# Patient Record
Sex: FEMALE | Race: WHITE | NOT HISPANIC OR LATINO | Employment: OTHER | URBAN - METROPOLITAN AREA
[De-identification: names, ages, dates, MRNs, and addresses within clinical notes are randomized per-mention and may not be internally consistent; named-entity substitution may affect disease eponyms.]

---

## 2017-03-08 ENCOUNTER — GENERIC CONVERSION - ENCOUNTER (OUTPATIENT)
Dept: OTHER | Facility: OTHER | Age: 77
End: 2017-03-08

## 2017-03-09 ENCOUNTER — GENERIC CONVERSION - ENCOUNTER (OUTPATIENT)
Dept: OTHER | Facility: OTHER | Age: 77
End: 2017-03-09

## 2017-03-20 ENCOUNTER — GENERIC CONVERSION - ENCOUNTER (OUTPATIENT)
Dept: OTHER | Facility: OTHER | Age: 77
End: 2017-03-20

## 2017-03-30 ENCOUNTER — ALLSCRIPTS OFFICE VISIT (OUTPATIENT)
Dept: OTHER | Facility: OTHER | Age: 77
End: 2017-03-30

## 2017-04-10 ENCOUNTER — GENERIC CONVERSION - ENCOUNTER (OUTPATIENT)
Dept: OTHER | Facility: OTHER | Age: 77
End: 2017-04-10

## 2017-04-19 ENCOUNTER — APPOINTMENT (OUTPATIENT)
Dept: LAB | Facility: HOSPITAL | Age: 77
End: 2017-04-19
Attending: INTERNAL MEDICINE
Payer: MEDICARE

## 2017-04-19 ENCOUNTER — TRANSCRIBE ORDERS (OUTPATIENT)
Dept: ADMINISTRATIVE | Facility: HOSPITAL | Age: 77
End: 2017-04-19

## 2017-04-19 ENCOUNTER — ALLSCRIPTS OFFICE VISIT (OUTPATIENT)
Dept: OTHER | Facility: OTHER | Age: 77
End: 2017-04-19

## 2017-04-19 DIAGNOSIS — I10 ESSENTIAL (PRIMARY) HYPERTENSION: ICD-10-CM

## 2017-04-19 DIAGNOSIS — R06.02 SOB (SHORTNESS OF BREATH): ICD-10-CM

## 2017-04-19 DIAGNOSIS — J84.9 INTERSTITIAL PULMONARY DISEASE (HCC): ICD-10-CM

## 2017-04-19 DIAGNOSIS — E78.5 HYPERLIPIDEMIA: ICD-10-CM

## 2017-04-19 DIAGNOSIS — I10 ESSENTIAL HYPERTENSION, MALIGNANT: ICD-10-CM

## 2017-04-19 DIAGNOSIS — E83.51 HYPOCALCEMIA: ICD-10-CM

## 2017-04-19 DIAGNOSIS — I10 ESSENTIAL HYPERTENSION, MALIGNANT: Primary | ICD-10-CM

## 2017-04-19 DIAGNOSIS — M25.569 PAIN IN KNEE: ICD-10-CM

## 2017-04-19 LAB
CA-I BLD-SCNC: 1.21 MMOL/L (ref 1.12–1.32)
PTH-INTACT SERPL-MCNC: 18.3 PG/ML (ref 14–72)

## 2017-04-19 PROCEDURE — 36415 COLL VENOUS BLD VENIPUNCTURE: CPT

## 2017-04-19 PROCEDURE — 84165 PROTEIN E-PHORESIS SERUM: CPT

## 2017-04-19 PROCEDURE — 83970 ASSAY OF PARATHORMONE: CPT

## 2017-04-19 PROCEDURE — 86480 TB TEST CELL IMMUN MEASURE: CPT

## 2017-04-19 PROCEDURE — 82330 ASSAY OF CALCIUM: CPT

## 2017-04-21 LAB
ALBUMIN SERPL ELPH-MCNC: 4.11 G/DL (ref 3.5–5)
ALBUMIN SERPL ELPH-MCNC: 59.5 % (ref 52–65)
ALPHA1 GLOB SERPL ELPH-MCNC: 0.38 G/DL (ref 0.1–0.4)
ALPHA1 GLOB SERPL ELPH-MCNC: 5.5 % (ref 2.5–5)
ALPHA2 GLOB SERPL ELPH-MCNC: 0.9 G/DL (ref 0.4–1.2)
ALPHA2 GLOB SERPL ELPH-MCNC: 13.1 % (ref 7–13)
ANNOTATION COMMENT IMP: NORMAL
BETA GLOB ABNORMAL SERPL ELPH-MCNC: 0.42 G/DL (ref 0.4–0.8)
BETA1 GLOB SERPL ELPH-MCNC: 6.1 % (ref 5–13)
BETA2 GLOB SERPL ELPH-MCNC: 5.4 % (ref 2–8)
BETA2+GAMMA GLOB SERPL ELPH-MCNC: 0.37 G/DL (ref 0.2–0.5)
GAMMA GLOB ABNORMAL SERPL ELPH-MCNC: 0.72 G/DL (ref 0.5–1.6)
GAMMA GLOB SERPL ELPH-MCNC: 10.4 % (ref 12–22)
GAMMA INTERFERON BACKGROUND BLD IA-ACNC: 0.06 IU/ML
IGG/ALB SER: 1.47 {RATIO} (ref 1.1–1.8)
M TB IFN-G BLD-IMP: NEGATIVE
M TB IFN-G CD4+ BCKGRND COR BLD-ACNC: 0.05 IU/ML
M TB IFN-G CD4+ T-CELLS BLD-ACNC: 0.11 IU/ML
MITOGEN IGNF BLD-ACNC: 6.42 IU/ML
PROT PATTERN SERPL ELPH-IMP: ABNORMAL
PROT SERPL-MCNC: 6.9 G/DL (ref 6.4–8.2)
QUANTIFERON-TB GOLD IN TUBE: NORMAL
SERVICE CMNT-IMP: NORMAL

## 2017-04-25 ENCOUNTER — HOSPITAL ENCOUNTER (OUTPATIENT)
Dept: CT IMAGING | Facility: HOSPITAL | Age: 77
Discharge: HOME | End: 2017-04-25
Attending: INTERNAL MEDICINE
Payer: MEDICARE

## 2017-04-25 DIAGNOSIS — J84.9 INTERSTITIAL PULMONARY DISEASE (HCC): ICD-10-CM

## 2017-04-25 PROCEDURE — 71250 CT THORAX DX C-: CPT

## 2017-05-12 ENCOUNTER — ALLSCRIPTS OFFICE VISIT (OUTPATIENT)
Dept: OTHER | Facility: OTHER | Age: 77
End: 2017-05-12

## 2017-05-12 ENCOUNTER — TRANSCRIBE ORDERS (OUTPATIENT)
Dept: MRI IMAGING | Facility: CLINIC | Age: 77
End: 2017-05-12

## 2017-05-12 ENCOUNTER — HOSPITAL ENCOUNTER (OUTPATIENT)
Dept: RADIOLOGY | Facility: CLINIC | Age: 77
Discharge: HOME | End: 2017-05-12
Payer: MEDICARE

## 2017-05-12 DIAGNOSIS — M25.569 PAIN IN KNEE: ICD-10-CM

## 2017-05-12 PROCEDURE — 73560 X-RAY EXAM OF KNEE 1 OR 2: CPT

## 2017-06-15 ENCOUNTER — ALLSCRIPTS OFFICE VISIT (OUTPATIENT)
Dept: OTHER | Facility: OTHER | Age: 77
End: 2017-06-15

## 2017-07-07 RX ORDER — METHYLPREDNISOLONE SODIUM SUCCINATE 125 MG/2ML
100 INJECTION, POWDER, LYOPHILIZED, FOR SOLUTION INTRAMUSCULAR; INTRAVENOUS ONCE
Status: COMPLETED | OUTPATIENT
Start: 2017-07-10 | End: 2017-07-10

## 2017-07-07 RX ORDER — ACETAMINOPHEN 325 MG/1
1000 TABLET ORAL ONCE
Status: COMPLETED | OUTPATIENT
Start: 2017-07-10 | End: 2017-07-10

## 2017-07-07 RX ORDER — DIPHENHYDRAMINE HCL 25 MG
50 TABLET ORAL ONCE
Status: COMPLETED | OUTPATIENT
Start: 2017-07-10 | End: 2017-07-10

## 2017-07-07 RX ORDER — SODIUM CHLORIDE 9 MG/ML
20 INJECTION, SOLUTION INTRAVENOUS CONTINUOUS
Status: DISCONTINUED | OUTPATIENT
Start: 2017-07-10 | End: 2017-07-13 | Stop reason: HOSPADM

## 2017-07-10 ENCOUNTER — HOSPITAL ENCOUNTER (OUTPATIENT)
Dept: INFUSION CENTER | Facility: CLINIC | Age: 77
Discharge: HOME | End: 2017-07-10
Payer: MEDICARE

## 2017-07-10 VITALS
HEIGHT: 65 IN | RESPIRATION RATE: 16 BRPM | WEIGHT: 187.5 LBS | HEART RATE: 73 BPM | DIASTOLIC BLOOD PRESSURE: 67 MMHG | TEMPERATURE: 98.2 F | SYSTOLIC BLOOD PRESSURE: 144 MMHG | BODY MASS INDEX: 31.24 KG/M2

## 2017-07-10 LAB
ALBUMIN SERPL BCP-MCNC: 3.4 G/DL (ref 3.5–5)
ALP SERPL-CCNC: 44 U/L (ref 46–116)
ALT SERPL W P-5'-P-CCNC: 27 U/L (ref 12–78)
AST SERPL W P-5'-P-CCNC: 30 U/L (ref 5–45)
BASOPHILS # BLD AUTO: 0.06 THOUSANDS/ΜL (ref 0–0.1)
BASOPHILS NFR BLD AUTO: 1 % (ref 0–1)
BILIRUB DIRECT SERPL-MCNC: 0.16 MG/DL (ref 0–0.2)
BILIRUB SERPL-MCNC: 0.6 MG/DL (ref 0.2–1)
CREAT SERPL-MCNC: 1.2 MG/DL (ref 0.6–1.3)
CRP SERPL QL: 17.9 MG/L
EOSINOPHIL # BLD AUTO: 0.34 THOUSAND/ΜL (ref 0–0.61)
EOSINOPHIL NFR BLD AUTO: 6 % (ref 0–6)
ERYTHROCYTE [DISTWIDTH] IN BLOOD BY AUTOMATED COUNT: 14.4 % (ref 11.6–15.1)
ERYTHROCYTE [SEDIMENTATION RATE] IN BLOOD: 24 MM/HOUR (ref 0–20)
GFR SERPL CREATININE-BSD FRML MDRD: 43.8 ML/MIN/1.73SQ M
HCT VFR BLD AUTO: 36.4 % (ref 34.8–46.1)
HGB BLD-MCNC: 11.7 G/DL (ref 11.5–15.4)
LYMPHOCYTES # BLD AUTO: 1.47 THOUSANDS/ΜL (ref 0.6–4.47)
LYMPHOCYTES NFR BLD AUTO: 24 % (ref 14–44)
MCH RBC QN AUTO: 31.4 PG (ref 26.8–34.3)
MCHC RBC AUTO-ENTMCNC: 32.1 G/DL (ref 31.4–37.4)
MCV RBC AUTO: 98 FL (ref 82–98)
MONOCYTES # BLD AUTO: 0.5 THOUSAND/ΜL (ref 0.17–1.22)
MONOCYTES NFR BLD AUTO: 8 % (ref 4–12)
NEUTROPHILS # BLD AUTO: 3.84 THOUSANDS/ΜL (ref 1.85–7.62)
NEUTS SEG NFR BLD AUTO: 61 % (ref 43–75)
PLATELET # BLD AUTO: 221 THOUSANDS/UL (ref 149–390)
PMV BLD AUTO: 10 FL (ref 8.9–12.7)
PROT SERPL-MCNC: 6.6 G/DL (ref 6.4–8.2)
RBC # BLD AUTO: 3.73 MILLION/UL (ref 3.81–5.12)
WBC # BLD AUTO: 6.21 THOUSAND/UL (ref 4.31–10.16)

## 2017-07-10 PROCEDURE — 96413 CHEMO IV INFUSION 1 HR: CPT

## 2017-07-10 PROCEDURE — 85652 RBC SED RATE AUTOMATED: CPT | Performed by: INTERNAL MEDICINE

## 2017-07-10 PROCEDURE — 85025 COMPLETE CBC W/AUTO DIFF WBC: CPT | Performed by: INTERNAL MEDICINE

## 2017-07-10 PROCEDURE — 80076 HEPATIC FUNCTION PANEL: CPT | Performed by: INTERNAL MEDICINE

## 2017-07-10 PROCEDURE — 82565 ASSAY OF CREATININE: CPT | Performed by: INTERNAL MEDICINE

## 2017-07-10 PROCEDURE — 96375 TX/PRO/DX INJ NEW DRUG ADDON: CPT

## 2017-07-10 PROCEDURE — 96415 CHEMO IV INFUSION ADDL HR: CPT

## 2017-07-10 PROCEDURE — 86140 C-REACTIVE PROTEIN: CPT | Performed by: INTERNAL MEDICINE

## 2017-07-10 RX ADMIN — METHYLPREDNISOLONE SODIUM SUCCINATE 100 MG: 125 INJECTION, POWDER, FOR SOLUTION INTRAMUSCULAR; INTRAVENOUS at 09:14

## 2017-07-10 RX ADMIN — DIPHENHYDRAMINE HYDROCHLORIDE 50 MG: 25 TABLET ORAL at 09:14

## 2017-07-10 RX ADMIN — RITUXIMAB: 10 INJECTION, SOLUTION INTRAVENOUS at 09:51

## 2017-07-10 RX ADMIN — SODIUM CHLORIDE 20 ML/HR: 900 INJECTION, SOLUTION INTRAVENOUS at 09:00

## 2017-07-10 RX ADMIN — ACETAMINOPHEN 975 MG: 325 TABLET ORAL at 09:14

## 2017-07-10 NOTE — PROGRESS NOTES
Pt resting with no complaints  Vitals stable; labs drawn from newly placed IV per order  Callbell within reach; will continue to monitor

## 2017-07-21 RX ORDER — ACETAMINOPHEN 325 MG/1
975 TABLET ORAL ONCE
Status: COMPLETED | OUTPATIENT
Start: 2017-07-24 | End: 2017-07-24

## 2017-07-21 RX ORDER — SODIUM CHLORIDE 9 MG/ML
20 INJECTION, SOLUTION INTRAVENOUS CONTINUOUS
Status: DISCONTINUED | OUTPATIENT
Start: 2017-07-24 | End: 2017-07-27 | Stop reason: HOSPADM

## 2017-07-21 RX ORDER — DIPHENHYDRAMINE HCL 50 MG
50 CAPSULE ORAL ONCE
Status: COMPLETED | OUTPATIENT
Start: 2017-07-24 | End: 2017-07-24

## 2017-07-21 RX ORDER — METHYLPREDNISOLONE SODIUM SUCCINATE 125 MG/2ML
100 INJECTION, POWDER, LYOPHILIZED, FOR SOLUTION INTRAMUSCULAR; INTRAVENOUS ONCE
Status: COMPLETED | OUTPATIENT
Start: 2017-07-24 | End: 2017-07-24

## 2017-07-24 ENCOUNTER — HOSPITAL ENCOUNTER (OUTPATIENT)
Dept: INFUSION CENTER | Facility: CLINIC | Age: 77
Discharge: HOME | End: 2017-07-24
Payer: MEDICARE

## 2017-07-24 VITALS
RESPIRATION RATE: 20 BRPM | TEMPERATURE: 98.4 F | HEIGHT: 65 IN | DIASTOLIC BLOOD PRESSURE: 68 MMHG | WEIGHT: 186.5 LBS | OXYGEN SATURATION: 97 % | SYSTOLIC BLOOD PRESSURE: 138 MMHG | BODY MASS INDEX: 31.07 KG/M2 | HEART RATE: 72 BPM

## 2017-07-24 PROCEDURE — 96375 TX/PRO/DX INJ NEW DRUG ADDON: CPT

## 2017-07-24 PROCEDURE — 96413 CHEMO IV INFUSION 1 HR: CPT

## 2017-07-24 PROCEDURE — 96415 CHEMO IV INFUSION ADDL HR: CPT

## 2017-07-24 RX ADMIN — DIPHENHYDRAMINE HYDROCHLORIDE 50 MG: 50 CAPSULE ORAL at 09:24

## 2017-07-24 RX ADMIN — SODIUM CHLORIDE 20 ML/HR: 0.9 INJECTION, SOLUTION INTRAVENOUS at 09:17

## 2017-07-24 RX ADMIN — RITUXIMAB: 10 INJECTION, SOLUTION INTRAVENOUS at 10:19

## 2017-07-24 RX ADMIN — METHYLPREDNISOLONE SODIUM SUCCINATE 100 MG: 125 INJECTION, POWDER, FOR SOLUTION INTRAMUSCULAR; INTRAVENOUS at 09:25

## 2017-07-24 RX ADMIN — ACETAMINOPHEN 975 MG: 325 TABLET ORAL at 09:24

## 2017-07-24 NOTE — PROGRESS NOTES
Pt to clinic for rituximab infusion, offers no complaints at this time,  will make next appointment, declines avs

## 2017-08-02 ENCOUNTER — GENERIC CONVERSION - ENCOUNTER (OUTPATIENT)
Dept: OTHER | Facility: OTHER | Age: 77
End: 2017-08-02

## 2017-08-02 DIAGNOSIS — R91.1 SOLITARY PULMONARY NODULE: ICD-10-CM

## 2017-09-21 ENCOUNTER — TRANSCRIBE ORDERS (OUTPATIENT)
Dept: ADMINISTRATIVE | Facility: HOSPITAL | Age: 77
End: 2017-09-21

## 2017-09-21 ENCOUNTER — APPOINTMENT (OUTPATIENT)
Dept: LAB | Facility: HOSPITAL | Age: 77
End: 2017-09-21
Attending: INTERNAL MEDICINE
Payer: MEDICARE

## 2017-09-21 DIAGNOSIS — E03.9 HYPOTHYROIDISM: ICD-10-CM

## 2017-09-21 DIAGNOSIS — I10 ESSENTIAL HYPERTENSION, MALIGNANT: ICD-10-CM

## 2017-09-21 DIAGNOSIS — E78.5 OTHER AND UNSPECIFIED HYPERLIPIDEMIA: ICD-10-CM

## 2017-09-21 DIAGNOSIS — E55.9 VITAMIN D DEFICIENCY: ICD-10-CM

## 2017-09-21 DIAGNOSIS — E87.6 HYPOKALEMIA: ICD-10-CM

## 2017-09-21 DIAGNOSIS — E55.9 UNSPECIFIED VITAMIN D DEFICIENCY: ICD-10-CM

## 2017-09-21 DIAGNOSIS — E78.5 HYPERLIPIDEMIA: ICD-10-CM

## 2017-09-21 DIAGNOSIS — E03.9 UNSPECIFIED HYPOTHYROIDISM: ICD-10-CM

## 2017-09-21 DIAGNOSIS — I10 ESSENTIAL (PRIMARY) HYPERTENSION: ICD-10-CM

## 2017-09-21 DIAGNOSIS — E03.9 UNSPECIFIED HYPOTHYROIDISM: Primary | ICD-10-CM

## 2017-09-21 LAB
ALBUMIN SERPL BCP-MCNC: 3.4 G/DL (ref 3.5–5)
ALP SERPL-CCNC: 43 U/L (ref 46–116)
ALT SERPL W P-5'-P-CCNC: 32 U/L (ref 12–78)
ANION GAP SERPL CALCULATED.3IONS-SCNC: 7 MMOL/L (ref 4–13)
AST SERPL W P-5'-P-CCNC: 31 U/L (ref 5–45)
BASOPHILS # BLD AUTO: 0.08 THOUSANDS/ΜL (ref 0–0.1)
BASOPHILS NFR BLD AUTO: 2 % (ref 0–1)
BILIRUB SERPL-MCNC: 0.4 MG/DL (ref 0.2–1)
BUN SERPL-MCNC: 21 MG/DL (ref 5–25)
CALCIUM SERPL-MCNC: 9.6 MG/DL (ref 8.3–10.1)
CHLORIDE SERPL-SCNC: 105 MMOL/L (ref 100–108)
CO2 SERPL-SCNC: 31 MMOL/L (ref 21–32)
CREAT SERPL-MCNC: 1.26 MG/DL (ref 0.6–1.3)
CRP SERPL QL: 9.5 MG/L
EOSINOPHIL # BLD AUTO: 0.21 THOUSAND/ΜL (ref 0–0.61)
EOSINOPHIL NFR BLD AUTO: 4 % (ref 0–6)
ERYTHROCYTE [DISTWIDTH] IN BLOOD BY AUTOMATED COUNT: 14.3 % (ref 11.6–15.1)
ERYTHROCYTE [SEDIMENTATION RATE] IN BLOOD: 27 MM/HOUR (ref 0–20)
GFR SERPL CREATININE-BSD FRML MDRD: 41 ML/MIN/1.73SQ M
GLUCOSE P FAST SERPL-MCNC: 99 MG/DL (ref 65–99)
HCT VFR BLD AUTO: 34.7 % (ref 34.8–46.1)
HGB BLD-MCNC: 11 G/DL (ref 11.5–15.4)
LYMPHOCYTES # BLD AUTO: 1.2 THOUSANDS/ΜL (ref 0.6–4.47)
LYMPHOCYTES NFR BLD AUTO: 22 % (ref 14–44)
MCH RBC QN AUTO: 31.1 PG (ref 26.8–34.3)
MCHC RBC AUTO-ENTMCNC: 31.7 G/DL (ref 31.4–37.4)
MCV RBC AUTO: 98 FL (ref 82–98)
MONOCYTES # BLD AUTO: 0.51 THOUSAND/ΜL (ref 0.17–1.22)
MONOCYTES NFR BLD AUTO: 9 % (ref 4–12)
NEUTROPHILS # BLD AUTO: 3.45 THOUSANDS/ΜL (ref 1.85–7.62)
NEUTS SEG NFR BLD AUTO: 63 % (ref 43–75)
NRBC BLD AUTO-RTO: 0 /100 WBCS
PLATELET # BLD AUTO: 239 THOUSANDS/UL (ref 149–390)
PMV BLD AUTO: 9.4 FL (ref 8.9–12.7)
POTASSIUM SERPL-SCNC: 4.2 MMOL/L (ref 3.5–5.3)
PROT SERPL-MCNC: 6.9 G/DL (ref 6.4–8.2)
RBC # BLD AUTO: 3.54 MILLION/UL (ref 3.81–5.12)
SODIUM SERPL-SCNC: 143 MMOL/L (ref 136–145)
T4 FREE SERPL-MCNC: 1.2 NG/DL (ref 0.76–1.46)
TSH SERPL DL<=0.05 MIU/L-ACNC: 3.3 UIU/ML (ref 0.36–3.74)
WBC # BLD AUTO: 5.46 THOUSAND/UL (ref 4.31–10.16)

## 2017-09-21 PROCEDURE — 84443 ASSAY THYROID STIM HORMONE: CPT

## 2017-09-21 PROCEDURE — 86140 C-REACTIVE PROTEIN: CPT

## 2017-09-21 PROCEDURE — 36415 COLL VENOUS BLD VENIPUNCTURE: CPT

## 2017-09-21 PROCEDURE — 84439 ASSAY OF FREE THYROXINE: CPT

## 2017-09-21 PROCEDURE — 80053 COMPREHEN METABOLIC PANEL: CPT

## 2017-09-21 PROCEDURE — 85025 COMPLETE CBC W/AUTO DIFF WBC: CPT

## 2017-09-21 PROCEDURE — 85652 RBC SED RATE AUTOMATED: CPT

## 2017-09-30 DIAGNOSIS — E78.5 HYPERLIPIDEMIA: ICD-10-CM

## 2017-09-30 DIAGNOSIS — R01.1 CARDIAC MURMUR: ICD-10-CM

## 2017-09-30 DIAGNOSIS — E87.6 HYPOKALEMIA: ICD-10-CM

## 2017-09-30 DIAGNOSIS — E55.9 VITAMIN D DEFICIENCY: ICD-10-CM

## 2017-09-30 DIAGNOSIS — I10 ESSENTIAL (PRIMARY) HYPERTENSION: ICD-10-CM

## 2017-09-30 DIAGNOSIS — E03.9 HYPOTHYROIDISM: ICD-10-CM

## 2017-10-12 ENCOUNTER — HOSPITAL ENCOUNTER (OUTPATIENT)
Dept: CT IMAGING | Facility: HOSPITAL | Age: 77
Discharge: HOME | End: 2017-10-12
Attending: INTERNAL MEDICINE
Payer: MEDICARE

## 2017-10-12 ENCOUNTER — TRANSCRIBE ORDERS (OUTPATIENT)
Dept: ADMINISTRATIVE | Facility: HOSPITAL | Age: 77
End: 2017-10-12

## 2017-10-12 ENCOUNTER — ALLSCRIPTS OFFICE VISIT (OUTPATIENT)
Dept: OTHER | Facility: OTHER | Age: 77
End: 2017-10-12

## 2017-10-12 DIAGNOSIS — R91.1 PULMONARY NODULE: ICD-10-CM

## 2017-10-12 DIAGNOSIS — R91.1 PULMONARY NODULE: Primary | ICD-10-CM

## 2017-10-12 PROCEDURE — 71250 CT THORAX DX C-: CPT

## 2017-10-13 NOTE — PROGRESS NOTES
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)   9  Vitamin D deficiency (268 9) (E55 9)   10  Adenocarcinoma of breast (174 9) (C50 919)    Plan  Allergic rhinitis    · PredniSONE (Kirby) 10 MG TABS  Hyperlipidemia    · (1) LIPID PANEL FASTING W DIRECT LDL REFLEX; Status:Active; Requested for:12Apr2018;   Hyperlipidemia, Hypertension, Hypothyroidism, Pulmonary nodule, Rheumatoid arthritis,  Tricompartment osteoarthritis of right knee, Vitamin D deficiency    · (1) CBC/PLT/DIFF; Status:Active; Requested for:12Apr2018;    · (1) COMPREHENSIVE METABOLIC PANEL; Status:Active; Requested for:12Apr2018;    · (1) C-REACTIVE PROTEIN; Status:Active; Requested for:12Apr2018;    · (1) SED RATE; Status:Active; Requested for:12Apr2018;    · (1) T4, FREE; Status:Active; Requested for:12Apr2018;    · (1) TSH; Status:Active; Requested for:12Apr2018;    · (1) VITAMIN D 25-HYDROXY; Status:Active; Requested for:12Apr2018;    · Follow-up visit in 6 months Evaluation and Treatment  Follow-up  Status: Hold For - Scheduling   Requested for: 12Apr2018  Hypertension    · EKG/ECG- POC; Status:Complete - Retrospective Authorization;   Done: 57HHP3695 01:33PM   · EKG/ECG- POC ; every 1 year; Last 19XKG2788; Next 06KKY5269; Status:Active  Hypothyroidism    · Levothyroxine Sodium 50 MCG Oral Tablet; TAKE 1 TABLET BY MOUTH EVERY OTHER DAY   · Levothyroxine Sodium 75 MCG Oral Tablet; TAKE 1 TABLET EVERY OTHER DAY   · Call [phone] if: The symptoms are not better in 7 days ; Status:Complete;   Done: 88DBM6242   · Call [phone] if:  You begin to have tremors or your hands become shaky ; Status:Complete;    Done: 92GPJ9575   · Call [phone] if: You gain or lose over 10 pounds without a change in your diet ;  Status:Complete;   Done: 82JFO7803   · Call [phone] if: You have symptoms of anxiety ; Status:Complete;   Done: 60VCG5152   · Call [phone] if: You lose weight without trying to ; Status:Complete;   Done: 56EKA2195   · Call [phone] if: Your loss of memory seems to be worse ; Status:Complete;   Done:  47GIF7865   · Call 911 if: You are having trouble staying awake ; Status:Complete;   Done: 17YQQ9987   · Call 911 if: You experience a new kind of chest pain (angina) or pressure ; Status:Complete;   Done:  84CMU2174   · Call 911 if: You experience a seizure ; Status:Complete;   Done: 09RGB6694   · Call 911 if: You have fainted or passed out ; Status:Complete;   Done: 79DDY5368   · Seek Immediate Medical Attention if: You are feeling short of breath ; Status:Complete;   Done:  75YQB1822   · Seek Immediate Medical Attention if: You feel your heart is beating very fast or skipping beats ;  Status:Complete;   Done: 56JUN6359   · Seek Immediate Medical Attention if: You have signs of too much thyroid hormone ; Status:Complete;    Done: 11ZSC8357   · Keep a diary of when and what you eat ; Status:Complete;   Done: 12Oct2017  Murmur    · ECHO COMPLETE WITH CONTRAST IF INDICATED; Status:Hold For - Scheduling; Requested  for:12Oct2017;    · Follow-up visit in 6 months Evaluation and Treatment  Follow-up  Status: Hold For - Scheduling   Requested for: 12Oct2017  Pulmonary nodule    · * CT CHEST WO CONTRAST; Status:Active;  Requested for:12Oct2017;     Discussion/Summary  Discussion Summary:   1 cardiac murmur appreciated before will check echocardiogramhypothyroidism is at goal recheck in 6 monthsrheumatoid arthritis is stable at this time continued follow-up with Dr Reynolds Amy check lipid panel next visitbreast cancer diagnosed 6 years ago follow-up with Dr Colin Rushing on tamoxifen estrogen receptor positivenodule we'll get repeat get CAT scan  Counseling Documentation With Imm: The patient was counseled regarding diagnostic results,-instructions for management,-risk factor reductions,-prognosis,-impressions,-risks and benefits of treatment options,-importance of compliance with treatment  total time of encounter was 30 [id number] minutes was spent counseling  Medication SE Review and Pt Understands Tx: Possible side effects of new medications were reviewed with the patient/guardian today  The treatment plan was reviewed with the patient/guardian  The patient/guardian understands and agrees with the treatment plan      Chief Complaint  Chief Complaint Free Text Note Form: f/u- Pt presents for f/u appt  to review labs  Labs are in chart  History of Present Illness  Breast Cancer: The patient is being seen for follows Dr Jay 6 years no recurrance breast cancer  The cancer is Patient is 5 years since diagnosis good follow-up with Dr Jay  Electrolyte Imbalance (Brief): The patient is being seen for a routine clinic follow-up of electrolyte imbalance  The patient has hypokalemia  The patient is currently asymptomatic  Hyperlipidemia (Follow-Up): Comorbid Illnesses: Rheumatoid arthritis  Symptoms: The patient is currently asymptomatic  Associated symptoms include no focal neurologic deficits-and-no memory loss  Medications: the patient is adherent with her medication regimen -She denies medication side effects  Rheumatoid Arthritis (Follow-Up): The patient states her rheumatoid arthritis has been stable since the last visit  Comorbid Illnesses: Good follow-up with Dr Kali Woodard no evidence of hot swollen joints morning stiffness is stable  Symptoms:   Vitamin D Deficiency: Disease type: vitamin D deficiency  Lung Nodule: The patient is currently asymptomatic  Review of Systems  Complete-Female:   Constitutional: No fever, no chills, feels well, no tiredness, no recent weight gain or weight loss     Eyes: No complaints of eye pain, no red eyes, no eyesight problems, no discharge, no dry eyes, no itching of eyes  ENT: no complaints of earache, no loss of hearing, no nose bleeds, no nasal discharge, no sore throat, no hoarseness  Cardiovascular: No complaints of slow heart rate, no fast heart rate, no chest pain, no palpitations, no leg claudication, no lower extremity edema  Respiratory: No complaints of shortness of breath, no wheezing, no cough, no SOB on exertion, no orthopnea, no PND  Gastrointestinal: No complaints of abdominal pain, no constipation, no nausea or vomiting, no diarrhea, no bloody stools  Genitourinary: No complaints of dysuria, no incontinence, no pelvic pain, no dysmenorrhea, no vaginal discharge or bleeding  Musculoskeletal: as noted in HPI  Integumentary: No complaints of skin rash or lesions, no itching, no skin wounds, no breast pain or lump  Neurological: tingling, but-No complaints of headache, no confusion, no convulsions, no numbness, no dizziness or fainting, no tingling, no limb weakness, no difficulty walking-and-as noted in HPI  Psychiatric: Not suicidal, no sleep disturbance, no anxiety or depression, no change in personality, no emotional problems  Endocrine: No complaints of proptosis, no hot flashes, no muscle weakness, no deepening of the voice, no feelings of weakness  Hematologic/Lymphatic: No complaints of swollen glands, no swollen glands in the neck, does not bleed easily, does not bruise easily  Active Problems  1  Abnormal blood chemistry (790 6) (R79 9)   2  Acute bronchitis (466 0) (J20 9)   3  Adenocarcinoma of breast (174 9) (C50 919)   4  Allergic rhinitis (477 9) (J30 9)   5  Enchondroma of bone (213 9) (D16 9)   6  Encounter for screening mammogram for malignant neoplasm of breast (V76 12) (Z12 31)   7  Hoarseness (784 42) (R49 0)   8  Hyperlipidemia (272 4) (E78 5)   9  Hypertension (401 9) (I10)   10  Hypokalemia (276 8) (E87 6)   11   Hypothyroidism (244  9) (E03 9)   12  Influenza vaccine needed (V04 81) (Z23)   13  Joint pain, knee (719 46) (M25 569)   14  Lumbago (724 2) (M54 5)   15  Lumbar radiculopathy (724 4) (M54 16)   16  Need for revaccination (V05 9) (Z23)   17  Osteopenia (733 90) (M85 80)   18  Other chronic pain (338 29) (G89 29)   19  Pulmonary nodule (793 11) (R91 1)   20  Renal disease (593 9) (N28 9)   21  Rheumatoid arthritis (714 0) (M06 9)   22  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)   23  Vitamin D deficiency (268 9) (E55 9)    Past Medical History  1  History of Acute upper respiratory infection (465 9) (J06 9)   2  History of Allergic angioedema (995 1) (T78 3XXA)   3  History of Colonoscopy (Fiberoptic) Screening   4  History of Hemorrhage of anus and rectum (569 3) (K62 5)   5  History of acute bronchitis (V12 69) (Z87 09)   6  History of angioedema (V13 89) (Z87 898)   7  History of Hypocalcemia (275 41) (E83 51)   8  History of Peptic Ulcer (V12 71)   9  History of Peptic Ulcer (V12 71)   10  History of Rheumatoid arthritis (714 0) (M06 9)   11  History of Rheumatoid arthritis (714 0) (M06 9)  Active Problems And Past Medical History Reviewed: The active problems and past medical history were reviewed and updated today  Surgical History  1  History of Cholecystectomy   2  History of Orthopedic Surgery  Surgical History Reviewed: The surgical history was reviewed and updated today  Family History  Mother    1  Family history of Breast Cancer (V16 3)   2  Family history of Mother  At Age ___  Father    1  Family history of Father  At Age ___   3  Family history of Renal Failure   5  Family history of Uremia  Sister    6  Family history of Breast Cancer (V16 3)   7  Family history of Breast Cancer (V16 3)   8  Family history of Breast Cancer (V16 3)  Maternal Aunt    9  Family history of Breast Cancer (V16 3)  Family History Reviewed:    The family history was reviewed and updated today  Social History   · Caffeine Use   · Never A Smoker   · Never Drank Alcohol  Social History Reviewed: The social history was reviewed and updated today  The social history was reviewed and is unchanged  Current Meds   1  Citracal + D TABS; Take 1 tablet daily; Therapy: (Recorded:12Mar2013) to Recorded   2  CO-Q 10 Omega-3 Fish Oil Oral Capsule; take 1 capsule daily; Therapy: (Recorded:12Mar2013) to Recorded   3  Crestor 5 MG Oral Tablet; TAKE 1 TABLET DAILY; Therapy: (Recorded:12Mar2013) to Recorded   4  HydroCHLOROthiazide 25 MG Oral Tablet; take 1 tablet every day; Therapy: 45Jhc5544 to (Evaluate:30Jun2018)  Requested for: 03Oct2017; Last Rx:03Oct2017   Ordered   5  Klor-Con 10 10 MEQ Oral Tablet Extended Release; TAKE 2 TABLETS TWICE DAILY; Therapy: 94UXV1051 to (Evaluate:17Oct2017)  Requested for: 01IBJ9722; Last Rx:19Jun2017   Ordered   6  KP Fexofenadine HCl - 180 MG Oral Tablet; TAKE 1 TABLET DAILY AS NEEDED; Therapy: 71Hkk3351 to (Evaluate:59Owb6784)  Requested for: 34Hde6924; Last Rx:56Rbn0121   Ordered   7  Levothyroxine Sodium 50 MCG Oral Tablet; TAKE 1 TABLET BY MOUTH EVERY OTHER DAY; Therapy: 75MZG7744 to (Evaluate:27Apr2017)  Requested for: 25Hqt3997; Last Rx:87Edc3700   Ordered   8  Levothyroxine Sodium 75 MCG Oral Tablet; TAKE 1 TABLET EVERY OTHER DAY; Therapy: 09Hep0207 to (Evaluate:69Hpw1139)  Requested for: 58Xfp9205; Last Rx:76Iti5880   Ordered   9  Methotrexate 2 5 MG Oral Tablet; Therapy: 79Jqg8800 to Recorded   10  Multi-Vitamin TABS; TAKE 1 TABLET DAILY; Therapy: (Recorded:12Mar2013) to Recorded   11  PredniSONE (Kirby) 10 MG TABS; TAKE 1 TABLET ONCE; Therapy: 45Oxg3131 to (Evaluate:65Opd6532)  Requested for: 43Hqj0333; Last Rx:30Osb0044    Ordered   12  PredniSONE 10 MG Oral Tablet; TAKE 4 TABLETS DAILY FOR 3 DAYS,3 TABLETS DAILY FOR 3    DAYS, 2 TABLETS DAILY FOR 3 DAYS AND 1 TABLET DAILY FOR 3 DAYS, THEN STOP;     Therapy: 40ULV4707 to (Last Rx: 05AAY1974)  Requested for: 47JHS8930 Ordered   13  Rituxan 10 MG/ML CONC; USE AS DIRECTED; Therapy: 29Cta6313 to Recorded   14  Saline Nasal Spray 0 65 % Nasal Solution; USE 1 SPRAY IN EACH NOSTRIL TWICE DAILY; Therapy: 43Kjl4778 to (Last [employer]ia Flash)  Requested for: 19Xqk3718 Ordered   15  SM Vitamin D3 2000 UNIT Oral Capsule; take 1 capsule daily  Requested for: 20Apr2015; Last    Rx:20Apr2015 Ordered   16  Tamoxifen Citrate 20 MG Oral Tablet; TAKE 1 TABLET DAILY; Therapy: 84FGG9723 to Recorded   17  Vitamin C 500 MG Oral Tablet; TAKE 1 TABLET DAILY; Therapy: (Recorded:12Mar2013) to Recorded  Medication List Reviewed: The medication list was reviewed and updated today  Allergies  1  Cipro TABS   2  Bactrim TABS   3  Codeine Derivatives   4  Darvocet-N 100 TABS   5  Lisinopril TABS   6  Penicillins   7  Percocet TABS    Vitals  Vital Signs    Recorded: 48GEB9794 01:56PM Recorded: 27IZJ9396 01:19PM   Temperature  98 8 F   Heart Rate  75   Respiration  18   Systolic 100, LUE, Sitting    Diastolic 60, LUE, Sitting    Height  5 ft 3 5 in   Weight  180 lb    BMI Calculated  31 39   BSA Calculated  1 86   O2 Saturation  98     Physical Exam    Constitutional   General appearance: Abnormal     Neck   Neck: Supple, symmetric, trachea midline, no masses  Thyroid: Normal, no thyromegaly  Pulmonary   Respiratory effort: No increased work of breathing or signs of respiratory distress  Cardiovascular   Auscultation of heart: Abnormal   The heart rate was normal  The rhythm was regular  Heart sounds: normal S1,-normal S2,-no gallop heard,-no S3-and-no S4  A grade 2 systolic murmur was heard at the LLSB  Carotid pulses: 2+ bilaterally  Examination of extremities for edema and/or varicosities: Abnormal   nonpitting edema present  Musculoskeletal Negative squeeze test positive ulnar deviation of both hands -severe ulnar deviation bilaterally of both hands     Psychiatric   Judgment and insight: Normal     Orientation to person, place, and time: Normal     Recent and remote memory: Intact  Mood and affect: Normal        Results/Data  EKG/ECG- POC 43WKR0517 01:33PM Myesha Villanueva     Test Name Result Flag Reference   EKG/ECG 10/12/2017         Health Management  Hypertension   EKG/ECG- POC; every 1 year; Last 02QLH5218; Next Due: 91FYC7041;  Active    Future Appointments    Date/Time Provider Specialty Site   10/25/2017 02:15 PM Myesha Villanueva DO Internal Medicine [facility] [address] [facility] INTERNAL MED [facility]     Signatures   Electronically signed by : Srinath Whitten DO; Oct 12 2017  2:08PM EST                       (Author)

## 2017-10-26 ENCOUNTER — HOSPITAL ENCOUNTER (OUTPATIENT)
Dept: NON INVASIVE DIAGNOSTICS | Facility: CLINIC | Age: 77
Discharge: HOME | End: 2017-10-26
Payer: MEDICARE

## 2017-10-26 DIAGNOSIS — R01.1 CARDIAC MURMUR: ICD-10-CM

## 2017-10-26 PROCEDURE — 93306 TTE W/DOPPLER COMPLETE: CPT

## 2017-12-28 ENCOUNTER — GENERIC CONVERSION - ENCOUNTER (OUTPATIENT)
Dept: OTHER | Facility: OTHER | Age: 77
End: 2017-12-28

## 2017-12-28 LAB
CHOLEST SERPL-MCNC: 123 MG/DL
CHOLEST/HDLC SERPL: 2.2 {RATIO}
HDLC SERPL-MCNC: 55 MG/DL
LDL CHOLESTEROL (HISTORICAL): 51
NON-HDL-CHOL (CHOL-HDL) (HISTORICAL): 68
TRIGL SERPL-MCNC: 84 MG/DL

## 2018-01-03 ENCOUNTER — HOSPITAL ENCOUNTER (OUTPATIENT)
Dept: RADIOLOGY | Facility: HOSPITAL | Age: 78
Discharge: HOME | End: 2018-01-06
Attending: INTERNAL MEDICINE
Payer: MEDICARE

## 2018-01-03 ENCOUNTER — ALLSCRIPTS OFFICE VISIT (OUTPATIENT)
Dept: OTHER | Facility: OTHER | Age: 78
End: 2018-01-03

## 2018-01-03 ENCOUNTER — TRANSCRIBE ORDERS (OUTPATIENT)
Dept: ADMINISTRATIVE | Facility: HOSPITAL | Age: 78
End: 2018-01-03

## 2018-01-03 DIAGNOSIS — M25.552 PAIN IN LEFT HIP: ICD-10-CM

## 2018-01-03 PROCEDURE — 73502 X-RAY EXAM HIP UNI 2-3 VIEWS: CPT

## 2018-01-04 NOTE — PROGRESS NOTES
Assessment   1  Left hip pain (719 45) (M25 552)   2  Adenocarcinoma of breast (174 9) (C59 988)    Plan   Adenocarcinoma of breast    · 1 - Anuja Nuno MD (Hematology/Oncology) Co-Management  *  Status: Active     Requested for: 97RMU7183  Care Summary provided  : Yes  Hypokalemia, Renal disease    · From  Klor-Con 10 10 MEQ Oral Tablet Extended Release TAKE 2 TABLETS    TWICE DAILY To Klor-Con 10 10 MEQ Oral Tablet Extended Release take 2 tablet daily  Left hip pain    · * XR HIP/PELV 2-3 VWS LEFT W PELVIS IF PERFORMED; Status:Active; Requested    RAR:87BWC3045;    · *1 - [facility] Co-Management  *PT   2-3 times per week for 6 weeks evaluate    and treat, medial muscle strain  Status: Active  Requested for: 88YAG6682  Care Summary provided  : Yes    Discussion/Summary      1 patient with left hip groin pain no history of trauma did have increased activity will get x-ray to rule out possible fracture she does have pain in the left groin area possible medial muscle strain although she does not have pain with adduction of her hips Patient with history of adenocarcinoma of the breast has been on tamoxifen for approximately 5 years will refer to Dr Hillary Casper for further follow-up  The patient was counseled regarding diagnostic results,-- instructions for management,-- risk factor reductions,-- prognosis,-- impressions,-- risks and benefits of treatment options,-- importance of compliance with treatment  total time of encounter was 30 minutes-- and-- 20 minutes was spent counseling  Possible side effects of new medications were reviewed with the patient/guardian today  The treatment plan was reviewed with the patient/guardian  The patient/guardian understands and agrees with the treatment plan      Chief Complaint   pt offers c/o groin pain x 3 weeks        History of Present Illness   HPI: 3 weeks ago getting in and out of [facility] 4 times now pain left groin once she gets up ok when getting up and down pain, Breast Cancer: The cancer is Adenocarcinoma of the breast       Review of Systems        Constitutional: No fever, no chills, feels well, no tiredness, no recent weight gain or weight loss  Eyes: No complaints of eye pain, no red eyes, no eyesight problems, no discharge, no dry eyes, no itching of eyes  ENT: no complaints of earache, no loss of hearing, no nose bleeds, no nasal discharge, no sore throat, no hoarseness  Cardiovascular: No complaints of slow heart rate, no fast heart rate, no chest pain, no palpitations, no leg claudication, no lower extremity edema  Respiratory: No complaints of shortness of breath, no wheezing, no cough, no SOB on exertion, no orthopnea, no PND  Gastrointestinal: No complaints of abdominal pain, no constipation, no nausea or vomiting, no diarrhea, no bloody stools  Genitourinary: No complaints of dysuria, no incontinence, no pelvic pain, no dysmenorrhea, no vaginal discharge or bleeding  Musculoskeletal: as noted in HPI  Integumentary: No complaints of skin rash or lesions, no itching, no skin wounds, no breast pain or lump  Neurological: tingling, but-- No complaints of headache, no confusion, no convulsions, no numbness, no dizziness or fainting, no tingling, no limb weakness, no difficulty walking-- and-- as noted in HPI  Psychiatric: Not suicidal, no sleep disturbance, no anxiety or depression, no change in personality, no emotional problems  Endocrine: No complaints of proptosis, no hot flashes, no muscle weakness, no deepening of the voice, no feelings of weakness  Hematologic/Lymphatic: No complaints of swollen glands, no swollen glands in the neck, does not bleed easily, does not bruise easily  Active Problems   1  Abnormal blood chemistry (790 6) (R79 9)   2  Acute bronchitis (466 0) (J20 9)   3  Adenocarcinoma of breast (174 9) (C50 919)   4  Allergic rhinitis (477 9) (J30 9)   5   Diastolic dysfunction (429  9) (I51 9)   6  Enchondroma of bone (213 9) (D16 9)   7  Encounter for screening mammogram for malignant neoplasm of breast (V76 12)     (Z12 31)   8  Hoarseness (784 42) (R49 0)   9  Hyperlipidemia (272 4) (E78 5)   10  Hypertension (401 9) (I10)   11  Hypokalemia (276 8) (E87 6)   12  Hypothyroidism (244 9) (E03 9)   13  Influenza vaccine needed (V04 81) (Z23)   14  Joint pain, knee (719 46) (M25 569)   15  Lumbago (724 2) (M54 5)   16  Lumbar radiculopathy (724 4) (M54 16)   17  Murmur (785 2) (R01 1)   18  Need for revaccination (V05 9) (Z23)   19  Osteopenia (733 90) (M85 80)   20  Other chronic pain (338 29) (G89 29)   21  Pulmonary nodule (793 11) (R91 1)   22  Renal disease (593 9) (N28 9)   23  Rheumatoid arthritis (714 0) (M06 9)   24  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)   25  Vitamin D deficiency (268 9) (E55 9)    Past Medical History   Active Problems And Past Medical History Reviewed: The active problems and past medical history were reviewed and updated today  Surgical History   Surgical History Reviewed: The surgical history was reviewed and updated today  Social History    · Caffeine Use   · Never A Smoker   · Never Drank Alcohol  The social history was reviewed and updated today  The social history was reviewed and is unchanged  Family History   Family History Reviewed: The family history was reviewed and updated today  Current Meds    1  Citracal + D TABS; Take 1 tablet daily; Therapy: (Recorded:12Mar2013) to Recorded   2  Crestor 5 MG Oral Tablet; TAKE 1 TABLET DAILY; Therapy: (Recorded:12Mar2013) to Recorded   3  HydroCHLOROthiazide 25 MG Oral Tablet; take 1 tablet every day; Therapy: 68Hml9581 to (Evaluate:30Jun2018)  Requested for: 40PVM8096; Last     Rx:03Oct2017 Ordered   4  Klor-Con 10 10 MEQ Oral Tablet Extended Release; TAKE 2 TABLETS TWICE DAILY;      Therapy: 36EVS2705 to (Evaluate:17Oct2017)  Requested for: 82MNK8414; Last     Rx:19Jun2017 Ordered   5  Levothyroxine Sodium 50 MCG Oral Tablet; TAKE 1 TABLET BY MOUTH EVERY OTHER     DAY; Therapy: 93UXJ2395 to (Evaluate:77Kpv2955)  Requested for: 31DJA7115; Last     Rx:13Nov2017 Ordered   6  Levothyroxine Sodium 75 MCG Oral Tablet; TAKE 1 TABLET EVERY OTHER DAY; Therapy: 61Cta1424 to (Liam Lovett)  Requested for: 52KHE5277; Last     Rx:13Nov2017 Ordered   7  Methotrexate 2 5 MG Oral Tablet; Therapy: 15Fqi7527 to Recorded   8  Multi-Vitamin TABS; TAKE 1 TABLET DAILY; Therapy: (Recorded:12Mar2013) to Recorded   9  Rituxan 10 MG/ML CONC; USE AS DIRECTED; Therapy: 20Aug2015 to Recorded   10  SM Vitamin D3 2000 UNIT Oral Capsule; take 1 capsule daily  Requested for:      20Apr2015; Last Rx:20Apr2015 Ordered   11  Tamoxifen Citrate 20 MG Oral Tablet; TAKE 1 TABLET DAILY; Therapy: 09OLB2610 to Recorded   12  Vitamin C 500 MG Oral Tablet; TAKE 1 TABLET DAILY; Therapy: (Recorded:12Mar2013) to Recorded     The medication list was reviewed and updated today  Allergies   1  Cipro TABS   2  Bactrim TABS   3  Codeine Derivatives   4  Darvocet-N 100 TABS   5  Lisinopril TABS   6  Penicillins   7  Percocet TABS    Vitals    Recorded: [id number] 03:47PM Recorded: 35MJK2207 03:23PM   Temperature  98 9 F   Heart Rate  78   Systolic 901, LUE, Sitting    Diastolic 80, LUE, Sitting    Height  5 ft 3 5 in   Weight  188 lb 6 oz   BMI Calculated  32 85   BSA Calculated  1 9   O2 Saturation  98     Physical Exam        Constitutional      General appearance: Abnormal        Neck      Neck: Supple, symmetric, trachea midline, no masses  Thyroid: Normal, no thyromegaly  Pulmonary      Respiratory effort: No increased work of breathing or signs of respiratory distress  Cardiovascular      Auscultation of heart: Normal rate and rhythm, normal S1 and S2, no murmurs  Carotid pulses: 2+ bilaterally         Examination of extremities for edema and/or varicosities: Abnormal   nonpitting edema present  Musculoskeletal pain left groin pain        Psychiatric      Mood and affect: Normal        Future Appointments      Date/Time Provider Specialty Site   05/01/2018 01:45 PM Abdirahman Weinberg DO Internal Medicine [facility] INTERNAL MED [facility]     Signatures    Electronically signed by : Mary Sosa DO; [place]  3 2018  4:03PM EST                       (Author)

## 2018-01-08 RX ORDER — SODIUM CHLORIDE 9 MG/ML
20 INJECTION, SOLUTION INTRAVENOUS CONTINUOUS
Status: DISCONTINUED | OUTPATIENT
Start: 2018-01-09 | End: 2018-01-12 | Stop reason: HOSPADM

## 2018-01-08 RX ORDER — METHYLPREDNISOLONE SODIUM SUCCINATE 40 MG/ML
100 INJECTION, POWDER, LYOPHILIZED, FOR SOLUTION INTRAMUSCULAR; INTRAVENOUS ONCE
Status: DISCONTINUED | OUTPATIENT
Start: 2018-01-09 | End: 2018-01-08

## 2018-01-08 RX ORDER — ACETAMINOPHEN 325 MG/1
975 TABLET ORAL ONCE
Status: COMPLETED | OUTPATIENT
Start: 2018-01-09 | End: 2018-01-09

## 2018-01-08 RX ORDER — METHYLPREDNISOLONE SODIUM SUCCINATE 40 MG/ML
100 INJECTION, POWDER, LYOPHILIZED, FOR SOLUTION INTRAMUSCULAR; INTRAVENOUS ONCE
Status: COMPLETED | OUTPATIENT
Start: 2018-01-09 | End: 2018-01-09

## 2018-01-08 RX ORDER — DIPHENHYDRAMINE HCL 25 MG
50 TABLET ORAL ONCE
Status: COMPLETED | OUTPATIENT
Start: 2018-01-09 | End: 2018-01-09

## 2018-01-09 ENCOUNTER — HOSPITAL ENCOUNTER (OUTPATIENT)
Dept: INFUSION CENTER | Facility: CLINIC | Age: 78
Discharge: HOME | End: 2018-01-11
Payer: MEDICARE

## 2018-01-09 VITALS
SYSTOLIC BLOOD PRESSURE: 152 MMHG | TEMPERATURE: 98.2 F | HEIGHT: 64 IN | BODY MASS INDEX: 31.84 KG/M2 | RESPIRATION RATE: 18 BRPM | HEART RATE: 75 BPM | DIASTOLIC BLOOD PRESSURE: 74 MMHG | WEIGHT: 186.51 LBS

## 2018-01-09 LAB
ALBUMIN SERPL BCP-MCNC: 3.5 G/DL (ref 3.5–5)
ALP SERPL-CCNC: 47 U/L (ref 46–116)
ALT SERPL W P-5'-P-CCNC: 21 U/L (ref 12–78)
AST SERPL W P-5'-P-CCNC: 21 U/L (ref 5–45)
BASOPHILS # BLD AUTO: 0.05 THOUSANDS/ΜL (ref 0–0.1)
BASOPHILS NFR BLD AUTO: 1 % (ref 0–1)
BILIRUB DIRECT SERPL-MCNC: 0.19 MG/DL (ref 0–0.2)
BILIRUB SERPL-MCNC: 0.4 MG/DL (ref 0.2–1)
CREAT SERPL-MCNC: 1.02 MG/DL (ref 0.6–1.3)
CRP SERPL QL: 9.6 MG/L
EOSINOPHIL # BLD AUTO: 0.15 THOUSAND/ΜL (ref 0–0.61)
EOSINOPHIL NFR BLD AUTO: 2 % (ref 0–6)
ERYTHROCYTE [DISTWIDTH] IN BLOOD BY AUTOMATED COUNT: 14.3 % (ref 11.6–15.1)
ERYTHROCYTE [SEDIMENTATION RATE] IN BLOOD: 26 MM/HOUR (ref 0–20)
GFR SERPL CREATININE-BSD FRML MDRD: 54 ML/MIN/1.73SQ M
HCT VFR BLD AUTO: 32.4 % (ref 34.8–46.1)
HGB BLD-MCNC: 10.5 G/DL (ref 11.5–15.4)
LYMPHOCYTES # BLD AUTO: 1.39 THOUSANDS/ΜL (ref 0.6–4.47)
LYMPHOCYTES NFR BLD AUTO: 18 % (ref 14–44)
MCH RBC QN AUTO: 31.8 PG (ref 26.8–34.3)
MCHC RBC AUTO-ENTMCNC: 32.4 G/DL (ref 31.4–37.4)
MCV RBC AUTO: 98 FL (ref 82–98)
MONOCYTES # BLD AUTO: 0.86 THOUSAND/ΜL (ref 0.17–1.22)
MONOCYTES NFR BLD AUTO: 11 % (ref 4–12)
NEUTROPHILS # BLD AUTO: 5.44 THOUSANDS/ΜL (ref 1.85–7.62)
NEUTS SEG NFR BLD AUTO: 69 % (ref 43–75)
NRBC BLD AUTO-RTO: 0 /100 WBCS
PLATELET # BLD AUTO: 231 THOUSANDS/UL (ref 149–390)
PMV BLD AUTO: 10 FL (ref 8.9–12.7)
PROT SERPL-MCNC: 6.6 G/DL (ref 6.4–8.2)
RBC # BLD AUTO: 3.3 MILLION/UL (ref 3.81–5.12)
WBC # BLD AUTO: 7.92 THOUSAND/UL (ref 4.31–10.16)

## 2018-01-09 PROCEDURE — 96415 CHEMO IV INFUSION ADDL HR: CPT

## 2018-01-09 PROCEDURE — 85652 RBC SED RATE AUTOMATED: CPT | Performed by: INTERNAL MEDICINE

## 2018-01-09 PROCEDURE — 80076 HEPATIC FUNCTION PANEL: CPT | Performed by: INTERNAL MEDICINE

## 2018-01-09 PROCEDURE — 82565 ASSAY OF CREATININE: CPT | Performed by: INTERNAL MEDICINE

## 2018-01-09 PROCEDURE — 86140 C-REACTIVE PROTEIN: CPT | Performed by: INTERNAL MEDICINE

## 2018-01-09 PROCEDURE — 85025 COMPLETE CBC W/AUTO DIFF WBC: CPT | Performed by: INTERNAL MEDICINE

## 2018-01-09 PROCEDURE — 96413 CHEMO IV INFUSION 1 HR: CPT

## 2018-01-09 PROCEDURE — 96375 TX/PRO/DX INJ NEW DRUG ADDON: CPT

## 2018-01-09 RX ADMIN — ACETAMINOPHEN 975 MG: 325 TABLET ORAL at 09:16

## 2018-01-09 RX ADMIN — SODIUM CHLORIDE 20 ML/HR: 0.9 INJECTION, SOLUTION INTRAVENOUS at 09:08

## 2018-01-09 RX ADMIN — METHYLPREDNISOLONE SODIUM SUCCINATE 100 MG: 40 INJECTION, POWDER, FOR SOLUTION INTRAMUSCULAR; INTRAVENOUS at 09:18

## 2018-01-09 RX ADMIN — DIPHENHYDRAMINE HCL 50 MG: 25 TABLET ORAL at 09:16

## 2018-01-09 RX ADMIN — RITUXIMAB: 10 INJECTION, SOLUTION INTRAVENOUS at 09:57

## 2018-01-09 NOTE — PROGRESS NOTES
Pt offers no complaints, resting comfortably  Vitals stable  Call bell in reach, will continue to monitor

## 2018-01-13 VITALS
HEIGHT: 64 IN | RESPIRATION RATE: 20 BRPM | WEIGHT: 189 LBS | TEMPERATURE: 98.5 F | OXYGEN SATURATION: 97 % | BODY MASS INDEX: 32.27 KG/M2 | SYSTOLIC BLOOD PRESSURE: 120 MMHG | HEART RATE: 66 BPM | DIASTOLIC BLOOD PRESSURE: 70 MMHG

## 2018-01-13 VITALS
WEIGHT: 183.13 LBS | DIASTOLIC BLOOD PRESSURE: 70 MMHG | SYSTOLIC BLOOD PRESSURE: 144 MMHG | BODY MASS INDEX: 31.27 KG/M2 | HEIGHT: 64 IN | HEART RATE: 74 BPM | OXYGEN SATURATION: 96 %

## 2018-01-14 VITALS
OXYGEN SATURATION: 98 % | SYSTOLIC BLOOD PRESSURE: 120 MMHG | WEIGHT: 180 LBS | BODY MASS INDEX: 30.73 KG/M2 | TEMPERATURE: 98.8 F | HEIGHT: 64 IN | DIASTOLIC BLOOD PRESSURE: 60 MMHG | HEART RATE: 75 BPM | RESPIRATION RATE: 18 BRPM

## 2018-01-14 VITALS
WEIGHT: 185 LBS | HEIGHT: 64 IN | SYSTOLIC BLOOD PRESSURE: 125 MMHG | TEMPERATURE: 99.6 F | OXYGEN SATURATION: 96 % | DIASTOLIC BLOOD PRESSURE: 55 MMHG | BODY MASS INDEX: 31.58 KG/M2 | HEART RATE: 80 BPM

## 2018-01-17 NOTE — MISCELLANEOUS
Assessment    1  Interstitial pulmonary disease (515) (J84 9)   2  Hypocalcemia (275 41) (E83 51)   3  Hypertension (401 9) (I10)   4  Hyperlipidemia (272 4) (E78 5)    Plan  Hyperlipidemia, Hypertension, Hypocalcemia    · (1) CALCIUM IONIZED; Status:Active; Requested for:19Apr2017;    Perform:[facility]; Due:19Apr2018; Ordered;  For:Hyperlipidemia, Hypertension, Hypocalcemia; Ordered By:Sourav Gay;   · (1) PTH N-TERMINAL (INTACT); Status:Active; Requested for:19Apr2017;    Perform:[facility]; Due:19Apr2018; Ordered;  For:Hyperlipidemia, Hypertension, Hypocalcemia; Ordered By:Sourav Gay;   · (1) QUANTIFERON - TB GOLD; Status:Active; Requested for:19Apr2017;    Perform:[facility]; Due:19Apr2018; Ordered;  For:Hyperlipidemia, Hypertension, Hypocalcemia; Ordered By:Sourav Gay;   · (LC) Immunofixation, Serum; Status:Active; Requested for:19Apr2017;    Perform:[facility]; Due:19Apr2018; Ordered;  For:Hyperlipidemia, Hypertension, Hypocalcemia; Ordered By:Sourav Gay;   · Follow Up if Not Better Evaluation and Treatment  Follow-up  Status: Complete  Done:  74SWO1122 03:56PM   Ordered; For: Hyperlipidemia, Hypertension, Hypocalcemia; Ordered By: Hope Ch Performed:  Due: 34TYZ1413  Interstitial pulmonary disease    · * CT CHEST HIGH RESOLUTION; Status:Hold For - Scheduling; Requested  for:19Apr2017;    Perform:[facility]; DXY:95FQK8934; Ordered; For:Interstitial pulmonary disease; Ordered By:Sourav Gay;   · Complete PFT with [facility]; Status:Hold For - Scheduling; Requested for:19Apr2017;    Perform:[facility]; Due:19Apr2018; Ordered; For:Interstitial pulmonary disease; Ordered By:Sourav Gay;   · 1 Alirio Wilson MD, Kathia  Pulmonary Disease Co-Management  *  Status: Active   Requested for: 86GVX4146   Ordered;  For: Interstitial pulmonary disease; Ordered By: Hope Ch Performed:  Due: 81HAY9575  Care Summary provided  : Yes    Discussion/Summary  Discussion Summary:   1 patient was admitted to [facility] for fever chills had 3 chest x-rays with different reports on them on suggested a small infiltrate the other was clear the other one suggested possible pleural effusion with increased interstitial markings, will rule out interstitial lung disease hold methotrexate at this time until Dr Tameka Juarez clears her that she does not have that will get a high resolution CAT scan of the chest as well as pulmonary function tests  2 hypocalcemia will recheck with laboratory work  3 rheumatoid arthritis Rituxan can be to infection has appointment with Dr Joseph Espitia to discuss whether to continue methotrexate and Rituxan or use another DMARDs  Counseling Documentation With Imm: The patient was counseled regarding diagnostic results, instructions for management, risk factor reductions, prognosis, impressions, risks and benefits of treatment options, importance of compliance with treatment  Chief Complaint  Chief Complaint Free Text Note Form: Patient presents for a f/u from hospital       History of Present Illness  TCM Communication [facility]: The patient is being contacted for follow-up after hospitalization  Hospital records were reviewed  She was hospitalized [facility]  The date of admission: 4/10/2017, date of discharge: 4/13/2017  Diagnosis: pneumonia vs acute bronchitis fever  She was discharged to home  Medications reviewed and updated today  She scheduled a follow up appointment     Symptoms: cough and shortness of breath, but no fever, no weakness, no dizziness, no headache, no fatigue, no chest pain, no back pain on left side, no back pain on right side, no arm pain left side, no arm pain on right side, no leg pain on left side, no leg pain on right side, no upper abdominal pain, no middle abdominal pain, no lower abdominal pain, no rash:, no anorexia, no nausea, no vomiting, no loose stools, no constipation, no pain with urinating, no incisional pain, no wound drainage and no swelling  Counseling was provided to the patient  Topics counseled included risk factor reductions, patient and family education and importance of compliance with treatment  Communication performed and completed by katherin underwood   Interstitial Lung Disease (Brief): The patient is being seen for an initial evaluation of and Question of interstitial lung disease on chest x-ray, was admitted her cough shortness of breath fever have resolved interstitial lung disease  Electrolyte Imbalance (Brief): The patient is being seen for follow-up of a hospitalization for electrolyte imbalance  The patient has hypocalcemia  The patient is currently asymptomatic  Hypertension (Follow-Up): The patient states she has been doing well with her blood pressure control since the last visit  Symptoms: The patient is currently asymptomatic  Review of Systems  Complete-Female:   Constitutional: No fever, no chills, feels well, no tiredness, no recent weight gain or weight loss  Eyes: No complaints of eye pain, no red eyes, no eyesight problems, no discharge, no dry eyes, no itching of eyes  ENT: no complaints of earache, no loss of hearing, no nose bleeds, no nasal discharge, no sore throat, no hoarseness  Cardiovascular: No complaints of slow heart rate, no fast heart rate, no chest pain, no palpitations, no leg claudication, no lower extremity edema  Respiratory: as noted in HPI  Gastrointestinal: No complaints of abdominal pain, no constipation, no nausea or vomiting, no diarrhea, no bloody stools  Genitourinary: No complaints of dysuria, no incontinence, no pelvic pain, no dysmenorrhea, no vaginal discharge or bleeding  Musculoskeletal: as noted in HPI  Integumentary: No complaints of skin rash or lesions, no itching, no skin wounds, no breast pain or lump     Neurological: tingling, but No complaints of headache, no confusion, no convulsions, no numbness, no dizziness or fainting, no tingling, no limb weakness, no difficulty walking and as noted in HPI  Psychiatric: Not suicidal, no sleep disturbance, no anxiety or depression, no change in personality, no emotional problems  Endocrine: No complaints of proptosis, no hot flashes, no muscle weakness, no deepening of the voice, no feelings of weakness  Hematologic/Lymphatic: No complaints of swollen glands, no swollen glands in the neck, does not bleed easily, does not bruise easily  Active Problems    1  Abnormal blood chemistry (790 6) (R79 9)   2  Adenocarcinoma of breast (174 9) (C50 919)   3  Allergic rhinitis (477 9) (J30 9)   4  Encounter for screening mammogram for malignant neoplasm of breast (V76 12)   (Z12 31)   5  Hyperlipidemia (272 4) (E78 5)   6  Hypertension (401 9) (I10)   7  Hypokalemia (276 8) (E87 6)   8  Hypothyroidism (244 9) (E03 9)   9  Influenza vaccine needed (V04 81) (Z23)   10  Lumbago (724 2) (M54 5)   11  Lumbar radiculopathy (724 4) (M54 16)   12  Need for revaccination (V05 9) (Z23)   13  Osteopenia (733 90) (M85 80)   14  Other chronic pain (338 29) (G89 29)   15  Renal disease (593 9) (N28 9)   16  Rheumatoid arthritis (714 0) (M06 9)   17  Vitamin D deficiency (268 9) (E55 9)    Past Medical History    1  History of Acute upper respiratory infection (465 9) (J06 9)   2  History of Allergic angioedema (995 1) (T78 3XXA)   3  History of Colonoscopy (Fiberoptic) Screening   4  History of Hemorrhage of anus and rectum (569 3) (K62 5)   5  History of acute bronchitis (V12 69) (Z87 09)   6  History of angioedema (V13 89) (Z87 898)   7  History of Peptic Ulcer (V12 71)   8  History of Peptic Ulcer (V12 71)   9  History of Rheumatoid arthritis (714 0) (M06 9)   10  History of Rheumatoid arthritis (714 0) (M06 9)    Surgical History    1  History of Cholecystectomy   2  History of Orthopedic Surgery  Surgical History Reviewed: The surgical history was reviewed and updated today         Family History  Mother 1  Family history of Breast Cancer (V16 3)   2  Family history of Mother  At Age ___  Father    1  Family history of Father  At Age ___   3  Family history of Renal Failure   5  Family history of Uremia  Sister    6  Family history of Breast Cancer (V16 3)   7  Family history of Breast Cancer (V16 3)   8  Family history of Breast Cancer (V16 3)  Maternal Aunt    9  Family history of Breast Cancer (V16 3)  Family History Reviewed: The family history was reviewed and updated today  Social History    · Caffeine Use   · Never A Smoker   · Never Drank Alcohol  Social History Reviewed: The social history was reviewed and updated today  The social history was reviewed and is unchanged  Current Meds   1  Citracal + D TABS; Take 1 tablet daily; Therapy: (Recorded:2013) to Recorded   2  CO-Q 10 Omega-3 Fish Oil Oral Capsule; take 1 capsule daily; Therapy: (Recorded:2013) to Recorded   3  Crestor 5 MG Oral Tablet; TAKE 1 TABLET DAILY; Therapy: (Recorded:2013) to Recorded   4  Klor-Con 10 10 MEQ Oral Tablet Extended Release; TAKE 2 TABLETS TWICE DAILY; Therapy: 22POG6135 to (Evaluate:2017)  Requested for: 35Tip5801; Last   Rx:77Vml3804 Ordered   5  KP Fexofenadine HCl - 180 MG Oral Tablet; TAKE 1 TABLET DAILY AS NEEDED; Therapy: 37Gfk6267 to (Evaluate:2015)  Requested for: 64Lgp0605; Last   Rx:20Ubs5488 Ordered   6  Levothyroxine Sodium 50 MCG Oral Tablet; TAKE 1 TABLET BY MOUTH EVERY OTHER   DAY; Therapy: 81OCL7737 to (Evaluate:58Tlq2211)  Requested for: 69Euy5177; Last   Rx:95Qlm0793 Ordered   7  Levothyroxine Sodium 75 MCG Oral Tablet; TAKE 1 TABLET EVERY OTHER DAY; Therapy: 47Gai3832 to (Evaluate:86Oup5766)  Requested for: 24Xdf2017; Last   Rx:53Csl8920 Ordered   8  Methotrexate 2 5 MG Oral Tablet; Therapy: 01Rvy6169 to Recorded   9  Multi-Vitamin TABS; TAKE 1 TABLET DAILY; Therapy: (Recorded:2013) to Recorded   10   PredniSONE (Kirby) 10 MG TABS; TAKE 1 TABLET ONCE; Therapy: 07Aug2015 to (Evaluate:67Del0895)  Requested for: 28Aug2015; Last    Rx:28Aug2015 Ordered   11  Rituxan 10 MG/ML CONC; USE AS DIRECTED; Therapy: 20Aug2015 to Recorded   12  Saline Nasal Spray 0 65 % Nasal Solution; USE 1 SPRAY IN EACH NOSTRIL TWICE    DAILY; Therapy: 28Aug2015 to (Last [place])  Requested for: 28Aug2015 Ordered   13  SM Vitamin D3 2000 UNIT Oral Capsule; take 1 capsule daily  Requested for:    20Apr2015; Last Rx:20Apr2015 Ordered   14  Tamoxifen Citrate 20 MG Oral Tablet; TAKE 1 TABLET DAILY; Therapy: 82OLC5592 to Recorded   15  Vitamin C 500 MG Oral Tablet; TAKE 1 TABLET DAILY; Therapy: (Recorded:12Mar2013) to Recorded  Medication List Reviewed: The medication list was reviewed and updated today  Allergies    1  Cipro TABS   2  Bactrim TABS   3  Codeine Derivatives   4  Darvocet-N 100 TABS   5  Lisinopril TABS   6  Penicillins   7  Percocet TABS    Vitals  Signs   Recorded: 12PYW1849 65:53ZZ   Systolic: 533, LUE, Sitting  Diastolic: 70, LUE, Sitting  Recorded: 19Apr2017 03:28PM   Temperature: 98 4 F  Heart Rate: 80  Height: 5 ft 3 5 in  Weight: 180 lb   BMI Calculated: 31 39  BSA Calculated: 1 86  O2 Saturation: 98    Physical Exam    Constitutional   General appearance: Abnormal     Neck   Neck: Supple, symmetric, trachea midline, no masses  Thyroid: Normal, no thyromegaly  Pulmonary   Respiratory effort: No increased work of breathing or signs of respiratory distress  Cardiovascular   Auscultation of heart: Normal rate and rhythm, normal S1 and S2, no murmurs  Carotid pulses: 2+ bilaterally  Examination of extremities for edema and/or varicosities: Abnormal   nonpitting edema present  Musculoskeletal Negative squeeze test positive ulnar deviation of both hands  severe ulnar deviation bilaterally of both hands  Psychiatric   Mood and affect: Normal        Health Management  Hypertension   EKG/ECG- POC; every 1 year;  Last 62VKW0375; Next Due: 03TBJ3867; Overdue    Future Appointments    Date/Time Provider Specialty Site   10/12/2017 01:00 PM Milka Roman DO Internal Medicine [facility] INTERNAL MED [address] [place] [address]     Signatures   Electronically signed by : Titi Young DO;  Apr 19 2017  4:00PM EST                       (Author)

## 2018-01-22 VITALS
HEIGHT: 64 IN | HEART RATE: 80 BPM | SYSTOLIC BLOOD PRESSURE: 125 MMHG | WEIGHT: 180 LBS | BODY MASS INDEX: 30.73 KG/M2 | TEMPERATURE: 98.4 F | DIASTOLIC BLOOD PRESSURE: 70 MMHG | OXYGEN SATURATION: 98 %

## 2018-01-22 RX ORDER — METHYLPREDNISOLONE SODIUM SUCCINATE 40 MG/ML
100 INJECTION, POWDER, LYOPHILIZED, FOR SOLUTION INTRAMUSCULAR; INTRAVENOUS ONCE
Status: COMPLETED | OUTPATIENT
Start: 2018-01-23 | End: 2018-01-23

## 2018-01-22 RX ORDER — METHYLPREDNISOLONE SODIUM SUCCINATE 40 MG/ML
100 INJECTION, POWDER, LYOPHILIZED, FOR SOLUTION INTRAMUSCULAR; INTRAVENOUS ONCE
Status: DISCONTINUED | OUTPATIENT
Start: 2018-01-23 | End: 2018-01-22

## 2018-01-22 RX ORDER — DIPHENHYDRAMINE HCL 25 MG
50 TABLET ORAL ONCE
Status: COMPLETED | OUTPATIENT
Start: 2018-01-23 | End: 2018-01-23

## 2018-01-22 RX ORDER — SODIUM CHLORIDE 9 MG/ML
20 INJECTION, SOLUTION INTRAVENOUS CONTINUOUS
Status: DISCONTINUED | OUTPATIENT
Start: 2018-01-23 | End: 2018-01-26 | Stop reason: HOSPADM

## 2018-01-22 RX ORDER — ACETAMINOPHEN 325 MG/1
975 TABLET ORAL ONCE
Status: COMPLETED | OUTPATIENT
Start: 2018-01-23 | End: 2018-01-23

## 2018-01-23 ENCOUNTER — HOSPITAL ENCOUNTER (OUTPATIENT)
Dept: INFUSION CENTER | Facility: CLINIC | Age: 78
Discharge: HOME | End: 2018-01-23
Payer: MEDICARE

## 2018-01-23 VITALS
HEART RATE: 65 BPM | RESPIRATION RATE: 18 BRPM | SYSTOLIC BLOOD PRESSURE: 132 MMHG | DIASTOLIC BLOOD PRESSURE: 62 MMHG | TEMPERATURE: 98.4 F

## 2018-01-23 VITALS
WEIGHT: 188.38 LBS | HEIGHT: 64 IN | HEART RATE: 78 BPM | TEMPERATURE: 98.9 F | OXYGEN SATURATION: 98 % | BODY MASS INDEX: 32.16 KG/M2 | SYSTOLIC BLOOD PRESSURE: 140 MMHG | DIASTOLIC BLOOD PRESSURE: 80 MMHG

## 2018-01-23 PROCEDURE — 96415 CHEMO IV INFUSION ADDL HR: CPT

## 2018-01-23 PROCEDURE — 96413 CHEMO IV INFUSION 1 HR: CPT

## 2018-01-23 PROCEDURE — 96375 TX/PRO/DX INJ NEW DRUG ADDON: CPT

## 2018-01-23 RX ADMIN — RITUXIMAB: 10 INJECTION, SOLUTION INTRAVENOUS at 09:39

## 2018-01-23 RX ADMIN — SODIUM CHLORIDE 20 ML/HR: 0.9 INJECTION, SOLUTION INTRAVENOUS at 09:00

## 2018-01-23 RX ADMIN — METHYLPREDNISOLONE SODIUM SUCCINATE 100 MG: 40 INJECTION, POWDER, FOR SOLUTION INTRAMUSCULAR; INTRAVENOUS at 09:09

## 2018-01-23 RX ADMIN — ACETAMINOPHEN 975 MG: 325 TABLET ORAL at 09:08

## 2018-01-23 RX ADMIN — DIPHENHYDRAMINE HCL 50 MG: 25 TABLET ORAL at 09:08

## 2018-01-23 NOTE — PROGRESS NOTES
Pt tolerated treatment well without any adverse reactions  Pt plans to schedule new appointments at her next dr Dada HUERTAS

## 2018-01-23 NOTE — PROGRESS NOTES
Pt offers no complaints, resting comfortably  Vitals stable  Labs reviewed  Will receive D15 of rituxan today  Call bell in reach, will continue to monitor

## 2018-02-05 ENCOUNTER — APPOINTMENT (EMERGENCY)
Dept: CT IMAGING | Facility: HOSPITAL | Age: 78
DRG: 547 | End: 2018-02-05
Payer: MEDICARE

## 2018-02-05 ENCOUNTER — HOSPITAL ENCOUNTER (INPATIENT)
Facility: HOSPITAL | Age: 78
LOS: 1 days | Discharge: HOME | DRG: 547 | End: 2018-02-06
Attending: EMERGENCY MEDICINE | Admitting: INTERNAL MEDICINE
Payer: MEDICARE

## 2018-02-05 DIAGNOSIS — M06.9 RHEUMATOID ARTHRITIS INVOLVING LEFT ANKLE, UNSPECIFIED RHEUMATOID FACTOR PRESENCE: Chronic | ICD-10-CM

## 2018-02-05 DIAGNOSIS — R26.2 AMBULATORY DYSFUNCTION: ICD-10-CM

## 2018-02-05 DIAGNOSIS — M25.572 LEFT ANKLE PAIN: Primary | ICD-10-CM

## 2018-02-05 DIAGNOSIS — I10 ESSENTIAL HYPERTENSION: Chronic | ICD-10-CM

## 2018-02-05 LAB
ANION GAP SERPL CALCULATED.3IONS-SCNC: 12 MMOL/L (ref 4–13)
BASOPHILS # BLD MANUAL: 0 THOUSAND/UL (ref 0–0.1)
BASOPHILS NFR MAR MANUAL: 0 % (ref 0–1)
BUN SERPL-MCNC: 27 MG/DL (ref 5–25)
CALCIUM SERPL-MCNC: 9.2 MG/DL (ref 8.3–10.1)
CHLORIDE SERPL-SCNC: 105 MMOL/L (ref 100–108)
CO2 SERPL-SCNC: 25 MMOL/L (ref 21–32)
CREAT SERPL-MCNC: 1.2 MG/DL (ref 0.6–1.3)
CRYSTALS SNV QL MICRO: NORMAL
EOSINOPHIL # BLD MANUAL: 0 THOUSAND/UL (ref 0–0.4)
EOSINOPHIL NFR BLD MANUAL: 0 % (ref 0–6)
ERYTHROCYTE [DISTWIDTH] IN BLOOD BY AUTOMATED COUNT: 15.1 % (ref 11.6–15.1)
GFR SERPL CREATININE-BSD FRML MDRD: 44 ML/MIN/1.73SQ M
GLUCOSE SERPL-MCNC: 133 MG/DL (ref 65–140)
GRAM STN SPEC: NORMAL
GRAM STN SPEC: NORMAL
HCT VFR BLD AUTO: 34.6 % (ref 34.8–46.1)
HGB BLD-MCNC: 11.5 G/DL (ref 11.5–15.4)
LYMPHOCYTES # BLD AUTO: 0.93 THOUSAND/UL (ref 0.6–4.47)
LYMPHOCYTES # BLD AUTO: 6 % (ref 14–44)
MCH RBC QN AUTO: 32 PG (ref 26.8–34.3)
MCHC RBC AUTO-ENTMCNC: 33.2 G/DL (ref 31.4–37.4)
MCV RBC AUTO: 96 FL (ref 82–98)
MONOCYTES # BLD AUTO: 0.47 THOUSAND/UL (ref 0–1.22)
MONOCYTES NFR BLD: 3 % (ref 4–12)
NEUTROPHILS # BLD MANUAL: 14.11 THOUSAND/UL (ref 1.85–7.62)
NEUTS BAND NFR BLD MANUAL: 1 % (ref 0–8)
NEUTS SEG NFR BLD AUTO: 90 % (ref 43–75)
NRBC BLD AUTO-RTO: 0 /100 WBCS
PLATELET # BLD AUTO: 200 THOUSANDS/UL (ref 149–390)
PLATELET # BLD AUTO: 223 THOUSANDS/UL (ref 149–390)
PLATELET BLD QL SMEAR: ADEQUATE
PMV BLD AUTO: 9.5 FL (ref 8.9–12.7)
PMV BLD AUTO: 9.6 FL (ref 8.9–12.7)
POTASSIUM SERPL-SCNC: 3.4 MMOL/L (ref 3.5–5.3)
RBC # BLD AUTO: 3.59 MILLION/UL (ref 3.81–5.12)
SODIUM SERPL-SCNC: 142 MMOL/L (ref 136–145)
TOTAL CELLS COUNTED SPEC: 100
WBC # BLD AUTO: 15.51 THOUSAND/UL (ref 4.31–10.16)

## 2018-02-05 PROCEDURE — 73700 CT LOWER EXTREMITY W/O DYE: CPT

## 2018-02-05 PROCEDURE — 99222 1ST HOSP IP/OBS MODERATE 55: CPT | Performed by: PHYSICIAN ASSISTANT

## 2018-02-05 PROCEDURE — 80048 BASIC METABOLIC PNL TOTAL CA: CPT | Performed by: EMERGENCY MEDICINE

## 2018-02-05 PROCEDURE — 89060 EXAM SYNOVIAL FLUID CRYSTALS: CPT | Performed by: EMERGENCY MEDICINE

## 2018-02-05 PROCEDURE — 99223 1ST HOSP IP/OBS HIGH 75: CPT | Performed by: PHYSICIAN ASSISTANT

## 2018-02-05 PROCEDURE — 87205 SMEAR GRAM STAIN: CPT | Performed by: EMERGENCY MEDICINE

## 2018-02-05 PROCEDURE — 36415 COLL VENOUS BLD VENIPUNCTURE: CPT | Performed by: EMERGENCY MEDICINE

## 2018-02-05 PROCEDURE — 85049 AUTOMATED PLATELET COUNT: CPT | Performed by: PHYSICIAN ASSISTANT

## 2018-02-05 PROCEDURE — 99285 EMERGENCY DEPT VISIT HI MDM: CPT

## 2018-02-05 PROCEDURE — 85007 BL SMEAR W/DIFF WBC COUNT: CPT | Performed by: EMERGENCY MEDICINE

## 2018-02-05 PROCEDURE — 85027 COMPLETE CBC AUTOMATED: CPT | Performed by: EMERGENCY MEDICINE

## 2018-02-05 PROCEDURE — 87070 CULTURE OTHR SPECIMN AEROBIC: CPT | Performed by: EMERGENCY MEDICINE

## 2018-02-05 RX ORDER — LEVOTHYROXINE SODIUM 0.05 MG/1
50 TABLET ORAL
Status: DISCONTINUED | OUTPATIENT
Start: 2018-02-05 | End: 2018-02-06 | Stop reason: HOSPADM

## 2018-02-05 RX ORDER — PREDNISONE 20 MG/1
40 TABLET ORAL DAILY
Status: DISCONTINUED | OUTPATIENT
Start: 2018-02-06 | End: 2018-02-05

## 2018-02-05 RX ORDER — ACETAMINOPHEN 325 MG/1
650 TABLET ORAL EVERY 6 HOURS PRN
Status: DISCONTINUED | OUTPATIENT
Start: 2018-02-05 | End: 2018-02-06 | Stop reason: HOSPADM

## 2018-02-05 RX ORDER — ONDANSETRON 2 MG/ML
4 INJECTION INTRAMUSCULAR; INTRAVENOUS EVERY 6 HOURS PRN
Status: DISCONTINUED | OUTPATIENT
Start: 2018-02-05 | End: 2018-02-06 | Stop reason: HOSPADM

## 2018-02-05 RX ORDER — MORPHINE SULFATE 2 MG/ML
1 INJECTION, SOLUTION INTRAMUSCULAR; INTRAVENOUS EVERY 4 HOURS PRN
Status: DISCONTINUED | OUTPATIENT
Start: 2018-02-05 | End: 2018-02-06 | Stop reason: HOSPADM

## 2018-02-05 RX ORDER — HYDROCHLOROTHIAZIDE 25 MG/1
25 TABLET ORAL DAILY
Status: DISCONTINUED | OUTPATIENT
Start: 2018-02-05 | End: 2018-02-06

## 2018-02-05 RX ORDER — LIDOCAINE HYDROCHLORIDE AND EPINEPHRINE 10; 10 MG/ML; UG/ML
5 INJECTION, SOLUTION INFILTRATION; PERINEURAL ONCE
Status: COMPLETED | OUTPATIENT
Start: 2018-02-05 | End: 2018-02-05

## 2018-02-05 RX ORDER — VITAMIN E 268 MG
400 CAPSULE ORAL DAILY
COMMUNITY
End: 2019-10-28 | Stop reason: ALTCHOICE

## 2018-02-05 RX ORDER — PREDNISONE 20 MG/1
60 TABLET ORAL ONCE
Status: COMPLETED | OUTPATIENT
Start: 2018-02-05 | End: 2018-02-05

## 2018-02-05 RX ORDER — PREDNISONE 20 MG/1
20 TABLET ORAL DAILY
Status: DISCONTINUED | OUTPATIENT
Start: 2018-02-09 | End: 2018-02-06 | Stop reason: HOSPADM

## 2018-02-05 RX ORDER — TAMOXIFEN CITRATE 10 MG/1
20 TABLET ORAL DAILY
Status: DISCONTINUED | OUTPATIENT
Start: 2018-02-05 | End: 2018-02-06 | Stop reason: HOSPADM

## 2018-02-05 RX ORDER — POTASSIUM CHLORIDE 750 MG/1
40 TABLET, EXTENDED RELEASE ORAL 2 TIMES DAILY
Status: DISCONTINUED | OUTPATIENT
Start: 2018-02-05 | End: 2018-02-06 | Stop reason: HOSPADM

## 2018-02-05 RX ORDER — PRAVASTATIN SODIUM 40 MG
40 TABLET ORAL
Status: DISCONTINUED | OUTPATIENT
Start: 2018-02-05 | End: 2018-02-06 | Stop reason: HOSPADM

## 2018-02-05 RX ADMIN — HYDROCHLOROTHIAZIDE 25 MG: 25 TABLET ORAL at 09:17

## 2018-02-05 RX ADMIN — ENOXAPARIN SODIUM 40 MG: 40 INJECTION SUBCUTANEOUS at 09:18

## 2018-02-05 RX ADMIN — TAMOXIFEN CITRATE 20 MG: 10 TABLET, FILM COATED ORAL at 10:33

## 2018-02-05 RX ADMIN — POTASSIUM CHLORIDE 40 MEQ: 750 TABLET, EXTENDED RELEASE ORAL at 17:40

## 2018-02-05 RX ADMIN — LIDOCAINE HYDROCHLORIDE,EPINEPHRINE BITARTRATE 5 ML: 10; .01 INJECTION, SOLUTION INFILTRATION; PERINEURAL at 02:45

## 2018-02-05 RX ADMIN — PREDNISONE 60 MG: 20 TABLET ORAL at 04:36

## 2018-02-05 RX ADMIN — LEVOTHYROXINE SODIUM 50 MCG: 50 TABLET ORAL at 09:18

## 2018-02-05 RX ADMIN — POTASSIUM CHLORIDE 40 MEQ: 750 TABLET, EXTENDED RELEASE ORAL at 09:17

## 2018-02-05 NOTE — CONSULTS
Orthopedics   Timothy Tony 69 y o  female MRN: [id number]  Unit/Bed#: -01      Chief Complaint:   Left ankle pain    HPI:  69 y  o female complaining of left ankle pain  No reports of trauma  Patient states she was sweeping the floor and was on her feet for a long time and noticed severe left ankle pain  It was difficult for her to ambulate  Patient came to the hospital and was admitted  Ankle was aspirated which shows no crystals and no bacteria in the Gram stain  Cultures still pending  Patient has pain over the medial and lateral aspects of the ankle  There is swelling noted  Denies numbness or tingling lower extremity  Patient does have a history of severe rheumatoid arthritis  Review Of Systems:   · Skin: See  · Neuro: See HPI  · Musculoskeletal: See HPI  · 14 point review of systems negative except as stated above     Past Medical History:   Past Medical History:   Diagnosis Date    Arthritis     Breast cancer ([address] )     Disease of thyroid gland     Hyperlipidemia     Hypertension     Neuritis     thoracic and lumbosacral    Vitamin D deficiency        Past Surgical History:   Past Surgical History:   Procedure Laterality Date    ABDOMINAL SURGERY      BREAST SURGERY      CHOLECYSTECTOMY      ELBOW SURGERY         Family History:  Family history reviewed and non-contributory  History reviewed  No pertinent family history  Social History:  Social History     Social History    Marital status:      Spouse name: N/A    Number of children: N/A    Years of education: N/A     Social History Main Topics    Smoking status: Never Smoker    Smokeless tobacco: Never Used    Alcohol use Yes      Comment: social use only   Drug use: No    Sexual activity: Not Asked     Other Topics Concern    None     Social History Narrative    None       Allergies:    Allergies   Allergen Reactions    Lisinopril Anaphylaxis    Codeine Other (See Comments)     Nausea/vomiting      Oxycodone Annotation - [id number]: NOT TOLERATE    Penicillins Hives           Labs:    0  Lab Value Date/Time   HCT 34 6 (L) 02/05/2018 0622   HCT 32 4 (L) 01/09/2018 0956   HCT 34 7 (L) 09/21/2017 0939   HCT 33 6 (L) 09/25/2015 1031   HCT 33 2 (L) 09/11/2015 1040   HCT 32 8 (L) 04/02/2015 1016   HGB 11 5 02/05/2018 0622   HGB 10 5 (L) 01/09/2018 0956   HGB 11 0 (L) 09/21/2017 0939   HGB 10 7 (L) 09/25/2015 1031   HGB 10 4 (L) 09/11/2015 1040   HGB 10 3 (L) 04/02/2015 1016   WBC 15 51 (H) 02/05/2018 0622   WBC 7 92 01/09/2018 0956   WBC 5 46 09/21/2017 0939   WBC 8 89 09/25/2015 1031   WBC 8 49 09/11/2015 1040   WBC 7 81 04/02/2015 1016   ESR 26 (H) 01/09/2018 0956   ESR 25 (H) 09/25/2015 1031   CRP 9 6 (H) 01/09/2018 0956   CRP 7 7 (H) 09/25/2015 1210       Meds:    Current Facility-Administered Medications:     acetaminophen (TYLENOL) tablet 650 mg, 650 mg, Oral, Q6H PRN, Taj Patrick PA-C    enoxaparin (LOVENOX) subcutaneous injection 40 mg, 40 mg, Subcutaneous, Daily, Taj Patrick PA-C    hydrochlorothiazide (HYDRODIURIL) tablet 25 mg, 25 mg, Oral, Daily, Taj Patrick PA-C    levothyroxine tablet 50 mcg, 50 mcg, Oral, Early Morning, Taj Patrick PA-C    methotrexate tablet 10 mg, 10 mg, Oral, Weekly, Taj Patrick PA-C    morphine injection 1 mg, 1 mg, Intravenous, Q4H PRN, Taj Patrick PA-C    ondansetron (ZOFRAN) injection 4 mg, 4 mg, Intravenous, Q6H PRN, Taj Patrick PA-C    potassium chloride (K-DUR,KLOR-CON) CR tablet 40 mEq, 40 mEq, Oral, BID, Tja Patrick, PA-C    pravastatin (PRAVACHOL) tablet 40 mg, 40 mg, Oral, Daily With Dinner, Taj Patrick, PA-C    tamoxifen (NOLVADEX) tablet 20 mg, 20 mg, Oral, Daily, Taj Patrick, PA-SIDNEY    Blood Culture:   No results found for: BLOODCX    Wound Culture:   No results found for: WOUNDCULT    Ins and Outs:  No intake/output data recorded            Physical Exam:   /63 (BP Location: Left arm)   Pulse 81   Temp 97 9 °F (36 6 °C) (Oral)   Resp 17   Wt 97 6 kg (215 lb 2 7 oz)   LMP  (LMP Unknown)   SpO2 94%   BMI 36 93 kg/m²   Gen: Alert and oriented to person, place, time  HEENT: EOMI, eyes clear, moist mucus membranes, hearing intact  Respiratory: Bilateral chest rise  No audible wheezing found  Cardiovascular: Regular Rate and Rhythm  Abdomen: soft nontender/nondistended    Musculoskeletal: left lower extremity  · Skin intact, no erythema noted  There is some diffuse swelling noted medial and lateral aspects  Tenderness noted with palpation medial and lateral ankle  · Sensation intact light touch in L1 through S1 distributions  · Motor intact 5/5 strength with Hip flexion/extension, knee flexion/extension, ankle dorsi/plantar flexion, EHL/FHL  · PT pulse      Tertiary: no tenderness over all other joints/long bones as except already stated  Radiology:   I personally reviewed the films  CT left ankle reviewed by DR Hoang: Loose bodies in joint; Severe DJD tarsal joints and throughout midfoot and hind foot with spontaneous talonavicular fusion  All loculated fluid collection dorsal calcaneus with some calcification in the wall suggesting chronic related to rheumatoid arthritis      _*_*_*_*_*_*_*_*_*_*_*_*_*_*_*_*_*_*_*_*_*_*_*_*_*_*_*_*_*_*_*_*_*_*_*_*_*_*_*_*_*    Assessment:  69 y  o female with exacerbation rheumatoid arthritis  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  Patient is afebrile with vitals being stable  Fluid analysis from the ankle joint shows Gram stain with no bacterior, no crystals seen and culture results pending  Not enough fluid was aspirated to test WBC in the fluid  Ankle joint does not appear septic  Plan:   · PT/OT evaluate and treat  · Pain control per primary team  · DVT ppx per primary team  · WBAT left lower extremity  · No emergent orthopedic treatment needed at this time  · Will continue to watch for final culture results  · Dispo:  If culture is negative patient can be discharged and should follow up with her rheumatologist    Dash hCapa PA-C

## 2018-02-05 NOTE — PHYSICIAN ADVISOR
This patient is a 69 y o  y/o female who is admitted to the hospital for Ankle Pain (Pt c/o left ankle pain starting yesterday around 3pm  Pt states it started hurting after sweeping yesterday  Pt states no injury but can't put alot of weight on it  Hx of RA  )   The patient presented to the ED on 2/5/18 at 0226 and was admitted to the hospital on 2/5/2018 0227  History of Present Illness includes: The patient presented with left ankle  Pain that began the day prior 3 PM   The patient was standing with increased of her ankle pain  She denied any specific injury  She has a long-standing history of rheumatoid arthritis  Vital signs in the ER are as follows   ED Triage Vitals   Temperature Pulse Respirations Blood Pressure SpO2   02/05/18 0231 02/05/18 0228 02/05/18 0228 02/05/18 0228 02/05/18 0228   98 5 °F (36 9 °C) 86 18 (!) 172/81 97 %      Temp Source Heart Rate Source Patient Position - Orthostatic VS BP Location FiO2 (%)   02/05/18 0231 02/05/18 0228 02/05/18 0228 02/05/18 0228 --   Oral Monitor Lying Left arm       Pain Score       02/05/18 0228       7          on exam, she   Has decreased range of motion swelling and tenderness  potassium is 3 4 creatinine is 1 2  CT scan showed no acute findings extensive osteoarthritis     this patient is being admitted secondary to ankle pain with a history of rheumatoid arthritis  Diagnostic considerations include rheumatoid arthritis flare versus gout  The plan of care includes orthopedic surgery consultation and arthrocentesis  The patient will be started on oral prednisone and placed on a steroid taper  Pain medication will be given  This patient was discharged after a 1 [place] stay   This patient is appropriate for OBSERVATION STATUS

## 2018-02-05 NOTE — ASSESSMENT & PLAN NOTE
Admit  Rheumatoid flare versus gout  Pain control  Orthopedic surgery consult  Await crystal count from [facility]

## 2018-02-05 NOTE — H&P
H&P- Alanis Mendez 1940, 69 y o  female MRN: [id number]    Unit/Bed#: ED 25 Encounter: [id number]    Primary Care Provider: Greg Jack DO   Date and time admitted to hospital: 2/5/2018  2:27 AM        * Left ankle pain   Assessment & Plan    Admit  Rheumatoid flare versus gout  Pain control  Orthopedic surgery consult  Await crystal count from SFA        Rheumatoid arthritis ([facility] )   Assessment & Plan    Continue methotrexate          Hypertension   Assessment & Plan    Continue HCTZ                VTE Prophylaxis: Enoxaparin (Lovenox)  / sequential compression device   Code Status:  Full  POLST: There is no POLST form on file for this patient (pre-hospital)  Discussion with family:  None    Anticipated Length of Stay:  Patient will be admitted on an Inpatient basis with an anticipated length of stay of  more than 2 midnights  Justification for Hospital Stay:  Pain control    Total Time for Visit, including Counseling / Coordination of Care: 30 minutes  Greater than 50% of this total time spent on direct patient counseling and coordination of care  Chief Complaint:   Left ankle pain    History of Present Illness:    Alanis Mendez is a 69 y o  female who presents with complaints of left ankle pain that began yesterday around 3:00 p m  Beto Dangelo Patient states that she was standing increasingly intermediate having increasingly severe ankle pain  Patient denies specific injury  Patient states she has been doing physical therapy for her ankle and she was doing some exercises that were causing pain  Patient has longstanding history of severe rheumatoid arthritis  Patient denies history of gout  Patient denies fever, chills  Review of Systems:    Review of Systems   Musculoskeletal: Positive for joint swelling  All other systems reviewed and are negative        Past Medical and Surgical History:     Past Medical History:   Diagnosis Date    Arthritis     Breast cancer ([facility] )     Disease of thyroid gland  Hyperlipidemia     Hypertension     Neuritis     thoracic and lumbosacral    Vitamin D deficiency        Past Surgical History:   Procedure Laterality Date    ABDOMINAL SURGERY      BREAST SURGERY      CHOLECYSTECTOMY      ELBOW SURGERY         Meds/Allergies:    Prior to Admission medications    Medication Sig Start Date End Date Taking? Authorizing Provider   ascorbic acid (VITAMIN C) 500 mg tablet Take 500 mg by mouth daily  Yes Historical Provider, MD   Calcium Citrate-Vitamin D (CITRACAL + D PO) Take by mouth 2 (two) times a day   Yes Historical Provider, MD   Cholecalciferol (VITAMIN D-3 PO) Take 2,000 Units by mouth daily  Yes Historical Provider, MD   hydrochlorothiazide (HYDRODIURIL) 25 mg tablet Take 25 mg by mouth daily  Yes Historical Provider, MD   methotrexate 2 5 mg tablet Take 10 mg by mouth once a week  Yes Historical Provider, MD   Misc Natural Products (LUTEIN 20 PO) Take 20 mg by mouth daily  Yes Historical Provider, MD   Multiple Vitamin (MULTIVITAMIN) tablet Take 1 tablet by mouth daily  Yes Historical Provider, MD   rosuvastatin (CRESTOR) 5 mg tablet Take 5 mg by mouth daily  Yes Historical Provider, MD   tamoxifen (NOLVADEX) 20 mg tablet Take 20 mg by mouth daily  Yes Historical Provider, MD   vitamin E, tocopherol, 400 units capsule Take 400 Units by mouth daily   Yes Historical Provider, MD   levothyroxine 50 mcg tablet Take 75 mcg by mouth every other day      Historical Provider, MD   potassium chloride (K-DUR) 10 mEq tablet Take 40 mEq by mouth 2 (two) times a day      Historical Provider, MD   riTUXimab (RITUXAN) 500 mg/50 mL Infuse into a venous catheter every 6 (six) months   8/20/15   Historical Provider, MD     I have reviewed home medications with patient personally  Allergies:    Allergies   Allergen Reactions    Lisinopril Anaphylaxis    Codeine Other (See Comments)     Nausea/vomiting      Oxycodone      [facility] - [id number]: NOT TOLERATE    Penicillins Hives       Social History:     Marital Status:    Occupation:  Retired  Patient Pre-hospital Living Situation:  Lives at home  Patient Pre-hospital Level of Mobility:  Ambulatory  Patient Pre-hospital Diet Restrictions:  None  Substance Use History:   History   Alcohol Use    Yes     Comment: social use only  History   Smoking Status    Never Smoker   Smokeless Tobacco    Never Used     History   Drug Use No       Family History:    History reviewed  No pertinent family history  Physical Exam:     Vitals:   Blood Pressure: 143/64 (02/05/18 0515)  Pulse: 91 (02/05/18 0515)  Temperature: 98 5 °F (36 9 °C) (02/05/18 0231)  Temp Source: Oral (02/05/18 0231)  Respirations: 16 (02/05/18 0515)  Weight - Scale: 97 6 kg (215 lb 2 7 oz) (02/05/18 0230)  SpO2: 96 % (02/05/18 0515)    Physical Exam   Constitutional: She is oriented to person, place, and time  She appears well-developed and well-nourished  HENT:   Head: Normocephalic and atraumatic  Right Ear: External ear normal    Left Ear: External ear normal    Nose: Nose normal    Mouth/Throat: Oropharynx is clear and moist    Eyes: Conjunctivae and EOM are normal  Pupils are equal, round, and reactive to light  Neck: Normal range of motion  Cardiovascular: Normal rate, regular rhythm and normal heart sounds  Pulmonary/Chest: Effort normal and breath sounds normal    Abdominal: Soft  Bowel sounds are normal    Musculoskeletal:        Left ankle: She exhibits decreased range of motion and swelling  Tenderness  Neurological: She is alert and oriented to person, place, and time  Skin: Skin is warm and dry  Psychiatric: She has a normal mood and affect  Her behavior is normal  Judgment and thought content normal    Vitals reviewed  Additional Data:     Lab Results: I have personally reviewed pertinent reports              Results from last 7 days  Lab Units 02/05/18  0622   SODIUM mmol/L 142   POTASSIUM mmol/L 3 4* CHLORIDE mmol/L 105   CO2 mmol/L 25   BUN mg/dL 27*   CREATININE mg/dL 1 20   CALCIUM mg/dL 9 2   GLUCOSE RANDOM mg/dL 133           Imaging: I have personally reviewed pertinent reports  CT ankle left wo contrast   ED Interpretation by Yecenia Lerma MD (43/09 [id number])   IMPRESSION:   No acute findings  Extensive osteoarthritis          EKG, Pathology, and Other Studies Reviewed on Admission:   · EKG:  None    Allscripts / Epic Records Reviewed: Yes     ** Please Note: This note has been constructed using a voice recognition system   **

## 2018-02-05 NOTE — CASE MANAGEMENT
Initial Clinical Review    Admission: Date/Time/Statement: 2/5/18 @ 0611     Orders Placed This Encounter   Procedures    Inpatient Admission (expected length of stay for this patient is greater than two midnights)     Standing Status:   Standing     Number of Occurrences:   1     Order Specific Question:   Admitting Physician     Answer:   Timothy Hoover [[id number]]     Order Specific Question:   Level of Care     Answer:   Med Surg [16]     Order Specific Question:   Estimated length of stay     Answer:   More than 2 Midnights     Order Specific Question:   Certification     Answer:   I certify that inpatient services are medically necessary for this patient for a duration of greater than two midnights  See H&P and MD Progress Notes for additional information about the patient's course of treatment  ED: Date/Time/Mode of Arrival:   ED Arrival Information     Expected Arrival Acuity Means of Arrival Escorted By Service Admission Type    - 2/5/2018 02:26 [facility] Urgent Ambulance [facility] General Medicine Urgent    Arrival Complaint    -          Chief Complaint:   Chief Complaint   Patient presents with    Ankle Pain     Pt c/o left ankle pain starting yesterday around 3pm  Pt states it started hurting after sweeping yesterday  Pt states no injury but can't put alot of weight on it  Hx of RA  History of Illness: Marty Du is a 69 y o  female who presents with complaints of left ankle pain that began yesterday around 3:00 p m  Chintan Quinn Patient states that she was standing increasingly intermediate having increasingly severe ankle pain  Patient denies specific injury  Patient states she has been doing physical therapy for her ankle and she was doing some exercises that were causing pain  Patient has longstanding history of severe rheumatoid arthritis  Patient denies history of gout    Patient denies fever, chills    ED Vital Signs:   ED Triage Vitals   Temperature Pulse Respirations Blood Pressure SpO2   02/05/18 0231 02/05/18 0228 02/05/18 0228 02/05/18 0228 02/05/18 0228   98 5 °F (36 9 °C) 86 18 (!) 172/81 97 %      Temp Source Heart Rate Source Patient Position - Orthostatic VS BP Location FiO2 (%)   02/05/18 0231 02/05/18 0228 02/05/18 0228 02/05/18 0228 --   Oral Monitor Lying Left arm       Pain Score       02/05/18 0228       7        Wt Readings from Last 1 Encounters:   02/05/18 97 6 kg (215 lb 2 7 oz)       Vital Signs (abnormal): wnl    Abnormal Labs/Diagnostic Test Results: wbc 15 51, H&H   11 5 34 6, K  3 4, BUN creat  27  1 20  CT L ankle- 1   Advanced degenerative change throughout the foot including suggestive findings of inflammatory arthropathy, such as rheumatoid, across the 2nd through 5th metatarsophalangeal joints as above   There is also spontaneous fusion across the talonavicular   joint likely secondary to long-standing degenerative change  2   Pes planus deformity and suggestive findings of chronic PTT dysfunction  3   Loculated plantar hindfoot fluid collection measuring up to 4 3 x 3 7 x 2 1 cm likely chronic given scant wall calcification  This could relate to rheumatoid disease with infection not entirely excluded but considerably less likely  Correlate   clinically  ED Treatment:   Medication Administration from 02/05/2018 0226 to 02/05/2018 0234       Date/Time Order Dose Route Action Action by Comments     02/05/2018 0245 lidocaine-epinephrine (XYLOCAINE/EPINEPHRINE) 1 %-1:100,000 injection 5 mL 5 mL Infiltration Given Carmen Rivera RN      02/05/2018 8760 predniSONE tablet 60 mg 60 mg Oral Given Carmen Rivera RN           Past Medical/Surgical History:    Active Ambulatory Problems     Diagnosis Date Noted    No Active Ambulatory Problems     Resolved Ambulatory Problems     Diagnosis Date Noted    No Resolved Ambulatory Problems     Past Medical History:   Diagnosis Date    Arthritis     Breast cancer ([address] )     Disease of thyroid gland     Hyperlipidemia     Hypertension     Neuritis     Vitamin D deficiency        Admitting Diagnosis: Ankle pain [M25 579]  Left ankle pain [M25 572]  Ambulatory dysfunction [R26 2]    Age/Sex: 69 y o  female    Assessment/Plan:   Left ankle pain   Assessment & Plan     Admit  Rheumatoid flare versus gout  Pain control  Orthopedic surgery consult  Await crystal count from SFA          Rheumatoid arthritis (HCC)   Assessment & Plan     Continue methotrexate             Hypertension   Assessment & Plan     Continue HCTZ             VTE Prophylaxis: Enoxaparin (Lovenox)  / sequential compression device   Code Status:  Full  POLST: There is no POLST form on file for this patient (pre-hospital)  Discussion with family:  None   Anticipated Length of Stay:  Patient will be admitted on an Inpatient basis with an anticipated length of stay of  more than 2 midnights     Justification for Hospital Stay:  Pain control     Admission Orders:  Scheduled Meds:   Current Facility-Administered Medications:  acetaminophen 650 mg Oral Q6H PRN CARLTON Gomez-SIDNEY   enoxaparin 40 mg Subcutaneous Daily CARLTON Gomez-SIDNEY   hydrochlorothiazide 25 mg Oral Daily CandCARLTON Hammonds-SIDNEY   levothyroxine 50 mcg Oral Early Morning CARLTON Gomez-SIDNEY   methotrexate 10 mg Oral Weekly CARLTON Gomez-SIDNEY   morphine injection 1 mg Intravenous Q4H PRN CARLTON Gomez-SIDNEY   ondansetron 4 mg Intravenous Q6H PRN CARLTON Gomez-SIDNEY   potassium chloride 40 mEq Oral BID CARLTON Gomez-SIDNEY   pravastatin 40 mg Oral Daily With Advance RUSS Galan   [START ON 2/9/2018] predniSONE 20 mg Oral Daily Amara Ospina DO   [START ON 2/6/2018] predniSONE 30 mg Oral Daily Amara Ospina DO   tamoxifen 20 mg Oral Daily Mariela Betancourt PA-C     Continuous Infusions:    PRN Meds:   acetaminophen    morphine injection    ondansetron    Reg diet   Up w/ assist  2/6 bmp, cbc,plt ct   PT eval   SCD   Ortho consult   2/5 synovial fluid cx and gram stain , synovial fluid crystal , stat gram stain     Ortho consult  2/5  Assessment:  69 y  o female with exacerbation rheumatoid arthritis  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

## 2018-02-05 NOTE — ED NOTES
Patient transported to [address] [facility], [facility]  02/05/18 5327

## 2018-02-05 NOTE — ED PROVIDER NOTES
History  Chief Complaint   Patient presents with    Ankle Pain     Pt c/o left ankle pain starting yesterday around 3pm  Pt states it started hurting after sweeping yesterday  Pt states no injury but can't put alot of weight on it  Hx of RA       81-year-old female presents with left ankle pain since approximately 1500 hours  Patient states she was sweeping the floor, denies any traumatic mechanism  Does not recall inverting or everting her ankle  Patient states constant aching pain that has become more severe throughout the day  She notes this significantly worsens with any attempted ambulation or bearing weight  She states that upon awakening to go to the bathroom tonight, she was unable to get to the bathroom due to the severe pain  Patient notes the pain is very mild at rest without attempted use but significantly worsens with palpation  Patient has a history of rheumatoid arthritis for which she takes rituximab and methotrexate daily  Patient has used corticosteroids in the past and takes them routinely after her rituximab injections  She has had no difficulty tolerating them in the past   She denies any history of gout  She denies any trauma to her ankle but states that she has been in physical therapy for her hip and notes more significant exercises this week  She denies the pain following these exercises though and states that only worsened after the episode this afternoon  Impression and plan:  Left ankle pain with a broad differential   Based on patient's history and physical, concerns for potential inflammatory versus infectious etiologies  Will obtain synovial fluid to evaluate for potential etiologies and obtain CT imaging of patient's ankle has nontraumatic mechanism, CT imaging will provide improved resolution of potential etiologies for the patient's pain  Will re-evaluate and reassess after initial evaluation          Ankle Pain   Associated symptoms: decreased ROM and swelling Associated symptoms: no back pain, no fatigue, no fever, no itching, no muscle weakness, no neck pain, no numbness, no stiffness and no tingling        Prior to Admission Medications   Prescriptions Last Dose Informant Patient Reported? Taking? Calcium Citrate-Vitamin D (CITRACAL + D PO)   Yes Yes   Sig: Take by mouth 2 (two) times a day   Cholecalciferol (VITAMIN D-3 PO)   Yes Yes   Sig: Take 2,000 Units by mouth daily  Misc Natural Products (LUTEIN 20 PO)   Yes Yes   Sig: Take 20 mg by mouth daily  Multiple Vitamin (MULTIVITAMIN) tablet   Yes Yes   Sig: Take 1 tablet by mouth daily  ascorbic acid (VITAMIN C) 500 mg tablet   Yes Yes   Sig: Take 500 mg by mouth daily  hydrochlorothiazide (HYDRODIURIL) 25 mg tablet   Yes Yes   Sig: Take 25 mg by mouth daily  levothyroxine 50 mcg tablet   Yes No   Sig: Take 75 mcg by mouth every other day     methotrexate 2 5 mg tablet   Yes Yes   Sig: Take 10 mg by mouth once a week  potassium chloride (K-DUR) 10 mEq tablet   Yes No   Sig: Take 40 mEq by mouth 2 (two) times a day     riTUXimab (RITUXAN) 500 mg/50 mL   Yes No   Sig: Infuse into a venous catheter every 6 (six) months     rosuvastatin (CRESTOR) 5 mg tablet   Yes Yes   Sig: Take 5 mg by mouth daily  tamoxifen (NOLVADEX) 20 mg tablet   Yes Yes   Sig: Take 20 mg by mouth daily  vitamin E, tocopherol, 400 units capsule   Yes Yes   Sig: Take 400 Units by mouth daily      Facility-Administered Medications: None       Past Medical History:   Diagnosis Date    Arthritis     Breast cancer ([facility] [address] )     Disease of thyroid gland     Hyperlipidemia     Hypertension     Neuritis     thoracic and lumbosacral    Vitamin D deficiency        Past Surgical History:   Procedure Laterality Date    ABDOMINAL SURGERY      BREAST SURGERY      CHOLECYSTECTOMY      ELBOW SURGERY         History reviewed  No pertinent family history  I have reviewed and agree with the history as documented      Social History Substance Use Topics    Smoking status: Never Smoker    Smokeless tobacco: Never Used    Alcohol use Yes      Comment: social use only  Review of Systems   Constitutional: Negative for fatigue and fever  Musculoskeletal: Negative for back pain, neck pain and stiffness  Skin: Negative for itching  Physical Exam  ED Triage Vitals   Temperature Pulse Respirations Blood Pressure SpO2   02/05/18 0231 02/05/18 0228 02/05/18 0228 02/05/18 0228 02/05/18 0228   98 5 °F (36 9 °C) 86 18 (!) 172/81 97 %      Temp Source Heart Rate Source Patient Position - Orthostatic VS BP Location FiO2 (%)   02/05/18 0231 02/05/18 0228 02/05/18 0228 02/05/18 0228 --   Oral Monitor Lying Left arm       Pain Score       02/05/18 0228       7           Orthostatic Vital Signs  Vitals:    02/05/18 0800 02/05/18 1522 02/05/18 2300 02/06/18 0700   BP: 142/63 114/53 118/51 106/52   Pulse: 80 82 87 68   Patient Position - Orthostatic VS:  Lying Lying Lying       Physical Exam   Constitutional: She appears well-developed and well-nourished  No distress  HENT:   Head: Atraumatic  Eyes: Pupils are equal, round, and reactive to light  Neck: Neck supple  Cardiovascular: Normal rate  Pulmonary/Chest: Effort normal    Abdominal: Soft  She exhibits no distension  Musculoskeletal: She exhibits no deformity  Left ankle: no deformity or defect noted  Diffuse swelling without clear effusion, no ecchymosis  Grossly normal range of motion though patient notes pain; normal resisted strength of the ankle with dorsiflexion, plantar flexion, inversion, eversion, flexion and extension of the toes  Tenderness to palpitation at the medial or lateral malleoli and the lateral and medial ligaments; no tenderness base of the 5th metatarsal, proximal or mid fibula and tibia, talocrural joint line or navicular  Tenderness to passive range of motion of the ankle  Normal squeeze test with no proximal tenderness       Neurological: She is alert    Skin: Skin is warm and dry  Psychiatric: She has a normal mood and affect  Vitals reviewed  ED Medications  Medications   lidocaine-epinephrine (XYLOCAINE/EPINEPHRINE) 1 %-1:100,000 injection 5 mL (5 mL Infiltration Given 2/5/18 0245)   predniSONE tablet 60 mg (60 mg Oral Given 2/5/18 0946)       Diagnostic Studies  Results Reviewed     Procedure Component Value Units Date/Time    Synovial fluid, Culture and Gram stain [[id number]] Collected:  02/05/18 0257    Lab Status:  Preliminary result Specimen: Body Fluid from [facility] Updated:  02/06/18 1334     Body Fluid Culture, Sterile Culture results to follow       Gram Stain Result 4+ Polys      No bacteria seen    Synovial fluid, crystal [[id number]] Collected:  02/05/18 0257    Lab Status:  Final result Specimen:  Synovial Fluid from Other Updated:  02/05/18 0832     Crystals, Synovial Fluid No Crystals Seen    CBC and differential [[id number]]  (Abnormal) Collected:  02/05/18 0622    Lab Status:  Final result Specimen:  Blood from Arm, Left Updated:  02/05/18 0705     WBC 15 51 (H) Thousand/uL      RBC 3 59 (L) Million/uL      Hemoglobin 11 5 g/dL      Hematocrit 34 6 (L) %      MCV 96 fL      MCH 32 0 pg      MCHC 33 2 g/dL      RDW 15 1 %      MPV 9 6 fL      Platelets 972 Thousands/uL      nRBC 0 /100 WBCs     Basic metabolic panel [[id number]]  (Abnormal) Collected:  02/05/18 0622    Lab Status:  Final result Specimen:  Blood from Arm, Left Updated:  02/05/18 3346     Sodium 142 mmol/L      Potassium 3 4 (L) mmol/L      Chloride 105 mmol/L      CO2 25 mmol/L      Anion Gap 12 mmol/L      BUN 27 (H) mg/dL      Creatinine 1 20 mg/dL      Glucose 133 mg/dL      Calcium 9 2 mg/dL      eGFR 44 ml/min/1 73sq m     Narrative:         National Kidney Disease Education Program recommendations are as follows:  GFR calculation is accurate only with a steady state creatinine  Chronic Kidney disease less than 60 ml/min/1 73 sq  meters  Kidney failure less than 15 ml/min/1 73 sq  meters  STAT Gram Stain [[id number]] Collected:  02/05/18 0257    Lab Status:  Final result Specimen:  Other from [facility] Updated:  02/05/18 0352     Gram Stain Result 4+ Polys      No organisms seen                 CT ankle left wo contrast   ED Interpretation by Olivia Garces MD (02/05 2157)   IMPRESSION:   No acute findings  Extensive osteoarthritis      Final Result by Jenney Castleman, MD (02/05 6920)   1  Advanced degenerative change throughout the foot including suggestive findings of inflammatory arthropathy, such as rheumatoid, across the 2nd through 5th metatarsophalangeal joints as above  There is also spontaneous fusion across the talonavicular    joint likely secondary to long-standing degenerative change  2   Pes planus deformity and suggestive findings of chronic PTT dysfunction  3   Loculated plantar hindfoot fluid collection measuring up to 4 3 x 3 7 x 2 1 cm likely chronic given scant wall calcification  This could relate to rheumatoid disease with infection not entirely excluded but considerably less likely  Correlate    clinically  Findings are consistent with the preliminary report from [facility] which was provided shortly after completion of the exam  The finding of plantar loculated collection  may alter immediate patient management, therefore we will now contact    referring physicians for direct verbal discussion  I personally discussed this study with Donna Varela, the nurse caring for this patient, on 2/5/2018 8:44 AM   Unfortunately, the referring physician could not be reached at this time  Workstation performed: [id number]                    Procedures  Procedures       Phone Contacts  ED Phone Contact    ED Course  ED Course as of Feb 07 0636   Mon Feb 05, 2018   0430 Patient's Gram stain without organisms noted  CT imaging without acute findings    Considering acute onset of pain, possibly secondary to inflammatory process, possibly gout  Unable to obtain crystal analysis overnight  Discussed potential options the patient, will attempt symptomatic treatment with prednisone which she has used previously with her existing rheumatoid arthritis regiment  Will attempt gait testing     5278 Patient unable to tolerate any weight-bearing with multiple attempts  Patient unable to use crutches due to chronic ambulatory dysfunction  Attempted walker without success  Discussed potential options with the patient  After extensive discussion, patient amenable to potential placement after evaluation by PT OT     0710 S/P corticosteroids   WBC: (!) 15 51                               [facility]  CritCare Time    Disposition  Final diagnoses:   Left ankle pain   Ambulatory dysfunction     Time reflects when diagnosis was documented in both MDM as applicable and the Disposition within this note     Time User Action Codes Description Comment    2/5/2018  6:11 AM Lubertha Sopchoppy Add [M25 572] Left ankle pain     2/5/2018  6:11 AM Lubertha Sopchoppy Add [R26 2] Ambulatory dysfunction     2/6/2018  2:23 PM Missy Hannon Add [I10] Essential hypertension     2/6/2018  2:23 PM Missy Hannon Add [M06 9] Rheumatoid arthritis involving left ankle, unspecified rheumatoid factor presence [facility])       ED Disposition     ED Disposition Condition Comment    Admit  Case was discussed with CHRISTOPHER and the patient's admission status was agreed to be Admission Status: inpatient status to the service of Dr Cata Donato Tuan up With Specialties Details Why [address] [facility],  Internal Medicine Schedule an appointment as soon as possible for a visit in 1 week(s)  [address] [facility]      Rachelle Lugo MD Rheumatology, Multidisciplinary Go in 2 day(s)  [address]  [phone]          Discharge Medication List as of 2/6/2018  3:24 PM      START taking these medications    Details   amLODIPine (NORVASC) 10 mg tablet Take 1 tablet (10 mg total) by mouth daily, Starting Wed 2/7/2018, Print      predniSONE 10 mg tablet Prednisone 30 mg (3 tabs) x 2 days, then 20 mg (2 tabs) x 3 days, Print         CONTINUE these medications which have NOT CHANGED    Details   ascorbic acid (VITAMIN C) 500 mg tablet Take 500 mg by mouth daily  , Until Discontinued, Historical Med      Calcium Citrate-Vitamin D (CITRACAL + D PO) Take by mouth 2 (two) times a day, Historical Med      Cholecalciferol (VITAMIN D-3 PO) Take 2,000 Units by mouth daily  , Until Discontinued, Historical Med      methotrexate 2 5 mg tablet Take 10 mg by mouth once a week , Until Discontinued, Historical Med      Misc Natural Products (LUTEIN 20 PO) Take 20 mg by mouth daily  , Until Discontinued, Historical Med      Multiple Vitamin (MULTIVITAMIN) tablet Take 1 tablet by mouth daily  , Until Discontinued, Historical Med      rosuvastatin (CRESTOR) 5 mg tablet Take 5 mg by mouth daily  , Until Discontinued, Historical Med      tamoxifen (NOLVADEX) 20 mg tablet Take 20 mg by mouth daily  , Until Discontinued, Historical Med      vitamin E, tocopherol, 400 units capsule Take 400 Units by mouth daily, Historical Med      levothyroxine 50 mcg tablet Take 75 mcg by mouth every other day  , Historical Med      potassium chloride (K-DUR) 10 mEq tablet Take 40 mEq by mouth 2 (two) times a day  , Historical Med      riTUXimab (RITUXAN) 500 mg/50 mL Infuse into a venous catheter every 6 (six) months  , Starting u 8/20/2015, Historical Med         STOP taking these medications       hydrochlorothiazide (HYDRODIURIL) 25 mg tablet Comments:   Reason for Stopping:               Outpatient Discharge Orders  Ambulatory referral to Occupational Therapy   Standing Status: Future  Standing Exp  Date: 08/06/18     Ambulatory referral to Physical Therapy   Standing Status: Future  Standing Exp   Date: 08/06/18     Discharge Diet     Activity as tolerated     Weight Bearing Restrictions     Call provider for:  severe uncontrolled pain     Call provider for:  redness, tenderness, or signs of infection (pain, swelling, redness, odor or green/yellow discharge around incision site)     Call provider for:  persistent dizziness or light-headedness         ED Provider  Electronically Signed by           Vitaliy Han MD  02/07/18 9162

## 2018-02-06 VITALS
BODY MASS INDEX: 36.73 KG/M2 | WEIGHT: 215.17 LBS | HEIGHT: 64 IN | TEMPERATURE: 98 F | DIASTOLIC BLOOD PRESSURE: 52 MMHG | HEART RATE: 68 BPM | OXYGEN SATURATION: 95 % | RESPIRATION RATE: 18 BRPM | SYSTOLIC BLOOD PRESSURE: 106 MMHG

## 2018-02-06 PROBLEM — E79.0 HYPERURICEMIA: Status: ACTIVE | Noted: 2018-02-06

## 2018-02-06 PROBLEM — E03.9 HYPOTHYROIDISM: Chronic | Status: ACTIVE | Noted: 2018-02-06

## 2018-02-06 LAB
ANION GAP SERPL CALCULATED.3IONS-SCNC: 7 MMOL/L (ref 4–13)
BUN SERPL-MCNC: 28 MG/DL (ref 5–25)
CALCIUM SERPL-MCNC: 8.4 MG/DL (ref 8.3–10.1)
CHLORIDE SERPL-SCNC: 108 MMOL/L (ref 100–108)
CO2 SERPL-SCNC: 28 MMOL/L (ref 21–32)
CREAT SERPL-MCNC: 1.17 MG/DL (ref 0.6–1.3)
ERYTHROCYTE [DISTWIDTH] IN BLOOD BY AUTOMATED COUNT: 15.4 % (ref 11.6–15.1)
GFR SERPL CREATININE-BSD FRML MDRD: 45 ML/MIN/1.73SQ M
GLUCOSE SERPL-MCNC: 106 MG/DL (ref 65–140)
HCT VFR BLD AUTO: 30 % (ref 34.8–46.1)
HGB BLD-MCNC: 9.7 G/DL (ref 11.5–15.4)
MCH RBC QN AUTO: 32.1 PG (ref 26.8–34.3)
MCHC RBC AUTO-ENTMCNC: 32.3 G/DL (ref 31.4–37.4)
MCV RBC AUTO: 99 FL (ref 82–98)
PLATELET # BLD AUTO: 180 THOUSANDS/UL (ref 149–390)
PMV BLD AUTO: 10.1 FL (ref 8.9–12.7)
POTASSIUM SERPL-SCNC: 3.9 MMOL/L (ref 3.5–5.3)
RBC # BLD AUTO: 3.02 MILLION/UL (ref 3.81–5.12)
SODIUM SERPL-SCNC: 143 MMOL/L (ref 136–145)
URATE SERPL-MCNC: 7 MG/DL (ref 2–6.8)
WBC # BLD AUTO: 10.85 THOUSAND/UL (ref 4.31–10.16)

## 2018-02-06 PROCEDURE — 85027 COMPLETE CBC AUTOMATED: CPT | Performed by: PHYSICIAN ASSISTANT

## 2018-02-06 PROCEDURE — 99238 HOSP IP/OBS DSCHRG MGMT 30/<: CPT | Performed by: INTERNAL MEDICINE

## 2018-02-06 PROCEDURE — 84550 ASSAY OF BLOOD/URIC ACID: CPT | Performed by: INTERNAL MEDICINE

## 2018-02-06 PROCEDURE — 97116 GAIT TRAINING THERAPY: CPT

## 2018-02-06 PROCEDURE — G8979 MOBILITY GOAL STATUS: HCPCS

## 2018-02-06 PROCEDURE — 97162 PT EVAL MOD COMPLEX 30 MIN: CPT

## 2018-02-06 PROCEDURE — 80048 BASIC METABOLIC PNL TOTAL CA: CPT | Performed by: PHYSICIAN ASSISTANT

## 2018-02-06 PROCEDURE — G8978 MOBILITY CURRENT STATUS: HCPCS

## 2018-02-06 RX ORDER — AMLODIPINE BESYLATE 10 MG/1
10 TABLET ORAL DAILY
Status: DISCONTINUED | OUTPATIENT
Start: 2018-02-07 | End: 2018-02-06 | Stop reason: HOSPADM

## 2018-02-06 RX ORDER — PREDNISONE 10 MG/1
TABLET ORAL
Qty: 14 TABLET | Refills: 0 | Status: SHIPPED | OUTPATIENT
Start: 2018-02-06 | End: 2018-02-14 | Stop reason: ALTCHOICE

## 2018-02-06 RX ORDER — AMLODIPINE BESYLATE 10 MG/1
10 TABLET ORAL DAILY
Qty: 30 TABLET | Refills: 0 | Status: SHIPPED | OUTPATIENT
Start: 2018-02-07 | End: 2018-02-14 | Stop reason: SDUPTHER

## 2018-02-06 RX ADMIN — TAMOXIFEN CITRATE 20 MG: 10 TABLET, FILM COATED ORAL at 08:27

## 2018-02-06 RX ADMIN — ENOXAPARIN SODIUM 40 MG: 40 INJECTION SUBCUTANEOUS at 08:27

## 2018-02-06 RX ADMIN — LEVOTHYROXINE SODIUM 50 MCG: 50 TABLET ORAL at 05:44

## 2018-02-06 RX ADMIN — HYDROCHLOROTHIAZIDE 25 MG: 25 TABLET ORAL at 08:27

## 2018-02-06 RX ADMIN — POTASSIUM CHLORIDE 40 MEQ: 750 TABLET, EXTENDED RELEASE ORAL at 08:26

## 2018-02-06 RX ADMIN — PREDNISONE 30 MG: 20 TABLET ORAL at 08:27

## 2018-02-06 NOTE — PHYSICAL THERAPY NOTE
PHYSICAL THERAPY EVALUATION/TREATMENT/DC NOTE          Patient Name: Umesh ROTHMANYCO'I Date: 2/6/2018  AGE:   69 y o  Mrn:   [id number]  ADMIT DX:  Ankle pain [M25 579]  Left ankle pain [M25 572]  Ambulatory dysfunction [R26 2]    Past Medical History:   Diagnosis Date    Arthritis     [facility] cancer [facility] [address]     Disease of thyroid gland     Hyperlipidemia     Hypertension     Neuritis     thoracic and lumbosacral    Vitamin D deficiency      Length Of Stay: 1  PHYSICAL THERAPY EVALUATION :    02/06/18 0959   Note Type   Note type Eval/Treat   Pain Assessment   Pain Assessment 0-10   Pain Score No Pain   Home Living   Type of Home House  (2 small JUANITO with rail)   Home Layout One level   Bathroom Shower/Tub Walk-in shower   Bathroom Toilet Standard   Bathroom Accessibility Accessible   Home Equipment (none)   Additional Comments staying with brother, assisting with his care as he has lung Ca  Prior Function   Level of [place] Independent with ADLs and functional mobility   Lives With (brother)   Ronak Help From (none needed)   ADL Assistance Independent   IADLs Independent   Falls in the last 6 months 0   Vocational Retired   Comments a few near falls per patient, "I stumble but have not fallen"  Drives   Restrictions/Precautions   Weight Bearing Precautions Per Order No   Braces or Orthoses (none)   Other Precautions Fall Risk; Chair Alarm; Bed Alarm   General   Additional Pertinent History Admitted with gradual onset L ankle pain and edema  Has RA at baseline  Aspirated with little obtained, no crystals noted, negative on gram stain  Blood work pending for Uric acid level for possible gout  Patient now with decreased pain, none this session    DIfficulty ambulating PTA   (cleared for PT by RN, eval/treat, WBAT, up w/A)   Family/Caregiver Present No   Cognition   Overall Cognitive Status [facility]   Arousal/Participation Alert   Orientation Level Oriented X4   Memory Within functional limits   Following Commands Follows all commands and directions without difficulty   Comments MD in during session from 3816 to 0965   RUE Assessment   RUE Assessment WFL, with hand deformities due to RA affecting grasp, not formally tested   LUE Assessment   LUE Assessment WFL, with hand deformities due to RA affecting grasp, not formally tested   RLE Assessment   RLE Assessment (grossly 4)   LLE Assessment   LLE Assessment (grossly 4, weaker than R)   Coordination   Movements are Fluid and Coordinated 1  (able to feel light touch in toes and feet)   Sensation WFL  (occassional incr numbness in toes due to RA deformties)   Light Touch   RLE Light Touch Grossly intact  (slight numbness in toes)   LLE Light Touch Impaired   LLE Light Touch Comments chronic numbness L anterior thigh from groin to knee  Able to feel light touch but decreased, described as tingling also  (slight numbness in toes)   Sharp/Dull   RLE Sharp/Dull Not tested   LLE Sharp/Dull Not tested   Proprioception   RLE Proprioception Not tested   LLE Proprioception Not tested   Bed Mobility   Supine to Sit 7  Independent   Additional Comments good sequencing, no problems   Transfers   Sit to Stand 5  Supervision   Additional items Assist x 1; Armrests; Increased time required;Verbal cues  (for UE placement with RW use)   Stand to Sit 5  Supervision   Additional items Assist x 1; Armrests; Verbal cues  (with RW)   Additional Comments good sequencing, no LOB   Ambulation/Elevation   Gait pattern Decreased foot clearance;Decreased L stance; Short stride; Excessively slow; Inconsistent nate   Gait Assistance 5  Supervision  (to CG assist)   Additional items Assist x 1;Verbal cues; Tactile cues   Assistive Device Rolling walker   Distance 70 feet   Balance   Static Sitting Good   Dynamic Sitting Good   Static Standing Fair +   Dynamic Standing Fair  (with RW)   Ambulatory Fair  (with RW)   Endurance Deficit   Endurance Deficit No   Activity Tolerance   Activity Tolerance Patient tolerated treatment well   Nurse Made Aware RN aware of session   Assessment   Prognosis Good   Problem List Decreased strength; Impaired balance;Decreased mobility; Impaired sensation;Obesity;Pain  (no pain at this time)   Assessment Admitted with gradual onset L ankle pain and edema  Has RA at baseline  Aspirated with little obtained, no crystals noted, negative on gram stain  Blood work pending for Uric acid level for possible gout  Patient now with decreased pain, none this session  DIfficulty ambulating PTA  Patient is safe for d/c to home with continued OP PT as noted  She is at her baseline but does have decreased gait quality placing her at risk for falls  Was getting OP PT PTA for L groin strain, now no pain  Admits to near falls  Suggested assistive device use but declines  Goals set reached for transfers and steps  Partially reached for gait on level surface  Presents with moderate level history, moderate level examination and evolving presentation  Moderate level evaluation complexity  Barriers to Discharge None   Goals   Patient Goals go home, walk without walker   STG Expiration Date 02/06/18   Short Term Goal #1 Patient will ambulate with least restrictive device for 100 feet without assist, up and down 2 small 4 in steps without assist with handrail and transfer sit to and from stand without assist    Treatment Day 1   Plan   Treatment/Interventions Functional transfer training;Elevations; Patient/family training;Equipment eval/education;Gait training;Spoke to nursing;Spoke to case management   PT Frequency One time visit  (treatment after evaluation as noted)   Recommendation   Recommendation Outpatient PT  (continued OPPT for LE strengthening and balance exer)   Equipment Recommended (recommend RW, but patient declines, also SPC will consider)   PT - OK to Discharge Yes   Barthel Index   Feeding 10   Bathing 0   Grooming Score 5   Dressing Score 10   Bladder Score 5   Bowels Score 10   Toilet Use Score 5   Transfers (Bed/Chair) Score 10   Mobility (Level Surface) Score 0   Stairs Score 5   Barthel Index Score 60       Time In: 1000  Time Out: 1023  Total Time: 23 minutes      S:  I want to try without the walker  O:  Transfer training sit to and from stand with arms of chair without assist, cues with SPC when used  Gait training with SPC in R hand initially for 50 feet, then changed to L hand due to difficulty with grasp  Then for 100 feet with close supervision, decreased step length, LE clearance, LE's not passing each other and slow/guarded gait speed  Gait without device for 100 feet, continued decreased gait quality but no significant change from use of SPC  Up and down 2-4 in steps x2 with L hand rail and close supervision to independent  Increased discussion with patient on consideration to RW use due to gait quality decreased at baseline and admitting to near falls  Not receptive at this time  Suggested SPC, needs to purchase hers out of facility as those we have for patients here are not appropriate due to her limited grasp   too large  She will consider it  A:  Patient safe for d/c to home  She is at her baseline although does have deficits creating decreased gait quality  P:  D/C PT at this time  No further acute intervention needed  Bunny Chaudhary, PT          Recommend OPP PT on DC to address deficits noted and increase safety of mobility while decreasing fall risk      Bunny Chaudhary, PT

## 2018-02-06 NOTE — SOCIAL WORK
Cm met with patient this day regarding care coordination  Patient reports residing in a private residence with her brother  Patient reports being independent with ADL's, drives and reports using outpatient physical therapy  Patient stated she is interested in resuming her outpatient physical therapy upon discharge  (MD aware of patient desire to do so)  Patient reports having adequate family and community supports for care at home  Patient reports no needs family will transport patient home upon discharge  CM reviewed discharge planning process including the following: identifying help at home, patient preference for discharge planning needs, pharmacy preference, and availability of treatment team to discuss questions or concerns patient and/or family may have regarding understanding medications and recognizing signs and symptoms once discharged  CM also encouraged patient to follow up with all recommended appointments after discharge  Patient advised of importance for patient and family to participate in managing patients medical well being

## 2018-02-06 NOTE — DISCHARGE SUMMARY
Discharge Summary - [facility]    Patient Information: Libertad Santoro 69 y o  female MRN: [id number]  Unit/Bed#: -01 Encounter: [id number]    Discharging Physician / Practitioner: Krystin Blake MD  PCP: Jaycee Jean DO  Admission Date: 2/5/2018  Discharge Date: 02/06/18    Disposition:     Home with outpatient PT/OT     Reason for Admission: Left ankle pain likely 2/2 RA    Discharge Diagnoses:     Principal Problem:    Left ankle pain  Active Problems:    Hypertension    Rheumatoid arthritis ([address] )  Resolved Problems:    * No resolved hospital problems  *      Consultations During Hospital Stay:  · Orthopedic surgery     Procedures Performed:     · Left ankle joint aspiration      Significant Findings / Test Results:     CT left ankle: CT LEFT ANKLE     INDICATION: ankle pain  History taken directly from the electronic ordering system  Ankle pain  Unable to bear weight  No trauma  Rheumatoid arthropathy      COMPARISON: None      TECHNIQUE: Serial Axial CT images were acquired through the left ankle  Coronal and sagittal reformation images were also obtained  Contrast material was not utilized  This examination, like all CT scans performed in the [facility], was performed utilizing techniques to minimize radiation dose exposure, including the use of iterative reconstruction and automated   exposure control  Rad dose 238 mGy-cm      FINDINGS:     JOINT EFFUSION: Joint effusion noted      ALIGNMENT: Increase in talar declination angle suggests chronic PTT dysfunction  This results in pes planus deformity      OSSEOUS STRUCTURES: Advanced degenerative change noted throughout the visualized midfoot and hindfoot with apparent spontaneous talonavicular fusion related to long-standing degenerative change    No acute fracture dislocation or osseous destructive   lesion identified      Incomplete evaluation of lateral deviation of the 2nd through 5th toes with erosive changes and pencil in cup deformity noted across the metatarsal phalangeal joints  Findings consistent with underlying inflammatory arthropathy      TENDONS: Limited assessment by CT technique without gross evidence of tear      LIGAMENTS: Limited assessment by CT technique without gross evidence of tear      MUSCULATURE: Intact      REMAINING SOFT TISSUES: Loculated collection noted along the plantar soft tissues at the level of the dorsal calcaneus measuring up to 4 3 x 3 7 x 2 1 cm  There is some wall calcification noted suggesting chronicity and presumably related to rheumatoid   disease      IMPRESSION:  1  Advanced degenerative change throughout the foot including suggestive findings of inflammatory arthropathy, such as rheumatoid, across the 2nd through 5th metatarsophalangeal joints as above  There is also spontaneous fusion across the talonavicular   joint likely secondary to long-standing degenerative change  2   Pes planus deformity and suggestive findings of chronic PTT dysfunction  3   Loculated plantar hindfoot fluid collection measuring up to 4 3 x 3 7 x 2 1 cm likely chronic given scant wall calcification  This could relate to rheumatoid disease with infection not entirely excluded but considerably less likely  Correlate clinically  No crystals seen on synovial fluid culture  Gram stain revealed no bacteria, 4+ polys     Incidental Findings:   · As above      Test Results Pending at Discharge (will require follow up): · Body fluid culture from left ankle: pending      Outpatient Tests Requested:  · Uric acid in 2-4 wks     Complications:  None    Hospital Course: Marquita Angulo is a 69 y o  female patient w/PMH of RA on methotrexate and Rituxan infusions, HTN, hypothyroidism and breast cancer on Tamoxifen who originally presented to the hospital on 2/5/2018 due to left ankle pain  She denied any trauma   In the ED, a CT of the left ankle was obtained followed by collection of synovial fluid was obtained  Patient had decreased ROM with mild edema but no surrounding erythema  Her hands reveal severe deformities c/w RA  Patient was seen by orthopedic surgery who did not recommend any emergent orthopedic treatment at this time  They also recommended WBAT for the LLE  Patient was started on Prednisone for which she will complete a taper outpatient  The synovial fluid did not reveal any crystals; however, the patient does have an elevated uric acid level  This is likely secondary to being on Hctz  Will D/C the Hctz and discharge the patient on Amlodipine  The patient is to f/u with her PCP outpatient for BP check and repeat uric acid studies  She has also been advised to f/u her rheumatologist in [place]  She states that she has an appointment with Rheum for this Thursday, 2/8/18  Lastly, the patient was seen and evaluated by PT/OT who recommend to resume outpatient PT/OT         Condition at Discharge: stable     Discharge Day Visit / Exam:     Subjective:  No acute overnight events  Reports that her ankle pain has significantly improved  States that she is able to ambulate to and from the bathroom without difficulty   Denies any chest pain or SOB   Denies a hx of gout   Vitals: Blood Pressure: 106/52 (02/06/18 0700)  Pulse: 68 (02/06/18 0700)  Temperature: 98 °F (36 7 °C) (02/06/18 0700)  Temp Source: Oral (02/06/18 0700)  Respirations: 18 (02/06/18 0700)  Height: 5' 4" (162 6 cm) (02/05/18 0800)  Weight - Scale: 97 6 kg (215 lb 2 7 oz) (02/05/18 0800)  SpO2: 95 % (02/06/18 0700)  Exam:   Physical Exam   Constitutional: She appears well-developed and well-nourished  No distress  HENT:   Head: Normocephalic and atraumatic  Nose: Nose normal    Eyes: Conjunctivae and EOM are normal  Pupils are equal, round, and reactive to light  Neck: Normal range of motion  Neck supple  No JVD present  Cardiovascular: Normal rate, regular rhythm and normal heart sounds  Exam reveals no gallop and no friction rub  No murmur heard  Pulmonary/Chest: Effort normal and breath sounds normal  No respiratory distress  She has no wheezes  She has no rales  She exhibits no tenderness  Abdominal: Soft  Bowel sounds are normal  She exhibits no distension  There is no tenderness  There is no rebound and no guarding  Musculoskeletal: She exhibits no edema  Left ankle wrapped with bandage: C/D/I, non-tender   Neurological: She is alert  No cranial nerve deficit  Skin: Skin is warm and dry  No rash noted  Psychiatric: She has a normal mood and affect  Vitals reviewed  Discharge instructions/Information to patient and family:   See after visit summary for information provided to patient and family  Provisions for Follow-Up Care:  See after visit summary for information related to follow-up care and any pertinent home health orders  Planned Readmission: No     Discharge Statement:  I spent >30 minutes discharging the patient  This time was spent on the day of discharge  I had direct contact with the patient on the day of discharge  Greater than 50% of the total time was spent examining patient, answering all patient questions, arranging and discussing plan of care with patient as well as directly providing post-discharge instructions  Additional time then spent on discharge activities  Discharge Medications:  See after visit summary for reconciled discharge medications provided to patient and family        ** Please Note: This note has been constructed using a voice recognition system **

## 2018-02-08 ENCOUNTER — TRANSITIONAL CARE MANAGEMENT (OUTPATIENT)
Dept: INTERNAL MEDICINE CLINIC | Facility: CLINIC | Age: 78
End: 2018-02-08

## 2018-02-08 LAB
BACTERIA SPEC BFLD CULT: NO GROWTH
GRAM STN SPEC: NORMAL
GRAM STN SPEC: NORMAL

## 2018-02-14 ENCOUNTER — OFFICE VISIT (OUTPATIENT)
Dept: INTERNAL MEDICINE CLINIC | Facility: CLINIC | Age: 78
End: 2018-02-14
Payer: MEDICARE

## 2018-02-14 VITALS
HEIGHT: 64 IN | OXYGEN SATURATION: 97 % | HEART RATE: 67 BPM | SYSTOLIC BLOOD PRESSURE: 142 MMHG | DIASTOLIC BLOOD PRESSURE: 65 MMHG | BODY MASS INDEX: 32.1 KG/M2 | TEMPERATURE: 99.1 F | WEIGHT: 188 LBS

## 2018-02-14 DIAGNOSIS — M25.572 LEFT ANKLE PAIN, UNSPECIFIED CHRONICITY: Primary | ICD-10-CM

## 2018-02-14 DIAGNOSIS — I10 ESSENTIAL HYPERTENSION: Chronic | ICD-10-CM

## 2018-02-14 DIAGNOSIS — E55.9 VITAMIN D DEFICIENCY: ICD-10-CM

## 2018-02-14 DIAGNOSIS — M06.9 RHEUMATOID ARTHRITIS INVOLVING LEFT ANKLE, UNSPECIFIED RHEUMATOID FACTOR PRESENCE: Chronic | ICD-10-CM

## 2018-02-14 PROBLEM — R01.1 MURMUR: Status: ACTIVE | Noted: 2017-10-12

## 2018-02-14 PROBLEM — R91.1 PULMONARY NODULE: Status: ACTIVE | Noted: 2017-05-02

## 2018-02-14 PROBLEM — M25.552 LEFT HIP PAIN: Status: ACTIVE | Noted: 2018-01-03

## 2018-02-14 PROBLEM — I51.89 DIASTOLIC DYSFUNCTION: Status: ACTIVE | Noted: 2017-11-02

## 2018-02-14 PROCEDURE — 99495 TRANSJ CARE MGMT MOD F2F 14D: CPT | Performed by: INTERNAL MEDICINE

## 2018-02-14 RX ORDER — AMLODIPINE BESYLATE 10 MG/1
10 TABLET ORAL DAILY
Qty: 90 TABLET | Refills: 2 | Status: SHIPPED | OUTPATIENT
Start: 2018-02-14 | End: 2018-02-14 | Stop reason: SDUPTHER

## 2018-02-14 RX ORDER — FOLIC ACID 1 MG/1
TABLET ORAL
Qty: 90 TABLET | Refills: 2 | Status: SHIPPED | OUTPATIENT
Start: 2018-02-14 | End: 2021-01-06 | Stop reason: SDUPTHER

## 2018-02-14 RX ORDER — AMLODIPINE BESYLATE 10 MG/1
10 TABLET ORAL DAILY
Qty: 90 TABLET | Refills: 0 | Status: SHIPPED | OUTPATIENT
Start: 2018-02-14 | End: 2018-02-27

## 2018-02-15 NOTE — PATIENT INSTRUCTIONS
Connective Tissue Disorders   AMBULATORY CARE:   A connective tissue disorder  can affect any connective tissue in your body  Connective tissues support your organs, attach muscles to bones, and create scar tissue after an injury  Cartilage is an example of connective tissue  There are many types of connective tissue disorders, such as rheumatoid arthritis, lupus, and scleroderma  The most common affected areas are joints, muscles, and skin  Your organs, eyes, nervous system, and blood vessels can also be affected  Common signs and symptoms of a connective tissue disorder:  Signs and symptoms depend on the type of connective tissue disorder and if it is severe  Symptoms may be mild or severe, and may come and go:  · Fever or fatigue    · Skin rash or thickening, blisters, or sensitivity to sunlight    · Rash on your cheeks that goes across your nose    · Joint pain, swelling, or warmth    · Deformed joints, or limited range of motion    · Cold, numb, or swollen fingers    · Loss of appetite, or weight loss without trying    · Dry mouth or eyes, vision problems, or an eye infection such as conjunctivitis    · Hair loss  Call 911 for any of the following:   · You are sweating, and your lips are pale or blue  · You have trouble breathing, chest pain or pressure, or a fast heartbeat  · You are vomiting blood  · You have a high fever  Seek care immediately if:   · You lose feeling in your hands or feet  · You lose feeling on one side of your body  · You have sudden pain in your eyes and vision problems  Contact your healthcare provider if:   · You have trouble urinating, or you urinate less than usual     · You have trouble having a bowel movement, or you lose control of your bowel movements  · Your muscle or joint tightness worsens, or your fingers begin to curl  · Your symptoms get worse, even after treatment  · Your skin is itchy, swollen, or has a rash      · You have questions or concerns about your condition or care  Treatment  may include any of the following:  · Medicines  may be given to prevent your immune system from attacking healthy cells  You may also need medicines to stop the disease from getting worse  You may need to use topical creams or lotions to control a rash or other symptoms that affect your skin  · Prescription pain medicine  may be given  Ask your healthcare provider how to take this medicine safely  Some prescription pain medicines contain acetaminophen  Do not take other medicines that contain acetaminophen without talking to your healthcare provider  Too much acetaminophen may cause liver damage  Prescription pain medicine may cause constipation  Ask your healthcare provider how to prevent or treat constipation  · NSAIDs , such as ibuprofen, help decrease swelling, pain, and fever  This medicine is available with or without a doctor's order  NSAIDs can cause stomach bleeding or kidney problems in certain people  If you take blood thinner medicine, always ask your healthcare provider if NSAIDs are safe for you  Always read the medicine label and follow directions  · Acetaminophen  helps reduce pain and fever  Acetaminophen is available without a doctor's order  Ask how much to take and how often to take it  Follow directions  Acetaminophen can cause liver problems if not taken correctly  · Steroids  may be given to reduce swelling and pain  Manage your connective tissue disorder:   · Rest as needed  Talk to your healthcare provider if you are having trouble sleeping because of pain or other symptoms  Rest your joints if they are stiff or painful  Your healthcare provider may suggest support devices such as crutches or splints to help your joints rest      · Eat a variety of healthy foods  Healthy foods include fruits, vegetables, lean meats, fish, and low-fat dairy products  Work with your healthcare provider or dietitian to create healthy meal plans  · Go to physical or occupational therapy as directed  A physical therapist can help you create an exercise plan  Exercise may help increase your energy  Exercise can also help keep stiff joints flexible and increase range of motion  An occupational therapist can help you learn to do your daily activities when you have pain or swelling  · Talk to your healthcare provider about pregnancy  If you are a woman and want to get pregnant, talk to your healthcare provider  You or your baby might be at risk for complications  You may need to wait until your disease is controlled or your medications are finished before you get pregnant  You may also have trouble getting pregnant because of your disease  Your healthcare provider may be able to suggest ways to improve your ability to become pregnant  · Do not smoke  Nicotine and other chemicals in cigarettes and cigars can cause blood vessel and lung damage  Ask your healthcare provider for information if you currently smoke and need help to quit  E-cigarettes or smokeless tobacco still contain nicotine  Talk to your healthcare provider before you use these products  · Manage stress  Stress may slow healing and lead to illness  Learn ways to control stress, such as relaxation, deep breathing, or listening to music  Manage flares:  A flare means something triggered your symptoms  Stress, cold weather, and sunlight are examples of triggers  Your healthcare provider can help you create a management plan that includes what to do if you have a flare  Treat flares quickly to help prevent serious illness  · Apply ice or heat as directed  Ice helps reduce pain and swelling, and may help prevent tissue damage  Use an ice pack, or put crushed ice in a bag  Cover the bag with a towel and apply to the painful area for 15 to 20 minutes every hour, or as directed  Heat helps reduce pain and muscle spasms   Apply a warm compress to the area for 20 minutes every 2 hours, or as directed  · Elevate the area above the level of your heart  Elevation can help reduce swelling and pain, especially in your joints  Elevate the area as often as possible  · Keep your hands and feet warm  Certain connective tissue disorders can cause your hands and feet to become cold and painful  Over time, ulcers or gangrene (tissue death) may develop if frequent or severe attacks are not prevented  Dress warmly in cold weather, including gloves and thick socks  It may help to wiggle your fingers or toes to improve circulation  Follow up with your healthcare provider as directed: You may need ongoing tests or treatment  Write down your questions so you remember to ask them during your visits  © 2017 [facility] Information is for End User's use only and may not be sold, redistributed or otherwise used for commercial purposes  All illustrations and images included in CareNotes® are the copyrighted property of [employer] , [employer]  or [employer]  The above information is an  only  It is not intended as medical advice for individual conditions or treatments  Talk to your doctor, nurse or pharmacist before following any medical regimen to see if it is safe and effective for you

## 2018-02-15 NOTE — PROGRESS NOTES
Assessment/Plan:     1 hypertension blood pressure is minimally elevated will recheck in April continue amlodipine discontinue potassium  2  Ankle pain secondary to rheumatoid arthritis continued follow-up with Dr Josseline Hull no gout was found will continue to hold off hydrochlorothiazide  3  Decreased vitamin-D will Rx vitamin D3  4  Rheumatoid arthritis folic acid prescription given     Diagnoses and all orders for this visit:    Left ankle pain, unspecified chronicity    Essential hypertension  -     amLODIPine (NORVASC) 10 mg tablet; Take 1 tablet (10 mg total) by mouth daily    Vitamin D deficiency  -     Cholecalciferol (SM VITAMIN D3) 2000 units CAPS; 1 daily    Rheumatoid arthritis involving left ankle, unspecified rheumatoid factor presence (HCC)  -     folic acid (FOLVITE) 1 mg tablet; 1 daily         Subjective:     Patient ID: Anabela Santos is a 69 y o  female  HPI    Review of Systems   Constitutional: Negative for appetite change, chills, fatigue, fever and unexpected weight change  HENT: Negative for congestion, ear pain, facial swelling, hearing loss, mouth sores, nosebleeds, postnasal drip, rhinorrhea, sinus pain, sore throat, trouble swallowing and voice change  Eyes: Negative for pain, discharge, redness and visual disturbance  Respiratory: Negative for apnea, chest tightness, shortness of breath, wheezing and stridor  Cardiovascular: Negative for chest pain, palpitations and leg swelling  Gastrointestinal: Negative for abdominal distention, abdominal pain, blood in stool, constipation, diarrhea and vomiting  Endocrine: Negative for cold intolerance, heat intolerance, polydipsia, polyphagia and polyuria  Genitourinary: Negative for difficulty urinating, dysuria, flank pain, frequency, genital sores, hematuria and urgency  Musculoskeletal: Negative for arthralgias and back pain  Skin: Negative for rash and wound     Allergic/Immunologic: Negative for environmental allergies, food allergies and immunocompromised state  Neurological: Negative for dizziness, tremors, seizures, syncope, facial asymmetry, speech difficulty, weakness, light-headedness, numbness and headaches  Hematological: Negative for adenopathy  Does not bruise/bleed easily  Psychiatric/Behavioral: Negative for agitation, behavioral problems, dysphoric mood, hallucinations, self-injury, sleep disturbance and suicidal ideas  The patient is not hyperactive  Objective:     Physical Exam   Constitutional: She is oriented to person, place, and time  She appears well-developed  obese   HENT:   Right Ear: External ear normal    Left Ear: External ear normal    Eyes: Right eye exhibits no discharge  Left eye exhibits no discharge  No scleral icterus  Neck: Carotid bruit is not present  No tracheal deviation present  No thyroid mass and no thyromegaly present  Cardiovascular: Normal rate, regular rhythm, normal heart sounds and intact distal pulses  Exam reveals no gallop and no friction rub  No murmur heard  Pulmonary/Chest: No respiratory distress  She has no wheezes  She has no rales  Musculoskeletal: She exhibits no edema  Bilateral ulnar deviation   Lymphadenopathy:     She has no cervical adenopathy  Neurological: She is alert and oriented to person, place, and time  Coordination normal    Psychiatric: She has a normal mood and affect  Her behavior is normal  Judgment and thought content normal    Nursing note and vitals reviewed  Vitals:    02/14/18 1753   BP: 142/65   BP Location: Left arm   Patient Position: Sitting   Pulse: 67   Temp: 99 1 °F (37 3 °C)   TempSrc: Tympanic   SpO2: 97%   Weight: 85 3 kg (188 lb)   Height: 5' 4" (1 626 m)       Transitional Care Management Review: Leah Lancaster is a 69 y o  female here for TCM follow up       During the TCM phone call patient stated:    Date and time hospital follow up call was made:  2/8/2018  9:59 AM  Hospital care reviewed:  Records reviewed  Patient was hopsitalized at:  [facility]  Date of admission:  2/5/18  Date of discharge:  2/6/18  Diagnosis:  RA  Were the patients medicaitons reviewed and updated:  Yes  Current symptoms:  None  Post hospital issues:  None  Should patient be enrolled in anticoag monitoring?:  No  Scheduled for follow up?:  Yes  Patients specialists:  Other (comment)  Other specialists Name:  [facility]re[facility]  Did you obtain your prescribed medications:  Yes  Do you need help managing your perscriptions or medications:  No  Is transportation to your appointments needed:  No  Living Arrangements:  Family members  Support System:  Family  The type of support provided:  Other (comment)  Do you have social support:  Yes, as much as I need  Are you recieving outpatient services:  No  Are you recieving home care services:  No  Are you using any community resources:  No  Current waiver service:  No  Have you fallen in the last 12 months:  No  Interperter language line required?:  No  Counseling:  Patient  Counseling topics:  Importance of RX compliance             Sheri Read, DO

## 2018-02-19 ENCOUNTER — OFFICE VISIT (OUTPATIENT)
Dept: HEMATOLOGY ONCOLOGY | Facility: CLINIC | Age: 78
End: 2018-02-19
Payer: MEDICARE

## 2018-02-19 VITALS
DIASTOLIC BLOOD PRESSURE: 68 MMHG | WEIGHT: 190 LBS | HEART RATE: 83 BPM | RESPIRATION RATE: 16 BRPM | SYSTOLIC BLOOD PRESSURE: 142 MMHG | HEIGHT: 64 IN | TEMPERATURE: 98.7 F | BODY MASS INDEX: 32.44 KG/M2

## 2018-02-19 DIAGNOSIS — C50.511 MALIGNANT NEOPLASM OF LOWER-OUTER QUADRANT OF RIGHT BREAST OF FEMALE, ESTROGEN RECEPTOR POSITIVE (HCC): Primary | ICD-10-CM

## 2018-02-19 DIAGNOSIS — D64.9 ANEMIA, UNSPECIFIED TYPE: ICD-10-CM

## 2018-02-19 DIAGNOSIS — Z17.0 MALIGNANT NEOPLASM OF LOWER-OUTER QUADRANT OF RIGHT BREAST OF FEMALE, ESTROGEN RECEPTOR POSITIVE (HCC): Primary | ICD-10-CM

## 2018-02-19 PROCEDURE — 99203 OFFICE O/P NEW LOW 30 MIN: CPT | Performed by: INTERNAL MEDICINE

## 2018-02-19 RX ORDER — TAMOXIFEN CITRATE 10 MG/1
20 TABLET ORAL DAILY
Qty: 90 TABLET | Refills: 1 | Status: SHIPPED | OUTPATIENT
Start: 2018-02-19 | End: 2018-03-12

## 2018-02-19 NOTE — PROGRESS NOTES
HEMATOLOGY / [address] NOTE    Primary Care Provider: Sushma Waldron DO  Referring Provider:    MRN: [id number]  : 1940    Reason for Encounter:  Chief Complaint   Patient presents with    Consult         History of Hematology / Oncology Illness:     Yoon Mart is a 69 y o  female who came in to establish care with oncology  Stage 1, right breast Cancer, invasive ductal carcinoma, ERPR positive, her 2 positive on FISH, grade 2     D/X:  2012  Tr:  Status post lumpectomy, radiation, Herceptin, currently on tamoxifen, x 5 years   [facility] by Dr Junior Mart  Interval History:     :  Patient has history of rheumatoid arthritis, under active care with Rheumatology, overall stable  Patient came in today to establish care with oncology  Medical oncology history as above  Given today reported overall doing well, at baseline health status  She reported has no weight loss, no lumps bumps in breast, no constitutional symptoms including no bleeding, easy bruise, no fever chills, no night sweats, no lumps bumps  She is tolerating tamoxifen without major side effects  There is no history of DVT, endometrial cancers, she reported having bilateral eye cataract, status post cataract surgery, last one is in 2017  She has no other oncology, hematology issues  Colonoscopy was on time with Dr Ronak Tolbert  Following Dr Yumiko Kim for GYN routine checkup  She reported smoked for short time and stopped in her 35s  Drink alcohol socially  Family history of oncology issues including brother has lung cancer, mother has colon cancers at age 46  She is currently , move in with her brother who suffered from lung cancer  She currently living close to [facility] about 45 minutes from our office        Problem list:     Patient Active Problem List   Diagnosis    Left ankle pain    Hypertension    Rheumatoid arthritis ([facility] [address] )    Hypothyroidism    Hyperuricemia    Abnormal blood chemistry    Diastolic dysfunction    Hyperlipidemia    Left hip pain    Lumbar radiculopathy    Murmur    Osteopenia    Other chronic pain    Pulmonary nodule    Vitamin D deficiency    Adenocarcinoma of breast (HCC)       Assessment / Plan:     1  Malignant neoplasm of lower-outer quadrant of right breast of female, estrogen receptor positive ([address] )  - discussed with patient regarding the new data based on the atlas trial, Tamoxifen x 5 vs 10yr followed for 15yr  5yr vs 10yr: DFS at 15yr: 25 vs 21% and OS from yr 5-14:  15 vs 12%  small increased endometrial Ca and clot  - patient agreed to proceed withadditional 5 year tamoxifen for a total 10 years  -  she will continue to [employer] twice a year with PCP  - she will have yearly mammogram   Ordered today for next month   -she will continue follow GYN on a yearly basis  She will also have breast exam during these visits  -Mammo screening bilateral w cad  - tamoxifen (NOLVADEX) 10 mg tablet; Take 2 tablets (20 mg total) by mouth daily  Dispense: 90 tablet; Refill: 1      2, anemia   -hemoglobin number fluctuate  Hemoglobin 9 7 with MCV 99   -likely anemia is due to multifactorial including chronic disease from rheumatoid arthritis, under treatment with methotrexate  -will continue follow-up  If the hemoglobin further drop, we will proceed with anemia workup  40     minutes were spent on this visit  All questions answered to satisfaction; Advised pt to call if there is any further questions  PHYSICIAL EXAMINATION:     Vital Signs:   [unfilled]  Body mass index is 32 61 kg/m²  Body surface area is 1 91 meters squared  GEN: Alert, awake oriented x3, in no acute distress  HEENT- No pallor, icterus, cyanosis, no oral mucosal lesions,   LAD - no palpable cervical, clavicle, axillary, inguinal LAD  Heart- normal S1 S2, regular rate and rhythm, No murmur, rubs     Lungs- clear breathing sound bilateral    Abdomen- soft, Non tender, bowel sounds present  Extremities- ++ edema, bilateral finger in joint deformity consistent with rheumatoid arthritis  Neuro- No focal neurological deficit           PAST MEDICAL HISTORY:   has a past medical history of Arthritis; Breast cancer ([address] ); Disease of thyroid gland; Hyperlipidemia; Hypertension; Neuritis; and Vitamin D deficiency  PAST SURGICAL HISTORY:   has a past surgical history that includes Abdominal surgery; Breast surgery; Cholecystectomy; and Elbow surgery  CURRENT MEDICATIONS:   Current Outpatient Prescriptions   Medication Sig Dispense Refill    amLODIPine (NORVASC) 10 mg tablet Take 1 tablet (10 mg total) by mouth daily 90 tablet 0    ascorbic acid (VITAMIN C) 500 mg tablet Take 500 mg by mouth daily   Calcium Citrate-Vitamin D (CITRACAL + D PO) Take by mouth 2 (two) times a day      Cholecalciferol (SM VITAMIN D3) 2000 units CAPS 1 daily 100 capsule 5    Cholecalciferol (VITAMIN D-3 PO) Take 2,000 Units by mouth daily   folic acid (FOLVITE) 1 mg tablet 1 daily 90 tablet 2    levothyroxine 50 mcg tablet Take 75 mcg by mouth every other day        methotrexate 2 5 mg tablet Take 10 mg by mouth once a week   Misc Natural Products (LUTEIN 20 PO) Take 20 mg by mouth daily   Multiple Vitamin (MULTIVITAMIN) tablet Take 1 tablet by mouth daily   riTUXimab (RITUXAN) 500 mg/50 mL Infuse into a venous catheter every 6 (six) months        rosuvastatin (CRESTOR) 5 mg tablet Take 5 mg by mouth daily   tamoxifen (NOLVADEX) 20 mg tablet Take 20 mg by mouth daily   vitamin E, tocopherol, 400 units capsule Take 400 Units by mouth daily      tamoxifen (NOLVADEX) 10 mg tablet Take 2 tablets (20 mg total) by mouth daily 90 tablet 1     No current facility-administered medications for this visit  [unfilled]    SOCIAL HISTORY:   reports that she has never smoked  She has never used smokeless tobacco  She reports that she drinks alcohol   She reports that she does not use drugs  FAMILY HISTORY:  family history is not on file  ALLERGIES:  is allergic to lisinopril; codeine; other; oxycodone; penicillins; sulfamethoxazole-trimethoprim; and ciprofloxacin  REVIEW OF SYSTEMS:  Please note that a 14-point review of systems was performed to include Constitutional, HEENT, Respiratory, CVS, GI, , Musculoskeletal, Integumentary, Neurologic, Rheumatologic, Endocrinologic, Psychiatric, Lymphatic, and Hematologic/Oncologic systems were reviewed and are negative unless otherwise stated in HPI  Positive and negative findings pertinent to this evaluation are incorporated into the history of present illness  LAB:  Lab Results   Component Value Date    WBC 10 85 (H) 02/06/2018    HGB 9 7 (L) 02/06/2018    HCT 30 0 (L) 02/06/2018    MCV 99 (H) 02/06/2018     02/06/2018     Lab Results   Component Value Date     02/06/2018    K 3 9 02/06/2018     02/06/2018    CO2 28 02/06/2018    ANIONGAP 7 02/06/2018    BUN 28 (H) 02/06/2018    CREATININE 1 17 02/06/2018    GLUCOSE 106 02/06/2018    GLUF 99 09/21/2017    CALCIUM 8 4 02/06/2018    AST 21 01/09/2018    ALT 21 01/09/2018    ALKPHOS 47 01/09/2018    PROT 6 6 01/09/2018    BILITOT 0 40 01/09/2018    EGFR 45 02/06/2018       IMAGING:  Mammo screening bilateral w cad    (Results Pending)     Ct Ankle Left Wo Contrast    Result Date: 2/5/2018  Narrative: CT LEFT ANKLE INDICATION: ankle pain  History taken directly from the electronic ordering system  Ankle pain  Unable to bear weight  No trauma  Rheumatoid arthropathy  COMPARISON: None  TECHNIQUE: Serial Axial CT images were acquired through the left ankle  Coronal and sagittal reformation images were also obtained  Contrast material was not utilized    This examination, like all CT scans performed in the [facility], was performed utilizing techniques to minimize radiation dose exposure, including the use of iterative reconstruction and automated exposure control  Rad dose 238 mGy-cm FINDINGS: JOINT EFFUSION: Joint effusion noted  ALIGNMENT: Increase in talar declination angle suggests chronic PTT dysfunction  This results in pes planus deformity  OSSEOUS STRUCTURES: Advanced degenerative change noted throughout the visualized midfoot and hindfoot with apparent spontaneous talonavicular fusion related to long-standing degenerative change  No acute fracture dislocation or osseous destructive lesion identified  Incomplete evaluation of lateral deviation of the 2nd through 5th toes with erosive changes and pencil in cup deformity noted across the metatarsal phalangeal joints  Findings consistent with underlying inflammatory arthropathy  TENDONS: Limited assessment by CT technique without gross evidence of tear  LIGAMENTS: Limited assessment by CT technique without gross evidence of tear  MUSCULATURE: Intact  REMAINING SOFT TISSUES: Loculated collection noted along the plantar soft tissues at the level of the dorsal calcaneus measuring up to 4 3 x 3 7 x 2 1 cm  There is some wall calcification noted suggesting chronicity and presumably related to rheumatoid disease  Impression: 1  Advanced degenerative change throughout the foot including suggestive findings of inflammatory arthropathy, such as rheumatoid, across the 2nd through 5th metatarsophalangeal joints as above  There is also spontaneous fusion across the talonavicular  joint likely secondary to long-standing degenerative change  2   Pes planus deformity and suggestive findings of chronic PTT dysfunction  3   Loculated plantar hindfoot fluid collection measuring up to 4 3 x 3 7 x 2 1 cm likely chronic given scant wall calcification  This could relate to rheumatoid disease with infection not entirely excluded but considerably less likely  Correlate clinically   Findings are consistent with the preliminary report from [facility] which was provided shortly after completion of the exam  The finding of plantar loculated collection  may alter immediate patient management, therefore we will now contact referring physicians for direct verbal discussion  I personally discussed this study with Angelica Daniel, the nurse caring for this patient, on 2/5/2018 8:44 AM   Unfortunately, the referring physician could not be reached at this time   Workstation performed: [id number]

## 2018-02-26 ENCOUNTER — TELEPHONE (OUTPATIENT)
Dept: INTERNAL MEDICINE CLINIC | Facility: CLINIC | Age: 78
End: 2018-02-26

## 2018-02-26 NOTE — TELEPHONE ENCOUNTER
Patient was given amlodipine on 2-14-18 and has noticed swelling in bilateral legs and feet since  Patient went to see her rheumatologist on Thursday when she learned that swelling in feet and legs is a medication side effect of amlodipine       Please Rayna Davis

## 2018-02-27 ENCOUNTER — OFFICE VISIT (OUTPATIENT)
Dept: INTERNAL MEDICINE CLINIC | Facility: CLINIC | Age: 78
End: 2018-02-27
Payer: MEDICARE

## 2018-02-27 VITALS
TEMPERATURE: 96.8 F | OXYGEN SATURATION: 98 % | BODY MASS INDEX: 32.61 KG/M2 | WEIGHT: 191 LBS | SYSTOLIC BLOOD PRESSURE: 145 MMHG | HEIGHT: 64 IN | HEART RATE: 72 BPM | DIASTOLIC BLOOD PRESSURE: 70 MMHG | RESPIRATION RATE: 14 BRPM

## 2018-02-27 DIAGNOSIS — I10 HYPERTENSION, UNSPECIFIED TYPE: Primary | Chronic | ICD-10-CM

## 2018-02-27 DIAGNOSIS — I51.89 DIASTOLIC DYSFUNCTION: ICD-10-CM

## 2018-02-27 DIAGNOSIS — R60.0 LOCALIZED EDEMA: ICD-10-CM

## 2018-02-27 PROCEDURE — 99214 OFFICE O/P EST MOD 30 MIN: CPT | Performed by: INTERNAL MEDICINE

## 2018-02-27 RX ORDER — CARVEDILOL 3.12 MG/1
3.12 TABLET ORAL 2 TIMES DAILY WITH MEALS
Qty: 60 TABLET | Refills: 2 | Status: SHIPPED | OUTPATIENT
Start: 2018-02-27 | End: 2018-10-11 | Stop reason: SDUPTHER

## 2018-02-27 NOTE — PROGRESS NOTES
Assessment/Plan:    1  Hypertension under control however patient has developed significant edema on high-dose amlodipine will discontinue amlodipine avoid diuretics with a question of gout which caused her ankle pain patient is not tolerate Ace inhibitors will try generic Coreg to take 3 125 mg twice a day recheck in 2 weeks  2  Edema secondary medication to amlodipine will discontinue to avoid salt tabs less than 3000 mg of sodium chloride a day especially prevalent and canned soups processed meats recheck in 2 weeks    o   There are no diagnoses linked to this encounter  The patient was counseled regarding instructions for management, risk factor reductions, patient and family education,impressions, risks and benefits of treatment options, side effects of medications, importance of compliance with treatment  The treatment plan was reviewed with the patient/guardian and patient/guardian understands and agrees with the treatment plan  Subjective:      Patient ID: Jose A Dwyer is a 69 y o  female  Increased weight 180 to 190 since on amlodipine no sob no orthopnea         The following portions of the patient's history were reviewed and updated as appropriate:   She has a past medical history of Arthritis; Breast cancer ([address] ); Disease of thyroid gland; Hyperlipidemia; Hypertension; Neuritis; and Vitamin D deficiency  ,   does not have any pertinent problems on file  ,   has a past surgical history that includes Abdominal surgery; Breast surgery; Cholecystectomy; and Elbow surgery  ,  family history is not on file  ,   reports that she has never smoked  She has never used smokeless tobacco  She reports that she drinks alcohol  She reports that she does not use drugs  ,  is allergic to lisinopril; codeine; other; oxycodone; penicillins; sulfamethoxazole-trimethoprim; and ciprofloxacin       Review of Systems   Constitutional: Negative for appetite change, chills, fatigue, fever and unexpected weight change  HENT: Negative for congestion, ear pain, facial swelling, hearing loss, mouth sores, nosebleeds, postnasal drip, rhinorrhea, sinus pain, sore throat, trouble swallowing and voice change  Eyes: Negative for pain, discharge, redness and visual disturbance  Respiratory: Negative for apnea, chest tightness, shortness of breath, wheezing and stridor  Cardiovascular: Positive for leg swelling  Negative for chest pain and palpitations  Gastrointestinal: Negative for abdominal distention, abdominal pain, blood in stool, constipation, diarrhea and vomiting  Endocrine: Negative for cold intolerance, heat intolerance, polydipsia, polyphagia and polyuria  Genitourinary: Negative for difficulty urinating, dysuria, flank pain, frequency, genital sores, hematuria and urgency  Musculoskeletal: Negative for arthralgias and back pain  Skin: Negative for rash and wound  Allergic/Immunologic: Negative for environmental allergies, food allergies and immunocompromised state  Neurological: Negative for dizziness, tremors, seizures, syncope, facial asymmetry, speech difficulty, weakness, light-headedness, numbness and headaches  Hematological: Negative for adenopathy  Does not bruise/bleed easily  Psychiatric/Behavioral: Negative for agitation, behavioral problems, dysphoric mood, hallucinations, self-injury, sleep disturbance and suicidal ideas  The patient is not hyperactive  Objective:     Physical Exam   Constitutional: She is oriented to person, place, and time  She appears well-developed  Obesity   HENT:   Right Ear: External ear normal    Left Ear: External ear normal    Eyes: Right eye exhibits no discharge  Left eye exhibits no discharge  No scleral icterus  Neck: Carotid bruit is not present  No tracheal deviation present  No thyroid mass and no thyromegaly present  Cardiovascular: Normal rate, regular rhythm, normal heart sounds and intact distal pulses    Exam reveals no gallop and no friction rub  No murmur heard  Pulmonary/Chest: No respiratory distress  She has no wheezes  She has no rales  Musculoskeletal: She exhibits edema  3 plus edema   Lymphadenopathy:     She has no cervical adenopathy  Neurological: She is alert and oriented to person, place, and time  Coordination normal    Psychiatric: She has a normal mood and affect  Her behavior is normal  Judgment and thought content normal    Nursing note and vitals reviewed

## 2018-02-27 NOTE — PATIENT INSTRUCTIONS
Edema   WHAT YOU NEED TO KNOW:   What is edema? Edema is swelling throughout your body  Edema is usually a sign that you are retaining fluid  The swelling may be caused by heart failure or kidney, thyroid, or liver disease  It may also be caused by medicines such as antidepressants, blood pressure medicines, or hormones  Sudden swelling around the lips or face may be a sign of a severe allergic reaction  Swelling of an arm or leg may be caused by blockage of your veins  What other signs and symptoms may occur with edema? · Discomfort or tenderness in the swollen areas    · Tight and shiny skin over the swollen areas    · Weight gain  How is edema diagnosed? Your healthcare provider will ask about your symptoms and any other symptoms you have  He may also ask about any medical conditions you have  Your healthcare provider will examine your skin over the swollen areas  He may gently push on the swollen area for a short time to see if this leaves a dimple  He may also order tests to find the cause of your edema  How is edema treated and managed? Treatment for edema depends on the cause  Depending on your medical condition, you may be given medicine to help get rid of extra body fluid  Your healthcare provider may suggest that you do any of the following to help manage edema:  · Elevate  your arms or legs as directed  Raise them above the level of your heart as often as you can  This will help decrease swelling and pain  Prop them on pillows or blankets to keep them elevated comfortably  · Wear pressure stockings as directed  The stockings are tight and put pressure on your legs  This helps to keep fluid from collecting in your legs or ankles  · Limit your salt intake  Salt causes your body to hold water  Ask about any other changes to your diet  · Stay active  Do not stand or sit for long periods of time  Ask your healthcare provider about the best exercise plan for you      · Keep your skin moist using lotion, cream, or ointment  Ask your healthcare provider what to use and how often to use it  When should I contact my healthcare provider? · The swollen area feels cold and is pale or blue in color  · The swollen area feels warm, painful, and is red in color  · You have increased swelling or swelling in other parts of your body  · You have questions or concerns about your condition or care  When should I seek immediate care? · You have shortness of breath at rest, especially when you lie down  · You cough up pink, foamy sputum  · You have chest pain  · Your heartbeat is fast or uneven  CARE AGREEMENT:   You have the right to help plan your care  Learn about your health condition and how it may be treated  Discuss treatment options with your caregivers to decide what care you want to receive  You always have the right to refuse treatment  The above information is an  only  It is not intended as medical advice for individual conditions or treatments  Talk to your doctor, nurse or pharmacist before following any medical regimen to see if it is safe and effective for you  © 2017 [employer]  Information is for End User's use only and may not be sold, redistributed or otherwise used for commercial purposes  All illustrations and images included in CareNotes® are the copyrighted property of [employer]  or [employer]ov[employer]

## 2018-03-07 NOTE — PROGRESS NOTES
History of Present Illness    Revaccination   Vaccine Information: Vaccine(s) Given (names): prevnar  Spoke with regarding vaccine out of temperature range, risks and benefits of revaccination and risks of not revaccinating  Action(s): Pt will be revaccinated  Pt called (attempt 1): 03/09/2017  Other Information: pt will receive revaccination at time of apt  Pt revaccinated at apt today  Revaccination Completed: 03/30/2017  Active Problems    1  Abnormal blood chemistry (790 6) (R79 9)   2  Acute bronchitis (466 0) (J20 9)   3  Adenocarcinoma of breast (174 9) (C50 919)   4  Allergic rhinitis (477 9) (J30 9)   5  Encounter for screening mammogram for malignant neoplasm of breast (V76 12)   (Z12 31)   6  Hyperlipidemia (272 4) (E78 5)   7  Hypertension (401 9) (I10)   8  Hypokalemia (276 8) (E87 6)   9  Hypothyroidism (244 9) (E03 9)   10  Influenza vaccine needed (V04 81) (Z23)   11  Lumbago (724 2) (M54 5)   12  Lumbar radiculopathy (724 4) (M54 16)   13  Need for revaccination (V05 9) (Z23)   14  Osteopenia (733 90) (M85 80)   15  Other chronic pain (338 29) (G89 29)   16  Renal disease (593 9) (N28 9)   17  Rheumatoid arthritis (714 0) (M06 9)   18  Vitamin D deficiency (268 9) (E55 9)    Immunizations  Influenza --- Rob Lower: [id number] Berta: [id number] Marce: 25-Sep-2014; Series4:  06-Oct-2015; Series5: 14-Sep-2016   PCV --- Series1: 15-Mar-2016   PPSV --- Series1: 26-Oct-1998; Margaret Pulido: [id number]   PPD --- [facility]: 11-Nov-2000   Td/DT --- Series1: Aug 2014     Current Meds   1  Citracal + D TABS; Take 1 tablet daily   2  CO-Q 10 Omega-3 Fish Oil Oral Capsule; take 1 capsule daily   3  Crestor 5 MG Oral Tablet; TAKE 1 TABLET DAILY   4  Klor-Con 10 10 MEQ Oral Tablet Extended Release; TAKE 2 TABLETS TWICE DAILY   5  KP Fexofenadine HCl - 180 MG Oral Tablet; TAKE 1 TABLET DAILY AS NEEDED   6  Levothyroxine Sodium 50 MCG Oral Tablet; TAKE 1 TABLET BY MOUTH EVERY OTHER   DAY   7  Levothyroxine Sodium 75 MCG Oral Tablet; TAKE 1 TABLET EVERY OTHER DAY   8  Methotrexate 2 5 MG Oral Tablet   9  Multi-Vitamin TABS; TAKE 1 TABLET DAILY   10  PredniSONE (Kirby) 10 MG TABS; TAKE 1 TABLET ONCE   11  Rituxan 10 MG/ML CONC; USE AS DIRECTED   12  Saline Nasal Spray 0 65 % Nasal Solution; USE 1 SPRAY IN EACH NOSTRIL TWICE    DAILY   13  SM Vitamin D3 2000 UNIT Oral Capsule; take 1 capsule daily   14  Tamoxifen Citrate 20 MG Oral Tablet; TAKE 1 TABLET DAILY   15  Vitamin C 500 MG Oral Tablet; TAKE 1 TABLET DAILY    Allergies    1  Cipro TABS   2  Bactrim TABS   3  Codeine Derivatives   4  Darvocet-N 100 TABS   5  Lisinopril TABS   6  Penicillins   7   Percocet TABS    Signatures   Electronically signed by : Steph Monk DO; Mar 30 2017  2:48PM EST

## 2018-03-12 ENCOUNTER — OFFICE VISIT (OUTPATIENT)
Dept: INTERNAL MEDICINE CLINIC | Facility: CLINIC | Age: 78
End: 2018-03-12
Payer: MEDICARE

## 2018-03-12 VITALS
WEIGHT: 192.2 LBS | OXYGEN SATURATION: 98 % | RESPIRATION RATE: 20 BRPM | DIASTOLIC BLOOD PRESSURE: 60 MMHG | HEIGHT: 64 IN | BODY MASS INDEX: 32.81 KG/M2 | TEMPERATURE: 99.4 F | HEART RATE: 70 BPM | SYSTOLIC BLOOD PRESSURE: 158 MMHG

## 2018-03-12 DIAGNOSIS — R60.0 LOCALIZED EDEMA: ICD-10-CM

## 2018-03-12 DIAGNOSIS — I10 HYPERTENSION, UNSPECIFIED TYPE: Primary | Chronic | ICD-10-CM

## 2018-03-12 PROCEDURE — 99214 OFFICE O/P EST MOD 30 MIN: CPT | Performed by: NURSE PRACTITIONER

## 2018-03-12 RX ORDER — HYDROCHLOROTHIAZIDE 25 MG/1
25 TABLET ORAL DAILY
Qty: 30 TABLET | Refills: 0 | Status: SHIPPED | OUTPATIENT
Start: 2018-03-12 | End: 2018-03-29 | Stop reason: SDUPTHER

## 2018-03-12 NOTE — PROGRESS NOTES
Assessment/Plan:    1  Hypertension-not controlled with Coreg, not interested in increasing dose at this time  Unable to tolerate ACE inhibitors  2  B/L LE edema-HCTZ was discontinued for presumed gout; however, joint aspiration did not reveal crystals  BP is elevated today  Blanca Gonzalez would like to re-initiate HCTZ at this time, as she was negative for gout in the hospital and edema has persisted with Amlodipine and Carvedilol  Will check serum potassium in two weeks  She is instructed to call the office if she experiences joint pain  Follow up in two weeks  Diagnoses and all orders for this visit:    Hypertension, unspecified type  -     hydrochlorothiazide (HYDRODIURIL) 25 mg tablet; Take 1 tablet (25 mg total) by mouth daily  -     Potassium; Future    Localized edema  -     hydrochlorothiazide (HYDRODIURIL) 25 mg tablet; Take 1 tablet (25 mg total) by mouth daily  -     Potassium; Future        The patient was counseled regarding instructions for management, risk factor reductions, patient and family education,impressions, risks and benefits of treatment options, side effects of medications, importance of compliance with treatment  The treatment plan was reviewed with the patient/guardian and patient/guardian understands and agrees with the treatment plan  Subjective:      Patient ID: Alyssa Horner is a 69 y o  female  Patient was in hospital last month for left ankle pain, gout was suspected but joint aspiration revealed no crystals  She was discharged with instructions to discontinue HCTZ and start Amlodipine  Upon starting Amlodipine, she noticed B/L LE edema within a day or two and came in for a visit  At that time, Amlodipine had been changed to Carvedilol  However, the edema has persisted and she has gained a few pounds  She also feels a sense of abdominal distention  She is followed by Dr Vivienne Odonnell for rheumatoid arthritis          The following portions of the patient's history were reviewed and updated as appropriate:   She has a past medical history of Arthritis; Breast cancer ([address] ); Disease of thyroid gland; Hyperlipidemia; Hypertension; Neuritis; and Vitamin D deficiency  ,   does not have any pertinent problems on file  ,   has a past surgical history that includes Abdominal surgery; Breast surgery; Cholecystectomy; and Elbow surgery  ,  family history is not on file  ,   reports that she has never smoked  She has never used smokeless tobacco  She reports that she drinks alcohol  She reports that she does not use drugs  ,  is allergic to lisinopril; codeine; other; oxycodone; penicillins; sulfamethoxazole-trimethoprim; and ciprofloxacin       Review of Systems   Constitutional: Negative  Respiratory: Negative  Cardiovascular: Positive for leg swelling  Musculoskeletal: Positive for arthralgias and joint swelling  Psychiatric/Behavioral: Negative  Objective:     Physical Exam   Constitutional: She is oriented to person, place, and time  She appears well-developed and well-nourished  Cardiovascular: Normal rate, regular rhythm, normal heart sounds and intact distal pulses  2+ edema RLE knee to ankle  1+ edema LLE knee to ankle   Pulmonary/Chest: Effort normal and breath sounds normal    Abdominal: Soft  Musculoskeletal: Normal range of motion  Neurological: She is alert and oriented to person, place, and time  She has normal reflexes  Skin: Skin is warm and dry  Psychiatric: She has a normal mood and affect   Her behavior is normal  Judgment and thought content normal

## 2018-03-12 NOTE — PATIENT INSTRUCTIONS
1  Hypertension-not controlled with Coreg, not interested in increasing dose at this time  Unable to tolerate ACE inhibitors  2  B/L LE edema-HCTZ was discontinued for presumed gout; however, joint aspiration did not reveal crystals  BP is elevated today  Jesusista Valerio would like to re-initiate HCTZ at this time, as she was negative for gout in the hospital and edema has persisted with Amlodipine and Carvedilol  Will check serum potassium in two weeks  She is instructed to call the office if she experiences joint pain  Follow up in two weeks

## 2018-03-20 ENCOUNTER — HOSPITAL ENCOUNTER (OUTPATIENT)
Dept: CT IMAGING | Facility: HOSPITAL | Age: 78
Discharge: HOME | End: 2018-03-20
Attending: INTERNAL MEDICINE
Payer: MEDICARE

## 2018-03-20 ENCOUNTER — APPOINTMENT (OUTPATIENT)
Dept: LAB | Facility: HOSPITAL | Age: 78
End: 2018-03-20
Attending: INTERNAL MEDICINE
Payer: MEDICARE

## 2018-03-20 DIAGNOSIS — R60.0 LOCALIZED EDEMA: ICD-10-CM

## 2018-03-20 DIAGNOSIS — M06.9 RHEUMATOID ARTHRITIS (HCC): ICD-10-CM

## 2018-03-20 DIAGNOSIS — E03.9 HYPOTHYROIDISM: ICD-10-CM

## 2018-03-20 DIAGNOSIS — R91.1 SOLITARY PULMONARY NODULE: ICD-10-CM

## 2018-03-20 DIAGNOSIS — I10 HYPERTENSION, UNSPECIFIED TYPE: Chronic | ICD-10-CM

## 2018-03-20 DIAGNOSIS — E78.5 HYPERLIPIDEMIA: ICD-10-CM

## 2018-03-20 DIAGNOSIS — M17.11 PRIMARY OSTEOARTHRITIS OF RIGHT KNEE: ICD-10-CM

## 2018-03-20 DIAGNOSIS — R91.1 PULMONARY NODULE: Primary | ICD-10-CM

## 2018-03-20 DIAGNOSIS — E55.9 VITAMIN D DEFICIENCY: ICD-10-CM

## 2018-03-20 DIAGNOSIS — I10 ESSENTIAL (PRIMARY) HYPERTENSION: ICD-10-CM

## 2018-03-20 LAB
25(OH)D3 SERPL-MCNC: 28.8 NG/ML (ref 30–100)
ALBUMIN SERPL BCP-MCNC: 3.5 G/DL (ref 3.5–5)
ALP SERPL-CCNC: 49 U/L (ref 46–116)
ALT SERPL W P-5'-P-CCNC: 21 U/L (ref 12–78)
ANION GAP SERPL CALCULATED.3IONS-SCNC: 8 MMOL/L (ref 4–13)
AST SERPL W P-5'-P-CCNC: 20 U/L (ref 5–45)
BASOPHILS # BLD AUTO: 0.09 THOUSANDS/ΜL (ref 0–0.1)
BASOPHILS NFR BLD AUTO: 2 % (ref 0–1)
BILIRUB SERPL-MCNC: 0.5 MG/DL (ref 0.2–1)
BUN SERPL-MCNC: 23 MG/DL (ref 5–25)
CALCIUM SERPL-MCNC: 8.8 MG/DL (ref 8.3–10.1)
CHLORIDE SERPL-SCNC: 103 MMOL/L (ref 100–108)
CHOLEST SERPL-MCNC: 142 MG/DL (ref 50–200)
CO2 SERPL-SCNC: 31 MMOL/L (ref 21–32)
CREAT SERPL-MCNC: 1.21 MG/DL (ref 0.6–1.3)
CRP SERPL QL: 20.4 MG/L
EOSINOPHIL # BLD AUTO: 0.27 THOUSAND/ΜL (ref 0–0.61)
EOSINOPHIL NFR BLD AUTO: 5 % (ref 0–6)
ERYTHROCYTE [DISTWIDTH] IN BLOOD BY AUTOMATED COUNT: 14.5 % (ref 11.6–15.1)
ERYTHROCYTE [SEDIMENTATION RATE] IN BLOOD: 27 MM/HOUR (ref 0–20)
GFR SERPL CREATININE-BSD FRML MDRD: 44 ML/MIN/1.73SQ M
GLUCOSE P FAST SERPL-MCNC: 101 MG/DL (ref 65–99)
HCT VFR BLD AUTO: 34.5 % (ref 34.8–46.1)
HDLC SERPL-MCNC: 53 MG/DL (ref 40–60)
HGB BLD-MCNC: 11 G/DL (ref 11.5–15.4)
LDLC SERPL CALC-MCNC: 60 MG/DL (ref 0–100)
LYMPHOCYTES # BLD AUTO: 1.26 THOUSANDS/ΜL (ref 0.6–4.47)
LYMPHOCYTES NFR BLD AUTO: 21 % (ref 14–44)
MCH RBC QN AUTO: 31.8 PG (ref 26.8–34.3)
MCHC RBC AUTO-ENTMCNC: 31.9 G/DL (ref 31.4–37.4)
MCV RBC AUTO: 100 FL (ref 82–98)
MONOCYTES # BLD AUTO: 0.73 THOUSAND/ΜL (ref 0.17–1.22)
MONOCYTES NFR BLD AUTO: 12 % (ref 4–12)
NEUTROPHILS # BLD AUTO: 3.59 THOUSANDS/ΜL (ref 1.85–7.62)
NEUTS SEG NFR BLD AUTO: 60 % (ref 43–75)
NRBC BLD AUTO-RTO: 0 /100 WBCS
PLATELET # BLD AUTO: 217 THOUSANDS/UL (ref 149–390)
PMV BLD AUTO: 9.8 FL (ref 8.9–12.7)
POTASSIUM SERPL-SCNC: 3.5 MMOL/L (ref 3.5–5.3)
PROT SERPL-MCNC: 7 G/DL (ref 6.4–8.2)
RBC # BLD AUTO: 3.46 MILLION/UL (ref 3.81–5.12)
SODIUM SERPL-SCNC: 142 MMOL/L (ref 136–145)
T4 FREE SERPL-MCNC: 1.17 NG/DL (ref 0.76–1.46)
TRIGL SERPL-MCNC: 147 MG/DL
TSH SERPL DL<=0.05 MIU/L-ACNC: 3.44 UIU/ML (ref 0.36–3.74)
WBC # BLD AUTO: 5.97 THOUSAND/UL (ref 4.31–10.16)

## 2018-03-20 PROCEDURE — 80061 LIPID PANEL: CPT

## 2018-03-20 PROCEDURE — 80053 COMPREHEN METABOLIC PANEL: CPT

## 2018-03-20 PROCEDURE — 71250 CT THORAX DX C-: CPT

## 2018-03-20 PROCEDURE — 82306 VITAMIN D 25 HYDROXY: CPT

## 2018-03-20 PROCEDURE — 36415 COLL VENOUS BLD VENIPUNCTURE: CPT

## 2018-03-20 PROCEDURE — 84443 ASSAY THYROID STIM HORMONE: CPT

## 2018-03-20 PROCEDURE — 85652 RBC SED RATE AUTOMATED: CPT

## 2018-03-20 PROCEDURE — 85025 COMPLETE CBC W/AUTO DIFF WBC: CPT

## 2018-03-20 PROCEDURE — 86140 C-REACTIVE PROTEIN: CPT

## 2018-03-20 PROCEDURE — 84439 ASSAY OF FREE THYROXINE: CPT

## 2018-03-29 ENCOUNTER — OFFICE VISIT (OUTPATIENT)
Dept: INTERNAL MEDICINE CLINIC | Facility: CLINIC | Age: 78
End: 2018-03-29
Payer: MEDICARE

## 2018-03-29 VITALS
BODY MASS INDEX: 31.76 KG/M2 | TEMPERATURE: 99.4 F | HEART RATE: 69 BPM | DIASTOLIC BLOOD PRESSURE: 70 MMHG | WEIGHT: 186 LBS | RESPIRATION RATE: 20 BRPM | SYSTOLIC BLOOD PRESSURE: 125 MMHG | HEIGHT: 64 IN | OXYGEN SATURATION: 97 %

## 2018-03-29 DIAGNOSIS — E78.5 HYPERLIPIDEMIA, UNSPECIFIED HYPERLIPIDEMIA TYPE: ICD-10-CM

## 2018-03-29 DIAGNOSIS — E55.9 VITAMIN D DEFICIENCY: ICD-10-CM

## 2018-03-29 DIAGNOSIS — I10 HYPERTENSION, UNSPECIFIED TYPE: Primary | Chronic | ICD-10-CM

## 2018-03-29 DIAGNOSIS — E03.9 HYPOTHYROIDISM, UNSPECIFIED TYPE: Chronic | ICD-10-CM

## 2018-03-29 DIAGNOSIS — R60.0 LOCALIZED EDEMA: ICD-10-CM

## 2018-03-29 DIAGNOSIS — M06.9 RHEUMATOID ARTHRITIS INVOLVING LEFT ANKLE, UNSPECIFIED RHEUMATOID FACTOR PRESENCE: Chronic | ICD-10-CM

## 2018-03-29 PROCEDURE — 99214 OFFICE O/P EST MOD 30 MIN: CPT | Performed by: INTERNAL MEDICINE

## 2018-03-29 RX ORDER — HYDROCHLOROTHIAZIDE 25 MG/1
25 TABLET ORAL DAILY
Qty: 90 TABLET | Refills: 2 | Status: SHIPPED | OUTPATIENT
Start: 2018-03-29 | End: 2018-10-11 | Stop reason: SDUPTHER

## 2018-03-29 RX ORDER — POTASSIUM CHLORIDE 20 MEQ/1
TABLET, EXTENDED RELEASE ORAL
Qty: 90 TABLET | Refills: 2 | Status: SHIPPED | OUTPATIENT
Start: 2018-03-29 | End: 2018-10-11 | Stop reason: SDUPTHER

## 2018-03-29 NOTE — LETTER
March 29, 2018     Emily [address]  [address] [place] [address]    Patient: Timothy Tony   YOB: 1940   Date of Visit: 3/29/2018       Dear Dr Juvencio Thomas:    Thank you for referring Darlene Langford to me for evaluation  Below are my notes for this consultation  If you have questions, please do not hesitate to call me  I look forward to following your patient along with you  Sincerely,        Berna Coto DO        CC: No Recipients  Berna Coto DO  3/29/2018  3:33 PM  Sign at close encounter  Assessment/Plan:    1  Hypertension is at goal will continue present medication hydrochlorothiazide  2  patient was admitted for severe ankle pain had fluid drawn which did not show any crystals was discharge in taken off of hydrochlorothiazide calcium channel blockers other medications were tried which caused edema and side effects patient is doing well hydrochlorothiazide will continue has rheumatoid arthritis continued follow-up with Dr Juvencio Thomas she seeing in May  3  History of hypokalemia potassium was normal on recent laboratory work will restart potassium will be checking laboratory work including magnesium in 6 months  4  Hypothyroidism is at goal continue medication recheck in 6 months  5  Hyperlipidemia is at goal recheck in 6 months  6  Decreased vitamin-D recheck in 6 months  7  Patient with ulnar deviation severe rheumatoid arthritis with joint damage no evidence of inflammatory joint disease at this time does have a mildly elevated sed rate fairly markedly elevated C reactive protein     Diagnoses and all orders for this visit:    Hypertension, unspecified type        The patient was counseled regarding instructions for management, risk factor reductions, patient and family education,impressions, risks and benefits of treatment options, side effects of medications, importance of compliance with treatment   The treatment plan was reviewed with the patient/guardian and patient/guardian understands and agrees with the treatment plan  Subjective:      Patient ID: Mehran Sebastian is a 69 y o  female  Tolerating medicine,  Well         The following portions of the patient's history were reviewed and updated as appropriate:   She has a past medical history of Arthritis; Breast cancer ([address] ); Disease of thyroid gland; Hyperlipidemia; Hypertension; Neuritis; and Vitamin D deficiency  ,   does not have any pertinent problems on file  ,   has a past surgical history that includes Abdominal surgery; Breast surgery; Cholecystectomy; and Elbow surgery  ,  family history is not on file  ,   reports that she has never smoked  She has never used smokeless tobacco  She reports that she drinks alcohol  She reports that she does not use drugs  ,  is allergic to lisinopril; codeine; other; oxycodone; penicillins; sulfamethoxazole-trimethoprim; and ciprofloxacin       Review of Systems   Constitutional: Negative for appetite change, chills, fatigue, fever and unexpected weight change  HENT: Negative for congestion, ear pain, facial swelling, hearing loss, mouth sores, nosebleeds, postnasal drip, rhinorrhea, sinus pain, sore throat, trouble swallowing and voice change  Eyes: Negative for pain, discharge, redness and visual disturbance  Respiratory: Negative for apnea, chest tightness, shortness of breath, wheezing and stridor  Cardiovascular: Negative for chest pain, palpitations and leg swelling  Gastrointestinal: Negative for abdominal distention, abdominal pain, blood in stool, constipation, diarrhea and vomiting  Endocrine: Negative for cold intolerance, heat intolerance, polydipsia, polyphagia and polyuria  Genitourinary: Negative for difficulty urinating, dysuria, flank pain, frequency, genital sores, hematuria and urgency  Musculoskeletal: Negative for arthralgias and back pain  Skin: Negative for rash and wound     Allergic/Immunologic: Negative for environmental allergies, food allergies and immunocompromised state  Neurological: Negative for dizziness, tremors, seizures, syncope, facial asymmetry, speech difficulty, weakness, light-headedness, numbness and headaches  Hematological: Negative for adenopathy  Does not bruise/bleed easily  Psychiatric/Behavioral: Negative for agitation, behavioral problems, dysphoric mood, hallucinations, self-injury, sleep disturbance and suicidal ideas  The patient is not hyperactive  Objective:     Physical Exam   Constitutional: She is oriented to person, place, and time  She appears well-developed  HENT:   Right Ear: External ear normal    Left Ear: External ear normal    Eyes: Right eye exhibits no discharge  Left eye exhibits no discharge  No scleral icterus  Neck: Carotid bruit is not present  No tracheal deviation present  No thyroid mass and no thyromegaly present  Cardiovascular: Normal rate, regular rhythm, normal heart sounds and intact distal pulses  Exam reveals no gallop and no friction rub  No murmur heard  Pulmonary/Chest: No respiratory distress  She has no wheezes  She has no rales  Musculoskeletal: She exhibits no edema  Ulnar deviation both hands   Lymphadenopathy:     She has no cervical adenopathy  Neurological: She is alert and oriented to person, place, and time  Coordination normal    Psychiatric: She has a normal mood and affect  Her behavior is normal  Judgment and thought content normal    Nursing note and vitals reviewed

## 2018-03-29 NOTE — PROGRESS NOTES
Assessment/Plan:    1  Hypertension is at goal will continue present medication hydrochlorothiazide  2  patient was admitted for severe ankle pain had fluid drawn which did not show any crystals was discharge in taken off of hydrochlorothiazide calcium channel blockers other medications were tried which caused edema and side effects patient is doing well hydrochlorothiazide will continue has rheumatoid arthritis continued follow-up with Dr Jose Cervantes she seeing in May  3  History of hypokalemia potassium was normal on recent laboratory work will restart potassium will be checking laboratory work including magnesium in 6 months  4  Hypothyroidism is at goal continue medication recheck in 6 months  5  Hyperlipidemia is at goal recheck in 6 months  6  Decreased vitamin-D recheck in 6 months  7  Patient with ulnar deviation severe rheumatoid arthritis with joint damage no evidence of inflammatory joint disease at this time does have a mildly elevated sed rate fairly markedly elevated C reactive protein     Diagnoses and all orders for this visit:    Hypertension, unspecified type        The patient was counseled regarding instructions for management, risk factor reductions, patient and family education,impressions, risks and benefits of treatment options, side effects of medications, importance of compliance with treatment  The treatment plan was reviewed with the patient/guardian and patient/guardian understands and agrees with the treatment plan  Subjective:      Patient ID: Verónica Ching is a 69 y o  female  Tolerating medicine,  Well         The following portions of the patient's history were reviewed and updated as appropriate:   She has a past medical history of Arthritis; Breast cancer ([address] ); Disease of thyroid gland; Hyperlipidemia; Hypertension; Neuritis; and Vitamin D deficiency  ,   does not have any pertinent problems on file  ,   has a past surgical history that includes Abdominal surgery; Breast surgery; Cholecystectomy; and Elbow surgery  ,  family history is not on file  ,   reports that she has never smoked  She has never used smokeless tobacco  She reports that she drinks alcohol  She reports that she does not use drugs  ,  is allergic to lisinopril; codeine; other; oxycodone; penicillins; sulfamethoxazole-trimethoprim; and ciprofloxacin       Review of Systems   Constitutional: Negative for appetite change, chills, fatigue, fever and unexpected weight change  HENT: Negative for congestion, ear pain, facial swelling, hearing loss, mouth sores, nosebleeds, postnasal drip, rhinorrhea, sinus pain, sore throat, trouble swallowing and voice change  Eyes: Negative for pain, discharge, redness and visual disturbance  Respiratory: Negative for apnea, chest tightness, shortness of breath, wheezing and stridor  Cardiovascular: Negative for chest pain, palpitations and leg swelling  Gastrointestinal: Negative for abdominal distention, abdominal pain, blood in stool, constipation, diarrhea and vomiting  Endocrine: Negative for cold intolerance, heat intolerance, polydipsia, polyphagia and polyuria  Genitourinary: Negative for difficulty urinating, dysuria, flank pain, frequency, genital sores, hematuria and urgency  Musculoskeletal: Negative for arthralgias and back pain  Skin: Negative for rash and wound  Allergic/Immunologic: Negative for environmental allergies, food allergies and immunocompromised state  Neurological: Negative for dizziness, tremors, seizures, syncope, facial asymmetry, speech difficulty, weakness, light-headedness, numbness and headaches  Hematological: Negative for adenopathy  Does not bruise/bleed easily  Psychiatric/Behavioral: Negative for agitation, behavioral problems, dysphoric mood, hallucinations, self-injury, sleep disturbance and suicidal ideas  The patient is not hyperactive            Objective:     Physical Exam   Constitutional: She is oriented to person, place, and time  She appears well-developed  HENT:   Right Ear: External ear normal    Left Ear: External ear normal    Eyes: Right eye exhibits no discharge  Left eye exhibits no discharge  No scleral icterus  Neck: Carotid bruit is not present  No tracheal deviation present  No thyroid mass and no thyromegaly present  Cardiovascular: Normal rate, regular rhythm, normal heart sounds and intact distal pulses  Exam reveals no gallop and no friction rub  No murmur heard  Pulmonary/Chest: No respiratory distress  She has no wheezes  She has no rales  Musculoskeletal: She exhibits no edema  Ulnar deviation both hands   Lymphadenopathy:     She has no cervical adenopathy  Neurological: She is alert and oriented to person, place, and time  Coordination normal    Psychiatric: She has a normal mood and affect  Her behavior is normal  Judgment and thought content normal    Nursing note and vitals reviewed

## 2018-03-29 NOTE — PATIENT INSTRUCTIONS
Hypothyroidism   WHAT YOU NEED TO KNOW:   Hypothyroidism is a condition that develops when the thyroid gland does not make enough thyroid hormone  Thyroid hormones help control body temperature, heart rate, growth, and weight  DISCHARGE INSTRUCTIONS:   Call 911 for any of the following:   · You have sudden chest pain or shortness of breath  · You have a seizure  · You feel like you are going to faint  Seek care immediately if:   · You have diarrhea, tremors, or trouble sleeping  · Your legs, ankles, or feet are swollen  Contact your healthcare provider if:   · You have a fever  · You have chills, a cough, or feel weak and achy  · You have pain and swelling in your muscles and joints  · Your skin is itchy, swollen, or you have a rash  · Your signs and symptoms return or get worse, even after treatment  · You have questions or concerns about your condition or care  Medicines:   · Thyroid hormone replacement medicine  helps bring your thyroid hormone level back to normal     · Take your medicine as directed  Contact your healthcare provider if you think your medicine is not helping or if you have side effects  Tell him or her if you are allergic to any medicine  Keep a list of the medicines, vitamins, and herbs you take  Include the amounts, and when and why you take them  Bring the list or the pill bottles to follow-up visits  Carry your medicine list with you in case of an emergency  Follow up with your healthcare provider as directed: You may need to return for more blood tests to check your thyroid hormone level  This will show if you are getting the right amount of thyroid medicine  Write down your questions so you remember to ask them during your visits  © 2017 [employer] Information is for End User's use only and may not be sold, redistributed or otherwise used for commercial purposes   All illustrations and images included in CareNotes® are the copyrighted property of [employer]  or [employer]  The above information is an  only  It is not intended as medical advice for individual conditions or treatments  Talk to your doctor, nurse or pharmacist before following any medical regimen to see if it is safe and effective for you

## 2018-04-12 DIAGNOSIS — E55.9 VITAMIN D DEFICIENCY: ICD-10-CM

## 2018-04-12 DIAGNOSIS — M06.9 RHEUMATOID ARTHRITIS (HCC): ICD-10-CM

## 2018-04-12 DIAGNOSIS — E78.5 HYPERLIPIDEMIA: ICD-10-CM

## 2018-04-12 DIAGNOSIS — R91.1 SOLITARY PULMONARY NODULE: ICD-10-CM

## 2018-04-12 DIAGNOSIS — M17.11 PRIMARY OSTEOARTHRITIS OF RIGHT KNEE: ICD-10-CM

## 2018-04-12 DIAGNOSIS — I10 ESSENTIAL (PRIMARY) HYPERTENSION: ICD-10-CM

## 2018-04-12 DIAGNOSIS — E03.9 HYPOTHYROIDISM: ICD-10-CM

## 2018-04-18 ENCOUNTER — TELEPHONE (OUTPATIENT)
Dept: INTERNAL MEDICINE CLINIC | Facility: CLINIC | Age: 78
End: 2018-04-18

## 2018-04-18 NOTE — TELEPHONE ENCOUNTER
Had a ct scan of her chest 3/20 and came to see dr Alix Wood 3/29    There was no mention of her results on this to her  ( significant findings   )   Please have doc Alix Wood review and get back to pt on

## 2018-04-19 DIAGNOSIS — E55.9 VITAMIN D DEFICIENCY: ICD-10-CM

## 2018-04-19 DIAGNOSIS — R93.429 ABNORMAL FINDING ON DIAGNOSTIC IMAGING OF KIDNEY: Primary | ICD-10-CM

## 2018-04-19 DIAGNOSIS — R91.1 PULMONARY NODULE: ICD-10-CM

## 2018-04-19 NOTE — TELEPHONE ENCOUNTER
I called patient left a message on her answering machine her pulmonary nodule is stable but they did see a questionable abnormality of her kidney ultrasound of the kidney was ordered to clarify, repeat CT scan of the chest in 1 year for the pulmonary nodule

## 2018-04-26 ENCOUNTER — HOSPITAL ENCOUNTER (OUTPATIENT)
Dept: ULTRASOUND IMAGING | Facility: HOSPITAL | Age: 78
Discharge: HOME | End: 2018-04-26
Attending: INTERNAL MEDICINE
Payer: MEDICARE

## 2018-04-26 DIAGNOSIS — R93.429 ABNORMAL FINDING ON DIAGNOSTIC IMAGING OF KIDNEY: ICD-10-CM

## 2018-04-26 PROCEDURE — 76770 US EXAM ABDO BACK WALL COMP: CPT

## 2018-07-09 RX ORDER — DIPHENHYDRAMINE HCL 25 MG
50 TABLET ORAL ONCE
Status: COMPLETED | OUTPATIENT
Start: 2018-07-10 | End: 2018-07-10

## 2018-07-09 RX ORDER — SODIUM CHLORIDE 9 MG/ML
20 INJECTION, SOLUTION INTRAVENOUS CONTINUOUS
Status: DISCONTINUED | OUTPATIENT
Start: 2018-07-10 | End: 2018-07-13 | Stop reason: HOSPADM

## 2018-07-09 RX ORDER — METHYLPREDNISOLONE SODIUM SUCCINATE 125 MG/2ML
100 INJECTION, POWDER, LYOPHILIZED, FOR SOLUTION INTRAMUSCULAR; INTRAVENOUS ONCE
Status: COMPLETED | OUTPATIENT
Start: 2018-07-10 | End: 2018-07-10

## 2018-07-09 RX ORDER — ACETAMINOPHEN 325 MG/1
975 TABLET ORAL ONCE
Status: COMPLETED | OUTPATIENT
Start: 2018-07-10 | End: 2018-07-10

## 2018-07-10 ENCOUNTER — HOSPITAL ENCOUNTER (OUTPATIENT)
Dept: INFUSION CENTER | Facility: CLINIC | Age: 78
Discharge: HOME | End: 2018-07-10
Payer: MEDICARE

## 2018-07-10 VITALS
HEART RATE: 85 BPM | SYSTOLIC BLOOD PRESSURE: 161 MMHG | RESPIRATION RATE: 16 BRPM | TEMPERATURE: 97.7 F | DIASTOLIC BLOOD PRESSURE: 81 MMHG

## 2018-07-10 LAB
ALBUMIN SERPL BCP-MCNC: 3.4 G/DL (ref 3.5–5)
ALP SERPL-CCNC: 42 U/L (ref 46–116)
ALT SERPL W P-5'-P-CCNC: 25 U/L (ref 12–78)
ANION GAP SERPL CALCULATED.3IONS-SCNC: 10 MMOL/L (ref 4–13)
AST SERPL W P-5'-P-CCNC: 40 U/L (ref 5–45)
BASOPHILS # BLD AUTO: 0.08 THOUSANDS/ΜL (ref 0–0.1)
BASOPHILS NFR BLD AUTO: 1 % (ref 0–1)
BILIRUB SERPL-MCNC: 0.8 MG/DL (ref 0.2–1)
BUN SERPL-MCNC: 20 MG/DL (ref 5–25)
CALCIUM SERPL-MCNC: 8.9 MG/DL (ref 8.3–10.1)
CHLORIDE SERPL-SCNC: 105 MMOL/L (ref 100–108)
CO2 SERPL-SCNC: 28 MMOL/L (ref 21–32)
CREAT SERPL-MCNC: 1.33 MG/DL (ref 0.6–1.3)
EOSINOPHIL # BLD AUTO: 0.26 THOUSAND/ΜL (ref 0–0.61)
EOSINOPHIL NFR BLD AUTO: 4 % (ref 0–6)
ERYTHROCYTE [DISTWIDTH] IN BLOOD BY AUTOMATED COUNT: 15.7 % (ref 11.6–15.1)
GFR SERPL CREATININE-BSD FRML MDRD: 39 ML/MIN/1.73SQ M
GLUCOSE SERPL-MCNC: 113 MG/DL (ref 65–140)
HCT VFR BLD AUTO: 32.5 % (ref 34.8–46.1)
HGB BLD-MCNC: 10.7 G/DL (ref 11.5–15.4)
IMM GRANULOCYTES # BLD AUTO: 0.02 THOUSAND/UL (ref 0–0.2)
IMM GRANULOCYTES NFR BLD AUTO: 0 % (ref 0–2)
LYMPHOCYTES # BLD AUTO: 1.18 THOUSANDS/ΜL (ref 0.6–4.47)
LYMPHOCYTES NFR BLD AUTO: 18 % (ref 14–44)
MCH RBC QN AUTO: 32 PG (ref 26.8–34.3)
MCHC RBC AUTO-ENTMCNC: 32.9 G/DL (ref 31.4–37.4)
MCV RBC AUTO: 97 FL (ref 82–98)
MONOCYTES # BLD AUTO: 0.72 THOUSAND/ΜL (ref 0.17–1.22)
MONOCYTES NFR BLD AUTO: 11 % (ref 4–12)
NEUTROPHILS # BLD AUTO: 4.5 THOUSANDS/ΜL (ref 1.85–7.62)
NEUTS SEG NFR BLD AUTO: 66 % (ref 43–75)
NRBC BLD AUTO-RTO: 0 /100 WBCS
PLATELET # BLD AUTO: 218 THOUSANDS/UL (ref 149–390)
PMV BLD AUTO: 9.8 FL (ref 8.9–12.7)
POTASSIUM SERPL-SCNC: 3.6 MMOL/L (ref 3.5–5.3)
PROT SERPL-MCNC: 6.8 G/DL (ref 6.4–8.2)
RBC # BLD AUTO: 3.34 MILLION/UL (ref 3.81–5.12)
SODIUM SERPL-SCNC: 143 MMOL/L (ref 136–145)
WBC # BLD AUTO: 6.76 THOUSAND/UL (ref 4.31–10.16)

## 2018-07-10 PROCEDURE — 85025 COMPLETE CBC W/AUTO DIFF WBC: CPT | Performed by: INTERNAL MEDICINE

## 2018-07-10 PROCEDURE — 96413 CHEMO IV INFUSION 1 HR: CPT

## 2018-07-10 PROCEDURE — 96375 TX/PRO/DX INJ NEW DRUG ADDON: CPT

## 2018-07-10 PROCEDURE — 96415 CHEMO IV INFUSION ADDL HR: CPT

## 2018-07-10 PROCEDURE — 80053 COMPREHEN METABOLIC PANEL: CPT | Performed by: INTERNAL MEDICINE

## 2018-07-10 RX ADMIN — RITUXIMAB: 10 INJECTION, SOLUTION INTRAVENOUS at 10:50

## 2018-07-10 RX ADMIN — SODIUM CHLORIDE 20 ML/HR: 0.9 INJECTION, SOLUTION INTRAVENOUS at 09:42

## 2018-07-10 RX ADMIN — METHYLPREDNISOLONE SODIUM SUCCINATE 100 MG: 125 INJECTION, POWDER, FOR SOLUTION INTRAMUSCULAR; INTRAVENOUS at 09:41

## 2018-07-10 RX ADMIN — DIPHENHYDRAMINE HCL 50 MG: 25 TABLET ORAL at 09:41

## 2018-07-10 RX ADMIN — ACETAMINOPHEN 975 MG: 325 TABLET, FILM COATED ORAL at 09:41

## 2018-07-10 NOTE — PLAN OF CARE
Problem: Potential for Falls  Goal: Patient will remain free of falls  INTERVENTIONS:  - Assess patient frequently for physical needs  -  Identify cognitive and physical deficits and behaviors that affect risk of falls    -  [place] fall precautions as indicated by assessment   - Educate patient/family on patient safety including physical limitations  - Instruct patient to call for assistance with activity based on assessment  - Modify environment to reduce risk of injury  - Consider OT/PT consult to assist with strengthening/mobility   Outcome: Progressing

## 2018-07-10 NOTE — PROGRESS NOTES
Pt comes to infusion center for IV rituxan pt tolerated infusion without difficulty or complaint  Pt aware of future appointments pt d/salvador in stable condition

## 2018-07-16 DIAGNOSIS — E03.9 HYPOTHYROIDISM, UNSPECIFIED TYPE: Primary | ICD-10-CM

## 2018-07-16 RX ORDER — LEVOTHYROXINE SODIUM 0.05 MG/1
75 TABLET ORAL EVERY OTHER DAY
Qty: 67 TABLET | Refills: 0 | Status: SHIPPED | OUTPATIENT
Start: 2018-07-16 | End: 2018-10-11 | Stop reason: SDUPTHER

## 2018-07-23 RX ORDER — SODIUM CHLORIDE 9 MG/ML
20 INJECTION, SOLUTION INTRAVENOUS CONTINUOUS
Status: DISCONTINUED | OUTPATIENT
Start: 2018-07-24 | End: 2018-07-27 | Stop reason: HOSPADM

## 2018-07-23 RX ORDER — DIPHENHYDRAMINE HCL 25 MG
50 TABLET ORAL ONCE
Status: COMPLETED | OUTPATIENT
Start: 2018-07-24 | End: 2018-07-24

## 2018-07-23 RX ORDER — ACETAMINOPHEN 325 MG/1
975 TABLET ORAL ONCE
Status: COMPLETED | OUTPATIENT
Start: 2018-07-24 | End: 2018-07-24

## 2018-07-23 RX ORDER — METHYLPREDNISOLONE SODIUM SUCCINATE 125 MG/2ML
100 INJECTION, POWDER, LYOPHILIZED, FOR SOLUTION INTRAMUSCULAR; INTRAVENOUS ONCE
Status: COMPLETED | OUTPATIENT
Start: 2018-07-24 | End: 2018-07-24

## 2018-07-24 ENCOUNTER — HOSPITAL ENCOUNTER (OUTPATIENT)
Dept: INFUSION CENTER | Facility: CLINIC | Age: 78
Discharge: HOME | End: 2018-07-24
Payer: MEDICARE

## 2018-07-24 ENCOUNTER — TELEPHONE (OUTPATIENT)
Dept: HEMATOLOGY ONCOLOGY | Facility: CLINIC | Age: 78
End: 2018-07-24

## 2018-07-24 VITALS
DIASTOLIC BLOOD PRESSURE: 60 MMHG | HEART RATE: 86 BPM | RESPIRATION RATE: 18 BRPM | TEMPERATURE: 98.6 F | SYSTOLIC BLOOD PRESSURE: 128 MMHG

## 2018-07-24 DIAGNOSIS — Z17.0 MALIGNANT NEOPLASM OF NIPPLE OF LEFT BREAST IN FEMALE, ESTROGEN RECEPTOR POSITIVE (HCC): Primary | ICD-10-CM

## 2018-07-24 DIAGNOSIS — C50.012 MALIGNANT NEOPLASM OF NIPPLE OF LEFT BREAST IN FEMALE, ESTROGEN RECEPTOR POSITIVE (HCC): Primary | ICD-10-CM

## 2018-07-24 LAB
ALBUMIN SERPL BCP-MCNC: 3.4 G/DL (ref 3.5–5)
ALP SERPL-CCNC: 45 U/L (ref 46–116)
ALT SERPL W P-5'-P-CCNC: 21 U/L (ref 12–78)
ANION GAP SERPL CALCULATED.3IONS-SCNC: 9 MMOL/L (ref 4–13)
AST SERPL W P-5'-P-CCNC: 24 U/L (ref 5–45)
BASOPHILS # BLD AUTO: 0.06 THOUSANDS/ΜL (ref 0–0.1)
BASOPHILS NFR BLD AUTO: 1 % (ref 0–1)
BILIRUB SERPL-MCNC: 0.5 MG/DL (ref 0.2–1)
BUN SERPL-MCNC: 22 MG/DL (ref 5–25)
CALCIUM SERPL-MCNC: 8.8 MG/DL (ref 8.3–10.1)
CHLORIDE SERPL-SCNC: 105 MMOL/L (ref 100–108)
CO2 SERPL-SCNC: 29 MMOL/L (ref 21–32)
CREAT SERPL-MCNC: 1.23 MG/DL (ref 0.6–1.3)
EOSINOPHIL # BLD AUTO: 0.35 THOUSAND/ΜL (ref 0–0.61)
EOSINOPHIL NFR BLD AUTO: 5 % (ref 0–6)
ERYTHROCYTE [DISTWIDTH] IN BLOOD BY AUTOMATED COUNT: 16.6 % (ref 11.6–15.1)
GFR SERPL CREATININE-BSD FRML MDRD: 43 ML/MIN/1.73SQ M
GLUCOSE SERPL-MCNC: 107 MG/DL (ref 65–140)
HCT VFR BLD AUTO: 34.4 % (ref 34.8–46.1)
HGB BLD-MCNC: 11 G/DL (ref 11.5–15.4)
IMM GRANULOCYTES # BLD AUTO: 0.03 THOUSAND/UL (ref 0–0.2)
IMM GRANULOCYTES NFR BLD AUTO: 1 % (ref 0–2)
LYMPHOCYTES # BLD AUTO: 1.17 THOUSANDS/ΜL (ref 0.6–4.47)
LYMPHOCYTES NFR BLD AUTO: 18 % (ref 14–44)
MCH RBC QN AUTO: 32.3 PG (ref 26.8–34.3)
MCHC RBC AUTO-ENTMCNC: 32 G/DL (ref 31.4–37.4)
MCV RBC AUTO: 101 FL (ref 82–98)
MONOCYTES # BLD AUTO: 0.64 THOUSAND/ΜL (ref 0.17–1.22)
MONOCYTES NFR BLD AUTO: 10 % (ref 4–12)
NEUTROPHILS # BLD AUTO: 4.26 THOUSANDS/ΜL (ref 1.85–7.62)
NEUTS SEG NFR BLD AUTO: 65 % (ref 43–75)
NRBC BLD AUTO-RTO: 0 /100 WBCS
PLATELET # BLD AUTO: 220 THOUSANDS/UL (ref 149–390)
PMV BLD AUTO: 10.1 FL (ref 8.9–12.7)
POTASSIUM SERPL-SCNC: 3.4 MMOL/L (ref 3.5–5.3)
PROT SERPL-MCNC: 6.8 G/DL (ref 6.4–8.2)
RBC # BLD AUTO: 3.41 MILLION/UL (ref 3.81–5.12)
SODIUM SERPL-SCNC: 143 MMOL/L (ref 136–145)
WBC # BLD AUTO: 6.51 THOUSAND/UL (ref 4.31–10.16)

## 2018-07-24 PROCEDURE — 80053 COMPREHEN METABOLIC PANEL: CPT | Performed by: INTERNAL MEDICINE

## 2018-07-24 PROCEDURE — 96375 TX/PRO/DX INJ NEW DRUG ADDON: CPT

## 2018-07-24 PROCEDURE — 85025 COMPLETE CBC W/AUTO DIFF WBC: CPT | Performed by: INTERNAL MEDICINE

## 2018-07-24 PROCEDURE — 96413 CHEMO IV INFUSION 1 HR: CPT

## 2018-07-24 PROCEDURE — 96415 CHEMO IV INFUSION ADDL HR: CPT

## 2018-07-24 RX ORDER — TAMOXIFEN CITRATE 20 MG/1
20 TABLET ORAL 2 TIMES DAILY
Qty: 90 TABLET | Refills: 3 | Status: SHIPPED | OUTPATIENT
Start: 2018-07-24 | End: 2018-08-20

## 2018-07-24 RX ADMIN — ACETAMINOPHEN 975 MG: 325 TABLET, FILM COATED ORAL at 09:28

## 2018-07-24 RX ADMIN — SODIUM CHLORIDE 20 ML/HR: 0.9 INJECTION, SOLUTION INTRAVENOUS at 09:29

## 2018-07-24 RX ADMIN — METHYLPREDNISOLONE SODIUM SUCCINATE 100 MG: 125 INJECTION, POWDER, FOR SOLUTION INTRAMUSCULAR; INTRAVENOUS at 09:30

## 2018-07-24 RX ADMIN — RITUXIMAB: 10 INJECTION, SOLUTION INTRAVENOUS at 10:08

## 2018-07-24 RX ADMIN — DIPHENHYDRAMINE HCL 50 MG: 25 TABLET ORAL at 09:29

## 2018-07-24 NOTE — TELEPHONE ENCOUNTER
Patient stopped in the office  Her next appt with Dr Jaxon Gaytan is on 8/20  She said she will run out of her Tamoxifen, 20mg once a day, before that  She has about 2 weeks left  She said when she got it she only got 2 months worth  Can you please send more to last until she's seen by Dr Jaxon Gaytan, or if you're able to she would prefer the 3 month supply because then it costs less  She would also like to change her pharmacy to [facility] in [facility] on [address] - [phone]  Please call patient to let her know

## 2018-07-24 NOTE — PLAN OF CARE
Problem: Potential for Falls  Goal: Patient will remain free of falls  INTERVENTIONS:  - Assess patient frequently for physical needs  -  Identify cognitive and physical deficits and behaviors that affect risk of falls    -  [place] fall precautions as indicated by assessment   - Educate patient/family on patient safety including physical limitations  - Instruct patient to call for assistance with activity based on assessment  - Modify environment to reduce risk of injury  - Consider OT/PT consult to assist with strengthening/mobility   Outcome: Progressing

## 2018-07-24 NOTE — PROGRESS NOTES
Pt here today for Rituxan, denies any discomforts  Labs drawn from peripheral line  Pt ok for treatment

## 2018-07-24 NOTE — TELEPHONE ENCOUNTER
Left voicemail for patient that I sent an RX for Tamoxifen 20mg  to her requested [facility] pharmacy for 90 days

## 2018-08-20 ENCOUNTER — OFFICE VISIT (OUTPATIENT)
Dept: HEMATOLOGY ONCOLOGY | Facility: CLINIC | Age: 78
End: 2018-08-20
Payer: MEDICARE

## 2018-08-20 VITALS
SYSTOLIC BLOOD PRESSURE: 142 MMHG | WEIGHT: 186 LBS | TEMPERATURE: 98.2 F | DIASTOLIC BLOOD PRESSURE: 76 MMHG | HEIGHT: 64 IN | HEART RATE: 60 BPM | RESPIRATION RATE: 18 BRPM | BODY MASS INDEX: 31.76 KG/M2 | OXYGEN SATURATION: 98 %

## 2018-08-20 DIAGNOSIS — D64.9 ANEMIA, UNSPECIFIED TYPE: ICD-10-CM

## 2018-08-20 DIAGNOSIS — C50.919 ADENOCARCINOMA OF BREAST, UNSPECIFIED LATERALITY (HCC): Primary | ICD-10-CM

## 2018-08-20 PROCEDURE — 99214 OFFICE O/P EST MOD 30 MIN: CPT | Performed by: INTERNAL MEDICINE

## 2018-08-20 RX ORDER — TAMOXIFEN CITRATE 20 MG/1
20 TABLET ORAL DAILY
Qty: 90 TABLET | Refills: 3 | Status: SHIPPED | OUTPATIENT
Start: 2018-08-20 | End: 2019-07-16 | Stop reason: SDUPTHER

## 2018-08-20 NOTE — PROGRESS NOTES
HEMATOLOGY / [address] NOTE    Primary Care Provider: Feliberto Skelton DO  Referring Provider:    MRN: [id number]  : 1940    Reason for Encounter:    Chief Complaint   Patient presents with    Follow-up     patient is here for a follow up          History of Hematology / Oncology Illness:     Dana Farnsworth is a 69 y o  female who came in to establish care with oncology  Stage 1, right breast Cancer, invasive ductal carcinoma, ERPR positive, her 2 positive on FISH, grade 2     D/X:  2012  Tr:  Status post lumpectomy, radiation, Herceptin, currently on tamoxifen, x 5 years   -[facility] by Dr Alexandru Tadeo      - on Tamoxifen for additional 5 yr    - mammogram 2018:   Negative for malignancy      Interval History:     :  Patient has history of rheumatoid arthritis, under active care with Rheumatology, overall stable  Patient came in today to establish care with oncology  Medical oncology history as above  Given today reported overall doing well, at baseline health status  She reported has no weight loss, no lumps bumps in breast, no constitutional symptoms including no bleeding, easy bruise, no fever chills, no night sweats, no lumps bumps  She is tolerating tamoxifen without major side effects  There is no history of DVT, endometrial cancers, she reported having bilateral eye cataract, status post cataract surgery, last one is in 2017  She has no other oncology, hematology issues  Colonoscopy was on time with Dr Colette Kirkland  Following Dr Nain Ch for GYN routine checkup  She reported smoked for short time and stopped in her 35s  Drink alcohol socially  Family history of oncology issues including brother has lung cancer, mother has colon cancers at age 46  She is currently , move in with her brother who suffered from lung cancer  She currently living close to [place] about 45 minutes from our office  : Given her follow-up    Doing well, no conscious symptoms  No new breast lumps bumps, no bone pain  No vaginal bleeding, no new vision change  Problem list:       Patient Active Problem List   Diagnosis    Left ankle pain    Hypertension    Rheumatoid arthritis ([facility] [place] [address] )    Hypothyroidism    Hyperuricemia    Abnormal blood chemistry    Diastolic dysfunction    Hyperlipidemia    Left hip pain    Lumbar radiculopathy    Murmur    Osteopenia    Other chronic pain    Pulmonary nodule    Vitamin D deficiency    Adenocarcinoma of breast ([facility] [place] [address] )    Localized edema    Abnormal finding on diagnostic imaging of kidney       Assessment / Plan:     1  Malignant neoplasm of lower-outer quadrant of right breast of female, estrogen receptor positive ([facility] )  - discussed with patient regarding the new data based on the atlas trial, Tamoxifen x 5 vs 10yr followed for 15yr  5yr vs 10yr: DFS at 15yr: 25 vs 21% and OS from yr 5-14:  15 vs 12%  small increased endometrial Ca and clot  - on additional 5 year tamoxifen for a total 10 years  -  she will continue to haveJamgoood work twice a year with PCP  -  she will have yearly mammogram    -  she will continue follow GYN on a yearly basis  She will also have breast exam during these visits  plan  -  Continue tamoxifen, follow-up in 1 year    -Mammo screening bilateral    - tamoxifen (NOLVADEX) 20 mg total by mouth daily  Dispense: 90 tablet; Refill: 3      2, anemia    -likely anemia is due to multifactorial including chronic disease from rheumatoid arthritis, under treatment with methotrexate  -will continue follow-up  If the hemoglobin further drop, we will proceed with anemia workup  25   minutes were spent on this visit  All questions answered to satisfaction; Advised pt to call if there is any further questions  PHYSICIAL EXAMINATION:     Vital Signs:   [unfilled]  Body mass index is 31 93 kg/m²  Body surface area is 1 9 meters squared      GEN: Alert, awake oriented x3, in no acute distress  HEENT- No pallor, icterus, cyanosis, no oral mucosal lesions,   LAD - no palpable cervical, clavicle, axillary, inguinal LAD  Heart- normal S1 S2, regular rate and rhythm, No murmur, rubs  Lungs- clear breathing sound bilateral    Abdomen- soft, Non tender, bowel sounds present  Extremities- ++ edema, bilateral finger in joint deformity consistent with rheumatoid arthritis  Neuro- No focal neurological deficit           PAST MEDICAL HISTORY:   has a past medical history of Allergic angioedema; Angioedema; Arthritis; Breast cancer ([facility] [address] ); Disease of thyroid gland; Hemorrhage of anus and rectum; Hyperlipidemia; Hypertension; Hypocalcemia; Neuritis; Peptic ulcer; Rheumatoid arthritis ([facility] [address] ); and Vitamin D deficiency  PAST SURGICAL HISTORY:   has a past surgical history that includes Abdominal surgery; Breast surgery; Cholecystectomy; Elbow surgery; and orthopedic surgery  CURRENT MEDICATIONS:     Current Outpatient Prescriptions   Medication Sig Dispense Refill    ascorbic acid (VITAMIN C) 500 mg tablet Take 500 mg by mouth daily   Calcium Citrate-Vitamin D (CITRACAL + D PO) Take by mouth 2 (two) times a day      carvedilol (COREG) 3 125 mg tablet Take 1 tablet (3 125 mg total) by mouth 2 (two) times a day with meals 60 tablet 2    Cholecalciferol (VITAMIN D-3 PO) Take 2,000 Units by mouth daily   folic acid (FOLVITE) 1 mg tablet 1 daily 90 tablet 2    hydrochlorothiazide (HYDRODIURIL) 25 mg tablet Take 1 tablet (25 mg total) by mouth daily 90 tablet 2    levothyroxine 50 mcg tablet Take 1 5 tablets (75 mcg total) by mouth every other day 67 tablet 0    methotrexate 2 5 mg tablet Take 10 mg by mouth once a week   Misc Natural Products (LUTEIN 20 PO) Take 20 mg by mouth daily   Multiple Vitamin (MULTIVITAMIN) tablet Take 1 tablet by mouth daily        potassium chloride (K-DUR,KLOR-CON) 20 mEq tablet 1 daily 90 tablet 2    riTUXimab (RITUXAN) 500 mg/50 mL Infuse into a venous catheter every 6 (six) months        rosuvastatin (CRESTOR) 5 mg tablet Take 5 mg by mouth daily   vitamin E, tocopherol, 400 units capsule Take 400 Units by mouth daily      tamoxifen (NOLVADEX) 20 mg tablet Take 1 tablet (20 mg total) by mouth daily 90 tablet 3     No current facility-administered medications for this visit  [unfilled]    SOCIAL HISTORY:   reports that she has never smoked  She has never used smokeless tobacco  She reports that she drinks alcohol  She reports that she does not use drugs  FAMILY HISTORY:  family history includes Breast cancer in her maternal aunt, mother, and sister; Kidney failure in her father; Other in her father  ALLERGIES:  is allergic to lisinopril; codeine; other; oxycodone; penicillins; sulfamethoxazole-trimethoprim; and ciprofloxacin  REVIEW OF SYSTEMS:  Please note that a 14-point review of systems was performed to include Constitutional, HEENT, Respiratory, CVS, GI, , Musculoskeletal, Integumentary, Neurologic, Rheumatologic, Endocrinologic, Psychiatric, Lymphatic, and Hematologic/Oncologic systems were reviewed and are negative unless otherwise stated in HPI  Positive and negative findings pertinent to this evaluation are incorporated into the history of present illness              LAB:    Lab Results   Component Value Date    WBC 6 51 07/24/2018    HGB 11 0 (L) 07/24/2018    HCT 34 4 (L) 07/24/2018     (H) 07/24/2018     07/24/2018       Lab Results   Component Value Date     07/24/2018    K 3 4 (L) 07/24/2018     07/24/2018    CO2 29 07/24/2018    ANIONGAP 9 07/24/2018    BUN 22 07/24/2018    CREATININE 1 23 07/24/2018    GLUCOSE 107 07/24/2018    GLUF 101 (H) 03/20/2018    CALCIUM 8 8 07/24/2018    AST 24 07/24/2018    ALT 21 07/24/2018    ALKPHOS 45 (L) 07/24/2018    PROT 6 8 07/24/2018    BILITOT 0 50 07/24/2018    EGFR 43 07/24/2018       IMAGING:    No orders to display     Ct Ankle Left Wo Contrast    Result Date: 2/5/2018  Narrative: CT LEFT ANKLE INDICATION: ankle pain  History taken directly from the electronic ordering system  Ankle pain  Unable to bear weight  No trauma  Rheumatoid arthropathy  COMPARISON: None  TECHNIQUE: Serial Axial CT images were acquired through the left ankle  Coronal and sagittal reformation images were also obtained  Contrast material was not utilized  This examination, like all CT scans performed in the [facility], was performed utilizing techniques to minimize radiation dose exposure, including the use of iterative reconstruction and automated exposure control  Rad dose 238 mGy-cm FINDINGS: JOINT EFFUSION: Joint effusion noted  ALIGNMENT: Increase in talar declination angle suggests chronic PTT dysfunction  This results in pes planus deformity  OSSEOUS STRUCTURES: Advanced degenerative change noted throughout the visualized midfoot and hindfoot with apparent spontaneous talonavicular fusion related to long-standing degenerative change  No acute fracture dislocation or osseous destructive lesion identified  Incomplete evaluation of lateral deviation of the 2nd through 5th toes with erosive changes and pencil in cup deformity noted across the metatarsal phalangeal joints  Findings consistent with underlying inflammatory arthropathy  TENDONS: Limited assessment by CT technique without gross evidence of tear  LIGAMENTS: Limited assessment by CT technique without gross evidence of tear  MUSCULATURE: Intact  REMAINING SOFT TISSUES: Loculated collection noted along the plantar soft tissues at the level of the dorsal calcaneus measuring up to 4 3 x 3 7 x 2 1 cm  There is some wall calcification noted suggesting chronicity and presumably related to rheumatoid disease  Impression: 1    Advanced degenerative change throughout the foot including suggestive findings of inflammatory arthropathy, such as rheumatoid, across the 2nd through 5th metatarsophalangeal joints as above  There is also spontaneous fusion across the talonavicular  joint likely secondary to long-standing degenerative change  2   Pes planus deformity and suggestive findings of chronic PTT dysfunction  3   Loculated plantar hindfoot fluid collection measuring up to 4 3 x 3 7 x 2 1 cm likely chronic given scant wall calcification  This could relate to rheumatoid disease with infection not entirely excluded but considerably less likely  Correlate clinically  Findings are consistent with the preliminary report from [facility] which was provided shortly after completion of the exam  The finding of plantar loculated collection  may alter immediate patient management, therefore we will now contact referring physicians for direct verbal discussion  I personally discussed this study with Cinthia Kitchen, the nurse caring for this patient, on 2/5/2018 8:44 AM   Unfortunately, the referring physician could not be reached at this time   Workstation performed: [id number]

## 2018-09-27 ENCOUNTER — APPOINTMENT (OUTPATIENT)
Dept: LAB | Facility: HOSPITAL | Age: 78
End: 2018-09-27
Attending: INTERNAL MEDICINE
Payer: MEDICARE

## 2018-09-27 DIAGNOSIS — I10 HYPERTENSION, UNSPECIFIED TYPE: Chronic | ICD-10-CM

## 2018-09-27 DIAGNOSIS — E03.9 HYPOTHYROIDISM, UNSPECIFIED TYPE: ICD-10-CM

## 2018-09-27 DIAGNOSIS — M06.9 RHEUMATOID ARTHRITIS INVOLVING LEFT ANKLE, UNSPECIFIED RHEUMATOID FACTOR PRESENCE: Chronic | ICD-10-CM

## 2018-09-27 DIAGNOSIS — E55.9 VITAMIN D DEFICIENCY: ICD-10-CM

## 2018-09-27 LAB
25(OH)D3 SERPL-MCNC: 42.2 NG/ML (ref 30–100)
ALBUMIN SERPL BCP-MCNC: 3.6 G/DL (ref 3.5–5)
ALP SERPL-CCNC: 48 U/L (ref 46–116)
ALT SERPL W P-5'-P-CCNC: 23 U/L (ref 12–78)
ANION GAP SERPL CALCULATED.3IONS-SCNC: 7 MMOL/L (ref 4–13)
AST SERPL W P-5'-P-CCNC: 21 U/L (ref 5–45)
BASOPHILS # BLD AUTO: 0.04 THOUSANDS/ΜL (ref 0–0.1)
BASOPHILS NFR BLD AUTO: 1 % (ref 0–1)
BILIRUB SERPL-MCNC: 0.6 MG/DL (ref 0.2–1)
BUN SERPL-MCNC: 21 MG/DL (ref 5–25)
CALCIUM SERPL-MCNC: 9.3 MG/DL (ref 8.3–10.1)
CHLORIDE SERPL-SCNC: 103 MMOL/L (ref 100–108)
CO2 SERPL-SCNC: 31 MMOL/L (ref 21–32)
CREAT SERPL-MCNC: 1.24 MG/DL (ref 0.6–1.3)
CRP SERPL QL: 19.1 MG/L
EOSINOPHIL # BLD AUTO: 0.28 THOUSAND/ΜL (ref 0–0.61)
EOSINOPHIL NFR BLD AUTO: 3 % (ref 0–6)
ERYTHROCYTE [DISTWIDTH] IN BLOOD BY AUTOMATED COUNT: 14.1 % (ref 11.6–15.1)
ERYTHROCYTE [SEDIMENTATION RATE] IN BLOOD: 17 MM/HOUR (ref 0–20)
GFR SERPL CREATININE-BSD FRML MDRD: 42 ML/MIN/1.73SQ M
GLUCOSE P FAST SERPL-MCNC: 102 MG/DL (ref 65–99)
HCT VFR BLD AUTO: 37.4 % (ref 34.8–46.1)
HGB BLD-MCNC: 12.2 G/DL (ref 11.5–15.4)
IMM GRANULOCYTES # BLD AUTO: 0.03 THOUSAND/UL (ref 0–0.2)
IMM GRANULOCYTES NFR BLD AUTO: 0 % (ref 0–2)
LYMPHOCYTES # BLD AUTO: 1.37 THOUSANDS/ΜL (ref 0.6–4.47)
LYMPHOCYTES NFR BLD AUTO: 17 % (ref 14–44)
MAGNESIUM SERPL-MCNC: 1.4 MG/DL (ref 1.6–2.6)
MCH RBC QN AUTO: 32.8 PG (ref 26.8–34.3)
MCHC RBC AUTO-ENTMCNC: 32.6 G/DL (ref 31.4–37.4)
MCV RBC AUTO: 101 FL (ref 82–98)
MONOCYTES # BLD AUTO: 0.69 THOUSAND/ΜL (ref 0.17–1.22)
MONOCYTES NFR BLD AUTO: 9 % (ref 4–12)
NEUTROPHILS # BLD AUTO: 5.75 THOUSANDS/ΜL (ref 1.85–7.62)
NEUTS SEG NFR BLD AUTO: 70 % (ref 43–75)
NRBC BLD AUTO-RTO: 0 /100 WBCS
PLATELET # BLD AUTO: 224 THOUSANDS/UL (ref 149–390)
PMV BLD AUTO: 10 FL (ref 8.9–12.7)
POTASSIUM SERPL-SCNC: 3.4 MMOL/L (ref 3.5–5.3)
PROT SERPL-MCNC: 6.9 G/DL (ref 6.4–8.2)
RBC # BLD AUTO: 3.72 MILLION/UL (ref 3.81–5.12)
SODIUM SERPL-SCNC: 141 MMOL/L (ref 136–145)
T4 FREE SERPL-MCNC: 1.1 NG/DL (ref 0.76–1.46)
TSH SERPL DL<=0.05 MIU/L-ACNC: 3.83 UIU/ML (ref 0.36–3.74)
WBC # BLD AUTO: 8.16 THOUSAND/UL (ref 4.31–10.16)

## 2018-09-27 PROCEDURE — 85652 RBC SED RATE AUTOMATED: CPT

## 2018-09-27 PROCEDURE — 84439 ASSAY OF FREE THYROXINE: CPT

## 2018-09-27 PROCEDURE — 86140 C-REACTIVE PROTEIN: CPT

## 2018-09-27 PROCEDURE — 83735 ASSAY OF MAGNESIUM: CPT

## 2018-09-27 PROCEDURE — 36415 COLL VENOUS BLD VENIPUNCTURE: CPT

## 2018-09-27 PROCEDURE — 85025 COMPLETE CBC W/AUTO DIFF WBC: CPT

## 2018-09-27 PROCEDURE — 84443 ASSAY THYROID STIM HORMONE: CPT

## 2018-09-27 PROCEDURE — 80053 COMPREHEN METABOLIC PANEL: CPT

## 2018-09-27 PROCEDURE — 82306 VITAMIN D 25 HYDROXY: CPT

## 2018-10-11 ENCOUNTER — OFFICE VISIT (OUTPATIENT)
Dept: INTERNAL MEDICINE CLINIC | Facility: CLINIC | Age: 78
End: 2018-10-11
Payer: MEDICARE

## 2018-10-11 VITALS
OXYGEN SATURATION: 97 % | HEART RATE: 75 BPM | WEIGHT: 188.4 LBS | RESPIRATION RATE: 20 BRPM | HEIGHT: 65 IN | TEMPERATURE: 99.1 F | SYSTOLIC BLOOD PRESSURE: 130 MMHG | BODY MASS INDEX: 31.39 KG/M2 | DIASTOLIC BLOOD PRESSURE: 75 MMHG

## 2018-10-11 DIAGNOSIS — R10.11 RIGHT UPPER QUADRANT PAIN: ICD-10-CM

## 2018-10-11 DIAGNOSIS — I51.89 DIASTOLIC DYSFUNCTION: ICD-10-CM

## 2018-10-11 DIAGNOSIS — Z87.11 HISTORY OF PEPTIC ULCER: ICD-10-CM

## 2018-10-11 DIAGNOSIS — E87.6 HYPOKALEMIA: ICD-10-CM

## 2018-10-11 DIAGNOSIS — R91.1 PULMONARY NODULE: ICD-10-CM

## 2018-10-11 DIAGNOSIS — Z23 NEED FOR IMMUNIZATION AGAINST INFLUENZA: Primary | ICD-10-CM

## 2018-10-11 DIAGNOSIS — E55.9 VITAMIN D DEFICIENCY: ICD-10-CM

## 2018-10-11 DIAGNOSIS — M06.9 RHEUMATOID ARTHRITIS INVOLVING LEFT ANKLE, UNSPECIFIED RHEUMATOID FACTOR PRESENCE: Chronic | ICD-10-CM

## 2018-10-11 DIAGNOSIS — E83.42 HYPOMAGNESEMIA: ICD-10-CM

## 2018-10-11 DIAGNOSIS — M54.16 LUMBAR RADICULOPATHY: ICD-10-CM

## 2018-10-11 DIAGNOSIS — R60.0 LOCALIZED EDEMA: ICD-10-CM

## 2018-10-11 DIAGNOSIS — E03.9 HYPOTHYROIDISM, UNSPECIFIED TYPE: ICD-10-CM

## 2018-10-11 DIAGNOSIS — E79.0 HYPERURICEMIA: ICD-10-CM

## 2018-10-11 DIAGNOSIS — E78.5 HYPERLIPIDEMIA, UNSPECIFIED HYPERLIPIDEMIA TYPE: ICD-10-CM

## 2018-10-11 DIAGNOSIS — I10 HYPERTENSION, UNSPECIFIED TYPE: Chronic | ICD-10-CM

## 2018-10-11 PROBLEM — Z85.3 PERSONAL HISTORY OF BREAST CANCER: Status: ACTIVE | Noted: 2018-04-30

## 2018-10-11 PROBLEM — N64.1 FAT NECROSIS (SEGMENTAL) OF BREAST: Status: ACTIVE | Noted: 2018-04-30

## 2018-10-11 PROCEDURE — 99214 OFFICE O/P EST MOD 30 MIN: CPT | Performed by: INTERNAL MEDICINE

## 2018-10-11 PROCEDURE — 90662 IIV NO PRSV INCREASED AG IM: CPT

## 2018-10-11 PROCEDURE — G0008 ADMIN INFLUENZA VIRUS VAC: HCPCS

## 2018-10-11 RX ORDER — HYDROCHLOROTHIAZIDE 25 MG/1
25 TABLET ORAL DAILY
Qty: 90 TABLET | Refills: 0 | Status: SHIPPED | OUTPATIENT
Start: 2018-10-11 | End: 2019-04-01 | Stop reason: SDUPTHER

## 2018-10-11 RX ORDER — ROSUVASTATIN CALCIUM 5 MG/1
5 TABLET, COATED ORAL DAILY
Qty: 90 TABLET | Refills: 2 | Status: SHIPPED | OUTPATIENT
Start: 2018-10-11 | End: 2019-03-14 | Stop reason: SDUPTHER

## 2018-10-11 RX ORDER — LEVOTHYROXINE SODIUM 0.05 MG/1
75 TABLET ORAL EVERY OTHER DAY
Qty: 67 TABLET | Refills: 0 | Status: SHIPPED | OUTPATIENT
Start: 2018-10-11 | End: 2018-11-05 | Stop reason: SDUPTHER

## 2018-10-11 RX ORDER — PANTOPRAZOLE SODIUM 40 MG/1
40 TABLET, DELAYED RELEASE ORAL DAILY
Qty: 30 TABLET | Refills: 2 | Status: SHIPPED | OUTPATIENT
Start: 2018-10-11 | End: 2019-03-14 | Stop reason: SDUPTHER

## 2018-10-11 RX ORDER — CARVEDILOL 3.12 MG/1
3.12 TABLET ORAL 2 TIMES DAILY WITH MEALS
Qty: 60 TABLET | Refills: 0 | Status: SHIPPED | OUTPATIENT
Start: 2018-10-11 | End: 2018-12-14 | Stop reason: SDUPTHER

## 2018-10-11 RX ORDER — POTASSIUM CHLORIDE 20 MEQ/1
TABLET, EXTENDED RELEASE ORAL
Qty: 90 TABLET | Refills: 0 | Status: SHIPPED | OUTPATIENT
Start: 2018-10-11 | End: 2018-11-05 | Stop reason: SDUPTHER

## 2018-10-11 NOTE — LETTER
October 11, 2018     Mariza Lobo MD  [address] [place] [address] 2  [address] [address]    Patient: Yoon Mart   YOB: 1940   Date of Visit: 10/11/2018       Dear Dr Kathi Brennan:    Thank you for referring Ruben Martel to me for evaluation  Below are my notes for this consultation  If you have questions, please do not hesitate to call me  I look forward to following your patient along with you  Sincerely,        Sushma Waldron DO        CC: No Recipients  Sushma Waldron DO  10/11/2018  3:14 PM  Sign at close encounter  Assessment/Plan:    Right upper quadrant or right mid quadrant pain with diarrhea over the past couple of weeks will get laboratory work as well as CT scan patient with history of known ulcer will restart pantoprazole have her come back in 3 weeks unless pain is resolving CT scan is normal  2  Hypothyroidism TSH is mildly elevated the patient has not had increased symptoms will recheck in 6 months  3  Low magnesium may be the cause of hypokalemia will replace with magnesium recheck potassium and magnesium in 2 weeks  4  Hypertension is at goal  5  Patient rheumatoid arthritis is stable at this time continued follow-up with Dr Kathi Brennan recheck labs in 6 months  6  Hyperlipidemia will was excellent in March will recheck in 6 months         Diagnoses and all orders for this visit:    Need for immunization against influenza  -     influenza vaccine, [id number], high-dose, PF 0 5 mL, for patients 65 yr+ (FLUZONE HIGH-DOSE)  -     CBC and differential; Future  -     Comprehensive metabolic panel; Future  -     Hemoglobin A1C; Future  -     Lipid Panel with Direct LDL reflex; Future  -     LDL cholesterol, direct; Future  -     Vitamin D 25 hydroxy; Future  -     TSH, 3rd generation with Free T4 reflex; Future  -     Magnesium; Future    Hyperuricemia  -     CBC and differential; Future  -     Comprehensive metabolic panel;  Future  -     Hemoglobin A1C; Future  -     Lipid Panel with Direct LDL reflex; Future  -     LDL cholesterol, direct; Future  -     Vitamin D 25 hydroxy; Future  -     TSH, 3rd generation with Free T4 reflex; Future  -     Magnesium; Future    Diastolic dysfunction  -     CBC and differential; Future  -     Comprehensive metabolic panel; Future  -     Hemoglobin A1C; Future  -     Lipid Panel with Direct LDL reflex; Future  -     LDL cholesterol, direct; Future  -     Vitamin D 25 hydroxy; Future  -     TSH, 3rd generation with Free T4 reflex; Future  -     Magnesium; Future    Hyperlipidemia, unspecified hyperlipidemia type  -     rosuvastatin (CRESTOR) 5 mg tablet; Take 1 tablet (5 mg total) by mouth daily  -     CBC and differential; Future  -     Comprehensive metabolic panel; Future  -     Hemoglobin A1C; Future  -     Lipid Panel with Direct LDL reflex; Future  -     LDL cholesterol, direct; Future  -     Vitamin D 25 hydroxy; Future  -     TSH, 3rd generation with Free T4 reflex; Future  -     Magnesium; Future    Lumbar radiculopathy  -     CBC and differential; Future  -     Comprehensive metabolic panel; Future  -     Hemoglobin A1C; Future  -     Lipid Panel with Direct LDL reflex; Future  -     LDL cholesterol, direct; Future  -     Vitamin D 25 hydroxy; Future  -     TSH, 3rd generation with Free T4 reflex; Future  -     Magnesium; Future    Vitamin D deficiency  -     CBC and differential; Future  -     Comprehensive metabolic panel; Future  -     Hemoglobin A1C; Future  -     Lipid Panel with Direct LDL reflex; Future  -     LDL cholesterol, direct; Future  -     Vitamin D 25 hydroxy; Future  -     TSH, 3rd generation with Free T4 reflex; Future  -     Magnesium; Future    Pulmonary nodule  -     CBC and differential; Future  -     Comprehensive metabolic panel; Future  -     Hemoglobin A1C; Future  -     Lipid Panel with Direct LDL reflex; Future  -     LDL cholesterol, direct; Future  -     Vitamin D 25 hydroxy;  Future  -     TSH, 3rd generation with Free T4 reflex; Future  -     Magnesium; Future    Hypertension, unspecified type  -     potassium chloride (K-DUR,KLOR-CON) 20 mEq tablet; 1 daily  -     hydrochlorothiazide (HYDRODIURIL) 25 mg tablet; Take 1 tablet (25 mg total) by mouth daily  -     carvedilol (COREG) 3 125 mg tablet; Take 1 tablet (3 125 mg total) by mouth 2 (two) times a day with meals  -     CBC and differential; Future  -     Comprehensive metabolic panel; Future  -     Hemoglobin A1C; Future  -     Lipid Panel with Direct LDL reflex; Future  -     LDL cholesterol, direct; Future  -     Vitamin D 25 hydroxy; Future  -     TSH, 3rd generation with Free T4 reflex; Future  -     Magnesium; Future    Rheumatoid arthritis involving left ankle, unspecified rheumatoid factor presence (HCC)  -     CBC and differential; Future  -     Comprehensive metabolic panel; Future  -     Hemoglobin A1C; Future  -     Lipid Panel with Direct LDL reflex; Future  -     LDL cholesterol, direct; Future  -     Vitamin D 25 hydroxy; Future  -     TSH, 3rd generation with Free T4 reflex; Future  -     Magnesium; Future    Localized edema  -     hydrochlorothiazide (HYDRODIURIL) 25 mg tablet; Take 1 tablet (25 mg total) by mouth daily  -     CBC and differential; Future  -     Comprehensive metabolic panel; Future  -     Hemoglobin A1C; Future  -     Lipid Panel with Direct LDL reflex; Future  -     LDL cholesterol, direct; Future  -     Vitamin D 25 hydroxy; Future  -     TSH, 3rd generation with Free T4 reflex; Future  -     Magnesium; Future    Hypothyroidism, unspecified type  -     levothyroxine 50 mcg tablet; Take 1 5 tablets (75 mcg total) by mouth every other day  -     CBC and differential; Future  -     Comprehensive metabolic panel; Future  -     Hemoglobin A1C; Future  -     Lipid Panel with Direct LDL reflex; Future  -     LDL cholesterol, direct; Future  -     Vitamin D 25 hydroxy; Future  -     TSH, 3rd generation with Free T4 reflex;  Future  - Magnesium; Future    Hypokalemia  -     CBC and differential; Future  -     Comprehensive metabolic panel; Future  -     Hemoglobin A1C; Future  -     Lipid Panel with Direct LDL reflex; Future  -     LDL cholesterol, direct; Future  -     Vitamin D 25 hydroxy; Future  -     TSH, 3rd generation with Free T4 reflex; Future  -     Magnesium; Future  -     Renal function panel; Future  -     Magnesium; Future    Hypomagnesemia  -     CBC and differential; Future  -     Comprehensive metabolic panel; Future  -     Hemoglobin A1C; Future  -     Lipid Panel with Direct LDL reflex; Future  -     LDL cholesterol, direct; Future  -     Vitamin D 25 hydroxy; Future  -     TSH, 3rd generation with Free T4 reflex; Future  -     Magnesium; Future  -     magnesium oxide (MAG-OX) 400 mg; Take 1 tablet (400 mg total) by mouth daily    Right upper quadrant pain  -     CT abdomen pelvis wo contrast; Future  -     Lipase; Future  -     Amylase; Future  -     pantoprazole (PROTONIX) 40 mg tablet; Take 1 tablet (40 mg total) by mouth daily    History of peptic ulcer  -     CT abdomen pelvis wo contrast; Future  -     pantoprazole (PROTONIX) 40 mg tablet; Take 1 tablet (40 mg total) by mouth daily         Scheduled Meds:  Continuous Infusions:  No current facility-administered medications for this visit  PRN Meds:   Scheduled Meds:  Continuous Infusions:  No current facility-administered medications for this visit  Scheduled Meds:    Current Outpatient Prescriptions:     ascorbic acid (VITAMIN C) 500 mg tablet, Take 500 mg by mouth daily  , Disp: , Rfl:     Calcium Citrate-Vitamin D (CITRACAL + D PO), Take by mouth 2 (two) times a day, Disp: , Rfl:     carvedilol (COREG) 3 125 mg tablet, Take 1 tablet (3 125 mg total) by mouth 2 (two) times a day with meals, Disp: 60 tablet, Rfl: 0    Cholecalciferol (VITAMIN D-3 PO), Take 2,000 Units by mouth daily  , Disp: , Rfl:     folic acid (FOLVITE) 1 mg tablet, 1 daily, Disp: 90 tablet, Rfl: 2    hydrochlorothiazide (HYDRODIURIL) 25 mg tablet, Take 1 tablet (25 mg total) by mouth daily, Disp: 90 tablet, Rfl: 0    levothyroxine 50 mcg tablet, Take 1 5 tablets (75 mcg total) by mouth every other day, Disp: 67 tablet, Rfl: 0    magnesium oxide (MAG-OX) 400 mg, Take 1 tablet (400 mg total) by mouth daily, Disp: 90 tablet, Rfl: 5    methotrexate 2 5 mg tablet, Take 10 mg by mouth once a week , Disp: , Rfl:     Misc Natural Products (LUTEIN 20 PO), Take 20 mg by mouth daily  , Disp: , Rfl:     Multiple Vitamin (MULTIVITAMIN) tablet, Take 1 tablet by mouth daily  , Disp: , Rfl:     pantoprazole (PROTONIX) 40 mg tablet, Take 1 tablet (40 mg total) by mouth daily, Disp: 30 tablet, Rfl: 2    potassium chloride (K-DUR,KLOR-CON) 20 mEq tablet, 1 daily, Disp: 90 tablet, Rfl: 0    riTUXimab (RITUXAN) 500 mg/50 mL, Infuse into a venous catheter every 6 (six) months  , Disp: , Rfl:     rosuvastatin (CRESTOR) 5 mg tablet, Take 1 tablet (5 mg total) by mouth daily, Disp: 90 tablet, Rfl: 2    tamoxifen (NOLVADEX) 20 mg tablet, Take 1 tablet (20 mg total) by mouth daily, Disp: 90 tablet, Rfl: 3    vitamin E, tocopherol, 400 units capsule, Take 400 Units by mouth daily, Disp: , Rfl:       The patient was counseled regarding instructions for management, risk factor reductions, patient and family education,impressions, risks and benefits of treatment options, side effects of medications, importance of compliance with treatment  The treatment plan was reviewed with the patient/guardian and patient/guardian understands and agrees with the treatment plan  Subjective:      Patient ID: Janki Roman is a 69 y o  female      1 am awake left lower quandrant pain then diarrhea, 2 times in 3 weeks had this 2 years ago pain 8 relieved in a few hours, no constipation        The following portions of the patient's history were reviewed and updated as appropriate:   She has a past medical history of Allergic angioedema; Angioedema; Arthritis; Breast cancer ([facility] [address] ); Disease of thyroid gland; Hemorrhage of anus and rectum; Hyperlipidemia; Hypertension; Hypocalcemia; Neuritis; Peptic ulcer; [facility] ); and Vitamin D deficiency  ,   does not have any pertinent problems on file  ,   has a past surgical history that includes Abdominal surgery; Breast surgery; Cholecystectomy; Elbow surgery; and orthopedic surgery  ,  family history includes Breast cancer in her maternal aunt, mother, and sister; Kidney failure in her father; Other in her father  ,   reports that she has never smoked  She has never used smokeless tobacco  She reports that she drinks alcohol  She reports that she does not use drugs  ,  is allergic to lisinopril; codeine; other; oxycodone; penicillins; sulfamethoxazole-trimethoprim; and ciprofloxacin       Review of Systems   Constitutional: Negative for appetite change, chills, fatigue, fever and unexpected weight change  HENT: Negative for congestion, ear pain, facial swelling, hearing loss, mouth sores, nosebleeds, postnasal drip, rhinorrhea, sinus pain, sore throat, trouble swallowing and voice change  Eyes: Negative for pain, discharge, redness and visual disturbance  Respiratory: Negative for apnea, chest tightness, shortness of breath, wheezing and stridor  Cardiovascular: Negative for chest pain, palpitations and leg swelling  Gastrointestinal: Positive for abdominal pain and diarrhea  Negative for abdominal distention, blood in stool, constipation and vomiting  Endocrine: Negative for cold intolerance, heat intolerance, polydipsia, polyphagia and polyuria  Genitourinary: Negative for difficulty urinating, dysuria, flank pain, frequency, genital sores, hematuria and urgency  Musculoskeletal: Positive for arthralgias  Negative for back pain  Skin: Negative for rash and wound     Allergic/Immunologic: Negative for environmental allergies, food allergies and immunocompromised state    Neurological: Negative for dizziness, tremors, seizures, syncope, facial asymmetry, speech difficulty, weakness, light-headedness, numbness and headaches  Hematological: Negative for adenopathy  Does not bruise/bleed easily  Psychiatric/Behavioral: Negative for agitation, behavioral problems, dysphoric mood, hallucinations, self-injury, sleep disturbance and suicidal ideas  The patient is not hyperactive  Objective:     Physical Exam   Constitutional: She is oriented to person, place, and time  She appears well-developed  obese   HENT:   Right Ear: External ear normal    Left Ear: External ear normal    Eyes: Right eye exhibits no discharge  Left eye exhibits no discharge  No scleral icterus  Neck: Carotid bruit is not present  No tracheal deviation present  No thyroid mass and no thyromegaly present  Cardiovascular: Normal rate, regular rhythm, normal heart sounds and intact distal pulses  Exam reveals no gallop and no friction rub  No murmur heard  Pulmonary/Chest: No respiratory distress  She has no wheezes  She has no rales  Abdominal: Soft  Normal appearance and bowel sounds are normal  She exhibits no shifting dullness, no distension, no pulsatile liver, no fluid wave, no abdominal bruit, no ascites, no pulsatile midline mass and no mass  There is no hepatosplenomegaly, splenomegaly or hepatomegaly  There is no tenderness  There is no CVA tenderness  Musculoskeletal: She exhibits no edema  Ulnar deviation both hands   Lymphadenopathy:     She has no cervical adenopathy  Neurological: She is alert and oriented to person, place, and time  Coordination normal    Psychiatric: She has a normal mood and affect  Her behavior is normal  Judgment and thought content normal    Nursing note and vitals reviewed        Vitals:    10/11/18 1420 10/11/18 1456   BP:  130/75   BP Location:  Left arm   Patient Position:  Sitting   Pulse: 75    Resp: 20    Temp: 99 1 °F (37 3 °C) TempSrc: Tympanic    SpO2: 97%    Weight: 85 5 kg (188 lb 6 4 oz)    Height: 5' 5" (1 651 m)

## 2018-10-11 NOTE — PATIENT INSTRUCTIONS
Abdominal Pain   AMBULATORY CARE:   Abdominal pain  can be dull, achy, or sharp  You may have pain in one area of your abdomen, or in your entire abdomen  Your pain may be caused by a condition such as constipation, food sensitivity or poisoning, infection, or a blockage  Abdominal pain can also be from a hernia, appendicitis, or an ulcer  Liver, gallbladder, or kidney conditions can also cause abdominal pain  The cause of your abdominal pain may be unknown  Seek care immediately if:   · You have new chest pain or shortness of breath  · You have pulsing pain in your upper abdomen or lower back that suddenly becomes constant  · Your pain is in the right lower abdominal area and worsens with movement  · You have a fever over 100 4°F (38°C) or shaking chills  · You are vomiting and cannot keep food or liquids down  · Your pain does not improve or gets worse over the next 8 to 12 hours  · You see blood in your vomit or bowel movements, or they look black and tarry  · Your skin or the whites of your eyes turn yellow  · You are a woman and have a large amount of vaginal bleeding that is not your monthly period  Contact your healthcare provider if:   · You have pain in your lower back  · You are a man and have pain in your testicles  · You have pain when you urinate  · You have questions or concerns about your condition or care  Treatment for abdominal pain  may include medicine to calm your stomach, prevent vomiting, or decrease pain  Follow up with your healthcare provider as directed:  Write down your questions so you remember to ask them during your visits  © 2017 [employer]  Information is for End User's use only and may not be sold, redistributed or otherwise used for commercial purposes  All illustrations and images included in CareNotes® are the copyrighted property of [employer]  or [employer]    The above information is an educational aid only  It is not intended as medical advice for individual conditions or treatments  Talk to your doctor, nurse or pharmacist before following any medical regimen to see if it is safe and effective for you

## 2018-10-11 NOTE — PROGRESS NOTES
Assessment/Plan:    Right upper quadrant or right mid quadrant pain with diarrhea over the past couple of weeks will get laboratory work as well as CT scan patient with history of known ulcer will restart pantoprazole have her come back in 3 weeks unless pain is resolving CT scan is normal  2  Hypothyroidism TSH is mildly elevated the patient has not had increased symptoms will recheck in 6 months  3  Low magnesium may be the cause of hypokalemia will replace with magnesium recheck potassium and magnesium in 2 weeks  4  Hypertension is at goal  5  Patient rheumatoid arthritis is stable at this time continued follow-up with Dr Adrienne Blank recheck labs in 6 months  6  Hyperlipidemia will was excellent in March will recheck in 6 months         Diagnoses and all orders for this visit:    Need for immunization against influenza  -     influenza vaccine, [id number], high-dose, PF 0 5 mL, for patients 65 yr+ (FLUZONE HIGH-DOSE)  -     CBC and differential; Future  -     Comprehensive metabolic panel; Future  -     Hemoglobin A1C; Future  -     Lipid Panel with Direct LDL reflex; Future  -     LDL cholesterol, direct; Future  -     Vitamin D 25 hydroxy; Future  -     TSH, 3rd generation with Free T4 reflex; Future  -     Magnesium; Future    Hyperuricemia  -     CBC and differential; Future  -     Comprehensive metabolic panel; Future  -     Hemoglobin A1C; Future  -     Lipid Panel with Direct LDL reflex; Future  -     LDL cholesterol, direct; Future  -     Vitamin D 25 hydroxy; Future  -     TSH, 3rd generation with Free T4 reflex; Future  -     Magnesium; Future    Diastolic dysfunction  -     CBC and differential; Future  -     Comprehensive metabolic panel; Future  -     Hemoglobin A1C; Future  -     Lipid Panel with Direct LDL reflex; Future  -     LDL cholesterol, direct; Future  -     Vitamin D 25 hydroxy; Future  -     TSH, 3rd generation with Free T4 reflex; Future  -     Magnesium;  Future    Hyperlipidemia, unspecified hyperlipidemia type  -     rosuvastatin (CRESTOR) 5 mg tablet; Take 1 tablet (5 mg total) by mouth daily  -     CBC and differential; Future  -     Comprehensive metabolic panel; Future  -     Hemoglobin A1C; Future  -     Lipid Panel with Direct LDL reflex; Future  -     LDL cholesterol, direct; Future  -     Vitamin D 25 hydroxy; Future  -     TSH, 3rd generation with Free T4 reflex; Future  -     Magnesium; Future    Lumbar radiculopathy  -     CBC and differential; Future  -     Comprehensive metabolic panel; Future  -     Hemoglobin A1C; Future  -     Lipid Panel with Direct LDL reflex; Future  -     LDL cholesterol, direct; Future  -     Vitamin D 25 hydroxy; Future  -     TSH, 3rd generation with Free T4 reflex; Future  -     Magnesium; Future    Vitamin D deficiency  -     CBC and differential; Future  -     Comprehensive metabolic panel; Future  -     Hemoglobin A1C; Future  -     Lipid Panel with Direct LDL reflex; Future  -     LDL cholesterol, direct; Future  -     Vitamin D 25 hydroxy; Future  -     TSH, 3rd generation with Free T4 reflex; Future  -     Magnesium; Future    Pulmonary nodule  -     CBC and differential; Future  -     Comprehensive metabolic panel; Future  -     Hemoglobin A1C; Future  -     Lipid Panel with Direct LDL reflex; Future  -     LDL cholesterol, direct; Future  -     Vitamin D 25 hydroxy; Future  -     TSH, 3rd generation with Free T4 reflex; Future  -     Magnesium; Future    Hypertension, unspecified type  -     potassium chloride (K-DUR,KLOR-CON) 20 mEq tablet; 1 daily  -     hydrochlorothiazide (HYDRODIURIL) 25 mg tablet; Take 1 tablet (25 mg total) by mouth daily  -     carvedilol (COREG) 3 125 mg tablet; Take 1 tablet (3 125 mg total) by mouth 2 (two) times a day with meals  -     CBC and differential; Future  -     Comprehensive metabolic panel; Future  -     Hemoglobin A1C; Future  -     Lipid Panel with Direct LDL reflex;  Future  -     LDL cholesterol, direct; Future  -     Vitamin D 25 hydroxy; Future  -     TSH, 3rd generation with Free T4 reflex; Future  -     Magnesium; Future    Rheumatoid arthritis involving left ankle, unspecified rheumatoid factor presence (HCC)  -     CBC and differential; Future  -     Comprehensive metabolic panel; Future  -     Hemoglobin A1C; Future  -     Lipid Panel with Direct LDL reflex; Future  -     LDL cholesterol, direct; Future  -     Vitamin D 25 hydroxy; Future  -     TSH, 3rd generation with Free T4 reflex; Future  -     Magnesium; Future    Localized edema  -     hydrochlorothiazide (HYDRODIURIL) 25 mg tablet; Take 1 tablet (25 mg total) by mouth daily  -     CBC and differential; Future  -     Comprehensive metabolic panel; Future  -     Hemoglobin A1C; Future  -     Lipid Panel with Direct LDL reflex; Future  -     LDL cholesterol, direct; Future  -     Vitamin D 25 hydroxy; Future  -     TSH, 3rd generation with Free T4 reflex; Future  -     Magnesium; Future    Hypothyroidism, unspecified type  -     levothyroxine 50 mcg tablet; Take 1 5 tablets (75 mcg total) by mouth every other day  -     CBC and differential; Future  -     Comprehensive metabolic panel; Future  -     Hemoglobin A1C; Future  -     Lipid Panel with Direct LDL reflex; Future  -     LDL cholesterol, direct; Future  -     Vitamin D 25 hydroxy; Future  -     TSH, 3rd generation with Free T4 reflex; Future  -     Magnesium; Future    Hypokalemia  -     CBC and differential; Future  -     Comprehensive metabolic panel; Future  -     Hemoglobin A1C; Future  -     Lipid Panel with Direct LDL reflex; Future  -     LDL cholesterol, direct; Future  -     Vitamin D 25 hydroxy; Future  -     TSH, 3rd generation with Free T4 reflex; Future  -     Magnesium; Future  -     Renal function panel; Future  -     Magnesium; Future    Hypomagnesemia  -     CBC and differential; Future  -     Comprehensive metabolic panel;  Future  -     Hemoglobin A1C; Future  -     Lipid Panel with Direct LDL reflex; Future  -     LDL cholesterol, direct; Future  -     Vitamin D 25 hydroxy; Future  -     TSH, 3rd generation with Free T4 reflex; Future  -     Magnesium; Future  -     magnesium oxide (MAG-OX) 400 mg; Take 1 tablet (400 mg total) by mouth daily    Right upper quadrant pain  -     CT abdomen pelvis wo contrast; Future  -     Lipase; Future  -     Amylase; Future  -     pantoprazole (PROTONIX) 40 mg tablet; Take 1 tablet (40 mg total) by mouth daily    History of peptic ulcer  -     CT abdomen pelvis wo contrast; Future  -     pantoprazole (PROTONIX) 40 mg tablet; Take 1 tablet (40 mg total) by mouth daily         Scheduled Meds:  Continuous Infusions:  No current facility-administered medications for this visit  PRN Meds:   Scheduled Meds:  Continuous Infusions:  No current facility-administered medications for this visit  Scheduled Meds:    Current Outpatient Prescriptions:     ascorbic acid (VITAMIN C) 500 mg tablet, Take 500 mg by mouth daily  , Disp: , Rfl:     Calcium Citrate-Vitamin D (CITRACAL + D PO), Take by mouth 2 (two) times a day, Disp: , Rfl:     carvedilol (COREG) 3 125 mg tablet, Take 1 tablet (3 125 mg total) by mouth 2 (two) times a day with meals, Disp: 60 tablet, Rfl: 0    Cholecalciferol (VITAMIN D-3 PO), Take 2,000 Units by mouth daily  , Disp: , Rfl:     folic acid (FOLVITE) 1 mg tablet, 1 daily, Disp: 90 tablet, Rfl: 2    hydrochlorothiazide (HYDRODIURIL) 25 mg tablet, Take 1 tablet (25 mg total) by mouth daily, Disp: 90 tablet, Rfl: 0    levothyroxine 50 mcg tablet, Take 1 5 tablets (75 mcg total) by mouth every other day, Disp: 67 tablet, Rfl: 0    magnesium oxide (MAG-OX) 400 mg, Take 1 tablet (400 mg total) by mouth daily, Disp: 90 tablet, Rfl: 5    methotrexate 2 5 mg tablet, Take 10 mg by mouth once a week , Disp: , Rfl:     Misc Natural Products (LUTEIN 20 PO), Take 20 mg by mouth daily  , Disp: , Rfl:     Multiple Vitamin (MULTIVITAMIN) tablet, Take 1 tablet by mouth daily  , Disp: , Rfl:     pantoprazole (PROTONIX) 40 mg tablet, Take 1 tablet (40 mg total) by mouth daily, Disp: 30 tablet, Rfl: 2    potassium chloride (K-DUR,KLOR-CON) 20 mEq tablet, 1 daily, Disp: 90 tablet, Rfl: 0    riTUXimab (RITUXAN) 500 mg/50 mL, Infuse into a venous catheter every 6 (six) months  , Disp: , Rfl:     rosuvastatin (CRESTOR) 5 mg tablet, Take 1 tablet (5 mg total) by mouth daily, Disp: 90 tablet, Rfl: 2    tamoxifen (NOLVADEX) 20 mg tablet, Take 1 tablet (20 mg total) by mouth daily, Disp: 90 tablet, Rfl: 3    vitamin E, tocopherol, 400 units capsule, Take 400 Units by mouth daily, Disp: , Rfl:       The patient was counseled regarding instructions for management, risk factor reductions, patient and family education,impressions, risks and benefits of treatment options, side effects of medications, importance of compliance with treatment  The treatment plan was reviewed with the patient/guardian and patient/guardian understands and agrees with the treatment plan  Subjective:      Patient ID: Marcus Corona is a 69 y o  female  1 am awake left lower quandrant pain then diarrhea, 2 times in 3 weeks had this 2 years ago pain 8 relieved in a few hours, no constipation        The following portions of the patient's history were reviewed and updated as appropriate:   She has a past medical history of Allergic angioedema; Angioedema; Arthritis; Breast cancer ([address] ); Disease of thyroid gland; Hemorrhage of anus and rectum; Hyperlipidemia; Hypertension; Hypocalcemia; Neuritis; Peptic ulcer; Rheumatoid arthritis ([address] ); and Vitamin D deficiency  ,   does not have any pertinent problems on file  ,   has a past surgical history that includes Abdominal surgery; Breast surgery; Cholecystectomy; Elbow surgery; and orthopedic surgery  ,  family history includes Breast cancer in her maternal aunt, mother, and sister; Kidney failure in her father; Other in her father  , reports that she has never smoked  She has never used smokeless tobacco  She reports that she drinks alcohol  She reports that she does not use drugs  ,  is allergic to lisinopril; codeine; other; oxycodone; penicillins; sulfamethoxazole-trimethoprim; and ciprofloxacin       Review of Systems   Constitutional: Negative for appetite change, chills, fatigue, fever and unexpected weight change  HENT: Negative for congestion, ear pain, facial swelling, hearing loss, mouth sores, nosebleeds, postnasal drip, rhinorrhea, sinus pain, sore throat, trouble swallowing and voice change  Eyes: Negative for pain, discharge, redness and visual disturbance  Respiratory: Negative for apnea, chest tightness, shortness of breath, wheezing and stridor  Cardiovascular: Negative for chest pain, palpitations and leg swelling  Gastrointestinal: Positive for abdominal pain and diarrhea  Negative for abdominal distention, blood in stool, constipation and vomiting  Endocrine: Negative for cold intolerance, heat intolerance, polydipsia, polyphagia and polyuria  Genitourinary: Negative for difficulty urinating, dysuria, flank pain, frequency, genital sores, hematuria and urgency  Musculoskeletal: Positive for arthralgias  Negative for back pain  Skin: Negative for rash and wound  Allergic/Immunologic: Negative for environmental allergies, food allergies and immunocompromised state  Neurological: Negative for dizziness, tremors, seizures, syncope, facial asymmetry, speech difficulty, weakness, light-headedness, numbness and headaches  Hematological: Negative for adenopathy  Does not bruise/bleed easily  Psychiatric/Behavioral: Negative for agitation, behavioral problems, dysphoric mood, hallucinations, self-injury, sleep disturbance and suicidal ideas  The patient is not hyperactive  Objective:     Physical Exam   Constitutional: She is oriented to person, place, and time  She appears well-developed     obese HENT:   Right Ear: External ear normal    Left Ear: External ear normal    Eyes: Right eye exhibits no discharge  Left eye exhibits no discharge  No scleral icterus  Neck: Carotid bruit is not present  No tracheal deviation present  No thyroid mass and no thyromegaly present  Cardiovascular: Normal rate, regular rhythm, normal heart sounds and intact distal pulses  Exam reveals no gallop and no friction rub  No murmur heard  Pulmonary/Chest: No respiratory distress  She has no wheezes  She has no rales  Abdominal: Soft  Normal appearance and bowel sounds are normal  She exhibits no shifting dullness, no distension, no pulsatile liver, no fluid wave, no abdominal bruit, no ascites, no pulsatile midline mass and no mass  There is no hepatosplenomegaly, splenomegaly or hepatomegaly  There is no tenderness  There is no CVA tenderness  Musculoskeletal: She exhibits no edema  Ulnar deviation both hands   Lymphadenopathy:     She has no cervical adenopathy  Neurological: She is alert and oriented to person, place, and time  Coordination normal    Psychiatric: She has a normal mood and affect  Her behavior is normal  Judgment and thought content normal    Nursing note and vitals reviewed        Vitals:    10/11/18 1420 10/11/18 1456   BP:  130/75   BP Location:  Left arm   Patient Position:  Sitting   Pulse: 75    Resp: 20    Temp: 99 1 °F (37 3 °C)    TempSrc: Tympanic    SpO2: 97%    Weight: 85 5 kg (188 lb 6 4 oz)    Height: 5' 5" (1 651 m)

## 2018-10-18 ENCOUNTER — HOSPITAL ENCOUNTER (OUTPATIENT)
Dept: CT IMAGING | Facility: HOSPITAL | Age: 78
Discharge: HOME | End: 2018-10-18
Attending: INTERNAL MEDICINE
Payer: MEDICARE

## 2018-10-18 DIAGNOSIS — R10.11 RIGHT UPPER QUADRANT PAIN: ICD-10-CM

## 2018-10-18 DIAGNOSIS — Z87.11 HISTORY OF PEPTIC ULCER: ICD-10-CM

## 2018-10-18 PROCEDURE — 74176 CT ABD & PELVIS W/O CONTRAST: CPT

## 2018-10-25 ENCOUNTER — APPOINTMENT (OUTPATIENT)
Dept: LAB | Facility: HOSPITAL | Age: 78
End: 2018-10-25
Attending: INTERNAL MEDICINE
Payer: MEDICARE

## 2018-10-25 DIAGNOSIS — R10.11 RIGHT UPPER QUADRANT PAIN: ICD-10-CM

## 2018-10-25 DIAGNOSIS — E87.6 HYPOKALEMIA: ICD-10-CM

## 2018-10-25 LAB
ALBUMIN SERPL BCP-MCNC: 3.7 G/DL (ref 3.5–5)
AMYLASE SERPL-CCNC: 93 IU/L (ref 25–115)
ANION GAP SERPL CALCULATED.3IONS-SCNC: 10 MMOL/L (ref 4–13)
BUN SERPL-MCNC: 21 MG/DL (ref 5–25)
CALCIUM SERPL-MCNC: 9 MG/DL (ref 8.3–10.1)
CHLORIDE SERPL-SCNC: 103 MMOL/L (ref 100–108)
CO2 SERPL-SCNC: 29 MMOL/L (ref 21–32)
CREAT SERPL-MCNC: 1.18 MG/DL (ref 0.6–1.3)
GFR SERPL CREATININE-BSD FRML MDRD: 45 ML/MIN/1.73SQ M
GLUCOSE SERPL-MCNC: 102 MG/DL (ref 65–140)
LIPASE SERPL-CCNC: 301 U/L (ref 73–393)
MAGNESIUM SERPL-MCNC: 1.5 MG/DL (ref 1.6–2.6)
PHOSPHATE SERPL-MCNC: 3.3 MG/DL (ref 2.3–4.1)
POTASSIUM SERPL-SCNC: 3.4 MMOL/L (ref 3.5–5.3)
SODIUM SERPL-SCNC: 142 MMOL/L (ref 136–145)

## 2018-10-25 PROCEDURE — 36415 COLL VENOUS BLD VENIPUNCTURE: CPT

## 2018-10-25 PROCEDURE — 82150 ASSAY OF AMYLASE: CPT

## 2018-10-25 PROCEDURE — 80069 RENAL FUNCTION PANEL: CPT

## 2018-10-25 PROCEDURE — 83735 ASSAY OF MAGNESIUM: CPT

## 2018-10-25 PROCEDURE — 83690 ASSAY OF LIPASE: CPT

## 2018-11-05 ENCOUNTER — OFFICE VISIT (OUTPATIENT)
Dept: INTERNAL MEDICINE CLINIC | Facility: CLINIC | Age: 78
End: 2018-11-05
Payer: MEDICARE

## 2018-11-05 VITALS
TEMPERATURE: 99.1 F | DIASTOLIC BLOOD PRESSURE: 74 MMHG | OXYGEN SATURATION: 97 % | HEART RATE: 77 BPM | BODY MASS INDEX: 31.46 KG/M2 | RESPIRATION RATE: 18 BRPM | HEIGHT: 65 IN | WEIGHT: 188.8 LBS | SYSTOLIC BLOOD PRESSURE: 140 MMHG

## 2018-11-05 DIAGNOSIS — R79.9 ABNORMAL BLOOD CHEMISTRY: Primary | ICD-10-CM

## 2018-11-05 DIAGNOSIS — E83.42 HYPOMAGNESEMIA: ICD-10-CM

## 2018-11-05 DIAGNOSIS — E78.5 HYPERLIPIDEMIA, UNSPECIFIED HYPERLIPIDEMIA TYPE: ICD-10-CM

## 2018-11-05 DIAGNOSIS — I10 HYPERTENSION, UNSPECIFIED TYPE: Chronic | ICD-10-CM

## 2018-11-05 DIAGNOSIS — E03.9 HYPOTHYROIDISM, UNSPECIFIED TYPE: ICD-10-CM

## 2018-11-05 PROCEDURE — 99214 OFFICE O/P EST MOD 30 MIN: CPT | Performed by: NURSE PRACTITIONER

## 2018-11-05 RX ORDER — POTASSIUM CHLORIDE 20 MEQ/1
TABLET, EXTENDED RELEASE ORAL
Qty: 90 TABLET | Refills: 0
Start: 2018-11-05 | End: 2019-02-11

## 2018-11-05 RX ORDER — LEVOTHYROXINE SODIUM 0.07 MG/1
TABLET ORAL
COMMUNITY
Start: 2018-11-04 | End: 2018-11-05

## 2018-11-05 RX ORDER — LEVOTHYROXINE SODIUM 0.05 MG/1
50 TABLET ORAL EVERY OTHER DAY
Qty: 67 TABLET | Refills: 0
Start: 2018-11-05 | End: 2019-04-08 | Stop reason: SDUPTHER

## 2018-11-05 RX ORDER — LEVOTHYROXINE SODIUM 0.07 MG/1
75 TABLET ORAL EVERY OTHER DAY
Qty: 90 TABLET | Refills: 3
Start: 2018-11-05 | End: 2019-02-11 | Stop reason: SDUPTHER

## 2018-11-05 NOTE — PROGRESS NOTES
Assessment/Plan:    1  Hypokalemia- No change in either Potassium or Magnesium levels since starting Magnesium supplement  Will increase to twice daily  Will check labs prior to next visit  2  Hypothyroidism- You are alternating 50 mcg with 75 mcg  We called your pharmacy and you have a script waiting for you for the 75s  3  Hypertension-BP elevated today, please start Coreg that was prescribed at your last visit  Follow up with me as needed and with Dr Melania Gallardo in two weeks or so, labs prior  Diagnoses and all orders for this visit:    Abnormal blood chemistry  -     Magnesium; Future  -     Comprehensive metabolic panel; Future  -     levothyroxine 75 mcg tablet; Take 1 tablet (75 mcg total) by mouth every other day    Hypertension, unspecified type  -     Magnesium; Future  -     Comprehensive metabolic panel; Future  -     levothyroxine 75 mcg tablet; Take 1 tablet (75 mcg total) by mouth every other day  -     potassium chloride (K-DUR,KLOR-CON) 20 mEq tablet; 1 tablet po BID    Hypothyroidism, unspecified type  -     Magnesium; Future  -     Comprehensive metabolic panel; Future  -     levothyroxine 75 mcg tablet; Take 1 tablet (75 mcg total) by mouth every other day  -     levothyroxine 50 mcg tablet; Take 1 tablet (50 mcg total) by mouth every other day    Hypomagnesemia  -     Magnesium; Future  -     Comprehensive metabolic panel; Future  -     levothyroxine 75 mcg tablet; Take 1 tablet (75 mcg total) by mouth every other day  -     magnesium oxide (MAG-OX) 400 mg; Take 1 tablet (400 mg total) by mouth 2 (two) times a day    Hyperlipidemia, unspecified hyperlipidemia type  -     Magnesium; Future  -     Comprehensive metabolic panel; Future  -     levothyroxine 75 mcg tablet; Take 1 tablet (75 mcg total) by mouth every other day    Other orders  -     Discontinue: levothyroxine 75 mcg tablet;          The patient was counseled regarding instructions for management, risk factor reductions, patient and family education,impressions, risks and benefits of treatment options, side effects of medications, importance of compliance with treatment  The treatment plan was reviewed with the patient/guardian and patient/guardian understands and agrees with the treatment plan  Current Outpatient Prescriptions:     ascorbic acid (VITAMIN C) 500 mg tablet, Take 500 mg by mouth daily  , Disp: , Rfl:     Calcium Citrate-Vitamin D (CITRACAL + D PO), Take by mouth 2 (two) times a day, Disp: , Rfl:     carvedilol (COREG) 3 125 mg tablet, Take 1 tablet (3 125 mg total) by mouth 2 (two) times a day with meals, Disp: 60 tablet, Rfl: 0    Cholecalciferol (VITAMIN D-3 PO), Take 2,000 Units by mouth daily  , Disp: , Rfl:     folic acid (FOLVITE) 1 mg tablet, 1 daily, Disp: 90 tablet, Rfl: 2    hydrochlorothiazide (HYDRODIURIL) 25 mg tablet, Take 1 tablet (25 mg total) by mouth daily, Disp: 90 tablet, Rfl: 0    levothyroxine 50 mcg tablet, Take 1 tablet (50 mcg total) by mouth every other day, Disp: 67 tablet, Rfl: 0    magnesium oxide (MAG-OX) 400 mg, Take 1 tablet (400 mg total) by mouth 2 (two) times a day, Disp: 90 tablet, Rfl: 0    methotrexate 2 5 mg tablet, Take 10 mg by mouth once a week , Disp: , Rfl:     Misc Natural Products (LUTEIN 20 PO), Take 20 mg by mouth daily  , Disp: , Rfl:     Multiple Vitamin (MULTIVITAMIN) tablet, Take 1 tablet by mouth daily  , Disp: , Rfl:     pantoprazole (PROTONIX) 40 mg tablet, Take 1 tablet (40 mg total) by mouth daily, Disp: 30 tablet, Rfl: 2    potassium chloride (K-DUR,KLOR-CON) 20 mEq tablet, 1 tablet po BID, Disp: 90 tablet, Rfl: 0    riTUXimab (RITUXAN) 500 mg/50 mL, Infuse into a venous catheter every 6 (six) months  , Disp: , Rfl:     rosuvastatin (CRESTOR) 5 mg tablet, Take 1 tablet (5 mg total) by mouth daily, Disp: 90 tablet, Rfl: 2    tamoxifen (NOLVADEX) 20 mg tablet, Take 1 tablet (20 mg total) by mouth daily, Disp: 90 tablet, Rfl: 3    vitamin E, tocopherol, 400 units capsule, Take 400 Units by mouth daily, Disp: , Rfl:     levothyroxine 75 mcg tablet, Take 1 tablet (75 mcg total) by mouth every other day, Disp: 90 tablet, Rfl: 3    Subjective:      Patient ID: Natalia Galdamez is a 69 y o  female  Teresa Care is here for follow up  Her abdominal pain and diarrhea have resolved  She has no complaints today  The following portions of the patient's history were reviewed and updated as appropriate:   She has a past medical history of Allergic angioedema; Angioedema; Arthritis; Breast cancer ([facility] [address] ); Disease of thyroid gland; Hemorrhage of anus and rectum; Hyperlipidemia; Hypertension; Hypocalcemia; Neuritis; Peptic ulcer; Rheumatoid arthritis ([facility] [address] ); and Vitamin D deficiency  ,   does not have any pertinent problems on file  ,   has a past surgical history that includes Abdominal surgery; Breast surgery; Cholecystectomy; Elbow surgery; and orthopedic surgery  ,  family history includes Breast cancer in her maternal aunt, mother, and sister; Kidney failure in her father; Other in her father  ,   reports that she has never smoked  She has never used smokeless tobacco  She reports that she drinks alcohol  She reports that she does not use drugs  ,  is allergic to lisinopril; codeine; other; oxycodone; penicillins; sulfamethoxazole-trimethoprim; and ciprofloxacin       Review of Systems   Constitutional: Negative  Respiratory: Negative  Cardiovascular: Negative  Musculoskeletal: Negative  Psychiatric/Behavioral: Negative            Objective:  /74 (BP Location: Left arm, Patient Position: Sitting, Cuff Size: Large)   Pulse 77   Temp 99 1 °F (37 3 °C) (Tympanic)   Resp 18   Ht 5' 5" (1 651 m)   Wt 85 6 kg (188 lb 12 8 oz)   LMP  (LMP Unknown)   SpO2 97%   BMI 31 42 kg/m²     Lab Review  Appointment on 10/25/2018   Component Date Value    Albumin 10/25/2018 3 7     Calcium 10/25/2018 9 0     Phosphorus 10/25/2018 3 3     Glucose 10/25/2018 102  BUN 10/25/2018 21     Creatinine 10/25/2018 1 18     Sodium 10/25/2018 142     Potassium 10/25/2018 3 4*    Chloride 10/25/2018 103     CO2 10/25/2018 29     ANION GAP 10/25/2018 10     eGFR 10/25/2018 45     Magnesium 10/25/2018 1 5*    Lipase 10/25/2018 301     Amylase 10/25/2018 93    Appointment on 09/27/2018   Component Date Value    WBC 09/27/2018 8 16     RBC 09/27/2018 3 72*    Hemoglobin 09/27/2018 12 2     Hematocrit 09/27/2018 37 4     MCV 09/27/2018 101*    MCH 09/27/2018 32 8     MCHC 09/27/2018 32 6     RDW 09/27/2018 14 1     MPV 09/27/2018 10 0     Platelets 72/05/7172 224     nRBC 09/27/2018 0     Neutrophils Relative 09/27/2018 70     Immat GRANS % 09/27/2018 0     Lymphocytes Relative 09/27/2018 17     Monocytes Relative 09/27/2018 9     Eosinophils Relative 09/27/2018 3     Basophils Relative 09/27/2018 1     Neutrophils Absolute 09/27/2018 5 75     Immature Grans Absolute 09/27/2018 0 03     Lymphocytes Absolute 09/27/2018 1 37     Monocytes Absolute 09/27/2018 0 69     Eosinophils Absolute 09/27/2018 0 28     Basophils Absolute 09/27/2018 0 04     Magnesium 09/27/2018 1 4*    Sodium 09/27/2018 141     Potassium 09/27/2018 3 4*    Chloride 09/27/2018 103     CO2 09/27/2018 31     ANION GAP 09/27/2018 7     BUN 09/27/2018 21     Creatinine 09/27/2018 1 24     Glucose, Fasting 09/27/2018 102*    Calcium 09/27/2018 9 3     AST 09/27/2018 21     ALT 09/27/2018 23     Alkaline Phosphatase 09/27/2018 48     Total Protein 09/27/2018 6 9     Albumin 09/27/2018 3 6     Total Bilirubin 09/27/2018 0 60     eGFR 09/27/2018 42     Sed Rate 09/27/2018 17     CRP 09/27/2018 19 1*    TSH 3RD GENERATON 09/27/2018 3 828*    Vit D, 25-Hydroxy 09/27/2018 42 2     Free T4 09/27/2018 1 10         Imaging: Ct Abdomen Pelvis Wo Contrast    Result Date: 10/18/2018  Narrative: CT ABDOMEN AND PELVIS WITHOUT IV CONTRAST INDICATION:   R10 11:  Right upper quadrant pain Z87 11: Personal history of peptic ulcer disease  COMPARISON:  None  TECHNIQUE:  CT examination of the abdomen and pelvis was performed without intravenous contrast   Axial, sagittal, and coronal 2D reformatted images were created from the source data and submitted for interpretation  Radiation dose length product (DLP) for this visit:  614 mGy-cm   This examination, like all CT scans performed in the [facility], was performed utilizing techniques to minimize radiation dose exposure, including the use of iterative reconstruction and automated exposure control  Enteric contrast was administered  FINDINGS: ABDOMEN LOWER CHEST:  No clinically significant abnormality identified in the visualized lower chest  LIVER/BILIARY TREE:  Fatty infiltration of the liver seen GALLBLADDER:  Gallbladder is not identified  Common bile duct is dilated, measuring 15 mm  No dilation of the intrahepatic ducts seen SPLEEN:  Unremarkable  PANCREAS:  Unremarkable  ADRENAL GLANDS:  Unremarkable  KIDNEYS/URETERS:  Bilateral renal calculi are noted which are nonobstructing  There is a right renal calculus which measures about 3 mm  There is a left renal calculus which measures about 4 mm  There is lobulated contour of the left kidney with focal bulge in the mid left kidney STOMACH AND BOWEL:  Small hiatal hernia seen Mild thickening of the GE junction noted APPENDIX:  No findings to suggest appendicitis  ABDOMINOPELVIC CAVITY:  No significant inguinal or pelvic sidewall lymph node enlargement seen No retroperitoneal lymph node enlargement seen VESSELS:  Unremarkable for patient's age  PELVIS REPRODUCTIVE ORGANS:  A left adnexal cyst seen, measuring about 2 1 x 1 6 x 2 1 cm  URINARY BLADDER:  Unremarkable  ABDOMINAL WALL/INGUINAL REGIONS:  Unremarkable  OSSEOUS STRUCTURES:  No acute fracture or destructive osseous lesion   Degenerative changes seen within the left hip joint with the superior medial joint space narrowing Degenerative changes seen within the lumbar spine    Impression: No acute inflammatory stranding seen A small hiatal hernia with thickening of the distal esophagus Bilateral nonobstructing renal calculi Dilated common bile duct, may be due to post cholecystectomy state and is stable since previous study of January 24, 2011  Correlation with liver function tests suggested  This may be evaluated with MRCP if needed Focal bulge in the mid left kidney, may be due to developmental lobulation or due to focal lesion  Suboptimally evaluated on this noncontrast study  Suggest MRI for further characterization The study was marked in EPIC for significant notification  Workstation performed: [id number]          Physical Exam   Constitutional: She is oriented to person, place, and time  She appears well-developed and well-nourished  Cardiovascular: Normal rate, regular rhythm and intact distal pulses  Murmur heard  Pulmonary/Chest: Effort normal and breath sounds normal    Musculoskeletal: Normal range of motion  She exhibits deformity  Neurological: She is alert and oriented to person, place, and time  She has normal reflexes  Psychiatric: She has a normal mood and affect   Her behavior is normal  Judgment and thought content normal

## 2018-11-05 NOTE — PATIENT INSTRUCTIONS
1  Hypokalemia- No change in either Potassium or Magnesium levels since starting Magnesium supplement  Will increase to twice daily  Will check labs prior to next visit  2  Hypothyroidism- You are alternating 50 mcg with 75 mcg  We called your pharmacy and you have a script waiting for you for the 75s  3  Hypertension-BP elevated today, please start Coreg that was prescribed at your last visit  Follow up with me as needed and with Dr Melania Gallardo in two weeks or so, labs prior

## 2018-11-06 RX ORDER — DIPHENHYDRAMINE HCL 25 MG
50 TABLET ORAL ONCE
Status: COMPLETED | OUTPATIENT
Start: 2018-11-07 | End: 2018-11-07

## 2018-11-06 RX ORDER — METHYLPREDNISOLONE SODIUM SUCCINATE 125 MG/2ML
100 INJECTION, POWDER, LYOPHILIZED, FOR SOLUTION INTRAMUSCULAR; INTRAVENOUS ONCE
Status: COMPLETED | OUTPATIENT
Start: 2018-11-07 | End: 2018-11-07

## 2018-11-06 RX ORDER — SODIUM CHLORIDE 9 MG/ML
20 INJECTION, SOLUTION INTRAVENOUS CONTINUOUS
Status: DISCONTINUED | OUTPATIENT
Start: 2018-11-07 | End: 2018-11-10 | Stop reason: HOSPADM

## 2018-11-06 RX ORDER — ACETAMINOPHEN 325 MG/1
975 TABLET ORAL ONCE
Status: COMPLETED | OUTPATIENT
Start: 2018-11-07 | End: 2018-11-07

## 2018-11-07 ENCOUNTER — HOSPITAL ENCOUNTER (OUTPATIENT)
Dept: INFUSION CENTER | Facility: CLINIC | Age: 78
Discharge: HOME | End: 2018-11-07
Payer: MEDICARE

## 2018-11-07 VITALS
TEMPERATURE: 98.4 F | HEART RATE: 73 BPM | RESPIRATION RATE: 18 BRPM | DIASTOLIC BLOOD PRESSURE: 62 MMHG | SYSTOLIC BLOOD PRESSURE: 134 MMHG

## 2018-11-07 LAB
ALBUMIN SERPL BCP-MCNC: 3.4 G/DL (ref 3.5–5)
ALP SERPL-CCNC: 50 U/L (ref 46–116)
ALT SERPL W P-5'-P-CCNC: 23 U/L (ref 12–78)
ANION GAP SERPL CALCULATED.3IONS-SCNC: 10 MMOL/L (ref 4–13)
AST SERPL W P-5'-P-CCNC: 22 U/L (ref 5–45)
BASOPHILS # BLD AUTO: 0.1 THOUSANDS/ΜL (ref 0–0.1)
BASOPHILS NFR BLD AUTO: 1 % (ref 0–1)
BILIRUB SERPL-MCNC: 0.4 MG/DL (ref 0.2–1)
BUN SERPL-MCNC: 25 MG/DL (ref 5–25)
CALCIUM SERPL-MCNC: 9 MG/DL (ref 8.3–10.1)
CHLORIDE SERPL-SCNC: 107 MMOL/L (ref 100–108)
CO2 SERPL-SCNC: 28 MMOL/L (ref 21–32)
CREAT SERPL-MCNC: 1.18 MG/DL (ref 0.6–1.3)
EOSINOPHIL # BLD AUTO: 0.36 THOUSAND/ΜL (ref 0–0.61)
EOSINOPHIL NFR BLD AUTO: 5 % (ref 0–6)
ERYTHROCYTE [DISTWIDTH] IN BLOOD BY AUTOMATED COUNT: 14.3 % (ref 11.6–15.1)
GFR SERPL CREATININE-BSD FRML MDRD: 45 ML/MIN/1.73SQ M
GLUCOSE SERPL-MCNC: 114 MG/DL (ref 65–140)
HCT VFR BLD AUTO: 34.8 % (ref 34.8–46.1)
HGB BLD-MCNC: 11.3 G/DL (ref 11.5–15.4)
IMM GRANULOCYTES # BLD AUTO: 0.02 THOUSAND/UL (ref 0–0.2)
IMM GRANULOCYTES NFR BLD AUTO: 0 % (ref 0–2)
LYMPHOCYTES # BLD AUTO: 1.25 THOUSANDS/ΜL (ref 0.6–4.47)
LYMPHOCYTES NFR BLD AUTO: 18 % (ref 14–44)
MCH RBC QN AUTO: 33.3 PG (ref 26.8–34.3)
MCHC RBC AUTO-ENTMCNC: 32.5 G/DL (ref 31.4–37.4)
MCV RBC AUTO: 103 FL (ref 82–98)
MONOCYTES # BLD AUTO: 0.72 THOUSAND/ΜL (ref 0.17–1.22)
MONOCYTES NFR BLD AUTO: 10 % (ref 4–12)
NEUTROPHILS # BLD AUTO: 4.52 THOUSANDS/ΜL (ref 1.85–7.62)
NEUTS SEG NFR BLD AUTO: 66 % (ref 43–75)
NRBC BLD AUTO-RTO: 0 /100 WBCS
PLATELET # BLD AUTO: 221 THOUSANDS/UL (ref 149–390)
PMV BLD AUTO: 10.2 FL (ref 8.9–12.7)
POTASSIUM SERPL-SCNC: 3.8 MMOL/L (ref 3.5–5.3)
PROT SERPL-MCNC: 6.8 G/DL (ref 6.4–8.2)
RBC # BLD AUTO: 3.39 MILLION/UL (ref 3.81–5.12)
SODIUM SERPL-SCNC: 145 MMOL/L (ref 136–145)
WBC # BLD AUTO: 6.97 THOUSAND/UL (ref 4.31–10.16)

## 2018-11-07 PROCEDURE — 96413 CHEMO IV INFUSION 1 HR: CPT

## 2018-11-07 PROCEDURE — 85025 COMPLETE CBC W/AUTO DIFF WBC: CPT | Performed by: INTERNAL MEDICINE

## 2018-11-07 PROCEDURE — 96375 TX/PRO/DX INJ NEW DRUG ADDON: CPT

## 2018-11-07 PROCEDURE — 96415 CHEMO IV INFUSION ADDL HR: CPT

## 2018-11-07 PROCEDURE — 80053 COMPREHEN METABOLIC PANEL: CPT | Performed by: INTERNAL MEDICINE

## 2018-11-07 RX ADMIN — RITUXIMAB: 10 INJECTION, SOLUTION INTRAVENOUS at 09:46

## 2018-11-07 RX ADMIN — SODIUM CHLORIDE 20 ML/HR: 0.9 INJECTION, SOLUTION INTRAVENOUS at 08:30

## 2018-11-07 RX ADMIN — DIPHENHYDRAMINE HCL 50 MG: 25 TABLET ORAL at 08:33

## 2018-11-07 RX ADMIN — ACETAMINOPHEN 975 MG: 325 TABLET, FILM COATED ORAL at 08:33

## 2018-11-07 RX ADMIN — METHYLPREDNISOLONE SODIUM SUCCINATE 100 MG: 125 INJECTION, POWDER, FOR SOLUTION INTRAMUSCULAR; INTRAVENOUS at 08:35

## 2018-11-07 NOTE — PROGRESS NOTES
Pt tolerated infusion without issue  PIV d/c'd dsd applied   Pt has f/u appt next infusion in 2 weeks pt given copy of AVS

## 2018-11-07 NOTE — PROGRESS NOTES
Pt here for D1 Rituxan, offers no complaints, resting comfortably  Vitals stable  Labs drawn from PIV upon insertion and sent to the lab  Call bell in reach, will continue to monitor

## 2018-11-20 RX ORDER — ACETAMINOPHEN 325 MG/1
975 TABLET ORAL ONCE
Status: COMPLETED | OUTPATIENT
Start: 2018-11-21 | End: 2018-11-21

## 2018-11-20 RX ORDER — SODIUM CHLORIDE 9 MG/ML
20 INJECTION, SOLUTION INTRAVENOUS CONTINUOUS
Status: DISCONTINUED | OUTPATIENT
Start: 2018-11-21 | End: 2018-11-24 | Stop reason: HOSPADM

## 2018-11-20 RX ORDER — METHYLPREDNISOLONE SODIUM SUCCINATE 125 MG/2ML
100 INJECTION, POWDER, LYOPHILIZED, FOR SOLUTION INTRAMUSCULAR; INTRAVENOUS ONCE
Status: COMPLETED | OUTPATIENT
Start: 2018-11-21 | End: 2018-11-21

## 2018-11-20 RX ORDER — DIPHENHYDRAMINE HCL 25 MG
50 TABLET ORAL ONCE
Status: COMPLETED | OUTPATIENT
Start: 2018-11-21 | End: 2018-11-21

## 2018-11-21 ENCOUNTER — HOSPITAL ENCOUNTER (OUTPATIENT)
Dept: INFUSION CENTER | Facility: CLINIC | Age: 78
Discharge: HOME | End: 2018-11-21
Payer: MEDICARE

## 2018-11-21 VITALS
DIASTOLIC BLOOD PRESSURE: 67 MMHG | SYSTOLIC BLOOD PRESSURE: 150 MMHG | TEMPERATURE: 98.7 F | RESPIRATION RATE: 18 BRPM | HEART RATE: 66 BPM

## 2018-11-21 LAB
ALBUMIN SERPL BCP-MCNC: 3.5 G/DL (ref 3.5–5)
ALP SERPL-CCNC: 52 U/L (ref 46–116)
ALT SERPL W P-5'-P-CCNC: 23 U/L (ref 12–78)
ANION GAP SERPL CALCULATED.3IONS-SCNC: 10 MMOL/L (ref 4–13)
AST SERPL W P-5'-P-CCNC: 21 U/L (ref 5–45)
BASOPHILS # BLD AUTO: 0.09 THOUSANDS/ΜL (ref 0–0.1)
BASOPHILS NFR BLD AUTO: 1 % (ref 0–1)
BILIRUB SERPL-MCNC: 0.5 MG/DL (ref 0.2–1)
BUN SERPL-MCNC: 25 MG/DL (ref 5–25)
CALCIUM SERPL-MCNC: 8.9 MG/DL (ref 8.3–10.1)
CHLORIDE SERPL-SCNC: 106 MMOL/L (ref 100–108)
CO2 SERPL-SCNC: 27 MMOL/L (ref 21–32)
CREAT SERPL-MCNC: 1.1 MG/DL (ref 0.6–1.3)
EOSINOPHIL # BLD AUTO: 0.27 THOUSAND/ΜL (ref 0–0.61)
EOSINOPHIL NFR BLD AUTO: 4 % (ref 0–6)
ERYTHROCYTE [DISTWIDTH] IN BLOOD BY AUTOMATED COUNT: 14.3 % (ref 11.6–15.1)
GFR SERPL CREATININE-BSD FRML MDRD: 49 ML/MIN/1.73SQ M
GLUCOSE SERPL-MCNC: 107 MG/DL (ref 65–140)
HCT VFR BLD AUTO: 34.1 % (ref 34.8–46.1)
HGB BLD-MCNC: 10.8 G/DL (ref 11.5–15.4)
IMM GRANULOCYTES # BLD AUTO: 0.02 THOUSAND/UL (ref 0–0.2)
IMM GRANULOCYTES NFR BLD AUTO: 0 % (ref 0–2)
LYMPHOCYTES # BLD AUTO: 1.27 THOUSANDS/ΜL (ref 0.6–4.47)
LYMPHOCYTES NFR BLD AUTO: 18 % (ref 14–44)
MCH RBC QN AUTO: 32 PG (ref 26.8–34.3)
MCHC RBC AUTO-ENTMCNC: 31.7 G/DL (ref 31.4–37.4)
MCV RBC AUTO: 101 FL (ref 82–98)
MONOCYTES # BLD AUTO: 0.78 THOUSAND/ΜL (ref 0.17–1.22)
MONOCYTES NFR BLD AUTO: 11 % (ref 4–12)
NEUTROPHILS # BLD AUTO: 4.75 THOUSANDS/ΜL (ref 1.85–7.62)
NEUTS SEG NFR BLD AUTO: 66 % (ref 43–75)
NRBC BLD AUTO-RTO: 0 /100 WBCS
PLATELET # BLD AUTO: 197 THOUSANDS/UL (ref 149–390)
PMV BLD AUTO: 9.8 FL (ref 8.9–12.7)
POTASSIUM SERPL-SCNC: 3.5 MMOL/L (ref 3.5–5.3)
PROT SERPL-MCNC: 6.7 G/DL (ref 6.4–8.2)
RBC # BLD AUTO: 3.38 MILLION/UL (ref 3.81–5.12)
SODIUM SERPL-SCNC: 143 MMOL/L (ref 136–145)
WBC # BLD AUTO: 7.18 THOUSAND/UL (ref 4.31–10.16)

## 2018-11-21 PROCEDURE — 96413 CHEMO IV INFUSION 1 HR: CPT

## 2018-11-21 PROCEDURE — 96375 TX/PRO/DX INJ NEW DRUG ADDON: CPT

## 2018-11-21 PROCEDURE — 96415 CHEMO IV INFUSION ADDL HR: CPT

## 2018-11-21 PROCEDURE — 80053 COMPREHEN METABOLIC PANEL: CPT | Performed by: INTERNAL MEDICINE

## 2018-11-21 PROCEDURE — 85025 COMPLETE CBC W/AUTO DIFF WBC: CPT | Performed by: INTERNAL MEDICINE

## 2018-11-21 RX ADMIN — SODIUM CHLORIDE 20 ML/HR: 0.9 INJECTION, SOLUTION INTRAVENOUS at 10:18

## 2018-11-21 RX ADMIN — DIPHENHYDRAMINE HCL 50 MG: 25 TABLET ORAL at 10:16

## 2018-11-21 RX ADMIN — RITUXIMAB: 10 INJECTION, SOLUTION INTRAVENOUS at 10:54

## 2018-11-21 RX ADMIN — ACETAMINOPHEN 975 MG: 325 TABLET, FILM COATED ORAL at 10:16

## 2018-11-21 RX ADMIN — METHYLPREDNISOLONE SODIUM SUCCINATE 100 MG: 125 INJECTION, POWDER, FOR SOLUTION INTRAMUSCULAR; INTRAVENOUS at 10:21

## 2018-11-21 NOTE — PROGRESS NOTES
Pt tolerated infusion well without complications  Per pt, she will schedule her next infusion appointments at her doctor's appointment  Pt discharged in stable condition and AVS provided

## 2018-12-06 ENCOUNTER — APPOINTMENT (OUTPATIENT)
Dept: LAB | Facility: HOSPITAL | Age: 78
End: 2018-12-06
Payer: MEDICARE

## 2018-12-06 DIAGNOSIS — E03.9 HYPOTHYROIDISM, UNSPECIFIED TYPE: ICD-10-CM

## 2018-12-06 DIAGNOSIS — E83.42 HYPOMAGNESEMIA: ICD-10-CM

## 2018-12-06 DIAGNOSIS — I10 HYPERTENSION, UNSPECIFIED TYPE: Chronic | ICD-10-CM

## 2018-12-06 DIAGNOSIS — E78.5 HYPERLIPIDEMIA, UNSPECIFIED HYPERLIPIDEMIA TYPE: ICD-10-CM

## 2018-12-06 DIAGNOSIS — R79.9 ABNORMAL BLOOD CHEMISTRY: ICD-10-CM

## 2018-12-06 LAB
ALBUMIN SERPL BCP-MCNC: 3.6 G/DL (ref 3.5–5)
ALP SERPL-CCNC: 54 U/L (ref 46–116)
ALT SERPL W P-5'-P-CCNC: 22 U/L (ref 12–78)
ANION GAP SERPL CALCULATED.3IONS-SCNC: 9 MMOL/L (ref 4–13)
AST SERPL W P-5'-P-CCNC: 21 U/L (ref 5–45)
BILIRUB SERPL-MCNC: 0.4 MG/DL (ref 0.2–1)
BUN SERPL-MCNC: 20 MG/DL (ref 5–25)
CALCIUM SERPL-MCNC: 8.8 MG/DL (ref 8.3–10.1)
CHLORIDE SERPL-SCNC: 106 MMOL/L (ref 100–108)
CO2 SERPL-SCNC: 31 MMOL/L (ref 21–32)
CREAT SERPL-MCNC: 1.23 MG/DL (ref 0.6–1.3)
GFR SERPL CREATININE-BSD FRML MDRD: 42 ML/MIN/1.73SQ M
GLUCOSE SERPL-MCNC: 106 MG/DL (ref 65–140)
MAGNESIUM SERPL-MCNC: 1.6 MG/DL (ref 1.6–2.6)
POTASSIUM SERPL-SCNC: 3.8 MMOL/L (ref 3.5–5.3)
PROT SERPL-MCNC: 7 G/DL (ref 6.4–8.2)
SODIUM SERPL-SCNC: 146 MMOL/L (ref 136–145)

## 2018-12-06 PROCEDURE — 83735 ASSAY OF MAGNESIUM: CPT

## 2018-12-06 PROCEDURE — 80053 COMPREHEN METABOLIC PANEL: CPT

## 2018-12-06 PROCEDURE — 36415 COLL VENOUS BLD VENIPUNCTURE: CPT

## 2018-12-14 ENCOUNTER — OFFICE VISIT (OUTPATIENT)
Dept: INTERNAL MEDICINE CLINIC | Facility: CLINIC | Age: 78
End: 2018-12-14
Payer: MEDICARE

## 2018-12-14 VITALS
TEMPERATURE: 98.5 F | HEIGHT: 65 IN | OXYGEN SATURATION: 95 % | HEART RATE: 67 BPM | DIASTOLIC BLOOD PRESSURE: 84 MMHG | WEIGHT: 194.2 LBS | BODY MASS INDEX: 32.36 KG/M2 | SYSTOLIC BLOOD PRESSURE: 160 MMHG

## 2018-12-14 DIAGNOSIS — E03.9 HYPOTHYROIDISM, UNSPECIFIED TYPE: Primary | Chronic | ICD-10-CM

## 2018-12-14 DIAGNOSIS — I10 HYPERTENSION, UNSPECIFIED TYPE: Chronic | ICD-10-CM

## 2018-12-14 DIAGNOSIS — E87.6 HYPOKALEMIA: ICD-10-CM

## 2018-12-14 PROCEDURE — 99214 OFFICE O/P EST MOD 30 MIN: CPT | Performed by: NURSE PRACTITIONER

## 2018-12-14 RX ORDER — CARVEDILOL 3.12 MG/1
3.12 TABLET ORAL 2 TIMES DAILY WITH MEALS
Qty: 180 TABLET | Refills: 3 | Status: SHIPPED | OUTPATIENT
Start: 2018-12-14 | End: 2019-03-14 | Stop reason: SDUPTHER

## 2018-12-14 NOTE — PATIENT INSTRUCTIONS
1  Hypertension- BP elevated today, please restart Carvidolol twice daily  2  Hypokalemia- Potassium is within normal range, Magnesium is on the low end of normal  Please make sure you are taking your Potassium supplement 20 meq twice daily and Magnesium 400 mg twice daily as directed  3  Hypothyroidism-Continue Levothyroxine, will check TSH before next visit  Please make sure your area rugs and mats have sticky foam underneath to avoid slips and falls  Follow up with me as needed and with Dr Praful Mcgowan as scheduled in May, labs prior

## 2018-12-14 NOTE — PROGRESS NOTES
Assessment/Plan:    1  Hypertension- BP elevated today, please restart Carvidolol twice daily  2  Hypokalemia- Potassium is within normal range, Magnesium is on the low end of normal  Please make sure you are taking your Potassium supplement 20 meq twice daily and Magnesium 400 mg twice daily as directed  3  Hypothyroidism-Continue Levothyroxine, will check TSH before next visit  Please make sure your area rugs and mats have sticky foam underneath to avoid slips and falls  Follow up with me as needed and with Dr Viktor Montero as scheduled in May, labs prior  Diagnoses and all orders for this visit:    Hypothyroidism, unspecified type    Hypertension, unspecified type  -     carvedilol (COREG) 3 125 mg tablet; Take 1 tablet (3 125 mg total) by mouth 2 (two) times a day with meals    Hypokalemia    The patient was counseled regarding instructions for management, risk factor reductions, patient and family education,impressions, risks and benefits of treatment options, side effects of medications, importance of compliance with treatment  The treatment plan was reviewed with the patient/guardian and patient/guardian understands and agrees with the treatment plan  Current Outpatient Prescriptions:     ascorbic acid (VITAMIN C) 500 mg tablet, Take 500 mg by mouth daily  , Disp: , Rfl:     Calcium Citrate-Vitamin D (CITRACAL + D PO), Take by mouth 2 (two) times a day, Disp: , Rfl:     Cholecalciferol (VITAMIN D-3 PO), Take 2,000 Units by mouth daily  , Disp: , Rfl:     folic acid (FOLVITE) 1 mg tablet, 1 daily, Disp: 90 tablet, Rfl: 2    hydrochlorothiazide (HYDRODIURIL) 25 mg tablet, Take 1 tablet (25 mg total) by mouth daily, Disp: 90 tablet, Rfl: 0    levothyroxine 50 mcg tablet, Take 1 tablet (50 mcg total) by mouth every other day, Disp: 67 tablet, Rfl: 0    levothyroxine 75 mcg tablet, Take 1 tablet (75 mcg total) by mouth every other day, Disp: 90 tablet, Rfl: 3    magnesium oxide (MAG-OX) 400 mg, Take 1 tablet (400 mg total) by mouth 2 (two) times a day, Disp: 90 tablet, Rfl: 0    methotrexate 2 5 mg tablet, Take 10 mg by mouth once a week , Disp: , Rfl:     Multiple Vitamin (MULTIVITAMIN) tablet, Take 1 tablet by mouth daily  , Disp: , Rfl:     potassium chloride (K-DUR,KLOR-CON) 20 mEq tablet, 1 tablet po BID, Disp: 90 tablet, Rfl: 0    riTUXimab (RITUXAN) 500 mg/50 mL, Infuse into a venous catheter every 6 (six) months  , Disp: , Rfl:     rosuvastatin (CRESTOR) 5 mg tablet, Take 1 tablet (5 mg total) by mouth daily, Disp: 90 tablet, Rfl: 2    tamoxifen (NOLVADEX) 20 mg tablet, Take 1 tablet (20 mg total) by mouth daily, Disp: 90 tablet, Rfl: 3    carvedilol (COREG) 3 125 mg tablet, Take 1 tablet (3 125 mg total) by mouth 2 (two) times a day with meals, Disp: 180 tablet, Rfl: 3    Misc Natural Products (LUTEIN 20 PO), Take 20 mg by mouth daily  , Disp: , Rfl:     pantoprazole (PROTONIX) 40 mg tablet, Take 1 tablet (40 mg total) by mouth daily (Patient not taking: Reported on 12/14/2018 ), Disp: 30 tablet, Rfl: 2    vitamin E, tocopherol, 400 units capsule, Take 400 Units by mouth daily, Disp: , Rfl:     Subjective:      Patient ID: Alanis Mendez is a 69 y o  female  [facility]  She ran out of Coreg and did not have a refill, so she has been off of it for the past three weeks  She is feeling well  She had a stumble on a plastic mat at home today, but stumbled and did not fall to the floor  The following portions of the patient's history were reviewed and updated as appropriate:   She has a past medical history of Allergic angioedema; Angioedema; Arthritis; Breast cancer ([address] ); Disease of thyroid gland; Hemorrhage of anus and rectum; Hyperlipidemia; Hypertension; Hypocalcemia; Neuritis; Peptic ulcer; Rheumatoid arthritis ([address] ); and Vitamin D deficiency  ,   does not have any pertinent problems on file  ,   has a past surgical history that includes Abdominal surgery; Breast surgery; Cholecystectomy; Elbow surgery; and orthopedic surgery  ,  family history includes Breast cancer in her maternal aunt, mother, and sister; Kidney failure in her father; Other in her father  ,   reports that she has never smoked  She has never used smokeless tobacco  She reports that she drinks alcohol  She reports that she does not use drugs  ,  is allergic to lisinopril; codeine; other; oxycodone; penicillins; sulfamethoxazole-trimethoprim; and ciprofloxacin       Review of Systems      Objective:  /84   Pulse 67   Temp 98 5 °F (36 9 °C) (Tympanic)   Ht 5' 5" (1 651 m)   Wt 88 1 kg (194 lb 3 2 oz)   LMP  (LMP Unknown)   SpO2 95%   BMI 32 32 kg/m²     Lab Review  Appointment on 12/06/2018   Component Date Value    Magnesium 12/06/2018 1 6     Sodium 12/06/2018 146*    Potassium 12/06/2018 3 8     Chloride 12/06/2018 106     CO2 12/06/2018 31     ANION GAP 12/06/2018 9     BUN 12/06/2018 20     Creatinine 12/06/2018 1 23     Glucose 12/06/2018 106     Calcium 12/06/2018 8 8     AST 12/06/2018 21     ALT 12/06/2018 22     Alkaline Phosphatase 12/06/2018 54     Total Protein 12/06/2018 7 0     Albumin 12/06/2018 3 6     Total Bilirubin 12/06/2018 0 40     eGFR 12/06/2018 42    Hospital Outpatient Visit on 11/21/2018   Component Date Value    WBC 11/21/2018 7 18     RBC 11/21/2018 3 38*    Hemoglobin 11/21/2018 10 8*    Hematocrit 11/21/2018 34 1*    MCV 11/21/2018 101*    MCH 11/21/2018 32 0     MCHC 11/21/2018 31 7     RDW 11/21/2018 14 3     MPV 11/21/2018 9 8     Platelets 18/20/0673 197     nRBC 11/21/2018 0     Neutrophils Relative 11/21/2018 66     Immat GRANS % 11/21/2018 0     Lymphocytes Relative 11/21/2018 18     Monocytes Relative 11/21/2018 11     Eosinophils Relative 11/21/2018 4     Basophils Relative 11/21/2018 1     Neutrophils Absolute 11/21/2018 4 75     Immature Grans Absolute 11/21/2018 0 02     Lymphocytes Absolute 11/21/2018 1 27     Monocytes Absolute 11/21/2018 0 78     Eosinophils Absolute 11/21/2018 0 27     Basophils Absolute 11/21/2018 0 09     Sodium 11/21/2018 143     Potassium 11/21/2018 3 5     Chloride 11/21/2018 106     CO2 11/21/2018 27     ANION GAP 11/21/2018 10     BUN 11/21/2018 25     Creatinine 11/21/2018 1 10     Glucose 11/21/2018 107     Calcium 11/21/2018 8 9     AST 11/21/2018 21     ALT 11/21/2018 23     Alkaline Phosphatase 11/21/2018 52     Total Protein 11/21/2018 6 7     Albumin 11/21/2018 3 5     Total Bilirubin 11/21/2018 0 50     eGFR 11/21/2018 49    Hospital Outpatient Visit on 11/07/2018   Component Date Value    WBC 11/07/2018 6 97     RBC 11/07/2018 3 39*    Hemoglobin 11/07/2018 11 3*    Hematocrit 11/07/2018 34 8     MCV 11/07/2018 103*    MCH 11/07/2018 33 3     MCHC 11/07/2018 32 5     RDW 11/07/2018 14 3     MPV 11/07/2018 10 2     Platelets 61/63/7169 221     nRBC 11/07/2018 0     Neutrophils Relative 11/07/2018 66     Immat GRANS % 11/07/2018 0     Lymphocytes Relative 11/07/2018 18     Monocytes Relative 11/07/2018 10     Eosinophils Relative 11/07/2018 5     Basophils Relative 11/07/2018 1     Neutrophils Absolute 11/07/2018 4 52     Immature Grans Absolute 11/07/2018 0 02     Lymphocytes Absolute 11/07/2018 1 25     Monocytes Absolute 11/07/2018 0 72     Eosinophils Absolute 11/07/2018 0 36     Basophils Absolute 11/07/2018 0 10     Sodium 11/07/2018 145     Potassium 11/07/2018 3 8     Chloride 11/07/2018 107     CO2 11/07/2018 28     ANION GAP 11/07/2018 10     BUN 11/07/2018 25     Creatinine 11/07/2018 1 18     Glucose 11/07/2018 114     Calcium 11/07/2018 9 0     AST 11/07/2018 22     ALT 11/07/2018 23     Alkaline Phosphatase 11/07/2018 50     Total Protein 11/07/2018 6 8     Albumin 11/07/2018 3 4*    Total Bilirubin 11/07/2018 0 40     eGFR 11/07/2018 45    Appointment on 10/25/2018   Component Date Value    Albumin 10/25/2018 3 7     Calcium 10/25/2018 9 0     Phosphorus 10/25/2018 3 3     Glucose 10/25/2018 102     BUN 10/25/2018 21     Creatinine 10/25/2018 1 18     Sodium 10/25/2018 142     Potassium 10/25/2018 3 4*    Chloride 10/25/2018 103     CO2 10/25/2018 29     ANION GAP 10/25/2018 10     eGFR 10/25/2018 45     Magnesium 10/25/2018 1 5*    Lipase 10/25/2018 301     Amylase 10/25/2018 93         Imaging: No results found         Physical Exam

## 2018-12-27 ENCOUNTER — TELEPHONE (OUTPATIENT)
Dept: INTERNAL MEDICINE CLINIC | Facility: CLINIC | Age: 78
End: 2018-12-27

## 2018-12-27 NOTE — TELEPHONE ENCOUNTER
----- Message from Gagandeep Sky DO sent at 12/18/2018 11:55 AM EST -----  Patient needs a free blood pressure check after visit with [facility] blood pressure being elevated and restarting new medication

## 2019-02-11 DIAGNOSIS — E83.42 HYPOMAGNESEMIA: ICD-10-CM

## 2019-02-11 DIAGNOSIS — R79.9 ABNORMAL BLOOD CHEMISTRY: ICD-10-CM

## 2019-02-11 DIAGNOSIS — E03.9 HYPOTHYROIDISM, UNSPECIFIED TYPE: ICD-10-CM

## 2019-02-11 DIAGNOSIS — I10 HYPERTENSION, UNSPECIFIED TYPE: Chronic | ICD-10-CM

## 2019-02-11 DIAGNOSIS — E78.5 HYPERLIPIDEMIA, UNSPECIFIED HYPERLIPIDEMIA TYPE: ICD-10-CM

## 2019-02-11 RX ORDER — LEVOTHYROXINE SODIUM 0.07 MG/1
75 TABLET ORAL EVERY OTHER DAY
Qty: 90 TABLET | Refills: 3 | Status: CANCELLED | OUTPATIENT
Start: 2019-02-11

## 2019-02-11 RX ORDER — LEVOTHYROXINE SODIUM 0.07 MG/1
75 TABLET ORAL EVERY OTHER DAY
Qty: 90 TABLET | Refills: 3
Start: 2019-02-11 | End: 2019-06-04 | Stop reason: SDUPTHER

## 2019-02-11 RX ORDER — POTASSIUM CHLORIDE 20 MEQ/1
TABLET, EXTENDED RELEASE ORAL
Qty: 90 TABLET | Refills: 0 | Status: CANCELLED
Start: 2019-02-11

## 2019-02-11 RX ORDER — POTASSIUM CHLORIDE 750 MG/1
10 TABLET, EXTENDED RELEASE ORAL 2 TIMES DAILY
Qty: 120 TABLET | Refills: 2 | Status: SHIPPED | OUTPATIENT
Start: 2019-02-11 | End: 2019-03-14 | Stop reason: SDUPTHER

## 2019-02-11 NOTE — TELEPHONE ENCOUNTER
Refill levothyroxine please   And pt also was saying the pharmacy was suppose to get back to us about getting pottasium 10 meq because she cant swallow the 20 meq pills? Can we do this also?

## 2019-02-13 ENCOUNTER — TELEPHONE (OUTPATIENT)
Dept: INTERNAL MEDICINE CLINIC | Facility: CLINIC | Age: 79
End: 2019-02-13

## 2019-02-22 ENCOUNTER — APPOINTMENT (OUTPATIENT)
Dept: LAB | Facility: HOSPITAL | Age: 79
End: 2019-02-22
Payer: MEDICARE

## 2019-02-22 ENCOUNTER — OFFICE VISIT (OUTPATIENT)
Dept: INTERNAL MEDICINE CLINIC | Facility: CLINIC | Age: 79
End: 2019-02-22
Payer: MEDICARE

## 2019-02-22 VITALS
HEIGHT: 65 IN | RESPIRATION RATE: 18 BRPM | BODY MASS INDEX: 31.32 KG/M2 | WEIGHT: 188 LBS | SYSTOLIC BLOOD PRESSURE: 130 MMHG | OXYGEN SATURATION: 97 % | HEART RATE: 79 BPM | TEMPERATURE: 98.9 F | DIASTOLIC BLOOD PRESSURE: 68 MMHG

## 2019-02-22 DIAGNOSIS — E03.9 HYPOTHYROIDISM, UNSPECIFIED TYPE: Chronic | ICD-10-CM

## 2019-02-22 DIAGNOSIS — R19.7 DIARRHEA IN ADULT PATIENT: Primary | ICD-10-CM

## 2019-02-22 DIAGNOSIS — R19.7 DIARRHEA IN ADULT PATIENT: ICD-10-CM

## 2019-02-22 LAB — TSH SERPL DL<=0.05 MIU/L-ACNC: 2.05 UIU/ML (ref 0.36–3.74)

## 2019-02-22 PROCEDURE — 84443 ASSAY THYROID STIM HORMONE: CPT

## 2019-02-22 PROCEDURE — 36415 COLL VENOUS BLD VENIPUNCTURE: CPT

## 2019-02-22 PROCEDURE — 99213 OFFICE O/P EST LOW 20 MIN: CPT | Performed by: NURSE PRACTITIONER

## 2019-02-22 RX ORDER — ERYTHROMYCIN 5 MG/G
OINTMENT OPHTHALMIC
COMMUNITY
Start: 2019-02-22 | End: 2019-09-03 | Stop reason: ALTCHOICE

## 2019-02-22 NOTE — PATIENT INSTRUCTIONS
Diarrhea- Will do stool testing  TSH added to labs also  Please drink clear fluids for the next 24 hours then advance to bland diet as we discussed  Please call on Monday

## 2019-02-22 NOTE — PROGRESS NOTES
Assessment/Plan:    Diarrhea- Will do stool testing  TSH added to labs also  Please drink clear fluids for the next 24 hours then advance to bland diet as we discussed  Please call on Monday  Diagnoses and all orders for this visit:    Diarrhea in adult patient  -     AFB Smear, Stool; Future  -     Clostridium difficile toxin by PCR; Future  -     Cryptosporidium Smear; Future  -     Fecal fat, qualitative; Future  -     Giardia antigen; Future  -     Lactoferrin, fecal, quantitative; Future  -     MISCELLANEOUS LAB TEST; Future  -     Occult Blood, Fecal Immunochemical; Future  -     Ova and parasite examination; Future  -     Stool Enteric Bacterial Panel by PCR; Future  -     Vibrio culture, stool; Future  -     White Blood Cells, Stool by Gram Stain; Future  -     Yersinia culture, stool; Future  -     TSH, 3rd generation with Free T4 reflex; Future    Hypothyroidism, unspecified type  -     AFB Smear, Stool; Future  -     Clostridium difficile toxin by PCR; Future  -     Cryptosporidium Smear; Future  -     Fecal fat, qualitative; Future  -     Giardia antigen; Future  -     Lactoferrin, fecal, quantitative; Future  -     MISCELLANEOUS LAB TEST; Future  -     Occult Blood, Fecal Immunochemical; Future  -     Ova and parasite examination; Future  -     Stool Enteric Bacterial Panel by PCR; Future  -     Vibrio culture, stool; Future  -     White Blood Cells, Stool by Gram Stain; Future  -     Yersinia culture, stool; Future  -     TSH, 3rd generation with Free T4 reflex; Future    Other orders  -     erythromycin (ILOTYCIN) ophthalmic ointment    The patient was counseled regarding instructions for management, risk factor reductions, patient and family education,impressions, risks and benefits of treatment options, side effects of medications, importance of compliance with treatment   The treatment plan was reviewed with the patient/guardian and patient/guardian understands and agrees with the treatment plan  Current Outpatient Medications:     ascorbic acid (VITAMIN C) 500 mg tablet, Take 500 mg by mouth daily  , Disp: , Rfl:     Calcium Citrate-Vitamin D (CITRACAL + D PO), Take by mouth 2 (two) times a day, Disp: , Rfl:     carvedilol (COREG) 3 125 mg tablet, Take 1 tablet (3 125 mg total) by mouth 2 (two) times a day with meals, Disp: 180 tablet, Rfl: 3    Cholecalciferol (VITAMIN D-3 PO), Take 2,000 Units by mouth daily  , Disp: , Rfl:     folic acid (FOLVITE) 1 mg tablet, 1 daily, Disp: 90 tablet, Rfl: 2    hydrochlorothiazide (HYDRODIURIL) 25 mg tablet, Take 1 tablet (25 mg total) by mouth daily, Disp: 90 tablet, Rfl: 0    levothyroxine 50 mcg tablet, Take 1 tablet (50 mcg total) by mouth every other day, Disp: 67 tablet, Rfl: 0    levothyroxine 75 mcg tablet, Take 1 tablet (75 mcg total) by mouth every other day, Disp: 90 tablet, Rfl: 3    magnesium oxide (MAG-OX) 400 mg, Take 1 tablet (400 mg total) by mouth 2 (two) times a day, Disp: 90 tablet, Rfl: 0    methotrexate 2 5 mg tablet, Take 10 mg by mouth once a week , Disp: , Rfl:     Misc Natural Products (LUTEIN 20 PO), Take 20 mg by mouth daily  , Disp: , Rfl:     Multiple Vitamin (MULTIVITAMIN) tablet, Take 1 tablet by mouth daily  , Disp: , Rfl:     pantoprazole (PROTONIX) 40 mg tablet, Take 1 tablet (40 mg total) by mouth daily, Disp: 30 tablet, Rfl: 2    potassium chloride (K-DUR,KLOR-CON) 10 mEq tablet, Take 1 tablet (10 mEq total) by mouth 2 (two) times a day 2 tablet po BID, Disp: 120 tablet, Rfl: 2    riTUXimab (RITUXAN) 500 mg/50 mL, Infuse into a venous catheter every 6 (six) months  , Disp: , Rfl:     rosuvastatin (CRESTOR) 5 mg tablet, Take 1 tablet (5 mg total) by mouth daily, Disp: 90 tablet, Rfl: 2    tamoxifen (NOLVADEX) 20 mg tablet, Take 1 tablet (20 mg total) by mouth daily, Disp: 90 tablet, Rfl: 3    vitamin E, tocopherol, 400 units capsule, Take 400 Units by mouth daily, Disp: , Rfl:     erythromycin (ILOTYCIN) ophthalmic ointment, , Disp: , Rfl:     Subjective:      Patient ID: Leah Lancaster is a 69 y o  female  Alondra Pata has had diarrhea off and on for the past three weeks  No fever, no recent oral abx use or hospitalization  Also increased stress recently  The following portions of the patient's history were reviewed and updated as appropriate:   She has a past medical history of Allergic angioedema, Angioedema, Arthritis, Breast cancer ([facility] [address] ), Disease of thyroid gland, Hemorrhage of anus and rectum, Hyperlipidemia, Hypertension, Hypocalcemia, Neuritis, Peptic ulcer, Rheumatoid arthritis ([facility] [address] ), and Vitamin D deficiency  ,  does not have any pertinent problems on file  ,   has a past surgical history that includes Abdominal surgery; Breast surgery; Cholecystectomy; Elbow surgery; and orthopedic surgery  ,  family history includes Breast cancer in her maternal aunt, mother, and sister; Kidney failure in her father; Other in her father  ,   reports that she has never smoked  She has never used smokeless tobacco  She reports that she drinks alcohol  She reports that she does not use drugs  ,  is allergic to lisinopril; codeine; other; oxycodone; penicillins; sulfamethoxazole-trimethoprim; and ciprofloxacin       Review of Systems   Constitutional: Negative  Respiratory: Negative  Cardiovascular: Negative  Gastrointestinal: Positive for diarrhea  Negative for abdominal pain, blood in stool, nausea and vomiting  Musculoskeletal: Negative  Psychiatric/Behavioral: Negative  Objective:  /68   Pulse 79   Temp 98 9 °F (37 2 °C)   Resp 18   Ht 5' 5" (1 651 m)   Wt 85 3 kg (188 lb)   LMP  (LMP Unknown)   SpO2 97%   BMI 31 28 kg/m²     Lab Review  No visits with results within 2 Month(s) from this visit     Latest known visit with results is:   Appointment on 12/06/2018   Component Date Value    Magnesium 12/06/2018 1 6     Sodium 12/06/2018 146*    Potassium 12/06/2018 3 8     Chloride 12/06/2018 106     CO2 12/06/2018 31     ANION GAP 12/06/2018 9     BUN 12/06/2018 20     Creatinine 12/06/2018 1 23     Glucose 12/06/2018 106     Calcium 12/06/2018 8 8     AST 12/06/2018 21     ALT 12/06/2018 22     Alkaline Phosphatase 12/06/2018 54     Total Protein 12/06/2018 7 0     Albumin 12/06/2018 3 6     Total Bilirubin 12/06/2018 0 40     eGFR 12/06/2018 42         Imaging: No results found  Physical Exam   Constitutional: She is oriented to person, place, and time  She appears well-developed and well-nourished  Cardiovascular: Normal rate, regular rhythm, normal heart sounds and intact distal pulses  Pulmonary/Chest: Effort normal and breath sounds normal    Abdominal: Soft  Bowel sounds are normal  She exhibits no distension  There is no tenderness  Musculoskeletal: She exhibits deformity  Neurological: She is alert and oriented to person, place, and time  She has normal reflexes  Psychiatric: She has a normal mood and affect   Her behavior is normal  Judgment and thought content normal

## 2019-02-28 ENCOUNTER — APPOINTMENT (OUTPATIENT)
Dept: LAB | Facility: HOSPITAL | Age: 79
End: 2019-02-28
Payer: MEDICARE

## 2019-02-28 DIAGNOSIS — E03.9 HYPOTHYROIDISM, UNSPECIFIED TYPE: Chronic | ICD-10-CM

## 2019-02-28 DIAGNOSIS — R19.7 DIARRHEA IN ADULT PATIENT: ICD-10-CM

## 2019-02-28 PROCEDURE — 87177 OVA AND PARASITES SMEARS: CPT

## 2019-02-28 PROCEDURE — 36415 COLL VENOUS BLD VENIPUNCTURE: CPT

## 2019-02-28 PROCEDURE — 87205 SMEAR GRAM STAIN: CPT

## 2019-02-28 PROCEDURE — 87206 SMEAR FLUORESCENT/ACID STAI: CPT

## 2019-02-28 PROCEDURE — 83631 LACTOFERRIN FECAL (QUANT): CPT

## 2019-02-28 PROCEDURE — 87493 C DIFF AMPLIFIED PROBE: CPT

## 2019-02-28 PROCEDURE — 87505 NFCT AGENT DETECTION GI: CPT

## 2019-02-28 PROCEDURE — 87798 DETECT AGENT NOS DNA AMP: CPT

## 2019-02-28 PROCEDURE — 87329 GIARDIA AG IA: CPT

## 2019-02-28 PROCEDURE — 87015 SPECIMEN INFECT AGNT CONCNTJ: CPT

## 2019-02-28 PROCEDURE — 87209 SMEAR COMPLEX STAIN: CPT

## 2019-02-28 PROCEDURE — 87046 STOOL CULTR AEROBIC BACT EA: CPT

## 2019-03-01 ENCOUNTER — TELEPHONE (OUTPATIENT)
Dept: INTERNAL MEDICINE CLINIC | Facility: CLINIC | Age: 79
End: 2019-03-01

## 2019-03-01 ENCOUNTER — APPOINTMENT (OUTPATIENT)
Dept: LAB | Facility: HOSPITAL | Age: 79
End: 2019-03-01
Payer: MEDICARE

## 2019-03-01 DIAGNOSIS — R19.7 DIARRHEA IN ADULT PATIENT: ICD-10-CM

## 2019-03-01 DIAGNOSIS — E03.9 HYPOTHYROIDISM, UNSPECIFIED TYPE: Chronic | ICD-10-CM

## 2019-03-01 LAB
C DIFF TOX GENS STL QL NAA+PROBE: NORMAL
CAMPYLOBACTER DNA SPEC NAA+PROBE: NORMAL
HEMOCCULT STL QL IA: NEGATIVE
SALMONELLA DNA SPEC QL NAA+PROBE: NORMAL
SHIGA TOXIN STX GENE SPEC NAA+PROBE: NORMAL
SHIGELLA DNA SPEC QL NAA+PROBE: NORMAL
WBC STL QL MICRO: NORMAL

## 2019-03-01 PROCEDURE — 82705 FATS/LIPIDS FECES QUAL: CPT

## 2019-03-01 PROCEDURE — G0328 FECAL BLOOD SCRN IMMUNOASSAY: HCPCS

## 2019-03-01 NOTE — TELEPHONE ENCOUNTER
Called patient left voicemail        ----- Message from [facility], [address] sent at 3/1/2019 12:52 PM EST -----  Please call   Still waiting on a few more results but so far stool testing has been normal

## 2019-03-02 LAB
CRYPTOSP STL QL ACID FAST STN: NORMAL
I BELLI SPEC QL ACID FAST MOD KINY STN: NORMAL
VIBRIO STL CULT: NORMAL
YERSINIA STL CULT: NORMAL

## 2019-03-03 LAB
G LAMBLIA AG STL QL IA: NEGATIVE
O+P STL CONC: NORMAL

## 2019-03-04 LAB — LACTOFERRIN SER-MCNC: <1 UG/ML(G) (ref 0–7.24)

## 2019-03-05 LAB
FAT STL QL: NORMAL
NEUTRAL FAT STL QL: NORMAL

## 2019-03-06 LAB — MISCELLANEOUS LAB TEST RESULT: NORMAL

## 2019-03-08 ENCOUNTER — TELEPHONE (OUTPATIENT)
Dept: INTERNAL MEDICINE CLINIC | Facility: CLINIC | Age: 79
End: 2019-03-08

## 2019-03-08 NOTE — TELEPHONE ENCOUNTER
Patient aware      ----- Message from Janel Hinson sent at 3/8/2019  9:03 AM EST -----  Please call  Looks like all of her stool testing has been negative, we are still waiting on one more result  How is she feeling?  thanks

## 2019-03-08 NOTE — TELEPHONE ENCOUNTER
[facility]      ----- [facility] at 3[facility]8/2019  9:03 AM EST -----  Please call  Looks like all of her stool testing has been negative, we are still waiting on one more result  How is she feeling?  thanks

## 2019-03-14 ENCOUNTER — OFFICE VISIT (OUTPATIENT)
Dept: INTERNAL MEDICINE CLINIC | Facility: CLINIC | Age: 79
End: 2019-03-14
Payer: MEDICARE

## 2019-03-14 VITALS
WEIGHT: 192 LBS | DIASTOLIC BLOOD PRESSURE: 80 MMHG | OXYGEN SATURATION: 98 % | TEMPERATURE: 98.4 F | HEART RATE: 65 BPM | HEIGHT: 65 IN | BODY MASS INDEX: 31.99 KG/M2 | SYSTOLIC BLOOD PRESSURE: 160 MMHG | RESPIRATION RATE: 18 BRPM

## 2019-03-14 DIAGNOSIS — R91.1 PULMONARY NODULE: ICD-10-CM

## 2019-03-14 DIAGNOSIS — E55.9 VITAMIN D DEFICIENCY: ICD-10-CM

## 2019-03-14 DIAGNOSIS — E79.0 HYPERURICEMIA: ICD-10-CM

## 2019-03-14 DIAGNOSIS — E03.9 HYPOTHYROIDISM, UNSPECIFIED TYPE: Chronic | ICD-10-CM

## 2019-03-14 DIAGNOSIS — R19.7 DIARRHEA IN ADULT PATIENT: Primary | ICD-10-CM

## 2019-03-14 DIAGNOSIS — Z87.11 HISTORY OF PEPTIC ULCER: ICD-10-CM

## 2019-03-14 DIAGNOSIS — E78.5 HYPERLIPIDEMIA, UNSPECIFIED HYPERLIPIDEMIA TYPE: ICD-10-CM

## 2019-03-14 DIAGNOSIS — I10 HYPERTENSION, UNSPECIFIED TYPE: Chronic | ICD-10-CM

## 2019-03-14 DIAGNOSIS — R10.11 RIGHT UPPER QUADRANT PAIN: ICD-10-CM

## 2019-03-14 PROCEDURE — 99214 OFFICE O/P EST MOD 30 MIN: CPT | Performed by: INTERNAL MEDICINE

## 2019-03-14 RX ORDER — MONTELUKAST SODIUM 4 MG/1
1 TABLET, CHEWABLE ORAL 2 TIMES DAILY
Qty: 60 TABLET | Refills: 1 | Status: SHIPPED | OUTPATIENT
Start: 2019-03-14 | End: 2019-04-05 | Stop reason: SDUPTHER

## 2019-03-14 RX ORDER — PANTOPRAZOLE SODIUM 40 MG/1
40 TABLET, DELAYED RELEASE ORAL DAILY
Qty: 90 TABLET | Refills: 2 | Status: SHIPPED | OUTPATIENT
Start: 2019-03-14 | End: 2020-06-29 | Stop reason: ALTCHOICE

## 2019-03-14 RX ORDER — POTASSIUM CHLORIDE 750 MG/1
10 TABLET, EXTENDED RELEASE ORAL 2 TIMES DAILY
Qty: 180 TABLET | Refills: 2 | Status: SHIPPED | OUTPATIENT
Start: 2019-03-14 | End: 2019-06-10 | Stop reason: SDUPTHER

## 2019-03-14 RX ORDER — CARVEDILOL 6.25 MG/1
6.25 TABLET ORAL 2 TIMES DAILY WITH MEALS
Qty: 180 TABLET | Refills: 2 | Status: SHIPPED | OUTPATIENT
Start: 2019-03-14 | End: 2019-10-28 | Stop reason: DRUGHIGH

## 2019-03-14 RX ORDER — ROSUVASTATIN CALCIUM 5 MG/1
5 TABLET, COATED ORAL DAILY
Qty: 90 TABLET | Refills: 2 | Status: SHIPPED | OUTPATIENT
Start: 2019-03-14

## 2019-03-14 NOTE — LETTER
March 14, 2019     Jaylin Cazares MD  [address] [place] [address] 2  [address] [address]    Patient: Heather Sandoval   YOB: 1940   Date of Visit: 3/14/2019       Dear Dr Israel Mcguire:    Thank you for referring Benito Griffin to me for evaluation  Below are my notes for this consultation  If you have questions, please do not hesitate to call me  I look forward to following your patient along with you  Sincerely,        Bacilio Jewell DO        CC: No Recipients  Bacilio Jewell DO  3/14/2019  1:41 PM  Sign at close encounter  Assessment/Plan:    1 diarrhea for the past 3 months cultures have been negative patient does have a history of diarrhea with stress she has had increased stressors in her life recently will Rx colestipol to take twice a day starting at night as needed for diarrhea to take separately from her other medications either take her medications when hour for 2 hours after colestipol,  2 hypertension is not at goal increase generic Coreg to 6 25 mg twice a day should be coming back in April for follow-up has laboratory work at that time           Diagnoses and all orders for this visit:    Diarrhea in adult patient  -     colestipol (COLESTID) 1 g tablet; Take 1 tablet (1 g total) by mouth 2 (two) times a day    Pulmonary nodule  -     CT chest wo contrast; Future    Hypothyroidism, unspecified type    Hyperuricemia    Hyperlipidemia, unspecified hyperlipidemia type  -     rosuvastatin (CRESTOR) 5 mg tablet; Take 1 tablet (5 mg total) by mouth daily    Vitamin D deficiency    Hypertension, unspecified type  -     carvedilol (COREG) 6 25 mg tablet; Take 1 tablet (6 25 mg total) by mouth 2 (two) times a day with meals  -     potassium chloride (K-DUR,KLOR-CON) 10 mEq tablet; Take 1 tablet (10 mEq total) by mouth 2 (two) times a day 1 tablet po BID    Right upper quadrant pain  -     pantoprazole (PROTONIX) 40 mg tablet;  Take 1 tablet (40 mg total) by mouth daily    History of peptic ulcer  -     pantoprazole (PROTONIX) 40 mg tablet; Take 1 tablet (40 mg total) by mouth daily        The patient was counseled regarding instructions for management, risk factor reductions, patient and family education,impressions, risks and benefits of treatment options, side effects of medications, importance of compliance with treatment  The treatment plan was reviewed with the patient/guardian and patient/guardian understands and agrees with the treatment plan  Current Outpatient Medications:     ascorbic acid (VITAMIN C) 500 mg tablet, Take 500 mg by mouth daily  , Disp: , Rfl:     Calcium Citrate-Vitamin D (CITRACAL + D PO), Take by mouth 2 (two) times a day, Disp: , Rfl:     carvedilol (COREG) 6 25 mg tablet, Take 1 tablet (6 25 mg total) by mouth 2 (two) times a day with meals, Disp: 180 tablet, Rfl: 2    Cholecalciferol (VITAMIN D-3 PO), Take 2,000 Units by mouth daily  , Disp: , Rfl:     colestipol (COLESTID) 1 g tablet, Take 1 tablet (1 g total) by mouth 2 (two) times a day, Disp: 60 tablet, Rfl: 1    erythromycin (ILOTYCIN) ophthalmic ointment, , Disp: , Rfl:     folic acid (FOLVITE) 1 mg tablet, 1 daily, Disp: 90 tablet, Rfl: 2    hydrochlorothiazide (HYDRODIURIL) 25 mg tablet, Take 1 tablet (25 mg total) by mouth daily, Disp: 90 tablet, Rfl: 0    levothyroxine 50 mcg tablet, Take 1 tablet (50 mcg total) by mouth every other day, Disp: 67 tablet, Rfl: 0    levothyroxine 75 mcg tablet, Take 1 tablet (75 mcg total) by mouth every other day, Disp: 90 tablet, Rfl: 3    magnesium oxide (MAG-OX) 400 mg, Take 1 tablet (400 mg total) by mouth 2 (two) times a day, Disp: 90 tablet, Rfl: 0    methotrexate 2 5 mg tablet, Take 10 mg by mouth once a week , Disp: , Rfl:     Misc Natural Products (LUTEIN 20 PO), Take 20 mg by mouth daily  , Disp: , Rfl:     Multiple Vitamin (MULTIVITAMIN) tablet, Take 1 tablet by mouth daily  , Disp: , Rfl:     pantoprazole (PROTONIX) 40 mg tablet, Take 1 tablet (40 mg total) by mouth daily, Disp: 90 tablet, Rfl: 2    potassium chloride (K-DUR,KLOR-CON) 10 mEq tablet, Take 1 tablet (10 mEq total) by mouth 2 (two) times a day 1 tablet po BID, Disp: 180 tablet, Rfl: 2    riTUXimab (RITUXAN) 500 mg/50 mL, Infuse into a venous catheter every 6 (six) months  , Disp: , Rfl:     rosuvastatin (CRESTOR) 5 mg tablet, Take 1 tablet (5 mg total) by mouth daily, Disp: 90 tablet, Rfl: 2    tamoxifen (NOLVADEX) 20 mg tablet, Take 1 tablet (20 mg total) by mouth daily, Disp: 90 tablet, Rfl: 3    vitamin E, tocopherol, 400 units capsule, Take 400 Units by mouth daily, Disp: , Rfl:     Subjective:      Patient ID: Marcial Villa is a 69 y o  female  Diarrhea for 3 months when eat food, in AM diarrhea, 2-4 bm per day rare wakes at night      The following portions of the patient's history were reviewed and updated as appropriate:   She has a past medical history of Allergic angioedema, Angioedema, Arthritis, Breast cancer ([address] ), Disease of thyroid gland, Hemorrhage of anus and rectum, Hyperlipidemia, Hypertension, Hypocalcemia, Neuritis, Peptic ulcer, Rheumatoid arthritis ([address] ), and Vitamin D deficiency  ,  does not have any pertinent problems on file  ,   has a past surgical history that includes Abdominal surgery; Breast surgery; Cholecystectomy; Elbow surgery; and orthopedic surgery  ,  family history includes Breast cancer in her maternal aunt, mother, and sister; Kidney failure in her father; Other in her father  ,   reports that she has never smoked  She has never used smokeless tobacco  She reports that she drinks alcohol  She reports that she does not use drugs  ,  is allergic to lisinopril; codeine; other; oxycodone; penicillins; sulfamethoxazole-trimethoprim; and ciprofloxacin       Review of Systems   Constitutional: Negative for appetite change, chills, fatigue, fever and unexpected weight change     HENT: Negative for congestion, ear pain, facial swelling, hearing loss, mouth sores, nosebleeds, postnasal drip, rhinorrhea, sinus pain, sore throat, trouble swallowing and voice change  Eyes: Negative for pain, discharge, redness and visual disturbance  Respiratory: Negative for apnea, cough, chest tightness, shortness of breath, wheezing and stridor  Cardiovascular: Negative for chest pain, palpitations and leg swelling  Gastrointestinal: Positive for diarrhea  Negative for abdominal distention, abdominal pain, blood in stool, constipation and vomiting  Endocrine: Negative for cold intolerance, heat intolerance, polydipsia, polyphagia and polyuria  Genitourinary: Negative for difficulty urinating, dysuria, flank pain, frequency, genital sores, hematuria and urgency  Musculoskeletal: Positive for arthralgias and joint swelling  Negative for back pain  Skin: Negative for rash and wound  Allergic/Immunologic: Negative for environmental allergies, food allergies and immunocompromised state  Neurological: Negative for dizziness, tremors, seizures, syncope, facial asymmetry, speech difficulty, weakness, light-headedness, numbness and headaches  Hematological: Negative for adenopathy  Does not bruise/bleed easily  Psychiatric/Behavioral: Negative for agitation, behavioral problems, dysphoric mood, hallucinations, self-injury, sleep disturbance and suicidal ideas  The patient is not hyperactive  Objective:  /80 (BP Location: Left arm, Patient Position: Sitting)   Pulse 65   Temp 98 4 °F (36 9 °C)   Resp 18   Ht 5' 5" (1 651 m)   Wt 87 1 kg (192 lb)   LMP  (LMP Unknown)   SpO2 98%   BMI 31 95 kg/m²      Lab Review  Appointment on 03/01/2019   Component Date Value    Fat Qual Neutral, Stl 03/01/2019 Normal     Fat,Totall 03/01/2019 Normal     OCCULT BLD, FECAL IMMUNO* 03/01/2019 Negative    Appointment on 02/28/2019   Component Date Value    C difficile toxin by PCR 02/28/2019 NEGATIVE for C difficle toxin by PCR        Cryptosporidium Smear, S* 02/28/2019 None seen     ISOSPORA SMEAR, STOOL 02/28/2019 None seen     Giardia Ag, Stl 02/28/2019 Negative     Lactoferrin, Quant 02/28/2019 <1 00     Ova + Parasite Exam 02/28/2019 No ova, cysts, or parasites seen     One negative specimen does not rule out the possibility of a  parasitic infection   Salmonella sp PCR 02/28/2019 None Detected     Shigella sp/Enteroinvasi* 02/28/2019 None Detected     Campylobacter sp (jejuni* 02/28/2019 None Detected     Shiga toxin 1/Shiga toxi* 02/28/2019 None Detected     Stool Culture, Vibrio On* 02/28/2019 No Vibrio species isolated       Fecal Leukocytes 02/28/2019 No WBC's     Yersinia Culture, Stool 02/28/2019 No Yersinia isolated at 48 hours     Miscellaneous Lab Test R* 02/28/2019 SEE WRITTEN REPORT     Appointment on 02/22/2019   Component Date Value    TSH 3RD GENERATON 02/22/2019 2 050    Appointment on 12/06/2018   Component Date Value    Magnesium 12/06/2018 1 6     Sodium 12/06/2018 146*    Potassium 12/06/2018 3 8     Chloride 12/06/2018 106     CO2 12/06/2018 31     ANION GAP 12/06/2018 9     BUN 12/06/2018 20     Creatinine 12/06/2018 1 23     Glucose 12/06/2018 106     Calcium 12/06/2018 8 8     AST 12/06/2018 21     ALT 12/06/2018 22     Alkaline Phosphatase 12/06/2018 54     Total Protein 12/06/2018 7 0     Albumin 12/06/2018 3 6     Total Bilirubin 12/06/2018 0 40     eGFR 12/06/2018 42    Hospital Outpatient Visit on 11/21/2018   Component Date Value    WBC 11/21/2018 7 18     RBC 11/21/2018 3 38*    Hemoglobin 11/21/2018 10 8*    Hematocrit 11/21/2018 34 1*    MCV 11/21/2018 101*    MCH 11/21/2018 32 0     MCHC 11/21/2018 31 7     RDW 11/21/2018 14 3     MPV 11/21/2018 9 8     Platelets 61/01/3565 197     nRBC 11/21/2018 0     Neutrophils Relative 11/21/2018 66     Immat GRANS % 11/21/2018 0     Lymphocytes Relative 11/21/2018 18     Monocytes Relative 11/21/2018 11     Eosinophils Relative 11/21/2018 4     Basophils Relative 11/21/2018 1     Neutrophils Absolute 11/21/2018 4 75     Immature Grans Absolute 11/21/2018 0 02     Lymphocytes Absolute 11/21/2018 1 27     Monocytes Absolute 11/21/2018 0 78     Eosinophils Absolute 11/21/2018 0 27     Basophils Absolute 11/21/2018 0 09     Sodium 11/21/2018 143     Potassium 11/21/2018 3 5     Chloride 11/21/2018 106     CO2 11/21/2018 27     ANION GAP 11/21/2018 10     BUN 11/21/2018 25     Creatinine 11/21/2018 1 10     Glucose 11/21/2018 107     Calcium 11/21/2018 8 9     AST 11/21/2018 21     ALT 11/21/2018 23     Alkaline Phosphatase 11/21/2018 52     Total Protein 11/21/2018 6 7     Albumin 11/21/2018 3 5     Total Bilirubin 11/21/2018 0 50     eGFR 11/21/2018 49    Hospital Outpatient Visit on 11/07/2018   Component Date Value    WBC 11/07/2018 6 97     RBC 11/07/2018 3 39*    Hemoglobin 11/07/2018 11 3*    Hematocrit 11/07/2018 34 8     MCV 11/07/2018 103*    MCH 11/07/2018 33 3     MCHC 11/07/2018 32 5     RDW 11/07/2018 14 3     MPV 11/07/2018 10 2     Platelets 68/40/8577 221     nRBC 11/07/2018 0     Neutrophils Relative 11/07/2018 66     Immat GRANS % 11/07/2018 0     Lymphocytes Relative 11/07/2018 18     Monocytes Relative 11/07/2018 10     Eosinophils Relative 11/07/2018 5     Basophils Relative 11/07/2018 1     Neutrophils Absolute 11/07/2018 4 52     Immature Grans Absolute 11/07/2018 0 02     Lymphocytes Absolute 11/07/2018 1 25     Monocytes Absolute 11/07/2018 0 72     Eosinophils Absolute 11/07/2018 0 36     Basophils Absolute 11/07/2018 0 10     Sodium 11/07/2018 145     Potassium 11/07/2018 3 8     Chloride 11/07/2018 107     CO2 11/07/2018 28     ANION GAP 11/07/2018 10     BUN 11/07/2018 25     Creatinine 11/07/2018 1 18     Glucose 11/07/2018 114     Calcium 11/07/2018 9 0     AST 11/07/2018 22     ALT 11/07/2018 23     Alkaline Phosphatase 11/07/2018 50     Total Protein 11/07/2018 6 8     Albumin 11/07/2018 3 4*    Total Bilirubin 11/07/2018 0 40     eGFR 11/07/2018 45    Appointment on 10/25/2018   Component Date Value    Albumin 10/25/2018 3 7     Calcium 10/25/2018 9 0     Phosphorus 10/25/2018 3 3     Glucose 10/25/2018 102     BUN 10/25/2018 21     Creatinine 10/25/2018 1 18     Sodium 10/25/2018 142     Potassium 10/25/2018 3 4*    Chloride 10/25/2018 103     CO2 10/25/2018 29     ANION GAP 10/25/2018 10     eGFR 10/25/2018 45     Magnesium 10/25/2018 1 5*    Lipase 10/25/2018 301     Amylase 10/25/2018 93    Appointment on 09/27/2018   Component Date Value    WBC 09/27/2018 8 16     RBC 09/27/2018 3 72*    Hemoglobin 09/27/2018 12 2     Hematocrit 09/27/2018 37 4     MCV 09/27/2018 101*    MCH 09/27/2018 32 8     MCHC 09/27/2018 32 6     RDW 09/27/2018 14 1     MPV 09/27/2018 10 0     Platelets 05/68/7028 224     nRBC 09/27/2018 0     Neutrophils Relative 09/27/2018 70     Immat GRANS % 09/27/2018 0     Lymphocytes Relative 09/27/2018 17     Monocytes Relative 09/27/2018 9     Eosinophils Relative 09/27/2018 3     Basophils Relative 09/27/2018 1     Neutrophils Absolute 09/27/2018 5 75     Immature Grans Absolute 09/27/2018 0 03     Lymphocytes Absolute 09/27/2018 1 37     Monocytes Absolute 09/27/2018 0 69     Eosinophils Absolute 09/27/2018 0 28     Basophils Absolute 09/27/2018 0 04     Magnesium 09/27/2018 1 4*    Sodium 09/27/2018 141     Potassium 09/27/2018 3 4*    Chloride 09/27/2018 103     CO2 09/27/2018 31     ANION GAP 09/27/2018 7     BUN 09/27/2018 21     Creatinine 09/27/2018 1 24     Glucose, Fasting 09/27/2018 102*    Calcium 09/27/2018 9 3     AST 09/27/2018 21     ALT 09/27/2018 23     Alkaline Phosphatase 09/27/2018 48     Total Protein 09/27/2018 6 9     Albumin 09/27/2018 3 6     Total Bilirubin 09/27/2018 0 60     eGFR 09/27/2018 42     Sed Rate 2018 17     CRP 2018 19 1*    TSH 3RD GENERATON 2018 3 828*    Vit D, 25-Hydroxy 2018 42 2     Free T4 2018 1 10          Imaging  @[email or web address]@  Recent Results (from the past 72022 hour(s))   Echo complete with contrast if indicated    Narrative    [address] [facility]  [phone]    Transthoracic Echocardiogram  2D, M-mode, Doppler, and Color Doppler    Study date:  26-Oct-2017    Patient: Olivier Love  MR number: [id number]  Account number: [de-identified]  : 1940  Age: 69 years  Gender: Female  Status: Outpatient  [facility]: [facility]  Height: 63 in  Weight: 180 lb  BP: 120/ 60 mmHg    Indications: Murmur  Diagnoses: R01 1 - Cardiac murmur, unspecified    Sonographer:  Davis RCS  Interpreting Physician:  Ekaterina Lovell MD  Primary Physician:  Joanna Vasquez DO  Referring Physician:  Joanna Vasquez DO  Group:  [facility]    SUMMARY    LEFT VENTRICLE:  Systolic function was normal  Ejection fraction was estimated in the range of 55 % to 65 %  There were no regional wall motion abnormalities  Features were consistent with a pseudonormal left ventricular filling pattern, with concomitant abnormal relaxation and increased filling pressure (grade 2 diastolic dysfunction)  MITRAL VALVE:  There was mild annular calcification  There was mild regurgitation  AORTIC VALVE:  There was trace regurgitation  TRICUSPID VALVE:  There was mild regurgitation  HISTORY: PRIOR HISTORY: Breast cancer  Risk factors: hypertension and hypercholesterolemia  PROCEDURE: The study was performed in the [address] [place] [address]  This was a routine study  The transthoracic approach was used  The study included complete 2D imaging, M-mode, complete spectral Doppler, and color Doppler   Images  were obtained from the parasternal, apical, subcostal, and suprasternal notch acoustic windows  Image quality was adequate  LEFT VENTRICLE: Size was normal  Systolic function was normal  Ejection fraction was estimated in the range of 55 % to 65 %  There were no regional wall motion abnormalities  Wall thickness was normal  DOPPLER: Features were consistent  with a pseudonormal left ventricular filling pattern, with concomitant abnormal relaxation and increased filling pressure (grade 2 diastolic dysfunction)  RIGHT VENTRICLE: The size was normal  Systolic function was normal  Wall thickness was normal     LEFT ATRIUM: Size was normal     RIGHT ATRIUM: Size was normal     MITRAL VALVE: There was mild annular calcification  Valve structure was normal  There was normal leaflet separation  DOPPLER: The transmitral velocity was within the normal range  There was no evidence for stenosis  There was mild  regurgitation  AORTIC VALVE: The valve was trileaflet  Leaflets exhibited normal thickness and normal cuspal separation  DOPPLER: Transaortic velocity was within the normal range  There was no evidence for stenosis  There was trace regurgitation  TRICUSPID VALVE: The valve structure was normal  There was normal leaflet separation  DOPPLER: The transtricuspid velocity was within the normal range  There was no evidence for stenosis  There was mild regurgitation  PULMONIC VALVE: Leaflets exhibited normal thickness, no calcification, and normal cuspal separation  DOPPLER: The transpulmonic velocity was within the normal range  There was no regurgitation  PERICARDIUM: There was no pericardial effusion  The pericardium was normal in appearance  AORTA: The root exhibited normal size  SYSTEMIC VEINS: IVC: The inferior vena cava was normal in size and course  Respirophasic changes were normal     SYSTEM MEASUREMENT TABLES    Apical four chamber  4 chamber Left Atrium Volume Index; Planimetry; End Systole;  Apical four chamber;: 17 26 cm2  Left Ventricular Ejection Fraction; Method of Disks, Single Plane; Apical four chamber;: 62 5 %  Left Ventricular End Diastolic Volume; Method of Disks, Single Plane; Apical four chamber;: 57 1 ml  Left Ventricular End Systolic Volume; Method of Disks, Single Plane; Apical four chamber;: 21 4 ml  Right Atrium Systolic Area; Planimetry; End Systole; Apical four chamber;: 13 17 cm2  Right Ventricular Internal Diastolic Dimension; End Diastole; Apical four chamber;: 36 9 mm  TAPSE: 26 2 mm    Unspecified Scan Mode  Aortic Root Diameter; End Systole;: 26 5 mm  Aortic valve Area; Continuity Equation by Velocity Time Integral; Systole;: 1 88 cm2  Cardiovascular Orifice Diameter; End Systole;: 19 5 mm  Gradient Pressure, Peak; Simplified Bernoulli; Antegrade Flow; Systole;: 17 5 mm[Hg]  Gradient pressure, average; Simplified Bernoulli; Antegrade Flow; Systole;: 9 mm[Hg]  Left atrial diameter; End Diastole;: 35 8 mm  Cardiac Output; Teichholz; Systole;: 2 93 L/min  Interventricular Septum Diastolic Thickness; Teichholz; End Diastole;: 11 4 mm  Left Ventricle Internal End Diastolic Dimension; Teichholz;: 37 6 mm  Left Ventricle Internal Systolic Dimension; Teichholz; End Systole;: 22 9 mm  Left Ventricle Posterior Wall Diastolic Thickness; Teichholz; End Diastole;: 11 5 mm  Left Ventricular Ejection Fraction; Teichholz;: 70 4 %  Stroke volume; Teichholz; Systole;: 42 5 ml  Mitral Valve Area; Area by Pressure Half-Time; Systole;: 3 49 cm2  Mitral Valve E to A Ratio; Systole;: 1 38  Maximum Tricuspid valve regurgitation pressure gradient; Regurgitant Flow; Systole;: 30 1 mm[Hg]    [employer] Accredited Echocardiography Laboratory    Prepared and electronically signed by    Dennis Morris MD  Signed 26-Oct-2017 18:33:23       CT chest wo contrast    (Results Pending)     No results found for this or any previous visit  Physical Exam   Constitutional: She is oriented to person, place, and time  She appears well-developed     obese   HENT: Right Ear: External ear normal    Left Ear: External ear normal    Eyes: Right eye exhibits no discharge  Left eye exhibits no discharge  No scleral icterus  Neck: Carotid bruit is not present  No tracheal deviation present  No thyroid mass and no thyromegaly present  Cardiovascular: Normal rate, regular rhythm, normal heart sounds and intact distal pulses  Exam reveals no gallop and no friction rub  No murmur heard  Pulmonary/Chest: No respiratory distress  She has no wheezes  She has no rales  Abdominal: Soft  Normal appearance  She exhibits no shifting dullness, no distension, no pulsatile liver, no fluid wave, no abdominal bruit, no ascites, no pulsatile midline mass and no mass  Bowel sounds are increased  There is no tenderness  Musculoskeletal: She exhibits no edema  Lymphadenopathy:     She has no cervical adenopathy  Neurological: She is alert and oriented to person, place, and time  Coordination normal    Psychiatric: She has a normal mood and affect  Her behavior is normal  Judgment and thought content normal    Nursing note and vitals reviewed

## 2019-03-14 NOTE — PROGRESS NOTES
Assessment/Plan:    1 diarrhea for the past 3 months cultures have been negative patient does have a history of diarrhea with stress she has had increased stressors in her life recently will Rx colestipol to take twice a day starting at night as needed for diarrhea to take separately from her other medications either take her medications when hour for 2 hours after colestipol,  2 hypertension is not at goal increase generic Coreg to 6 25 mg twice a day should be coming back in April for follow-up has laboratory work at that time           Diagnoses and all orders for this visit:    Diarrhea in adult patient  -     colestipol (COLESTID) 1 g tablet; Take 1 tablet (1 g total) by mouth 2 (two) times a day    Pulmonary nodule  -     CT chest wo contrast; Future    Hypothyroidism, unspecified type    Hyperuricemia    Hyperlipidemia, unspecified hyperlipidemia type  -     rosuvastatin (CRESTOR) 5 mg tablet; Take 1 tablet (5 mg total) by mouth daily    Vitamin D deficiency    Hypertension, unspecified type  -     carvedilol (COREG) 6 25 mg tablet; Take 1 tablet (6 25 mg total) by mouth 2 (two) times a day with meals  -     potassium chloride (K-DUR,KLOR-CON) 10 mEq tablet; Take 1 tablet (10 mEq total) by mouth 2 (two) times a day 1 tablet po BID    Right upper quadrant pain  -     pantoprazole (PROTONIX) 40 mg tablet; Take 1 tablet (40 mg total) by mouth daily    History of peptic ulcer  -     pantoprazole (PROTONIX) 40 mg tablet; Take 1 tablet (40 mg total) by mouth daily        The patient was counseled regarding instructions for management, risk factor reductions, patient and family education,impressions, risks and benefits of treatment options, side effects of medications, importance of compliance with treatment  The treatment plan was reviewed with the patient/guardian and patient/guardian understands and agrees with the treatment plan              Current Outpatient Medications:     ascorbic acid (VITAMIN C) 500 mg tablet, Take 500 mg by mouth daily  , Disp: , Rfl:     Calcium Citrate-Vitamin D (CITRACAL + D PO), Take by mouth 2 (two) times a day, Disp: , Rfl:     carvedilol (COREG) 6 25 mg tablet, Take 1 tablet (6 25 mg total) by mouth 2 (two) times a day with meals, Disp: 180 tablet, Rfl: 2    Cholecalciferol (VITAMIN D-3 PO), Take 2,000 Units by mouth daily  , Disp: , Rfl:     colestipol (COLESTID) 1 g tablet, Take 1 tablet (1 g total) by mouth 2 (two) times a day, Disp: 60 tablet, Rfl: 1    erythromycin (ILOTYCIN) ophthalmic ointment, , Disp: , Rfl:     folic acid (FOLVITE) 1 mg tablet, 1 daily, Disp: 90 tablet, Rfl: 2    hydrochlorothiazide (HYDRODIURIL) 25 mg tablet, Take 1 tablet (25 mg total) by mouth daily, Disp: 90 tablet, Rfl: 0    levothyroxine 50 mcg tablet, Take 1 tablet (50 mcg total) by mouth every other day, Disp: 67 tablet, Rfl: 0    levothyroxine 75 mcg tablet, Take 1 tablet (75 mcg total) by mouth every other day, Disp: 90 tablet, Rfl: 3    magnesium oxide (MAG-OX) 400 mg, Take 1 tablet (400 mg total) by mouth 2 (two) times a day, Disp: 90 tablet, Rfl: 0    methotrexate 2 5 mg tablet, Take 10 mg by mouth once a week , Disp: , Rfl:     Misc Natural Products (LUTEIN 20 PO), Take 20 mg by mouth daily  , Disp: , Rfl:     Multiple Vitamin (MULTIVITAMIN) tablet, Take 1 tablet by mouth daily  , Disp: , Rfl:     pantoprazole (PROTONIX) 40 mg tablet, Take 1 tablet (40 mg total) by mouth daily, Disp: 90 tablet, Rfl: 2    potassium chloride (K-DUR,KLOR-CON) 10 mEq tablet, Take 1 tablet (10 mEq total) by mouth 2 (two) times a day 1 tablet po BID, Disp: 180 tablet, Rfl: 2    riTUXimab (RITUXAN) 500 mg/50 mL, Infuse into a venous catheter every 6 (six) months  , Disp: , Rfl:     rosuvastatin (CRESTOR) 5 mg tablet, Take 1 tablet (5 mg total) by mouth daily, Disp: 90 tablet, Rfl: 2    tamoxifen (NOLVADEX) 20 mg tablet, Take 1 tablet (20 mg total) by mouth daily, Disp: 90 tablet, Rfl: 3    vitamin E, tocopherol, 400 units capsule, Take 400 Units by mouth daily, Disp: , Rfl:     Subjective:      Patient ID: Roise Alaniz is a 69 y o  female  Diarrhea for 3 months when eat food, in AM diarrhea, 2-4 bm per day rare wakes at night      The following portions of the patient's history were reviewed and updated as appropriate:   She has a past medical history of Allergic angioedema, Angioedema, Arthritis, Breast cancer ([facility] ), Disease of thyroid gland, Hemorrhage of anus and rectum, Hyperlipidemia, Hypertension, Hypocalcemia, Neuritis, Peptic ulcer, Rheumatoid arthritis ([facility] ), and Vitamin D deficiency  ,  does not have any pertinent problems on file  ,   has a past surgical history that includes Abdominal surgery; Breast surgery; Cholecystectomy; Elbow surgery; and orthopedic surgery  ,  family history includes Breast cancer in her maternal aunt, mother, and sister; Kidney failure in her father; Other in her father  ,   reports that she has never smoked  She has never used smokeless tobacco  She reports that she drinks alcohol  She reports that she does not use drugs  ,  is allergic to lisinopril; codeine; other; oxycodone; penicillins; sulfamethoxazole-trimethoprim; and ciprofloxacin       Review of Systems   Constitutional: Negative for appetite change, chills, fatigue, fever and unexpected weight change  HENT: Negative for congestion, ear pain, facial swelling, hearing loss, mouth sores, nosebleeds, postnasal drip, rhinorrhea, sinus pain, sore throat, trouble swallowing and voice change  Eyes: Negative for pain, discharge, redness and visual disturbance  Respiratory: Negative for apnea, cough, chest tightness, shortness of breath, wheezing and stridor  Cardiovascular: Negative for chest pain, palpitations and leg swelling  Gastrointestinal: Positive for diarrhea  Negative for abdominal distention, abdominal pain, blood in stool, constipation and vomiting     Endocrine: Negative for cold intolerance, heat intolerance, polydipsia, polyphagia and polyuria  Genitourinary: Negative for difficulty urinating, dysuria, flank pain, frequency, genital sores, hematuria and urgency  Musculoskeletal: Positive for arthralgias and joint swelling  Negative for back pain  Skin: Negative for rash and wound  Allergic/Immunologic: Negative for environmental allergies, food allergies and immunocompromised state  Neurological: Negative for dizziness, tremors, seizures, syncope, facial asymmetry, speech difficulty, weakness, light-headedness, numbness and headaches  Hematological: Negative for adenopathy  Does not bruise/bleed easily  Psychiatric/Behavioral: Negative for agitation, behavioral problems, dysphoric mood, hallucinations, self-injury, sleep disturbance and suicidal ideas  The patient is not hyperactive  Objective:  /80 (BP Location: Left arm, Patient Position: Sitting)   Pulse 65   Temp 98 4 °F (36 9 °C)   Resp 18   Ht 5' 5" (1 651 m)   Wt 87 1 kg (192 lb)   LMP  (LMP Unknown)   SpO2 98%   BMI 31 95 kg/m²     Lab Review  Appointment on 03/01/2019   Component Date Value    Fat Qual Neutral, Stl 03/01/2019 Normal     Fat,Totall 03/01/2019 Normal     OCCULT BLD, FECAL IMMUNO* 03/01/2019 Negative    Appointment on 02/28/2019   Component Date Value    C difficile toxin by PCR 02/28/2019 NEGATIVE for C difficle toxin by PCR   Cryptosporidium Smear, S* 02/28/2019 None seen     ISOSPORA SMEAR, STOOL 02/28/2019 None seen     Giardia Ag, Stl 02/28/2019 Negative     Lactoferrin, Quant 02/28/2019 <1 00     Ova + Parasite Exam 02/28/2019 No ova, cysts, or parasites seen     One negative specimen does not rule out the possibility of a  parasitic infection       Salmonella sp PCR 02/28/2019 None Detected     Shigella sp/Enteroinvasi* 02/28/2019 None Detected     Campylobacter sp (jejuni* 02/28/2019 None Detected     Shiga toxin 1/Shiga toxi* 02/28/2019 None Detected     Stool Culture, Vibrio On* 02/28/2019 No Vibrio species isolated       Fecal Leukocytes 02/28/2019 No WBC's     Yersinia Culture, Stool 02/28/2019 No Yersinia isolated at 48 hours     Miscellaneous Lab Test R* 02/28/2019 SEE WRITTEN REPORT     Appointment on 02/22/2019   Component Date Value    TSH 3RD GENERATON 02/22/2019 2 050    Appointment on 12/06/2018   Component Date Value    Magnesium 12/06/2018 1 6     Sodium 12/06/2018 146*    Potassium 12/06/2018 3 8     Chloride 12/06/2018 106     CO2 12/06/2018 31     ANION GAP 12/06/2018 9     BUN 12/06/2018 20     Creatinine 12/06/2018 1 23     Glucose 12/06/2018 106     Calcium 12/06/2018 8 8     AST 12/06/2018 21     ALT 12/06/2018 22     Alkaline Phosphatase 12/06/2018 54     Total Protein 12/06/2018 7 0     Albumin 12/06/2018 3 6     Total Bilirubin 12/06/2018 0 40     eGFR 12/06/2018 42    Hospital Outpatient Visit on 11/21/2018   Component Date Value    WBC 11/21/2018 7 18     RBC 11/21/2018 3 38*    Hemoglobin 11/21/2018 10 8*    Hematocrit 11/21/2018 34 1*    MCV 11/21/2018 101*    MCH 11/21/2018 32 0     MCHC 11/21/2018 31 7     RDW 11/21/2018 14 3     MPV 11/21/2018 9 8     Platelets 93/50/8292 197     nRBC 11/21/2018 0     Neutrophils Relative 11/21/2018 66     Immat GRANS % 11/21/2018 0     Lymphocytes Relative 11/21/2018 18     Monocytes Relative 11/21/2018 11     Eosinophils Relative 11/21/2018 4     Basophils Relative 11/21/2018 1     Neutrophils Absolute 11/21/2018 4 75     Immature Grans Absolute 11/21/2018 0 02     Lymphocytes Absolute 11/21/2018 1 27     Monocytes Absolute 11/21/2018 0 78     Eosinophils Absolute 11/21/2018 0 27     Basophils Absolute 11/21/2018 0 09     Sodium 11/21/2018 143     Potassium 11/21/2018 3 5     Chloride 11/21/2018 106     CO2 11/21/2018 27     ANION GAP 11/21/2018 10     BUN 11/21/2018 25     Creatinine 11/21/2018 1 10     Glucose 11/21/2018 107     Calcium 11/21/2018 8 9     AST 11/21/2018 21     ALT 11/21/2018 23     Alkaline Phosphatase 11/21/2018 52     Total Protein 11/21/2018 6 7     Albumin 11/21/2018 3 5     Total Bilirubin 11/21/2018 0 50     eGFR 11/21/2018 49    Hospital Outpatient Visit on 11/07/2018   Component Date Value    WBC 11/07/2018 6 97     RBC 11/07/2018 3 39*    Hemoglobin 11/07/2018 11 3*    Hematocrit 11/07/2018 34 8     MCV 11/07/2018 103*    MCH 11/07/2018 33 3     MCHC 11/07/2018 32 5     RDW 11/07/2018 14 3     MPV 11/07/2018 10 2     Platelets 57/56/7407 221     nRBC 11/07/2018 0     Neutrophils Relative 11/07/2018 66     Immat GRANS % 11/07/2018 0     Lymphocytes Relative 11/07/2018 18     Monocytes Relative 11/07/2018 10     Eosinophils Relative 11/07/2018 5     Basophils Relative 11/07/2018 1     Neutrophils Absolute 11/07/2018 4 52     Immature Grans Absolute 11/07/2018 0 02     Lymphocytes Absolute 11/07/2018 1 25     Monocytes Absolute 11/07/2018 0 72     Eosinophils Absolute 11/07/2018 0 36     Basophils Absolute 11/07/2018 0 10     Sodium 11/07/2018 145     Potassium 11/07/2018 3 8     Chloride 11/07/2018 107     CO2 11/07/2018 28     ANION GAP 11/07/2018 10     BUN 11/07/2018 25     Creatinine 11/07/2018 1 18     Glucose 11/07/2018 114     Calcium 11/07/2018 9 0     AST 11/07/2018 22     ALT 11/07/2018 23     Alkaline Phosphatase 11/07/2018 50     Total Protein 11/07/2018 6 8     Albumin 11/07/2018 3 4*    Total Bilirubin 11/07/2018 0 40     eGFR 11/07/2018 45    Appointment on 10/25/2018   Component Date Value    Albumin 10/25/2018 3 7     Calcium 10/25/2018 9 0     Phosphorus 10/25/2018 3 3     Glucose 10/25/2018 102     BUN 10/25/2018 21     Creatinine 10/25/2018 1 18     Sodium 10/25/2018 142     Potassium 10/25/2018 3 4*    Chloride 10/25/2018 103     CO2 10/25/2018 29     ANION GAP 10/25/2018 10     eGFR 10/25/2018 45     Magnesium 10/25/2018 1 5*    Lipase 10/25/2018 301     Amylase 10/25/2018 93    Appointment on 2018   Component Date Value    WBC 2018 8 16     RBC 2018 3 72*    Hemoglobin 2018 12 2     Hematocrit 2018 37 4     MCV 2018 101*    MCH 2018 32 8     MCHC 2018 32 6     RDW 2018 14 1     MPV 2018 10 0     Platelets  224     nRBC 2018 0     Neutrophils Relative 2018 70     Immat GRANS % 2018 0     Lymphocytes Relative 2018 17     Monocytes Relative 2018 9     Eosinophils Relative 2018 3     Basophils Relative 2018 1     Neutrophils Absolute 2018 5 75     Immature Grans Absolute 2018 0 03     Lymphocytes Absolute 2018 1 37     Monocytes Absolute 2018 0 69     Eosinophils Absolute 2018 0 28     Basophils Absolute 2018 0 04     Magnesium 2018 1 4*    Sodium 2018 141     Potassium 2018 3 4*    Chloride 2018 103     CO2 2018 31     ANION GAP 2018 7     BUN 2018 21     Creatinine 2018 1 24     Glucose, Fasting 2018 102*    Calcium 2018 9 3     AST 2018 21     ALT 2018 23     Alkaline Phosphatase 2018 48     Total Protein 2018 6 9     Albumin 2018 3 6     Total Bilirubin 2018 0 60     eGFR 2018 42     Sed Rate 2018 17     CRP 2018 19 1*    TSH 3RD GENERATON 2018 3 828*    Vit D, 25-Hydroxy 2018 42 2     Free T4 2018 1 10          Imaging  @[email or web address]@  Recent Results (from the past 98849 hour(s))   Echo complete with contrast if indicated    Narrative    [facility] [address] [facility]  [phone]    Transthoracic Echocardiogram  2D, M-mode, Doppler, and Color Doppler    Study date:  26-Oct-2017    Patient: Madina Andersno  MR number: [id number]  Account number: [de-identified]  : 1940  Age: 69 years  Gender: Female  Status: Outpatient  Location: [facility]  Height: 63 in  Weight: 180 lb  BP: 120/ 60 mmHg    Indications: Murmur  Diagnoses: R01 1 - Cardiac murmur, unspecified    Sonographer:  Hernandez RCS  Interpreting Physician:  Dusty Deshpande MD  Primary Physician:  Juni Buckner DO  Referring Physician:  Juni Buckner DO  Group:  [facility]    LEFT VENTRICLE:  Systolic function was normal  Ejection fraction was estimated in the range of 55 % to 65 %  There were no regional wall motion abnormalities  Features were consistent with a pseudonormal left ventricular filling pattern, with concomitant abnormal relaxation and increased filling pressure (grade 2 diastolic dysfunction)  MITRAL VALVE:  There was mild annular calcification  There was mild regurgitation  AORTIC VALVE:  There was trace regurgitation  TRICUSPID VALVE:  There was mild regurgitation  HISTORY: PRIOR HISTORY: Breast cancer  Risk factors: hypertension and hypercholesterolemia  PROCEDURE: The study was performed in the [address] [place] [address]  This was a routine study  The transthoracic approach was used  The study included complete 2D imaging, M-mode, complete spectral Doppler, and color Doppler  Images  were obtained from the parasternal, apical, subcostal, and suprasternal notch acoustic windows  Image quality was adequate  LEFT VENTRICLE: Size was normal  Systolic function was normal  Ejection fraction was estimated in the range of 55 % to 65 %  There were no regional wall motion abnormalities  Wall thickness was normal  DOPPLER: Features were consistent  with a pseudonormal left ventricular filling pattern, with concomitant abnormal relaxation and increased filling pressure (grade 2 diastolic dysfunction)      RIGHT VENTRICLE: The size was normal  Systolic function was normal  Wall thickness was normal     LEFT ATRIUM: Size was normal     RIGHT ATRIUM: Size was normal     MITRAL VALVE: There was mild annular calcification  Valve structure was normal  There was normal leaflet separation  DOPPLER: The transmitral velocity was within the normal range  There was no evidence for stenosis  There was mild  regurgitation  AORTIC VALVE: The valve was trileaflet  Leaflets exhibited normal thickness and normal cuspal separation  DOPPLER: Transaortic velocity was within the normal range  There was no evidence for stenosis  There was trace regurgitation  TRICUSPID VALVE: The valve structure was normal  There was normal leaflet separation  DOPPLER: The transtricuspid velocity was within the normal range  There was no evidence for stenosis  There was mild regurgitation  PULMONIC VALVE: Leaflets exhibited normal thickness, no calcification, and normal cuspal separation  DOPPLER: The transpulmonic velocity was within the normal range  There was no regurgitation  PERICARDIUM: There was no pericardial effusion  The pericardium was normal in appearance  AORTA: The root exhibited normal size  SYSTEMIC VEINS: IVC: The inferior vena cava was normal in size and course  Respirophasic changes were normal     SYSTEM MEASUREMENT TABLES    Apical four chamber  4 chamber Left Atrium Volume Index; Planimetry; End Systole; Apical four chamber;: 17 26 cm2  Left Ventricular Ejection Fraction; Method of Disks, Single Plane; Apical four chamber;: 62 5 %  Left Ventricular End Diastolic Volume; Method of Disks, Single Plane; Apical four chamber;: 57 1 ml  Left Ventricular End Systolic Volume; Method of Disks, Single Plane; Apical four chamber;: 21 4 ml  Right Atrium Systolic Area; Planimetry; End Systole; Apical four chamber;: 13 17 cm2  Right Ventricular Internal Diastolic Dimension; End Diastole; Apical four chamber;: 36 9 mm  TAPSE: 26 2 mm    Unspecified Scan Mode  Aortic Root Diameter;  End Systole;: 26 5 mm  Aortic valve Area; Continuity Equation by Velocity Time Integral; Systole;: 1 88 cm2  Cardiovascular Orifice Diameter; End Systole;: 19 5 mm  Gradient Pressure, Peak; Simplified Bernoulli; Antegrade Flow; Systole;: 17 5 mm[Hg]  Gradient pressure, average; Simplified Bernoulli; Antegrade Flow; Systole;: 9 mm[Hg]  Left atrial diameter; End Diastole;: 35 8 mm  Cardiac Output; Teichholz; Systole;: 2 93 L/min  Interventricular Septum Diastolic Thickness; Teichholz; End Diastole;: 11 4 mm  Left Ventricle Internal End Diastolic Dimension; Teichholz;: 37 6 mm  Left Ventricle Internal Systolic Dimension; Teichholz; End Systole;: 22 9 mm  Left Ventricle Posterior Wall Diastolic Thickness; Teichholz; End Diastole;: 11 5 mm  Left Ventricular Ejection Fraction; Teichholz;: 70 4 %  Stroke volume; Teichholz; Systole;: 42 5 ml  Mitral Valve Area; Area by Pressure Half-Time; Systole;: 3 49 cm2  Mitral Valve E to A Ratio; Systole;: 1 38  Maximum Tricuspid valve regurgitation pressure gradient; Regurgitant Flow; Systole;: 30 1 mm[Hg]    Inters\[facility] [place] [facility]\"" Commission Accredited Echocardiography Laboratory    Prepared and electronically signed by    Jose Smith MD  Signed 26-Oct-2017 18:33:23       CT chest wo contrast    (Results Pending)     No results found for this or any previous visit  Physical Exam   Constitutional: She is oriented to person, place, and time  She appears well-developed  obese   HENT:   Right Ear: External ear normal    Left Ear: External ear normal    Eyes: Right eye exhibits no discharge  Left eye exhibits no discharge  No scleral icterus  Neck: Carotid bruit is not present  No tracheal deviation present  No thyroid mass and no thyromegaly present  Cardiovascular: Normal rate, regular rhythm, normal heart sounds and intact distal pulses  Exam reveals no gallop and no friction rub  No murmur heard  Pulmonary/Chest: No respiratory distress  She has no wheezes  She has no rales  Abdominal: Soft  Normal appearance   She exhibits no shifting dullness, no distension, no pulsatile liver, no fluid wave, no abdominal bruit, no ascites, no pulsatile midline mass and no mass  Bowel sounds are increased  There is no tenderness  Musculoskeletal: She exhibits no edema  Lymphadenopathy:     She has no cervical adenopathy  Neurological: She is alert and oriented to person, place, and time  Coordination normal    Psychiatric: She has a normal mood and affect  Her behavior is normal  Judgment and thought content normal    Nursing note and vitals reviewed

## 2019-03-25 ENCOUNTER — TELEPHONE (OUTPATIENT)
Dept: INTERNAL MEDICINE CLINIC | Facility: CLINIC | Age: 79
End: 2019-03-25

## 2019-03-25 NOTE — TELEPHONE ENCOUNTER
Spoke with Mukesh Taylor) and ask him pls call patient and redo the AFB smear stool test, since the patient stills having diarrhea      Mukesh Cerrato will call patient,

## 2019-03-25 NOTE — TELEPHONE ENCOUNTER
Maria C Yu, , canceled the AFB smear stool by mistake back on 2/28/19  Lab was never preformed due to lab accident       If patient is still having diarrhea and dr Praful Mcgowan wants pt to have testing, the lab will call patient to apologize and have patient repeat test      Please call [facility] back at [phone] option 1

## 2019-03-26 ENCOUNTER — TRANSCRIBE ORDERS (OUTPATIENT)
Dept: LAB | Facility: HOSPITAL | Age: 79
End: 2019-03-26

## 2019-03-26 DIAGNOSIS — E03.9 HYPOTHYROIDISM, UNSPECIFIED TYPE: Primary | ICD-10-CM

## 2019-03-26 DIAGNOSIS — R19.7 DIARRHEA IN ADULT PATIENT: ICD-10-CM

## 2019-03-28 ENCOUNTER — HOSPITAL ENCOUNTER (OUTPATIENT)
Dept: CT IMAGING | Facility: HOSPITAL | Age: 79
Discharge: HOME | End: 2019-03-28
Attending: INTERNAL MEDICINE
Payer: MEDICARE

## 2019-03-28 DIAGNOSIS — R91.1 PULMONARY NODULE: ICD-10-CM

## 2019-03-28 PROCEDURE — 71250 CT THORAX DX C-: CPT

## 2019-04-01 DIAGNOSIS — I10 HYPERTENSION, UNSPECIFIED TYPE: Chronic | ICD-10-CM

## 2019-04-01 DIAGNOSIS — R60.0 LOCALIZED EDEMA: ICD-10-CM

## 2019-04-01 RX ORDER — HYDROCHLOROTHIAZIDE 25 MG/1
25 TABLET ORAL DAILY
Qty: 90 TABLET | Refills: 0 | Status: SHIPPED | OUTPATIENT
Start: 2019-04-01 | End: 2019-06-24 | Stop reason: SDUPTHER

## 2019-04-03 PROBLEM — R91.1 PULMONARY NODULE: Status: RESOLVED | Noted: 2017-05-02 | Resolved: 2019-04-03

## 2019-04-05 DIAGNOSIS — R19.7 DIARRHEA IN ADULT PATIENT: ICD-10-CM

## 2019-04-05 RX ORDER — MONTELUKAST SODIUM 4 MG/1
TABLET, CHEWABLE ORAL
Qty: 60 TABLET | Refills: 0 | Status: SHIPPED | OUTPATIENT
Start: 2019-04-05 | End: 2019-12-19

## 2019-04-08 ENCOUNTER — APPOINTMENT (OUTPATIENT)
Dept: LAB | Facility: HOSPITAL | Age: 79
End: 2019-04-08
Attending: INTERNAL MEDICINE
Payer: MEDICARE

## 2019-04-08 DIAGNOSIS — E03.9 HYPOTHYROIDISM, UNSPECIFIED TYPE: ICD-10-CM

## 2019-04-08 DIAGNOSIS — Z23 NEED FOR IMMUNIZATION AGAINST INFLUENZA: ICD-10-CM

## 2019-04-08 DIAGNOSIS — E55.9 VITAMIN D DEFICIENCY: ICD-10-CM

## 2019-04-08 DIAGNOSIS — R60.0 LOCALIZED EDEMA: ICD-10-CM

## 2019-04-08 DIAGNOSIS — E78.5 HYPERLIPIDEMIA, UNSPECIFIED HYPERLIPIDEMIA TYPE: ICD-10-CM

## 2019-04-08 DIAGNOSIS — I51.89 DIASTOLIC DYSFUNCTION: ICD-10-CM

## 2019-04-08 DIAGNOSIS — M06.9 RHEUMATOID ARTHRITIS INVOLVING LEFT ANKLE, UNSPECIFIED RHEUMATOID FACTOR PRESENCE: Chronic | ICD-10-CM

## 2019-04-08 DIAGNOSIS — E87.6 HYPOKALEMIA: ICD-10-CM

## 2019-04-08 DIAGNOSIS — I10 HYPERTENSION, UNSPECIFIED TYPE: Chronic | ICD-10-CM

## 2019-04-08 DIAGNOSIS — R91.1 PULMONARY NODULE: ICD-10-CM

## 2019-04-08 DIAGNOSIS — E83.42 HYPOMAGNESEMIA: ICD-10-CM

## 2019-04-08 DIAGNOSIS — E79.0 HYPERURICEMIA: ICD-10-CM

## 2019-04-08 DIAGNOSIS — M54.16 LUMBAR RADICULOPATHY: ICD-10-CM

## 2019-04-08 LAB
ALBUMIN SERPL BCP-MCNC: 3.5 G/DL (ref 3.5–5)
ALP SERPL-CCNC: 43 U/L (ref 46–116)
ALT SERPL W P-5'-P-CCNC: 20 U/L (ref 12–78)
ANION GAP SERPL CALCULATED.3IONS-SCNC: 8 MMOL/L (ref 4–13)
AST SERPL W P-5'-P-CCNC: 22 U/L (ref 5–45)
BASOPHILS # BLD AUTO: 0.09 THOUSANDS/ΜL (ref 0–0.1)
BASOPHILS NFR BLD AUTO: 2 % (ref 0–1)
BILIRUB SERPL-MCNC: 0.4 MG/DL (ref 0.2–1)
BUN SERPL-MCNC: 22 MG/DL (ref 5–25)
CALCIUM SERPL-MCNC: 8.7 MG/DL (ref 8.3–10.1)
CHLORIDE SERPL-SCNC: 105 MMOL/L (ref 100–108)
CHOLEST SERPL-MCNC: 163 MG/DL (ref 50–200)
CO2 SERPL-SCNC: 29 MMOL/L (ref 21–32)
CREAT SERPL-MCNC: 1.14 MG/DL (ref 0.6–1.3)
EOSINOPHIL # BLD AUTO: 0.24 THOUSAND/ΜL (ref 0–0.61)
EOSINOPHIL NFR BLD AUTO: 4 % (ref 0–6)
ERYTHROCYTE [DISTWIDTH] IN BLOOD BY AUTOMATED COUNT: 15 % (ref 11.6–15.1)
EST. AVERAGE GLUCOSE BLD GHB EST-MCNC: 117 MG/DL
GFR SERPL CREATININE-BSD FRML MDRD: 46 ML/MIN/1.73SQ M
GLUCOSE P FAST SERPL-MCNC: 89 MG/DL (ref 65–99)
HBA1C MFR BLD: 5.7 % (ref 4.2–6.3)
HCT VFR BLD AUTO: 34.2 % (ref 34.8–46.1)
HDLC SERPL-MCNC: 41 MG/DL (ref 40–60)
HGB BLD-MCNC: 10.9 G/DL (ref 11.5–15.4)
IMM GRANULOCYTES # BLD AUTO: 0.02 THOUSAND/UL (ref 0–0.2)
IMM GRANULOCYTES NFR BLD AUTO: 0 % (ref 0–2)
LDLC SERPL CALC-MCNC: 87 MG/DL (ref 0–100)
LDLC SERPL DIRECT ASSAY-MCNC: 99 MG/DL (ref 0–100)
LYMPHOCYTES # BLD AUTO: 1.18 THOUSANDS/ΜL (ref 0.6–4.47)
LYMPHOCYTES NFR BLD AUTO: 21 % (ref 14–44)
MAGNESIUM SERPL-MCNC: 1.8 MG/DL (ref 1.6–2.6)
MCH RBC QN AUTO: 33.4 PG (ref 26.8–34.3)
MCHC RBC AUTO-ENTMCNC: 31.9 G/DL (ref 31.4–37.4)
MCV RBC AUTO: 105 FL (ref 82–98)
MONOCYTES # BLD AUTO: 0.6 THOUSAND/ΜL (ref 0.17–1.22)
MONOCYTES NFR BLD AUTO: 11 % (ref 4–12)
NEUTROPHILS # BLD AUTO: 3.4 THOUSANDS/ΜL (ref 1.85–7.62)
NEUTS SEG NFR BLD AUTO: 62 % (ref 43–75)
NRBC BLD AUTO-RTO: 0 /100 WBCS
PLATELET # BLD AUTO: 195 THOUSANDS/UL (ref 149–390)
PMV BLD AUTO: 10.1 FL (ref 8.9–12.7)
POTASSIUM SERPL-SCNC: 3.8 MMOL/L (ref 3.5–5.3)
PROT SERPL-MCNC: 6.6 G/DL (ref 6.4–8.2)
RBC # BLD AUTO: 3.26 MILLION/UL (ref 3.81–5.12)
SODIUM SERPL-SCNC: 142 MMOL/L (ref 136–145)
TRIGL SERPL-MCNC: 174 MG/DL
TSH SERPL DL<=0.05 MIU/L-ACNC: 2.63 UIU/ML (ref 0.36–3.74)
WBC # BLD AUTO: 5.53 THOUSAND/UL (ref 4.31–10.16)

## 2019-04-08 PROCEDURE — 83721 ASSAY OF BLOOD LIPOPROTEIN: CPT

## 2019-04-08 PROCEDURE — 83036 HEMOGLOBIN GLYCOSYLATED A1C: CPT

## 2019-04-08 PROCEDURE — 80053 COMPREHEN METABOLIC PANEL: CPT

## 2019-04-08 PROCEDURE — 85025 COMPLETE CBC W/AUTO DIFF WBC: CPT

## 2019-04-08 PROCEDURE — 80061 LIPID PANEL: CPT

## 2019-04-08 PROCEDURE — 36415 COLL VENOUS BLD VENIPUNCTURE: CPT

## 2019-04-08 PROCEDURE — 83735 ASSAY OF MAGNESIUM: CPT

## 2019-04-08 PROCEDURE — 84443 ASSAY THYROID STIM HORMONE: CPT

## 2019-04-08 PROCEDURE — 82306 VITAMIN D 25 HYDROXY: CPT

## 2019-04-08 RX ORDER — LEVOTHYROXINE SODIUM 0.05 MG/1
50 TABLET ORAL EVERY OTHER DAY
Qty: 67 TABLET | Refills: 0 | Status: SHIPPED | OUTPATIENT
Start: 2019-04-08 | End: 2019-06-10 | Stop reason: SDUPTHER

## 2019-04-09 LAB — 25(OH)D3 SERPL-MCNC: 39.2 NG/ML (ref 30–100)

## 2019-04-22 RX ORDER — SODIUM CHLORIDE 9 MG/ML
20 INJECTION, SOLUTION INTRAVENOUS CONTINUOUS
Status: DISCONTINUED | OUTPATIENT
Start: 2019-04-23 | End: 2019-04-26 | Stop reason: HOSPADM

## 2019-04-22 RX ORDER — DIPHENHYDRAMINE HCL 25 MG
50 TABLET ORAL ONCE
Status: COMPLETED | OUTPATIENT
Start: 2019-04-23 | End: 2019-04-23

## 2019-04-22 RX ORDER — METHYLPREDNISOLONE SODIUM SUCCINATE 125 MG/2ML
100 INJECTION, POWDER, LYOPHILIZED, FOR SOLUTION INTRAMUSCULAR; INTRAVENOUS ONCE
Status: COMPLETED | OUTPATIENT
Start: 2019-04-23 | End: 2019-04-23

## 2019-04-22 RX ORDER — ACETAMINOPHEN 325 MG/1
1000 TABLET ORAL ONCE
Status: COMPLETED | OUTPATIENT
Start: 2019-04-23 | End: 2019-04-23

## 2019-04-23 ENCOUNTER — HOSPITAL ENCOUNTER (OUTPATIENT)
Dept: INFUSION CENTER | Facility: CLINIC | Age: 79
Discharge: HOME | End: 2019-04-23
Payer: MEDICARE

## 2019-04-23 VITALS
RESPIRATION RATE: 20 BRPM | TEMPERATURE: 98.3 F | DIASTOLIC BLOOD PRESSURE: 76 MMHG | SYSTOLIC BLOOD PRESSURE: 172 MMHG | HEART RATE: 64 BPM

## 2019-04-23 PROCEDURE — 96413 CHEMO IV INFUSION 1 HR: CPT

## 2019-04-23 PROCEDURE — 96415 CHEMO IV INFUSION ADDL HR: CPT

## 2019-04-23 PROCEDURE — 96375 TX/PRO/DX INJ NEW DRUG ADDON: CPT

## 2019-04-23 RX ADMIN — METHYLPREDNISOLONE SODIUM SUCCINATE 100 MG: 125 INJECTION, POWDER, FOR SOLUTION INTRAMUSCULAR; INTRAVENOUS at 10:21

## 2019-04-23 RX ADMIN — ACETAMINOPHEN 975 MG: 325 TABLET, FILM COATED ORAL at 10:16

## 2019-04-23 RX ADMIN — SODIUM CHLORIDE 20 ML/HR: 0.9 INJECTION, SOLUTION INTRAVENOUS at 10:13

## 2019-04-23 RX ADMIN — RITUXIMAB: 10 INJECTION, SOLUTION INTRAVENOUS at 10:51

## 2019-04-23 RX ADMIN — DIPHENHYDRAMINE HCL 50 MG: 25 TABLET, FILM COATED ORAL at 10:16

## 2019-04-23 NOTE — PROGRESS NOTES
Pt here for Rituxan, offers no complaints, resting comfortably  Vitals stable  Labs reviewed  Pt tolerated treatment well without any adverse reactions  Aware of next appointments  AVS provided

## 2019-05-06 RX ORDER — SODIUM CHLORIDE 9 MG/ML
20 INJECTION, SOLUTION INTRAVENOUS CONTINUOUS
Status: DISCONTINUED | OUTPATIENT
Start: 2019-05-07 | End: 2019-05-10 | Stop reason: HOSPADM

## 2019-05-06 RX ORDER — METHYLPREDNISOLONE SODIUM SUCCINATE 125 MG/2ML
100 INJECTION, POWDER, LYOPHILIZED, FOR SOLUTION INTRAMUSCULAR; INTRAVENOUS ONCE
Status: COMPLETED | OUTPATIENT
Start: 2019-05-07 | End: 2019-05-07

## 2019-05-06 RX ORDER — DIPHENHYDRAMINE HCL 25 MG
50 TABLET ORAL ONCE
Status: COMPLETED | OUTPATIENT
Start: 2019-05-07 | End: 2019-05-07

## 2019-05-06 RX ORDER — ACETAMINOPHEN 325 MG/1
975 TABLET ORAL ONCE
Status: COMPLETED | OUTPATIENT
Start: 2019-05-07 | End: 2019-05-07

## 2019-05-06 RX ORDER — ACETAMINOPHEN 325 MG/1
1000 TABLET ORAL ONCE
Status: DISCONTINUED | OUTPATIENT
Start: 2019-05-07 | End: 2019-05-06

## 2019-05-07 ENCOUNTER — HOSPITAL ENCOUNTER (OUTPATIENT)
Dept: INFUSION CENTER | Facility: CLINIC | Age: 79
Discharge: HOME | End: 2019-05-07
Payer: MEDICARE

## 2019-05-07 DIAGNOSIS — I10 HYPERTENSION, UNSPECIFIED TYPE: Chronic | ICD-10-CM

## 2019-05-07 PROCEDURE — 96372 THER/PROPH/DIAG INJ SC/IM: CPT

## 2019-05-07 PROCEDURE — 96415 CHEMO IV INFUSION ADDL HR: CPT

## 2019-05-07 PROCEDURE — 96413 CHEMO IV INFUSION 1 HR: CPT

## 2019-05-07 RX ORDER — POTASSIUM CHLORIDE 750 MG/1
TABLET, EXTENDED RELEASE ORAL
Qty: 120 TABLET | Refills: 2 | Status: SHIPPED | OUTPATIENT
Start: 2019-05-07 | End: 2020-04-24 | Stop reason: SDUPTHER

## 2019-05-07 RX ADMIN — ACETAMINOPHEN 975 MG: 325 TABLET, FILM COATED ORAL at 10:28

## 2019-05-07 RX ADMIN — DIPHENHYDRAMINE HCL 50 MG: 25 TABLET, FILM COATED ORAL at 10:28

## 2019-05-07 RX ADMIN — RITUXIMAB: 10 INJECTION, SOLUTION INTRAVENOUS at 11:06

## 2019-05-07 RX ADMIN — SODIUM CHLORIDE 20 ML/HR: 0.9 INJECTION, SOLUTION INTRAVENOUS at 10:20

## 2019-05-07 RX ADMIN — METHYLPREDNISOLONE SODIUM SUCCINATE 100 MG: 125 INJECTION, POWDER, FOR SOLUTION INTRAMUSCULAR; INTRAVENOUS at 10:27

## 2019-05-07 NOTE — PROGRESS NOTES
Pt to infusion center for day 15 off rituxan for RA  Pt c/o generalized pain to all joints  PIV inserted without issue pt tolerated well

## 2019-06-04 DIAGNOSIS — E03.9 HYPOTHYROIDISM, UNSPECIFIED TYPE: ICD-10-CM

## 2019-06-04 DIAGNOSIS — E78.5 HYPERLIPIDEMIA, UNSPECIFIED HYPERLIPIDEMIA TYPE: ICD-10-CM

## 2019-06-04 DIAGNOSIS — E83.42 HYPOMAGNESEMIA: ICD-10-CM

## 2019-06-04 DIAGNOSIS — R79.9 ABNORMAL BLOOD CHEMISTRY: ICD-10-CM

## 2019-06-04 DIAGNOSIS — I10 HYPERTENSION, UNSPECIFIED TYPE: Chronic | ICD-10-CM

## 2019-06-04 RX ORDER — LEVOTHYROXINE SODIUM 0.07 MG/1
TABLET ORAL
Qty: 30 TABLET | Refills: 4 | Status: SHIPPED | OUTPATIENT
Start: 2019-06-04 | End: 2019-06-10 | Stop reason: SDUPTHER

## 2019-06-05 PROBLEM — H35.3131 EARLY DRY STAGE NONEXUDATIVE AGE-RELATED MACULAR DEGENERATION OF BOTH EYES: Status: ACTIVE | Noted: 2019-06-05

## 2019-06-10 ENCOUNTER — OFFICE VISIT (OUTPATIENT)
Dept: INTERNAL MEDICINE CLINIC | Facility: CLINIC | Age: 79
End: 2019-06-10
Payer: MEDICARE

## 2019-06-10 VITALS
RESPIRATION RATE: 18 BRPM | HEIGHT: 65 IN | WEIGHT: 191.6 LBS | DIASTOLIC BLOOD PRESSURE: 68 MMHG | SYSTOLIC BLOOD PRESSURE: 118 MMHG | OXYGEN SATURATION: 96 % | HEART RATE: 70 BPM | BODY MASS INDEX: 31.92 KG/M2 | TEMPERATURE: 99.3 F

## 2019-06-10 DIAGNOSIS — J84.9 INTERSTITIAL PULMONARY DISEASE (HCC): ICD-10-CM

## 2019-06-10 DIAGNOSIS — E78.5 HYPERLIPIDEMIA, UNSPECIFIED HYPERLIPIDEMIA TYPE: ICD-10-CM

## 2019-06-10 DIAGNOSIS — I10 HYPERTENSION, UNSPECIFIED TYPE: Chronic | ICD-10-CM

## 2019-06-10 DIAGNOSIS — E83.42 HYPOMAGNESEMIA: ICD-10-CM

## 2019-06-10 DIAGNOSIS — M85.80 OSTEOPENIA, UNSPECIFIED LOCATION: ICD-10-CM

## 2019-06-10 DIAGNOSIS — Z00.00 HEALTHCARE MAINTENANCE: Primary | ICD-10-CM

## 2019-06-10 DIAGNOSIS — M06.9 RHEUMATOID ARTHRITIS INVOLVING LEFT ANKLE, UNSPECIFIED RHEUMATOID FACTOR PRESENCE: Chronic | ICD-10-CM

## 2019-06-10 DIAGNOSIS — E03.9 HYPOTHYROIDISM, UNSPECIFIED TYPE: Chronic | ICD-10-CM

## 2019-06-10 DIAGNOSIS — C50.919 ADENOCARCINOMA OF BREAST, UNSPECIFIED LATERALITY (HCC): ICD-10-CM

## 2019-06-10 DIAGNOSIS — R79.9 ABNORMAL BLOOD CHEMISTRY: ICD-10-CM

## 2019-06-10 PROBLEM — R10.11 RIGHT UPPER QUADRANT PAIN: Status: RESOLVED | Noted: 2018-10-11 | Resolved: 2019-06-10

## 2019-06-10 PROBLEM — R19.7 DIARRHEA IN ADULT PATIENT: Status: RESOLVED | Noted: 2019-02-22 | Resolved: 2019-06-10

## 2019-06-10 PROCEDURE — 99214 OFFICE O/P EST MOD 30 MIN: CPT | Performed by: NURSE PRACTITIONER

## 2019-06-10 PROCEDURE — G0439 PPPS, SUBSEQ VISIT: HCPCS | Performed by: NURSE PRACTITIONER

## 2019-06-10 RX ORDER — LEVOTHYROXINE SODIUM 0.07 MG/1
TABLET ORAL
Qty: 45 TABLET | Refills: 3 | Status: SHIPPED | OUTPATIENT
Start: 2019-06-10 | End: 2020-07-24 | Stop reason: SDUPTHER

## 2019-06-10 RX ORDER — LEVOTHYROXINE SODIUM 0.05 MG/1
50 TABLET ORAL EVERY OTHER DAY
Qty: 45 TABLET | Refills: 3 | Status: SHIPPED | OUTPATIENT
Start: 2019-06-10 | End: 2020-05-04

## 2019-06-24 DIAGNOSIS — R60.0 LOCALIZED EDEMA: ICD-10-CM

## 2019-06-24 DIAGNOSIS — I10 HYPERTENSION, UNSPECIFIED TYPE: Chronic | ICD-10-CM

## 2019-06-24 RX ORDER — HYDROCHLOROTHIAZIDE 25 MG/1
25 TABLET ORAL DAILY
Qty: 90 TABLET | Refills: 0 | Status: SHIPPED | OUTPATIENT
Start: 2019-06-24 | End: 2019-09-30 | Stop reason: SDUPTHER

## 2019-07-16 ENCOUNTER — TELEPHONE (OUTPATIENT)
Dept: HEMATOLOGY ONCOLOGY | Facility: CLINIC | Age: 79
End: 2019-07-16

## 2019-07-16 DIAGNOSIS — C50.919 ADENOCARCINOMA OF BREAST, UNSPECIFIED LATERALITY (HCC): ICD-10-CM

## 2019-07-16 NOTE — TELEPHONE ENCOUNTER
Laura Holden called and needs a refill on Tamoxifen 20 mg 1 tablet by mouth once a day 90 day supply with refills   Please call [facility] mail order at [phone]

## 2019-07-17 RX ORDER — TAMOXIFEN CITRATE 20 MG/1
20 TABLET ORAL DAILY
Qty: 90 TABLET | Refills: 3 | Status: SHIPPED | OUTPATIENT
Start: 2019-07-17 | End: 2019-08-08 | Stop reason: SDUPTHER

## 2019-08-08 DIAGNOSIS — C50.919 ADENOCARCINOMA OF BREAST, UNSPECIFIED LATERALITY (HCC): ICD-10-CM

## 2019-08-08 RX ORDER — TAMOXIFEN CITRATE 20 MG/1
20 TABLET ORAL DAILY
Qty: 90 TABLET | Refills: 3 | Status: SHIPPED | OUTPATIENT
Start: 2019-08-08 | End: 2019-08-08 | Stop reason: SDUPTHER

## 2019-08-08 RX ORDER — TAMOXIFEN CITRATE 20 MG/1
20 TABLET ORAL DAILY
Qty: 90 TABLET | Refills: 3 | Status: SHIPPED | OUTPATIENT
Start: 2019-08-08 | End: 2019-12-19 | Stop reason: ALTCHOICE

## 2019-08-26 ENCOUNTER — OFFICE VISIT (OUTPATIENT)
Dept: HEMATOLOGY ONCOLOGY | Facility: CLINIC | Age: 79
End: 2019-08-26
Payer: MEDICARE

## 2019-08-26 VITALS
OXYGEN SATURATION: 99 % | BODY MASS INDEX: 32.07 KG/M2 | HEART RATE: 61 BPM | SYSTOLIC BLOOD PRESSURE: 142 MMHG | RESPIRATION RATE: 18 BRPM | DIASTOLIC BLOOD PRESSURE: 80 MMHG | HEIGHT: 65 IN | TEMPERATURE: 98.5 F | WEIGHT: 192.5 LBS

## 2019-08-26 DIAGNOSIS — C50.919 ADENOCARCINOMA OF BREAST, UNSPECIFIED LATERALITY (HCC): Primary | ICD-10-CM

## 2019-08-26 DIAGNOSIS — D64.9 ANEMIA, UNSPECIFIED TYPE: ICD-10-CM

## 2019-08-26 PROCEDURE — 99214 OFFICE O/P EST MOD 30 MIN: CPT | Performed by: INTERNAL MEDICINE

## 2019-08-26 NOTE — PROGRESS NOTES
HEMATOLOGY / [address] NOTE    Primary Care Provider: Trino Crouch DO  Referring Provider:    MRN: [id number]  : 1940    Reason for Encounter:    Chief Complaint   Patient presents with    Follow-up     1 year          History of Hematology / Oncology Illness:     Jonah Molina is a 69 y o  female who came in to establish care with oncology  Stage 1, right breast Cancer, invasive ductal carcinoma, ERPR positive, her 2 positive on FISH, grade 2     D/X:  2012  Tr:  Status post lumpectomy, radiation, Herceptin, currently on tamoxifen, x 5 years   -[facility] - [facility] by Dr Ana Maria Tapia      - on Tamoxifen for additional 5 yr    - mammogram 2019:   Negative for malignancy          Assessment / Plan:     1  Malignant neoplasm of lower-outer quadrant of right breast of female, estrogen receptor positive ([facility] [address] )  - discussed with patient regarding the new data based on the atlas trial, Tamoxifen x 5 vs 10yr followed for 15yr  5yr vs 10yr: DFS at 15yr: 25 vs 21% and OS from yr 5-14:  15 vs 12%  small increased endometrial Ca and clot  - plan for tamoxifen for a total 10 years  Initially diagnosed in , has not been 7 years  Overall doing well no evidence for cancer recurrence  - tolerated well no issues, no evidence for DVT, PE or endometrial malignancy  Plan to continue current treatment, tamoxifen daily, mammogram on a yearly basis  Labs twice a year  [facility]  plan  -  Continue tamoxifen, follow-up in 1 year             2, anemia    -likely anemia is due to multifactorial including chronic disease from rheumatoid arthritis, under treatment with methotrexate  -will continue follow-up  If the hemoglobin further drop, we will proceed with anemia workup  25   minutes were spent on this visit  All questions answered to satisfaction; Advised pt to call if there is any further questions                 Interval History:     :  Patient has history of rheumatoid arthritis, under active care with Rheumatology, overall stable  Patient came in today to establish care with oncology  Medical oncology history as above  Given today reported overall doing well, at baseline health status  She reported has no weight loss, no lumps bumps in breast, no constitutional symptoms including no bleeding, easy bruise, no fever chills, no night sweats, no lumps bumps  She is tolerating tamoxifen without major side effects  There is no history of DVT, endometrial cancers, she reported having bilateral eye cataract, status post cataract surgery, last one is in April of 2017  She has no other oncology, hematology issues  Colonoscopy was on time with Dr Anton Louis  Following   [facility] for GYN routine checkup  She reported smoked for short time and stopped in her 35s  Drink alcohol socially  Family history of oncology issues including brother has lung cancer, mother has colon cancers at age 46  She is currently , move in with her brother who suffered from lung cancer  She currently living close to [facility] about 45 minutes from our office  8/20/2018: Given her follow-up  Doing well, no conscious symptoms  No new breast lumps bumps, no bone pain  No vaginal bleeding, no new vision change  8/26/2019:  Came for follow-up  Reported doing well no issues  No new lumps bumps  Patient [facility]        Problem list:       Patient Active Problem List   Diagnosis    Left ankle pain    Hypertension    Rheumatoid arthritis ([address] )    Hypothyroidism    Hyperuricemia    Abnormal blood chemistry    Diastolic dysfunction    Hyperlipidemia    Left hip pain    Lumbar radiculopathy    Murmur    Osteopenia    Other chronic pain    Vitamin D deficiency    Adenocarcinoma of breast ([address] )    Localized edema    Abnormal finding on diagnostic imaging of kidney    Fat necrosis (segmental) of breast    Personal history of breast cancer    History of peptic ulcer    Hypokalemia    Early dry stage nonexudative 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 eyes    Healthcare maintenance    Interstitial pulmonary disease (HCC)         PHYSICIAL EXAMINATION:     Vital Signs:   [unfilled]  Body mass index is 32 03 kg/m²  Body surface area is 1 95 meters squared  No major findings on physical examination  No lymphadenopathy  GEN: Alert, awake oriented x3, in no acute distress  HEENT- No pallor, icterus, cyanosis, no oral mucosal lesions,   LAD - no palpable cervical, clavicle, axillary, inguinal LAD  Heart- normal S1 S2, regular rate and rhythm, No murmur, rubs  Lungs- clear breathing sound bilateral    Abdomen- soft, Non tender, bowel sounds present  Extremities- ++ edema, bilateral finger in joint deformity consistent with rheumatoid arthritis  Neuro- No focal neurological deficit           PAST MEDICAL HISTORY:   has a past medical history of Allergic angioedema, Angioedema, Arthritis, Breast cancer ([facility] [address] ), Disease of thyroid gland, Hemorrhage of anus and rectum, Hyperlipidemia, Hypertension, Hypocalcemia, Neuritis, Peptic ulcer, Rheumatoid arthritis ([facility] [address] ), and Vitamin D deficiency  PAST SURGICAL HISTORY:   has a past surgical history that includes Abdominal surgery; Breast surgery; Cholecystectomy; Elbow surgery; and orthopedic surgery  CURRENT MEDICATIONS:     Current Outpatient Medications   Medication Sig Dispense Refill    ascorbic acid (VITAMIN C) 500 mg tablet Take 500 mg by mouth daily   Calcium Citrate-Vitamin D (CITRACAL + D PO) Take by mouth 2 (two) times a day      carvedilol (COREG) 6 25 mg tablet Take 1 tablet (6 25 mg total) by mouth 2 (two) times a day with meals 180 tablet 2    Cholecalciferol (VITAMIN D-3 PO) Take 2,000 Units by mouth daily        colestipol (COLESTID) 1 g tablet TAKE 1 TABLET TWICE A DAY 60 tablet 0    erythromycin (ILOTYCIN) ophthalmic ointment       folic acid (FOLVITE) 1 mg tablet 1 daily 90 tablet 2    hydrochlorothiazide (HYDRODIURIL) 25 mg tablet Take 1 tablet (25 mg total) by mouth daily 90 tablet 0    KLOR-CON M10 10 MEQ tablet TAKE 2 TABLETS BY MOUTH TWICE A  tablet 2    levothyroxine 50 mcg tablet Take 1 tablet (50 mcg total) by mouth every other day 45 tablet 3    levothyroxine 75 mcg tablet Take 1 tab po every other day, alternating with 50 mcg 45 tablet 3    magnesium oxide (MAG-OX) 400 mg Take 1 tablet (400 mg total) by mouth 2 (two) times a day 90 tablet 0    methotrexate 2 5 mg tablet Take 10 mg by mouth once a week   Misc Natural Products (LUTEIN 20 PO) Take 20 mg by mouth daily   Multiple Vitamin (MULTIVITAMIN) tablet Take 1 tablet by mouth daily   riTUXimab (RITUXAN) 500 mg/50 mL Infuse into a venous catheter every 6 (six) months        rosuvastatin (CRESTOR) 5 mg tablet Take 1 tablet (5 mg total) by mouth daily 90 tablet 2    tamoxifen (NOLVADEX) 20 mg tablet Take 1 tablet (20 mg total) by mouth daily 90 tablet 3    pantoprazole (PROTONIX) 40 mg tablet Take 1 tablet (40 mg total) by mouth daily (Patient not taking: Reported on 6/10/2019) 90 tablet 2    vitamin E, tocopherol, 400 units capsule Take 400 Units by mouth daily       No current facility-administered medications for this visit  [unfilled]    SOCIAL HISTORY:   reports that she has never smoked  She has never used smokeless tobacco  She reports that she drinks alcohol  She reports that she does not use drugs  FAMILY HISTORY:  family history includes Breast cancer in her maternal aunt, mother, and sister; Kidney failure in her father; Other in her father  ALLERGIES:  is allergic to lisinopril; codeine; other; oxycodone; penicillins; sulfamethoxazole-trimethoprim; and ciprofloxacin      REVIEW OF SYSTEMS:  Please note that a 14-point review of systems was performed to include Constitutional, HEENT, Respiratory, CVS, GI, , Musculoskeletal, Integumentary, Neurologic, Rheumatologic, Endocrinologic, Psychiatric, Lymphatic, and Hematologic/Oncologic systems were reviewed and are negative unless otherwise stated in HPI  Positive and negative findings pertinent to this evaluation are incorporated into the history of present illness  LAB:    Lab Results   Component Value Date    WBC 5 53 04/08/2019    HGB 10 9 (L) 04/08/2019    HCT 34 2 (L) 04/08/2019     (H) 04/08/2019     04/08/2019       Lab Results   Component Value Date    K 3 8 04/08/2019     04/08/2019    CO2 29 04/08/2019    BUN 22 04/08/2019    CREATININE 1 14 04/08/2019    GLUF 89 04/08/2019    CALCIUM 8 7 04/08/2019    AST 22 04/08/2019    ALT 20 04/08/2019    ALKPHOS 43 (L) 04/08/2019    PROT 6 1 (L) 09/25/2015    BILITOT 0 31 09/25/2015    EGFR 46 04/08/2019       IMAGING:    No orders to display     Ct Ankle Left Wo Contrast    Result Date: 2/5/2018  Narrative: CT LEFT ANKLE INDICATION: ankle pain  History taken directly from the electronic ordering system  Ankle pain  Unable to bear weight  No trauma  Rheumatoid arthropathy  COMPARISON: None  TECHNIQUE: Serial Axial CT images were acquired through the left ankle  Coronal and sagittal reformation images were also obtained  Contrast material was not utilized  This examination, like all CT scans performed in the [facility], was performed utilizing techniques to minimize radiation dose exposure, including the use of iterative reconstruction and automated exposure control  Rad dose 238 mGy-cm FINDINGS: JOINT EFFUSION: Joint effusion noted  ALIGNMENT: Increase in talar declination angle suggests chronic PTT dysfunction  This results in pes planus deformity  OSSEOUS STRUCTURES: Advanced degenerative change noted throughout the visualized midfoot and hindfoot with apparent spontaneous talonavicular fusion related to long-standing degenerative change    No acute fracture dislocation or osseous destructive lesion identified  Incomplete evaluation of lateral deviation of the 2nd through 5th toes with erosive changes and pencil in cup deformity noted across the metatarsal phalangeal joints  Findings consistent with underlying inflammatory arthropathy  TENDONS: Limited assessment by CT technique without gross evidence of tear  LIGAMENTS: Limited assessment by CT technique without gross evidence of tear  MUSCULATURE: Intact  REMAINING SOFT TISSUES: Loculated collection noted along the plantar soft tissues at the level of the dorsal calcaneus measuring up to 4 3 x 3 7 x 2 1 cm  There is some wall calcification noted suggesting chronicity and presumably related to rheumatoid disease  Impression: 1  Advanced degenerative change throughout the foot including suggestive findings of inflammatory arthropathy, such as rheumatoid, across the 2nd through 5th metatarsophalangeal joints as above  There is also spontaneous fusion across the talonavicular  joint likely secondary to long-standing degenerative change  2   Pes planus deformity and suggestive findings of chronic PTT dysfunction  3   Loculated plantar hindfoot fluid collection measuring up to 4 3 x 3 7 x 2 1 cm likely chronic given scant wall calcification  This could relate to rheumatoid disease with infection not entirely excluded but considerably less likely  Correlate clinically  Findings are consistent with the preliminary report from [facility] which was provided shortly after completion of the exam  The finding of plantar loculated collection  may alter immediate patient management, therefore we will now contact referring physicians for direct verbal discussion  I personally discussed this study with Kyle Corbin, the nurse caring for this patient, on 2/5/2018 8:44 AM   Unfortunately, the referring physician could not be reached at this time   Workstation performed: [id number]

## 2019-09-03 ENCOUNTER — OFFICE VISIT (OUTPATIENT)
Dept: INTERNAL MEDICINE CLINIC | Facility: CLINIC | Age: 79
End: 2019-09-03
Payer: MEDICARE

## 2019-09-03 VITALS
OXYGEN SATURATION: 98 % | HEART RATE: 73 BPM | DIASTOLIC BLOOD PRESSURE: 72 MMHG | HEIGHT: 65 IN | TEMPERATURE: 98.9 F | SYSTOLIC BLOOD PRESSURE: 125 MMHG | WEIGHT: 198.2 LBS | BODY MASS INDEX: 33.02 KG/M2 | RESPIRATION RATE: 16 BRPM

## 2019-09-03 DIAGNOSIS — R21 RASH: Primary | ICD-10-CM

## 2019-09-03 PROCEDURE — 99213 OFFICE O/P EST LOW 20 MIN: CPT | Performed by: INTERNAL MEDICINE

## 2019-09-03 RX ORDER — DOXYCYCLINE HYCLATE 100 MG
100 TABLET ORAL 2 TIMES DAILY
Qty: 28 TABLET | Refills: 0 | Status: SHIPPED | OUTPATIENT
Start: 2019-09-03 | End: 2019-09-17

## 2019-09-03 RX ORDER — PREDNISONE 10 MG/1
10 TABLET ORAL DAILY
Qty: 7 TABLET | Refills: 0 | Status: SHIPPED | OUTPATIENT
Start: 2019-09-03 | End: 2019-10-28 | Stop reason: ALTCHOICE

## 2019-09-03 NOTE — PATIENT INSTRUCTIONS
Doxycycline (By mouth)   Doxycycline ([id number])  Treats and prevents infections  Also used to prevent malaria and treat rosacea or severe acne  This medicine is a tetracycline antibiotic  Brand Name(s): Acticlate, Adoxa, Adoxa Kirby 1/150, Avidoxy, Avidoxy DK, BenzoDox 30 Kit, BenzoDox 60 Kit, Doryx, Doryx MPC, Monodox, Morgidox 6R767YE, Morgidox [id number] Kit, Morgidox 1x50MG, Morgidox 1x50MG Kit, Morgidox [id number]   There may be other brand names for this medicine  When This Medicine Should Not Be Used: This medicine is not right for everyone  Do not use it if you had an allergic reaction to doxycycline or another tetracycline antibiotic, or if you are pregnant or breastfeeding  How to Use This Medicine:   Capsule, Delayed Release Capsule, Long Acting Capsule, Liquid, Tablet, Delayed Release Tablet  · Your doctor will tell you how much medicine to use  Do not use more than directed  · Ask your pharmacist or doctor if you need to take this medicine with or without food  Some forms can be taken with food or milk, but others must be taken on an empty stomach  · Tablets: You may take this medicine with food or milk to avoid stomach irritation  To break a tablet, hold the tablet between your thumb and index fingers close to the appropriate scored line  Then, apply enough pressure to snap the tablet segments apart  Do not use the tablet if it does not break on the scored lines  · Delayed-release tablets: You may also take this medicine by sprinkling the broken tablets onto room-temperature applesauce  Swallow this mixture right away; do not chew  Do not store the mixture for later use  · Oracea® capsules: This medicine must be taken on an empty stomach, at least 1 hour before or 2 hours after a meal   · Capsule: Swallow whole  Do not break, crush, chew, or open it  · Oral liquid: Shake the bottle well just before each use   Measure the oral liquid medicine with a marked measuring spoon, oral syringe, or medicine cup  · Take all of the medicine in your prescription to clear up your infection, even if you feel better after the first few doses  · Drink plenty of fluids to avoid throat problems, if you take the capsule or tablet form  · Malaria prevention: Start taking the medicine 1 or 2 days before you travel  Take the medicine every day during your trip  Keep taking it for 4 weeks after you return  However, do not use the medicine for longer than 4 months  · Do not use this medicine for more than 9 months if you are using it for rosacea  · Use only the brand of medicine your doctor prescribed  Other brands may not work the same way  · Read and follow the patient instructions that come with this medicine  Talk to your doctor or pharmacist if you have any questions  · Missed dose: Take a dose as soon as you remember  If it is almost time for your next dose, wait until then and take a regular dose  Do not take extra medicine to make up for a missed dose  · Store the medicine in a closed container at room temperature, away from heat, moisture, and direct light  Do not freeze the oral liquid  Drugs and Foods to Avoid:   Ask your doctor or pharmacist before using any other medicine, including over-the-counter medicines, vitamins, and herbal products  · Some foods and medicines can affect how doxycycline works  Tell your doctor if you are using any of the following:  ¨ Bismuth subsalicylate, isotretinoin or other acne medicines, acitretin or other medicine to treat psoriasis  ¨ Penicillin antibiotic, birth control pills, medicine for seizures (such as carbamazepine, phenobarbital, phenytoin), stomach medicine, a blood thinner (such as warfarin), or any medicine that contains aluminum, calcium, or iron (such an antacid or vitamin supplement)  Warnings While Using This Medicine:   · This medicine may cause birth defects if either partner is using it during conception or pregnancy   Tell your doctor right away if you or your partner becomes pregnant  Birth control pills may not work as well when used with this medicine  Use a second form of birth control to keep from getting pregnant  · Tell your doctor if you have kidney disease, liver disease, asthma, or an allergy to sulfites  Tell your doctor if you had surgery on your stomach, or if you have a history of yeast infections  · This medicine may cause the following problems:  ¨ Permanent change in tooth color (in children younger than 6years old)  ¨ Increased pressure inside the head  ¨ Yeast infection  ¨ Immune system problems  · This medicine can cause diarrhea  Call your doctor if the diarrhea becomes severe, does not stop, or is bloody  Do not take any medicine to stop diarrhea until you have talked to your doctor  Diarrhea can occur 2 months or more after you stop taking this medicine  · This medicine may make your skin more sensitive to sunlight  Wear sunscreen  Do not use sunlamps or tanning beds  · Tell any doctor or dentist who treats you that you are using this medicine  This medicine may affect certain medical test results  · Call your doctor if your symptoms do not improve or if they get worse  · Keep all medicine out of the reach of children  Never share your medicine with anyone    Possible Side Effects While Using This Medicine:   Call your doctor right away if you notice any of these side effects:  · Allergic reaction: Itching or hives, swelling in your face or hands, swelling or tingling in your mouth or throat, chest tightness, trouble breathing  · Blistering, peeling, red skin rash  · Burning, pain, or irritation in your upper stomach or throat  · Diarrhea that may contain blood  · Fever, chills, cough, runny or stuffy nose, sore throat, and body aches  · Joint pain, fever, rash, and unusual tiredness or weakness  · Severe headache, dizziness, or vision changes  If you notice these less serious side effects, talk with your doctor:   · Darkening of your skin, scars, teeth, or gums  · Sores or white patches on your lips, mouth, or throat  If you notice other side effects that you think are caused by this medicine, tell your doctor  Call your doctor for medical advice about side effects  You may report side effects to FDA at 7-899-FDA-3417  © 2017 [employer] Information is for End User's use only and may not be sold, redistributed or otherwise used for commercial purposes  The above information is an  only  It is not intended as medical advice for individual conditions or treatments  Talk to your doctor, nurse or pharmacist before following any medical regimen to see if it is safe and effective for you        1 patient with rash on lower left side of her face looks like bug bites on her right hand it is pruritic will Rx doxycycline in case his earlier erysipelas as well as prednisone 10 mg for 1 day if not improved patient to please call

## 2019-09-03 NOTE — PROGRESS NOTES
Assessment/Plan:    1 patient with rash on lower left side of her face looks like bug bites on her right hand it is pruritic will Rx doxycycline in case his earlier erysipelas as well as prednisone 10 mg for 1 day if not improved patient to please call           Diagnoses and all orders for this visit:    Rash  -     doxycycline hyclate (VIBRA-TABS) 100 mg tablet; Take 1 tablet (100 mg total) by mouth 2 (two) times a day for 14 days  -     predniSONE 10 mg tablet; Take 1 tablet (10 mg total) by mouth daily        The patient was counseled regarding instructions for management, risk factor reductions, patient and family education,impressions, risks and benefits of treatment options, side effects of medications, importance of compliance with treatment  The treatment plan was reviewed with the patient/guardian and patient/guardian understands and agrees with the treatment plan  Current Outpatient Medications:     ascorbic acid (VITAMIN C) 500 mg tablet, Take 500 mg by mouth daily  , Disp: , Rfl:     Calcium Citrate-Vitamin D (CITRACAL + D PO), Take by mouth 2 (two) times a day, Disp: , Rfl:     carvedilol (COREG) 6 25 mg tablet, Take 1 tablet (6 25 mg total) by mouth 2 (two) times a day with meals, Disp: 180 tablet, Rfl: 2    Cholecalciferol (VITAMIN D-3 PO), Take 2,000 Units by mouth daily  , Disp: , Rfl:     colestipol (COLESTID) 1 g tablet, TAKE 1 TABLET TWICE A DAY, Disp: 60 tablet, Rfl: 0    folic acid (FOLVITE) 1 mg tablet, 1 daily, Disp: 90 tablet, Rfl: 2    hydrochlorothiazide (HYDRODIURIL) 25 mg tablet, Take 1 tablet (25 mg total) by mouth daily, Disp: 90 tablet, Rfl: 0    KLOR-CON M10 10 MEQ tablet, TAKE 2 TABLETS BY MOUTH TWICE A DAY, Disp: 120 tablet, Rfl: 2    levothyroxine 50 mcg tablet, Take 1 tablet (50 mcg total) by mouth every other day, Disp: 45 tablet, Rfl: 3    levothyroxine 75 mcg tablet, Take 1 tab po every other day, alternating with 50 mcg, Disp: 45 tablet, Rfl: 3   magnesium oxide (MAG-OX) 400 mg, Take 1 tablet (400 mg total) by mouth 2 (two) times a day, Disp: 90 tablet, Rfl: 0    methotrexate 2 5 mg tablet, Take 10 mg by mouth once a week , Disp: , Rfl:     Misc Natural Products (LUTEIN 20 PO), Take 20 mg by mouth daily  , Disp: , Rfl:     Multiple Vitamin (MULTIVITAMIN) tablet, Take 1 tablet by mouth daily  , Disp: , Rfl:     riTUXimab (RITUXAN) 500 mg/50 mL, Infuse into a venous catheter every 6 (six) months  , Disp: , Rfl:     doxycycline hyclate (VIBRA-TABS) 100 mg tablet, Take 1 tablet (100 mg total) by mouth 2 (two) times a day for 14 days, Disp: 28 tablet, Rfl: 0    pantoprazole (PROTONIX) 40 mg tablet, Take 1 tablet (40 mg total) by mouth daily (Patient not taking: Reported on 6/10/2019), Disp: 90 tablet, Rfl: 2    predniSONE 10 mg tablet, Take 1 tablet (10 mg total) by mouth daily, Disp: 7 tablet, Rfl: 0    rosuvastatin (CRESTOR) 5 mg tablet, Take 1 tablet (5 mg total) by mouth daily, Disp: 90 tablet, Rfl: 2    tamoxifen (NOLVADEX) 20 mg tablet, Take 1 tablet (20 mg total) by mouth daily, Disp: 90 tablet, Rfl: 3    vitamin E, tocopherol, 400 units capsule, Take 400 Units by mouth daily, Disp: , Rfl:     Subjective:      Patient ID: Billie Moran is a 67 y o  female  Rash face 3 days started right hand and right face and chest itching, neck no fever      The following portions of the patient's history were reviewed and updated as appropriate:   She has a past medical history of Allergic angioedema, Angioedema, Arthritis, Breast cancer ([address] ), Disease of thyroid gland, Hemorrhage of anus and rectum, Hyperlipidemia, Hypertension, Hypocalcemia, Neuritis, Peptic ulcer, Rheumatoid arthritis ([address] ), and Vitamin D deficiency  ,  does not have any pertinent problems on file  ,   has a past surgical history that includes Abdominal surgery; Breast surgery; Cholecystectomy; Elbow surgery; and orthopedic surgery  ,  family history includes Breast cancer in her maternal aunt, mother, and sister; Kidney failure in her father; Other in her father  ,   reports that she has never smoked  She has never used smokeless tobacco  She reports that she drinks alcohol  She reports that she does not use drugs  ,  is allergic to lisinopril; codeine; other; oxycodone; penicillins; sulfamethoxazole-trimethoprim; and ciprofloxacin       Review of Systems   Constitutional: Negative for appetite change, chills, fatigue, fever and unexpected weight change  HENT: Negative for congestion, ear pain, facial swelling, hearing loss, mouth sores, nosebleeds, postnasal drip, rhinorrhea, sinus pain, sore throat, trouble swallowing and voice change  Eyes: Negative for pain, discharge, redness and visual disturbance  Respiratory: Negative for apnea, chest tightness, shortness of breath, wheezing and stridor  Cardiovascular: Negative for chest pain, palpitations and leg swelling  Gastrointestinal: Negative for abdominal distention, abdominal pain, blood in stool, constipation, diarrhea and vomiting  Endocrine: Negative for cold intolerance, heat intolerance, polydipsia, polyphagia and polyuria  Genitourinary: Negative for difficulty urinating, dysuria, flank pain, frequency, genital sores, hematuria and urgency  Musculoskeletal: Negative for arthralgias and back pain  Skin: Positive for rash  Negative for wound  Allergic/Immunologic: Negative for environmental allergies, food allergies and immunocompromised state  Neurological: Negative for dizziness, tremors, seizures, syncope, facial asymmetry, speech difficulty, weakness, light-headedness, numbness and headaches  Hematological: Negative for adenopathy  Does not bruise/bleed easily  Psychiatric/Behavioral: Negative for agitation, behavioral problems, dysphoric mood, hallucinations, self-injury, sleep disturbance and suicidal ideas  The patient is not hyperactive            Objective:  /72 (BP Location: Left arm, Patient Position: Sitting)   Pulse 73   Temp 98 9 °F (37 2 °C) (Tympanic)   Resp 16   Ht 5' 5" (1 651 m)   Wt 89 9 kg (198 lb 3 2 oz)   LMP  (LMP Unknown)   SpO2 98%   BMI 32 98 kg/m²     Lab Review  not applicable      Imaging  @[id number]@  Recent Results (from the past 54380 hour(s))   Echo complete with contrast if indicated    Narrative    [facility] [facility]  [phone]    Transthoracic Echocardiogram  2D, M-mode, Doppler, and Color Doppler    Study date:  26-Oct-2017    Patient: Nichole Park  MR number: [id number]  Account number: [de-identified]  : 1940  Age: 69 years  Gender: Female  Status: Outpatient  Location: [facility]  Height: 63 in  Weight: 180 lb  BP: 120/ 60 mmHg    Indications: Murmur  Diagnoses: R01 1 - Cardiac murmur, unspecified    Sonographer:  Marylee Parma,, RCS  Interpreting Physician:  Marcos Melton MD  Primary Physician:  Adan Hillman DO  Referring Physician:  Adan Hillman DO  Group:  [facility]    SUMMARY    LEFT VENTRICLE:  Systolic function was normal  Ejection fraction was estimated in the range of 55 % to 65 %  There were no regional wall motion abnormalities  Features were consistent with a pseudonormal left ventricular filling pattern, with concomitant abnormal relaxation and increased filling pressure (grade 2 diastolic dysfunction)  MITRAL VALVE:  There was mild annular calcification  There was mild regurgitation  AORTIC VALVE:  There was trace regurgitation  TRICUSPID VALVE:  There was mild regurgitation  HISTORY: PRIOR HISTORY: Breast cancer  Risk factors: hypertension and hypercholesterolemia  PROCEDURE: The study was performed in the [address] [place] [address]  This was a routine study  The transthoracic approach was used  The study included complete 2D imaging, M-mode, complete spectral Doppler, and color Doppler   Images  were obtained from the parasternal, apical, subcostal, and suprasternal notch acoustic windows  Image quality was adequate  LEFT VENTRICLE: Size was normal  Systolic function was normal  Ejection fraction was estimated in the range of 55 % to 65 %  There were no regional wall motion abnormalities  Wall thickness was normal  DOPPLER: Features were consistent  with a pseudonormal left ventricular filling pattern, with concomitant abnormal relaxation and increased filling pressure (grade 2 diastolic dysfunction)  RIGHT VENTRICLE: The size was normal  Systolic function was normal  Wall thickness was normal     LEFT ATRIUM: Size was normal     RIGHT ATRIUM: Size was normal     MITRAL VALVE: There was mild annular calcification  Valve structure was normal  There was normal leaflet separation  DOPPLER: The transmitral velocity was within the normal range  There was no evidence for stenosis  There was mild  regurgitation  AORTIC VALVE: The valve was trileaflet  Leaflets exhibited normal thickness and normal cuspal separation  DOPPLER: Transaortic velocity was within the normal range  There was no evidence for stenosis  There was trace regurgitation  TRICUSPID VALVE: The valve structure was normal  There was normal leaflet separation  DOPPLER: The transtricuspid velocity was within the normal range  There was no evidence for stenosis  There was mild regurgitation  PULMONIC VALVE: Leaflets exhibited normal thickness, no calcification, and normal cuspal separation  DOPPLER: The transpulmonic velocity was within the normal range  There was no regurgitation  PERICARDIUM: There was no pericardial effusion  The pericardium was normal in appearance  AORTA: The root exhibited normal size  SYSTEMIC VEINS: IVC: The inferior vena cava was normal in size and course  Respirophasic changes were normal     SYSTEM MEASUREMENT TABLES    Apical four chamber  4 chamber Left Atrium Volume Index; Planimetry; End Systole;  Apical four chamber;: 17 26 cm2  Left Ventricular Ejection Fraction; Method of Disks, Single Plane; Apical four chamber;: 62 5 %  Left Ventricular End Diastolic Volume; Method of Disks, Single Plane; Apical four chamber;: 57 1 ml  Left Ventricular End Systolic Volume; Method of Disks, Single Plane; Apical four chamber;: 21 4 ml  Right Atrium Systolic Area; Planimetry; End Systole; Apical four chamber;: 13 17 cm2  Right Ventricular Internal Diastolic Dimension; End Diastole; Apical four chamber;: 36 9 mm  TAPSE: 26 2 mm    Unspecified Scan Mode  Aortic Root Diameter; End Systole;: 26 5 mm  Aortic valve Area; Continuity Equation by Velocity Time Integral; Systole;: 1 88 cm2  Cardiovascular Orifice Diameter; End Systole;: 19 5 mm  Gradient Pressure, Peak; Simplified Bernoulli; Antegrade Flow; Systole;: 17 5 mm[Hg]  Gradient pressure, average; Simplified Bernoulli; Antegrade Flow; Systole;: 9 mm[Hg]  Left atrial diameter; End Diastole;: 35 8 mm  Cardiac Output; Teichholz; Systole;: 2 93 L/min  Interventricular Septum Diastolic Thickness; Teichholz; End Diastole;: 11 4 mm  Left Ventricle Internal End Diastolic Dimension; Teichholz;: 37 6 mm  Left Ventricle Internal Systolic Dimension; Teichholz; End Systole;: 22 9 mm  Left Ventricle Posterior Wall Diastolic Thickness; Teichholz; End Diastole;: 11 5 mm  Left Ventricular Ejection Fraction; Teichholz;: 70 4 %  Stroke volume; Teichholz; Systole;: 42 5 ml  Mitral Valve Area; Area by Pressure Half-Time; Systole;: 3 49 cm2  Mitral Valve E to A Ratio; Systole;: 1 38  Maximum Tricuspid valve regurgitation pressure gradient; Regurgitant Flow; Systole;: 30 1 mm[Hg]    [employer] Accredited Echocardiography Laboratory    Prepared and electronically signed by    Iain Iraheta MD  Signed 26-Oct-2017 18:33:23       No orders to display     No results found for this or any previous visit  Physical Exam   Constitutional: She is oriented to person, place, and time   She appears well-developed  HENT:   Right Ear: External ear normal    Left Ear: External ear normal    Eyes: Right eye exhibits no discharge  Left eye exhibits no discharge  No scleral icterus  Neck: Carotid bruit is not present  No tracheal deviation present  No thyroid mass and no thyromegaly present  Cardiovascular: Normal rate, regular rhythm, normal heart sounds and intact distal pulses  Exam reveals no gallop and no friction rub  No murmur heard  Pulmonary/Chest: No respiratory distress  She has no wheezes  She has no rales  Musculoskeletal: She exhibits no edema  Lymphadenopathy:     She has no cervical adenopathy  Neurological: She is alert and oriented to person, place, and time  Coordination normal    Skin:   Erythema right hand right face and right eye and right cheek   Psychiatric: She has a normal mood and affect  Her behavior is normal  Judgment and thought content normal    Nursing note and vitals reviewed

## 2019-09-05 ENCOUNTER — TELEPHONE (OUTPATIENT)
Dept: INTERNAL MEDICINE CLINIC | Facility: CLINIC | Age: 79
End: 2019-09-05

## 2019-09-05 ENCOUNTER — OFFICE VISIT (OUTPATIENT)
Dept: DERMATOLOGY | Facility: CLINIC | Age: 79
End: 2019-09-05
Payer: MEDICARE

## 2019-09-05 DIAGNOSIS — R21 RASH: Primary | ICD-10-CM

## 2019-09-05 DIAGNOSIS — L23.7 POISON IVY: Primary | ICD-10-CM

## 2019-09-05 PROCEDURE — 99213 OFFICE O/P EST LOW 20 MIN: CPT | Performed by: DERMATOLOGY

## 2019-09-05 RX ORDER — BETAMETHASONE DIPROPIONATE 0.5 MG/G
CREAM TOPICAL 2 TIMES DAILY
Qty: 15 G | Refills: 2 | Status: SHIPPED | OUTPATIENT
Start: 2019-09-05 | End: 2019-12-19

## 2019-09-05 NOTE — PATIENT INSTRUCTIONS
Patient advise that this is all consistent with poison ivy since some minimal will go ahead treat with just his topical steroid advised patient not to take the prednisone doxycycline given to her previously  Patient advise that the prednisone dose is way too small to help her condition and it is not extensive enough to really require systemic therapy

## 2019-09-05 NOTE — TELEPHONE ENCOUNTER
The rash on face is not being helped with creams doc gave her    Its still spreading  ( into scalp and few spots on neck and wrist also)     whats next step for cas may?

## 2019-09-05 NOTE — PROGRESS NOTES
[address]  [address] [facility] [address]  [phone]  [phone]     MRN: [id number] : 1940  Encounter: [id number]  Patient Information: Tiffany Renee  Chief complaint:  Rash on skin    History of present illness:  81-year-old female presents secondary to a rash that has been going on for about 5 days  Patient was given 10 mg of prednisone takes a good day also given doxycycline  Patient notably was doing yd work  Past Medical History:   Diagnosis Date    Allergic angioedema     [id number] resolved    Angioedema     [id number] resolved    Arthritis     [facility] [address] [address] )     Disease of thyroid gland     Hemorrhage of anus and rectum     2016 resolved    Hyperlipidemia     Hypertension     Hypocalcemia     2017 resolved    Neuritis     thoracic and lumbosacral    Peptic ulcer     Rheumatoid arthritis ([facility] [address] )     Vitamin D deficiency      Past Surgical History:   Procedure Laterality Date    ABDOMINAL SURGERY      BREAST SURGERY      CHOLECYSTECTOMY      ELBOW SURGERY      ORTHOPEDIC SURGERY      for toes     Social History   Social History     Substance and Sexual Activity   Alcohol Use Yes    Comment: social use only    allscrpts says never drank alcohol     Social History     Substance and Sexual Activity   Drug Use No     Social History     Tobacco Use   Smoking Status Never Smoker   Smokeless Tobacco Never Used     Family History   Problem Relation Age of Onset    Breast cancer Mother     Kidney failure Father     Other Father         uremia    Breast cancer Sister     Breast cancer Maternal Aunt      Meds/Allergies   Allergies   Allergen Reactions    Lisinopril Anaphylaxis    Codeine Other (See Comments)     Nausea/vomiting      Other      Annotation - [id number]: tounge swelling    Oxycodone      Annotation - [id number]: NOT TOLERATE    Penicillins Hives    Sulfamethoxazole-Trimethoprim     Ciprofloxacin Rash       Meds:  Prior to Admission medications    Medication Sig Start Date End Date Taking? Authorizing Provider   ascorbic acid (VITAMIN C) 500 mg tablet Take 500 mg by mouth daily  Yes Historical Provider, MD   Calcium Citrate-Vitamin D (CITRACAL + D PO) Take by mouth 2 (two) times a day   Yes Historical Provider, MD   carvedilol (COREG) 6 25 mg tablet Take 1 tablet (6 25 mg total) by mouth 2 (two) times a day with meals 3/14/19  Yes Quentin Heimlich, DO   Cholecalciferol (VITAMIN D-3 PO) Take 2,000 Units by mouth daily  Yes Historical Provider, MD   colestipol (COLESTID) 1 g tablet TAKE 1 TABLET TWICE A DAY 4/5/19  Yes PUJA Avilez   doxycycline hyclate (VIBRA-TABS) 100 mg tablet Take 1 tablet (100 mg total) by mouth 2 (two) times a day for 14 days 9/3/19 9/17/19 Yes Sourav Adan,    folic acid (FOLVITE) 1 mg tablet 1 daily 2/14/18  Yes Quentin Heimlich, DO   hydrochlorothiazide (HYDRODIURIL) 25 mg tablet Take 1 tablet (25 mg total) by mouth daily 6/24/19  Yes PUJA Jordan   KLOR-CON M10 10 MEQ tablet TAKE 2 TABLETS BY MOUTH TWICE A DAY 5/7/19  Yes PUJA Jordan   levothyroxine 50 mcg tablet Take 1 tablet (50 mcg total) by mouth every other day 6/10/19  Yes PUJA Jordan   levothyroxine 75 mcg tablet Take 1 tab po every other day, alternating with 50 mcg 6/10/19  Yes PUJA Avilez   magnesium oxide (MAG-OX) 400 mg Take 1 tablet (400 mg total) by mouth 2 (two) times a day 11/5/18  Yes PUJA Avilez   methotrexate 2 5 mg tablet Take 10 mg by mouth once a week  Yes Historical Provider, MD   Misc Natural Products (LUTEIN 20 PO) Take 20 mg by mouth daily  Yes Historical Provider, MD   Multiple Vitamin (MULTIVITAMIN) tablet Take 1 tablet by mouth daily     Yes Historical Provider, MD   pantoprazole (PROTONIX) 40 mg tablet Take 1 tablet (40 mg total) by mouth daily 3/14/19  Yes Quentin Heimlich, DO   predniSONE 10 mg tablet Take 1 tablet (10 mg total) by mouth daily 9/3/19  Yes Zoltan Montgomery Cris Pacheco DO   riTUXimab (RITUXAN) 500 mg/50 mL Infuse into a venous catheter every 6 (six) months   8/20/15  Yes Historical Provider, MD   rosuvastatin (CRESTOR) 5 mg tablet Take 1 tablet (5 mg total) by mouth daily 3/14/19  Yes Yuval Painter DO   tamoxifen (NOLVADEX) 20 mg tablet Take 1 tablet (20 mg total) by mouth daily 8/8/19  Yes Everett Valenzuela MD PhD   vitamin E, tocopherol, 400 units capsule Take 400 Units by mouth daily   Yes Historical Provider, MD       Subjective:     Review of Systems:    General: negative for - chills, fatigue, fever,  weight gain or weight loss  Psychological: negative for - anxiety, behavioral disorder, concentration difficulties, decreased libido, depression, irritability, memory difficulties, mood swings, sleep disturbances or suicidal ideation  ENT: negative for - hearing difficulties , nasal congestion, nasal discharge, oral lesions, sinus pain, sneezing, sore throat  Allergy and Immunology: negative for - hives, insect bite sensitivity,  Hematological and Lymphatic: negative for - bleeding problems, blood clots,bruising, swollen lymph nodes  Endocrine: negative for - hair pattern changes, hot flashes, malaise/lethargy, mood swings, palpitations, polydipsia/polyuria, skin changes, temperature intolerance or unexpected weight change  Respiratory: negative for - cough, hemoptysis, orthopnea, shortness of breath, or wheezing  Cardiovascular: negative for - chest pain, dyspnea on exertion, edema,  Gastrointestinal: negative for - abdominal pain, nausea/vomiting  Genito-Urinary: negative for - dysuria, incontinence, irregular/heavy menses or urinary frequency/urgency  Musculoskeletal: negative for - gait disturbance, joint pain, joint stiffness, joint swelling, muscle pain, muscular weakness  Dermatological:  As in HPI  Neurological: negative for confusion, dizziness, headaches, impaired coordination/balance, memory loss, numbness/tingling, seizures, speech problems, tremors or weakness       Objective:   LMP  (LMP Unknown)     Physical Exam:    General Appearance:    Alert, cooperative, no distress   Head:    Normocephalic, without obvious abnormality, atraumatic   Lymphatics:    No lymphadenopathy noted      Abdomen:   No hepatosplenomegaly   Skin:   A full skin exam was performed including scalp, head scalp, eyes, ears, nose, lips, neck, chest, axilla, abdomen, back, buttocks, bilateral upper extremities, bilateral lower extremities, hands, feet, fingers, toes, fingernails, and toenails erythema scaling well-demarcated patches noted on the neck left side of face and scalp also little bit on both arms     Assessment:     1  Poison ivy           Plan:   Patient advise that this is all consistent with poison ivy since some minimal will go ahead treat with just his topical steroid advised patient not to take the prednisone doxycycline given to her previously  Patient advise that the prednisone dose is way too small to help her condition and it is not extensive enough to really require systemic therapy  Dalila Kennedy MD  9/5/2019,4:22 PM    Portions of the record may have been created with voice recognition software   Occasional wrong word or "sound a like" substitutions may have occurred due to the inherent limitations of voice recognition software   Read the chart carefully and recognize, using context, where substitutions have occurred

## 2019-09-05 NOTE — TELEPHONE ENCOUNTER
Patient on prednisone and doxycycline not improving question of erysipelas versus other will refer to Dr Dima Gorman urgently staff informed to call

## 2019-09-11 ENCOUNTER — HOSPITAL ENCOUNTER (EMERGENCY)
Facility: HOSPITAL | Age: 79
Discharge: HOME | End: 2019-09-11
Attending: EMERGENCY MEDICINE
Payer: MEDICARE

## 2019-09-11 VITALS
HEART RATE: 65 BPM | RESPIRATION RATE: 20 BRPM | OXYGEN SATURATION: 98 % | SYSTOLIC BLOOD PRESSURE: 146 MMHG | BODY MASS INDEX: 32.39 KG/M2 | WEIGHT: 194.67 LBS | TEMPERATURE: 97.5 F | DIASTOLIC BLOOD PRESSURE: 64 MMHG

## 2019-09-11 DIAGNOSIS — L23.7 POISON IVY: Primary | ICD-10-CM

## 2019-09-11 PROCEDURE — 99283 EMERGENCY DEPT VISIT LOW MDM: CPT | Performed by: PHYSICIAN ASSISTANT

## 2019-09-11 PROCEDURE — 99282 EMERGENCY DEPT VISIT SF MDM: CPT

## 2019-09-11 RX ORDER — HYDROXYZINE HYDROCHLORIDE 25 MG/1
25 TABLET, FILM COATED ORAL EVERY 6 HOURS PRN
Qty: 40 TABLET | Refills: 0 | Status: SHIPPED | OUTPATIENT
Start: 2019-09-11 | End: 2019-12-19

## 2019-09-11 RX ORDER — PREDNISONE 1 MG/1
TABLET ORAL
Qty: 91 TABLET | Refills: 0 | Status: SHIPPED | OUTPATIENT
Start: 2019-09-11 | End: 2019-10-28 | Stop reason: ALTCHOICE

## 2019-09-11 RX ORDER — SKIN CLEANSER COMBINATION NO.8
1 CLEANSER (GRAM) TOPICAL ONCE AS NEEDED
Qty: 30 G | Refills: 0 | Status: SHIPPED | OUTPATIENT
Start: 2019-09-11 | End: 2020-04-30

## 2019-09-11 NOTE — ED PROVIDER NOTES
History  Chief Complaint   Patient presents with    Rash     pt states that she has a generalized rash thats been spreading throughout her body that started 2 weeks ago and has not been getting better, pt states that she is extremely itchy and uncomfortable     71-year-old female with past medical history significant for interstitial lung disease, hypertension, hypothyroidism and rheumatoid arthritis presents to the emergency department with chief complaint of itchy red rash  Onset of symptoms reported as 2 weeks ago  Location of symptoms reported as multiple sites including the genitals, groin, abdomen, bilateral arms, face, back and legs  Quality is reported as itchy red rash  Severity is reported as moderate  Associated symptoms:  Denies fevers  Denies chills  Denies nausea or vomiting  Positive for itching  Denies lip tongue or facial swelling  Denies wheezing or coughing  Denies difficulty swallowing or sore throat  Modifying factors:  Patient reports that she was recently diagnosed by her dermatologist as having poison ivy  She was initially put on 1 week of prednisone 10 mg for 7 days but states that it did not improve her symptoms  She was seen by dermatology and prescribed betamethasone for itching but reports it is not working as well  Patient reports the itching is keeping her up at night  Reviewed past visits via [facility]:  Patient was last seen in the emergency department on February 5, 2018 and hospitalized for left ankle pain and ambulatory dysfunction  History provided by:  Parent   used: No        Prior to Admission Medications   Prescriptions Last Dose Informant Patient Reported? Taking? Calcium Citrate-Vitamin D (CITRACAL + D PO)  Self Yes No   Sig: Take by mouth 2 (two) times a day   Cholecalciferol (VITAMIN D-3 PO)  Self Yes No   Sig: Take 2,000 Units by mouth daily     KLOR-CON M10 10 MEQ tablet  Self No No   Sig: TAKE 2 TABLETS BY MOUTH TWICE A DAY   [facility] [place] [facility] Natural Products (LUTEIN 20 PO)  Self Yes No   Sig: Take 20 mg by mouth daily  Multiple Vitamin (MULTIVITAMIN) tablet  Self Yes No   Sig: Take 1 tablet by mouth daily  ascorbic acid (VITAMIN C) 500 mg tablet  Self Yes No   Sig: Take 500 mg by mouth daily  betamethasone, augmented, (DIPROLENE-AF) 0 05 % cream   No No   Sig: Apply topically 2 (two) times a day Applied to areas until resolved   carvedilol (COREG) 6 25 mg tablet  Self No No   Sig: Take 1 tablet (6 25 mg total) by mouth 2 (two) times a day with meals   colestipol (COLESTID) 1 g tablet  Self No No   Sig: TAKE 1 TABLET TWICE A DAY   doxycycline hyclate (VIBRA-TABS) 100 mg tablet   No No   Sig: Take 1 tablet (100 mg total) by mouth 2 (two) times a day for 14 days   folic acid (FOLVITE) 1 mg tablet  Self No No   Si daily   hydrochlorothiazide (HYDRODIURIL) 25 mg tablet  Self No No   Sig: Take 1 tablet (25 mg total) by mouth daily   levothyroxine 50 mcg tablet  Self No No   Sig: Take 1 tablet (50 mcg total) by mouth every other day   levothyroxine 75 mcg tablet  Self No No   Sig: Take 1 tab po every other day, alternating with 50 mcg   magnesium oxide (MAG-OX) 400 mg  Self No No   Sig: Take 1 tablet (400 mg total) by mouth 2 (two) times a day   methotrexate 2 5 mg tablet  Self Yes No   Sig: Take 10 mg by mouth once a week     pantoprazole (PROTONIX) 40 mg tablet  Self No No   Sig: Take 1 tablet (40 mg total) by mouth daily   predniSONE 10 mg tablet   No No   Sig: Take 1 tablet (10 mg total) by mouth daily   riTUXimab (RITUXAN) 500 mg/50 mL  Self Yes No   Sig: Infuse into a venous catheter every 6 (six) months     rosuvastatin (CRESTOR) 5 mg tablet  Self No No   Sig: Take 1 tablet (5 mg total) by mouth daily   tamoxifen (NOLVADEX) 20 mg tablet  Self No No   Sig: Take 1 tablet (20 mg total) by mouth daily   vitamin E, tocopherol, 400 units capsule  Self Yes No   Sig: Take 400 Units by mouth daily      Facility-Administered Medications: None       Past Medical History:   Diagnosis Date    Allergic angioedema     2mar2016 resolved    Angioedema     [id number] resolved    Arthritis     [facility]     Disease of thyroid gland     Hemorrhage of anus and rectum     02mar2016 resolved    Hyperlipidemia     Hypertension     Hypocalcemia     12oct2017 resolved    Neuritis     thoracic and lumbosacral    Peptic ulcer     [facility]     Vitamin D deficiency        Past Surgical History:   Procedure Laterality Date    ABDOMINAL SURGERY      BREAST SURGERY      CHOLECYSTECTOMY      ELBOW SURGERY      ORTHOPEDIC SURGERY      for toes       Family History   Problem Relation Age of Onset   Dick Guru Breast cancer Mother     Kidney failure Father     Other Father         uremia    Breast cancer Sister     Breast cancer Maternal Aunt      I have reviewed and agree with the history as documented  Social History     Tobacco Use    Smoking status: Never Smoker    Smokeless tobacco: Never Used   Substance Use Topics    Alcohol use: Not Currently    Drug use: No        Review of Systems   Constitutional: Negative for activity change, appetite change, chills, diaphoresis, fatigue, fever and unexpected weight change  HENT: Negative for congestion, dental problem, drooling, ear discharge, ear pain, facial swelling, hearing loss, mouth sores, nosebleeds, postnasal drip, rhinorrhea, sinus pressure, sinus pain, sneezing, sore throat, tinnitus, trouble swallowing and voice change  Eyes: Negative for photophobia, pain, discharge, redness, itching and visual disturbance  Respiratory: Negative for apnea, cough, choking, chest tightness, shortness of breath, wheezing and stridor  Cardiovascular: Negative for chest pain, palpitations and leg swelling  Gastrointestinal: Negative for abdominal distention, abdominal pain, anal bleeding, blood in stool, constipation, diarrhea, nausea, rectal pain and vomiting     Endocrine: Negative for cold intolerance, heat intolerance, polydipsia, polyphagia and polyuria  Genitourinary: Negative for decreased urine volume, difficulty urinating, dyspareunia, dysuria, enuresis, flank pain, frequency, hematuria, menstrual problem, pelvic pain, urgency, vaginal bleeding, vaginal discharge and vaginal pain  Musculoskeletal: Negative for arthralgias, back pain, gait problem, joint swelling, myalgias, neck pain and neck stiffness  Skin: Positive for rash  Negative for color change, pallor and wound  Allergic/Immunologic: Negative for environmental allergies, food allergies and immunocompromised state  Neurological: Negative for dizziness, tremors, seizures, syncope, facial asymmetry, speech difficulty, weakness, light-headedness, numbness and headaches  Hematological: Negative for adenopathy  Does not bruise/bleed easily  Psychiatric/Behavioral: Negative for agitation, confusion, hallucinations, self-injury and suicidal ideas  The patient is not hyperactive  All other systems reviewed and are negative  Physical Exam  Physical Exam   Constitutional: She is oriented to person, place, and time  She appears well-developed and well-nourished  No distress  /64 (BP Location: Left arm)   Pulse 65   Temp 97 5 °F (36 4 °C) (Oral)   Resp 20   Wt 88 3 kg (194 lb 10 7 oz)   LMP  (LMP Unknown)   SpO2 98%   BMI 32 39 kg/m²    HENT:   Head: Normocephalic and atraumatic  Right Ear: External ear normal    Left Ear: External ear normal    Nose: Nose normal    Mouth/Throat: Oropharynx is clear and moist  No oropharyngeal exudate  Eyes: Pupils are equal, round, and reactive to light  Conjunctivae and EOM are normal  Right eye exhibits no discharge  Left eye exhibits no discharge  No scleral icterus  Neck: Normal range of motion  Neck supple  No JVD present  No tracheal deviation present  Cardiovascular: Normal rate, regular rhythm and intact distal pulses     Pulmonary/Chest: Effort normal and breath sounds normal  No stridor  No respiratory distress  She has no wheezes  She has no rales  She exhibits no tenderness  Abdominal: Soft  Bowel sounds are normal  She exhibits no distension and no mass  There is no tenderness  There is no rebound and no guarding  No hernia  Musculoskeletal: Normal range of motion  She exhibits deformity  She exhibits no edema or tenderness  Chronic deformity of MCP and IP joints of both hands consistent with chronic rheumatoid arthritis  Lymphadenopathy:     She has no cervical adenopathy  Neurological: She is alert and oriented to person, place, and time  She displays normal reflexes  No cranial nerve deficit or sensory deficit  She exhibits normal muscle tone  Coordination normal    Skin: Skin is warm and dry  Capillary refill takes less than 2 seconds  Rash noted  She is not diaphoretic  There is erythema  No pallor  There are multiple areas of raised red rash, papules and/or bullae type lesions, arranged in characteristic linear or streak-like configurations present to bilateral hands and arms, anterior abdominal wall, inguinal areas, bilateral legs and back as well as face  Lesions are irregularly shaped, best characterized a linear wispy distributions  No petechiae, no pustules or vesicles  Psychiatric: She has a normal mood and affect  Her behavior is normal  Judgment and thought content normal    Nursing note and vitals reviewed        Vital Signs  ED Triage Vitals [09/11/19 0551]   Temperature Pulse Respirations Blood Pressure SpO2   97 5 °F (36 4 °C) 65 20 146/64 98 %      Temp Source Heart Rate Source Patient Position - Orthostatic VS BP Location FiO2 (%)   Oral Monitor Lying Left arm --      Pain Score       --           Vitals:    09/11/19 0551   BP: 146/64   Pulse: 65   Patient Position - Orthostatic VS: Lying         Visual Acuity      ED Medications  Medications - No data to display    Diagnostic Studies  Results Reviewed     None                 No orders to display              Procedures  Procedures       ED Course                               MDM  Number of Diagnoses or Management Options  Poison ivy: new and does not require workup  Diagnosis management comments: ddx includes but is not limited to scabies, atopic dermatitis, strep infection, herpes zoster/shingles, fungal infection, allergic reaction, contact dermatitis, psoriasis, eczema, lice, flea bites, impetigo, pityriasis, seborrheic dermatitis, consider but doubt porphyria, vasculitis, chicken pox, measles  Discussed with patient symptoms appear most consistent with poison ivy  Will place on a 2 week course of prednisone for treatment  Add hydroxyzine for itching  Add zanfel for itching to be applied topically  Follow up with primary care physician and Dermatology in 2-3 days for further evaluation treatment symptoms  Reviewed reasons to return to ed  Patient verbalized understanding of diagnosis and agreement with discharge plan of care as well as understanding of reasons to return to ed  Amount and/or Complexity of Data Reviewed  Review and summarize past medical records: yes    Patient Progress  Patient progress: stable      Disposition  Final diagnoses:   Poison ivy     Time reflects when diagnosis was documented in both MDM as applicable and the Disposition within this note     Time User Action Codes Description Comment    9/11/2019  7:23 AM Jacobo Connelly Add [L23 7] Poison ivy       ED Disposition     ED Disposition Condition Date/Time Comment    Discharge Stable Wed Sep 11, 2019  7:23 AM Evans Epps discharge to home/self care              Follow-up Information     Follow up With Specialties Details Why Contact Info Additional Violet 163, DO Internal Medicine Call in 2 days for further evaluation of symptoms [address]  [address]  Let\[facility] [place] [facility]\"" 34 Emergency Department Emergency Medicine Go to If symptoms worsen [address] [facility] [place] [id number] [phone] [place] [facility], [address] [place], [place], [address] Shahab Bloom MD Dermatology Call in 2 days for further evaluation of symptoms Olga Lidia [address]  [phone]             Discharge Medication List as of 9/11/2019  7:31 AM      START taking these medications    Details   hydrOXYzine HCL (ATARAX) 25 mg tablet Take 1 tablet (25 mg total) by mouth every 6 (six) hours as needed for itching, Starting Wed 9/11/2019, Print      Poison Ivy Treatments (ZANFEL) MISC Apply 1 application topically once as needed (poison ivy) for up to 7 days, Starting Wed 9/11/2019, Until Wed 9/18/2019, Print      !! predniSONE 5 mg tablet 40 mg PO daily x 7 days then decrease to 20 mg PO daily x 7 days  , Print       !! - Potential duplicate medications found  Please discuss with provider  CONTINUE these medications which have NOT CHANGED    Details   ascorbic acid (VITAMIN C) 500 mg tablet Take 500 mg by mouth daily  , Historical Med      betamethasone, augmented, (DIPROLENE-AF) 0 05 % cream Apply topically 2 (two) times a day Applied to areas until resolved, Starting Thu 9/5/2019, Normal      Calcium Citrate-Vitamin D (CITRACAL + D PO) Take by mouth 2 (two) times a day, Historical Med      carvedilol (COREG) 6 25 mg tablet Take 1 tablet (6 25 mg total) by mouth 2 (two) times a day with meals, Starting Thu 3/14/2019, Print      Cholecalciferol (VITAMIN D-3 PO) Take 2,000 Units by mouth daily  , Historical Med      colestipol (COLESTID) 1 g tablet TAKE 1 TABLET TWICE A DAY, Normal      doxycycline hyclate (VIBRA-TABS) 100 mg tablet Take 1 tablet (100 mg total) by mouth 2 (two) times a day for 14 days, Starting Tue 9/3/2019, Until Tue 7/957/95/2497, Normal      folic acid (FOLVITE) 1 mg tablet 1 daily, Print      hydrochlorothiazide (HYDRODIURIL) 25 mg tablet Take 1 tablet (25 mg total) by mouth daily, Starting Mon 6/24/2019, Normal      KLOR-CON M10 10 MEQ tablet TAKE 2 TABLETS BY MOUTH TWICE A DAY, Normal      !! levothyroxine 50 mcg tablet Take 1 tablet (50 mcg total) by mouth every other day, Starting Mon 6/10/2019, Normal      !! levothyroxine 75 mcg tablet Take 1 tab po every other day, alternating with 50 mcg, Normal      magnesium oxide (MAG-OX) 400 mg Take 1 tablet (400 mg total) by mouth 2 (two) times a day, Starting Mon 11/5/2018, No Print      methotrexate 2 5 mg tablet Take 10 mg by mouth once a week , Historical Med      Misc Natural Products (LUTEIN 20 PO) Take 20 mg by mouth daily  , Historical Med      Multiple Vitamin (MULTIVITAMIN) tablet Take 1 tablet by mouth daily  , Historical Med      pantoprazole (PROTONIX) 40 mg tablet Take 1 tablet (40 mg total) by mouth daily, Starting Thu 3/14/2019, Print      !! predniSONE 10 mg tablet Take 1 tablet (10 mg total) by mouth daily, Starting Tue 9/3/2019, Normal      riTUXimab (RITUXAN) 500 mg/50 mL Infuse into a venous catheter every 6 (six) months  , Starting Thu 8/20/2015, Historical Med      rosuvastatin (CRESTOR) 5 mg tablet Take 1 tablet (5 mg total) by mouth daily, Starting Thu 3/14/2019, Print      tamoxifen (NOLVADEX) 20 mg tablet Take 1 tablet (20 mg total) by mouth daily, Starting Thu 8/8/2019, Print      vitamin E, tocopherol, 400 units capsule Take 400 Units by mouth daily, Historical Med       !! - Potential duplicate medications found  Please discuss with provider  No discharge procedures on file      ED Provider  Electronically Signed by           Winston Yaenz PA-C  09/11/19 8918

## 2019-09-30 ENCOUNTER — CLINICAL SUPPORT (OUTPATIENT)
Dept: INTERNAL MEDICINE CLINIC | Facility: CLINIC | Age: 79
End: 2019-09-30
Payer: MEDICARE

## 2019-09-30 ENCOUNTER — TELEPHONE (OUTPATIENT)
Dept: INTERNAL MEDICINE CLINIC | Facility: CLINIC | Age: 79
End: 2019-09-30

## 2019-09-30 DIAGNOSIS — I10 HYPERTENSION, UNSPECIFIED TYPE: Chronic | ICD-10-CM

## 2019-09-30 DIAGNOSIS — R60.0 LOCALIZED EDEMA: ICD-10-CM

## 2019-09-30 DIAGNOSIS — Z23 ENCOUNTER FOR IMMUNIZATION: ICD-10-CM

## 2019-09-30 PROCEDURE — G0008 ADMIN INFLUENZA VIRUS VAC: HCPCS

## 2019-09-30 PROCEDURE — 90662 IIV NO PRSV INCREASED AG IM: CPT

## 2019-09-30 RX ORDER — HYDROCHLOROTHIAZIDE 25 MG/1
25 TABLET ORAL DAILY
Qty: 90 TABLET | Refills: 0 | Status: CANCELLED | OUTPATIENT
Start: 2019-09-30

## 2019-09-30 RX ORDER — HYDROCHLOROTHIAZIDE 25 MG/1
25 TABLET ORAL DAILY
Qty: 90 TABLET | Refills: 0 | Status: SHIPPED | OUTPATIENT
Start: 2019-09-30 | End: 2019-12-30 | Stop reason: SDUPTHER

## 2019-09-30 NOTE — TELEPHONE ENCOUNTER
Pt will be coming in today to get a flu shot  Pt also need a refill of hydrochlorothiazide (HYDRODIURIL) 25 MG 90 DAYS SUPPLY  1 tablet a day  [facility]

## 2019-10-24 ENCOUNTER — HOSPITAL ENCOUNTER (EMERGENCY)
Facility: HOSPITAL | Age: 79
Discharge: HOME | End: 2019-10-24
Attending: EMERGENCY MEDICINE | Admitting: EMERGENCY MEDICINE
Payer: MEDICARE

## 2019-10-24 ENCOUNTER — TRANSCRIBE ORDERS (OUTPATIENT)
Dept: ADMINISTRATIVE | Facility: HOSPITAL | Age: 79
End: 2019-10-24

## 2019-10-24 ENCOUNTER — HOSPITAL ENCOUNTER (OUTPATIENT)
Dept: NON INVASIVE DIAGNOSTICS | Facility: HOSPITAL | Age: 79
Discharge: HOME | End: 2019-10-24
Admitting: INTERNAL MEDICINE
Payer: MEDICARE

## 2019-10-24 ENCOUNTER — TELEPHONE (OUTPATIENT)
Dept: INTERNAL MEDICINE CLINIC | Facility: CLINIC | Age: 79
End: 2019-10-24

## 2019-10-24 VITALS
BODY MASS INDEX: 31.99 KG/M2 | HEART RATE: 65 BPM | WEIGHT: 192 LBS | DIASTOLIC BLOOD PRESSURE: 79 MMHG | TEMPERATURE: 98 F | SYSTOLIC BLOOD PRESSURE: 174 MMHG | OXYGEN SATURATION: 96 % | RESPIRATION RATE: 18 BRPM | HEIGHT: 65 IN

## 2019-10-24 DIAGNOSIS — R60.9 EDEMA, UNSPECIFIED TYPE: ICD-10-CM

## 2019-10-24 DIAGNOSIS — R60.9 SWELLING: Primary | ICD-10-CM

## 2019-10-24 DIAGNOSIS — R60.9 SWELLING: ICD-10-CM

## 2019-10-24 DIAGNOSIS — I82.462 ACUTE DEEP VEIN THROMBOSIS (DVT) OF CALF MUSCLE VEIN OF LEFT LOWER EXTREMITY (HCC): Primary | ICD-10-CM

## 2019-10-24 DIAGNOSIS — R79.89 ELEVATED SERUM CREATININE: ICD-10-CM

## 2019-10-24 LAB
ANION GAP SERPL CALCULATED.3IONS-SCNC: 9 MMOL/L (ref 4–13)
BUN SERPL-MCNC: 24 MG/DL (ref 5–25)
CALCIUM SERPL-MCNC: 9.1 MG/DL (ref 8.3–10.1)
CHLORIDE SERPL-SCNC: 109 MMOL/L (ref 100–108)
CO2 SERPL-SCNC: 26 MMOL/L (ref 21–32)
CREAT SERPL-MCNC: 1.32 MG/DL (ref 0.6–1.3)
GFR SERPL CREATININE-BSD FRML MDRD: 39 ML/MIN/1.73SQ M
GLUCOSE SERPL-MCNC: 97 MG/DL (ref 65–140)
POTASSIUM SERPL-SCNC: 3.6 MMOL/L (ref 3.5–5.3)
SODIUM SERPL-SCNC: 144 MMOL/L (ref 136–145)

## 2019-10-24 PROCEDURE — 36415 COLL VENOUS BLD VENIPUNCTURE: CPT | Performed by: EMERGENCY MEDICINE

## 2019-10-24 PROCEDURE — 93971 EXTREMITY STUDY: CPT | Performed by: SURGERY

## 2019-10-24 PROCEDURE — 99283 EMERGENCY DEPT VISIT LOW MDM: CPT

## 2019-10-24 PROCEDURE — 99284 EMERGENCY DEPT VISIT MOD MDM: CPT | Performed by: EMERGENCY MEDICINE

## 2019-10-24 PROCEDURE — 93971 EXTREMITY STUDY: CPT

## 2019-10-24 PROCEDURE — 80048 BASIC METABOLIC PNL TOTAL CA: CPT | Performed by: EMERGENCY MEDICINE

## 2019-10-24 NOTE — DISCHARGE INSTRUCTIONS
We are starting you on a blood thinning medication called Eliquis (apixaban)  Please take 2 tablets (10 mg) twice a day for the first 7 days  Then reduce to 5 mg twice a day  Follow-up with your primary care doctor as they will prescribe you this medication in the future and determine how long you should be on this medication  This medication can cause bleeding  Return to the ER or call your doctor if you have any significant bleeding, shortness of breath, chest pain, or other changes

## 2019-10-24 NOTE — TELEPHONE ENCOUNTER
Pt had posatives dopplar of legs for clots, as per [facility]       Pt sent to hospital there in [facility]

## 2019-10-24 NOTE — ED PROVIDER NOTES
History  Chief Complaint   Patient presents with    Leg Swelling     According to the patient, she had went for a study and found a blood clot in the left leg  Patient reports swelling and redness in the left calf  52-year-old female presenting for evaluation after having a left lower extremity duplex scan today, after having intermittent pain in the left lower calf and some redness over the past month  Patient says she had poison ivy in the area, was put on steroids which did help with her rash and the itching, however she still felt that she had a lump in the area  Was ordered for a duplex scan by her rheumatologist to follows her closely  Has been able to ambulate normally  Has some stable left upper anterior thigh and hip "tingling" that has been present for many years, no changes to that  She has noticed that her left ankle in foot seems more swollen than her right, however she has noticed swelling in both of her feet at times in the past   Patient denies any history of chest pain, shortness of breath, dyspnea on exertion, lightheadedness, dizziness, fatigue, chills  No recent changes to any medications, patient says that she took her antihypertensives today  States that she feels anxious being in the emergency department at this time  ROS is otherwise neg as below  History provided by:  Patient   used: No        Prior to Admission Medications   Prescriptions Last Dose Informant Patient Reported? Taking? Calcium Citrate-Vitamin D (CITRACAL + D PO)  Self Yes No   Sig: Take by mouth 2 (two) times a day   Cholecalciferol (VITAMIN D-3 PO)  Self Yes No   Sig: Take 2,000 Units by mouth daily  KLOR-CON M10 10 MEQ tablet  Self No No   Sig: TAKE 2 TABLETS BY MOUTH TWICE A DAY   Misc Natural Products (LUTEIN 20 PO)  Self Yes No   Sig: Take 20 mg by mouth daily  Multiple Vitamin (MULTIVITAMIN) tablet  Self Yes No   Sig: Take 1 tablet by mouth daily     Poison Ivy Treatments (ZANFEL) MISC   No No   Sig: Apply 1 application topically once as needed (poison ivy) for up to 7 days   ascorbic acid (VITAMIN C) 500 mg tablet  Self Yes No   Sig: Take 500 mg by mouth daily  betamethasone, augmented, (DIPROLENE-AF) 0 05 % cream   No No   Sig: Apply topically 2 (two) times a day Applied to areas until resolved   carvedilol (COREG) 6 25 mg tablet  Self No No   Sig: Take 1 tablet (6 25 mg total) by mouth 2 (two) times a day with meals   colestipol (COLESTID) 1 g tablet  Self No No   Sig: TAKE 1 TABLET TWICE A DAY   folic acid (FOLVITE) 1 mg tablet  Self No No   Si daily   hydrOXYzine HCL (ATARAX) 25 mg tablet   No No   Sig: Take 1 tablet (25 mg total) by mouth every 6 (six) hours as needed for itching   hydrochlorothiazide (HYDRODIURIL) 25 mg tablet   No No   Sig: Take 1 tablet (25 mg total) by mouth daily   levothyroxine 50 mcg tablet  Self No No   Sig: Take 1 tablet (50 mcg total) by mouth every other day   levothyroxine 75 mcg tablet  Self No No   Sig: Take 1 tab po every other day, alternating with 50 mcg   magnesium oxide (MAG-OX) 400 mg  Self No No   Sig: Take 1 tablet (400 mg total) by mouth 2 (two) times a day   methotrexate 2 5 mg tablet  Self Yes No   Sig: Take 10 mg by mouth once a week  pantoprazole (PROTONIX) 40 mg tablet  Self No No   Sig: Take 1 tablet (40 mg total) by mouth daily   predniSONE 10 mg tablet   No No   Sig: Take 1 tablet (10 mg total) by mouth daily   predniSONE 5 mg tablet   No No   Si mg PO daily x 7 days then decrease to 20 mg PO daily x 7 days     riTUXimab (RITUXAN) 500 mg/50 mL  Self Yes No   Sig: Infuse into a venous catheter every 6 (six) months     rosuvastatin (CRESTOR) 5 mg tablet  Self No No   Sig: Take 1 tablet (5 mg total) by mouth daily   tamoxifen (NOLVADEX) 20 mg tablet  Self No No   Sig: Take 1 tablet (20 mg total) by mouth daily   vitamin E, tocopherol, 400 units capsule  Self Yes No   Sig: Take 400 Units by mouth daily Facility-Administered Medications: None       Past Medical History:   Diagnosis Date    Allergic angioedema     [id number] resolved    Angioedema     [id number] resolved    Arthritis     [facility] [address]     Disease of thyroid gland     Hemorrhage of anus and rectum     [id number] resolved    Hyperlipidemia     Hypertension     Hypocalcemia     [id number] resolved    Neuritis     thoracic and lumbosacral    Peptic ulcer     Rhe[facility]oid arthritis ([facility] [address]     Vitamin D deficiency        Past Surgical History:   Procedure Laterality Date    ABDOMINAL SURGERY      BREAST SURGERY      CHOLECYSTECTOMY      ELBOW SURGERY      ORTHOPEDIC SURGERY      for toes       Family History   Problem Relation Age of Onset   Jaret Moose Breast cancer Mother     Kidney failure Father     Other Father         uremia    Breast cancer Sister     Breast cancer Maternal Aunt      I have reviewed and agree with the history as documented  Social History     Tobacco Use    Smoking status: Never Smoker    Smokeless tobacco: Never Used   Substance Use Topics    Alcohol use: Not Currently    Drug use: No        Review of Systems   Constitutional: Negative for chills, diaphoresis, fatigue and fever  HENT: Negative for congestion, rhinorrhea and sore throat  Eyes: Negative for redness and visual disturbance  Respiratory: Negative for cough, shortness of breath and wheezing  Cardiovascular: Positive for leg swelling  Negative for chest pain and palpitations  Gastrointestinal: Negative for abdominal pain, diarrhea, nausea and vomiting  Genitourinary: Negative for difficulty urinating, dysuria, frequency, hematuria and urgency  Musculoskeletal: Negative for back pain and gait problem  Skin: Positive for rash (Resolved)  Negative for pallor  Neurological: Negative for dizziness, syncope, weakness, light-headedness and headaches  Psychiatric/Behavioral: Negative for confusion  The patient is nervous/anxious  Physical Exam  ED Triage Vitals [10/24/19 1537]   Temperature Pulse Respirations Blood Pressure SpO2   98 °F (36 7 °C) 76 18 (!) 223/101 96 %      Temp Source Heart Rate Source Patient Position - Orthostatic VS BP Location FiO2 (%)   Oral Monitor Sitting Left arm --      Pain Score       No Pain             Orthostatic Vital Signs  Vitals:    10/24/19 1537 10/24/19 1715   BP: (!) 223/101 (!) 174/79   Pulse: 76 65   Patient Position - Orthostatic VS: Sitting Lying       Physical Exam   Constitutional: She appears well-developed and well-nourished  No distress  HENT:   Head: Normocephalic and atraumatic  Eyes: Pupils are equal, round, and reactive to light  Conjunctivae are normal    Neck: Normal range of motion  Cardiovascular: Normal rate, regular rhythm and normal heart sounds  No murmur heard  Pulmonary/Chest: Effort normal and breath sounds normal  No respiratory distress  She has no wheezes  She has no rales  Abdominal: Soft  Bowel sounds are normal  She exhibits no distension  There is no tenderness  Musculoskeletal: Normal range of motion  She exhibits edema (1+ pitting edema at left ankle and foot compared to trace in the right ) and tenderness (Mild tenderness of left posterior right medial calf)  Neurological: She is alert  Skin: Skin is warm and dry  No rash noted  She is not diaphoretic  Psychiatric: She has a normal mood and affect  Nursing note and vitals reviewed        ED Medications  Medications - No data to display    Diagnostic Studies  Results Reviewed     Procedure Component Value Units Date/Time    Basic metabolic panel [[id number]]  (Abnormal) Collected:  10/24/19 1700    Lab Status:  Final result Specimen:  Blood from Arm, Left Updated:  10/24/19 6063     Sodium 144 mmol/L      Potassium 3 6 mmol/L      Chloride 109 mmol/L      CO2 26 mmol/L      ANION GAP 9 mmol/L      BUN 24 mg/dL      Creatinine 1 32 mg/dL      Glucose 97 mg/dL      Calcium 9 1 mg/dL      eGFR 39 ml/min/1 73sq m     Narrative:       National Kidney Disease Foundation guidelines for Chronic Kidney Disease (CKD):     Stage 1 with normal or high GFR (GFR > 90 mL/min/1 73 square meters)    Stage 2 Mild CKD (GFR = 60-89 mL/min/1 73 square meters)    Stage 3A Moderate CKD (GFR = 45-59 mL/min/1 73 square meters)    Stage 3B Moderate CKD (GFR = 30-44 mL/min/1 73 square meters)    Stage 4 Severe CKD (GFR = 15-29 mL/min/1 73 square meters)    Stage 5 End Stage CKD (GFR <15 mL/min/1 73 square meters)  Note: GFR calculation is accurate only with a steady state creatinine                 No orders to display       FINDINGS:     Segment        Right            Left                            Impression       Impression                CFV            Normal (Patent)  Normal (Patent)           GSV Prox Calf                   Occlusive Subsegmental    PostTibial                      Non Occlusive Thrombus             CONCLUSION:  RIGHT LOWER LIMB LIMITED:  Evaluation shows no evidence of thrombus in the common femoral vein  Doppler evaluation shows a normal response to augmentation maneuvers  LEFT LOWER LIMB:  Evidence of non-occlusive acute deep vein thrombosis in one of the paired  posterior tibial veins  Evidence of superficial thrombophlebitis noted in the greater saphenous vein at  the proximal calf, and one of it's branches  Doppler evaluation shows a normal response to augmentation maneuvers  Popliteal, posterior tibial and anterior tibial arterial Doppler waveforms are  Triphasic/biphasic  Procedures  Procedures        ED Course           Identification of Seniors at Risk      Most Recent Value   (ISAR) Identification of Seniors at Risk   Before the illness or injury that brought you to the Emergency, did you need someone to help you on a regular basis?   0 Filed at: 10/24/2019 1542   In the last 24 hours, have you needed more help than usual?  0 Filed at: 10/24/2019 1542   Have you been hospitalized for one or more nights during the past 6 months? 0 Filed at: 10/24/2019 1542   In general, do you see well?  0 Filed at: 10/24/2019 1542   In general, do you have serious problems with your memory? 0 Filed at: 10/24/2019 1542   Do you take more than three different medications every day? 1 Filed at: 10/24/2019 1542   ISAR Score  1 Filed at: 10/24/2019 1542                          MDM  Number of Diagnoses or Management Options  Acute deep vein thrombosis (DVT) of calf muscle vein of left lower extremity: new and requires workup  Elevated serum creatinine: minor  Diagnosis management comments: A 76-year-old female presenting for evaluation after found to have a left nonocclusive acute DVT in the posterior tibial vein as well as some superficial thrombophlebitis in the area  Patient has no other complaints of chest pain, shortness of breath, dizziness, lightheadedness  Given prescription for Eliquis as patient has GFR of 39 today  This is new for her, although not significantly changed from her priors, do recommend she follow up with her PCP for this change  Patient was initially hypertensive, this resolves to 174/79 at time of discharge  Recommended she follow up with her primary care physician for this  Discussed return precautions with patient including symptoms of pulmonary embolism or worsening DVT  Discharged in good condition         Amount and/or Complexity of Data Reviewed  Clinical lab tests: reviewed    Risk of Complications, Morbidity, and/or Mortality  Presenting problems: minimal  Diagnostic procedures: minimal  Management options: minimal    Patient Progress  Patient progress: improved      Disposition  Final diagnoses:   Acute deep vein thrombosis (DVT) of calf muscle vein of left lower extremity   Elevated serum creatinine     Time reflects when diagnosis was documented in both MDM as applicable and the Disposition within this note     Time User Action Codes Description Comment    10/24/2019 5:53 PM Lopez Gillis Add [I82 [id number]] Acute deep vein thrombosis (DVT) of calf muscle vein of left lower extremity     10/24/2019  5:54 PM Lopez Gillis Add [R79 [id number]] Elevated serum creatinine       ED Disposition     ED Disposition Condition Date/Time Comment    Discharge Good u Oct 24, 2019  5:41 PM Abad Trejo discharge to home/self care  Follow-up Information     Follow up With Specialties Details Why Contact Info Additional Violet 163, DO Internal Medicine Schedule an appointment as soon as possible for a visit in 2 days For reevaluation as we discussed  [address] Emergency Department Emergency Medicine Go to  As needed [address] [facility], [address], [place], [place], [address]   [phone]          Discharge Medication List as of 10/24/2019  6:03 PM      START taking these medications    Details   apixaban (ELIQUIS) 5 mg Take 1 tablet (5 mg total) by mouth 2 (two) times a day Take 2 tablets (10 mg) twice a day for the first 7 days  Then afterwards take 1 tablet (5 mg) twice daily  , Starting Thu 10/24/2019, Until Sat 11/23/2019, Print         CONTINUE these medications which have NOT CHANGED    Details   ascorbic acid (VITAMIN C) 500 mg tablet Take 500 mg by mouth daily  , Historical Med      betamethasone, augmented, (DIPROLENE-AF) 0 05 % cream Apply topically 2 (two) times a day Applied to areas until resolved, Starting u 9/5/2019, Normal      Calcium Citrate-Vitamin D (CITRACAL + D PO) Take by mouth 2 (two) times a day, Historical Med      carvedilol (COREG) 6 25 mg tablet Take 1 tablet (6 25 mg total) by mouth 2 (two) times a day with meals, Starting Thu 3/14/2019, Print      Cholecalciferol (VITAMIN D-3 PO) Take 2,000 Units by mouth daily  , Historical Med      colestipol (COLESTID) 1 g tablet TAKE 1 TABLET TWICE A DAY, Normal folic acid (FOLVITE) 1 mg tablet 1 daily, Print      hydrochlorothiazide (HYDRODIURIL) 25 mg tablet Take 1 tablet (25 mg total) by mouth daily, Starting Mon 9/30/2019, Normal      hydrOXYzine HCL (ATARAX) 25 mg tablet Take 1 tablet (25 mg total) by mouth every 6 (six) hours as needed for itching, Starting Wed 9/11/2019, Print      KLOR-CON M10 10 MEQ tablet TAKE 2 TABLETS BY MOUTH TWICE A DAY, Normal      !! levothyroxine 50 mcg tablet Take 1 tablet (50 mcg total) by mouth every other day, Starting Mon 6/10/2019, Normal      !! levothyroxine 75 mcg tablet Take 1 tab po every other day, alternating with 50 mcg, Normal      magnesium oxide (MAG-OX) 400 mg Take 1 tablet (400 mg total) by mouth 2 (two) times a day, Starting Mon 11/5/2018, No Print      methotrexate 2 5 mg tablet Take 10 mg by mouth once a week , Historical Med      Misc Natural Products (LUTEIN 20 PO) Take 20 mg by mouth daily  , Historical Med      Multiple Vitamin (MULTIVITAMIN) tablet Take 1 tablet by mouth daily  , Historical Med      pantoprazole (PROTONIX) 40 mg tablet Take 1 tablet (40 mg total) by mouth daily, Starting Thu 3/14/2019, Print      Poison Ivy Treatments (ZANFEL) MISC Apply 1 application topically once as needed (poison ivy) for up to 7 days, Starting Wed 9/11/2019, Until Wed 9/18/2019, Print      !! predniSONE 10 mg tablet Take 1 tablet (10 mg total) by mouth daily, Starting Tue 9/3/2019, Normal      !! predniSONE 5 mg tablet 40 mg PO daily x 7 days then decrease to 20 mg PO daily x 7 days  , Print      riTUXimab (RITUXAN) 500 mg/50 mL Infuse into a venous catheter every 6 (six) months  , Starting Thu 8/20/2015, Historical Med      rosuvastatin (CRESTOR) 5 mg tablet Take 1 tablet (5 mg total) by mouth daily, Starting Thu 3/14/2019, Print      tamoxifen (NOLVADEX) 20 mg tablet Take 1 tablet (20 mg total) by mouth daily, Starting Thu 8/8/2019, Print      vitamin E, tocopherol, 400 units capsule Take 400 Units by mouth daily, Historical Med       !! - Potential duplicate medications found  Please discuss with provider  No discharge procedures on file  ED Provider  Attending physically available and evaluated Orpha Feeler  I managed the patient along with the ED Attending      Electronically Signed by         Claudean Miracle, MD  10/24/19 8048

## 2019-10-28 ENCOUNTER — OFFICE VISIT (OUTPATIENT)
Dept: INTERNAL MEDICINE CLINIC | Facility: CLINIC | Age: 79
End: 2019-10-28
Payer: MEDICARE

## 2019-10-28 VITALS
OXYGEN SATURATION: 98 % | HEIGHT: 65 IN | DIASTOLIC BLOOD PRESSURE: 88 MMHG | RESPIRATION RATE: 18 BRPM | WEIGHT: 197.6 LBS | BODY MASS INDEX: 32.92 KG/M2 | SYSTOLIC BLOOD PRESSURE: 170 MMHG | HEART RATE: 72 BPM | TEMPERATURE: 98.9 F

## 2019-10-28 DIAGNOSIS — I82.462 ACUTE DEEP VEIN THROMBOSIS (DVT) OF CALF MUSCLE VEIN OF LEFT LOWER EXTREMITY (HCC): ICD-10-CM

## 2019-10-28 DIAGNOSIS — I10 HYPERTENSION, UNSPECIFIED TYPE: Chronic | ICD-10-CM

## 2019-10-28 DIAGNOSIS — E78.5 HYPERLIPIDEMIA, UNSPECIFIED HYPERLIPIDEMIA TYPE: Primary | ICD-10-CM

## 2019-10-28 PROCEDURE — 99214 OFFICE O/P EST MOD 30 MIN: CPT | Performed by: NURSE PRACTITIONER

## 2019-10-28 RX ORDER — CARVEDILOL 12.5 MG/1
12.5 TABLET ORAL 2 TIMES DAILY WITH MEALS
Qty: 180 TABLET | Refills: 3 | Status: SHIPPED | OUTPATIENT
Start: 2019-10-28 | End: 2019-12-19 | Stop reason: SDUPTHER

## 2019-10-28 NOTE — PATIENT INSTRUCTIONS
1  Hypertension not at goal  Increase Carvedilol to 12 5 mg twice daily and return in two weeks for a blood pressure check  2  DVT of LLE- Start Eliquis 2 tabs twice daily x 1 week, then one tab twice daily thereafter  3  Rheumatoid arthritis- has good follow up with rheumatology Dr Aidan Watts     Follow up with me in two weeks and with Dr Ronda Estevez as scheduled in December, labs prior

## 2019-10-28 NOTE — PROGRESS NOTES
Assessment/Plan:    1  Hypertension not at goal  Increase Carvedilol to 12 5 mg twice daily and return in two weeks for a blood pressure check  2  DVT of LLE- Start Eliquis 2 tabs twice daily x 1 week, then one tab twice daily thereafter  3  Rheumatoid arthritis- has good follow up with rheumatology Dr Juanetta Gottron     Follow up with me in two weeks and with Dr Matthew Edmonds as scheduled in December, labs prior  Diagnoses and all orders for this visit:    Hyperlipidemia, unspecified hyperlipidemia type    Hypertension, unspecified type  -     carvedilol (COREG) 12 5 mg tablet; Take 1 tablet (12 5 mg total) by mouth 2 (two) times a day with meals    Acute deep vein thrombosis (DVT) of calf muscle vein of left lower extremity  -     apixaban (ELIQUIS) 5 mg; Take 1 tablet (5 mg total) by mouth 2 (two) times a day    The patient was counseled regarding instructions for management, risk factor reductions, patient and family education,impressions, risks and benefits of treatment options, side effects of medications, importance of compliance with treatment  The treatment plan was reviewed with the patient/guardian and patient/guardian understands and agrees with the treatment plan  Current Outpatient Medications:     ascorbic acid (VITAMIN C) 500 mg tablet, Take 500 mg by mouth daily  , Disp: , Rfl:     betamethasone, augmented, (DIPROLENE-AF) 0 05 % cream, Apply topically 2 (two) times a day Applied to areas until resolved, Disp: 15 g, Rfl: 2    Calcium Citrate-Vitamin D (CITRACAL + D PO), Take by mouth 2 (two) times a day, Disp: , Rfl:     Cholecalciferol (VITAMIN D-3 PO), Take 2,000 Units by mouth daily  , Disp: , Rfl:     colestipol (COLESTID) 1 g tablet, TAKE 1 TABLET TWICE A DAY, Disp: 60 tablet, Rfl: 0    folic acid (FOLVITE) 1 mg tablet, 1 daily, Disp: 90 tablet, Rfl: 2    hydrochlorothiazide (HYDRODIURIL) 25 mg tablet, Take 1 tablet (25 mg total) by mouth daily, Disp: 90 tablet, Rfl: 0    hydrOXYzine HCL (ATARAX) 25 mg tablet, Take 1 tablet (25 mg total) by mouth every 6 (six) hours as needed for itching, Disp: 40 tablet, Rfl: 0    KLOR-CON M10 10 MEQ tablet, TAKE 2 TABLETS BY MOUTH TWICE A DAY, Disp: 120 tablet, Rfl: 2    levothyroxine 50 mcg tablet, Take 1 tablet (50 mcg total) by mouth every other day, Disp: 45 tablet, Rfl: 3    levothyroxine 75 mcg tablet, Take 1 tab po every other day, alternating with 50 mcg, Disp: 45 tablet, Rfl: 3    magnesium oxide (MAG-OX) 400 mg, Take 1 tablet (400 mg total) by mouth 2 (two) times a day, Disp: 90 tablet, Rfl: 0    methotrexate 2 5 mg tablet, Take 10 mg by mouth once a week , Disp: , Rfl:     Misc Natural Products (LUTEIN 20 PO), Take 20 mg by mouth daily  , Disp: , Rfl:     Multiple Vitamin (MULTIVITAMIN) tablet, Take 1 tablet by mouth daily  , Disp: , Rfl:     riTUXimab (RITUXAN) 500 mg/50 mL, Infuse into a venous catheter every 6 (six) months  , Disp: , Rfl:     rosuvastatin (CRESTOR) 5 mg tablet, Take 1 tablet (5 mg total) by mouth daily, Disp: 90 tablet, Rfl: 2    tamoxifen (NOLVADEX) 20 mg tablet, Take 1 tablet (20 mg total) by mouth daily, Disp: 90 tablet, Rfl: 3    apixaban (ELIQUIS) 5 mg, Take 1 tablet (5 mg total) by mouth 2 (two) times a day, Disp: 180 tablet, Rfl: 2    carvedilol (COREG) 12 5 mg tablet, Take 1 tablet (12 5 mg total) by mouth 2 (two) times a day with meals, Disp: 180 tablet, Rfl: 3    pantoprazole (PROTONIX) 40 mg tablet, Take 1 tablet (40 mg total) by mouth daily (Patient not taking: Reported on 10/28/2019), Disp: 90 tablet, Rfl: 2    Poison Ivy Treatments (ZANFEL) MISC, Apply 1 application topically once as needed (poison ivy) for up to 7 days, Disp: 30 g, Rfl: 0    Subjective:      Patient ID: Yoselin Kimball is a 67 y o  female  Had swelling LLE, Dr Rossana Pompa ordered vascular studies---> (+) DVT, went to ER to start Eliquis  She has not started the medication yet        The following portions of the patient's history were reviewed and updated as appropriate:   She has a past medical history of Allergic angioedema, Angioedema, Arthritis, Breast cancer ([facility] [address] ), Disease of thyroid gland, Hemorrhage of anus and rectum, Hyperlipidemia, Hypertension, Hypocalcemia, Neuritis, Peptic ulcer, Rheumatoid arthritis ([facility] [address] ), and Vitamin D deficiency  ,  does not have any pertinent problems on file  ,   has a past surgical history that includes Abdominal surgery; Breast surgery; Cholecystectomy; Elbow surgery; and orthopedic surgery  ,  family history includes Breast cancer in her maternal aunt, mother, and sister; Kidney failure in her father; Other in her father  ,   reports that she has never smoked  She has never used smokeless tobacco  She reports that she drank alcohol  She reports that she does not use drugs  ,  is allergic to lisinopril; codeine; other; oxycodone; penicillins; sulfamethoxazole-trimethoprim; and ciprofloxacin       Review of Systems   Constitutional: Negative  Respiratory: Negative  Cardiovascular: Negative  Musculoskeletal: Negative  Psychiatric/Behavioral: Negative  Objective:  /88   Pulse 72   Temp 98 9 °F (37 2 °C) (Tympanic)   Resp 18   Ht 5' 5" (1 651 m)   Wt 89 6 kg (197 lb 9 6 oz)   LMP  (LMP Unknown)   SpO2 98%   BMI 32 88 kg/m²     Lab Review  Admission on 10/24/2019, Discharged on 10/24/2019   Component Date Value    Sodium 10/24/2019 144     Potassium 10/24/2019 3 6     Chloride 10/24/2019 109*    CO2 10/24/2019 26     ANION GAP 10/24/2019 9     BUN 10/24/2019 24     Creatinine 10/24/2019 1 32*    Glucose 10/24/2019 97     Calcium 10/24/2019 9 1     eGFR 10/24/2019 39         Imaging: Vas Lower Limb Venous Duplex Study, Unilateral/limited    Result Date: 10/24/2019  Narrative:  [facility] REPORT [facility]INICAL: Indications: Patient presents with left lower extremity edema and a palpable lump  Operative History: No cardiovascular surgeries noted   Risk Factors The patient has history of HTN and HLD  FINDINGS:  Segment        Right            Left                          Impression       Impression              CFV            Normal (Patent)  Normal (Patent)         GSV Prox Calf                   Occlusive Subsegmental  PostTibial                      Non Occlusive Thrombus     CONCLUSION:  Impression: RIGHT LOWER LIMB LIMITED: Evaluation shows no evidence of thrombus in the common femoral vein  Doppler evaluation shows a normal response to augmentation maneuvers  LEFT LOWER LIMB: Evidence of non-occlusive acute deep vein thrombosis in one of the paired posterior tibial veins  Evidence of superficial thrombophlebitis noted in the greater saphenous vein at the proximal calf, and one of it's branches  Doppler evaluation shows a normal response to augmentation maneuvers  Popliteal, posterior tibial and anterior tibial arterial Doppler waveforms are triphasic/biphasic  Technical findings were given to [facility]  SIGNATURE: Electronically Signed by: Rubina Blunt on 2019-10-24 07:38:50 PM         Physical Exam   Constitutional: She is oriented to person, place, and time  She appears well-developed and well-nourished  Cardiovascular: Normal rate, regular rhythm, normal heart sounds and intact distal pulses  Pulmonary/Chest: Effort normal and breath sounds normal    Musculoskeletal: She exhibits deformity  Neurological: She is alert and oriented to person, place, and time  She has normal reflexes  Psychiatric: She has a normal mood and affect   Her behavior is normal  Judgment and thought content normal

## 2019-11-04 ENCOUNTER — OFFICE VISIT (OUTPATIENT)
Dept: HEMATOLOGY ONCOLOGY | Facility: CLINIC | Age: 79
End: 2019-11-04
Payer: MEDICARE

## 2019-11-04 VITALS — RESPIRATION RATE: 18 BRPM | OXYGEN SATURATION: 98 % | HEART RATE: 91 BPM

## 2019-11-04 DIAGNOSIS — C50.919 ADENOCARCINOMA OF BREAST, UNSPECIFIED LATERALITY (HCC): ICD-10-CM

## 2019-11-04 DIAGNOSIS — I82.4Y2 DEEP VEIN THROMBOSIS (DVT) OF PROXIMAL VEIN OF LEFT LOWER EXTREMITY, UNSPECIFIED CHRONICITY (HCC): Primary | ICD-10-CM

## 2019-11-04 DIAGNOSIS — D64.9 ANEMIA, UNSPECIFIED TYPE: ICD-10-CM

## 2019-11-04 PROCEDURE — 99214 OFFICE O/P EST MOD 30 MIN: CPT | Performed by: INTERNAL MEDICINE

## 2019-11-04 NOTE — PROGRESS NOTES
HEMATOLOGY / [address] NOTE    Primary Care Provider: Ariane Leonardo DO  Referring Provider:    MRN: [id number]  : 1940    Reason for Encounter:    No chief complaint on file  History of Hematology / Oncology Illness:     Barbara Hudson is a 67 y o  female who came in to establish care with oncology  Stage 1, right breast Cancer, invasive ductal carcinoma, ERPR positive, her 2 positive on FISH, grade 2     - previously under the care in [facility] by Dr Hebert  D/X:  2012  Tr:  Status post lumpectomy, radiation, Herceptin, currently on tamoxifen, x 7  Years from  - 2019, stopped  due to DVT    Surveillance: The mammogram 2019:   Negative for malignancy          Assessment / Plan:     1  Malignant neoplasm of lower-outer quadrant of right breast of female, estrogen receptor positive ([facility] [address] )  - has been on tamoxifen 7 years now  Lately developed acute left lower extremity DVT, this is likely provoked by recent poison ivy infection  Patient is on blood thinner  Tamoxifen could be a small risk factor for DVT, weighing the risks and benefit, decided to stop tamoxifen from now  Patient will be on surveillance  2  Deep vein thrombosis (DVT) of proximal vein of left lower extremity, unspecified chronicity ([facility] [address] )   - she is on blood thinner Eliquis, she will continue until finishing 3 months treatment at the end of January  She will then need further workup for risk of recurrence  Depending result we will decide whether to stop the anticoagulation treatment or continue  - D-dimer, quantitative; Future  - VAS lower limb venous duplex study, unilateral/limited; Future          3, anemia    -likely anemia is due to multifactorial including chronic disease from rheumatoid arthritis, under treatment with methotrexate  -will continue follow-up  If the hemoglobin further drop, we will proceed with anemia workup  25   minutes were spent on this visit   All questions answered to satisfaction; Advised pt to call if there is any further questions  Interval History:     2/19:  Patient has history of rheumatoid arthritis, under active care with Rheumatology, overall stable  Patient came in today to establish care with oncology  Medical oncology history as above  Given today reported overall doing well, at baseline health status  She reported has no weight loss, no lumps bumps in breast, no constitutional symptoms including no bleeding, easy bruise, no fever chills, no night sweats, no lumps bumps  She is tolerating tamoxifen without major side effects  There is no history of DVT, endometrial cancers, she reported having bilateral eye cataract, status post cataract surgery, last one is in April of 2017  She has no other oncology, hematology issues  Colonoscopy was on time with Dr Guru Foster  Following   [facility] routine checkup  She reported smoked for short time and stopped in her 35s  Drink alcohol socially  Family history of oncology issues including brother has lung cancer, mother has colon cancers at age 46  She is currently , move in with her brother who suffered from lung cancer  She currently living close to [place] about 45 minutes from our office  8/20/2018: Given her follow-up  Doing well, no conscious symptoms  No new breast lumps bumps, no bone pain  No vaginal bleeding, no new vision change  8/26/2019:  Came for follow-up  Reported doing well no issues  No new lumps bumps  Patient is following breast surgeon Dr  [facility]LLSTAR [facility]  11/4/2019:  Came in for follow-up  Reported doing well  No new lumps bumps  Developed new left lower DVT, appears triggered by recent poison ivy infection, infections under control  Developed leg swelling and comfort miss, reported symptom has been much better          Problem list:       Patient Active Problem List   Diagnosis    Left ankle pain    Hypertension    Rheumatoid arthritis ([address] )    Hypothyroidism    Hyperuricemia    Abnormal blood chemistry    Diastolic dysfunction    Hyperlipidemia    Left hip pain    Lumbar radiculopathy    Murmur    Osteopenia    Other chronic pain    Vitamin D deficiency    Adenocarcinoma of breast (HCC)    Localized edema    Abnormal finding on diagnostic imaging of kidney    Fat necrosis (segmental) of breast    Personal history of breast cancer    History of peptic ulcer    Hypokalemia    Early dry stage nonexudative 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 eyes    Healthcare maintenance    Interstitial pulmonary disease (HCC)    Rash         PHYSICIAL EXAMINATION:     Vital Signs:   [unfilled]  There is no height or weight on file to calculate BMI  There is no height or weight on file to calculate BSA  No major findings on physical examination  No lymphadenopathy  GEN: Alert, awake oriented x3, in no acute distress  HEENT- No pallor, icterus, cyanosis, no oral mucosal lesions,   LAD - no palpable cervical, clavicle, axillary, inguinal LAD  Heart- normal S1 S2, regular rate and rhythm, No murmur, rubs  Lungs- clear breathing sound bilateral    Abdomen- soft, Non tender, bowel sounds present  Extremities- ++ edema, bilateral finger in joint deformity consistent with rheumatoid arthritis  Neuro- No focal neurological deficit           PAST MEDICAL HISTORY:   has a past medical history of Allergic angioedema, Angioedema, Arthritis, Breast cancer ([address] ), Disease of thyroid gland, Hemorrhage of anus and rectum, Hyperlipidemia, Hypertension, Hypocalcemia, Neuritis, Peptic ulcer, Rheumatoid arthritis ([address] ), and Vitamin D deficiency  PAST SURGICAL HISTORY:   has a past surgical history that includes Abdominal surgery; Breast surgery; Cholecystectomy; Elbow surgery; and orthopedic surgery      CURRENT MEDICATIONS:     Current Outpatient Medications   Medication Sig Dispense Refill    apixaban (ELIQUIS) 5 mg Take 1 tablet (5 mg total) by mouth 2 (two) times a day 180 tablet 2    ascorbic acid (VITAMIN C) 500 mg tablet Take 500 mg by mouth daily   betamethasone, augmented, (DIPROLENE-AF) 0 05 % cream Apply topically 2 (two) times a day Applied to areas until resolved 15 g 2    Calcium Citrate-Vitamin D (CITRACAL + D PO) Take by mouth 2 (two) times a day      carvedilol (COREG) 12 5 mg tablet Take 1 tablet (12 5 mg total) by mouth 2 (two) times a day with meals 180 tablet 3    Cholecalciferol (VITAMIN D-3 PO) Take 2,000 Units by mouth daily   colestipol (COLESTID) 1 g tablet TAKE 1 TABLET TWICE A DAY 60 tablet 0    folic acid (FOLVITE) 1 mg tablet 1 daily 90 tablet 2    hydrochlorothiazide (HYDRODIURIL) 25 mg tablet Take 1 tablet (25 mg total) by mouth daily 90 tablet 0    hydrOXYzine HCL (ATARAX) 25 mg tablet Take 1 tablet (25 mg total) by mouth every 6 (six) hours as needed for itching 40 tablet 0    KLOR-CON M10 10 MEQ tablet TAKE 2 TABLETS BY MOUTH TWICE A  tablet 2    levothyroxine 50 mcg tablet Take 1 tablet (50 mcg total) by mouth every other day 45 tablet 3    levothyroxine 75 mcg tablet Take 1 tab po every other day, alternating with 50 mcg 45 tablet 3    magnesium oxide (MAG-OX) 400 mg Take 1 tablet (400 mg total) by mouth 2 (two) times a day 90 tablet 0    methotrexate 2 5 mg tablet Take 10 mg by mouth once a week   Misc Natural Products (LUTEIN 20 PO) Take 20 mg by mouth daily   Multiple Vitamin (MULTIVITAMIN) tablet Take 1 tablet by mouth daily        pantoprazole (PROTONIX) 40 mg tablet Take 1 tablet (40 mg total) by mouth daily 90 tablet 2    riTUXimab (RITUXAN) 500 mg/50 mL Infuse into a venous catheter every 6 (six) months        rosuvastatin (CRESTOR) 5 mg tablet Take 1 tablet (5 mg total) by mouth daily 90 tablet 2    tamoxifen (NOLVADEX) 20 mg tablet Take 1 tablet (20 mg total) by mouth daily 90 tablet 3    Poison Ivy Treatments (ZANFEL) [facility] Apply 1 application topically once as needed (poison ivy) for up to 7 days 30 g 0     No current facility-administered medications for this visit  [unfilled]    SOCIAL HISTORY:   reports that she has never smoked  She has never used smokeless tobacco  She reports that she drank alcohol  She reports that she does not use drugs  FAMILY HISTORY:  family history includes Breast cancer in her maternal aunt, mother, and sister; Kidney failure in her father; Other in her father  ALLERGIES:  is allergic to lisinopril; codeine; other; oxycodone; penicillins; sulfamethoxazole-trimethoprim; and ciprofloxacin  REVIEW OF SYSTEMS:  Please note that a 14-point review of systems was performed to include Constitutional, HEENT, Respiratory, CVS, GI, , Musculoskeletal, Integumentary, Neurologic, Rheumatologic, Endocrinologic, Psychiatric, Lymphatic, and Hematologic/Oncologic systems were reviewed and are negative unless otherwise stated in HPI  Positive and negative findings pertinent to this evaluation are incorporated into the history of present illness  LAB:    Lab Results   Component Value Date    WBC 5 53 04/08/2019    HGB 10 9 (L) 04/08/2019    HCT 34 2 (L) 04/08/2019     (H) 04/08/2019     04/08/2019       Lab Results   Component Value Date    K 3 6 10/24/2019     (H) 10/24/2019    CO2 26 10/24/2019    BUN 24 10/24/2019    CREATININE 1 32 (H) 10/24/2019    GLUF 89 04/08/2019    CALCIUM 9 1 10/24/2019    AST 22 04/08/2019    ALT 20 04/08/2019    ALKPHOS 43 (L) 04/08/2019    PROT 6 1 (L) 09/25/2015    BILITOT 0 31 09/25/2015    EGFR 39 10/24/2019       IMAGING:    VAS lower limb venous duplex study, unilateral/limited    (Results Pending)     Ct Ankle Left Wo Contrast    Result Date: 2/5/2018  Narrative: CT LEFT ANKLE INDICATION: ankle pain  History taken directly from the electronic ordering system  Ankle pain  Unable to bear weight  No trauma  Rheumatoid arthropathy  COMPARISON: None  TECHNIQUE: Serial Axial CT images were acquired through the left ankle  Coronal and sagittal reformation images were also obtained  Contrast material was not utilized  This examination, like all CT scans performed in the [facility], was performed utilizing techniques to minimize radiation dose exposure, including the use of iterative reconstruction and automated exposure control  Rad dose 238 mGy-cm FINDINGS: JOINT EFFUSION: Joint effusion noted  ALIGNMENT: Increase in talar declination angle suggests chronic PTT dysfunction  This results in pes planus deformity  OSSEOUS STRUCTURES: Advanced degenerative change noted throughout the visualized midfoot and hindfoot with apparent spontaneous talonavicular fusion related to long-standing degenerative change  No acute fracture dislocation or osseous destructive lesion identified  Incomplete evaluation of lateral deviation of the 2nd through 5th toes with erosive changes and pencil in cup deformity noted across the metatarsal phalangeal joints  Findings consistent with underlying inflammatory arthropathy  TENDONS: Limited assessment by CT technique without gross evidence of tear  LIGAMENTS: Limited assessment by CT technique without gross evidence of tear  MUSCULATURE: Intact  REMAINING SOFT TISSUES: Loculated collection noted along the plantar soft tissues at the level of the dorsal calcaneus measuring up to 4 3 x 3 7 x 2 1 cm  There is some wall calcification noted suggesting chronicity and presumably related to rheumatoid disease  Impression: 1  Advanced degenerative change throughout the foot including suggestive findings of inflammatory arthropathy, such as rheumatoid, across the 2nd through 5th metatarsophalangeal joints as above  There is also spontaneous fusion across the talonavicular  joint likely secondary to long-standing degenerative change   2   Pes planus deformity and suggestive findings of chronic PTT dysfunction  3   Loculated plantar hindfoot fluid collection measuring up to 4 3 x 3 7 x 2 1 cm likely chronic given scant wall calcification  This could relate to rheumatoid disease with infection not entirely excluded but considerably less likely  Correlate clinically  Findings are consistent with the preliminary report from [facility] which was provided shortly after completion of the exam  The finding of plantar loculated collection  may alter immediate patient management, therefore we will now contact referring physicians for direct verbal discussion  I personally discussed this study with Vicky Alba, the nurse caring for this patient, on 2/5/2018 8:44 AM   Unfortunately, the referring physician could not be reached at this time   Workstation performed: [id number]

## 2019-11-06 ENCOUNTER — TELEPHONE (OUTPATIENT)
Dept: INTERNAL MEDICINE CLINIC | Facility: CLINIC | Age: 79
End: 2019-11-06

## 2019-11-06 NOTE — TELEPHONE ENCOUNTER
Spoke with the pharmacist and the label with instructions were wrong  Patient aware about the mistake and she will continue with 5mg twice a day given since the beginning at the hospital and it was continue per FR

## 2019-11-06 NOTE — TELEPHONE ENCOUNTER
Patient has questions in regards to Eliquis 5mg tablet   Prescribed by Nikik  10/28  1 Tablet twice daily   Patient states medication was reduced to 1 tablet daily in hospital  Patient seeking guidance

## 2019-11-18 RX ORDER — DIPHENHYDRAMINE HCL 25 MG
50 TABLET ORAL ONCE
Status: COMPLETED | OUTPATIENT
Start: 2019-11-19 | End: 2019-11-19

## 2019-11-18 RX ORDER — METHYLPREDNISOLONE SODIUM SUCCINATE 125 MG/2ML
100 INJECTION, POWDER, LYOPHILIZED, FOR SOLUTION INTRAMUSCULAR; INTRAVENOUS ONCE
Status: COMPLETED | OUTPATIENT
Start: 2019-11-19 | End: 2019-11-19

## 2019-11-18 RX ORDER — SODIUM CHLORIDE 9 MG/ML
20 INJECTION, SOLUTION INTRAVENOUS CONTINUOUS
Status: DISCONTINUED | OUTPATIENT
Start: 2019-11-19 | End: 2019-11-22 | Stop reason: HOSPADM

## 2019-11-18 RX ORDER — ACETAMINOPHEN 325 MG/1
975 TABLET ORAL ONCE
Status: COMPLETED | OUTPATIENT
Start: 2019-11-19 | End: 2019-11-19

## 2019-11-19 ENCOUNTER — HOSPITAL ENCOUNTER (OUTPATIENT)
Dept: INFUSION CENTER | Facility: CLINIC | Age: 79
Discharge: HOME | End: 2019-11-19
Payer: MEDICARE

## 2019-11-19 VITALS
TEMPERATURE: 98.7 F | SYSTOLIC BLOOD PRESSURE: 130 MMHG | DIASTOLIC BLOOD PRESSURE: 72 MMHG | HEART RATE: 73 BPM | RESPIRATION RATE: 18 BRPM

## 2019-11-19 PROCEDURE — 96415 CHEMO IV INFUSION ADDL HR: CPT

## 2019-11-19 PROCEDURE — 96413 CHEMO IV INFUSION 1 HR: CPT

## 2019-11-19 PROCEDURE — 96375 TX/PRO/DX INJ NEW DRUG ADDON: CPT

## 2019-11-19 RX ADMIN — RITUXIMAB: 10 INJECTION, SOLUTION INTRAVENOUS at 09:49

## 2019-11-19 RX ADMIN — DIPHENHYDRAMINE HCL 50 MG: 25 TABLET, COATED ORAL at 08:52

## 2019-11-19 RX ADMIN — ACETAMINOPHEN 975 MG: 325 TABLET, FILM COATED ORAL at 08:52

## 2019-11-19 RX ADMIN — SODIUM CHLORIDE 20 ML/HR: 0.9 INJECTION, SOLUTION INTRAVENOUS at 08:50

## 2019-11-19 RX ADMIN — METHYLPREDNISOLONE SODIUM SUCCINATE 100 MG: 125 INJECTION, POWDER, FOR SOLUTION INTRAMUSCULAR; INTRAVENOUS at 08:50

## 2019-11-19 NOTE — PROGRESS NOTES
Pt presents for Rituxan infusion for treatment of rheumatoid arthritis  Pt offers no complaints  Peripheral IV established  Call bell within reach, will continue to monitor

## 2019-12-03 RX ORDER — DIPHENHYDRAMINE HCL 25 MG
50 TABLET ORAL ONCE
Status: COMPLETED | OUTPATIENT
Start: 2019-12-04 | End: 2019-12-04

## 2019-12-03 RX ORDER — SODIUM CHLORIDE 9 MG/ML
20 INJECTION, SOLUTION INTRAVENOUS CONTINUOUS
Status: DISCONTINUED | OUTPATIENT
Start: 2019-12-04 | End: 2019-12-07 | Stop reason: HOSPADM

## 2019-12-03 RX ORDER — ACETAMINOPHEN 325 MG/1
975 TABLET ORAL ONCE
Status: COMPLETED | OUTPATIENT
Start: 2019-12-04 | End: 2019-12-04

## 2019-12-03 RX ORDER — METHYLPREDNISOLONE SODIUM SUCCINATE 125 MG/2ML
100 INJECTION, POWDER, LYOPHILIZED, FOR SOLUTION INTRAMUSCULAR; INTRAVENOUS ONCE
Status: COMPLETED | OUTPATIENT
Start: 2019-12-04 | End: 2019-12-04

## 2019-12-04 ENCOUNTER — HOSPITAL ENCOUNTER (OUTPATIENT)
Dept: INFUSION CENTER | Facility: CLINIC | Age: 79
Discharge: HOME | End: 2019-12-04
Payer: MEDICARE

## 2019-12-04 VITALS
RESPIRATION RATE: 18 BRPM | TEMPERATURE: 98.5 F | SYSTOLIC BLOOD PRESSURE: 132 MMHG | DIASTOLIC BLOOD PRESSURE: 63 MMHG | OXYGEN SATURATION: 95 % | HEART RATE: 70 BPM

## 2019-12-04 PROCEDURE — 96413 CHEMO IV INFUSION 1 HR: CPT

## 2019-12-04 PROCEDURE — 96375 TX/PRO/DX INJ NEW DRUG ADDON: CPT

## 2019-12-04 PROCEDURE — 96415 CHEMO IV INFUSION ADDL HR: CPT

## 2019-12-04 RX ADMIN — METHYLPREDNISOLONE SODIUM SUCCINATE 100 MG: 125 INJECTION, POWDER, FOR SOLUTION INTRAMUSCULAR; INTRAVENOUS at 08:35

## 2019-12-04 RX ADMIN — ACETAMINOPHEN 975 MG: 325 TABLET, FILM COATED ORAL at 08:35

## 2019-12-04 RX ADMIN — DIPHENHYDRAMINE HCL 50 MG: 25 TABLET, COATED ORAL at 08:35

## 2019-12-04 RX ADMIN — RITUXIMAB 1000 MG: 10 INJECTION, SOLUTION INTRAVENOUS at 09:15

## 2019-12-04 RX ADMIN — SODIUM CHLORIDE 20 ML/HR: 0.9 INJECTION, SOLUTION INTRAVENOUS at 08:10

## 2019-12-04 NOTE — PROGRESS NOTES
Tolerated infusion without incident: No adverse reactions noted: No further appt's scheduled:  Will notify unit if needed: AVS given per request

## 2019-12-04 NOTE — PROGRESS NOTES
Patient to [facility] for Rituxan: Complaining of generalized discomfort: Left FA PIV initiated without incident

## 2019-12-09 ENCOUNTER — APPOINTMENT (OUTPATIENT)
Dept: LAB | Facility: HOSPITAL | Age: 79
End: 2019-12-09
Payer: MEDICARE

## 2019-12-09 DIAGNOSIS — E78.5 HYPERLIPIDEMIA, UNSPECIFIED HYPERLIPIDEMIA TYPE: ICD-10-CM

## 2019-12-09 DIAGNOSIS — R79.9 ABNORMAL BLOOD CHEMISTRY: ICD-10-CM

## 2019-12-09 DIAGNOSIS — M85.80 OSTEOPENIA, UNSPECIFIED LOCATION: ICD-10-CM

## 2019-12-09 DIAGNOSIS — J84.9 INTERSTITIAL PULMONARY DISEASE (HCC): ICD-10-CM

## 2019-12-09 DIAGNOSIS — I10 HYPERTENSION, UNSPECIFIED TYPE: ICD-10-CM

## 2019-12-09 DIAGNOSIS — C50.919 ADENOCARCINOMA OF BREAST, UNSPECIFIED LATERALITY (HCC): ICD-10-CM

## 2019-12-09 DIAGNOSIS — E83.42 HYPOMAGNESEMIA: ICD-10-CM

## 2019-12-09 DIAGNOSIS — M06.9 RHEUMATOID ARTHRITIS INVOLVING LEFT ANKLE, UNSPECIFIED RHEUMATOID FACTOR PRESENCE: ICD-10-CM

## 2019-12-09 DIAGNOSIS — Z00.00 HEALTHCARE MAINTENANCE: ICD-10-CM

## 2019-12-09 DIAGNOSIS — E03.9 HYPOTHYROIDISM, UNSPECIFIED TYPE: ICD-10-CM

## 2019-12-09 LAB
ALBUMIN SERPL BCP-MCNC: 3.5 G/DL (ref 3.5–5)
ALP SERPL-CCNC: 44 U/L (ref 46–116)
ALT SERPL W P-5'-P-CCNC: 21 U/L (ref 12–78)
ANION GAP SERPL CALCULATED.3IONS-SCNC: 10 MMOL/L (ref 4–13)
AST SERPL W P-5'-P-CCNC: 22 U/L (ref 5–45)
BASOPHILS # BLD AUTO: 0.06 THOUSANDS/ΜL (ref 0–0.1)
BASOPHILS NFR BLD AUTO: 1 % (ref 0–1)
BILIRUB SERPL-MCNC: 0.6 MG/DL (ref 0.2–1)
BUN SERPL-MCNC: 19 MG/DL (ref 5–25)
CALCIUM SERPL-MCNC: 9 MG/DL (ref 8.3–10.1)
CHLORIDE SERPL-SCNC: 102 MMOL/L (ref 100–108)
CHOLEST SERPL-MCNC: 158 MG/DL (ref 50–200)
CO2 SERPL-SCNC: 28 MMOL/L (ref 21–32)
CREAT SERPL-MCNC: 1.17 MG/DL (ref 0.6–1.3)
EOSINOPHIL # BLD AUTO: 0.27 THOUSAND/ΜL (ref 0–0.61)
EOSINOPHIL NFR BLD AUTO: 5 % (ref 0–6)
ERYTHROCYTE [DISTWIDTH] IN BLOOD BY AUTOMATED COUNT: 15.4 % (ref 11.6–15.1)
GFR SERPL CREATININE-BSD FRML MDRD: 45 ML/MIN/1.73SQ M
GLUCOSE P FAST SERPL-MCNC: 95 MG/DL (ref 65–99)
HCT VFR BLD AUTO: 34 % (ref 34.8–46.1)
HDLC SERPL-MCNC: 44 MG/DL
HGB BLD-MCNC: 10.6 G/DL (ref 11.5–15.4)
IMM GRANULOCYTES # BLD AUTO: 0.02 THOUSAND/UL (ref 0–0.2)
IMM GRANULOCYTES NFR BLD AUTO: 0 % (ref 0–2)
LDLC SERPL CALC-MCNC: 85 MG/DL (ref 0–100)
LYMPHOCYTES # BLD AUTO: 1.31 THOUSANDS/ΜL (ref 0.6–4.47)
LYMPHOCYTES NFR BLD AUTO: 25 % (ref 14–44)
MAGNESIUM SERPL-MCNC: 1.5 MG/DL (ref 1.6–2.6)
MCH RBC QN AUTO: 32.3 PG (ref 26.8–34.3)
MCHC RBC AUTO-ENTMCNC: 31.2 G/DL (ref 31.4–37.4)
MCV RBC AUTO: 104 FL (ref 82–98)
MONOCYTES # BLD AUTO: 0.61 THOUSAND/ΜL (ref 0.17–1.22)
MONOCYTES NFR BLD AUTO: 12 % (ref 4–12)
NEUTROPHILS # BLD AUTO: 2.96 THOUSANDS/ΜL (ref 1.85–7.62)
NEUTS SEG NFR BLD AUTO: 57 % (ref 43–75)
NRBC BLD AUTO-RTO: 0 /100 WBCS
PLATELET # BLD AUTO: 207 THOUSANDS/UL (ref 149–390)
PMV BLD AUTO: 10.5 FL (ref 8.9–12.7)
POTASSIUM SERPL-SCNC: 3.5 MMOL/L (ref 3.5–5.3)
PROT SERPL-MCNC: 6.7 G/DL (ref 6.4–8.2)
RBC # BLD AUTO: 3.28 MILLION/UL (ref 3.81–5.12)
SODIUM SERPL-SCNC: 140 MMOL/L (ref 136–145)
T4 FREE SERPL-MCNC: 1.2 NG/DL (ref 0.76–1.46)
TRIGL SERPL-MCNC: 143 MG/DL
TSH SERPL DL<=0.05 MIU/L-ACNC: 4.17 UIU/ML (ref 0.36–3.74)
WBC # BLD AUTO: 5.23 THOUSAND/UL (ref 4.31–10.16)

## 2019-12-09 PROCEDURE — 85025 COMPLETE CBC W/AUTO DIFF WBC: CPT

## 2019-12-09 PROCEDURE — 36415 COLL VENOUS BLD VENIPUNCTURE: CPT

## 2019-12-09 PROCEDURE — 84439 ASSAY OF FREE THYROXINE: CPT

## 2019-12-09 PROCEDURE — 80061 LIPID PANEL: CPT

## 2019-12-09 PROCEDURE — 80053 COMPREHEN METABOLIC PANEL: CPT

## 2019-12-09 PROCEDURE — 84443 ASSAY THYROID STIM HORMONE: CPT

## 2019-12-09 PROCEDURE — 83735 ASSAY OF MAGNESIUM: CPT

## 2019-12-12 DIAGNOSIS — J84.9 INTERSTITIAL PULMONARY DISEASE (HCC): Primary | ICD-10-CM

## 2019-12-12 DIAGNOSIS — I10 HYPERTENSION, UNSPECIFIED TYPE: Chronic | ICD-10-CM

## 2019-12-12 RX ORDER — CARVEDILOL 6.25 MG/1
TABLET ORAL
Qty: 180 TABLET | Refills: 2 | Status: SHIPPED | OUTPATIENT
Start: 2019-12-12 | End: 2019-12-19 | Stop reason: ALTCHOICE

## 2019-12-19 ENCOUNTER — OFFICE VISIT (OUTPATIENT)
Dept: INTERNAL MEDICINE CLINIC | Facility: CLINIC | Age: 79
End: 2019-12-19
Payer: MEDICARE

## 2019-12-19 VITALS
HEIGHT: 65 IN | BODY MASS INDEX: 32.89 KG/M2 | HEART RATE: 68 BPM | RESPIRATION RATE: 16 BRPM | OXYGEN SATURATION: 97 % | DIASTOLIC BLOOD PRESSURE: 70 MMHG | TEMPERATURE: 98.6 F | SYSTOLIC BLOOD PRESSURE: 150 MMHG | WEIGHT: 197.4 LBS

## 2019-12-19 DIAGNOSIS — E79.0 HYPERURICEMIA: ICD-10-CM

## 2019-12-19 DIAGNOSIS — I10 HYPERTENSION, UNSPECIFIED TYPE: Chronic | ICD-10-CM

## 2019-12-19 DIAGNOSIS — E78.5 HYPERLIPIDEMIA, UNSPECIFIED HYPERLIPIDEMIA TYPE: ICD-10-CM

## 2019-12-19 DIAGNOSIS — I82.462 ACUTE DEEP VEIN THROMBOSIS (DVT) OF CALF MUSCLE VEIN OF LEFT LOWER EXTREMITY (HCC): ICD-10-CM

## 2019-12-19 DIAGNOSIS — E55.9 VITAMIN D DEFICIENCY: ICD-10-CM

## 2019-12-19 DIAGNOSIS — E03.9 HYPOTHYROIDISM, UNSPECIFIED TYPE: Primary | Chronic | ICD-10-CM

## 2019-12-19 PROBLEM — R21 RASH: Status: RESOLVED | Noted: 2019-09-03 | Resolved: 2019-12-19

## 2019-12-19 PROBLEM — E87.6 HYPOKALEMIA: Status: RESOLVED | Noted: 2018-12-14 | Resolved: 2019-12-19

## 2019-12-19 PROCEDURE — 99214 OFFICE O/P EST MOD 30 MIN: CPT | Performed by: INTERNAL MEDICINE

## 2019-12-19 RX ORDER — CARVEDILOL 25 MG/1
25 TABLET ORAL 2 TIMES DAILY WITH MEALS
Qty: 180 TABLET | Refills: 2 | Status: SHIPPED | OUTPATIENT
Start: 2019-12-19 | End: 2020-08-03

## 2019-12-19 NOTE — PROGRESS NOTES
Assessment/Plan:    1  Hypertension is not at goal will be increasing carvedilol to 25 mg twice a day she can take to 12 5 mg twice a day until the new prescription arrives should be continuing to monitor her blood pressure blood pressure goal is less than 130/80 patient will be seeing Dr Gabriel Eugene in mid January to review DVT  2  Patient with left DVT continues to have edema some venous stasis dermatitis occurring on the left lower extremity would recommend continuing elevation as blood clot dissolves edema should improve may consider compression stockings as per Dr Gabriel Eugene, patient is at risk of recurrence with history of breast cancer and the fact that this was non provoked DVT of the left lower extremity proximal deep tibial arteries  3  Rheumatoid arthritis patient continues to follow with to Dr Viral Hodges  4  Hypothyroidism is at goal           Diagnoses and all orders for this visit:    Hypothyroidism, unspecified type  -     CBC and differential; Future  -     Comprehensive metabolic panel; Future  -     LDL cholesterol, direct; Future  -     Lipid Panel with Direct LDL reflex; Future  -     C-reactive protein; Future  -     Sedimentation rate, automated; Future    Hyperlipidemia, unspecified hyperlipidemia type  -     CBC and differential; Future  -     Comprehensive metabolic panel; Future  -     LDL cholesterol, direct; Future  -     Lipid Panel with Direct LDL reflex; Future  -     C-reactive protein; Future  -     Sedimentation rate, automated; Future    Vitamin D deficiency  -     CBC and differential; Future  -     Comprehensive metabolic panel; Future  -     LDL cholesterol, direct; Future  -     Lipid Panel with Direct LDL reflex; Future  -     C-reactive protein; Future  -     Sedimentation rate, automated; Future    Hyperuricemia  -     CBC and differential; Future  -     Comprehensive metabolic panel; Future  -     LDL cholesterol, direct; Future  -     Lipid Panel with Direct LDL reflex;  Future  - C-reactive protein; Future  -     Sedimentation rate, automated; Future    Acute deep vein thrombosis (DVT) of calf muscle vein of left lower extremity  -     CBC and differential; Future  -     Comprehensive metabolic panel; Future  -     LDL cholesterol, direct; Future  -     Lipid Panel with Direct LDL reflex; Future  -     C-reactive protein; Future  -     Sedimentation rate, automated; Future    BMI 32 0-32 9,adult  -     CBC and differential; Future  -     Comprehensive metabolic panel; Future  -     LDL cholesterol, direct; Future  -     Lipid Panel with Direct LDL reflex; Future  -     C-reactive protein; Future  -     Sedimentation rate, automated; Future    Hypertension, unspecified type  -     CBC and differential; Future  -     Comprehensive metabolic panel; Future  -     LDL cholesterol, direct; Future  -     Lipid Panel with Direct LDL reflex; Future  -     C-reactive protein; Future  -     Sedimentation rate, automated; Future  -     carvedilol (COREG) 25 mg tablet; Take 1 tablet (25 mg total) by mouth 2 (two) times a day with meals        The patient was counseled regarding instructions for management, risk factor reductions, patient and family education,impressions, risks and benefits of treatment options, side effects of medications, importance of compliance with treatment  The treatment plan was reviewed with the patient/guardian and patient/guardian understands and agrees with the treatment plan  Current Outpatient Medications:     apixaban (ELIQUIS) 5 mg, Take 1 tablet (5 mg total) by mouth 2 (two) times a day, Disp: 180 tablet, Rfl: 2    ascorbic acid (VITAMIN C) 500 mg tablet, Take 500 mg by mouth daily  , Disp: , Rfl:     Calcium Citrate-Vitamin D (CITRACAL + D PO), Take by mouth 2 (two) times a day, Disp: , Rfl:     carvedilol (COREG) 25 mg tablet, Take 1 tablet (25 mg total) by mouth 2 (two) times a day with meals, Disp: 180 tablet, Rfl: 2    Cholecalciferol (VITAMIN D-3 PO), Take 2,000 Units by mouth daily  , Disp: , Rfl:     folic acid (FOLVITE) 1 mg tablet, 1 daily, Disp: 90 tablet, Rfl: 2    hydrochlorothiazide (HYDRODIURIL) 25 mg tablet, Take 1 tablet (25 mg total) by mouth daily, Disp: 90 tablet, Rfl: 0    KLOR-CON M10 10 MEQ tablet, TAKE 2 TABLETS BY MOUTH TWICE A DAY, Disp: 120 tablet, Rfl: 2    levothyroxine 50 mcg tablet, Take 1 tablet (50 mcg total) by mouth every other day, Disp: 45 tablet, Rfl: 3    levothyroxine 75 mcg tablet, Take 1 tab po every other day, alternating with 50 mcg, Disp: 45 tablet, Rfl: 3    magnesium oxide (MAG-OX) 400 mg, Take 1 tablet (400 mg total) by mouth 2 (two) times a day, Disp: 90 tablet, Rfl: 0    methotrexate 2 5 mg tablet, Take 10 mg by mouth once a week , Disp: , Rfl:     Multiple Vitamin (MULTIVITAMIN) tablet, Take 1 tablet by mouth daily  , Disp: , Rfl:     riTUXimab (RITUXAN) 500 mg/50 mL, Infuse into a venous catheter every 6 (six) months  , Disp: , Rfl:     rosuvastatin (CRESTOR) 5 mg tablet, Take 1 tablet (5 mg total) by mouth daily, Disp: 90 tablet, Rfl: 2    pantoprazole (PROTONIX) 40 mg tablet, Take 1 tablet (40 mg total) by mouth daily (Patient not taking: Reported on 12/19/2019), Disp: 90 tablet, Rfl: 2    Poison Ivy Treatments (ZANFEL) MISC, Apply 1 application topically once as needed (poison ivy) for up to 7 days, Disp: 30 g, Rfl: 0    Subjective:      Patient ID: Virgen Otero is a 67 y o  female      Do denies any chest pain or shortness of breath has been having some edema left lower extremity improved with raising her leg but it has been persistent      The following portions of the patient's history were reviewed and updated as appropriate:   She has a past medical history of Allergic angioedema, Angioedema, Arthritis, Breast cancer ([facility] [address] ), Disease of thyroid gland, Hemorrhage of anus and rectum, Hyperlipidemia, Hypertension, Hypocalcemia, Neuritis, Peptic ulcer, Rheumatoid arthritis ([facility] [address] ), and Vitamin D deficiency  ,  does not have any pertinent problems on file  ,   has a past surgical history that includes Abdominal surgery; Breast surgery; Cholecystectomy; Elbow surgery; and orthopedic surgery  ,  family history includes Breast cancer in her maternal aunt, mother, and sister; Kidney failure in her father; Other in her father  ,   reports that she has never smoked  She has never used smokeless tobacco  She reports that she drank alcohol  She reports that she does not use drugs  ,  is allergic to lisinopril; codeine; other; oxycodone; penicillins; sulfamethoxazole-trimethoprim; and ciprofloxacin       Review of Systems   Constitutional: Negative for appetite change, chills, fatigue, fever and unexpected weight change  HENT: Negative for congestion, ear pain, facial swelling, hearing loss, mouth sores, nosebleeds, postnasal drip, rhinorrhea, sinus pain, sore throat, trouble swallowing and voice change  Eyes: Negative for pain, discharge, redness and visual disturbance  Respiratory: Negative for apnea, chest tightness, shortness of breath, wheezing and stridor  Cardiovascular: Negative for chest pain, palpitations and leg swelling  Gastrointestinal: Negative for abdominal distention, abdominal pain, blood in stool, constipation, diarrhea and vomiting  Endocrine: Negative for cold intolerance, heat intolerance, polydipsia, polyphagia and polyuria  Genitourinary: Negative for difficulty urinating, dysuria, flank pain, frequency, genital sores, hematuria and urgency  Musculoskeletal: Negative for arthralgias and back pain  Skin: Negative for rash and wound  Allergic/Immunologic: Negative for environmental allergies, food allergies and immunocompromised state  Neurological: Negative for dizziness, tremors, seizures, syncope, facial asymmetry, speech difficulty, weakness, light-headedness, numbness and headaches  Hematological: Negative for adenopathy  Does not bruise/bleed easily  Psychiatric/Behavioral: Negative for agitation, behavioral problems, dysphoric mood, hallucinations, self-injury, sleep disturbance and suicidal ideas  The patient is not hyperactive            Objective:  /70 (BP Location: Left arm, Patient Position: Sitting)   Pulse 68   Temp 98 6 °F (37 °C) (Tympanic)   Resp 16   Ht 5' 5" (1 651 m)   Wt 89 5 kg (197 lb 6 4 oz)   LMP  (LMP Unknown)   SpO2 97%   BMI 32 85 kg/m²     Lab Review  Appointment on 12/09/2019   Component Date Value    WBC 12/09/2019 5 23     RBC 12/09/2019 3 28*    Hemoglobin 12/09/2019 10 6*    Hematocrit 12/09/2019 34 0*    MCV 12/09/2019 104*    MCH 12/09/2019 32 3     MCHC 12/09/2019 31 2*    RDW 12/09/2019 15 4*    MPV 12/09/2019 10 5     Platelets 39/98/5440 207     nRBC 12/09/2019 0     Neutrophils Relative 12/09/2019 57     Immat GRANS % 12/09/2019 0     Lymphocytes Relative 12/09/2019 25     Monocytes Relative 12/09/2019 12     Eosinophils Relative 12/09/2019 5     Basophils Relative 12/09/2019 1     Neutrophils Absolute 12/09/2019 2 96     Immature Grans Absolute 12/09/2019 0 02     Lymphocytes Absolute 12/09/2019 1 31     Monocytes Absolute 12/09/2019 0 61     Eosinophils Absolute 12/09/2019 0 27     Basophils Absolute 12/09/2019 0 06     Sodium 12/09/2019 140     Potassium 12/09/2019 3 5     Chloride 12/09/2019 102     CO2 12/09/2019 28     ANION GAP 12/09/2019 10     BUN 12/09/2019 19     Creatinine 12/09/2019 1 17     Glucose, Fasting 12/09/2019 95     Calcium 12/09/2019 9 0     AST 12/09/2019 22     ALT 12/09/2019 21     Alkaline Phosphatase 12/09/2019 44*    Total Protein 12/09/2019 6 7     Albumin 12/09/2019 3 5     Total Bilirubin 12/09/2019 0 60     eGFR 12/09/2019 45     Cholesterol 12/09/2019 158     Triglycerides 12/09/2019 143     HDL, Direct 12/09/2019 44     LDL Calculated 12/09/2019 85     TSH 3RD GENERATON 12/09/2019 4 168*    Magnesium 12/09/2019 1 5*    Free T4 2019 1 20    Admission on 10/24/2019, Discharged on 10/24/2019   Component Date Value    Sodium 10/24/2019 144     Potassium 10/24/2019 3 6     Chloride 10/24/2019 109*    CO2 10/24/2019 26     ANION GAP 10/24/2019 9     BUN 10/24/2019 24     Creatinine 10/24/2019 1 32*    Glucose 10/24/2019 97     Calcium 10/24/2019 9 1     eGFR 10/24/2019 39          Imaging  @[id number]@  Recent Results (from the past 37266 hour(s))   Echo complete with contrast if indicated    Narrative    [facility] [facility]  [phone]    Transthoracic Echocardiogram  2D, M-mode, Doppler, and Color Doppler    Study date:  26-Oct-2017    Patient: Socorro Monson  MR number: [id number]  Account number: [de-identified]  : 1940  Age: 69 years  Gender: Female  Status: Outpatient  Location: [facility]  Height: 63 in  Weight: 180 lb  BP: 120/ 60 mmHg    Indications: Murmur  Diagnoses: R01 1 - Cardiac murmur, unspecified    Sonographer:  Hardy RCS  Interpreting Physician:  Cristo Huerta MD  Primary Physician:  Tal Smith DO  Referring Physician:  Tal Smith DO  Group:  [facility]    SUMMARY    LEFT VENTRICLE:  Systolic function was normal  Ejection fraction was estimated in the range of 55 % to 65 %  There were no regional wall motion abnormalities  Features were consistent with a pseudonormal left ventricular filling pattern, with concomitant abnormal relaxation and increased filling pressure (grade 2 diastolic dysfunction)  MITRAL VALVE:  There was mild annular calcification  There was mild regurgitation  AORTIC VALVE:  There was trace regurgitation  TRICUSPID VALVE:  There was mild regurgitation  HISTORY: PRIOR HISTORY: Breast cancer  Risk factors: hypertension and hypercholesterolemia  PROCEDURE: The study was performed in the [address] [place] [address]  This was a routine study   The transthoracic approach was used  The study included complete 2D imaging, M-mode, complete spectral Doppler, and color Doppler  Images  were obtained from the parasternal, apical, subcostal, and suprasternal notch acoustic windows  Image quality was adequate  LEFT VENTRICLE: Size was normal  Systolic function was normal  Ejection fraction was estimated in the range of 55 % to 65 %  There were no regional wall motion abnormalities  Wall thickness was normal  DOPPLER: Features were consistent  with a pseudonormal left ventricular filling pattern, with concomitant abnormal relaxation and increased filling pressure (grade 2 diastolic dysfunction)  RIGHT VENTRICLE: The size was normal  Systolic function was normal  Wall thickness was normal     LEFT ATRIUM: Size was normal     RIGHT ATRIUM: Size was normal     MITRAL VALVE: There was mild annular calcification  Valve structure was normal  There was normal leaflet separation  DOPPLER: The transmitral velocity was within the normal range  There was no evidence for stenosis  There was mild  regurgitation  AORTIC VALVE: The valve was trileaflet  Leaflets exhibited normal thickness and normal cuspal separation  DOPPLER: Transaortic velocity was within the normal range  There was no evidence for stenosis  There was trace regurgitation  TRICUSPID VALVE: The valve structure was normal  There was normal leaflet separation  DOPPLER: The transtricuspid velocity was within the normal range  There was no evidence for stenosis  There was mild regurgitation  PULMONIC VALVE: Leaflets exhibited normal thickness, no calcification, and normal cuspal separation  DOPPLER: The transpulmonic velocity was within the normal range  There was no regurgitation  PERICARDIUM: There was no pericardial effusion  The pericardium was normal in appearance  AORTA: The root exhibited normal size  SYSTEMIC VEINS: IVC: The inferior vena cava was normal in size and course   Respirophasic changes were normal     SYSTEM MEASUREMENT TABLES    Apical four chamber  4 chamber Left Atrium Volume Index; Planimetry; End Systole; Apical four chamber;: 17 26 cm2  Left Ventricular Ejection Fraction; Method of Disks, Single Plane; Apical four chamber;: 62 5 %  Left Ventricular End Diastolic Volume; Method of Disks, Single Plane; Apical four chamber;: 57 1 ml  Left Ventricular End Systolic Volume; Method of Disks, Single Plane; Apical four chamber;: 21 4 ml  Right Atrium Systolic Area; Planimetry; End Systole; Apical four chamber;: 13 17 cm2  Right Ventricular Internal Diastolic Dimension; End Diastole; Apical four chamber;: 36 9 mm  TAPSE: 26 2 mm    Unspecified Scan Mode  Aortic Root Diameter; End Systole;: 26 5 mm  Aortic valve Area; Continuity Equation by Velocity Time Integral; Systole;: 1 88 cm2  Cardiovascular Orifice Diameter; End Systole;: 19 5 mm  Gradient Pressure, Peak; Simplified Bernoulli; Antegrade Flow; Systole;: 17 5 mm[Hg]  Gradient pressure, average; Simplified Bernoulli; Antegrade Flow; Systole;: 9 mm[Hg]  Left atrial diameter; End Diastole;: 35 8 mm  Cardiac Output; Teichholz; Systole;: 2 93 L/min  Interventricular Septum Diastolic Thickness; Teichholz; End Diastole;: 11 4 mm  Left Ventricle Internal End Diastolic Dimension; Teichholz;: 37 6 mm  Left Ventricle Internal Systolic Dimension; Teichholz; End Systole;: 22 9 mm  Left Ventricle Posterior Wall Diastolic Thickness; Teichholz; End Diastole;: 11 5 mm  Left Ventricular Ejection Fraction; Teichholz;: 70 4 %  Stroke volume;  Teichholz; Systole;: 42 5 ml  Mitral Valve Area; Area by Pressure Half-Time; Systole;: 3 49 cm2  Mitral Valve E to A Ratio; Systole;: 1 38  Maximum Tricuspid valve regurgitation pressure gradient; Regurgitant Flow; Systole;: 30 1 mm[Hg]    [employer] Accredited Echocardiography Laboratory    Prepared and electronically signed by    Gianna Hickey MD  Signed 26-Oct-2017 18:33:23       No orders to display No results found for this or any previous visit  Physical Exam   Constitutional: She is oriented to person, place, and time  She appears well-developed  HENT:   Right Ear: External ear normal    Left Ear: External ear normal    Eyes: Right eye exhibits no discharge  Left eye exhibits no discharge  No scleral icterus  Neck: Carotid bruit is not present  No tracheal deviation present  No thyroid mass and no thyromegaly present  Cardiovascular: Normal rate, regular rhythm, normal heart sounds and intact distal pulses  Exam reveals no gallop and no friction rub  No murmur heard  Pulmonary/Chest: No respiratory distress  She has no wheezes  She has no rales  Musculoskeletal: She exhibits no edema  2+ edema of the left lower extremity with early venous stasis dermatitis trace edema of the right lower extremity   Lymphadenopathy:     She has no cervical adenopathy  Neurological: She is alert and oriented to person, place, and time  Coordination normal    Psychiatric: She has a normal mood and affect  Her behavior is normal  Judgment and thought content normal    Nursing note and vitals reviewed  BMI Counseling: Body mass index is 32 85 kg/m²  The BMI is above normal  Nutrition recommendations include reducing portion sizes, decreasing overall calorie intake, 3-5 servings of fruits/vegetables daily, reducing fast food intake, consuming healthier snacks, decreasing soda and/or juice intake, moderation in carbohydrate intake, increasing intake of lean protein and reducing intake of saturated fat and trans fat  Exercise recommendations include exercising 3-5 times per week

## 2019-12-19 NOTE — PATIENT INSTRUCTIONS
Obesity   AMBULATORY CARE:   Obesity  is when your body mass index (BMI) is greater than 30  Your healthcare provider will use your height and weight to measure your BMI  The risks of obesity include  many health problems, such as injuries or physical disability  You may need tests to check for the following:  · Diabetes     · High blood pressure or high cholesterol     · Heart disease     · Gallbladder or liver disease     · Cancer of the colon, breast, prostate, liver, or kidney     · Sleep apnea     · Arthritis or gout  Seek care immediately if:   · You have a severe headache, confusion, or difficulty speaking  · You have weakness on one side of your body  · You have chest pain, sweating, or shortness of breath  Contact your healthcare provider if:   · You have symptoms of gallbladder or liver disease, such as pain in your upper abdomen  · You have knee or hip pain and discomfort while walking  · You have symptoms of diabetes, such as intense hunger and thirst, and frequent urination  · You have symptoms of sleep apnea, such as snoring or daytime sleepiness  · You have questions or concerns about your condition or care  Treatment for obesity  focuses on helping you lose weight to improve your health  Even a small decrease in BMI can reduce the risk for many health problems  Your healthcare provider will help you set a weight-loss goal   · Lifestyle changes  are the first step in treating obesity  These include making healthy food choices and getting regular physical activity  Your healthcare provider may suggest a weight-loss program that involves coaching, education, and therapy  · Medicine  may help you lose weight when it is used with a healthy diet and physical activity  · Surgery  can help you lose weight if you are very obese and have other health problems  There are several types of weight-loss surgery  Ask your healthcare provider for more information    Be successful losing weight:   · Set small, realistic goals  An example of a small goal is to walk for 20 minutes 5 days a week  Anther goal is to lose 5% of your body weight  · Tell friends, family members, and coworkers about your goals  and ask for their support  Ask a friend to lose weight with you, or join a weight-loss support group  · Identify foods or triggers that may cause you to overeat , and find ways to avoid them  Remove tempting high-calorie foods from your home and workplace  Place a bowl of fresh fruit on your kitchen counter  If stress causes you to eat, then find other ways to cope with stress  · Keep a diary to track what you eat and drink  Also write down how many minutes of physical activity you do each day  Weigh yourself once a week and record it in your diary  Eating changes: You will need to eat 500 to 1,000 fewer calories each day than you currently eat to lose 1 to 2 pounds a week  The following changes will help you cut calories:  · Eat smaller portions  Use small plates, no larger than 9 inches in diameter  Fill your plate half full of fruits and vegetables  Measure your food using measuring cups until you know what a serving size looks like  · Eat 3 meals and 1 or 2 snacks each day  Plan your meals in advance  Guru  and eat at home most of the time  Eat slowly  · Eat fruits and vegetables at every meal   They are low in calories and high in fiber, which makes you feel full  Do not add butter, margarine, or cream sauce to vegetables  Use herbs to season steamed vegetables  · Eat less fat and fewer fried foods  Eat more baked or grilled chicken and fish  These protein sources are lower in calories and fat than red meat  Limit fast food  Dress your salads with olive oil and vinegar instead of bottled dressing  · Limit the amount of sugar you eat  Do not drink sugary beverages  Limit alcohol  Activity changes:  Physical activity is good for your body in many ways   It helps you burn calories and build strong muscles  It decreases stress and depression, and improves your mood  It can also help you sleep better  Talk to your healthcare provider before you begin an exercise program   · Exercise for at least 30 minutes 5 days a week  Start slowly  Set aside time each day for physical activity that you enjoy and that is convenient for you  It is best to do both weight training and an activity that increases your heart rate, such as walking, bicycling, or swimming  · Find ways to be more active  Do yard work and housecleaning  Walk up the stairs instead of using elevators  Spend your leisure time going to events that require walking, such as outdoor festivals or fairs  This extra physical activity can help you lose weight and keep it off  Follow up with your healthcare provider as directed: You may need to meet with a dietitian  Write down your questions so you remember to ask them during your visits  © 2017 [address] [employer] Information is for End User's use only and may not be sold, redistributed or otherwise used for commercial purposes  All illustrations and images included in CareNotes® are the copyrighted property of [employer]  or [employer]  The above information is an  only  It is not intended as medical advice for individual conditions or treatments  Talk to your doctor, nurse or pharmacist before following any medical regimen to see if it is safe and effective for you  Hypertension is not at goal will be increasing carvedilol to 25 mg twice a day she can take to 12 5 mg twice a day until the new prescription arrives should be continuing to monitor her blood pressure blood pressure goal is less than 130/80 patient will be seeing Dr Paulo Mason in mid January to review DVT  2   Patient with left DVT continues to have edema some venous stasis dermatitis occurring on the left lower extremity would recommend continuing elevation as blood clot dissolves edema should improve may consider compression stockings as per Dr Ana Luisa Momin, patient is at risk of recurrence with history of breast cancer and the fact that this was non provoked DVT of the left lower extremity proximal deep tibial arteries  3  Rheumatoid arthritis patient continues to follow with to Dr Rossana Pompa  4   Hypothyroidism is at goal

## 2019-12-19 NOTE — LETTER
December 19, 2019     Nereida Ridley MD PhD  [address] [facility] [place] [address]    Patient: Ayla Kong   YOB: 1940   Date of Visit: 12/19/2019       Dear Dr Toby Locke:    Thank you for referring Fito Kyle to me for evaluation  Below are my notes for this consultation  If you have questions, please do not hesitate to call me  I look forward to following your patient along with you  Sincerely,        Edith Stovall,         CC: MD Edith Crum,   12/19/2019  2:14 PM  Sign at close encounter  Assessment/Plan:    1  Hypertension is not at goal will be increasing carvedilol to 25 mg twice a day she can take to 12 5 mg twice a day until the new prescription arrives should be continuing to monitor her blood pressure blood pressure goal is less than 130/80 patient will be seeing Dr Toby Locke in mid January to review DVT  2  Patient with left DVT continues to have edema some venous stasis dermatitis occurring on the left lower extremity would recommend continuing elevation as blood clot dissolves edema should improve may consider compression stockings as per Dr Toby Locke, patient is at risk of recurrence with history of breast cancer and the fact that this was non provoked DVT of the left lower extremity proximal deep tibial arteries  3  Rheumatoid arthritis patient continues to follow with to Dr Eulalio Yao  4  Hypothyroidism is at goal           Diagnoses and all orders for this visit:    Hypothyroidism, unspecified type  -     CBC and differential; Future  -     Comprehensive metabolic panel; Future  -     LDL cholesterol, direct; Future  -     Lipid Panel with Direct LDL reflex; Future  -     C-reactive protein; Future  -     Sedimentation rate, automated; Future    Hyperlipidemia, unspecified hyperlipidemia type  -     CBC and differential; Future  -     Comprehensive metabolic panel; Future  -     LDL cholesterol, direct;  Future  -     Lipid Panel with Direct LDL reflex; Future  -     C-reactive protein; Future  -     Sedimentation rate, automated; Future    Vitamin D deficiency  -     CBC and differential; Future  -     Comprehensive metabolic panel; Future  -     LDL cholesterol, direct; Future  -     Lipid Panel with Direct LDL reflex; Future  -     C-reactive protein; Future  -     Sedimentation rate, automated; Future    Hyperuricemia  -     CBC and differential; Future  -     Comprehensive metabolic panel; Future  -     LDL cholesterol, direct; Future  -     Lipid Panel with Direct LDL reflex; Future  -     C-reactive protein; Future  -     Sedimentation rate, automated; Future    Acute deep vein thrombosis (DVT) of calf muscle vein of left lower extremity  -     CBC and differential; Future  -     Comprehensive metabolic panel; Future  -     LDL cholesterol, direct; Future  -     Lipid Panel with Direct LDL reflex; Future  -     C-reactive protein; Future  -     Sedimentation rate, automated; Future    BMI 32 0-32 9,adult  -     CBC and differential; Future  -     Comprehensive metabolic panel; Future  -     LDL cholesterol, direct; Future  -     Lipid Panel with Direct LDL reflex; Future  -     C-reactive protein; Future  -     Sedimentation rate, automated; Future    Hypertension, unspecified type  -     CBC and differential; Future  -     Comprehensive metabolic panel; Future  -     LDL cholesterol, direct; Future  -     Lipid Panel with Direct LDL reflex; Future  -     C-reactive protein; Future  -     Sedimentation rate, automated; Future  -     carvedilol (COREG) 25 mg tablet; Take 1 tablet (25 mg total) by mouth 2 (two) times a day with meals        The patient was counseled regarding instructions for management, risk factor reductions, patient and family education,impressions, risks and benefits of treatment options, side effects of medications, importance of compliance with treatment   The treatment plan was reviewed with the patient/guardian and patient/guardian understands and agrees with the treatment plan  Current Outpatient Medications:     apixaban (ELIQUIS) 5 mg, Take 1 tablet (5 mg total) by mouth 2 (two) times a day, Disp: 180 tablet, Rfl: 2    ascorbic acid (VITAMIN C) 500 mg tablet, Take 500 mg by mouth daily  , Disp: , Rfl:     Calcium Citrate-Vitamin D (CITRACAL + D PO), Take by mouth 2 (two) times a day, Disp: , Rfl:     carvedilol (COREG) 25 mg tablet, Take 1 tablet (25 mg total) by mouth 2 (two) times a day with meals, Disp: 180 tablet, Rfl: 2    Cholecalciferol (VITAMIN D-3 PO), Take 2,000 Units by mouth daily  , Disp: , Rfl:     folic acid (FOLVITE) 1 mg tablet, 1 daily, Disp: 90 tablet, Rfl: 2    hydrochlorothiazide (HYDRODIURIL) 25 mg tablet, Take 1 tablet (25 mg total) by mouth daily, Disp: 90 tablet, Rfl: 0    KLOR-CON M10 10 MEQ tablet, TAKE 2 TABLETS BY MOUTH TWICE A DAY, Disp: 120 tablet, Rfl: 2    levothyroxine 50 mcg tablet, Take 1 tablet (50 mcg total) by mouth every other day, Disp: 45 tablet, Rfl: 3    levothyroxine 75 mcg tablet, Take 1 tab po every other day, alternating with 50 mcg, Disp: 45 tablet, Rfl: 3    magnesium oxide (MAG-OX) 400 mg, Take 1 tablet (400 mg total) by mouth 2 (two) times a day, Disp: 90 tablet, Rfl: 0    methotrexate 2 5 mg tablet, Take 10 mg by mouth once a week , Disp: , Rfl:     Multiple Vitamin (MULTIVITAMIN) tablet, Take 1 tablet by mouth daily  , Disp: , Rfl:     riTUXimab (RITUXAN) 500 mg/50 mL, Infuse into a venous catheter every 6 (six) months  , Disp: , Rfl:     rosuvastatin (CRESTOR) 5 mg tablet, Take 1 tablet (5 mg total) by mouth daily, Disp: 90 tablet, Rfl: 2    pantoprazole (PROTONIX) 40 mg tablet, Take 1 tablet (40 mg total) by mouth daily (Patient not taking: Reported on 12/19/2019), Disp: 90 tablet, Rfl: 2    Poison Ivy Treatments (ZANFEL) MISC, Apply 1 application topically once as needed (poison ivy) for up to 7 days, Disp: 30 g, Rfl: 0    Subjective:      Patient ID: [facility] Cris Cast is a 67 y o  female  Do denies any chest pain or shortness of breath has been having some edema left lower extremity improved with raising her leg but it has been persistent      The following portions of the patient's history were reviewed and updated as appropriate:   She has a past medical history of Allergic angioedema, Angioedema, Arthritis, Breast cancer ([facility] [address] ), Disease of thyroid gland, Hemorrhage of anus and rectum, Hyperlipidemia, Hypertension, Hypocalcemia, Neuritis, Peptic ulcer, Rheumatoid arthritis ([facility] [address] ), and Vitamin D deficiency  ,  does not have any pertinent problems on file  ,   has a past surgical history that includes Abdominal surgery; Breast surgery; Cholecystectomy; Elbow surgery; and orthopedic surgery  ,  family history includes Breast cancer in her maternal aunt, mother, and sister; Kidney failure in her father; Other in her father  ,   reports that she has never smoked  She has never used smokeless tobacco  She reports that she drank alcohol  She reports that she does not use drugs  ,  is allergic to lisinopril; codeine; other; oxycodone; penicillins; sulfamethoxazole-trimethoprim; and ciprofloxacin       Review of Systems   Constitutional: Negative for appetite change, chills, fatigue, fever and unexpected weight change  HENT: Negative for congestion, ear pain, facial swelling, hearing loss, mouth sores, nosebleeds, postnasal drip, rhinorrhea, sinus pain, sore throat, trouble swallowing and voice change  Eyes: Negative for pain, discharge, redness and visual disturbance  Respiratory: Negative for apnea, chest tightness, shortness of breath, wheezing and stridor  Cardiovascular: Negative for chest pain, palpitations and leg swelling  Gastrointestinal: Negative for abdominal distention, abdominal pain, blood in stool, constipation, diarrhea and vomiting  Endocrine: Negative for cold intolerance, heat intolerance, polydipsia, polyphagia and polyuria     Genitourinary: Negative for difficulty urinating, dysuria, flank pain, frequency, genital sores, hematuria and urgency  Musculoskeletal: Negative for arthralgias and back pain  Skin: Negative for rash and wound  Allergic/Immunologic: Negative for environmental allergies, food allergies and immunocompromised state  Neurological: Negative for dizziness, tremors, seizures, syncope, facial asymmetry, speech difficulty, weakness, light-headedness, numbness and headaches  Hematological: Negative for adenopathy  Does not bruise/bleed easily  Psychiatric/Behavioral: Negative for agitation, behavioral problems, dysphoric mood, hallucinations, self-injury, sleep disturbance and suicidal ideas  The patient is not hyperactive            Objective:  /70 (BP Location: Left arm, Patient Position: Sitting)   Pulse 68   Temp 98 6 °F (37 °C) (Tympanic)   Resp 16   Ht 5' 5" (1 651 m)   Wt 89 5 kg (197 lb 6 4 oz)   LMP  (LMP Unknown)   SpO2 97%   BMI 32 85 kg/m²      Lab Review  Appointment on 12/09/2019   Component Date Value    WBC 12/09/2019 5 23     RBC 12/09/2019 3 28*    Hemoglobin 12/09/2019 10 6*    Hematocrit 12/09/2019 34 0*    MCV 12/09/2019 104*    MCH 12/09/2019 32 3     MCHC 12/09/2019 31 2*    RDW 12/09/2019 15 4*    MPV 12/09/2019 10 5     Platelets 36/14/6490 207     nRBC 12/09/2019 0     Neutrophils Relative 12/09/2019 57     Immat GRANS % 12/09/2019 0     Lymphocytes Relative 12/09/2019 25     Monocytes Relative 12/09/2019 12     Eosinophils Relative 12/09/2019 5     Basophils Relative 12/09/2019 1     Neutrophils Absolute 12/09/2019 2 96     Immature Grans Absolute 12/09/2019 0 02     Lymphocytes Absolute 12/09/2019 1 31     Monocytes Absolute 12/09/2019 0 61     Eosinophils Absolute 12/09/2019 0 27     Basophils Absolute 12/09/2019 0 06     Sodium 12/09/2019 140     Potassium 12/09/2019 3 5     Chloride 12/09/2019 102     CO2 12/09/2019 28     ANION GAP 12/09/2019 10     BUN 12/09/2019 19     Creatinine 2019 1 17     Glucose, Fasting 2019 95     Calcium 2019 9 0     AST 2019 22     ALT 2019 21     Alkaline Phosphatase 2019 44*    Total Protein 2019 6 7     Albumin 2019 3 5     Total Bilirubin 2019 0 60     eGFR 2019 45     Cholesterol 2019 158     Triglycerides 2019 143     HDL, Direct 2019 44     LDL Calculated 2019 85     TSH 3RD GENERATON 2019 4 168*    Magnesium 2019 1 5*    Free T4 2019 1 20    Admission on 10/24/2019, Discharged on 10/24/2019   Component Date Value    Sodium 10/24/2019 144     Potassium 10/24/2019 3 6     Chloride 10/24/2019 109*    CO2 10/24/2019 26     ANION GAP 10/24/2019 9     BUN 10/24/2019 24     Creatinine 10/24/2019 1 32*    Glucose 10/24/2019 97     Calcium 10/24/2019 9 1     eGFR 10/24/2019 39          Imaging  @[id number]@  Recent Results (from the past 48360 hour(s))   Echo complete with contrast if indicated    Narrative    [place] [address] [address] [place] [address]  [phone]    Transthoracic Echocardiogram  2D, M-mode, Doppler, and Color Doppler    Study date:  26-Oct-2017    Patient: Socorro Monson  MR number: [id number]  Account number: [de-identified]  : 1940  Age: 69 years  Gender: Female  Status: Outpatient  Location: [facility]  Height: 63 in  Weight: 180 lb  BP: 120/ 60 mmHg    Indications: Murmur  Diagnoses: R01 1 - Cardiac murmur, unspecified    Sonographer:  Darshana Weiss, MARANDA  Interpreting Physician:  Cristo Huerta MD  Primary Physician:  Yocasta Will DO  Referring Physician:  Yocasta Will DO  Group:  [facility]    SUMMARY    LEFT VENTRICLE:  Systolic function was normal  Ejection fraction was estimated in the range of 55 % to 65 %  There were no regional wall motion abnormalities    Features were consistent with a pseudonormal left ventricular filling pattern, with concomitant abnormal relaxation and increased filling pressure (grade 2 diastolic dysfunction)  MITRAL VALVE:  There was mild annular calcification  There was mild regurgitation  AORTIC VALVE:  There was trace regurgitation  TRICUSPID VALVE:  There was mild regurgitation  HISTORY: PRIOR HISTORY: Breast cancer  Risk factors: hypertension and hypercholesterolemia  PROCEDURE: The study was performed in the [address] [place] [address]  This was a routine study  The transthoracic approach was used  The study included complete 2D imaging, M-mode, complete spectral Doppler, and color Doppler  Images  were obtained from the parasternal, apical, subcostal, and suprasternal notch acoustic windows  Image quality was adequate  LEFT VENTRICLE: Size was normal  Systolic function was normal  Ejection fraction was estimated in the range of 55 % to 65 %  There were no regional wall motion abnormalities  Wall thickness was normal  DOPPLER: Features were consistent  with a pseudonormal left ventricular filling pattern, with concomitant abnormal relaxation and increased filling pressure (grade 2 diastolic dysfunction)  RIGHT VENTRICLE: The size was normal  Systolic function was normal  Wall thickness was normal     LEFT ATRIUM: Size was normal     RIGHT ATRIUM: Size was normal     MITRAL VALVE: There was mild annular calcification  Valve structure was normal  There was normal leaflet separation  DOPPLER: The transmitral velocity was within the normal range  There was no evidence for stenosis  There was mild  regurgitation  AORTIC VALVE: The valve was trileaflet  Leaflets exhibited normal thickness and normal cuspal separation  DOPPLER: Transaortic velocity was within the normal range  There was no evidence for stenosis  There was trace regurgitation  TRICUSPID VALVE: The valve structure was normal  There was normal leaflet separation   DOPPLER: The transtricuspid velocity was within the normal range  There was no evidence for stenosis  There was mild regurgitation  PULMONIC VALVE: Leaflets exhibited normal thickness, no calcification, and normal cuspal separation  DOPPLER: The transpulmonic velocity was within the normal range  There was no regurgitation  PERICARDIUM: There was no pericardial effusion  The pericardium was normal in appearance  AORTA: The root exhibited normal size  SYSTEMIC VEINS: IVC: The inferior vena cava was normal in size and course  Respirophasic changes were normal     SYSTEM MEASUREMENT TABLES    Apical four chamber  4 chamber Left Atrium Volume Index; Planimetry; End Systole; Apical four chamber;: 17 26 cm2  Left Ventricular Ejection Fraction; Method of Disks, Single Plane; Apical four chamber;: 62 5 %  Left Ventricular End Diastolic Volume; Method of Disks, Single Plane; Apical four chamber;: 57 1 ml  Left Ventricular End Systolic Volume; Method of Disks, Single Plane; Apical four chamber;: 21 4 ml  Right Atrium Systolic Area; Planimetry; End Systole; Apical four chamber;: 13 17 cm2  Right Ventricular Internal Diastolic Dimension; End Diastole; Apical four chamber;: 36 9 mm  TAPSE: 26 2 mm    Unspecified Scan Mode  Aortic Root Diameter; End Systole;: 26 5 mm  Aortic valve Area; Continuity Equation by Velocity Time Integral; Systole;: 1 88 cm2  Cardiovascular Orifice Diameter; End Systole;: 19 5 mm  Gradient Pressure, Peak; Simplified Bernoulli; Antegrade Flow; Systole;: 17 5 mm[Hg]  Gradient pressure, average; Simplified Bernoulli; Antegrade Flow; Systole;: 9 mm[Hg]  Left atrial diameter; End Diastole;: 35 8 mm  Cardiac Output; Teichholz; Systole;: 2 93 L/min  Interventricular Septum Diastolic Thickness; Teichholz; End Diastole;: 11 4 mm  Left Ventricle Internal End Diastolic Dimension; Teichholz;: 37 6 mm  Left Ventricle Internal Systolic Dimension;  Teichholz; End Systole;: 22 9 mm  Left Ventricle Posterior Wall Diastolic Thickness; Teichholz; End Diastole;: 11 5 mm  Left Ventricular Ejection Fraction; Teichholz;: 70 4 %  Stroke volume; Teichholz; Systole;: 42 5 ml  Mitral Valve Area; Area by Pressure Half-Time; Systole;: 3 49 cm2  Mitral Valve E to A Ratio; Systole;: 1 38  Maximum Tricuspid valve regurgitation pressure gradient; Regurgitant Flow; Systole;: 30 1 mm[Hg]    [facility]    Prepared and electronically signed by    Saeid Argueta MD  Signed 26-Oct-2017 18:33:23       No orders to display     No results found for this or any previous visit  Physical Exam   Constitutional: She is oriented to person, place, and time  She appears well-developed  HENT:   Right Ear: External ear normal    Left Ear: External ear normal    Eyes: Right eye exhibits no discharge  Left eye exhibits no discharge  No scleral icterus  Neck: Carotid bruit is not present  No tracheal deviation present  No thyroid mass and no thyromegaly present  Cardiovascular: Normal rate, regular rhythm, normal heart sounds and intact distal pulses  Exam reveals no gallop and no friction rub  No murmur heard  Pulmonary/Chest: No respiratory distress  She has no wheezes  She has no rales  Musculoskeletal: She exhibits no edema  2+ edema of the left lower extremity with early venous stasis dermatitis trace edema of the right lower extremity   Lymphadenopathy:     She has no cervical adenopathy  Neurological: She is alert and oriented to person, place, and time  Coordination normal    Psychiatric: She has a normal mood and affect  Her behavior is normal  Judgment and thought content normal    Nursing note and vitals reviewed  BMI Counseling: Body mass index is 32 85 kg/m²   The BMI is above normal  Nutrition recommendations include reducing portion sizes, decreasing overall calorie intake, 3-5 servings of fruits/vegetables daily, reducing fast food intake, consuming healthier snacks, decreasing soda and/or juice intake, moderation in carbohydrate intake, increasing intake of lean protein and reducing intake of saturated fat and trans fat  Exercise recommendations include exercising 3-5 times per week

## 2019-12-30 DIAGNOSIS — R60.0 LOCALIZED EDEMA: ICD-10-CM

## 2019-12-30 DIAGNOSIS — I10 HYPERTENSION, UNSPECIFIED TYPE: Chronic | ICD-10-CM

## 2019-12-30 RX ORDER — HYDROCHLOROTHIAZIDE 25 MG/1
25 TABLET ORAL DAILY
Qty: 90 TABLET | Refills: 2 | Status: SHIPPED | OUTPATIENT
Start: 2019-12-30 | End: 2020-03-26 | Stop reason: SDUPTHER

## 2020-01-24 ENCOUNTER — HOSPITAL ENCOUNTER (OUTPATIENT)
Dept: VASCULAR ULTRASOUND | Facility: HOSPITAL | Age: 80
Discharge: HOME | End: 2020-01-24
Attending: INTERNAL MEDICINE
Payer: MEDICARE

## 2020-01-24 ENCOUNTER — APPOINTMENT (OUTPATIENT)
Dept: LAB | Facility: HOSPITAL | Age: 80
End: 2020-01-24
Attending: INTERNAL MEDICINE
Payer: MEDICARE

## 2020-01-24 DIAGNOSIS — I82.4Y2 DEEP VEIN THROMBOSIS (DVT) OF PROXIMAL VEIN OF LEFT LOWER EXTREMITY, UNSPECIFIED CHRONICITY (HCC): ICD-10-CM

## 2020-01-24 LAB — D DIMER PPP FEU-MCNC: 0.38 UG/ML FEU

## 2020-01-24 PROCEDURE — 36415 COLL VENOUS BLD VENIPUNCTURE: CPT

## 2020-01-24 PROCEDURE — 93971 EXTREMITY STUDY: CPT | Performed by: SURGERY

## 2020-01-24 PROCEDURE — 93971 EXTREMITY STUDY: CPT

## 2020-01-24 PROCEDURE — 85379 FIBRIN DEGRADATION QUANT: CPT

## 2020-01-31 ENCOUNTER — OFFICE VISIT (OUTPATIENT)
Dept: HEMATOLOGY ONCOLOGY | Facility: CLINIC | Age: 80
End: 2020-01-31
Payer: MEDICARE

## 2020-01-31 VITALS
WEIGHT: 203 LBS | BODY MASS INDEX: 33.82 KG/M2 | OXYGEN SATURATION: 88 % | SYSTOLIC BLOOD PRESSURE: 110 MMHG | HEIGHT: 65 IN | TEMPERATURE: 98.6 F | DIASTOLIC BLOOD PRESSURE: 64 MMHG | RESPIRATION RATE: 18 BRPM | HEART RATE: 67 BPM

## 2020-01-31 DIAGNOSIS — C50.919 ADENOCARCINOMA OF BREAST, UNSPECIFIED LATERALITY (HCC): ICD-10-CM

## 2020-01-31 DIAGNOSIS — D64.9 ANEMIA, UNSPECIFIED TYPE: ICD-10-CM

## 2020-01-31 DIAGNOSIS — I82.532 CHRONIC DEEP VEIN THROMBOSIS (DVT) OF POPLITEAL VEIN OF LEFT LOWER EXTREMITY (HCC): Primary | ICD-10-CM

## 2020-01-31 PROCEDURE — 99214 OFFICE O/P EST MOD 30 MIN: CPT | Performed by: INTERNAL MEDICINE

## 2020-03-26 DIAGNOSIS — R60.0 LOCALIZED EDEMA: ICD-10-CM

## 2020-03-26 DIAGNOSIS — I10 HYPERTENSION, UNSPECIFIED TYPE: Chronic | ICD-10-CM

## 2020-03-26 RX ORDER — HYDROCHLOROTHIAZIDE 25 MG/1
25 TABLET ORAL DAILY
Qty: 90 TABLET | Refills: 2 | Status: SHIPPED | OUTPATIENT
Start: 2020-03-26 | End: 2020-12-18

## 2020-04-06 ENCOUNTER — TELEPHONE (OUTPATIENT)
Dept: HEMATOLOGY ONCOLOGY | Facility: CLINIC | Age: 80
End: 2020-04-06

## 2020-04-23 ENCOUNTER — HOSPITAL ENCOUNTER (OUTPATIENT)
Dept: NON INVASIVE DIAGNOSTICS | Facility: CLINIC | Age: 80
Discharge: HOME | End: 2020-04-23
Attending: INTERNAL MEDICINE
Payer: MEDICARE

## 2020-04-23 DIAGNOSIS — I82.532 CHRONIC DEEP VEIN THROMBOSIS (DVT) OF POPLITEAL VEIN OF LEFT LOWER EXTREMITY (HCC): ICD-10-CM

## 2020-04-23 PROCEDURE — 93971 EXTREMITY STUDY: CPT | Performed by: SURGERY

## 2020-04-23 PROCEDURE — 93971 EXTREMITY STUDY: CPT

## 2020-04-24 DIAGNOSIS — I10 HYPERTENSION, UNSPECIFIED TYPE: Chronic | ICD-10-CM

## 2020-04-24 RX ORDER — POTASSIUM CHLORIDE 750 MG/1
20 TABLET, EXTENDED RELEASE ORAL 2 TIMES DAILY
Qty: 120 TABLET | Refills: 2 | Status: SHIPPED | OUTPATIENT
Start: 2020-04-24 | End: 2020-04-30 | Stop reason: SDUPTHER

## 2020-04-27 ENCOUNTER — TELEMEDICINE (OUTPATIENT)
Dept: INTERNAL MEDICINE CLINIC | Facility: CLINIC | Age: 80
End: 2020-04-27
Payer: MEDICARE

## 2020-04-27 VITALS — TEMPERATURE: 98.3 F

## 2020-04-27 DIAGNOSIS — I82.5Z2 LOWER LEG DVT (DEEP VENOUS THROMBOEMBOLISM), CHRONIC, LEFT (HCC): Primary | ICD-10-CM

## 2020-04-27 PROCEDURE — 99442 PR PHYS/QHP TELEPHONE EVALUATION 11-20 MIN: CPT | Performed by: NURSE PRACTITIONER

## 2020-04-30 ENCOUNTER — TELEMEDICINE (OUTPATIENT)
Dept: INTERNAL MEDICINE CLINIC | Facility: CLINIC | Age: 80
End: 2020-04-30
Payer: MEDICARE

## 2020-04-30 VITALS
DIASTOLIC BLOOD PRESSURE: 73 MMHG | TEMPERATURE: 98.1 F | HEART RATE: 58 BPM | SYSTOLIC BLOOD PRESSURE: 174 MMHG | BODY MASS INDEX: 31.62 KG/M2 | WEIGHT: 190 LBS

## 2020-04-30 DIAGNOSIS — K59.1 FUNCTIONAL DIARRHEA: ICD-10-CM

## 2020-04-30 DIAGNOSIS — M06.9 RHEUMATOID ARTHRITIS INVOLVING LEFT ANKLE, UNSPECIFIED RHEUMATOID FACTOR PRESENCE: Primary | Chronic | ICD-10-CM

## 2020-04-30 DIAGNOSIS — I83.812 VARICOSE VEINS OF LEFT LOWER EXTREMITY WITH PAIN: ICD-10-CM

## 2020-04-30 DIAGNOSIS — R00.1 BRADYCARDIA WITH 51-60 BEATS PER MINUTE: ICD-10-CM

## 2020-04-30 DIAGNOSIS — R10.11 RIGHT UPPER QUADRANT PAIN: ICD-10-CM

## 2020-04-30 DIAGNOSIS — Z87.11 HISTORY OF PEPTIC ULCER: ICD-10-CM

## 2020-04-30 DIAGNOSIS — E03.9 HYPOTHYROIDISM, UNSPECIFIED TYPE: Chronic | ICD-10-CM

## 2020-04-30 DIAGNOSIS — I82.5Z2 LOWER LEG DVT (DEEP VENOUS THROMBOEMBOLISM), CHRONIC, LEFT (HCC): ICD-10-CM

## 2020-04-30 DIAGNOSIS — I10 HYPERTENSION, UNSPECIFIED TYPE: Chronic | ICD-10-CM

## 2020-04-30 PROBLEM — J84.9 INTERSTITIAL PULMONARY DISEASE (HCC): Status: RESOLVED | Noted: 2019-06-10 | Resolved: 2020-04-30

## 2020-04-30 PROCEDURE — 99214 OFFICE O/P EST MOD 30 MIN: CPT | Performed by: INTERNAL MEDICINE

## 2020-04-30 RX ORDER — LATANOPROST 50 UG/ML
SOLUTION/ DROPS OPHTHALMIC
COMMUNITY
Start: 2020-03-05

## 2020-04-30 RX ORDER — POTASSIUM CHLORIDE 750 MG/1
20 TABLET, EXTENDED RELEASE ORAL 2 TIMES DAILY
Qty: 180 TABLET | Refills: 2 | Status: SHIPPED | OUTPATIENT
Start: 2020-04-30 | End: 2020-04-30 | Stop reason: SDUPTHER

## 2020-04-30 RX ORDER — PSYLLIUM SEED (WITH DEXTROSE)
POWDER (GRAM) ORAL
Qty: 100 WAFER | Refills: 5 | Status: SHIPPED | OUTPATIENT
Start: 2020-04-30

## 2020-04-30 RX ORDER — POTASSIUM CHLORIDE 750 MG/1
20 TABLET, EXTENDED RELEASE ORAL 2 TIMES DAILY
Qty: 180 TABLET | Refills: 2 | Status: SHIPPED | OUTPATIENT
Start: 2020-04-30 | End: 2021-01-18 | Stop reason: SDUPTHER

## 2020-05-01 ENCOUNTER — OFFICE VISIT (OUTPATIENT)
Dept: HEMATOLOGY ONCOLOGY | Facility: CLINIC | Age: 80
End: 2020-05-01
Payer: MEDICARE

## 2020-05-01 VITALS
HEIGHT: 65 IN | OXYGEN SATURATION: 98 % | BODY MASS INDEX: 33.07 KG/M2 | WEIGHT: 198.5 LBS | TEMPERATURE: 98.1 F | HEART RATE: 56 BPM | RESPIRATION RATE: 16 BRPM

## 2020-05-01 DIAGNOSIS — I82.5Z2 LOWER LEG DVT (DEEP VENOUS THROMBOEMBOLISM), CHRONIC, LEFT (HCC): Primary | ICD-10-CM

## 2020-05-01 DIAGNOSIS — C50.919 ADENOCARCINOMA OF BREAST, UNSPECIFIED LATERALITY (HCC): ICD-10-CM

## 2020-05-01 PROCEDURE — 3078F DIAST BP <80 MM HG: CPT | Performed by: INTERNAL MEDICINE

## 2020-05-01 PROCEDURE — 3008F BODY MASS INDEX DOCD: CPT | Performed by: INTERNAL MEDICINE

## 2020-05-01 PROCEDURE — 1036F TOBACCO NON-USER: CPT | Performed by: INTERNAL MEDICINE

## 2020-05-01 PROCEDURE — 1160F RVW MEDS BY RX/DR IN RCRD: CPT | Performed by: INTERNAL MEDICINE

## 2020-05-01 PROCEDURE — 99214 OFFICE O/P EST MOD 30 MIN: CPT | Performed by: INTERNAL MEDICINE

## 2020-05-01 PROCEDURE — 3077F SYST BP >= 140 MM HG: CPT | Performed by: INTERNAL MEDICINE

## 2020-05-01 PROCEDURE — 4040F PNEUMOC VAC/ADMIN/RCVD: CPT | Performed by: INTERNAL MEDICINE

## 2020-05-04 DIAGNOSIS — E03.9 HYPOTHYROIDISM, UNSPECIFIED TYPE: Chronic | ICD-10-CM

## 2020-05-04 RX ORDER — LEVOTHYROXINE SODIUM 50 MCG
TABLET ORAL
Qty: 45 TABLET | Refills: 3 | Status: SHIPPED | OUTPATIENT
Start: 2020-05-04 | End: 2020-07-24 | Stop reason: SDUPTHER

## 2020-06-09 ENCOUNTER — APPOINTMENT (OUTPATIENT)
Dept: LAB | Facility: HOSPITAL | Age: 80
End: 2020-06-09
Attending: INTERNAL MEDICINE
Payer: MEDICARE

## 2020-06-09 ENCOUNTER — TRANSCRIBE ORDERS (OUTPATIENT)
Dept: ADMINISTRATIVE | Facility: HOSPITAL | Age: 80
End: 2020-06-09

## 2020-06-09 DIAGNOSIS — M48.061 SPINAL STENOSIS, LUMBAR REGION, WITHOUT NEUROGENIC CLAUDICATION: ICD-10-CM

## 2020-06-09 DIAGNOSIS — M05.79 SEROPOSITIVE RHEUMATOID ARTHRITIS OF MULTIPLE SITES (HCC): Primary | ICD-10-CM

## 2020-06-09 LAB
ALBUMIN SERPL BCP-MCNC: 3.9 G/DL (ref 3.5–5)
ALP SERPL-CCNC: 71 U/L (ref 46–116)
ALT SERPL W P-5'-P-CCNC: 24 U/L (ref 12–78)
AST SERPL W P-5'-P-CCNC: 26 U/L (ref 5–45)
BASOPHILS # BLD AUTO: 0.05 THOUSANDS/ΜL (ref 0–0.1)
BASOPHILS NFR BLD AUTO: 1 % (ref 0–1)
BILIRUB DIRECT SERPL-MCNC: 0.19 MG/DL (ref 0–0.2)
BILIRUB SERPL-MCNC: 0.6 MG/DL (ref 0.2–1)
C3 SERPL-MCNC: 128 MG/DL (ref 90–180)
C4 SERPL-MCNC: 36 MG/DL (ref 10–40)
CREAT SERPL-MCNC: 1.25 MG/DL (ref 0.6–1.3)
CRP SERPL QL: 12 MG/L
EOSINOPHIL # BLD AUTO: 0.08 THOUSAND/ΜL (ref 0–0.61)
EOSINOPHIL NFR BLD AUTO: 1 % (ref 0–6)
ERYTHROCYTE [DISTWIDTH] IN BLOOD BY AUTOMATED COUNT: 14.7 % (ref 11.6–15.1)
ERYTHROCYTE [SEDIMENTATION RATE] IN BLOOD: 33 MM/HOUR (ref 2–25)
GFR SERPL CREATININE-BSD FRML MDRD: 41 ML/MIN/1.73SQ M
HCT VFR BLD AUTO: 35.1 % (ref 34.8–46.1)
HGB BLD-MCNC: 11.5 G/DL (ref 11.5–15.4)
IMM GRANULOCYTES # BLD AUTO: 0.02 THOUSAND/UL (ref 0–0.2)
IMM GRANULOCYTES NFR BLD AUTO: 0 % (ref 0–2)
LYMPHOCYTES # BLD AUTO: 0.69 THOUSANDS/ΜL (ref 0.6–4.47)
LYMPHOCYTES NFR BLD AUTO: 12 % (ref 14–44)
MCH RBC QN AUTO: 33.8 PG (ref 26.8–34.3)
MCHC RBC AUTO-ENTMCNC: 32.8 G/DL (ref 31.4–37.4)
MCV RBC AUTO: 103 FL (ref 82–98)
MONOCYTES # BLD AUTO: 0.41 THOUSAND/ΜL (ref 0.17–1.22)
MONOCYTES NFR BLD AUTO: 7 % (ref 4–12)
NEUTROPHILS # BLD AUTO: 4.76 THOUSANDS/ΜL (ref 1.85–7.62)
NEUTS SEG NFR BLD AUTO: 79 % (ref 43–75)
NRBC BLD AUTO-RTO: 0 /100 WBCS
PLATELET # BLD AUTO: 236 THOUSANDS/UL (ref 149–390)
PMV BLD AUTO: 10.2 FL (ref 8.9–12.7)
PROT SERPL-MCNC: 7.4 G/DL (ref 6.4–8.2)
RBC # BLD AUTO: 3.4 MILLION/UL (ref 3.81–5.12)
WBC # BLD AUTO: 6.01 THOUSAND/UL (ref 4.31–10.16)

## 2020-06-09 PROCEDURE — 86160 COMPLEMENT ANTIGEN: CPT

## 2020-06-09 PROCEDURE — 80076 HEPATIC FUNCTION PANEL: CPT

## 2020-06-09 PROCEDURE — 82565 ASSAY OF CREATININE: CPT

## 2020-06-09 PROCEDURE — 85025 COMPLETE CBC W/AUTO DIFF WBC: CPT

## 2020-06-09 PROCEDURE — 36415 COLL VENOUS BLD VENIPUNCTURE: CPT

## 2020-06-09 PROCEDURE — 86200 CCP ANTIBODY: CPT

## 2020-06-09 PROCEDURE — 84165 PROTEIN E-PHORESIS SERUM: CPT | Performed by: PATHOLOGY

## 2020-06-09 PROCEDURE — 85652 RBC SED RATE AUTOMATED: CPT

## 2020-06-09 PROCEDURE — 86140 C-REACTIVE PROTEIN: CPT

## 2020-06-09 PROCEDURE — 86162 COMPLEMENT TOTAL (CH50): CPT

## 2020-06-09 PROCEDURE — 86430 RHEUMATOID FACTOR TEST QUAL: CPT

## 2020-06-09 PROCEDURE — 84165 PROTEIN E-PHORESIS SERUM: CPT

## 2020-06-09 PROCEDURE — 86038 ANTINUCLEAR ANTIBODIES: CPT

## 2020-06-10 LAB
CH50 SERPL-ACNC: >60 U/ML
RHEUMATOID FACT SER QL LA: NEGATIVE
RYE IGE QN: NEGATIVE

## 2020-06-11 LAB
ALBUMIN SERPL ELPH-MCNC: 4.28 G/DL (ref 3.5–5)
ALBUMIN SERPL ELPH-MCNC: 62.1 % (ref 52–65)
ALPHA1 GLOB SERPL ELPH-MCNC: 0.3 G/DL (ref 0.1–0.4)
ALPHA1 GLOB SERPL ELPH-MCNC: 4.4 % (ref 2.5–5)
ALPHA2 GLOB SERPL ELPH-MCNC: 0.87 G/DL (ref 0.4–1.2)
ALPHA2 GLOB SERPL ELPH-MCNC: 12.6 % (ref 7–13)
BETA GLOB ABNORMAL SERPL ELPH-MCNC: 0.43 G/DL (ref 0.4–0.8)
BETA1 GLOB SERPL ELPH-MCNC: 6.2 % (ref 5–13)
BETA2 GLOB SERPL ELPH-MCNC: 5.2 % (ref 2–8)
BETA2+GAMMA GLOB SERPL ELPH-MCNC: 0.36 G/DL (ref 0.2–0.5)
GAMMA GLOB ABNORMAL SERPL ELPH-MCNC: 0.66 G/DL (ref 0.5–1.6)
GAMMA GLOB SERPL ELPH-MCNC: 9.5 % (ref 12–22)
IGG/ALB SER: 1.64 {RATIO} (ref 1.1–1.8)
PROT PATTERN SERPL ELPH-IMP: ABNORMAL
PROT SERPL-MCNC: 6.9 G/DL (ref 6.4–8.2)

## 2020-06-12 LAB — CCP IGA+IGG SERPL IA-ACNC: 9 UNITS (ref 0–19)

## 2020-06-15 ENCOUNTER — APPOINTMENT (OUTPATIENT)
Dept: LAB | Facility: HOSPITAL | Age: 80
End: 2020-06-15
Attending: INTERNAL MEDICINE
Payer: MEDICARE

## 2020-06-15 DIAGNOSIS — E78.5 HYPERLIPIDEMIA, UNSPECIFIED HYPERLIPIDEMIA TYPE: ICD-10-CM

## 2020-06-15 DIAGNOSIS — E55.9 VITAMIN D DEFICIENCY: ICD-10-CM

## 2020-06-15 DIAGNOSIS — E03.9 HYPOTHYROIDISM, UNSPECIFIED TYPE: Chronic | ICD-10-CM

## 2020-06-15 DIAGNOSIS — I82.462 ACUTE DEEP VEIN THROMBOSIS (DVT) OF CALF MUSCLE VEIN OF LEFT LOWER EXTREMITY (HCC): ICD-10-CM

## 2020-06-15 DIAGNOSIS — I10 HYPERTENSION, UNSPECIFIED TYPE: Chronic | ICD-10-CM

## 2020-06-15 DIAGNOSIS — E79.0 HYPERURICEMIA: ICD-10-CM

## 2020-06-15 LAB
ALBUMIN SERPL BCP-MCNC: 3.7 G/DL (ref 3.5–5)
ALP SERPL-CCNC: 60 U/L (ref 46–116)
ALT SERPL W P-5'-P-CCNC: 22 U/L (ref 12–78)
ANION GAP SERPL CALCULATED.3IONS-SCNC: 8 MMOL/L (ref 4–13)
AST SERPL W P-5'-P-CCNC: 19 U/L (ref 5–45)
BASOPHILS # BLD AUTO: 0.06 THOUSANDS/ΜL (ref 0–0.1)
BASOPHILS NFR BLD AUTO: 1 % (ref 0–1)
BILIRUB SERPL-MCNC: 0.6 MG/DL (ref 0.2–1)
BUN SERPL-MCNC: 25 MG/DL (ref 5–25)
CALCIUM SERPL-MCNC: 8.9 MG/DL (ref 8.3–10.1)
CHLORIDE SERPL-SCNC: 106 MMOL/L (ref 100–108)
CHOLEST SERPL-MCNC: 169 MG/DL (ref 50–200)
CO2 SERPL-SCNC: 29 MMOL/L (ref 21–32)
CREAT SERPL-MCNC: 1.27 MG/DL (ref 0.6–1.3)
CRP SERPL QL: 13.5 MG/L
EOSINOPHIL # BLD AUTO: 0.25 THOUSAND/ΜL (ref 0–0.61)
EOSINOPHIL NFR BLD AUTO: 3 % (ref 0–6)
ERYTHROCYTE [DISTWIDTH] IN BLOOD BY AUTOMATED COUNT: 15.5 % (ref 11.6–15.1)
ERYTHROCYTE [SEDIMENTATION RATE] IN BLOOD: 23 MM/HOUR (ref 2–25)
GFR SERPL CREATININE-BSD FRML MDRD: 41 ML/MIN/1.73SQ M
GLUCOSE P FAST SERPL-MCNC: 92 MG/DL (ref 65–99)
HCT VFR BLD AUTO: 35.5 % (ref 34.8–46.1)
HDLC SERPL-MCNC: 61 MG/DL
HGB BLD-MCNC: 11 G/DL (ref 11.5–15.4)
IMM GRANULOCYTES # BLD AUTO: 0.03 THOUSAND/UL (ref 0–0.2)
IMM GRANULOCYTES NFR BLD AUTO: 0 % (ref 0–2)
LDLC SERPL CALC-MCNC: 94 MG/DL (ref 0–100)
LDLC SERPL DIRECT ASSAY-MCNC: 97 MG/DL (ref 0–100)
LYMPHOCYTES # BLD AUTO: 1.43 THOUSANDS/ΜL (ref 0.6–4.47)
LYMPHOCYTES NFR BLD AUTO: 19 % (ref 14–44)
MCH RBC QN AUTO: 32.6 PG (ref 26.8–34.3)
MCHC RBC AUTO-ENTMCNC: 31 G/DL (ref 31.4–37.4)
MCV RBC AUTO: 105 FL (ref 82–98)
MONOCYTES # BLD AUTO: 0.73 THOUSAND/ΜL (ref 0.17–1.22)
MONOCYTES NFR BLD AUTO: 10 % (ref 4–12)
NEUTROPHILS # BLD AUTO: 5.12 THOUSANDS/ΜL (ref 1.85–7.62)
NEUTS SEG NFR BLD AUTO: 67 % (ref 43–75)
NRBC BLD AUTO-RTO: 0 /100 WBCS
PLATELET # BLD AUTO: 220 THOUSANDS/UL (ref 149–390)
PMV BLD AUTO: 9.9 FL (ref 8.9–12.7)
POTASSIUM SERPL-SCNC: 3.7 MMOL/L (ref 3.5–5.3)
PROT SERPL-MCNC: 7.1 G/DL (ref 6.4–8.2)
RBC # BLD AUTO: 3.37 MILLION/UL (ref 3.81–5.12)
SODIUM SERPL-SCNC: 143 MMOL/L (ref 136–145)
TRIGL SERPL-MCNC: 70 MG/DL
WBC # BLD AUTO: 7.62 THOUSAND/UL (ref 4.31–10.16)

## 2020-06-15 PROCEDURE — 83721 ASSAY OF BLOOD LIPOPROTEIN: CPT

## 2020-06-15 PROCEDURE — 80053 COMPREHEN METABOLIC PANEL: CPT

## 2020-06-15 PROCEDURE — 85025 COMPLETE CBC W/AUTO DIFF WBC: CPT

## 2020-06-15 PROCEDURE — 86140 C-REACTIVE PROTEIN: CPT

## 2020-06-15 PROCEDURE — 36415 COLL VENOUS BLD VENIPUNCTURE: CPT

## 2020-06-15 PROCEDURE — 80061 LIPID PANEL: CPT

## 2020-06-15 PROCEDURE — 85652 RBC SED RATE AUTOMATED: CPT

## 2020-06-17 PROBLEM — H35.3132 INTERMEDIATE STAGE NONEXUDATIVE AGE-RELATED MACULAR DEGENERATION OF BOTH EYES: Status: ACTIVE | Noted: 2019-06-05

## 2020-06-25 RX ORDER — SODIUM CHLORIDE 9 MG/ML
20 INJECTION, SOLUTION INTRAVENOUS CONTINUOUS
Status: DISCONTINUED | OUTPATIENT
Start: 2020-06-26 | End: 2020-06-29 | Stop reason: HOSPADM

## 2020-06-25 RX ORDER — METHYLPREDNISOLONE SODIUM SUCCINATE 125 MG/2ML
100 INJECTION, POWDER, LYOPHILIZED, FOR SOLUTION INTRAMUSCULAR; INTRAVENOUS ONCE
Status: COMPLETED | OUTPATIENT
Start: 2020-06-26 | End: 2020-06-26

## 2020-06-25 RX ORDER — ACETAMINOPHEN 325 MG/1
975 TABLET ORAL ONCE
Status: COMPLETED | OUTPATIENT
Start: 2020-06-26 | End: 2020-06-26

## 2020-06-25 RX ORDER — DIPHENHYDRAMINE HCL 25 MG
50 TABLET ORAL ONCE
Status: COMPLETED | OUTPATIENT
Start: 2020-06-26 | End: 2020-06-26

## 2020-06-26 ENCOUNTER — HOSPITAL ENCOUNTER (OUTPATIENT)
Dept: INFUSION CENTER | Facility: CLINIC | Age: 80
Discharge: HOME | End: 2020-06-26
Payer: MEDICARE

## 2020-06-26 VITALS
TEMPERATURE: 97.7 F | DIASTOLIC BLOOD PRESSURE: 64 MMHG | BODY MASS INDEX: 33.16 KG/M2 | HEART RATE: 67 BPM | SYSTOLIC BLOOD PRESSURE: 151 MMHG | WEIGHT: 199.3 LBS | RESPIRATION RATE: 20 BRPM

## 2020-06-26 PROCEDURE — 96375 TX/PRO/DX INJ NEW DRUG ADDON: CPT

## 2020-06-26 PROCEDURE — 96413 CHEMO IV INFUSION 1 HR: CPT

## 2020-06-26 PROCEDURE — 96415 CHEMO IV INFUSION ADDL HR: CPT

## 2020-06-26 RX ADMIN — METHYLPREDNISOLONE SODIUM SUCCINATE 100 MG: 125 INJECTION, POWDER, FOR SOLUTION INTRAMUSCULAR; INTRAVENOUS at 09:48

## 2020-06-26 RX ADMIN — ACETAMINOPHEN 975 MG: 325 TABLET ORAL at 09:47

## 2020-06-26 RX ADMIN — DIPHENHYDRAMINE HCL 50 MG: 25 TABLET, COATED ORAL at 09:47

## 2020-06-26 RX ADMIN — RITUXIMAB 1000 MG: 10 INJECTION, SOLUTION INTRAVENOUS at 10:43

## 2020-06-26 RX ADMIN — SODIUM CHLORIDE 20 ML/HR: 0.9 INJECTION, SOLUTION INTRAVENOUS at 09:46

## 2020-06-26 NOTE — PLAN OF CARE
Problem: Potential for Falls  Goal: Patient will remain free of falls  Description  INTERVENTIONS:  - Assess patient frequently for physical needs  -  Identify cognitive and physical deficits and behaviors that affect risk of falls  -  [place] fall precautions as indicated by assessment   - Educate patient/family on patient safety including physical limitations  - Instruct patient to call for assistance with activity based on assessment  - Modify environment to reduce risk of injury  - Consider OT/PT consult to assist with strengthening/mobility  Outcome: Progressing     Problem: Knowledge Deficit  Goal: Patient/family/caregiver demonstrates understanding of disease process, treatment plan, medications, and discharge instructions  Description  Complete learning assessment and assess knowledge base    Interventions:  - Provide teaching at level of understanding  - Provide teaching via preferred learning methods  Outcome: Progressing

## 2020-06-26 NOTE — PROGRESS NOTES
Pt arrived for day 1 of Rituxan infusion offering no complaints  Denies any s/s of infection or abx use  Pt tolerated infusion well without incident and was discharged in stable condition  Pt aware of next infusion appointment on 7/10 and AVS provided

## 2020-06-29 ENCOUNTER — OFFICE VISIT (OUTPATIENT)
Dept: INTERNAL MEDICINE CLINIC | Facility: CLINIC | Age: 80
End: 2020-06-29
Payer: MEDICARE

## 2020-06-29 ENCOUNTER — APPOINTMENT (OUTPATIENT)
Dept: LAB | Facility: HOSPITAL | Age: 80
End: 2020-06-29
Payer: MEDICARE

## 2020-06-29 VITALS
WEIGHT: 203 LBS | DIASTOLIC BLOOD PRESSURE: 62 MMHG | HEART RATE: 57 BPM | SYSTOLIC BLOOD PRESSURE: 138 MMHG | BODY MASS INDEX: 33.82 KG/M2 | RESPIRATION RATE: 16 BRPM | OXYGEN SATURATION: 97 % | TEMPERATURE: 99 F | HEIGHT: 65 IN

## 2020-06-29 DIAGNOSIS — I10 HYPERTENSION, UNSPECIFIED TYPE: Chronic | ICD-10-CM

## 2020-06-29 DIAGNOSIS — E55.9 VITAMIN D DEFICIENCY: ICD-10-CM

## 2020-06-29 DIAGNOSIS — E03.9 HYPOTHYROIDISM, UNSPECIFIED TYPE: Chronic | ICD-10-CM

## 2020-06-29 DIAGNOSIS — E78.5 HYPERLIPIDEMIA, UNSPECIFIED HYPERLIPIDEMIA TYPE: ICD-10-CM

## 2020-06-29 DIAGNOSIS — Z00.00 MEDICARE ANNUAL WELLNESS VISIT, SUBSEQUENT: ICD-10-CM

## 2020-06-29 DIAGNOSIS — R79.9 ABNORMAL BLOOD CHEMISTRY: ICD-10-CM

## 2020-06-29 DIAGNOSIS — M85.80 OSTEOPENIA, UNSPECIFIED LOCATION: ICD-10-CM

## 2020-06-29 DIAGNOSIS — Z12.11 ENCOUNTER FOR FIT (FECAL IMMUNOCHEMICAL TEST) SCREENING: Primary | ICD-10-CM

## 2020-06-29 DIAGNOSIS — M06.9 RHEUMATOID ARTHRITIS INVOLVING LEFT ANKLE, UNSPECIFIED RHEUMATOID FACTOR PRESENCE: Chronic | ICD-10-CM

## 2020-06-29 DIAGNOSIS — Z12.11 ENCOUNTER FOR FIT (FECAL IMMUNOCHEMICAL TEST) SCREENING: ICD-10-CM

## 2020-06-29 LAB — TSH SERPL DL<=0.05 MIU/L-ACNC: 3.68 UIU/ML (ref 0.36–3.74)

## 2020-06-29 PROCEDURE — 3075F SYST BP GE 130 - 139MM HG: CPT | Performed by: NURSE PRACTITIONER

## 2020-06-29 PROCEDURE — 1125F AMNT PAIN NOTED PAIN PRSNT: CPT | Performed by: NURSE PRACTITIONER

## 2020-06-29 PROCEDURE — 1036F TOBACCO NON-USER: CPT | Performed by: NURSE PRACTITIONER

## 2020-06-29 PROCEDURE — 84443 ASSAY THYROID STIM HORMONE: CPT

## 2020-06-29 PROCEDURE — 3078F DIAST BP <80 MM HG: CPT | Performed by: NURSE PRACTITIONER

## 2020-06-29 PROCEDURE — 1123F ACP DISCUSS/DSCN MKR DOCD: CPT | Performed by: NURSE PRACTITIONER

## 2020-06-29 PROCEDURE — 3008F BODY MASS INDEX DOCD: CPT | Performed by: NURSE PRACTITIONER

## 2020-06-29 PROCEDURE — 4040F PNEUMOC VAC/ADMIN/RCVD: CPT | Performed by: NURSE PRACTITIONER

## 2020-06-29 PROCEDURE — G0438 PPPS, INITIAL VISIT: HCPCS | Performed by: NURSE PRACTITIONER

## 2020-06-29 PROCEDURE — 1170F FXNL STATUS ASSESSED: CPT | Performed by: NURSE PRACTITIONER

## 2020-06-29 PROCEDURE — 1160F RVW MEDS BY RX/DR IN RCRD: CPT | Performed by: NURSE PRACTITIONER

## 2020-06-29 PROCEDURE — 99214 OFFICE O/P EST MOD 30 MIN: CPT | Performed by: NURSE PRACTITIONER

## 2020-07-06 ENCOUNTER — TELEPHONE (OUTPATIENT)
Dept: INTERNAL MEDICINE CLINIC | Facility: CLINIC | Age: 80
End: 2020-07-06

## 2020-07-06 NOTE — TELEPHONE ENCOUNTER
----- Message from [facility], [address] sent at 7/6/2020 10:03 AM EDT -----  Please call   TSH wnl thanks

## 2020-07-09 RX ORDER — METHYLPREDNISOLONE SODIUM SUCCINATE 125 MG/2ML
100 INJECTION, POWDER, LYOPHILIZED, FOR SOLUTION INTRAMUSCULAR; INTRAVENOUS ONCE
Status: COMPLETED | OUTPATIENT
Start: 2020-07-10 | End: 2020-07-10

## 2020-07-09 RX ORDER — ACETAMINOPHEN 325 MG/1
975 TABLET ORAL ONCE
Status: COMPLETED | OUTPATIENT
Start: 2020-07-10 | End: 2020-07-10

## 2020-07-09 RX ORDER — DIPHENHYDRAMINE HCL 25 MG
50 TABLET ORAL ONCE
Status: COMPLETED | OUTPATIENT
Start: 2020-07-10 | End: 2020-07-10

## 2020-07-09 RX ORDER — SODIUM CHLORIDE 9 MG/ML
20 INJECTION, SOLUTION INTRAVENOUS CONTINUOUS
Status: DISCONTINUED | OUTPATIENT
Start: 2020-07-10 | End: 2020-07-13 | Stop reason: HOSPADM

## 2020-07-10 ENCOUNTER — HOSPITAL ENCOUNTER (OUTPATIENT)
Dept: INFUSION CENTER | Facility: CLINIC | Age: 80
Discharge: HOME | End: 2020-07-10
Payer: MEDICARE

## 2020-07-10 VITALS
RESPIRATION RATE: 20 BRPM | TEMPERATURE: 97.5 F | OXYGEN SATURATION: 99 % | HEART RATE: 70 BPM | DIASTOLIC BLOOD PRESSURE: 64 MMHG | SYSTOLIC BLOOD PRESSURE: 146 MMHG

## 2020-07-10 PROCEDURE — 96375 TX/PRO/DX INJ NEW DRUG ADDON: CPT

## 2020-07-10 PROCEDURE — 96415 CHEMO IV INFUSION ADDL HR: CPT

## 2020-07-10 PROCEDURE — 96413 CHEMO IV INFUSION 1 HR: CPT

## 2020-07-10 RX ADMIN — ACETAMINOPHEN 975 MG: 325 TABLET, FILM COATED ORAL at 09:53

## 2020-07-10 RX ADMIN — SODIUM CHLORIDE 20 ML/HR: 0.9 INJECTION, SOLUTION INTRAVENOUS at 09:56

## 2020-07-10 RX ADMIN — DIPHENHYDRAMINE HCL 50 MG: 25 TABLET, COATED ORAL at 09:51

## 2020-07-10 RX ADMIN — METHYLPREDNISOLONE SODIUM SUCCINATE 100 MG: 125 INJECTION, POWDER, FOR SOLUTION INTRAMUSCULAR; INTRAVENOUS at 09:53

## 2020-07-10 RX ADMIN — RITUXIMAB 1000 MG: 10 INJECTION, SOLUTION INTRAVENOUS at 10:39

## 2020-07-10 NOTE — PROGRESS NOTES
Pt tolerated infusion well, piv removed, schedule given, pt is aware of future apts , pt d c in stable condition

## 2020-07-24 DIAGNOSIS — R79.9 ABNORMAL BLOOD CHEMISTRY: ICD-10-CM

## 2020-07-24 DIAGNOSIS — E78.5 HYPERLIPIDEMIA, UNSPECIFIED HYPERLIPIDEMIA TYPE: ICD-10-CM

## 2020-07-24 DIAGNOSIS — I10 HYPERTENSION, UNSPECIFIED TYPE: Chronic | ICD-10-CM

## 2020-07-24 DIAGNOSIS — E83.42 HYPOMAGNESEMIA: ICD-10-CM

## 2020-07-24 DIAGNOSIS — E03.9 HYPOTHYROIDISM, UNSPECIFIED TYPE: Chronic | ICD-10-CM

## 2020-07-24 RX ORDER — LEVOTHYROXINE SODIUM 0.07 MG/1
TABLET ORAL
Qty: 45 TABLET | Refills: 3 | Status: SHIPPED | OUTPATIENT
Start: 2020-07-24 | End: 2021-01-18 | Stop reason: SDUPTHER

## 2020-07-24 RX ORDER — LEVOTHYROXINE SODIUM 0.05 MG/1
50 TABLET ORAL EVERY OTHER DAY
Qty: 45 TABLET | Refills: 3 | Status: SHIPPED | OUTPATIENT
Start: 2020-07-24 | End: 2021-10-04

## 2020-08-01 DIAGNOSIS — I10 HYPERTENSION, UNSPECIFIED TYPE: Chronic | ICD-10-CM

## 2020-08-03 RX ORDER — CARVEDILOL 25 MG/1
TABLET ORAL
Qty: 180 TABLET | Refills: 2 | Status: SHIPPED | OUTPATIENT
Start: 2020-08-03 | End: 2021-01-18 | Stop reason: SDUPTHER

## 2020-09-22 ENCOUNTER — CLINICAL SUPPORT (OUTPATIENT)
Dept: INTERNAL MEDICINE CLINIC | Facility: CLINIC | Age: 80
End: 2020-09-22
Payer: MEDICARE

## 2020-09-22 DIAGNOSIS — Z23 ENCOUNTER FOR IMMUNIZATION: ICD-10-CM

## 2020-09-22 PROCEDURE — 90662 IIV NO PRSV INCREASED AG IM: CPT

## 2020-09-22 PROCEDURE — G0008 ADMIN INFLUENZA VIRUS VAC: HCPCS

## 2020-10-27 ENCOUNTER — TRANSCRIBE ORDERS (OUTPATIENT)
Dept: ADMINISTRATIVE | Facility: HOSPITAL | Age: 80
End: 2020-10-27

## 2020-10-27 ENCOUNTER — LAB (OUTPATIENT)
Dept: LAB | Facility: HOSPITAL | Age: 80
End: 2020-10-27
Attending: INTERNAL MEDICINE
Payer: MEDICARE

## 2020-10-27 DIAGNOSIS — M05.79 SEROPOSITIVE RHEUMATOID ARTHRITIS OF MULTIPLE SITES (HCC): Primary | ICD-10-CM

## 2020-10-27 LAB
ALBUMIN SERPL BCP-MCNC: 3.7 G/DL (ref 3.5–5)
ALP SERPL-CCNC: 66 U/L (ref 46–116)
ALT SERPL W P-5'-P-CCNC: 30 U/L (ref 12–78)
AST SERPL W P-5'-P-CCNC: 25 U/L (ref 5–45)
BASOPHILS # BLD AUTO: 0.05 THOUSANDS/ΜL (ref 0–0.1)
BASOPHILS NFR BLD AUTO: 1 % (ref 0–1)
BILIRUB DIRECT SERPL-MCNC: 0.16 MG/DL (ref 0–0.2)
BILIRUB SERPL-MCNC: 0.7 MG/DL (ref 0.2–1)
CREAT SERPL-MCNC: 1.31 MG/DL (ref 0.6–1.3)
CRP SERPL QL: 8.8 MG/L
EOSINOPHIL # BLD AUTO: 0.26 THOUSAND/ΜL (ref 0–0.61)
EOSINOPHIL NFR BLD AUTO: 7 % (ref 0–6)
ERYTHROCYTE [DISTWIDTH] IN BLOOD BY AUTOMATED COUNT: 16 % (ref 11.6–15.1)
ERYTHROCYTE [SEDIMENTATION RATE] IN BLOOD: 18 MM/HOUR (ref 0–29)
GFR SERPL CREATININE-BSD FRML MDRD: 39 ML/MIN/1.73SQ M
HCT VFR BLD AUTO: 34.6 % (ref 34.8–46.1)
HGB BLD-MCNC: 10.9 G/DL (ref 11.5–15.4)
IMM GRANULOCYTES # BLD AUTO: 0.01 THOUSAND/UL (ref 0–0.2)
IMM GRANULOCYTES NFR BLD AUTO: 0 % (ref 0–2)
LYMPHOCYTES # BLD AUTO: 0.83 THOUSANDS/ΜL (ref 0.6–4.47)
LYMPHOCYTES NFR BLD AUTO: 22 % (ref 14–44)
MCH RBC QN AUTO: 32.6 PG (ref 26.8–34.3)
MCHC RBC AUTO-ENTMCNC: 31.5 G/DL (ref 31.4–37.4)
MCV RBC AUTO: 104 FL (ref 82–98)
MONOCYTES # BLD AUTO: 0.36 THOUSAND/ΜL (ref 0.17–1.22)
MONOCYTES NFR BLD AUTO: 9 % (ref 4–12)
NEUTROPHILS # BLD AUTO: 2.33 THOUSANDS/ΜL (ref 1.85–7.62)
NEUTS SEG NFR BLD AUTO: 61 % (ref 43–75)
NRBC BLD AUTO-RTO: 0 /100 WBCS
PLATELET # BLD AUTO: 204 THOUSANDS/UL (ref 149–390)
PMV BLD AUTO: 10 FL (ref 8.9–12.7)
PROT SERPL-MCNC: 7.1 G/DL (ref 6.4–8.2)
RBC # BLD AUTO: 3.34 MILLION/UL (ref 3.81–5.12)
WBC # BLD AUTO: 3.84 THOUSAND/UL (ref 4.31–10.16)

## 2020-10-27 PROCEDURE — 80076 HEPATIC FUNCTION PANEL: CPT

## 2020-10-27 PROCEDURE — 82565 ASSAY OF CREATININE: CPT

## 2020-10-27 PROCEDURE — 86140 C-REACTIVE PROTEIN: CPT

## 2020-10-27 PROCEDURE — 85652 RBC SED RATE AUTOMATED: CPT

## 2020-10-27 PROCEDURE — 85025 COMPLETE CBC W/AUTO DIFF WBC: CPT

## 2020-10-27 PROCEDURE — 36415 COLL VENOUS BLD VENIPUNCTURE: CPT

## 2020-11-27 ENCOUNTER — HOSPITAL ENCOUNTER (OUTPATIENT)
Dept: INFUSION CENTER | Facility: CLINIC | Age: 80
Discharge: HOME | End: 2020-11-27

## 2020-12-10 RX ORDER — SODIUM CHLORIDE 9 MG/ML
20 INJECTION, SOLUTION INTRAVENOUS CONTINUOUS
Status: DISCONTINUED | OUTPATIENT
Start: 2020-12-11 | End: 2020-12-14 | Stop reason: HOSPADM

## 2020-12-10 RX ORDER — METHYLPREDNISOLONE SODIUM SUCCINATE 125 MG/2ML
100 INJECTION, POWDER, LYOPHILIZED, FOR SOLUTION INTRAMUSCULAR; INTRAVENOUS ONCE
Status: COMPLETED | OUTPATIENT
Start: 2020-12-11 | End: 2020-12-11

## 2020-12-10 RX ORDER — ACETAMINOPHEN 325 MG/1
975 TABLET ORAL ONCE
Status: COMPLETED | OUTPATIENT
Start: 2020-12-11 | End: 2020-12-11

## 2020-12-10 RX ORDER — DIPHENHYDRAMINE HCL 25 MG
50 TABLET ORAL ONCE
Status: COMPLETED | OUTPATIENT
Start: 2020-12-11 | End: 2020-12-11

## 2020-12-11 ENCOUNTER — HOSPITAL ENCOUNTER (OUTPATIENT)
Dept: INFUSION CENTER | Facility: CLINIC | Age: 80
Discharge: HOME | End: 2020-12-11
Payer: MEDICARE

## 2020-12-11 VITALS
SYSTOLIC BLOOD PRESSURE: 139 MMHG | TEMPERATURE: 96.8 F | DIASTOLIC BLOOD PRESSURE: 63 MMHG | HEART RATE: 66 BPM | RESPIRATION RATE: 18 BRPM

## 2020-12-11 PROCEDURE — 96375 TX/PRO/DX INJ NEW DRUG ADDON: CPT

## 2020-12-11 PROCEDURE — 96413 CHEMO IV INFUSION 1 HR: CPT

## 2020-12-11 PROCEDURE — 96415 CHEMO IV INFUSION ADDL HR: CPT

## 2020-12-11 RX ADMIN — METHYLPREDNISOLONE SODIUM SUCCINATE 100 MG: 125 INJECTION, POWDER, FOR SOLUTION INTRAMUSCULAR; INTRAVENOUS at 09:41

## 2020-12-11 RX ADMIN — RITUXIMAB 1000 MG: 10 INJECTION, SOLUTION INTRAVENOUS at 10:28

## 2020-12-11 RX ADMIN — SODIUM CHLORIDE 20 ML/HR: 0.9 INJECTION, SOLUTION INTRAVENOUS at 09:41

## 2020-12-11 RX ADMIN — DIPHENHYDRAMINE HYDROCHLORIDE 50 MG: 25 TABLET ORAL at 09:42

## 2020-12-11 RX ADMIN — ACETAMINOPHEN 975 MG: 325 TABLET, FILM COATED ORAL at 09:42

## 2020-12-18 DIAGNOSIS — R60.0 LOCALIZED EDEMA: ICD-10-CM

## 2020-12-18 DIAGNOSIS — I10 HYPERTENSION, UNSPECIFIED TYPE: Chronic | ICD-10-CM

## 2020-12-18 RX ORDER — HYDROCHLOROTHIAZIDE 25 MG/1
TABLET ORAL
Qty: 90 TABLET | Refills: 2 | Status: SHIPPED | OUTPATIENT
Start: 2020-12-18 | End: 2021-09-07

## 2020-12-24 RX ORDER — SODIUM CHLORIDE 9 MG/ML
20 INJECTION, SOLUTION INTRAVENOUS CONTINUOUS
Status: DISCONTINUED | OUTPATIENT
Start: 2020-12-28 | End: 2020-12-31 | Stop reason: HOSPADM

## 2020-12-24 RX ORDER — METHYLPREDNISOLONE SODIUM SUCCINATE 125 MG/2ML
100 INJECTION, POWDER, LYOPHILIZED, FOR SOLUTION INTRAMUSCULAR; INTRAVENOUS ONCE
Status: COMPLETED | OUTPATIENT
Start: 2020-12-28 | End: 2020-12-28

## 2020-12-24 RX ORDER — ACETAMINOPHEN 325 MG/1
975 TABLET ORAL ONCE
Status: COMPLETED | OUTPATIENT
Start: 2020-12-28 | End: 2020-12-28

## 2020-12-24 RX ORDER — DIPHENHYDRAMINE HCL 25 MG
50 TABLET ORAL ONCE
Status: COMPLETED | OUTPATIENT
Start: 2020-12-28 | End: 2020-12-28

## 2020-12-28 ENCOUNTER — HOSPITAL ENCOUNTER (OUTPATIENT)
Dept: INFUSION CENTER | Facility: CLINIC | Age: 80
Discharge: HOME | End: 2020-12-28
Payer: MEDICARE

## 2020-12-28 VITALS
TEMPERATURE: 96.7 F | HEART RATE: 62 BPM | SYSTOLIC BLOOD PRESSURE: 142 MMHG | DIASTOLIC BLOOD PRESSURE: 63 MMHG | RESPIRATION RATE: 18 BRPM

## 2020-12-28 PROCEDURE — 96415 CHEMO IV INFUSION ADDL HR: CPT

## 2020-12-28 PROCEDURE — 96375 TX/PRO/DX INJ NEW DRUG ADDON: CPT

## 2020-12-28 PROCEDURE — 96413 CHEMO IV INFUSION 1 HR: CPT

## 2020-12-28 RX ADMIN — DIPHENHYDRAMINE HYDROCHLORIDE 50 MG: 25 TABLET ORAL at 09:50

## 2020-12-28 RX ADMIN — SODIUM CHLORIDE 20 ML/HR: 0.9 INJECTION, SOLUTION INTRAVENOUS at 09:50

## 2020-12-28 RX ADMIN — ACETAMINOPHEN 975 MG: 325 TABLET, FILM COATED ORAL at 09:50

## 2020-12-28 RX ADMIN — RITUXIMAB 1000 MG: 10 INJECTION, SOLUTION INTRAVENOUS at 11:04

## 2020-12-28 RX ADMIN — METHYLPREDNISOLONE SODIUM SUCCINATE 100 MG: 125 INJECTION, POWDER, FOR SOLUTION INTRAMUSCULAR; INTRAVENOUS at 09:50

## 2021-01-06 DIAGNOSIS — M06.9: ICD-10-CM

## 2021-01-06 RX ORDER — FOLIC ACID 1 MG/1
TABLET ORAL
Qty: 90 TABLET | Refills: 2 | Status: SHIPPED | OUTPATIENT
Start: 2021-01-06

## 2021-01-06 NOTE — TELEPHONE ENCOUNTER
Patient left message  is requesting a refill on folic acid 1 mg tablet, take 1 daily  Did return her call and left a message   90 day supply    [facility]/[place]    Call back #[phone]

## 2021-01-13 ENCOUNTER — LAB (OUTPATIENT)
Dept: LAB | Facility: HOSPITAL | Age: 81
End: 2021-01-13
Payer: MEDICARE

## 2021-01-13 DIAGNOSIS — E03.9 HYPOTHYROIDISM, UNSPECIFIED TYPE: Chronic | ICD-10-CM

## 2021-01-13 DIAGNOSIS — E78.5 HYPERLIPIDEMIA, UNSPECIFIED HYPERLIPIDEMIA TYPE: ICD-10-CM

## 2021-01-13 DIAGNOSIS — Z12.11 ENCOUNTER FOR FIT (FECAL IMMUNOCHEMICAL TEST) SCREENING: ICD-10-CM

## 2021-01-13 DIAGNOSIS — Z00.00 MEDICARE ANNUAL WELLNESS VISIT, SUBSEQUENT: ICD-10-CM

## 2021-01-13 DIAGNOSIS — R79.9 ABNORMAL BLOOD CHEMISTRY: ICD-10-CM

## 2021-01-13 DIAGNOSIS — E55.9 VITAMIN D DEFICIENCY: ICD-10-CM

## 2021-01-13 LAB — TSH SERPL DL<=0.05 MIU/L-ACNC: 2.66 UIU/ML (ref 0.36–3.74)

## 2021-01-13 PROCEDURE — 36415 COLL VENOUS BLD VENIPUNCTURE: CPT

## 2021-01-13 PROCEDURE — 84443 ASSAY THYROID STIM HORMONE: CPT

## 2021-01-18 ENCOUNTER — OFFICE VISIT (OUTPATIENT)
Dept: INTERNAL MEDICINE CLINIC | Facility: CLINIC | Age: 81
End: 2021-01-18
Payer: MEDICARE

## 2021-01-18 VITALS
HEART RATE: 69 BPM | BODY MASS INDEX: 32.82 KG/M2 | OXYGEN SATURATION: 98 % | WEIGHT: 197 LBS | HEIGHT: 65 IN | DIASTOLIC BLOOD PRESSURE: 70 MMHG | SYSTOLIC BLOOD PRESSURE: 140 MMHG | TEMPERATURE: 97.9 F

## 2021-01-18 DIAGNOSIS — M06.9 RHEUMATOID ARTHRITIS OF LEFT ANKLE, UNSPECIFIED WHETHER RHEUMATOID FACTOR PRESENT (HCC): Chronic | ICD-10-CM

## 2021-01-18 DIAGNOSIS — R79.9 ABNORMAL BLOOD CHEMISTRY: ICD-10-CM

## 2021-01-18 DIAGNOSIS — E83.42 HYPOMAGNESEMIA: ICD-10-CM

## 2021-01-18 DIAGNOSIS — E78.5 HYPERLIPIDEMIA, UNSPECIFIED HYPERLIPIDEMIA TYPE: ICD-10-CM

## 2021-01-18 DIAGNOSIS — R60.0 LOCALIZED EDEMA: ICD-10-CM

## 2021-01-18 DIAGNOSIS — E03.9 HYPOTHYROIDISM, UNSPECIFIED TYPE: Primary | Chronic | ICD-10-CM

## 2021-01-18 DIAGNOSIS — I82.5Z2 LOWER LEG DVT (DEEP VENOUS THROMBOEMBOLISM), CHRONIC, LEFT (HCC): ICD-10-CM

## 2021-01-18 DIAGNOSIS — M85.80 OSTEOPENIA, UNSPECIFIED LOCATION: ICD-10-CM

## 2021-01-18 DIAGNOSIS — C50.919 ADENOCARCINOMA OF BREAST, UNSPECIFIED LATERALITY (HCC): ICD-10-CM

## 2021-01-18 DIAGNOSIS — I10 HYPERTENSION, UNSPECIFIED TYPE: Chronic | ICD-10-CM

## 2021-01-18 PROBLEM — M25.572 LEFT ANKLE PAIN: Status: RESOLVED | Noted: 2018-02-05 | Resolved: 2021-01-18

## 2021-01-18 PROBLEM — M25.552 LEFT HIP PAIN: Status: RESOLVED | Noted: 2018-01-03 | Resolved: 2021-01-18

## 2021-01-18 PROCEDURE — 99214 OFFICE O/P EST MOD 30 MIN: CPT | Performed by: NURSE PRACTITIONER

## 2021-01-18 RX ORDER — LEVOTHYROXINE SODIUM 0.07 MG/1
TABLET ORAL
Qty: 45 TABLET | Refills: 3 | Status: SHIPPED | OUTPATIENT
Start: 2021-01-18 | End: 2022-01-18 | Stop reason: SDUPTHER

## 2021-01-18 RX ORDER — POTASSIUM CHLORIDE 750 MG/1
20 TABLET, EXTENDED RELEASE ORAL 2 TIMES DAILY
Qty: 180 TABLET | Refills: 2 | Status: SHIPPED | OUTPATIENT
Start: 2021-01-18 | End: 2022-01-18 | Stop reason: SDUPTHER

## 2021-01-18 RX ORDER — CARVEDILOL 25 MG/1
25 TABLET ORAL 2 TIMES DAILY WITH MEALS
Qty: 180 TABLET | Refills: 2 | Status: SHIPPED | OUTPATIENT
Start: 2021-01-18 | End: 2021-10-04

## 2021-01-18 NOTE — PROGRESS NOTES
BMI Counseling: Body mass index is 32 78 kg/m²  The BMI is above normal  Nutrition recommendations include decreasing portion sizes, encouraging healthy choices of fruits and vegetables, decreasing fast food intake, consuming healthier snacks, limiting drinks that contain sugar, moderation in carbohydrate intake, increasing intake of lean protein, reducing intake of saturated and trans fat and reducing intake of cholesterol  Exercise recommendations include exercising 3-5 times per week  No pharmacotherapy was ordered  Patient referred to PCP due to patient being overweight  Assessment/Plan:    1  Hypertension- at goal  Continue present medication  2  Hyperlipidemia- Due for lipid panel  Continue Rosuvastatin  3  Hypothyroidism- TSH at goal  Continue present doses of Levothyroxine, alternating 75 mcg/50 mcg  4  Rheumatoid arthritis- Followed by rheumatology, Dr Virgen Yu   5  Breast cancer- Followed by Dr Saqib Matthew     Please have your blood work done and we will call you with results  Follow up in six months  Diagnoses and all orders for this visit:    Hypothyroidism, unspecified type  -     CBC and differential; Future  -     Comprehensive metabolic panel; Future  -     Lipid panel; Future  -     LDL cholesterol, direct; Future  -     levothyroxine 75 mcg tablet; Take 1 tab po every other day, alternating with 50 mcg    Hypertension, unspecified type  -     CBC and differential; Future  -     Comprehensive metabolic panel; Future  -     Lipid panel; Future  -     LDL cholesterol, direct; Future  -     carvedilol (COREG) 25 mg tablet; Take 1 tablet (25 mg total) by mouth 2 (two) times a day with meals  -     potassium chloride (Klor-Con M10) 10 mEq tablet; Take 2 tablets (20 mEq total) by mouth 2 (two) times a day  -     levothyroxine 75 mcg tablet;  Take 1 tab po every other day, alternating with 50 mcg    Rheumatoid arthritis of left ankle, unspecified whether rheumatoid factor present (HCC)  -     CBC and differential; Future  -     Comprehensive metabolic panel; Future  -     Lipid panel; Future  -     LDL cholesterol, direct; Future    Osteopenia, unspecified location  -     CBC and differential; Future  -     Comprehensive metabolic panel; Future  -     Lipid panel; Future  -     LDL cholesterol, direct; Future    Hyperlipidemia, unspecified hyperlipidemia type  -     CBC and differential; Future  -     Comprehensive metabolic panel; Future  -     Lipid panel; Future  -     LDL cholesterol, direct; Future  -     levothyroxine 75 mcg tablet; Take 1 tab po every other day, alternating with 50 mcg    Localized edema  -     CBC and differential; Future  -     Comprehensive metabolic panel; Future  -     Lipid panel; Future  -     LDL cholesterol, direct; Future    Abnormal blood chemistry  -     CBC and differential; Future  -     Comprehensive metabolic panel; Future  -     Lipid panel; Future  -     LDL cholesterol, direct; Future  -     levothyroxine 75 mcg tablet; Take 1 tab po every other day, alternating with 50 mcg    Hypomagnesemia  -     CBC and differential; Future  -     Comprehensive metabolic panel; Future  -     Lipid panel; Future  -     LDL cholesterol, direct; Future  -     levothyroxine 75 mcg tablet; Take 1 tab po every other day, alternating with 50 mcg    Lower leg DVT (deep venous thromboembolism), chronic, left (HCC)  -     CBC and differential; Future  -     Comprehensive metabolic panel; Future  -     Lipid panel; Future  -     LDL cholesterol, direct; Future    Adenocarcinoma of breast, unspecified laterality ([facility] )  -     CBC and differential; Future  -     Comprehensive metabolic panel; Future  -     Lipid panel; Future  -     LDL cholesterol, direct;  Future        The patient was counseled regarding instructions for management, risk factor reductions, patient and family education,impressions, risks and benefits of treatment options, side effects of medications, importance of compliance with treatment  The treatment plan was reviewed with the patient/guardian and patient/guardian understands and agrees with the treatment plan  Current Outpatient Medications:     ascorbic acid (VITAMIN C) 500 mg tablet, Take 500 mg by mouth daily  , Disp: , Rfl:     Calcium Citrate-Vitamin D (CITRACAL + D PO), Take by mouth 2 (two) times a day, Disp: , Rfl:     carvedilol (COREG) 25 mg tablet, Take 1 tablet (25 mg total) by mouth 2 (two) times a day with meals, Disp: 180 tablet, Rfl: 2    Cholecalciferol (VITAMIN D-3 PO), Take 2,000 Units by mouth daily  , Disp: , Rfl:     folic acid (FOLVITE) 1 mg tablet, 1 daily, Disp: 90 tablet, Rfl: 2    hydrochlorothiazide (HYDRODIURIL) 25 mg tablet, TAKE 1 TABLET BY MOUTH EVERY DAY, Disp: 90 tablet, Rfl: 2    latanoprost (XALATAN) 0 005 % ophthalmic solution, , Disp: , Rfl:     levothyroxine (Synthroid) 50 mcg tablet, Take 1 tablet (50 mcg total) by mouth every other day, Disp: 45 tablet, Rfl: 3    levothyroxine 75 mcg tablet, Take 1 tab po every other day, alternating with 50 mcg, Disp: 45 tablet, Rfl: 3    magnesium oxide (MAG-OX) 400 mg, Take 1 tablet (400 mg total) by mouth 2 (two) times a day, Disp: 90 tablet, Rfl: 0    methotrexate 2 5 mg tablet, Take 4 tablets (10 mg total) by mouth once a week (Patient taking differently: Take 15 mg by mouth once a week ), Disp: 12 tablet, Rfl:     Multiple Vitamin (MULTIVITAMIN) tablet, Take 1 tablet by mouth daily  , Disp: , Rfl:     potassium chloride (Klor-Con M10) 10 mEq tablet, Take 2 tablets (20 mEq total) by mouth 2 (two) times a day, Disp: 180 tablet, Rfl: 2    Psyllium (METAMUCIL) WAFR, Take 2 twice a day, Disp: 100 Wafer, Rfl: 5    riTUXimab (RITUXAN) 500 mg/50 mL, Infuse into a venous catheter every 6 (six) months  , Disp: , Rfl:     rosuvastatin (CRESTOR) 5 mg tablet, Take 1 tablet (5 mg total) by mouth daily, Disp: 90 tablet, Rfl: 2    Subjective:      Patient ID: Elizabeth Lopez is a 79 y o  female  Here for follow up  No complaints today  The following portions of the patient's history were reviewed and updated as appropriate:   She has a past medical history of Allergic angioedema, Angioedema, Arthritis, Breast cancer ([facility] ), Disease of thyroid gland, Hemorrhage of anus and rectum, Hyperlipidemia, Hypertension, Hypocalcemia, Neuritis, Peptic ulcer, Rheumatoid arthritis ([facility] ), and Vitamin D deficiency  ,  does not have any pertinent problems on file  ,   has a past surgical history that includes Abdominal surgery; Breast surgery; Cholecystectomy; Elbow surgery; and orthopedic surgery  ,  family history includes Breast cancer in her maternal aunt, mother, and sister; Kidney failure in her father; Other in her father  ,   reports that she has never smoked  She has never used smokeless tobacco  She reports previous alcohol use  She reports that she does not use drugs  ,  is allergic to lisinopril; codeine; other; oxycodone; penicillins; sulfamethoxazole-trimethoprim; and ciprofloxacin       Review of Systems   Constitutional: Negative  Respiratory: Negative  Cardiovascular: Negative  Musculoskeletal: Negative  Psychiatric/Behavioral: Negative  Objective:  /70 (BP Location: Left arm, Patient Position: Sitting, Cuff Size: Large)   Pulse 69   Temp 97 9 °F (36 6 °C) (Temporal)   Ht 5' 5" (1 651 m)   Wt 89 4 kg (197 lb)   LMP  (LMP Unknown)   SpO2 98%   BMI 32 78 kg/m²     Lab Review  Lab on 01/13/2021   Component Date Value    TSH 3RD GENERATON 01/13/2021 2 659       Imaging: No results found  Physical Exam  Constitutional:       Appearance: She is well-developed  Cardiovascular:      Rate and Rhythm: Normal rate and regular rhythm  Heart sounds: Normal heart sounds  Pulmonary:      Effort: Pulmonary effort is normal       Breath sounds: Normal breath sounds  Musculoskeletal:         General: Deformity present     Neurological:      Mental Status: She is alert and oriented to person, place, and time  Deep Tendon Reflexes: Reflexes are normal and symmetric  Psychiatric:         Behavior: Behavior normal          Thought Content:  Thought content normal          Judgment: Judgment normal

## 2021-01-18 NOTE — PATIENT INSTRUCTIONS
1  Hypertension- at goal  Continue present medication  2  Hyperlipidemia- Due for lipid panel  Continue Rosuvastatin  3  Hypothyroidism- TSH at goal  Continue present doses of Levothyroxine, alternating 75 mcg/50 mcg  4  Rheumatoid arthritis- Followed by rheumatology, Dr Kun Betts   5  Breast cancer- Followed by Dr Ana Luisa Momin     Please have your blood work done and we will call you with results  Follow up in six months

## 2021-01-19 ENCOUNTER — APPOINTMENT (OUTPATIENT)
Dept: LAB | Facility: HOSPITAL | Age: 81
End: 2021-01-19
Payer: MEDICARE

## 2021-01-19 DIAGNOSIS — C50.919 ADENOCARCINOMA OF BREAST, UNSPECIFIED LATERALITY (HCC): ICD-10-CM

## 2021-01-19 DIAGNOSIS — I10 HYPERTENSION, UNSPECIFIED TYPE: Chronic | ICD-10-CM

## 2021-01-19 DIAGNOSIS — E83.42 HYPOMAGNESEMIA: ICD-10-CM

## 2021-01-19 DIAGNOSIS — E78.5 HYPERLIPIDEMIA, UNSPECIFIED HYPERLIPIDEMIA TYPE: ICD-10-CM

## 2021-01-19 DIAGNOSIS — M06.9 RHEUMATOID ARTHRITIS OF LEFT ANKLE, UNSPECIFIED WHETHER RHEUMATOID FACTOR PRESENT (HCC): Chronic | ICD-10-CM

## 2021-01-19 DIAGNOSIS — M85.80 OSTEOPENIA, UNSPECIFIED LOCATION: ICD-10-CM

## 2021-01-19 DIAGNOSIS — R79.9 ABNORMAL BLOOD CHEMISTRY: ICD-10-CM

## 2021-01-19 DIAGNOSIS — R60.0 LOCALIZED EDEMA: ICD-10-CM

## 2021-01-19 DIAGNOSIS — E03.9 HYPOTHYROIDISM, UNSPECIFIED TYPE: Chronic | ICD-10-CM

## 2021-01-19 DIAGNOSIS — I82.5Z2 LOWER LEG DVT (DEEP VENOUS THROMBOEMBOLISM), CHRONIC, LEFT (HCC): ICD-10-CM

## 2021-01-19 LAB
ALBUMIN SERPL BCP-MCNC: 3.5 G/DL (ref 3.5–5)
ALP SERPL-CCNC: 67 U/L (ref 46–116)
ALT SERPL W P-5'-P-CCNC: 27 U/L (ref 12–78)
ANION GAP SERPL CALCULATED.3IONS-SCNC: 8 MMOL/L (ref 4–13)
AST SERPL W P-5'-P-CCNC: 25 U/L (ref 5–45)
BASOPHILS # BLD AUTO: 0.09 THOUSANDS/ΜL (ref 0–0.1)
BASOPHILS NFR BLD AUTO: 2 % (ref 0–1)
BILIRUB SERPL-MCNC: 0.6 MG/DL (ref 0.2–1)
BUN SERPL-MCNC: 18 MG/DL (ref 5–25)
CALCIUM SERPL-MCNC: 9 MG/DL (ref 8.3–10.1)
CHLORIDE SERPL-SCNC: 104 MMOL/L (ref 100–108)
CHOLEST SERPL-MCNC: 180 MG/DL (ref 50–200)
CO2 SERPL-SCNC: 30 MMOL/L (ref 21–32)
CREAT SERPL-MCNC: 1.28 MG/DL (ref 0.6–1.3)
EOSINOPHIL # BLD AUTO: 0.31 THOUSAND/ΜL (ref 0–0.61)
EOSINOPHIL NFR BLD AUTO: 7 % (ref 0–6)
ERYTHROCYTE [DISTWIDTH] IN BLOOD BY AUTOMATED COUNT: 15.4 % (ref 11.6–15.1)
GFR SERPL CREATININE-BSD FRML MDRD: 40 ML/MIN/1.73SQ M
GLUCOSE P FAST SERPL-MCNC: 104 MG/DL (ref 65–99)
HCT VFR BLD AUTO: 35.6 % (ref 34.8–46.1)
HDLC SERPL-MCNC: 43 MG/DL
HGB BLD-MCNC: 11.2 G/DL (ref 11.5–15.4)
IMM GRANULOCYTES # BLD AUTO: 0.02 THOUSAND/UL (ref 0–0.2)
IMM GRANULOCYTES NFR BLD AUTO: 0 % (ref 0–2)
LDLC SERPL CALC-MCNC: 107 MG/DL (ref 0–100)
LDLC SERPL DIRECT ASSAY-MCNC: 112 MG/DL (ref 0–100)
LYMPHOCYTES # BLD AUTO: 0.83 THOUSANDS/ΜL (ref 0.6–4.47)
LYMPHOCYTES NFR BLD AUTO: 18 % (ref 14–44)
MCH RBC QN AUTO: 32.5 PG (ref 26.8–34.3)
MCHC RBC AUTO-ENTMCNC: 31.5 G/DL (ref 31.4–37.4)
MCV RBC AUTO: 103 FL (ref 82–98)
MONOCYTES # BLD AUTO: 0.5 THOUSAND/ΜL (ref 0.17–1.22)
MONOCYTES NFR BLD AUTO: 11 % (ref 4–12)
NEUTROPHILS # BLD AUTO: 2.98 THOUSANDS/ΜL (ref 1.85–7.62)
NEUTS SEG NFR BLD AUTO: 62 % (ref 43–75)
NONHDLC SERPL-MCNC: 137 MG/DL
NRBC BLD AUTO-RTO: 0 /100 WBCS
PLATELET # BLD AUTO: 225 THOUSANDS/UL (ref 149–390)
PMV BLD AUTO: 9.9 FL (ref 8.9–12.7)
POTASSIUM SERPL-SCNC: 3.7 MMOL/L (ref 3.5–5.3)
PROT SERPL-MCNC: 7 G/DL (ref 6.4–8.2)
RBC # BLD AUTO: 3.45 MILLION/UL (ref 3.81–5.12)
SODIUM SERPL-SCNC: 142 MMOL/L (ref 136–145)
TRIGL SERPL-MCNC: 150 MG/DL
WBC # BLD AUTO: 4.73 THOUSAND/UL (ref 4.31–10.16)

## 2021-01-19 PROCEDURE — 36415 COLL VENOUS BLD VENIPUNCTURE: CPT

## 2021-01-19 PROCEDURE — 85025 COMPLETE CBC W/AUTO DIFF WBC: CPT

## 2021-01-19 PROCEDURE — 80053 COMPREHEN METABOLIC PANEL: CPT

## 2021-01-19 PROCEDURE — 83721 ASSAY OF BLOOD LIPOPROTEIN: CPT

## 2021-01-19 PROCEDURE — 80061 LIPID PANEL: CPT

## 2021-02-26 ENCOUNTER — TELEPHONE (OUTPATIENT)
Dept: INTERNAL MEDICINE CLINIC | Facility: CLINIC | Age: 81
End: 2021-02-26

## 2021-02-26 NOTE — TELEPHONE ENCOUNTER
Patient is getting a Covid vaccine tomorrow  She is going to [id number] [place] [address]  She is on a lot of different medications and would like to know if she should not take them before she gets her vaccine? Please advise       Call back #[phone]

## 2021-03-16 ENCOUNTER — APPOINTMENT (OUTPATIENT)
Dept: LAB | Facility: HOSPITAL | Age: 81
End: 2021-03-16
Attending: INTERNAL MEDICINE
Payer: MEDICARE

## 2021-03-16 ENCOUNTER — TRANSCRIBE ORDERS (OUTPATIENT)
Dept: ADMINISTRATIVE | Facility: HOSPITAL | Age: 81
End: 2021-03-16

## 2021-03-16 DIAGNOSIS — M05.79 SEROPOSITIVE RHEUMATOID ARTHRITIS OF MULTIPLE SITES (HCC): Primary | ICD-10-CM

## 2021-03-16 LAB
ALBUMIN SERPL BCP-MCNC: 3.4 G/DL (ref 3.5–5)
ALP SERPL-CCNC: 60 U/L (ref 46–116)
ALT SERPL W P-5'-P-CCNC: 22 U/L (ref 12–78)
AST SERPL W P-5'-P-CCNC: 19 U/L (ref 5–45)
BASOPHILS # BLD AUTO: 0.09 THOUSANDS/ΜL (ref 0–0.1)
BASOPHILS NFR BLD AUTO: 2 % (ref 0–1)
BILIRUB DIRECT SERPL-MCNC: 0.2 MG/DL (ref 0–0.2)
BILIRUB SERPL-MCNC: 0.6 MG/DL (ref 0.2–1)
CREAT SERPL-MCNC: 1.35 MG/DL (ref 0.6–1.3)
CRP SERPL QL: 8.2 MG/L
EOSINOPHIL # BLD AUTO: 0.42 THOUSAND/ΜL (ref 0–0.61)
EOSINOPHIL NFR BLD AUTO: 8 % (ref 0–6)
ERYTHROCYTE [DISTWIDTH] IN BLOOD BY AUTOMATED COUNT: 15.7 % (ref 11.6–15.1)
ERYTHROCYTE [SEDIMENTATION RATE] IN BLOOD: 23 MM/HOUR (ref 0–29)
GFR SERPL CREATININE-BSD FRML MDRD: 37 ML/MIN/1.73SQ M
HCT VFR BLD AUTO: 34.5 % (ref 34.8–46.1)
HGB BLD-MCNC: 10.8 G/DL (ref 11.5–15.4)
IMM GRANULOCYTES # BLD AUTO: 0.02 THOUSAND/UL (ref 0–0.2)
IMM GRANULOCYTES NFR BLD AUTO: 0 % (ref 0–2)
LYMPHOCYTES # BLD AUTO: 1.2 THOUSANDS/ΜL (ref 0.6–4.47)
LYMPHOCYTES NFR BLD AUTO: 22 % (ref 14–44)
MCH RBC QN AUTO: 32 PG (ref 26.8–34.3)
MCHC RBC AUTO-ENTMCNC: 31.3 G/DL (ref 31.4–37.4)
MCV RBC AUTO: 102 FL (ref 82–98)
MONOCYTES # BLD AUTO: 0.77 THOUSAND/ΜL (ref 0.17–1.22)
MONOCYTES NFR BLD AUTO: 14 % (ref 4–12)
NEUTROPHILS # BLD AUTO: 2.92 THOUSANDS/ΜL (ref 1.85–7.62)
NEUTS SEG NFR BLD AUTO: 54 % (ref 43–75)
NRBC BLD AUTO-RTO: 0 /100 WBCS
PLATELET # BLD AUTO: 200 THOUSANDS/UL (ref 149–390)
PMV BLD AUTO: 10.2 FL (ref 8.9–12.7)
PROT SERPL-MCNC: 6.7 G/DL (ref 6.4–8.2)
RBC # BLD AUTO: 3.37 MILLION/UL (ref 3.81–5.12)
WBC # BLD AUTO: 5.42 THOUSAND/UL (ref 4.31–10.16)

## 2021-03-16 PROCEDURE — 36415 COLL VENOUS BLD VENIPUNCTURE: CPT

## 2021-03-16 PROCEDURE — 85652 RBC SED RATE AUTOMATED: CPT

## 2021-03-16 PROCEDURE — 86140 C-REACTIVE PROTEIN: CPT

## 2021-03-16 PROCEDURE — 85025 COMPLETE CBC W/AUTO DIFF WBC: CPT

## 2021-03-16 PROCEDURE — 80076 HEPATIC FUNCTION PANEL: CPT

## 2021-03-16 PROCEDURE — 86361 T CELL ABSOLUTE COUNT: CPT

## 2021-03-16 PROCEDURE — 82565 ASSAY OF CREATININE: CPT

## 2021-03-19 LAB — MISCELLANEOUS LAB TEST RESULT: NORMAL

## 2021-05-07 ENCOUNTER — OFFICE VISIT (OUTPATIENT)
Dept: HEMATOLOGY ONCOLOGY | Facility: CLINIC | Age: 81
End: 2021-05-07
Payer: MEDICARE

## 2021-05-07 VITALS
WEIGHT: 199 LBS | OXYGEN SATURATION: 94 % | RESPIRATION RATE: 18 BRPM | DIASTOLIC BLOOD PRESSURE: 80 MMHG | HEIGHT: 65 IN | BODY MASS INDEX: 33.15 KG/M2 | SYSTOLIC BLOOD PRESSURE: 154 MMHG | HEART RATE: 63 BPM

## 2021-05-07 DIAGNOSIS — Z12.31 ENCOUNTER FOR SCREENING MAMMOGRAM FOR MALIGNANT NEOPLASM OF BREAST: ICD-10-CM

## 2021-05-07 DIAGNOSIS — C50.919 ADENOCARCINOMA OF BREAST, UNSPECIFIED LATERALITY (HCC): Primary | ICD-10-CM

## 2021-05-07 DIAGNOSIS — D75.89 MACROCYTOSIS: ICD-10-CM

## 2021-05-07 DIAGNOSIS — D64.9 ANEMIA, UNSPECIFIED TYPE: ICD-10-CM

## 2021-05-07 DIAGNOSIS — N18.32 STAGE 3B CHRONIC KIDNEY DISEASE (HCC): ICD-10-CM

## 2021-05-07 DIAGNOSIS — Z86.718 HISTORY OF DVT (DEEP VEIN THROMBOSIS): ICD-10-CM

## 2021-05-07 PROCEDURE — 99215 OFFICE O/P EST HI 40 MIN: CPT | Performed by: INTERNAL MEDICINE

## 2021-05-07 NOTE — PROGRESS NOTES
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, grade 2     - previously under the care in [facility] by Dr Junior Mart  D/X:  2012  Tr:  Status post lumpectomy, radiation, Herceptin, currently on tamoxifen, x 7  Years from  - 2019, stopped  due to DVT    Surveillance: The mammogram 2020:   Negative for malignancy      2  Deep vein thrombosis (DVT) of proximal vein of left lower extremity   - 10/2019 : 1st clot in life triggered by recent poison ivy infection , on eliquis 5 mg bid x 6m   - 2020 : doppler showed DVT resolved         Assessment / Plan:       1  Adenocarcinoma of breast, unspecified laterality ([facility] )   - clinically no evidence of cancer relapse  Continue labs every 6 months ordered by PCP and rheumatologist   Mushtaq Greenfield yearly basis breast examination yearly basis  Will follow patient 1 year      - Mammo screening bilateral w cad; Future    2  Encounter for screening mammogram for malignant neoplasm of breast      - Mammo screening bilateral w cad; Future    3  History of DVT (deep vein thrombosis)   - no new leg swelling  Clinically no evidence of recurrent DVT  4  Stage 3b chronic kidney disease ([facility] )  - made patient aware to have blood pressure better controlled, patient will discuss with PCP in the next visit  Made patient aware to avoid NSAIDs  5  Anemia, unspecified type  - likely due to underlying CKD, could be having vitamin deficiency or underlying MDS as well  Advised patient to consider take multivitamin    6   Macrocytosis  - take multivitamin;  closely monitor                   45   minutes were spent on this visit  All questions answered to satisfaction; Advised pt to call if there is any further questions  Interval History:     2/19:  Patient has history of rheumatoid arthritis, under active care with Rheumatology, overall stable  Patient came in today to establish care with oncology  Medical oncology history as above  Given today reported overall doing well, at baseline health status  She reported has no weight loss, no lumps bumps in breast, no constitutional symptoms including no bleeding, easy bruise, no fever chills, no night sweats, no lumps bumps  She is tolerating tamoxifen without major side effects  There is no history of DVT, endometrial cancers, she reported having bilateral eye cataract, status post cataract surgery, last one is in April of 2017  She has no other oncology, hematology issues  Colonoscopy was on time with Dr Shayne Hairston  Following Dr Stephany Davis for GYN routine checkup  She reported smoked for short time and stopped in her 35s  Drink alcohol socially  Family history of oncology issues including brother has lung cancer, mother has colon cancers at age 46  She is currently , move in with her brother who suffered from lung cancer  She currently living close to [facility] about 45 minutes from our office  8/20/2018: Given her follow-up  Doing well, no conscious symptoms  No new breast lumps bumps, no bone pain  No vaginal bleeding, no new vision change  8/26/2019:  Came for follow-up  Reported doing well no issues  No new lumps bumps  Patient is following breast surgeon Dr  [facility]  11/4/2019:  Came in for follow-up  Reported doing well  No new lumps bumps  Developed new left lower DVT, appears triggered by recent poison ivy infection, infections under control  Developed leg swelling and comfort miss, reported symptom has been much better      1/31/2020:  Came for follow-up, doing well, body weight stable, no bleeding  No new lumps bumps    5/1/2020 :  Came in for follow-up  Overall doing well, no leg swelling  No bleeding on Eliquis; no lumps bumps no chest pain no bone pain  5/7/2021:  Came for follow-up, subjective patient doing okay no new lumps bumps  Body weight stable no leg swelling  Problem list:       Patient Active Problem List   Diagnosis    Hypertension    [facility]umatoid arthritis ([facility] )    Hypothyroidism    Hyperuricemia    Abnormal blood chemistry    Diastolic dysfunction    Hyperlipidemia    Lumbar radiculopathy    Murmur    Osteopenia    Other chronic pain    Vitamin D deficiency    [facility]    Localized edema    Abnormal finding on diagnostic imaging of kidney    Fat necrosis (segmental) of breast    Personal history of breast cancer    History of peptic ulcer    Intermediate stage nonexudative 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 eyes    Encounter for FIT (fecal immunochemical test) screening    Lower leg DVT (deep venous thromboembolism), chronic, left (HCC)    Bradycardia with 51-60 beats per minute    Functional diarrhea    Varicose veins of left lower extremity with pain    Medicare annual wellness visit, subsequent         PHYSICIAL EXAMINATION:     Vital Signs:   [unfilled]  Body mass index is 33 12 kg/m²  Body surface area is 1 97 meters squared  No leg swelling, status post right lumpectomy  No new lumps bumps;  no suspicious breast nodules  GEN: Alert, awake oriented x3, in no acute distress  HEENT- No pallor, icterus, cyanosis, no oral mucosal lesions,   LAD - no palpable cervical, clavicle, axillary, inguinal LAD  Heart- normal S1 S2, regular rate and rhythm, No murmur, rubs  Lungs- clear breathing sound bilateral    Abdomen- soft, Non tender, bowel sounds present  Extremities- ++ edema, bilateral finger in joint deformity consistent with rheumatoid arthritis    Neuro- No focal neurological deficit           PAST MEDICAL HISTORY:   has a past medical history of Allergic angioedema, Angioedema, Arthritis, Breast cancer ([facility] ), Disease of thyroid gland, Hemorrhage of anus and rectum, Hyperlipidemia, Hypertension, Hypocalcemia, Neuritis, Peptic ulcer, Rheumatoid arthritis ([facility] ), and Vitamin D deficiency  PAST SURGICAL HISTORY:   has a past surgical history that includes Abdominal surgery; Breast surgery; Cholecystectomy; Elbow surgery; and orthopedic surgery  CURRENT MEDICATIONS:     Current Outpatient Medications   Medication Sig Dispense Refill    ascorbic acid (VITAMIN C) 500 mg tablet Take 500 mg by mouth daily   Calcium Citrate-Vitamin D (CITRACAL + D PO) Take by mouth 2 (two) times a day      carvedilol (COREG) 25 mg tablet Take 1 tablet (25 mg total) by mouth 2 (two) times a day with meals 180 tablet 2    Cholecalciferol (VITAMIN D-3 PO) Take 2,000 Units by mouth daily   folic acid (FOLVITE) 1 mg tablet 1 daily 90 tablet 2    hydrochlorothiazide (HYDRODIURIL) 25 mg tablet TAKE 1 TABLET BY MOUTH EVERY DAY 90 tablet 2    latanoprost (XALATAN) 0 005 % ophthalmic solution       levothyroxine (Synthroid) 50 mcg tablet Take 1 tablet (50 mcg total) by mouth every other day 45 tablet 3    levothyroxine 75 mcg tablet Take 1 tab po every other day, alternating with 50 mcg 45 tablet 3    magnesium oxide (MAG-OX) 400 mg Take 1 tablet (400 mg total) by mouth 2 (two) times a day 90 tablet 0    methotrexate 2 5 mg tablet Take 4 tablets (10 mg total) by mouth once a week (Patient taking differently: Take 15 mg by mouth once a week ) 12 tablet     Multiple Vitamin (MULTIVITAMIN) tablet Take 1 tablet by mouth daily        potassium chloride (Klor-Con M10) 10 mEq tablet Take 2 tablets (20 mEq total) by mouth 2 (two) times a day 180 tablet 2    Psyllium (METAMUCIL) WAFR Take 2 twice a day 100 Wafer 5    riTUXimab (RITUXAN) 500 mg/50 mL Infuse into a venous catheter every 6 (six) months        rosuvastatin (CRESTOR) 5 mg tablet Take 1 tablet (5 mg total) by mouth daily 90 tablet 2     No current facility-administered medications for this visit  [unfilled]    SOCIAL HISTORY:   reports that she has never smoked  She has never used smokeless tobacco  She reports previous alcohol use  She reports that she does not use drugs  FAMILY HISTORY:  family history includes Breast cancer in her maternal aunt, mother, and sister; Kidney failure in her father; Other in her father  ALLERGIES:  is allergic to lisinopril; codeine; other; oxycodone; penicillins; sulfamethoxazole-trimethoprim; and ciprofloxacin  REVIEW OF SYSTEMS:  Please note that a 14-point review of systems was performed to include Constitutional, HEENT, Respiratory, CVS, GI, , Musculoskeletal, Integumentary, Neurologic, Rheumatologic, Endocrinologic, Psychiatric, Lymphatic, and Hematologic/Oncologic systems were reviewed and are negative unless otherwise stated in HPI  Positive and negative findings pertinent to this evaluation are incorporated into the history of present illness  LAB:    Lab Results   Component Value Date    WBC 5 42 03/16/2021    HGB 10 8 (L) 03/16/2021    HCT 34 5 (L) 03/16/2021     (H) 03/16/2021     03/16/2021       Lab Results   Component Value Date    K 3 7 01/19/2021     01/19/2021    CO2 30 01/19/2021    BUN 18 01/19/2021    CREATININE 1 35 (H) 03/16/2021    GLUF 104 (H) 01/19/2021    CALCIUM 9 0 01/19/2021    AST 19 03/16/2021    ALT 22 03/16/2021    ALKPHOS 60 03/16/2021    PROT 6 1 (L) 09/25/2015    BILITOT 0 31 09/25/2015    EGFR 37 03/16/2021       IMAGING:    Mammo screening bilateral w cad    (Results Pending)     Ct Ankle Left Wo Contrast    Result Date: 2/5/2018  Narrative: CT LEFT ANKLE INDICATION: ankle pain  History taken directly from the electronic ordering system  Ankle pain  Unable to bear weight  No trauma  Rheumatoid arthropathy  COMPARISON: None  TECHNIQUE: Serial Axial CT images were acquired through the left ankle  Coronal and sagittal reformation images were also obtained  Contrast material was not utilized  This examination, like all CT scans performed in the [facility], was performed utilizing techniques to minimize radiation dose exposure, including the use of iterative reconstruction and automated exposure control  Rad dose 238 mGy-cm FINDINGS: JOINT EFFUSION: Joint effusion noted  ALIGNMENT: Increase in talar declination angle suggests chronic PTT dysfunction  This results in pes planus deformity  OSSEOUS STRUCTURES: Advanced degenerative change noted throughout the visualized midfoot and hindfoot with apparent spontaneous talonavicular fusion related to long-standing degenerative change  No acute fracture dislocation or osseous destructive lesion identified  Incomplete evaluation of lateral deviation of the 2nd through 5th toes with erosive changes and pencil in cup deformity noted across the metatarsal phalangeal joints  Findings consistent with underlying inflammatory arthropathy  TENDONS: Limited assessment by CT technique without gross evidence of tear  LIGAMENTS: Limited assessment by CT technique without gross evidence of tear  MUSCULATURE: Intact  REMAINING SOFT TISSUES: Loculated collection noted along the plantar soft tissues at the level of the dorsal calcaneus measuring up to 4 3 x 3 7 x 2 1 cm  There is some wall calcification noted suggesting chronicity and presumably related to rheumatoid disease  Impression: 1  Advanced degenerative change throughout the foot including suggestive findings of inflammatory arthropathy, such as rheumatoid, across the 2nd through 5th metatarsophalangeal joints as above  There is also spontaneous fusion across the talonavicular  joint likely secondary to long-standing degenerative change   2   Pes planus deformity and suggestive findings of chronic PTT dysfunction  3   Loculated plantar hindfoot fluid collection measuring up to 4 3 x 3 7 x 2 1 cm likely chronic given scant wall calcification  This could relate to rheumatoid disease with infection not entirely excluded but considerably less likely  Correlate clinically  Findings are consistent with the preliminary report from [facility] which was provided shortly after completion of the exam  The finding of plantar loculated collection  may alter immediate patient management, therefore we will now contact referring physicians for direct verbal discussion  I personally discussed this study with Jackie Fernandez, the nurse caring for this patient, on 2/5/2018 8:44 AM   Unfortunately, the referring physician could not be reached at this time   Workstation performed: [id number]

## 2021-05-11 ENCOUNTER — TELEPHONE (OUTPATIENT)
Dept: HEMATOLOGY ONCOLOGY | Facility: CLINIC | Age: 81
End: 2021-05-11

## 2021-05-11 NOTE — TELEPHONE ENCOUNTER
Patient is calling about her 3/16/21 Creatinine level of 1 35  Patient stated she is not drinking po fluids like she should  She stated in the AM she drinks 8 oz of water with her thyroid and 4 oz of  Coffee with her medication  During the day she drinks 16 oz of water and tonic water  Dinner time she drinks 4 oz of milk  4 oz of tonic water before bedtime  Advised advised to increase PO fluid  She stated that she would increase her water intake  Patient has a Rituxan infusion ordered by Rheumatology on 6/15 and 6/29/21  She will be having labs drawn prior to treatment  Will forward to patient PCP as an FYI  Will notify Dr Chelsea Frankel RN as Jennifer Ervin

## 2021-06-15 ENCOUNTER — HOSPITAL ENCOUNTER (OUTPATIENT)
Dept: INFUSION CENTER | Facility: CLINIC | Age: 81
Discharge: HOME | End: 2021-06-15
Payer: MEDICARE

## 2021-06-15 VITALS
DIASTOLIC BLOOD PRESSURE: 70 MMHG | RESPIRATION RATE: 20 BRPM | TEMPERATURE: 97.5 F | HEART RATE: 67 BPM | SYSTOLIC BLOOD PRESSURE: 158 MMHG

## 2021-06-15 PROCEDURE — 96375 TX/PRO/DX INJ NEW DRUG ADDON: CPT

## 2021-06-15 PROCEDURE — 96415 CHEMO IV INFUSION ADDL HR: CPT

## 2021-06-15 PROCEDURE — 96413 CHEMO IV INFUSION 1 HR: CPT

## 2021-06-15 RX ORDER — SODIUM CHLORIDE 9 MG/ML
20 INJECTION, SOLUTION INTRAVENOUS CONTINUOUS
Status: DISCONTINUED | OUTPATIENT
Start: 2021-06-15 | End: 2021-06-18 | Stop reason: HOSPADM

## 2021-06-15 RX ORDER — DIPHENHYDRAMINE HCL 25 MG
50 TABLET ORAL ONCE
Status: COMPLETED | OUTPATIENT
Start: 2021-06-15 | End: 2021-06-15

## 2021-06-15 RX ORDER — METHYLPREDNISOLONE SODIUM SUCCINATE 125 MG/2ML
100 INJECTION, POWDER, LYOPHILIZED, FOR SOLUTION INTRAMUSCULAR; INTRAVENOUS ONCE
Status: COMPLETED | OUTPATIENT
Start: 2021-06-15 | End: 2021-06-15

## 2021-06-15 RX ORDER — ACETAMINOPHEN 325 MG/1
975 TABLET ORAL ONCE
Status: COMPLETED | OUTPATIENT
Start: 2021-06-15 | End: 2021-06-15

## 2021-06-15 RX ADMIN — RITUXIMAB 1000 MG: 10 INJECTION, SOLUTION INTRAVENOUS at 11:38

## 2021-06-15 RX ADMIN — DIPHENHYDRAMINE HYDROCHLORIDE 50 MG: 25 TABLET ORAL at 10:32

## 2021-06-15 RX ADMIN — METHYLPREDNISOLONE SODIUM SUCCINATE 100 MG: 125 INJECTION, POWDER, FOR SOLUTION INTRAMUSCULAR; INTRAVENOUS at 10:31

## 2021-06-15 RX ADMIN — SODIUM CHLORIDE 20 ML/HR: 0.9 INJECTION, SOLUTION INTRAVENOUS at 10:39

## 2021-06-15 RX ADMIN — ACETAMINOPHEN 975 MG: 325 TABLET, FILM COATED ORAL at 10:32

## 2021-06-15 NOTE — PROGRESS NOTES
Pt presents for   Offers no complaints  Pt tolerated tx with out incident  AVS provided  Aware of next appt

## 2021-06-15 NOTE — PLAN OF CARE
Problem: Potential for Falls  Goal: Patient will remain free of falls  Description: INTERVENTIONS:  - Educate patient/family on patient safety including physical limitations  - Instruct patient to call for assistance with activity     Outcome: Progressing     Problem: Knowledge Deficit  Goal: Patient/family/caregiver demonstrates understanding of disease process, treatment plan, medications, and discharge instructions  Description: Complete learning assessment and assess knowledge base    Interventions:  - Provide teaching at level of understanding  - Provide teaching via preferred learning methods  Outcome: Progressing

## 2021-06-29 ENCOUNTER — HOSPITAL ENCOUNTER (OUTPATIENT)
Dept: INFUSION CENTER | Facility: CLINIC | Age: 81
Discharge: HOME | End: 2021-06-29
Payer: MEDICARE

## 2021-06-29 VITALS
HEART RATE: 59 BPM | RESPIRATION RATE: 20 BRPM | SYSTOLIC BLOOD PRESSURE: 148 MMHG | DIASTOLIC BLOOD PRESSURE: 70 MMHG | TEMPERATURE: 97.1 F

## 2021-06-29 PROCEDURE — 96415 CHEMO IV INFUSION ADDL HR: CPT

## 2021-06-29 PROCEDURE — 96413 CHEMO IV INFUSION 1 HR: CPT

## 2021-06-29 PROCEDURE — 96375 TX/PRO/DX INJ NEW DRUG ADDON: CPT

## 2021-06-29 RX ORDER — SODIUM CHLORIDE 9 MG/ML
20 INJECTION, SOLUTION INTRAVENOUS CONTINUOUS
Status: DISCONTINUED | OUTPATIENT
Start: 2021-06-29 | End: 2021-07-02 | Stop reason: HOSPADM

## 2021-06-29 RX ORDER — DIPHENHYDRAMINE HCL 25 MG
50 TABLET ORAL ONCE
Status: COMPLETED | OUTPATIENT
Start: 2021-06-29 | End: 2021-06-29

## 2021-06-29 RX ORDER — ACETAMINOPHEN 325 MG/1
975 TABLET ORAL ONCE
Status: COMPLETED | OUTPATIENT
Start: 2021-06-29 | End: 2021-06-29

## 2021-06-29 RX ORDER — METHYLPREDNISOLONE SODIUM SUCCINATE 125 MG/2ML
100 INJECTION, POWDER, LYOPHILIZED, FOR SOLUTION INTRAMUSCULAR; INTRAVENOUS ONCE
Status: COMPLETED | OUTPATIENT
Start: 2021-06-29 | End: 2021-06-29

## 2021-06-29 RX ADMIN — SODIUM CHLORIDE 20 ML/HR: 0.9 INJECTION, SOLUTION INTRAVENOUS at 10:50

## 2021-06-29 RX ADMIN — DIPHENHYDRAMINE HYDROCHLORIDE 50 MG: 25 TABLET ORAL at 10:50

## 2021-06-29 RX ADMIN — METHYLPREDNISOLONE SODIUM SUCCINATE 100 MG: 125 INJECTION, POWDER, FOR SOLUTION INTRAMUSCULAR; INTRAVENOUS at 10:50

## 2021-06-29 RX ADMIN — RITUXIMAB 1000 MG: 10 INJECTION, SOLUTION INTRAVENOUS at 11:38

## 2021-06-29 RX ADMIN — ACETAMINOPHEN 975 MG: 325 TABLET, FILM COATED ORAL at 10:50

## 2021-06-29 NOTE — PLAN OF CARE
Problem: Potential for Falls  Goal: Patient will remain free of falls  Description: INTERVENTIONS:  - Educate patient/family on patient safety including physical limitations  - Instruct patient to call for assistance with activity   - Consult OT/PT to assist with strengthening/mobility   - Keep Call bell within reach  - Keep bed low and locked with side rails adjusted as appropriate  - Keep care items and personal belongings within reach  - Apply yellow socks and bracelet for high fall risk patients  - Consider moving patient to room near nurses station  Outcome: Progressing

## 2021-06-29 NOTE — PROGRESS NOTES
Pt presented for day 15 rituxan, offering no complaints  PIV placed in L arm without incident  Pt tolerated entire infusion, VSS throughout  PIV removed, pt given AVS and discharged in stable condition

## 2021-07-13 ENCOUNTER — HOSPITAL ENCOUNTER (OUTPATIENT)
Dept: MAMMOGRAPHY | Facility: CLINIC | Age: 81
Discharge: HOME | End: 2021-07-13
Payer: MEDICARE

## 2021-07-13 VITALS — HEIGHT: 65 IN | BODY MASS INDEX: 33.15 KG/M2 | WEIGHT: 199 LBS

## 2021-07-13 DIAGNOSIS — C50.919 ADENOCARCINOMA OF BREAST, UNSPECIFIED LATERALITY (HCC): ICD-10-CM

## 2021-07-13 DIAGNOSIS — Z12.31 ENCOUNTER FOR SCREENING MAMMOGRAM FOR MALIGNANT NEOPLASM OF BREAST: ICD-10-CM

## 2021-07-13 PROCEDURE — 77063 BREAST TOMOSYNTHESIS BI: CPT

## 2021-07-13 PROCEDURE — 77067 SCR MAMMO BI INCL CAD: CPT

## 2021-07-21 ENCOUNTER — TELEPHONE (OUTPATIENT)
Dept: INTERNAL MEDICINE CLINIC | Facility: CLINIC | Age: 81
End: 2021-07-21

## 2021-07-21 NOTE — TELEPHONE ENCOUNTER
Patient has an appt scheduled for the beginning of August and would like you to order her 4 month routine labs  Patient would like to have them done in time for her appt  Please notify patient once orders are in her chart

## 2021-07-27 ENCOUNTER — APPOINTMENT (OUTPATIENT)
Dept: LAB | Facility: HOSPITAL | Age: 81
End: 2021-07-27
Payer: MEDICARE

## 2021-07-27 DIAGNOSIS — M06.9 RHEUMATOID ARTHRITIS OF LEFT ANKLE, UNSPECIFIED WHETHER RHEUMATOID FACTOR PRESENT (HCC): ICD-10-CM

## 2021-07-27 DIAGNOSIS — I10 HYPERTENSION, UNSPECIFIED TYPE: ICD-10-CM

## 2021-07-27 DIAGNOSIS — E03.9 HYPOTHYROIDISM, UNSPECIFIED TYPE: ICD-10-CM

## 2021-07-27 DIAGNOSIS — E78.5 HYPERLIPIDEMIA, UNSPECIFIED HYPERLIPIDEMIA TYPE: ICD-10-CM

## 2021-07-27 LAB
ALBUMIN SERPL BCP-MCNC: 3.7 G/DL (ref 3.5–5)
ALP SERPL-CCNC: 65 U/L (ref 46–116)
ALT SERPL W P-5'-P-CCNC: 20 U/L (ref 12–78)
ANION GAP SERPL CALCULATED.3IONS-SCNC: 8 MMOL/L (ref 4–13)
AST SERPL W P-5'-P-CCNC: 24 U/L (ref 5–45)
BASOPHILS # BLD AUTO: 0.05 THOUSANDS/ΜL (ref 0–0.1)
BASOPHILS NFR BLD AUTO: 1 % (ref 0–1)
BILIRUB SERPL-MCNC: 0.85 MG/DL (ref 0.2–1)
BUN SERPL-MCNC: 21 MG/DL (ref 5–25)
CALCIUM SERPL-MCNC: 8.6 MG/DL (ref 8.3–10.1)
CHLORIDE SERPL-SCNC: 103 MMOL/L (ref 100–108)
CHOLEST SERPL-MCNC: 188 MG/DL (ref 50–200)
CO2 SERPL-SCNC: 30 MMOL/L (ref 21–32)
CREAT SERPL-MCNC: 1.43 MG/DL (ref 0.6–1.3)
CRP SERPL QL: 10.3 MG/L
EOSINOPHIL # BLD AUTO: 0.29 THOUSAND/ΜL (ref 0–0.61)
EOSINOPHIL NFR BLD AUTO: 6 % (ref 0–6)
ERYTHROCYTE [DISTWIDTH] IN BLOOD BY AUTOMATED COUNT: 14.8 % (ref 11.6–15.1)
ERYTHROCYTE [SEDIMENTATION RATE] IN BLOOD: 37 MM/HOUR (ref 0–29)
GFR SERPL CREATININE-BSD FRML MDRD: 35 ML/MIN/1.73SQ M
GLUCOSE P FAST SERPL-MCNC: 105 MG/DL (ref 65–99)
HCT VFR BLD AUTO: 35.2 % (ref 34.8–46.1)
HDLC SERPL-MCNC: 46 MG/DL
HGB BLD-MCNC: 11.3 G/DL (ref 11.5–15.4)
IMM GRANULOCYTES # BLD AUTO: 0.02 THOUSAND/UL (ref 0–0.2)
IMM GRANULOCYTES NFR BLD AUTO: 0 % (ref 0–2)
LDLC SERPL CALC-MCNC: 118 MG/DL (ref 0–100)
LYMPHOCYTES # BLD AUTO: 1.06 THOUSANDS/ΜL (ref 0.6–4.47)
LYMPHOCYTES NFR BLD AUTO: 22 % (ref 14–44)
MCH RBC QN AUTO: 31.4 PG (ref 26.8–34.3)
MCHC RBC AUTO-ENTMCNC: 32.1 G/DL (ref 31.4–37.4)
MCV RBC AUTO: 98 FL (ref 82–98)
MONOCYTES # BLD AUTO: 0.49 THOUSAND/ΜL (ref 0.17–1.22)
MONOCYTES NFR BLD AUTO: 10 % (ref 4–12)
NEUTROPHILS # BLD AUTO: 3 THOUSANDS/ΜL (ref 1.85–7.62)
NEUTS SEG NFR BLD AUTO: 61 % (ref 43–75)
NONHDLC SERPL-MCNC: 142 MG/DL
NRBC BLD AUTO-RTO: 0 /100 WBCS
PLATELET # BLD AUTO: 217 THOUSANDS/UL (ref 149–390)
PMV BLD AUTO: 10.1 FL (ref 8.9–12.7)
POTASSIUM SERPL-SCNC: 3.9 MMOL/L (ref 3.5–5.3)
PROT SERPL-MCNC: 7.1 G/DL (ref 6.4–8.2)
RBC # BLD AUTO: 3.6 MILLION/UL (ref 3.81–5.12)
SODIUM SERPL-SCNC: 141 MMOL/L (ref 136–145)
TRIGL SERPL-MCNC: 118 MG/DL
TSH SERPL DL<=0.05 MIU/L-ACNC: 3.11 UIU/ML (ref 0.36–3.74)
WBC # BLD AUTO: 4.91 THOUSAND/UL (ref 4.31–10.16)

## 2021-07-27 PROCEDURE — 85652 RBC SED RATE AUTOMATED: CPT

## 2021-07-27 PROCEDURE — 80061 LIPID PANEL: CPT

## 2021-07-27 PROCEDURE — 85025 COMPLETE CBC W/AUTO DIFF WBC: CPT

## 2021-07-27 PROCEDURE — 86140 C-REACTIVE PROTEIN: CPT

## 2021-07-27 PROCEDURE — 36415 COLL VENOUS BLD VENIPUNCTURE: CPT

## 2021-07-27 PROCEDURE — 84443 ASSAY THYROID STIM HORMONE: CPT

## 2021-07-27 PROCEDURE — 80053 COMPREHEN METABOLIC PANEL: CPT

## 2021-08-02 ENCOUNTER — OFFICE VISIT (OUTPATIENT)
Dept: INTERNAL MEDICINE CLINIC | Facility: CLINIC | Age: 81
End: 2021-08-02
Payer: MEDICARE

## 2021-08-02 VITALS
HEART RATE: 60 BPM | DIASTOLIC BLOOD PRESSURE: 80 MMHG | RESPIRATION RATE: 16 BRPM | OXYGEN SATURATION: 95 % | BODY MASS INDEX: 33.15 KG/M2 | SYSTOLIC BLOOD PRESSURE: 130 MMHG | WEIGHT: 199 LBS | HEIGHT: 65 IN | TEMPERATURE: 97.6 F

## 2021-08-02 DIAGNOSIS — I10 HYPERTENSION, UNSPECIFIED TYPE: Chronic | ICD-10-CM

## 2021-08-02 DIAGNOSIS — Z00.00 MEDICARE ANNUAL WELLNESS VISIT, SUBSEQUENT: ICD-10-CM

## 2021-08-02 DIAGNOSIS — R73.09 ABNORMAL GLUCOSE: ICD-10-CM

## 2021-08-02 DIAGNOSIS — Z12.11 SCREENING FOR MALIGNANT NEOPLASM OF COLON: ICD-10-CM

## 2021-08-02 DIAGNOSIS — E78.5 HYPERLIPIDEMIA, UNSPECIFIED HYPERLIPIDEMIA TYPE: ICD-10-CM

## 2021-08-02 DIAGNOSIS — C50.919 ADENOCARCINOMA OF BREAST, UNSPECIFIED LATERALITY (HCC): ICD-10-CM

## 2021-08-02 DIAGNOSIS — N18.32 STAGE 3B CHRONIC KIDNEY DISEASE (HCC): ICD-10-CM

## 2021-08-02 DIAGNOSIS — M06.9 RHEUMATOID ARTHRITIS OF LEFT ANKLE, UNSPECIFIED WHETHER RHEUMATOID FACTOR PRESENT (HCC): Chronic | ICD-10-CM

## 2021-08-02 DIAGNOSIS — M85.80 OSTEOPENIA, UNSPECIFIED LOCATION: ICD-10-CM

## 2021-08-02 DIAGNOSIS — E03.9 HYPOTHYROIDISM, UNSPECIFIED TYPE: Primary | Chronic | ICD-10-CM

## 2021-08-02 PROCEDURE — G0439 PPPS, SUBSEQ VISIT: HCPCS | Performed by: NURSE PRACTITIONER

## 2021-08-02 PROCEDURE — 99214 OFFICE O/P EST MOD 30 MIN: CPT | Performed by: NURSE PRACTITIONER

## 2021-08-02 PROCEDURE — 1123F ACP DISCUSS/DSCN MKR DOCD: CPT | Performed by: NURSE PRACTITIONER

## 2021-08-02 RX ORDER — SODIUM, POTASSIUM,MAG SULFATES 17.5-3.13G
1 SOLUTION, RECONSTITUTED, ORAL ORAL ONCE
Qty: 177 ML | Refills: 0 | Status: SHIPPED | OUTPATIENT
Start: 2021-08-02 | End: 2022-04-20 | Stop reason: ALTCHOICE

## 2021-08-02 NOTE — PROGRESS NOTES
Assessment and Plan:     Problem List Items Addressed This Visit     None           Preventive health issues were discussed with patient, and age appropriate screening tests were ordered as noted in patient's After Visit Summary  Personalized health advice and appropriate referrals for health education or preventive services given if needed, as noted in patient's After Visit Summary       History of Present Illness:     Patient presents for Medicare Annual Wellness visit    Patient Care Team:  Connor Mari as PCP - General (Internal Medicine)  Murphy Yang MD     Problem List:     Patient Active Problem List   Diagnosis    Hypertension    [facility]    Hypothyroidism    Hyperuricemia    Abnormal blood chemistry    Diastolic dysfunction    Hyperlipidemia    Lumbar radiculopathy    Murmur    Osteopenia    Other chronic pain    Vitamin D deficiency    Adenocarcinoma of breast ([facility]    Localized edema    Abnormal finding on diagnostic imaging of kidney    Fat necrosis (segmental) of breast    Personal history of breast cancer    History of peptic ulcer    Intermediate stage nonexudative 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 eyes    Encounter for FIT (fecal immunochemical test) screening    Lower leg DVT (deep venous thromboembolism), chronic, left (HCC)    Bradycardia with 51-60 beats per minute    Functional diarrhea    Varicose veins of left lower extremity with pain    Medicare annual wellness visit, subsequent    Macrocytosis    Anemia    Stage 3b chronic kidney disease ([facility]    History of DVT (deep vein thrombosis)      Past Medical and Surgical History:     Past Medical History:   Diagnosis Date    Allergic angioedema     [id number] resolved    Angioedema     [id number] resolved    Arthritis     Breast cancer ([facility] ) 2012    right     Disease of thyroid gland     Hemorrhage of anus and rectum     [id number] resolved    Hyperlipidemia     Hypertension     Hypocalcemia     [id number] resolved    Neuritis     thoracic and lumbosacral    Peptic ulcer     Rheumatoid arthritis [facility]     Stage 3b chronic kidney disease ([facility] ) 5/7/2021    Vitamin D deficiency      Past Surgical History:   Procedure Laterality Date    ABDOMINAL SURGERY      BREAST BIOPSY Right 2012    BREAST LUMPECTOMY Left 2012    BREAST SURGERY      CHOLECYSTECTOMY      ELBOW SURGERY      ORTHOPEDIC SURGERY      for toes      Family History:     Family History   Problem Relation Age of Onset    Breast cancer Mother     Kidney failure Father     Other Father         uremia    Breast cancer Maternal Aunt     Breast cancer Maternal Aunt     Breast cancer Maternal Aunt     Breast cancer Maternal Aunt     Breast cancer Maternal Aunt     Breast cancer Maternal Uncle     Lung cancer Brother       Social History:     Social History     Socioeconomic History    Marital status:      Spouse name: None    Number of children: None    Years of education: None    Highest education level: None   Occupational History    None   Tobacco Use    Smoking status: Never Smoker    Smokeless tobacco: Never Used   Substance and Sexual Activity    Alcohol use: Not Currently    Drug use: No    Sexual activity: Never   Other Topics Concern    None   Social History Narrative    Active: caffeine use     Social Determinants of Health     Financial Resource Strain:     Difficulty of Paying Living Expenses:    Food Insecurity:     Worried About Running Out of Food in the Last Year:     Ran Out of Food in the Last Year:    Transportation Needs:     Lack of Transportation (Medical):      Lack of Transportation (Non-Medical):    Physical Activity:     Days of Exercise per Week:     Minutes of Exercise per Session:    Stress:     Feeling of Stress :    Social Connections:     Frequency of Communication with Friends and Family:     Frequency of Social Gatherings with Friends and Family:     Attends Advent Services:     Active Member of Clubs or Organizations:     Attends Club or Organization Meetings:     Marital Status:    Intimate Partner Violence:     Fear of Current or Ex-Partner:     Emotionally Abused:     Physically Abused:     Sexually Abused:       Medications and Allergies:     Current Outpatient Medications   Medication Sig Dispense Refill    ascorbic acid (VITAMIN C) 500 mg tablet Take 500 mg by mouth daily   Calcium Citrate-Vitamin D (CITRACAL + D PO) Take by mouth 2 (two) times a day      carvedilol (COREG) 25 mg tablet Take 1 tablet (25 mg total) by mouth 2 (two) times a day with meals 180 tablet 2    Cholecalciferol (VITAMIN D-3 PO) Take 2,000 Units by mouth daily   folic acid (FOLVITE) 1 mg tablet 1 daily 90 tablet 2    hydrochlorothiazide (HYDRODIURIL) 25 mg tablet TAKE 1 TABLET BY MOUTH EVERY DAY 90 tablet 2    latanoprost (XALATAN) 0 005 % ophthalmic solution       levothyroxine (Synthroid) 50 mcg tablet Take 1 tablet (50 mcg total) by mouth every other day 45 tablet 3    levothyroxine 75 mcg tablet Take 1 tab po every other day, alternating with 50 mcg 45 tablet 3    magnesium oxide (MAG-OX) 400 mg Take 1 tablet (400 mg total) by mouth 2 (two) times a day 90 tablet 0    methotrexate 2 5 mg tablet Take 4 tablets (10 mg total) by mouth once a week (Patient taking differently: Take 15 mg by mouth once a week ) 12 tablet     Multiple Vitamin (MULTIVITAMIN) tablet Take 1 tablet by mouth daily   potassium chloride (Klor-Con M10) 10 mEq tablet Take 2 tablets (20 mEq total) by mouth 2 (two) times a day 180 tablet 2    Psyllium (METAMUCIL) WAFR Take 2 twice a day 100 Wafer 5    riTUXimab (RITUXAN) 500 mg/50 mL Infuse into a venous catheter every 6 (six) months        rosuvastatin (CRESTOR) 5 mg tablet Take 1 tablet (5 mg total) by mouth daily 90 tablet 2     No current facility-administered medications for this visit       Allergies   Allergen Reactions    Lisinopril Anaphylaxis    Codeine Other (See Comments)     Nausea/vomiting      Other      Annotation - [id number]: tounge swelling    Oxycodone      Annotation - [id number]: NOT TOLERATE    Penicillins Hives    Sulfamethoxazole-Trimethoprim     Ciprofloxacin Rash      Immunizations:     Immunization History   Administered Date(s) Administered    DT (pediatric) 08/01/2014    Influenza Split High Dose Preservative Free IM 10/02/2013, 09/25/2014, 10/06/2015, 09/14/2016, 09/29/2017    Influenza, high dose seasonal 0 7 mL 10/11/2018, 09/30/2019, 09/22/2020    Influenza, seasonal, injectable 09/19/2012    Pneumococcal Conjugate 13-Valent 03/15/2016, 03/30/2017    Pneumococcal Polysaccharide PPV23 10/26/1998, 11/07/2005    Tuberculin Skin Test-PPD Intradermal 11/11/2000      Health Maintenance:         Topic Date Due    Hepatitis C Screening  Never done         Topic Date Due    COVID-19 Vaccine (1) Never done    DTaP,Tdap,and Td Vaccines (1 - Tdap) 09[birth year]02/1962    Pneumococcal Vaccine: 65+ Years (4 of 4 - PPSV23) 03/30/2018    Influenza Vaccine (1) 09/01/2021      Medicare Health Risk Assessment:     /80   Pulse 60   Temp 97 6 °F (36 4 °C)   Resp 16   Ht 5' 5" (1 651 m)   Wt 90 3 kg (199 lb)   LMP  (LMP Unknown)   SpO2 95%   BMI 33 12 kg/m²      Mechelle Salguero is here for her Subsequent Wellness visit  Last Medicare Wellness visit information reviewed, patient interviewed and updates made to the record today  Health Risk Assessment:   Patient rates overall health as good  Patient feels that their physical health rating is same  Patient is satisfied with their life  Eyesight was rated as same  Hearing was rated as same  Patient feels that their emotional and mental health rating is much better  Patients states they are never, rarely angry  Patient states they are never, rarely unusually tired/fatigued  Pain experienced in the last 7 days has been none   Patient states that she has experienced no weight loss or gain in last 6 months  Fall Risk Screening: In the past year, patient has experienced: no history of falling in past year      Urinary Incontinence Screening:   Patient has not leaked urine accidently in the last six months  Home Safety:  Patient has trouble with stairs inside or outside of their home  Patient has working smoke alarms and has working carbon monoxide detector  Home safety hazards include: none  Medications:   Patient is currently taking over-the-counter supplements  OTC medications include: see medication list  Patient is able to manage medications  Activities of Daily Living (ADLs)/Instrumental Activities of Daily Living (IADLs):   Walk and transfer into and out of bed and chair?: Yes  Dress and groom yourself?: Yes    Bathe or shower yourself?: Yes    Feed yourself? Yes  Do your laundry/housekeeping?: Yes  Manage your money, pay your bills and track your expenses?: Yes  Make your own meals?: Yes    Do your own shopping?: Yes    Previous Hospitalizations:   Any hospitalizations or ED visits within the last 12 months?: No      Advance Care Planning:   Living will: Yes    Durable POA for healthcare:  Yes    Advanced directive: Yes    Advanced directive counseling given: Yes    Five wishes given: Yes    Patient declined ACP directive: No      PREVENTIVE SCREENINGS      Cardiovascular Screening:    General: Screening Not Indicated and History Lipid Disorder      Diabetes Screening:     General: Screening Current      Colorectal Cancer Screening:     General: Screening Current      Breast Cancer Screening:     General: History Breast Cancer      Cervical Cancer Screening:    General: Screening Not Indicated      Osteoporosis Screening:    General: Risks and Benefits Discussed      Lung Cancer Screening:     General: Screening Not Indicated      Hepatitis C Screening:    General: Screening Not Indicated    Screening, Brief Intervention, and Referral to Treatment (SBIRT)    Screening      Single Item Drug Screening:  How often have you used an illegal drug (including marijuana) or a prescription medication for non-medical reasons in the past year? never    Single Item Drug Screen Score: 0  Interpretation: Negative screen for possible drug use disorder      PUJA Jordan

## 2021-08-02 NOTE — PROGRESS NOTES
Assessment/Plan:    1  Medicare wellness completed  2  Hypertension- At goal   3  Hyperlipidemia- LDL near goal  Please try to remember taking this medication every evening  Continue present medication, will recheck in six months  4  Hypothyroidism- TSH at goal  Continue present alternating doses of 50 mcg/75 mcg and recheck in six months  5  Rheumatoid arthritis- On Methotrexate and Rituxan  Followed by rheumatology, Dr Tameka Womack   6  R Breast cancer- Followed by oncology  Colonoscopy on Wednesday with Dr Jc Burton  Follow up in six months, labs prior  Diagnoses and all orders for this visit:    Hypothyroidism, unspecified type  -     TSH, 3rd generation with Free T4 reflex; Future    Medicare annual wellness visit, subsequent    Hypertension, unspecified type  -     CBC and differential; Future  -     Comprehensive metabolic panel; Future    Osteopenia, unspecified location    Rheumatoid arthritis of left ankle, unspecified whether rheumatoid factor present (HCC)    Stage 3b chronic kidney disease (HCC)    Hyperlipidemia, unspecified hyperlipidemia type  -     Lipid panel; Future    Abnormal glucose  -     HEMOGLOBIN A1C W/ EAG ESTIMATION; Future    Adenocarcinoma of breast, unspecified laterality ([facility] [address] )    Screening for malignant neoplasm of colon  -     Na Sulfate-K Sulfate-Mg Sulf (Suprep Bowel Prep Kit) 17 5-3 13-1 6 GM/177ML SOLN; Take 1 kit by mouth once for 1 dose        The patient was counseled regarding instructions for management, risk factor reductions, patient and family education,impressions, risks and benefits of treatment options, side effects of medications, importance of compliance with treatment  The treatment plan was reviewed with the patient/guardian and patient/guardian understands and agrees with the treatment plan  Current Outpatient Medications:     ascorbic acid (VITAMIN C) 500 mg tablet, Take 500 mg by mouth daily  , Disp: , Rfl:     Calcium Citrate-Vitamin D (CITRACAL + D PO), Take by mouth 2 (two) times a day, Disp: , Rfl:     carvedilol (COREG) 25 mg tablet, Take 1 tablet (25 mg total) by mouth 2 (two) times a day with meals, Disp: 180 tablet, Rfl: 2    Cholecalciferol (VITAMIN D-3 PO), Take 2,000 Units by mouth daily  , Disp: , Rfl:     folic acid (FOLVITE) 1 mg tablet, 1 daily, Disp: 90 tablet, Rfl: 2    hydrochlorothiazide (HYDRODIURIL) 25 mg tablet, TAKE 1 TABLET BY MOUTH EVERY DAY, Disp: 90 tablet, Rfl: 2    latanoprost (XALATAN) 0 005 % ophthalmic solution, , Disp: , Rfl:     levothyroxine (Synthroid) 50 mcg tablet, Take 1 tablet (50 mcg total) by mouth every other day, Disp: 45 tablet, Rfl: 3    levothyroxine 75 mcg tablet, Take 1 tab po every other day, alternating with 50 mcg, Disp: 45 tablet, Rfl: 3    magnesium oxide (MAG-OX) 400 mg, Take 1 tablet (400 mg total) by mouth 2 (two) times a day, Disp: 90 tablet, Rfl: 0    methotrexate 2 5 mg tablet, Take 4 tablets (10 mg total) by mouth once a week (Patient taking differently: Take 15 mg by mouth once a week ), Disp: 12 tablet, Rfl:     Multiple Vitamin (MULTIVITAMIN) tablet, Take 1 tablet by mouth daily  , Disp: , Rfl:     potassium chloride (Klor-Con M10) 10 mEq tablet, Take 2 tablets (20 mEq total) by mouth 2 (two) times a day, Disp: 180 tablet, Rfl: 2    Psyllium (METAMUCIL) WAFR, Take 2 twice a day, Disp: 100 Wafer, Rfl: 5    riTUXimab (RITUXAN) 500 mg/50 mL, Infuse into a venous catheter every 6 (six) months  , Disp: , Rfl:     rosuvastatin (CRESTOR) 5 mg tablet, Take 1 tablet (5 mg total) by mouth daily, Disp: 90 tablet, Rfl: 2    Na Sulfate-K Sulfate-Mg Sulf (Suprep Bowel Prep Kit) 17 5-3 13-1 6 GM/177ML SOLN, Take 1 kit by mouth once for 1 dose, Disp: 177 mL, Rfl: 0    Subjective:      Patient ID: Nirmala Gutierrez is a 79 y o  female  Here today for follow up and lab review  No complaints today        The following portions of the patient's history were reviewed and updated as appropriate: She has a past medical history of Allergic angioedema, Angioedema, Arthritis, Breast cancer ([facility] [address] ) (2012), Disease of thyroid gland, Hemorrhage of anus and rectum, Hyperlipidemia, Hypertension, Hypocalcemia, Neuritis, Peptic ulcer, Rheumatoid arthritis ([facility] ), [facility] (5/7/2021), and Vitamin D deficiency  ,  does not have any pertinent problems on file  ,   has a past surgical history that includes Abdominal surgery; Breast surgery; Cholecystectomy; Elbow surgery; orthopedic surgery; Breast biopsy (Right, 2012); and Breast lumpectomy (Left, 2012)  ,  family history includes Breast cancer in her maternal aunt, maternal aunt, maternal aunt, maternal aunt, maternal aunt, maternal uncle, and mother; Kidney failure in her father; Lung cancer in her brother; Other in her father  ,   reports that she has never smoked  She has never used smokeless tobacco  She reports previous alcohol use  She reports that she does not use drugs  ,  is allergic to lisinopril, codeine, other, oxycodone, penicillins, sulfamethoxazole-trimethoprim, and ciprofloxacin       Review of Systems   Constitutional: Negative  Respiratory: Negative  Cardiovascular: Negative  Musculoskeletal: Negative  Psychiatric/Behavioral: Negative            Objective:  /80   Pulse 60   Temp 97 6 °F (36 4 °C)   Resp 16   Ht 5' 5" (1 651 m)   Wt 90 3 kg (199 lb)   LMP  (LMP Unknown)   SpO2 95%   BMI 33 12 kg/m²     Lab Review  Appointment on 07/27/2021   Component Date Value    WBC 07/27/2021 4 91     RBC 07/27/2021 3 60*    Hemoglobin 07/27/2021 11 3*    Hematocrit 07/27/2021 35 2     MCV 07/27/2021 98     MCH 07/27/2021 31 4     MCHC 07/27/2021 32 1     RDW 07/27/2021 14 8     MPV 07/27/2021 10 1     Platelets 84/07/2539 217     nRBC 07/27/2021 0     Neutrophils Relative 07/27/2021 61     Immat GRANS % 07/27/2021 0     Lymphocytes Relative 07/27/2021 22     Monocytes Relative 07/27/2021 10     Eosinophils Relative 07/27/2021 6     Basophils Relative 07/27/2021 1     Neutrophils Absolute 07/27/2021 3 00     Immature Grans Absolute 07/27/2021 0 02     Lymphocytes Absolute 07/27/2021 1 06     Monocytes Absolute 07/27/2021 0 49     Eosinophils Absolute 07/27/2021 0 29     Basophils Absolute 07/27/2021 0 05     Sodium 07/27/2021 141     Potassium 07/27/2021 3 9     Chloride 07/27/2021 103     CO2 07/27/2021 30     ANION GAP 07/27/2021 8     BUN 07/27/2021 21     Creatinine 07/27/2021 1 43*    Glucose, Fasting 07/27/2021 105*    Calcium 07/27/2021 8 6     AST 07/27/2021 24     ALT 07/27/2021 20     Alkaline Phosphatase 07/27/2021 65     Total Protein 07/27/2021 7 1     Albumin 07/27/2021 3 7     Total Bilirubin 07/27/2021 0 85     eGFR 07/27/2021 35     Cholesterol 07/27/2021 188     Triglycerides 07/27/2021 118     HDL, Direct 07/27/2021 46     LDL Calculated 07/27/2021 118*    Non-HDL-Chol (CHOL-HDL) 07/27/2021 142     TSH 3RD GENERATON 07/27/2021 3 110     CRP 07/27/2021 10 3*    Sed Rate 07/27/2021 37*        Imaging: Mammo screening bilateral w 3d & cad    Result Date: 7/21/2021  Narrative: DIAGNOSIS: Encounter for screening mammogram for malignant neoplasm of breast ; [facility] TECHNIQUE: Digital screening mammography was performed  Computer Aided Detection (CAD) analyzed all applicable images  COMPARISONS: Prior breast imaging dated: 06/03/2020, 04/25/2019, 04/17/2018, 04/10/2018, 03/08/2017, and 03/07/2016 RELEVANT HISTORY: Family Breast Cancer History: History of breast cancer in Mother, Maternal Aunt, Maternal Aunt, Maternal Aunt, Maternal Aunt, Maternal Aunt, Maternal Uncle  Family Medical History: Family medical history includes breast cancer in 7 relatives (maternal aunt, maternal aunt, maternal aunt, maternal aunt, maternal aunt, maternal uncle, mother)  Personal History: No known relevant hormone history   Surgical history includes breast biopsy and lumpectomy  Medical history includes breast cancer  The patient is scheduled in a reminder system for screening mammography  8-10% of cancers will be missed on mammography  Management of a palpable abnormality must be based on clinical grounds  Patients will be notified of their results via letter from our facility  Accredited by [employer] and FDA  RISK ASSESSMENT: Shereen risk assessment reporting was suppressed due to the patient's history and/or demographic factors  TISSUE DENSITY: There are scattered areas of fibroglandular density  INDICATION: Katherine Haynes is a 79 y o  female presenting for screening mammography  FINDINGS: RIGHT 1) POST-SURGICAL FINDING: There are post-surgical findings from a previous lumpectomy seen in the upper outer quadrant of the right breast in the posterior depth  Compared to the previous study, there are no significant changes  Left There are no suspicious masses, grouped microcalcifications or areas of architectural distortion  The skin and nipple areolar complex are unremarkable  Several benign calcifications are stable  Impression: No mammographic evidence of malignancy  ASSESSMENT/BI-RADS CATEGORY: Left: 2 - Benign Right: 2 - Benign Overall: 2 - Benign RECOMMENDATION:      - Routine screening mammogram in 1 year for both breasts  Workstation ID: [id number]      Physical Exam  Constitutional:       Appearance: She is well-developed  Cardiovascular:      Rate and Rhythm: Normal rate and regular rhythm  Heart sounds: Normal heart sounds  Pulmonary:      Effort: Pulmonary effort is normal       Breath sounds: Normal breath sounds  Musculoskeletal:         General: Deformity present  Normal range of motion  Neurological:      Mental Status: She is alert and oriented to person, place, and time  Deep Tendon Reflexes: Reflexes are normal and symmetric     Psychiatric:         Behavior: Behavior normal  Thought Content:  Thought content normal          Judgment: Judgment normal

## 2021-08-02 NOTE — PATIENT INSTRUCTIONS
1  Medicare wellness completed  2  Hypertension- At goal   3  Hyperlipidemia- LDL near goal  Please try to remember taking this medication every evening  Continue present medication, will recheck in six months  4  Hypothyroidism- TSH at goal  Continue present alternating doses of 50 mcg/75 mcg and recheck in six months  5  Rheumatoid arthritis- On Methotrexate and Rituxan  Followed by rheumatology, Dr Hernandez Arzate   6  R Breast cancer- Followed by oncology  Colonoscopy on Wednesday with Dr Guthrie Marrow  Follow up in six months, labs prior

## 2021-08-09 ENCOUNTER — HOSPITAL ENCOUNTER (OUTPATIENT)
Dept: RADIOLOGY | Facility: HOSPITAL | Age: 81
Discharge: HOME | End: 2021-08-09
Payer: MEDICARE

## 2021-08-09 ENCOUNTER — OFFICE VISIT (OUTPATIENT)
Dept: INTERNAL MEDICINE CLINIC | Facility: CLINIC | Age: 81
End: 2021-08-09
Payer: MEDICARE

## 2021-08-09 VITALS
DIASTOLIC BLOOD PRESSURE: 80 MMHG | RESPIRATION RATE: 16 BRPM | OXYGEN SATURATION: 95 % | HEART RATE: 55 BPM | SYSTOLIC BLOOD PRESSURE: 130 MMHG | WEIGHT: 196 LBS | HEIGHT: 65 IN | BODY MASS INDEX: 32.65 KG/M2 | TEMPERATURE: 100.2 F

## 2021-08-09 DIAGNOSIS — M54.50 ACUTE BILATERAL LOW BACK PAIN WITHOUT SCIATICA: ICD-10-CM
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
## 2021-08-09 PROCEDURE — 72072 X-RAY EXAM THORAC SPINE 3VWS: CPT

## 2021-08-09 PROCEDURE — 72100 X-RAY EXAM L-S SPINE 2/3 VWS: CPT

## 2021-08-09 PROCEDURE — 99213 OFFICE O/P EST LOW 20 MIN: CPT | Performed by: NURSE PRACTITIONER

## 2021-08-09 NOTE — PATIENT INSTRUCTIONS
Recent fall with low and mid-back pain- XR thoracic and lumbar spine ordered  Continue Tylenol for pain relief  We will call you with results

## 2021-08-09 NOTE — PROGRESS NOTES
Assessment/Plan:    Recent fall with low and mid-back pain- XR thoracic and lumbar spine ordered  Continue Tylenol for pain relief  We will call you with results  Diagnoses and all orders for this visit:    Fall, initial encounter  -     XR spine lumbar 2 or 3 views injury; Future  -     XR spine thoracic 3 vw; Future    Acute bilateral low back pain without sciatica  -     XR spine lumbar 2 or 3 views injury; Future  -     XR spine thoracic 3 vw; Future    The patient was counseled regarding instructions for management, risk factor reductions, patient and family education,impressions, risks and benefits of treatment options, side effects of medications, importance of compliance with treatment  The treatment plan was reviewed with the patient/guardian and patient/guardian understands and agrees with the treatment plan  Current Outpatient Medications:     ascorbic acid (VITAMIN C) 500 mg tablet, Take 500 mg by mouth daily  , Disp: , Rfl:     Calcium Citrate-Vitamin D (CITRACAL + D PO), Take by mouth 2 (two) times a day, Disp: , Rfl:     carvedilol (COREG) 25 mg tablet, Take 1 tablet (25 mg total) by mouth 2 (two) times a day with meals, Disp: 180 tablet, Rfl: 2    Cholecalciferol (VITAMIN D-3 PO), Take 2,000 Units by mouth daily  , Disp: , Rfl:     folic acid (FOLVITE) 1 mg tablet, 1 daily, Disp: 90 tablet, Rfl: 2    hydrochlorothiazide (HYDRODIURIL) 25 mg tablet, TAKE 1 TABLET BY MOUTH EVERY DAY, Disp: 90 tablet, Rfl: 2    latanoprost (XALATAN) 0 005 % ophthalmic solution, , Disp: , Rfl:     levothyroxine (Synthroid) 50 mcg tablet, Take 1 tablet (50 mcg total) by mouth every other day, Disp: 45 tablet, Rfl: 3    levothyroxine 75 mcg tablet, Take 1 tab po every other day, alternating with 50 mcg, Disp: 45 tablet, Rfl: 3    magnesium oxide (MAG-OX) 400 mg, Take 1 tablet (400 mg total) by mouth 2 (two) times a day, Disp: 90 tablet, Rfl: 0    methotrexate 2 5 mg tablet, Take 4 tablets (10 mg total) by mouth once a week (Patient taking differently: Take 15 mg by mouth once a week ), Disp: 12 tablet, Rfl:     Multiple Vitamin (MULTIVITAMIN) tablet, Take 1 tablet by mouth daily  , Disp: , Rfl:     potassium chloride (Klor-Con M10) 10 mEq tablet, Take 2 tablets (20 mEq total) by mouth 2 (two) times a day, Disp: 180 tablet, Rfl: 2    Psyllium (METAMUCIL) WAFR, Take 2 twice a day, Disp: 100 Wafer, Rfl: 5    riTUXimab (RITUXAN) 500 mg/50 mL, Infuse into a venous catheter every 6 (six) months  , Disp: , Rfl:     rosuvastatin (CRESTOR) 5 mg tablet, Take 1 tablet (5 mg total) by mouth daily, Disp: 90 tablet, Rfl: 2    Na Sulfate-K Sulfate-Mg Sulf (Suprep Bowel Prep Kit) 17 5-3 13-1 6 GM/177ML SOLN, Take 1 kit by mouth once for 1 dose, Disp: 177 mL, Rfl: 0    Subjective:      Patient ID: Dexter Mariano is a 79 y o  female  Shedd [place] in the kitchen  No loss of consciousness, did not hit head  Landed on buttocks  Now has mid and low back pain  She is taking three Tylenol for pain, which does diminish the pain  The following portions of the patient's history were reviewed and updated as appropriate:   She has a past medical history of Allergic angioedema, Angioedema, Arthritis, Breast cancer ([address] ) (2012), Disease of thyroid gland, Hemorrhage of anus and rectum, Hyperlipidemia, Hypertension, Hypocalcemia, Neuritis, Peptic ulcer, Rheumatoid arthritis ([address] ), Stage 3b chronic kidney disease ([address] ) (5/7/2021), and Vitamin D deficiency  ,  does not have any pertinent problems on file  ,   has a past surgical history that includes Abdominal surgery; Breast surgery; Cholecystectomy; Elbow surgery; orthopedic surgery; Breast biopsy (Right, 2012); and Breast lumpectomy (Left, 2012)  ,  family history includes Breast cancer in her maternal aunt, maternal aunt, maternal aunt, maternal aunt, maternal aunt, maternal uncle, and mother; Kidney failure in her father; Lung cancer in her brother; Other in her father  ,   reports that she has never smoked  She has never used smokeless tobacco  She reports previous alcohol use  She reports that she does not use drugs  ,  is allergic to lisinopril, codeine, other, oxycodone, penicillins, sulfamethoxazole-trimethoprim, and ciprofloxacin       Review of Systems   Constitutional: Negative  Respiratory: Negative  Cardiovascular: Negative  Musculoskeletal: Positive for back pain  Psychiatric/Behavioral: Negative            Objective:  /80   Pulse 55   Temp 100 2 °F (37 9 °C)   Resp 16   Ht 5' 5" (1 651 m)   Wt 88 9 kg (196 lb)   LMP  (LMP Unknown)   SpO2 95%   BMI 32 62 kg/m²     Lab Review  Appointment on 07/27/2021   Component Date Value    WBC 07/27/2021 4 91     RBC 07/27/2021 3 60*    Hemoglobin 07/27/2021 11 3*    Hematocrit 07/27/2021 35 2     MCV 07/27/2021 98     MCH 07/27/2021 31 4     MCHC 07/27/2021 32 1     RDW 07/27/2021 14 8     MPV 07/27/2021 10 1     Platelets 22/29/4339 217     nRBC 07/27/2021 0     Neutrophils Relative 07/27/2021 61     Immat GRANS % 07/27/2021 0     Lymphocytes Relative 07/27/2021 22     Monocytes Relative 07/27/2021 10     Eosinophils Relative 07/27/2021 6     Basophils Relative 07/27/2021 1     Neutrophils Absolute 07/27/2021 3 00     Immature Grans Absolute 07/27/2021 0 02     Lymphocytes Absolute 07/27/2021 1 06     Monocytes Absolute 07/27/2021 0 49     Eosinophils Absolute 07/27/2021 0 29     Basophils Absolute 07/27/2021 0 05     Sodium 07/27/2021 141     Potassium 07/27/2021 3 9     Chloride 07/27/2021 103     CO2 07/27/2021 30     ANION GAP 07/27/2021 8     BUN 07/27/2021 21     Creatinine 07/27/2021 1 43*    Glucose, Fasting 07/27/2021 105*    Calcium 07/27/2021 8 6     AST 07/27/2021 24     ALT 07/27/2021 20     Alkaline Phosphatase 07/27/2021 65     Total Protein 07/27/2021 7 1     Albumin 07/27/2021 3 7     Total Bilirubin 07/27/2021 0 85     eGFR 07/27/2021 35     Cholesterol 07/27/2021 188  Triglycerides 07/27/2021 118     HDL, Direct 07/27/2021 46     LDL Calculated 07/27/2021 118*    Non-HDL-Chol (CHOL-HDL) 07/27/2021 142     TSH 3RD GENERATON 07/27/2021 3 110     CRP 07/27/2021 10 3*    Sed Rate 07/27/2021 37*        Imaging: Mammo screening bilateral w 3d & cad    Result Date: 7/21/2021  Narrative: DIAGNOSIS: Encounter for screening mammogram for malignant neoplasm of breast ; Adenocarcinoma of breast, unspecified laterality ([facility] ) TECHNIQUE: Digital screening mammography was performed  Computer Aided Detection (CAD) analyzed all applicable images  COMPARISONS: Prior breast imaging dated: 06/03/2020, 04/25/2019, 04/17/2018, 04/10/2018, 03/08/2017, and 03/07/2016 RELEVANT HISTORY: Family Breast Cancer History: History of breast cancer in Mother, Maternal Aunt, Maternal Aunt, Maternal Aunt, Maternal Aunt, Maternal Aunt, Maternal Uncle  Family Medical History: Family medical history includes breast cancer in 7 relatives (maternal aunt, maternal aunt, maternal aunt, maternal aunt, maternal aunt, maternal uncle, mother)  Personal History: No known relevant hormone history  Surgical history includes breast biopsy and lumpectomy  Medical history includes breast cancer  The patient is scheduled in a reminder system for screening mammography  8-10% of cancers will be missed on mammography  Management of a palpable abnormality must be based on clinical grounds  Patients will be notified of their results via letter from our facility  Accredited by [employer] of [employer] and FDA  RISK ASSESSMENT: Tyrer-Pablozick risk assessment reporting was suppressed due to the patient's history and/or demographic factors  TISSUE DENSITY: There are scattered areas of fibroglandular density  INDICATION: Owen Barnett is a 79 y o  female presenting for screening mammography   FINDINGS: RIGHT 1) POST-SURGICAL FINDING: There are post-surgical findings from a previous lumpectomy seen in the upper outer quadrant of the right breast in the posterior depth  Compared to the previous study, there are no significant changes  Left There are no suspicious masses, grouped microcalcifications or areas of architectural distortion  The skin and nipple areolar complex are unremarkable  Several benign calcifications are stable  Impression: No mammographic evidence of malignancy  ASSESSMENT/BI-RADS CATEGORY: Left: 2 - Benign Right: 2 - Benign Overall: 2 - Benign RECOMMENDATION:      - Routine screening mammogram in 1 year for both breasts  Workstation ID: [id number]      Physical Exam  Constitutional:       Appearance: She is well-developed  Cardiovascular:      Rate and Rhythm: Normal rate and regular rhythm  Heart sounds: Normal heart sounds  Pulmonary:      Effort: Pulmonary effort is normal       Breath sounds: Normal breath sounds  Musculoskeletal:         General: Normal range of motion  Neurological:      Mental Status: She is alert and oriented to person, place, and time  Deep Tendon Reflexes: Reflexes are normal and symmetric  Psychiatric:         Behavior: Behavior normal          Thought Content:  Thought content normal          Judgment: Judgment normal

## 2021-08-11 ENCOUNTER — TELEPHONE (OUTPATIENT)
Dept: INTERNAL MEDICINE CLINIC | Facility: CLINIC | Age: 81
End: 2021-08-11

## 2021-08-12 ENCOUNTER — TELEPHONE (OUTPATIENT)
Dept: INTERNAL MEDICINE CLINIC | Facility: CLINIC | Age: 81
End: 2021-08-12

## 2021-08-12 NOTE — TELEPHONE ENCOUNTER
Rad called,  Significant findings found by them on her xrays    Please advise,     They said balwinder cardosot is off , so maybe another provider can look see on for now?

## 2021-08-13 ENCOUNTER — TELEPHONE (OUTPATIENT)
Dept: INTERNAL MEDICINE CLINIC | Facility: CLINIC | Age: 81
End: 2021-08-13

## 2021-08-16 ENCOUNTER — OFFICE VISIT (OUTPATIENT)
Dept: OBGYN CLINIC | Facility: CLINIC | Age: 81
End: 2021-08-16
Payer: MEDICARE

## 2021-08-16 VITALS
DIASTOLIC BLOOD PRESSURE: 86 MMHG | SYSTOLIC BLOOD PRESSURE: 134 MMHG | WEIGHT: 197.8 LBS | BODY MASS INDEX: 32.96 KG/M2 | HEIGHT: 65 IN | HEART RATE: 78 BPM

## 2021-08-16 DIAGNOSIS — S22.000A COMPRESSION FRACTURE OF BODY OF THORACIC VERTEBRA (HCC): ICD-10-CM

## 2021-08-16 PROCEDURE — 99203 OFFICE O/P NEW LOW 30 MIN: CPT | Performed by: ORTHOPAEDIC SURGERY

## 2021-08-16 NOTE — PROGRESS NOTES
[address] Gurmeet Evans MD  [address] [facility] [address]  [phone]    HISTORY OF PRESENT ILLNESS:  The patient presents for initial evaluation of thoracic spine  She did fall 8/8/2021 in which she slipped in her kitchen  She was seen at the ED including radiograph displaying chronic T6 and T8 fracture and T12 superior endplate compression  Today she complains of low back pain  She denies radiating symptoms yet mentions 20 year history of radiating leg symptoms alleviated since this recent fall  She rates her pain at 0/10 and 5/10 at its worse  She denies past lumbar injections or surgery  She does have RA  ALLERGIES:   Allergies   Allergen Reactions    Lisinopril Anaphylaxis    Codeine Other (See Comments)     Nausea/vomiting      Other      Annotation - [id number]: tounge swelling    Oxycodone      Annotation - [id number]: NOT TOLERATE    Penicillins Hives    Sulfamethoxazole-Trimethoprim     Ciprofloxacin Rash       MEDICATIONS:    Current Outpatient Medications:     ascorbic acid (VITAMIN C) 500 mg tablet, Take 500 mg by mouth daily  , Disp: , Rfl:     Calcium Citrate-Vitamin D (CITRACAL + D PO), Take by mouth 2 (two) times a day, Disp: , Rfl:     carvedilol (COREG) 25 mg tablet, Take 1 tablet (25 mg total) by mouth 2 (two) times a day with meals, Disp: 180 tablet, Rfl: 2    Cholecalciferol (VITAMIN D-3 PO), Take 2,000 Units by mouth daily  , Disp: , Rfl:     folic acid (FOLVITE) 1 mg tablet, 1 daily, Disp: 90 tablet, Rfl: 2    hydrochlorothiazide (HYDRODIURIL) 25 mg tablet, TAKE 1 TABLET BY MOUTH EVERY DAY, Disp: 90 tablet, Rfl: 2    latanoprost (XALATAN) 0 005 % ophthalmic solution, , Disp: , Rfl:     levothyroxine (Synthroid) 50 mcg tablet, Take 1 tablet (50 mcg total) by mouth every other day, Disp: 45 tablet, Rfl: 3    levothyroxine 75 mcg tablet, Take 1 tab po every other day, alternating with 50 mcg, Disp: 45 tablet, Rfl: 3    magnesium oxide (MAG-OX) 400 mg, Take 1 tablet (400 mg total) by mouth 2 (two) times a day, Disp: 90 tablet, Rfl: 0    methotrexate 2 5 mg tablet, Take 4 tablets (10 mg total) by mouth once a week (Patient taking differently: Take 15 mg by mouth once a week ), Disp: 12 tablet, Rfl:     Multiple Vitamin (MULTIVITAMIN) tablet, Take 1 tablet by mouth daily  , Disp: , Rfl:     potassium chloride (Klor-Con M10) 10 mEq tablet, Take 2 tablets (20 mEq total) by mouth 2 (two) times a day, Disp: 180 tablet, Rfl: 2    Psyllium (METAMUCIL) WAFR, Take 2 twice a day, Disp: 100 Wafer, Rfl: 5    riTUXimab (RITUXAN) 500 mg/50 mL, Infuse into a venous catheter every 6 (six) months  , Disp: , Rfl:     rosuvastatin (CRESTOR) 5 mg tablet, Take 1 tablet (5 mg total) by mouth daily, Disp: 90 tablet, Rfl: 2    Na Sulfate-K Sulfate-Mg Sulf (Suprep Bowel Prep Kit) 17 5-3 13-1 6 GM/177ML SOLN, Take 1 kit by mouth once for 1 dose, Disp: 177 mL, Rfl: 0     PAST MEDICAL HISTORY:   Past Medical History:   Diagnosis Date    Allergic angioedema     [id number] resolved    Angioedema     [id number] resolved    Arthritis     Breast cancer ([facility] [address] ) 2012    right     Disease of thyroid gland     Hemorrhage of anus and rectum     [id number] resolved    Hyperlipidemia     Hypertension     Hypocalcemia     [id number] resolved    Neuritis     thoracic and lumbosacral    Peptic ulcer     Rheumatoid arthritis ([address] )     Stage 3b chronic kidney disease ([facility] [address] ) 5/7/2021    Vitamin D deficiency        PAST SURGICAL HISTORY:  Past Surgical History:   Procedure Laterality Date    ABDOMINAL SURGERY      BREAST BIOPSY Right 2012    BREAST LUMPECTOMY Left 2012    BREAST SURGERY      CHOLECYSTECTOMY      ELBOW SURGERY      ORTHOPEDIC SURGERY      for toes       SOCIAL HISTORY:  Social History     Tobacco Use   Smoking Status Never Smoker   Smokeless Tobacco Never Used        ROS:  Review of Systems   Musculoskeletal: Positive for back pain          PHYSICAL EXAM:  Cata 79year-old individual   Ambulates with a shuffling gait  Normal sensation 5/5 motor function bilateral lower extremities  Discomfort at the thoracolumbar junction  No some deformity in the proximal lower extremity  Significant deformity involving bilateral hands consistent with chronic rheumatoid arthritis  RADIOGRAPHIC STUDIES:  1  X-rays T-spine 08/09/2021, T12 wedge deformity consistent with superior endplate fracture  Unable to assess chronology  Radiologist noted other fractures at T6 and T8 which I could not see on the films  2  X-rays L-spine 08/09/2021 multilevel lumbar degenerative disc disease  T12 compression fracture  No evidence instability  ASSESSMENT:  1  Compression fracture of body of thoracic vertebra [facility])  -     Ambulatory referral to Orthopedic Surgery      PLAN:  29-year-old presents with acute thoracolumbar discomfort consistent with a compression fracture  X-rays from August 9th demonstrate a wedge deformity at T12  Radiologist also noted additional findings at T6 and T8 which did not appear to be present  There was also indication discomfort on examination of the mid to upper thoracic spine  Natural history of compression fracture was discussed  She has been using Tylenol intermittently  She may continue to use the Tylenol as needed  Am going to order aqua therapy and will see her back in 1 month  If she is not better by then, MRI study lumbar spine will be ordered to better evaluate the fracture site           Scribe Attestation    I,:  Steve Caceres am acting as a scribe while in the presence of the attending physician :       I,:  Bairon Ivy MD personally performed the services described in this documentation    as scribed in my presence :

## 2021-09-07 DIAGNOSIS — R60.0 LOCALIZED EDEMA: ICD-10-CM

## 2021-09-07 DIAGNOSIS — I10 HYPERTENSION, UNSPECIFIED TYPE: Chronic | ICD-10-CM

## 2021-09-07 RX ORDER — HYDROCHLOROTHIAZIDE 25 MG/1
TABLET ORAL
Qty: 90 TABLET | Refills: 2 | Status: SHIPPED | OUTPATIENT
Start: 2021-09-07 | End: 2022-06-01

## 2021-10-02 DIAGNOSIS — E03.9 HYPOTHYROIDISM, UNSPECIFIED TYPE: Chronic | ICD-10-CM

## 2021-10-02 DIAGNOSIS — I10 HYPERTENSION, UNSPECIFIED TYPE: Chronic | ICD-10-CM

## 2021-10-04 RX ORDER — CARVEDILOL 25 MG/1
TABLET ORAL
Qty: 180 TABLET | Refills: 2 | Status: SHIPPED | OUTPATIENT
Start: 2021-10-04 | End: 2022-01-18 | Stop reason: SDUPTHER

## 2021-10-04 RX ORDER — LEVOTHYROXINE SODIUM 50 MCG
TABLET ORAL
Qty: 45 TABLET | Refills: 3 | Status: SHIPPED | OUTPATIENT
Start: 2021-10-04 | End: 2022-01-18 | Stop reason: SDUPTHER

## 2021-10-05 ENCOUNTER — CLINICAL SUPPORT (OUTPATIENT)
Dept: INTERNAL MEDICINE CLINIC | Facility: CLINIC | Age: 81
End: 2021-10-05
Payer: MEDICARE

## 2021-10-05 DIAGNOSIS — Z23 NEED FOR IMMUNIZATION AGAINST INFLUENZA: Primary | ICD-10-CM

## 2021-10-05 PROCEDURE — 90662 IIV NO PRSV INCREASED AG IM: CPT

## 2021-10-05 PROCEDURE — G0008 ADMIN INFLUENZA VIRUS VAC: HCPCS

## 2021-10-27 ENCOUNTER — OFFICE VISIT (OUTPATIENT)
Dept: INTERNAL MEDICINE CLINIC | Facility: CLINIC | Age: 81
End: 2021-10-27
Payer: MEDICARE

## 2021-10-27 VITALS
RESPIRATION RATE: 16 BRPM | DIASTOLIC BLOOD PRESSURE: 80 MMHG | HEIGHT: 65 IN | TEMPERATURE: 99.3 F | SYSTOLIC BLOOD PRESSURE: 144 MMHG | BODY MASS INDEX: 32.99 KG/M2 | HEART RATE: 65 BPM | OXYGEN SATURATION: 92 % | WEIGHT: 198 LBS

## 2021-10-27 DIAGNOSIS — R09.82 POST-NASAL DRIP: Primary | ICD-10-CM

## 2021-10-27 DIAGNOSIS — R05.9 COUGH: ICD-10-CM

## 2021-10-27 PROCEDURE — 99213 OFFICE O/P EST LOW 20 MIN: CPT | Performed by: NURSE PRACTITIONER

## 2021-10-27 RX ORDER — LORATADINE 10 MG/1
10 TABLET ORAL DAILY
Qty: 90 TABLET | Refills: 0 | Status: SHIPPED | OUTPATIENT
Start: 2021-10-27 | End: 2022-01-24 | Stop reason: SDUPTHER

## 2021-10-27 RX ORDER — BENZONATATE 100 MG/1
100 CAPSULE ORAL 3 TIMES DAILY PRN
Qty: 30 CAPSULE | Refills: 0 | Status: SHIPPED | OUTPATIENT
Start: 2021-10-27 | End: 2022-02-09 | Stop reason: ALTCHOICE

## 2021-11-30 NOTE — PROGRESS NOTES
11/30/2021 continue on aspirin and statin when able to take PO.    HEMATOLOGY / [address] NOTE    Primary Care Provider: Logan Pearson DO  Referring Provider:    MRN: [id number]  : 1940    Reason for Encounter:    Chief Complaint   Patient presents with    Follow-up     3 month          History of Hematology / Oncology Illness:     Yoselin Kimball is a 67 y o  female who came in to establish care with oncology  Stage 1, right breast Cancer, invasive ductal carcinoma, ERPR positive, her 2 positive on FISH, grade 2     - previously under the care in [facility] by Dr Angi Ribera  D/X:  2012  Tr:  Status post lumpectomy, radiation, Herceptin, currently on tamoxifen, x 7  Years from  - 2019, stopped  due to DVT    Surveillance: The mammogram 2019:   Negative for malignancy      2  Deep vein thrombosis (DVT) of proximal vein of left lower extremity  - 10/2019 : 1st clot in life triggered by recent poison ivy infection    Assessment / Plan:     1  Malignant neoplasm of lower-outer quadrant of right breast of female, estrogen receptor positive ([facility] [address] )  - off tamoxifen due to side effect; continue survillence, no evidence of recurrence   Patient have yearly mammogram   Last mammogram in 2018   0 0 order another mammogram in the next visit        2  Deep vein thrombosis (DVT) of proximal vein of left lower extremity, unspecified chronicity (HCC)   - repeated doppler showed sub-acute / chronic DVT after 3 months of eliquis, after discussion with patient, would like to continue current Eliquis 5 mg b i d  for additional 3 months, will then repeat another Doppler  Showed complete resolution DVT, we may consider to stop blood thinner, if not , will switch dose to 2 5 mg for total 1 year  3, anemia    -likely anemia is due to multifactorial including chronic disease from rheumatoid arthritis, under treatment with methotrexate  -will continue follow-up  If the hemoglobin further drop, we will proceed with anemia workup         25   minutes were spent on this visit  All questions answered to satisfaction; Advised pt to call if there is any further questions  Interval History:     2/19:  Patient has history of rheumatoid arthritis, under active care with Rheumatology, overall stable  Patient came in today to establish care with oncology  Medical oncology history as above  Given today reported overall doing well, at baseline health status  She reported has no weight loss, no lumps bumps in breast, no constitutional symptoms including no bleeding, easy bruise, no fever chills, no night sweats, no lumps bumps  She is tolerating tamoxifen without major side effects  There is no history of DVT, endometrial cancers, she reported having bilateral eye cataract, status post cataract surgery, last one is in April of 2017  She has no other oncology, hematology issues  Colonoscopy was on time with Dr Azar Valle  Following Dr Christopher Riggs for GYN routine checkup  She reported smoked for short time and stopped in her 35s  Drink alcohol socially  Family history of oncology issues including brother has lung cancer, mother has colon cancers at age 46  She is currently , move in with her brother who suffered from lung cancer  She currently living close to [place] about 45 minutes from our office  8/20/2018: Given her follow-up  Doing well, no conscious symptoms  No new breast lumps bumps, no bone pain  No vaginal bleeding, no new vision change  8/26/2019:  Came for follow-up  Reported doing well no issues  No new lumps bumps  Patient is following breast surgeon [facility]  11/4/2019:  Came in for follow-up  Reported doing well  No new lumps bumps  Developed new left lower DVT, appears triggered by recent poison ivy infection, infections under control  Developed leg swelling and comfort miss, reported symptom has been much better      1/31/2020:  Came for follow-up, doing well, body weight stable, no bleeding  No new lumps bumps        Problem list:       Patient Active Problem List   Diagnosis    Left ankle pain    Hypertension    Rheumatoid arthritis ([address] )    Hypothyroidism    Hyperuricemia    Abnormal blood chemistry    Diastolic dysfunction    Hyperlipidemia    Left hip pain    Lumbar radiculopathy    Murmur    Osteopenia    Other chronic pain    Vitamin D deficiency    Adenocarcinoma of breast ([address] )    Localized edema    Abnormal finding on diagnostic imaging of kidney    Fat necrosis (segmental) of breast    Personal history of breast cancer    History of peptic ulcer    Early dry stage nonexudative 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 eyes    Healthcare maintenance    Interstitial pulmonary disease (HCC)         PHYSICIAL EXAMINATION:     Vital Signs:   [unfilled]  Body mass index is 33 78 kg/m²  Body surface area is 1 99 meters squared  No major findings on physical examination  No lymphadenopathy  GEN: Alert, awake oriented x3, in no acute distress  HEENT- No pallor, icterus, cyanosis, no oral mucosal lesions,   LAD - no palpable cervical, clavicle, axillary, inguinal LAD  Heart- normal S1 S2, regular rate and rhythm, No murmur, rubs  Lungs- clear breathing sound bilateral    Abdomen- soft, Non tender, bowel sounds present  Extremities- ++ edema, bilateral finger in joint deformity consistent with rheumatoid arthritis  Neuro- No focal neurological deficit           PAST MEDICAL HISTORY:   has a past medical history of Allergic angioedema, Angioedema, Arthritis, Breast cancer ([address] ), Disease of thyroid gland, Hemorrhage of anus and rectum, Hyperlipidemia, Hypertension, Hypocalcemia, Neuritis, Peptic ulcer, Rheumatoid arthritis ([address] ), and Vitamin D deficiency  PAST SURGICAL HISTORY:   has a past surgical history that includes Abdominal surgery; Breast surgery; Cholecystectomy; Elbow surgery; and orthopedic surgery      CURRENT MEDICATIONS:     Current Outpatient Medications   Medication Sig Dispense Refill    apixaban (ELIQUIS) 5 mg Take 1 tablet (5 mg total) by mouth 2 (two) times a day 180 tablet 2    ascorbic acid (VITAMIN C) 500 mg tablet Take 500 mg by mouth daily   Calcium Citrate-Vitamin D (CITRACAL + D PO) Take by mouth 2 (two) times a day      carvedilol (COREG) 25 mg tablet Take 1 tablet (25 mg total) by mouth 2 (two) times a day with meals 180 tablet 2    Cholecalciferol (VITAMIN D-3 PO) Take 2,000 Units by mouth daily   folic acid (FOLVITE) 1 mg tablet 1 daily 90 tablet 2    hydrochlorothiazide (HYDRODIURIL) 25 mg tablet Take 1 tablet (25 mg total) by mouth daily 90 tablet 2    KLOR-CON M10 10 MEQ tablet TAKE 2 TABLETS BY MOUTH TWICE A  tablet 2    levothyroxine 50 mcg tablet Take 1 tablet (50 mcg total) by mouth every other day 45 tablet 3    levothyroxine 75 mcg tablet Take 1 tab po every other day, alternating with 50 mcg 45 tablet 3    magnesium oxide (MAG-OX) 400 mg Take 1 tablet (400 mg total) by mouth 2 (two) times a day 90 tablet 0    methotrexate 2 5 mg tablet Take 10 mg by mouth once a week   Multiple Vitamin (MULTIVITAMIN) tablet Take 1 tablet by mouth daily   pantoprazole (PROTONIX) 40 mg tablet Take 1 tablet (40 mg total) by mouth daily (Patient not taking: Reported on 12/19/2019) 90 tablet 2    Poison Ivy Treatments (ZANFEL) MISC Apply 1 application topically once as needed (poison ivy) for up to 7 days 30 g 0    riTUXimab (RITUXAN) 500 mg/50 mL Infuse into a venous catheter every 6 (six) months        rosuvastatin (CRESTOR) 5 mg tablet Take 1 tablet (5 mg total) by mouth daily 90 tablet 2     No current facility-administered medications for this visit  [unfilled]    SOCIAL HISTORY:   reports that she has never smoked  She has never used smokeless tobacco  She reports that she drank alcohol  She reports that she does not use drugs       FAMILY HISTORY:  family history includes Breast cancer in her maternal aunt, mother, and sister; Kidney failure in her father; Other in her father  ALLERGIES:  is allergic to lisinopril; codeine; other; oxycodone; penicillins; sulfamethoxazole-trimethoprim; and ciprofloxacin  REVIEW OF SYSTEMS:  Please note that a 14-point review of systems was performed to include Constitutional, HEENT, Respiratory, CVS, GI, , Musculoskeletal, Integumentary, Neurologic, Rheumatologic, Endocrinologic, Psychiatric, Lymphatic, and Hematologic/Oncologic systems were reviewed and are negative unless otherwise stated in HPI  Positive and negative findings pertinent to this evaluation are incorporated into the history of present illness  LAB:    Lab Results   Component Value Date    WBC 5 23 12/09/2019    HGB 10 6 (L) 12/09/2019    HCT 34 0 (L) 12/09/2019     (H) 12/09/2019     12/09/2019       Lab Results   Component Value Date    K 3 5 12/09/2019     12/09/2019    CO2 28 12/09/2019    BUN 19 12/09/2019    CREATININE 1 17 12/09/2019    GLUF 95 12/09/2019    CALCIUM 9 0 12/09/2019    AST 22 12/09/2019    ALT 21 12/09/2019    ALKPHOS 44 (L) 12/09/2019    PROT 6 1 (L) 09/25/2015    BILITOT 0 31 09/25/2015    EGFR 45 12/09/2019       IMAGING:    VAS lower limb venous duplex study, unilateral/limited    (Results Pending)     Ct Ankle Left Wo Contrast    Result Date: 2/5/2018  Narrative: CT LEFT ANKLE INDICATION: ankle pain  History taken directly from the electronic ordering system  Ankle pain  Unable to bear weight  No trauma  Rheumatoid arthropathy  COMPARISON: None  TECHNIQUE: Serial Axial CT images were acquired through the left ankle  Coronal and sagittal reformation images were also obtained  Contrast material was not utilized    This examination, like all CT scans performed in the [facility], was performed utilizing techniques to minimize radiation dose exposure, including the use of iterative reconstruction and automated exposure control  Rad dose 238 mGy-cm FINDINGS: JOINT EFFUSION: Joint effusion noted  ALIGNMENT: Increase in talar declination angle suggests chronic PTT dysfunction  This results in pes planus deformity  OSSEOUS STRUCTURES: Advanced degenerative change noted throughout the visualized midfoot and hindfoot with apparent spontaneous talonavicular fusion related to long-standing degenerative change  No acute fracture dislocation or osseous destructive lesion identified  Incomplete evaluation of lateral deviation of the 2nd through 5th toes with erosive changes and pencil in cup deformity noted across the metatarsal phalangeal joints  Findings consistent with underlying inflammatory arthropathy  TENDONS: Limited assessment by CT technique without gross evidence of tear  LIGAMENTS: Limited assessment by CT technique without gross evidence of tear  MUSCULATURE: Intact  REMAINING SOFT TISSUES: Loculated collection noted along the plantar soft tissues at the level of the dorsal calcaneus measuring up to 4 3 x 3 7 x 2 1 cm  There is some wall calcification noted suggesting chronicity and presumably related to rheumatoid disease  Impression: 1  Advanced degenerative change throughout the foot including suggestive findings of inflammatory arthropathy, such as rheumatoid, across the 2nd through 5th metatarsophalangeal joints as above  There is also spontaneous fusion across the talonavicular  joint likely secondary to long-standing degenerative change  2   Pes planus deformity and suggestive findings of chronic PTT dysfunction  3   Loculated plantar hindfoot fluid collection measuring up to 4 3 x 3 7 x 2 1 cm likely chronic given scant wall calcification  This could relate to rheumatoid disease with infection not entirely excluded but considerably less likely  Correlate clinically   Findings are consistent with the preliminary report from [facility] which was provided shortly after completion of the exam  The finding of plantar loculated collection  may alter immediate patient management, therefore we will now contact referring physicians for direct verbal discussion  I personally discussed this study with Jean Pierre Barrientos, the nurse caring for this patient, on 2/5/2018 8:44 AM   Unfortunately, the referring physician could not be reached at this time   Workstation performed: [id number]

## 2021-12-13 ENCOUNTER — APPOINTMENT (OUTPATIENT)
Dept: LAB | Facility: HOSPITAL | Age: 81
End: 2021-12-13
Payer: MEDICARE

## 2021-12-13 DIAGNOSIS — M05.79 SEROPOSITIVE RHEUMATOID ARTHRITIS OF MULTIPLE SITES (HCC): ICD-10-CM

## 2021-12-13 LAB
25(OH)D3 SERPL-MCNC: 50.6 NG/ML (ref 30–100)
ALBUMIN SERPL BCP-MCNC: 3.8 G/DL (ref 3.5–5)
ALP SERPL-CCNC: 67 U/L (ref 46–116)
ALT SERPL W P-5'-P-CCNC: 22 U/L (ref 12–78)
AST SERPL W P-5'-P-CCNC: 24 U/L (ref 5–45)
BASOPHILS # BLD AUTO: 0.04 THOUSANDS/ΜL (ref 0–0.1)
BASOPHILS NFR BLD AUTO: 1 % (ref 0–1)
BILIRUB DIRECT SERPL-MCNC: 0.18 MG/DL (ref 0–0.2)
BILIRUB SERPL-MCNC: 0.63 MG/DL (ref 0.2–1)
CALCIUM SERPL-MCNC: 9.1 MG/DL (ref 8.3–10.1)
CREAT SERPL-MCNC: 1.25 MG/DL (ref 0.6–1.3)
CRP SERPL QL: 6.6 MG/L
EOSINOPHIL # BLD AUTO: 0.22 THOUSAND/ΜL (ref 0–0.61)
EOSINOPHIL NFR BLD AUTO: 3 % (ref 0–6)
ERYTHROCYTE [DISTWIDTH] IN BLOOD BY AUTOMATED COUNT: 16 % (ref 11.6–15.1)
ERYTHROCYTE [SEDIMENTATION RATE] IN BLOOD: 37 MM/HOUR (ref 0–29)
GFR SERPL CREATININE-BSD FRML MDRD: 41 ML/MIN/1.73SQ M
HCT VFR BLD AUTO: 35.5 % (ref 34.8–46.1)
HGB BLD-MCNC: 11.4 G/DL (ref 11.5–15.4)
IMM GRANULOCYTES # BLD AUTO: 0.02 THOUSAND/UL (ref 0–0.2)
IMM GRANULOCYTES NFR BLD AUTO: 0 % (ref 0–2)
LYMPHOCYTES # BLD AUTO: 0.82 THOUSANDS/ΜL (ref 0.6–4.47)
LYMPHOCYTES NFR BLD AUTO: 12 % (ref 14–44)
MCH RBC QN AUTO: 33 PG (ref 26.8–34.3)
MCHC RBC AUTO-ENTMCNC: 32.1 G/DL (ref 31.4–37.4)
MCV RBC AUTO: 103 FL (ref 82–98)
MONOCYTES # BLD AUTO: 0.69 THOUSAND/ΜL (ref 0.17–1.22)
MONOCYTES NFR BLD AUTO: 10 % (ref 4–12)
NEUTROPHILS # BLD AUTO: 4.91 THOUSANDS/ΜL (ref 1.85–7.62)
NEUTS SEG NFR BLD AUTO: 74 % (ref 43–75)
NRBC BLD AUTO-RTO: 0 /100 WBCS
PLATELET # BLD AUTO: 219 THOUSANDS/UL (ref 149–390)
PMV BLD AUTO: 10 FL (ref 8.9–12.7)
PROT SERPL-MCNC: 7.1 G/DL (ref 6.4–8.2)
PTH-INTACT SERPL-MCNC: 45.7 PG/ML (ref 18.4–80.1)
RBC # BLD AUTO: 3.45 MILLION/UL (ref 3.81–5.12)
WBC # BLD AUTO: 6.7 THOUSAND/UL (ref 4.31–10.16)

## 2021-12-13 PROCEDURE — 85025 COMPLETE CBC W/AUTO DIFF WBC: CPT

## 2021-12-13 PROCEDURE — 83970 ASSAY OF PARATHORMONE: CPT

## 2021-12-13 PROCEDURE — 82310 ASSAY OF CALCIUM: CPT

## 2021-12-13 PROCEDURE — 82306 VITAMIN D 25 HYDROXY: CPT

## 2021-12-13 PROCEDURE — 86140 C-REACTIVE PROTEIN: CPT

## 2021-12-13 PROCEDURE — 84165 PROTEIN E-PHORESIS SERUM: CPT

## 2021-12-13 PROCEDURE — 84165 PROTEIN E-PHORESIS SERUM: CPT | Performed by: PATHOLOGY

## 2021-12-13 PROCEDURE — 36415 COLL VENOUS BLD VENIPUNCTURE: CPT

## 2021-12-13 PROCEDURE — 80076 HEPATIC FUNCTION PANEL: CPT

## 2021-12-13 PROCEDURE — 82565 ASSAY OF CREATININE: CPT

## 2021-12-13 PROCEDURE — 85652 RBC SED RATE AUTOMATED: CPT

## 2021-12-14 LAB
ALBUMIN SERPL ELPH-MCNC: 4.15 G/DL (ref 3.5–5)
ALBUMIN SERPL ELPH-MCNC: 64.9 % (ref 52–65)
ALPHA1 GLOB SERPL ELPH-MCNC: 0.3 G/DL (ref 0.1–0.4)
ALPHA1 GLOB SERPL ELPH-MCNC: 4.7 % (ref 2.5–5)
ALPHA2 GLOB SERPL ELPH-MCNC: 0.81 G/DL (ref 0.4–1.2)
ALPHA2 GLOB SERPL ELPH-MCNC: 12.7 % (ref 7–13)
BETA GLOB ABNORMAL SERPL ELPH-MCNC: 0.35 G/DL (ref 0.4–0.8)
BETA1 GLOB SERPL ELPH-MCNC: 5.4 % (ref 5–13)
BETA2 GLOB SERPL ELPH-MCNC: 4.6 % (ref 2–8)
BETA2+GAMMA GLOB SERPL ELPH-MCNC: 0.29 G/DL (ref 0.2–0.5)
GAMMA GLOB ABNORMAL SERPL ELPH-MCNC: 0.49 G/DL (ref 0.5–1.6)
GAMMA GLOB SERPL ELPH-MCNC: 7.7 % (ref 12–22)
IGG/ALB SER: 1.85 {RATIO} (ref 1.1–1.8)
PROT PATTERN SERPL ELPH-IMP: ABNORMAL
PROT SERPL-MCNC: 6.4 G/DL (ref 6.4–8.2)

## 2021-12-29 ENCOUNTER — HOSPITAL ENCOUNTER (OUTPATIENT)
Dept: INFUSION CENTER | Facility: CLINIC | Age: 81
Discharge: HOME | End: 2021-12-29
Payer: MEDICARE

## 2021-12-29 VITALS — DIASTOLIC BLOOD PRESSURE: 64 MMHG | HEART RATE: 70 BPM | SYSTOLIC BLOOD PRESSURE: 136 MMHG | RESPIRATION RATE: 18 BRPM

## 2021-12-29 PROCEDURE — 96375 TX/PRO/DX INJ NEW DRUG ADDON: CPT

## 2021-12-29 PROCEDURE — 96413 CHEMO IV INFUSION 1 HR: CPT

## 2021-12-29 PROCEDURE — 96415 CHEMO IV INFUSION ADDL HR: CPT

## 2021-12-29 RX ORDER — DIPHENHYDRAMINE HCL 25 MG
50 TABLET ORAL ONCE
Status: COMPLETED | OUTPATIENT
Start: 2021-12-29 | End: 2021-12-29

## 2021-12-29 RX ORDER — SODIUM CHLORIDE 9 MG/ML
20 INJECTION, SOLUTION INTRAVENOUS CONTINUOUS
Status: DISCONTINUED | OUTPATIENT
Start: 2021-12-29 | End: 2022-01-01 | Stop reason: HOSPADM

## 2021-12-29 RX ORDER — METHYLPREDNISOLONE SODIUM SUCCINATE 125 MG/2ML
100 INJECTION, POWDER, LYOPHILIZED, FOR SOLUTION INTRAMUSCULAR; INTRAVENOUS ONCE
Status: COMPLETED | OUTPATIENT
Start: 2021-12-29 | End: 2021-12-29

## 2021-12-29 RX ORDER — ACETAMINOPHEN 325 MG/1
975 TABLET ORAL ONCE
Status: COMPLETED | OUTPATIENT
Start: 2021-12-29 | End: 2021-12-29

## 2021-12-29 RX ADMIN — METHYLPREDNISOLONE SODIUM SUCCINATE 100 MG: 125 INJECTION, POWDER, FOR SOLUTION INTRAMUSCULAR; INTRAVENOUS at 10:05

## 2021-12-29 RX ADMIN — SODIUM CHLORIDE 20 ML/HR: 0.9 INJECTION, SOLUTION INTRAVENOUS at 10:04

## 2021-12-29 RX ADMIN — RITUXIMAB 1000 MG: 10 INJECTION, SOLUTION INTRAVENOUS at 10:50

## 2021-12-29 RX ADMIN — DIPHENHYDRAMINE HCL 50 MG: 25 TABLET, COATED ORAL at 10:05

## 2021-12-29 RX ADMIN — ACETAMINOPHEN 975 MG: 325 TABLET, FILM COATED ORAL at 10:04

## 2021-12-29 NOTE — PROGRESS NOTES
Pt presents for Rituximab offering no compliants  Pt denies any antibiotic treatment or infection  Pt tolerated treatment without incident  AVS declined  Next appointment reviewed

## 2022-01-11 ENCOUNTER — TELEMEDICINE (OUTPATIENT)
Dept: INTERNAL MEDICINE CLINIC | Facility: CLINIC | Age: 82
End: 2022-01-11
Payer: MEDICARE

## 2022-01-11 VITALS — WEIGHT: 198 LBS | BODY MASS INDEX: 32.99 KG/M2 | HEIGHT: 65 IN

## 2022-01-11 DIAGNOSIS — D84.9 IMMUNOCOMPROMISED (HCC): ICD-10-CM

## 2022-01-11 DIAGNOSIS — R68.83 CHILLS: ICD-10-CM

## 2022-01-11 DIAGNOSIS — J06.9 UPPER RESPIRATORY TRACT INFECTION, UNSPECIFIED TYPE: Primary | ICD-10-CM

## 2022-01-11 PROCEDURE — U0005 INFEC AGEN DETEC AMPLI PROBE: HCPCS | Performed by: INTERNAL MEDICINE

## 2022-01-11 PROCEDURE — 99441 PR PHYS/QHP TELEPHONE EVALUATION 5-10 MIN: CPT | Performed by: INTERNAL MEDICINE

## 2022-01-11 PROCEDURE — U0003 INFECTIOUS AGENT DETECTION BY NUCLEIC ACID (DNA OR RNA); SEVERE ACUTE RESPIRATORY SYNDROME CORONAVIRUS 2 (SARS-COV-2) (CORONAVIRUS DISEASE [COVID-19]), AMPLIFIED PROBE TECHNIQUE, MAKING USE OF HIGH THROUGHPUT TECHNOLOGIES AS DESCRIBED BY CMS-2020-01-R: HCPCS | Performed by: INTERNAL MEDICINE

## 2022-01-11 RX ORDER — ACETAMINOPHEN 325 MG/1
975 TABLET ORAL ONCE
Status: DISCONTINUED | OUTPATIENT
Start: 2022-01-11 | End: 2022-01-15 | Stop reason: HOSPADM

## 2022-01-11 RX ORDER — SODIUM CHLORIDE 9 MG/ML
20 INJECTION, SOLUTION INTRAVENOUS CONTINUOUS
Status: DISCONTINUED | OUTPATIENT
Start: 2022-01-12 | End: 2022-01-15 | Stop reason: HOSPADM

## 2022-01-11 RX ORDER — DIPHENHYDRAMINE HCL 25 MG
50 TABLET ORAL ONCE
Status: DISCONTINUED | OUTPATIENT
Start: 2022-01-12 | End: 2022-01-15 | Stop reason: HOSPADM

## 2022-01-11 RX ORDER — METHYLPREDNISOLONE SODIUM SUCCINATE 125 MG/2ML
100 INJECTION, POWDER, LYOPHILIZED, FOR SOLUTION INTRAMUSCULAR; INTRAVENOUS ONCE
Status: DISCONTINUED | OUTPATIENT
Start: 2022-01-12 | End: 2022-01-15 | Stop reason: HOSPADM

## 2022-01-11 NOTE — PROGRESS NOTES
COVID-19 Outpatient Progress Note    Assessment/Plan:    Problem List Items Addressed This Visit     None      Visit Diagnoses     Upper respiratory tract infection, unspecified type    -  Primary    Relevant Orders    COVID Only- Office Collect    Immunocompromised ([address] )        Relevant Orders    COVID Only- Office Collect    Chills        Relevant Orders    COVID Only- Office Collect         Disposition:     Recommended patient to come to the office to test for COVID-19  I have spent 10 minutes directly with the patient  Greater than 50% of this time was spent in counseling/coordination of care regarding: instructions for management  Encounter provider Christopher Navas MD    Provider located at [address] [place] [address]  [address] [facility]  [facility] [address]  [address] [facility] [id number] [phone]    Recent Visits  No visits were found meeting these conditions  Showing recent visits within past 7 days and meeting all other requirements  Today's Visits  Date Type Provider Dept   01/11/22 Jeaneth Davis MD [facility] [address] today's visits and meeting all other requirements  Future Appointments  No visits were found meeting these conditions  Showing future appointments within next 150 days and meeting all other requirements     This virtual check-in was done via telephone and she agrees to proceed  Patient agrees to participate in a virtual check in via telephone or video visit instead of presenting to the office to address urgent/immediate medical needs  Patient is aware this is a billable service  After connecting through Telephone, the patient was identified by name and date of birth  Janki Roman was informed that this was a telemedicine visit and that the exam was being conducted confidentially over secure lines  My office door was closed  No one else was in the room   Janki Roman acknowledged consent and understanding of privacy and security of the telemedicine visit  I informed the patient that I have reviewed her record in Epic and presented the opportunity for her to ask any questions regarding the visit today  The patient agreed to participate  It was my intent to perform this visit via video technology but the patient was not able to do a video connection so the visit was completed via audio telephone only  Verification of patient location:  Patient is located in the following state in which I hold an active license: [place]    Subjective: Smith Malin is a [de-identified] y o  female who is concerned about COVID-19  Patient's symptoms include chills, nasal congestion and cough   Patient denies fever      - Date of symptom onset: 1/9/2022      COVID-19 vaccination status: Fully vaccinated with booster    Exposure:   Contact with a person who is under investigation (PUI) for or who is positive for COVID-19 within the last 14 days?: No    Hospitalized recently for fever and/or lower respiratory symptoms?: No      Currently a healthcare worker that is involved in direct patient care?: No      Works in a special setting where the risk of COVID-19 transmission may be high? (this may include long-term care, correctional and [facility] facilities; homeless shelters; assisted-living facilities and group homes ): No      Resident in a special setting where the risk of COVID-19 transmission may be high? (this may include long-term care, correctional and [facility] facilities; homeless shelters; assisted-living facilities and group homes ): No      Lab Results   Component Value Date    [address] Not Detected 07/29/2021     Past Medical History:   Diagnosis Date    Allergic angioedema     [id number] resolved    Angioedema     [id number] resolved    Arthritis     Breast cancer ([facility] [address] ) 2012    right     Disease of thyroid gland     Hemorrhage of anus and rectum     [id number] resolved    Hyperlipidemia     Hypertension     Hypocalcemia     [id number] resolved    Neuritis     thoracic and lumbosacral    Peptic ulcer     Rheumatoid arthritis ([facility] [address] )     Stage 3b chronic kidney disease ([facility] [address] ) 5/7/2021    Vitamin D deficiency      Past Surgical History:   Procedure Laterality Date    ABDOMINAL SURGERY      BREAST BIOPSY Right 2012    BREAST LUMPECTOMY Left 2012    BREAST SURGERY      CHOLECYSTECTOMY      ELBOW SURGERY      ORTHOPEDIC SURGERY      for toes     Current Outpatient Medications   Medication Sig Dispense Refill    ascorbic acid (VITAMIN C) 500 mg tablet Take 500 mg by mouth daily   benzonatate (TESSALON PERLES) 100 mg capsule Take 1 capsule (100 mg total) by mouth 3 (three) times a day as needed for cough 30 capsule 0    Calcium Citrate-Vitamin D (CITRACAL + D PO) Take by mouth 2 (two) times a day      carvedilol (COREG) 25 mg tablet TAKE 1 TABLET TWICE DAILY  WITH MEALS 180 tablet 2    Cholecalciferol (VITAMIN D-3 PO) Take 2,000 Units by mouth daily   folic acid (FOLVITE) 1 mg tablet 1 daily 90 tablet 2    hydrochlorothiazide (HYDRODIURIL) 25 mg tablet TAKE 1 TABLET BY MOUTH EVERY DAY 90 tablet 2    levothyroxine 75 mcg tablet Take 1 tab po every other day, alternating with 50 mcg 45 tablet 3    loratadine (CLARITIN) 10 mg tablet Take 1 tablet (10 mg total) by mouth daily 90 tablet 0    magnesium oxide (MAG-OX) 400 mg Take 1 tablet (400 mg total) by mouth 2 (two) times a day 90 tablet 0    methotrexate 2 5 mg tablet Take 4 tablets (10 mg total) by mouth once a week (Patient taking differently: Take 15 mg by mouth once a week ) 12 tablet     Multiple Vitamin (MULTIVITAMIN) tablet Take 1 tablet by mouth daily        Na Sulfate-K Sulfate-Mg Sulf (Suprep Bowel Prep Kit) 17 5-3 13-1 6 GM/177ML SOLN Take 1 kit by mouth once for 1 dose 177 mL 0    potassium chloride (Klor-Con M10) 10 mEq tablet Take 2 tablets (20 mEq total) by mouth 2 (two) times a day 180 tablet 2    Psyllium (METAMUCIL) WAFR Take 2 twice a day 100 Wafer 5    riTUXimab (RITUXAN) 500 mg/50 mL Infuse into a venous catheter every 6 (six) months        rosuvastatin (CRESTOR) 5 mg tablet Take 1 tablet (5 mg total) by mouth daily 90 tablet 2    Synthroid 50 MCG tablet TAKE 1 TABLET EVERY OTHER  DAY 45 tablet 3    latanoprost (XALATAN) 0 005 % ophthalmic solution  (Patient not taking: Reported on 10/27/2021)       No current facility-administered medications for this visit  Facility-Administered Medications Ordered in Other Visits   Medication Dose Route Frequency Provider Last Rate Last Admin    acetaminophen (TYLENOL) tablet 975 mg  975 mg Oral Once MD Jeanne Haney ON 1/12/2022] diphenhydrAMINE (BENADRYL) tablet 50 mg  50 mg Oral Once MD Jeanne Haney ON 1/12/2022] methylPREDNISolone sodium succinate (Solu-MEDROL) injection 100 mg  100 mg Intravenous Once MD Jeanne Haney ON 1/12/2022] riTUXimab (RITUXAN) 1,000 mg in sodium chloride 0 9 % 400 mL subsequent titrated chemo infusion  1,000 mg Intravenous Once MD Jeanne Haney ON 1/12/2022] sodium chloride 0 9 % infusion  20 mL/hr Intravenous Continuous Lamar Del Valle MD         Allergies   Allergen Reactions    Lisinopril Anaphylaxis    Codeine Other (See Comments)     Nausea/vomiting      Other      Annotation - [id number]: tounge swelling    Oxycodone      Annotation - [id number]: NOT TOLERATE    Penicillins Hives    Sulfamethoxazole-Trimethoprim     Ciprofloxacin Rash       Review of Systems   Constitutional: Positive for chills  Negative for fever  HENT: Positive for congestion  Respiratory: Positive for cough  All other systems reviewed and are negative  Objective:    Vitals:    01/11/22 1317   Weight: 89 8 kg (198 lb)   Height: 5' 5" (1 651 m)       Physical Exam  Vitals reviewed     Pulmonary:      Effort: Pulmonary effort is normal    Neurological:      Mental Status: She is alert and oriented to person, place, and time    Psychiatric:         Mood and Affect: Mood normal          VIRTUAL VISIT DISCLAIMER    Verónica Ching verbally agrees to participate in [employer]  Pt is aware that [employer] could be limited without vital signs or the ability to perform a full hands-on physical Royce Plum understands she or the provider may request at any time to terminate the video visit and request the patient to seek care or treatment in person

## 2022-01-12 ENCOUNTER — HOSPITAL ENCOUNTER (OUTPATIENT)
Dept: INFUSION CENTER | Facility: CLINIC | Age: 82
Discharge: HOME | End: 2022-01-12

## 2022-01-13 ENCOUNTER — HOSPITAL ENCOUNTER (OUTPATIENT)
Dept: INFUSION CENTER | Facility: HOSPITAL | Age: 82
Discharge: HOME | End: 2022-01-13
Payer: MEDICARE

## 2022-01-13 ENCOUNTER — TELEMEDICINE (OUTPATIENT)
Dept: INTERNAL MEDICINE CLINIC | Facility: CLINIC | Age: 82
End: 2022-01-13
Payer: MEDICARE

## 2022-01-13 VITALS — WEIGHT: 198 LBS | BODY MASS INDEX: 32.99 KG/M2 | HEIGHT: 65 IN

## 2022-01-13 VITALS
OXYGEN SATURATION: 97 % | DIASTOLIC BLOOD PRESSURE: 65 MMHG | HEART RATE: 59 BPM | RESPIRATION RATE: 18 BRPM | SYSTOLIC BLOOD PRESSURE: 145 MMHG | TEMPERATURE: 97.6 F

## 2022-01-13 DIAGNOSIS — U07.1 COVID: Primary | ICD-10-CM

## 2022-01-13 LAB — SARS-COV-2 RNA RESP QL NAA+PROBE: POSITIVE

## 2022-01-13 PROCEDURE — 99441 PR PHYS/QHP TELEPHONE EVALUATION 5-10 MIN: CPT | Performed by: INTERNAL MEDICINE

## 2022-01-13 PROCEDURE — M0247 HB SOTROVIMAB INF ADMIN: HCPCS | Performed by: INTERNAL MEDICINE

## 2022-01-13 RX ORDER — SODIUM CHLORIDE 9 MG/ML
20 INJECTION, SOLUTION INTRAVENOUS ONCE
Status: COMPLETED | OUTPATIENT
Start: 2022-01-13 | End: 2022-01-13

## 2022-01-13 RX ORDER — ALBUTEROL SULFATE 90 UG/1
3 AEROSOL, METERED RESPIRATORY (INHALATION) ONCE AS NEEDED
Status: CANCELLED | OUTPATIENT
Start: 2022-01-13

## 2022-01-13 RX ORDER — ONDANSETRON 2 MG/ML
4 INJECTION INTRAMUSCULAR; INTRAVENOUS ONCE AS NEEDED
Status: CANCELLED | OUTPATIENT
Start: 2022-01-13

## 2022-01-13 RX ORDER — ACETAMINOPHEN 325 MG/1
650 TABLET ORAL ONCE AS NEEDED
Status: DISCONTINUED | OUTPATIENT
Start: 2022-01-13 | End: 2022-01-16 | Stop reason: HOSPADM

## 2022-01-13 RX ORDER — SODIUM CHLORIDE 9 MG/ML
20 INJECTION, SOLUTION INTRAVENOUS ONCE
Status: CANCELLED | OUTPATIENT
Start: 2022-01-13

## 2022-01-13 RX ORDER — ALBUTEROL SULFATE 90 UG/1
3 AEROSOL, METERED RESPIRATORY (INHALATION) ONCE AS NEEDED
Status: DISCONTINUED | OUTPATIENT
Start: 2022-01-13 | End: 2022-01-16 | Stop reason: HOSPADM

## 2022-01-13 RX ORDER — ONDANSETRON 2 MG/ML
4 INJECTION INTRAMUSCULAR; INTRAVENOUS ONCE AS NEEDED
Status: DISCONTINUED | OUTPATIENT
Start: 2022-01-13 | End: 2022-01-16 | Stop reason: HOSPADM

## 2022-01-13 RX ORDER — ACETAMINOPHEN 325 MG/1
650 TABLET ORAL ONCE AS NEEDED
Status: CANCELLED | OUTPATIENT
Start: 2022-01-13

## 2022-01-13 RX ADMIN — SODIUM CHLORIDE 500 MG: 9 INJECTION, SOLUTION INTRAVENOUS at 13:43

## 2022-01-13 RX ADMIN — SODIUM CHLORIDE 20 ML/HR: 9 INJECTION, SOLUTION INTRAVENOUS at 13:44

## 2022-01-13 NOTE — PROGRESS NOTES
One hour observation complete  Vitals checked and stable  Discharge instructions given  Patient walked out of unit

## 2022-01-13 NOTE — PROGRESS NOTES
COVID-19 Outpatient Progress Note    Assessment/Plan:    Pt is COVID +; states she is fully vaccinated but is on immunosuppressant therapy- rituximab tx  Qualifies for ab tx and is in agreement  Problem List Items Addressed This Visit     None      Visit Diagnoses     COVID    -  Primary         Disposition:     I recommended continued isolation until at least 24 hours have passed since recovery defined as resolution of fever without the use of fever-reducing medications AND improvement in COVID symptoms AND 10 days have passed since onset of symptoms (or 10 days have passed since date of first positive viral diagnostic test for asymptomatic patients)  Patient is at increased risk of progressing towards severe COVID-19 due to the following high risk criteria:   - Older age (age >= 72years old)  - Obesity or being overweight  - Chronic kidney disease  - Immunosuppressive disease or immunosuppressive treatment  - Cardiovascular disease    Patient meets criteria for Sotrovimab infusion  They were counseled in regards to risks, benefits, and side effects of this infusion  Sotrovimab is an investigational medicine used to treat mild-to-moderate symptoms of COVID-19 in adults and children (15years of age and older weighing at least 80 pounds (40 kg)) with positive results of direct SARS-CoV-2 viral testing, and who are at high risk of progression to severe COVID-19, including hospitalization or death  Sotrovimab is investigational because it is still being studied  There is limited information about the safety and effectiveness of using sotrovimab to treat people with mild-to-moderate COVID-19  The FDA has authorized the emergency use of sotrovimab for the treatment of COVID-19 under an Emergency Use Authorization (EUA)       How will I receive sotrovimab?    - You will receive 1 dose of sotrovimab  - Sotrovimab will be given through a vein (intravenous or IV infusion) over 30 minutes    Possible side effects of sotrovimab: Allergic reactions can happen during and after infusion with sotrovimab  Possible reactions include: fever, chills, nausea, headache, shortness of breath, low or high blood pressure, rapid or slow heart rate, chest discomfort or pain, weakness, confusion, feeling tired, wheezing, swelling of your lips, face, or throat, rash including hives, itching, muscle aches, dizziness, and sweating  These reactions may be severe or life threatening  Worsening symptoms after treatment: You may experience new or worsening symptoms after infusion, including fever, difficulty breathing, rapid or slow heart rate, tiredness, weakness or confusion  If these occur, contact your healthcare provider or seek immediate medical attention as some of these events have required hospitalization  It is unknown if these events are related to treatment or are due to the progression of COVID19  The side effects of getting any medicine by vein may include brief pain, bleeding, bruising of the skin, soreness, swelling, and possible infection at the infusion site  These are not all the possible side effects of sotrovimab  Not a lot of people have been given sotrovimab  Serious and unexpected side effects may happen  Sotrovimab are still being studied so it is possible that all of the risks are not known at this time  It is possible that sotrovimab could interfere with your body's own ability to fight off a future infection of SARS-CoV-2  Similarly, sotrovimab may reduce your bodys immune response to a vaccine for SARS-CoV-2  Specific studies have not been conducted to address these possible risks  Talk to your healthcare provider if you have any questions  Emergency Use Authorization:    [facility] FDA has made sotrovimab available under an emergency access mechanism called an EUA   The EUA is supported by a [place] of Health and Human Service (HHS) declaration that circumstances exist to justify the emergency use of drugs and biological products during the COVID-19 pandemic  Sotrovimab have not undergone the same type of review as an FDA-approved or cleared product  The FDA may issue an EUA when certain criteria are met, which includes that there are no adequate, approved, and available alternatives  In addition, the FDA decision is based on the totality of scientific evidence available showing that it is reasonable to believe that the product meets certain criteria for safety, performance, and labeling and may be effective in treatment of patients during the COVID-19 pandemic  All of these criteria must be met to allow for the product to be used in the treatment of patients during the COVID-19 pandemic  The EUA for sotrovimab together is in effect for the duration of the COVID-19 declaration justifying emergency use of these products, unless terminated or revoked (after which the product may no longer be used)  What if I am pregnant or breastfeeding? There is no experience treating pregnant women or breastfeeding mothers with sotrovimab  For a mother and unborn baby, the benefit of receiving sotrovimab may be greater than the risk from the treatment  If you are pregnant or breastfeeding, discuss your options and specific situation with your healthcare provider  Regarding COVID-19 Vaccination:    Currently there is no data or safety or efficacy of COVID-19 vaccination in persons who received monoclonal antibodies as part of COVID-19 treatment  Treatment should be deferred for at least 90 days to avoid interference of the treatment with vaccine-induced immune responses (this is based on estimated half-life of therapies and evidence suggesting reinfection is uncommon within 90 days of initial infection)  Full fact sheet document for patients can be found at: [employer]    The patient consents to proceed with sotrovimab infusion      I have spent 10 minutes directly with the patient  Greater than 50% of this time was spent in counseling/coordination of care regarding: instructions for management  Encounter provider Romelia Mccarthy MD    Provider located at [address] [address]  [facility] [address]  [address] [place] [id number]  [phone]    Recent Visits  Date Type Provider Dept   01/11/22 Neha Mckeon MD [facility]   Showing recent visits within past 7 days and meeting all other requirements  Today's Visits  Date Type Provider Dept   01/13/22 Telemedicine Romelia Mccarthy MD [facility]   Showing today's visits and meeting all other requirements  Future Appointments  No visits were found meeting these conditions  Showing future appointments within next 150 days and meeting all other requirements     This virtual check-in was done via telephone and she agrees to proceed  Patient agrees to participate in a virtual check in via telephone or video visit instead of presenting to the office to address urgent/immediate medical needs  Patient is aware this is a billable service  After connecting through Telephone, the patient was identified by name and date of birth  Emmanuelisabel Jorgemayks was informed that this was a telemedicine visit and that the exam was being conducted confidentially over secure lines  My office door was closed  No one else was in the room  Marty Du acknowledged consent and understanding of privacy and security of the telemedicine visit  I informed the patient that I have reviewed her record in Epic and presented the opportunity for her to ask any questions regarding the visit today  The patient agreed to participate  It was my intent to perform this visit via video technology but the patient was not able to do a video connection so the visit was completed via audio telephone only          Verification of patient location:  Patient is located in the following state in which I hold an active license: [place]    Subjective: Gopal Moe is a [de-identified] y o  female who has been screened for COVID-19  Symptom change since last report: unchanged  Patient's symptoms include chills, fatigue, malaise, cough and headache      - Date of symptom onset: 1/9/2022  - Date of positive COVID-19 PCR: 1/11/2022  Type of test: PCR    COVID-19 vaccination status: Fully vaccinated with booster    Jennifer Guadalupe has been staying home and has isolated themselves in her home  She is taking care to not share personal items and is cleaning all surfaces that are touched often, like counters, tabletops, and doorknobs using household cleaning sprays or wipes  She is wearing a mask when she leaves her room  Lab Results   Component Value Date    SARSCOV2 Positive (A) 01/11/2022    [facility] [facility] [address] Not Detected 07/29/2021     Past Medical History:   Diagnosis Date    Allergic angioedema     [id number] resolved    Angioedema     [id number] resolved    Arthritis     Breast cancer ([facility] [address] ) 2012    right     Disease of thyroid gland     Hemorrhage of anus and rectum     [id number] resolved    Hyperlipidemia     Hypertension     Hypocalcemia     [id number] resolved    Neuritis     thoracic and lumbosacral    Peptic ulcer     Rheumatoid arthritis ([address] )     Stage 3b chronic kidney disease ([facility] [address] ) 5/7/2021    Vitamin D deficiency      Past Surgical History:   Procedure Laterality Date    ABDOMINAL SURGERY      BREAST BIOPSY Right 2012    BREAST LUMPECTOMY Left 2012    BREAST SURGERY      CHOLECYSTECTOMY      ELBOW SURGERY      ORTHOPEDIC SURGERY      for toes     Current Outpatient Medications   Medication Sig Dispense Refill    ascorbic acid (VITAMIN C) 500 mg tablet Take 500 mg by mouth daily        benzonatate (TESSALON PERLES) 100 mg capsule Take 1 capsule (100 mg total) by mouth 3 (three) times a day as needed for cough 30 capsule 0    Calcium Citrate-Vitamin D (CITRACAL + D PO) Take by mouth 2 (two) times a day      carvedilol (COREG) 25 mg tablet TAKE 1 TABLET TWICE DAILY  WITH MEALS 180 tablet 2    Cholecalciferol (VITAMIN D-3 PO) Take 2,000 Units by mouth daily   folic acid (FOLVITE) 1 mg tablet 1 daily 90 tablet 2    hydrochlorothiazide (HYDRODIURIL) 25 mg tablet TAKE 1 TABLET BY MOUTH EVERY DAY 90 tablet 2    latanoprost (XALATAN) 0 005 % ophthalmic solution        levothyroxine 75 mcg tablet Take 1 tab po every other day, alternating with 50 mcg 45 tablet 3    loratadine (CLARITIN) 10 mg tablet Take 1 tablet (10 mg total) by mouth daily 90 tablet 0    magnesium oxide (MAG-OX) 400 mg Take 1 tablet (400 mg total) by mouth 2 (two) times a day 90 tablet 0    methotrexate 2 5 mg tablet Take 4 tablets (10 mg total) by mouth once a week (Patient taking differently: Take 15 mg by mouth once a week ) 12 tablet     Multiple Vitamin (MULTIVITAMIN) tablet Take 1 tablet by mouth daily   potassium chloride (Klor-Con M10) 10 mEq tablet Take 2 tablets (20 mEq total) by mouth 2 (two) times a day 180 tablet 2    Psyllium (METAMUCIL) WAFR Take 2 twice a day 100 Wafer 5    riTUXimab (RITUXAN) 500 mg/50 mL Infuse into a venous catheter every 6 (six) months        rosuvastatin (CRESTOR) 5 mg tablet Take 1 tablet (5 mg total) by mouth daily 90 tablet 2    Synthroid 50 MCG tablet TAKE 1 TABLET EVERY OTHER  DAY 45 tablet 3    Na Sulfate-K Sulfate-Mg Sulf (Suprep Bowel Prep Kit) 17 5-3 13-1 6 GM/177ML SOLN Take 1 kit by mouth once for 1 dose 177 mL 0     No current facility-administered medications for this visit       Facility-Administered Medications Ordered in Other Visits   Medication Dose Route Frequency Provider Last Rate Last Admin    acetaminophen (TYLENOL) tablet 975 mg  975 mg Oral Once Sandra Kendall MD        diphenhydrAMINE (BENADRYL) tablet 50 mg  50 mg Oral Once Sandra Kendall MD        methylPREDNISolone sodium succinate (Solu-MEDROL) injection 100 mg  100 mg Intravenous Once Elie Benítez MD        riTUXimab (RITUXAN) 1,000 mg in sodium chloride 0 9 % 400 mL subsequent titrated chemo infusion  1,000 mg Intravenous Once Elie Benítez MD        sodium chloride 0 9 % infusion  20 mL/hr Intravenous Continuous Elie Benítez MD         Allergies   Allergen Reactions    Lisinopril Anaphylaxis    Codeine Other (See Comments)     Nausea/vomiting      Other      Annotation - [id number]: tounge swelling    Oxycodone      Annotation - [id number]: NOT TOLERATE    Penicillins Hives    Sulfamethoxazole-Trimethoprim     Ciprofloxacin Rash       Review of Systems   Constitutional: Positive for chills and fatigue  Respiratory: Positive for cough  Neurological: Positive for headaches  All other systems reviewed and are negative  Objective:    Vitals:    01/13/22 0850   Weight: 89 8 kg (198 lb)   Height: 5' 5" (1 651 m)       Physical Exam  Vitals reviewed  Neurological:      Mental Status: She is alert and oriented to person, place, and time  Psychiatric:         Mood and Affect: Mood normal          VIRTUAL VISIT DISCLAIMER    Leah Lancaster verbally agrees to participate in [employer]  Pt is aware that [employer] could be limited without vital signs or the ability to perform a full hands-on physical Adelerayne Dean understands she or the provider may request at any time to terminate the video visit and request the patient to seek care or treatment in person

## 2022-01-13 NOTE — PROGRESS NOTES
Patient checked in for sotrovimab antibody treatment  EUA given and verbal consent obtained  Iv started with KVO running  Vital signs checked and stable  All questions answered at this time  Call bell within reach

## 2022-01-18 ENCOUNTER — TELEPHONE (OUTPATIENT)
Dept: INTERNAL MEDICINE CLINIC | Facility: CLINIC | Age: 82
End: 2022-01-18

## 2022-01-18 DIAGNOSIS — I10 HYPERTENSION, UNSPECIFIED TYPE: Chronic | ICD-10-CM

## 2022-01-18 DIAGNOSIS — E03.9 HYPOTHYROIDISM, UNSPECIFIED TYPE: Chronic | ICD-10-CM

## 2022-01-18 DIAGNOSIS — R79.9 ABNORMAL BLOOD CHEMISTRY: ICD-10-CM

## 2022-01-18 DIAGNOSIS — E78.5 HYPERLIPIDEMIA, UNSPECIFIED HYPERLIPIDEMIA TYPE: ICD-10-CM

## 2022-01-18 DIAGNOSIS — E83.42 HYPOMAGNESEMIA: ICD-10-CM

## 2022-01-18 RX ORDER — CARVEDILOL 25 MG/1
25 TABLET ORAL 2 TIMES DAILY WITH MEALS
Qty: 180 TABLET | Refills: 1 | Status: SHIPPED | OUTPATIENT
Start: 2022-01-18 | End: 2022-07-12

## 2022-01-18 RX ORDER — POTASSIUM CHLORIDE 750 MG/1
20 TABLET, EXTENDED RELEASE ORAL 2 TIMES DAILY
Qty: 360 TABLET | Refills: 1 | Status: SHIPPED | OUTPATIENT
Start: 2022-01-18

## 2022-01-18 RX ORDER — LEVOTHYROXINE SODIUM 0.05 MG/1
50 TABLET ORAL EVERY OTHER DAY
Qty: 45 TABLET | Refills: 1 | Status: SHIPPED | OUTPATIENT
Start: 2022-01-18 | End: 2022-07-12

## 2022-01-18 RX ORDER — LEVOTHYROXINE SODIUM 0.07 MG/1
TABLET ORAL
Qty: 45 TABLET | Refills: 1 | Status: SHIPPED | OUTPATIENT
Start: 2022-01-18 | End: 2022-07-12

## 2022-01-18 NOTE — TELEPHONE ENCOUNTER
Potassium chloride 10 mEq tablet-take 2 tablets by mouth 2 times a day    Synthroid 50 MCG tablet -take 1 tablet by mouth every other day    Synthroid 75 mcg tablet-take 1 tablet by mouth every other day    Caredilol 25 mg tablet-take 1 tablet twice daily with meals    [facility]    90 day supply w/refills

## 2022-01-24 ENCOUNTER — TELEMEDICINE (OUTPATIENT)
Dept: INTERNAL MEDICINE CLINIC | Facility: CLINIC | Age: 82
End: 2022-01-24
Payer: MEDICARE

## 2022-01-24 DIAGNOSIS — R09.82 POST-NASAL DRIP: ICD-10-CM

## 2022-01-24 DIAGNOSIS — U07.1 COVID: Primary | ICD-10-CM

## 2022-01-24 PROCEDURE — 99441 PR PHYS/QHP TELEPHONE EVALUATION 5-10 MIN: CPT | Performed by: INTERNAL MEDICINE

## 2022-01-24 RX ORDER — LORATADINE 10 MG/1
10 TABLET ORAL DAILY
Qty: 90 TABLET | Refills: 5 | Status: SHIPPED | OUTPATIENT
Start: 2022-01-24 | End: 2022-08-10 | Stop reason: ALTCHOICE

## 2022-01-24 NOTE — PROGRESS NOTES
COVID-19 Outpatient Progress Note    Assessment/Plan:    Lingering symptoms of covid; tx with claritin  Problem List Items Addressed This Visit        Other    Post-nasal drip    Relevant Medications    loratadine (CLARITIN) 10 mg tablet    COVID - Primary         Disposition:     Patient has COVID-19 infection  Based off CDC guidelines, they were recommended to isolate for 5 days from the date of the positive test  If they remain asymptomatic, isolation may be ended followed by 5 days of wearing a mask when around othes to minimize risk of infecting others  If they have a fever, continue to stay home until fever resolves for at least 24 hours  I have spent 10 minutes directly with the patient  Greater than 50% of this time was spent in counseling/coordination of care regarding: instructions for management  Encounter provider Tenzin Lo MD    Provider located at [address] [place] [address]  [facility] [address]  [address] [place] [id number] [phone]    Recent Visits  Date Type Provider Dept   01/18/22 Telephone Daniel Sosa [facility]   Showing recent visits within past 7 days and meeting all other requirements  Today's Visits  Date Type Provider Dept   01/24/22 Sterling Liz MD [facility] [address] today's visits and meeting all other requirements  Future Appointments  No visits were found meeting these conditions  Showing future appointments within next 150 days and meeting all other requirements     This virtual check-in was done via telephone and she agrees to proceed  Patient agrees to participate in a virtual check in via telephone or video visit instead of presenting to the office to address urgent/immediate medical needs  Patient is aware this is a billable service  After connecting through Telephone, the patient was identified by name and date of birth   Kaila Rahul was informed that this was a telemedicine visit and that the exam was being conducted confidentially over secure lines  My office door was closed  No one else was in the room  Marcial Villa acknowledged consent and understanding of privacy and security of the telemedicine visit  I informed the patient that I have reviewed her record in Epic and presented the opportunity for her to ask any questions regarding the visit today  The patient agreed to participate  It was my intent to perform this visit via video technology but the patient was not able to do a video connection so the visit was completed via audio telephone only  Verification of patient location:  Patient is located in the following state in which I hold an active license: [place]    Subjective: Marcial Villa is a [de-identified] y o  female who has been screened for COVID-19  Patient's symptoms include nasal congestion and rhinorrhea  - Date of symptom onset: 1/9/2022  - Date of positive COVID-19 test: 1/11/2022  Type of test: PCR  COVID-19 vaccination status: Partially vaccinated    Pallavi Lynne has been staying home and has isolated themselves in her home  She is taking care to not share personal items and is cleaning all surfaces that are touched often, like counters, tabletops, and doorknobs using household cleaning sprays or wipes  She is wearing a mask when she leaves her room       Lab Results   Component Value Date    SARSCOV2 Positive (A) 01/11/2022    [facility] [address] [facility] Not Detected 07/29/2021     Past Medical History:   Diagnosis Date    Allergic angioedema     [id number] resolved    Angioedema     [id number] resolved    Arthritis     Breast cancer ([address] ) 2012    right     Disease of thyroid gland     Hemorrhage of anus and rectum     [id number] resolved    Hyperlipidemia     Hypertension     Hypocalcemia     [id number] resolved    Neuritis     thoracic and lumbosacral    Peptic ulcer     Rheumatoid arthritis ([address] )     Stage 3b chronic kidney disease ([address] ) 5/7/2021    Vitamin D deficiency      Past Surgical History:   Procedure Laterality Date    ABDOMINAL SURGERY      BREAST BIOPSY Right 2012    BREAST LUMPECTOMY Left 2012    BREAST SURGERY      CHOLECYSTECTOMY      ELBOW SURGERY      ORTHOPEDIC SURGERY      for toes     Current Outpatient Medications   Medication Sig Dispense Refill    ascorbic acid (VITAMIN C) 500 mg tablet Take 500 mg by mouth daily   benzonatate (TESSALON PERLES) 100 mg capsule Take 1 capsule (100 mg total) by mouth 3 (three) times a day as needed for cough 30 capsule 0    Calcium Citrate-Vitamin D (CITRACAL + D PO) Take by mouth 2 (two) times a day      carvedilol (COREG) 25 mg tablet Take 1 tablet (25 mg total) by mouth 2 (two) times a day with meals 180 tablet 1    Cholecalciferol (VITAMIN D-3 PO) Take 2,000 Units by mouth daily   folic acid (FOLVITE) 1 mg tablet 1 daily 90 tablet 2    hydrochlorothiazide (HYDRODIURIL) 25 mg tablet TAKE 1 TABLET BY MOUTH EVERY DAY 90 tablet 2    latanoprost (XALATAN) 0 005 % ophthalmic solution        levothyroxine (Synthroid) 50 mcg tablet Take 1 tablet (50 mcg total) by mouth every other day 45 tablet 1    levothyroxine 75 mcg tablet Take 1 tab po every other day, alternating with 50 mcg 45 tablet 1    loratadine (CLARITIN) 10 mg tablet Take 1 tablet (10 mg total) by mouth daily 90 tablet 5    magnesium oxide (MAG-OX) 400 mg Take 1 tablet (400 mg total) by mouth 2 (two) times a day 90 tablet 0    methotrexate 2 5 mg tablet Take 4 tablets (10 mg total) by mouth once a week (Patient taking differently: Take 15 mg by mouth once a week ) 12 tablet     Multiple Vitamin (MULTIVITAMIN) tablet Take 1 tablet by mouth daily        Na Sulfate-K Sulfate-Mg Sulf (Suprep Bowel Prep Kit) 17 5-3 13-1 6 GM/177ML SOLN Take 1 kit by mouth once for 1 dose 177 mL 0    potassium chloride (Klor-Con M10) 10 mEq tablet Take 2 tablets (20 mEq total) by mouth 2 (two) times a day 360 tablet 1    Psyllium (METAMUCIL) WAFR Take 2 twice a day 100 Wafer 5    riTUXimab (RITUXAN) 500 mg/50 mL Infuse into a venous catheter every 6 (six) months        rosuvastatin (CRESTOR) 5 mg tablet Take 1 tablet (5 mg total) by mouth daily 90 tablet 2     No current facility-administered medications for this visit  Allergies   Allergen Reactions    Lisinopril Anaphylaxis    Codeine Other (See Comments)     Nausea/vomiting      Other      Annotation - [id number]: tounge swelling    Oxycodone      Annotation - [id number]: NOT TOLERATE    Penicillins Hives    Sulfamethoxazole-Trimethoprim     Ciprofloxacin Rash       Review of Systems   HENT: Positive for congestion and rhinorrhea  All other systems reviewed and are negative  Objective: There were no vitals filed for this visit  Physical Exam  Vitals reviewed  Pulmonary:      Effort: Pulmonary effort is normal    Neurological:      Mental Status: She is oriented to person, place, and time  Psychiatric:         Mood and Affect: Mood normal          VIRTUAL VISIT DISCLAIMER    Jeny Paula verbally agrees to participate in [employer]  Pt is aware that [employer] could be limited without vital signs or the ability to perform a full hands-on physical Myriam Gonsalez understands she or the provider may request at any time to terminate the video visit and request the patient to seek care or treatment in person

## 2022-02-01 ENCOUNTER — APPOINTMENT (OUTPATIENT)
Dept: LAB | Facility: HOSPITAL | Age: 82
End: 2022-02-01
Payer: MEDICARE

## 2022-02-01 DIAGNOSIS — R73.09 ABNORMAL GLUCOSE: ICD-10-CM

## 2022-02-01 DIAGNOSIS — I10 HYPERTENSION, UNSPECIFIED TYPE: Chronic | ICD-10-CM

## 2022-02-01 DIAGNOSIS — E78.5 HYPERLIPIDEMIA, UNSPECIFIED HYPERLIPIDEMIA TYPE: ICD-10-CM

## 2022-02-01 DIAGNOSIS — E03.9 HYPOTHYROIDISM, UNSPECIFIED TYPE: Chronic | ICD-10-CM

## 2022-02-01 LAB
ALBUMIN SERPL BCP-MCNC: 3.7 G/DL (ref 3.5–5)
ALP SERPL-CCNC: 61 U/L (ref 46–116)
ALT SERPL W P-5'-P-CCNC: 21 U/L (ref 12–78)
ANION GAP SERPL CALCULATED.3IONS-SCNC: 5 MMOL/L (ref 4–13)
AST SERPL W P-5'-P-CCNC: 18 U/L (ref 5–45)
BASOPHILS # BLD AUTO: 0.06 THOUSANDS/ΜL (ref 0–0.1)
BASOPHILS NFR BLD AUTO: 1 % (ref 0–1)
BILIRUB SERPL-MCNC: 0.62 MG/DL (ref 0.2–1)
BUN SERPL-MCNC: 14 MG/DL (ref 5–25)
CALCIUM SERPL-MCNC: 9 MG/DL (ref 8.3–10.1)
CHLORIDE SERPL-SCNC: 102 MMOL/L (ref 100–108)
CHOLEST SERPL-MCNC: 155 MG/DL
CO2 SERPL-SCNC: 30 MMOL/L (ref 21–32)
CREAT SERPL-MCNC: 1.37 MG/DL (ref 0.6–1.3)
EOSINOPHIL # BLD AUTO: 0.34 THOUSAND/ΜL (ref 0–0.61)
EOSINOPHIL NFR BLD AUTO: 6 % (ref 0–6)
ERYTHROCYTE [DISTWIDTH] IN BLOOD BY AUTOMATED COUNT: 15.6 % (ref 11.6–15.1)
EST. AVERAGE GLUCOSE BLD GHB EST-MCNC: 114 MG/DL
GFR SERPL CREATININE-BSD FRML MDRD: 36 ML/MIN/1.73SQ M
GLUCOSE P FAST SERPL-MCNC: 98 MG/DL (ref 65–99)
HBA1C MFR BLD: 5.6 %
HCT VFR BLD AUTO: 36.5 % (ref 34.8–46.1)
HDLC SERPL-MCNC: 46 MG/DL
HGB BLD-MCNC: 11.3 G/DL (ref 11.5–15.4)
IMM GRANULOCYTES # BLD AUTO: 0.01 THOUSAND/UL (ref 0–0.2)
IMM GRANULOCYTES NFR BLD AUTO: 0 % (ref 0–2)
LDLC SERPL CALC-MCNC: 86 MG/DL (ref 0–100)
LYMPHOCYTES # BLD AUTO: 1.22 THOUSANDS/ΜL (ref 0.6–4.47)
LYMPHOCYTES NFR BLD AUTO: 20 % (ref 14–44)
MCH RBC QN AUTO: 31.6 PG (ref 26.8–34.3)
MCHC RBC AUTO-ENTMCNC: 31 G/DL (ref 31.4–37.4)
MCV RBC AUTO: 102 FL (ref 82–98)
MONOCYTES # BLD AUTO: 0.69 THOUSAND/ΜL (ref 0.17–1.22)
MONOCYTES NFR BLD AUTO: 11 % (ref 4–12)
NEUTROPHILS # BLD AUTO: 3.89 THOUSANDS/ΜL (ref 1.85–7.62)
NEUTS SEG NFR BLD AUTO: 62 % (ref 43–75)
NONHDLC SERPL-MCNC: 109 MG/DL
NRBC BLD AUTO-RTO: 0 /100 WBCS
PLATELET # BLD AUTO: 184 THOUSANDS/UL (ref 149–390)
PMV BLD AUTO: 9.8 FL (ref 8.9–12.7)
POTASSIUM SERPL-SCNC: 4.4 MMOL/L (ref 3.5–5.3)
PROT SERPL-MCNC: 6.8 G/DL (ref 6.4–8.2)
RBC # BLD AUTO: 3.58 MILLION/UL (ref 3.81–5.12)
SODIUM SERPL-SCNC: 137 MMOL/L (ref 136–145)
TRIGL SERPL-MCNC: 117 MG/DL
TSH SERPL DL<=0.05 MIU/L-ACNC: 2.43 UIU/ML (ref 0.36–3.74)
WBC # BLD AUTO: 6.21 THOUSAND/UL (ref 4.31–10.16)

## 2022-02-01 PROCEDURE — 36415 COLL VENOUS BLD VENIPUNCTURE: CPT

## 2022-02-01 PROCEDURE — 80053 COMPREHEN METABOLIC PANEL: CPT

## 2022-02-01 PROCEDURE — 80061 LIPID PANEL: CPT

## 2022-02-01 PROCEDURE — 83036 HEMOGLOBIN GLYCOSYLATED A1C: CPT

## 2022-02-01 PROCEDURE — 84443 ASSAY THYROID STIM HORMONE: CPT

## 2022-02-01 PROCEDURE — 85025 COMPLETE CBC W/AUTO DIFF WBC: CPT

## 2022-02-08 ENCOUNTER — TELEPHONE (OUTPATIENT)
Dept: HEMATOLOGY ONCOLOGY | Facility: CLINIC | Age: 82
End: 2022-02-08

## 2022-02-08 NOTE — TELEPHONE ENCOUNTER
Spoke to patient to let her know that her appt with Dr Kevin Meadows 5/12 has been rescheduled to 5/26 @ 10:40am and will now be with Dr Mago Yeung  Pt is aware and okay with this appt

## 2022-02-09 ENCOUNTER — OFFICE VISIT (OUTPATIENT)
Dept: INTERNAL MEDICINE CLINIC | Facility: CLINIC | Age: 82
End: 2022-02-09
Payer: MEDICARE

## 2022-02-09 VITALS
BODY MASS INDEX: 29.66 KG/M2 | OXYGEN SATURATION: 95 % | TEMPERATURE: 98 F | SYSTOLIC BLOOD PRESSURE: 134 MMHG | HEART RATE: 72 BPM | WEIGHT: 178 LBS | DIASTOLIC BLOOD PRESSURE: 80 MMHG | HEIGHT: 65 IN

## 2022-02-09 DIAGNOSIS — E03.9 HYPOTHYROIDISM, UNSPECIFIED TYPE: Chronic | ICD-10-CM

## 2022-02-09 DIAGNOSIS — E78.5 HYPERLIPIDEMIA, UNSPECIFIED HYPERLIPIDEMIA TYPE: ICD-10-CM

## 2022-02-09 DIAGNOSIS — R79.89 ELEVATED SERUM CREATININE: ICD-10-CM

## 2022-02-09 DIAGNOSIS — N18.32 STAGE 3B CHRONIC KIDNEY DISEASE (HCC): ICD-10-CM

## 2022-02-09 DIAGNOSIS — G89.29 OTHER CHRONIC PAIN: ICD-10-CM

## 2022-02-09 DIAGNOSIS — R79.9 ABNORMAL BLOOD CHEMISTRY: ICD-10-CM

## 2022-02-09 DIAGNOSIS — R73.09 ABNORMAL GLUCOSE: ICD-10-CM

## 2022-02-09 DIAGNOSIS — Z85.3 PERSONAL HISTORY OF BREAST CANCER: ICD-10-CM

## 2022-02-09 DIAGNOSIS — I10 HYPERTENSION, UNSPECIFIED TYPE: Chronic | ICD-10-CM

## 2022-02-09 DIAGNOSIS — C50.919 ADENOCARCINOMA OF BREAST, UNSPECIFIED LATERALITY (HCC): ICD-10-CM

## 2022-02-09 DIAGNOSIS — M06.9 RHEUMATOID ARTHRITIS OF LEFT ANKLE, UNSPECIFIED WHETHER RHEUMATOID FACTOR PRESENT (HCC): Primary | Chronic | ICD-10-CM

## 2022-02-09 DIAGNOSIS — I82.5Z2 LOWER LEG DVT (DEEP VENOUS THROMBOEMBOLISM), CHRONIC, LEFT (HCC): ICD-10-CM

## 2022-02-09 PROCEDURE — 99214 OFFICE O/P EST MOD 30 MIN: CPT | Performed by: NURSE PRACTITIONER

## 2022-02-09 NOTE — PROGRESS NOTES
BMI Counseling: Body mass index is 29 62 kg/m²  The BMI is above normal  Nutrition recommendations include decreasing portion sizes, encouraging healthy choices of fruits and vegetables, decreasing fast food intake, consuming healthier snacks, limiting drinks that contain sugar, moderation in carbohydrate intake, increasing intake of lean protein, reducing intake of saturated and trans fat and reducing intake of cholesterol  Exercise recommendations include exercising 3-5 times per week  No pharmacotherapy was ordered  Patient referred to PCP  Rationale for BMI follow-up plan is due to patient being overweight or obese  Assessment/Plan:    1  Hypertension- At goal  Continue current medication  2  Hyperlipidemia- At goal  Continue statin  3  Hypothyroidism- TSH at goal  Continue alternating doses of Levothyroxine  4  Rheumatoid arthritis- Stable  Followed by rheumatology  5  Recovered from Covid- No lingering symptoms  6  History of breast cancer- Followed by oncology for mammograms  7  Chronic kidney disease, elevated creatinine- Referred to nephrology  Follow up in six months, labs prior  Diagnoses and all orders for this visit:    Rheumatoid arthritis of left ankle, unspecified whether rheumatoid factor present ([place] [id number] )    Other chronic pain    Hyperlipidemia, unspecified hyperlipidemia type  -     Lipid panel; Future    Hypertension, unspecified type  -     CBC and differential; Future  -     Comprehensive metabolic panel; Future    Hypothyroidism, unspecified type  -     TSH, 3rd generation with Free T4 reflex; Future    Stage 3b chronic kidney disease ([facility] )  -     Ambulatory Referral to Nephrology; Future    Elevated serum creatinine  -     Ambulatory Referral to Nephrology;  Future    Lower leg DVT (deep venous thromboembolism), chronic, left (HCC)    Adenocarcinoma of breast, unspecified laterality (HCC)    Abnormal blood chemistry    Abnormal glucose  -     HEMOGLOBIN A1C W/ EAG ESTIMATION; Future    Personal history of breast cancer    The patient was counseled regarding instructions for management, risk factor reductions, patient and family education,impressions, risks and benefits of treatment options, side effects of medications, importance of compliance with treatment  The treatment plan was reviewed with the patient/guardian and patient/guardian understands and agrees with the treatment plan  Current Outpatient Medications:     ascorbic acid (VITAMIN C) 500 mg tablet, Take 500 mg by mouth daily  , Disp: , Rfl:     Calcium Citrate-Vitamin D (CITRACAL + D PO), Take by mouth 2 (two) times a day, Disp: , Rfl:     carvedilol (COREG) 25 mg tablet, Take 1 tablet (25 mg total) by mouth 2 (two) times a day with meals, Disp: 180 tablet, Rfl: 1    Cholecalciferol (VITAMIN D-3 PO), Take 2,000 Units by mouth daily  , Disp: , Rfl:     folic acid (FOLVITE) 1 mg tablet, 1 daily, Disp: 90 tablet, Rfl: 2    hydrochlorothiazide (HYDRODIURIL) 25 mg tablet, TAKE 1 TABLET BY MOUTH EVERY DAY, Disp: 90 tablet, Rfl: 2    latanoprost (XALATAN) 0 005 % ophthalmic solution,  , Disp: , Rfl:     levothyroxine (Synthroid) 50 mcg tablet, Take 1 tablet (50 mcg total) by mouth every other day, Disp: 45 tablet, Rfl: 1    levothyroxine 75 mcg tablet, Take 1 tab po every other day, alternating with 50 mcg, Disp: 45 tablet, Rfl: 1    loratadine (CLARITIN) 10 mg tablet, Take 1 tablet (10 mg total) by mouth daily, Disp: 90 tablet, Rfl: 5    magnesium oxide (MAG-OX) 400 mg, Take 1 tablet (400 mg total) by mouth 2 (two) times a day, Disp: 90 tablet, Rfl: 0    methotrexate 2 5 mg tablet, Take 4 tablets (10 mg total) by mouth once a week (Patient taking differently: Take 15 mg by mouth once a week ), Disp: 12 tablet, Rfl:     Multiple Vitamin (MULTIVITAMIN) tablet, Take 1 tablet by mouth daily  , Disp: , Rfl:     potassium chloride (Klor-Con M10) 10 mEq tablet, Take 2 tablets (20 mEq total) by mouth 2 (two) times a day, Disp: 360 tablet, Rfl: 1    Psyllium (METAMUCIL) WAFR, Take 2 twice a day, Disp: 100 Wafer, Rfl: 5    riTUXimab (RITUXAN) 500 mg/50 mL, Infuse into a venous catheter every 6 (six) months  , Disp: , Rfl:     rosuvastatin (CRESTOR) 5 mg tablet, Take 1 tablet (5 mg total) by mouth daily, Disp: 90 tablet, Rfl: 2    Na Sulfate-K Sulfate-Mg Sulf (Suprep Bowel Prep Kit) 17 5-3 13-1 6 GM/177ML SOLN, Take 1 kit by mouth once for 1 dose (Patient not taking: Reported on 2/9/2022 ), Disp: 177 mL, Rfl: 0    Subjective:      Patient ID: Gio Tirado is a [de-identified] y o  female  Here for follow up  No complaints today  The following portions of the patient's history were reviewed and updated as appropriate:   She has a past medical history of Allergic angioedema, Angioedema, Arthritis, Breast cancer ([facility] [address] ) (2012), Disease of thyroid gland, Hemorrhage of anus and rectum, Hyperlipidemia, Hypertension, Hypocalcemia, Neuritis, Peptic ulcer, Rheumatoid arthritis ([address] ), Stage 3b chronic kidney disease ([address] ) (5/7/2021), and Vitamin D deficiency  ,  does not have any pertinent problems on file  ,   has a past surgical history that includes Abdominal surgery; Breast surgery; Cholecystectomy; Elbow surgery; orthopedic surgery; Breast biopsy (Right, 2012); and Breast lumpectomy (Left, 2012)  ,  family history includes Breast cancer in her maternal aunt, maternal aunt, maternal aunt, maternal aunt, maternal aunt, maternal uncle, and mother; Kidney failure in her father; Lung cancer in her brother; Other in her father  ,   reports that she has never smoked  She has never used smokeless tobacco  She reports previous alcohol use  She reports that she does not use drugs  ,  is allergic to lisinopril, codeine, other, oxycodone, penicillins, sulfamethoxazole-trimethoprim, and ciprofloxacin       Review of Systems   Constitutional: Negative  Respiratory: Negative  Cardiovascular: Negative  Musculoskeletal: Negative      Psychiatric/Behavioral: Negative            Objective:  /80 (BP Location: Left arm, Patient Position: Sitting, Cuff Size: Adult)   Pulse 72   Temp 98 °F (36 7 °C) (Tympanic)   Ht 5' 5" (1 651 m)   Wt 80 7 kg (178 lb)   LMP  (LMP Unknown)   SpO2 95%   BMI 29 62 kg/m²     Lab Review  Appointment on 02/01/2022   Component Date Value    WBC 02/01/2022 6 21     RBC 02/01/2022 3 58*    Hemoglobin 02/01/2022 11 3*    Hematocrit 02/01/2022 36 5     MCV 02/01/2022 102*    MCH 02/01/2022 31 6     MCHC 02/01/2022 31 0*    RDW 02/01/2022 15 6*    MPV 02/01/2022 9 8     Platelets 79/95/3352 184     nRBC 02/01/2022 0     Neutrophils Relative 02/01/2022 62     Immat GRANS % 02/01/2022 0     Lymphocytes Relative 02/01/2022 20     Monocytes Relative 02/01/2022 11     Eosinophils Relative 02/01/2022 6     Basophils Relative 02/01/2022 1     Neutrophils Absolute 02/01/2022 3 89     Immature Grans Absolute 02/01/2022 0 01     Lymphocytes Absolute 02/01/2022 1 22     Monocytes Absolute 02/01/2022 0 69     Eosinophils Absolute 02/01/2022 0 34     Basophils Absolute 02/01/2022 0 06     Sodium 02/01/2022 137     Potassium 02/01/2022 4 4     Chloride 02/01/2022 102     CO2 02/01/2022 30     ANION GAP 02/01/2022 5     BUN 02/01/2022 14     Creatinine 02/01/2022 1 37*    Glucose, Fasting 02/01/2022 98     Calcium 02/01/2022 9 0     AST 02/01/2022 18     ALT 02/01/2022 21     Alkaline Phosphatase 02/01/2022 61     Total Protein 02/01/2022 6 8     Albumin 02/01/2022 3 7     Total Bilirubin 02/01/2022 0 62     eGFR 02/01/2022 36     Hemoglobin A1C 02/01/2022 5 6     EAG 02/01/2022 114     Cholesterol 02/01/2022 155     Triglycerides 02/01/2022 117     HDL, Direct 02/01/2022 46*    LDL Calculated 02/01/2022 86     Non-HDL-Chol (CHOL-HDL) 02/01/2022 109     TSH 3RD GENERATON 02/01/2022 2 432    Telemedicine on 01/11/2022   Component Date Value    SARS-CoV-2 01/11/2022 Positive*   Appointment on 12/13/2021 Component Date Value    Calcium 12/13/2021 9 1     Creatinine 12/13/2021 1 25     eGFR 12/13/2021 41     WBC 12/13/2021 6 70     RBC 12/13/2021 3 45*    Hemoglobin 12/13/2021 11 4*    Hematocrit 12/13/2021 35 5     MCV 12/13/2021 103*    MCH 12/13/2021 33 0     MCHC 12/13/2021 32 1     RDW 12/13/2021 16 0*    MPV 12/13/2021 10 0     Platelets 25/04/0588 219     nRBC 12/13/2021 0     Neutrophils Relative 12/13/2021 74     Immat GRANS % 12/13/2021 0     Lymphocytes Relative 12/13/2021 12*    Monocytes Relative 12/13/2021 10     Eosinophils Relative 12/13/2021 3     Basophils Relative 12/13/2021 1     Neutrophils Absolute 12/13/2021 4 91     Immature Grans Absolute 12/13/2021 0 02     Lymphocytes Absolute 12/13/2021 0 82     Monocytes Absolute 12/13/2021 0 69     Eosinophils Absolute 12/13/2021 0 22     Basophils Absolute 12/13/2021 0 04     Sed Rate 12/13/2021 37*    CRP 12/13/2021 6 6*    Total Bilirubin 12/13/2021 0 63     Bilirubin, Direct 12/13/2021 0 18     Alkaline Phosphatase 12/13/2021 67     AST 12/13/2021 24     ALT 12/13/2021 22     Total Protein 12/13/2021 7 1     Albumin 12/13/2021 3 8     PTH 12/13/2021 45 7     A/G Ratio 12/13/2021 1 85*    Albumin Electrophoresis 12/13/2021 64 9     Albumin CONC 12/13/2021 4 15     Alpha 1 12/13/2021 4 7     ALPHA 1 CONC 12/13/2021 0 30     Alpha 2 12/13/2021 12 7     ALPHA 2 CONC 12/13/2021 0 81     Beta-1 12/13/2021 5 4     BETA 1 CONC 12/13/2021 0 35*    Beta-2 12/13/2021 4 6     BETA 2 CONC 12/13/2021 0 29     Gamma Globulin 12/13/2021 7 7*    GAMMA CONC 12/13/2021 0 49*    Total Protein 12/13/2021 6 4     SPEP Interpretation 12/13/2021 See Comment     Vit D, 25-Hydroxy 12/13/2021 50 6       Imaging: No results found  Physical Exam  Constitutional:       Appearance: She is well-developed  Cardiovascular:      Rate and Rhythm: Normal rate and regular rhythm  Heart sounds: Normal heart sounds  Pulmonary:      Effort: Pulmonary effort is normal       Breath sounds: Normal breath sounds  Musculoskeletal:         General: Normal range of motion  Neurological:      Mental Status: She is alert and oriented to person, place, and time  Deep Tendon Reflexes: Reflexes are normal and symmetric  Psychiatric:         Behavior: Behavior normal          Thought Content:  Thought content normal          Judgment: Judgment normal

## 2022-02-09 NOTE — LETTER
Date: 2/9/2022    To whom it may concern: This is to certify that Mehran Sebastian has been under my care for the following diagnosis: Rheumatoid arthritis and chronic pain  I feel that she is unable to serve on Jury Duty at this time for the above mentioned medical reasons                    Sincerely,               PUJA Jordan

## 2022-02-09 NOTE — PATIENT INSTRUCTIONS
1  Hypertension- At goal  Continue current medication  2  Hyperlipidemia- At goal  Continue statin  3  Hypothyroidism- TSH at goal  Continue alternating doses of Levothyroxine  4  Rheumatoid arthritis- Stable  Followed by rheumatology  5  Recovered from Covid- No lingering symptoms  6  History of breast cancer- Followed by oncology for mammograms  7  Chronic kidney disease, elevated creatinine- Referred to nephrology  Follow up in six months, labs prior

## 2022-02-14 RX ORDER — ACETAMINOPHEN 325 MG/1
975 TABLET ORAL ONCE
Status: DISCONTINUED | OUTPATIENT
Start: 2022-02-15 | End: 2022-02-16

## 2022-02-14 RX ORDER — SODIUM CHLORIDE 9 MG/ML
20 INJECTION, SOLUTION INTRAVENOUS CONTINUOUS
Status: DISCONTINUED | OUTPATIENT
Start: 2022-02-15 | End: 2022-02-16

## 2022-02-14 RX ORDER — METHYLPREDNISOLONE SODIUM SUCCINATE 125 MG/2ML
100 INJECTION, POWDER, LYOPHILIZED, FOR SOLUTION INTRAMUSCULAR; INTRAVENOUS ONCE
Status: DISCONTINUED | OUTPATIENT
Start: 2022-02-14 | End: 2022-02-16

## 2022-02-14 RX ORDER — DIPHENHYDRAMINE HCL 25 MG
50 TABLET ORAL ONCE
Status: DISCONTINUED | OUTPATIENT
Start: 2022-02-15 | End: 2022-02-16

## 2022-02-16 ENCOUNTER — HOSPITAL ENCOUNTER (OUTPATIENT)
Dept: INFUSION CENTER | Facility: CLINIC | Age: 82
Discharge: HOME | End: 2022-02-16
Payer: MEDICARE

## 2022-02-16 VITALS
HEART RATE: 66 BPM | DIASTOLIC BLOOD PRESSURE: 63 MMHG | SYSTOLIC BLOOD PRESSURE: 140 MMHG | TEMPERATURE: 97.6 F | RESPIRATION RATE: 18 BRPM

## 2022-02-16 PROCEDURE — 96375 TX/PRO/DX INJ NEW DRUG ADDON: CPT

## 2022-02-16 PROCEDURE — 96415 CHEMO IV INFUSION ADDL HR: CPT

## 2022-02-16 PROCEDURE — 96413 CHEMO IV INFUSION 1 HR: CPT

## 2022-02-16 RX ORDER — METHYLPREDNISOLONE SODIUM SUCCINATE 125 MG/2ML
100 INJECTION, POWDER, LYOPHILIZED, FOR SOLUTION INTRAMUSCULAR; INTRAVENOUS ONCE
Status: COMPLETED | OUTPATIENT
Start: 2022-02-16 | End: 2022-02-16

## 2022-02-16 RX ORDER — SODIUM CHLORIDE 9 MG/ML
20 INJECTION, SOLUTION INTRAVENOUS CONTINUOUS
Status: DISCONTINUED | OUTPATIENT
Start: 2022-02-16 | End: 2022-02-19 | Stop reason: HOSPADM

## 2022-02-16 RX ORDER — DIPHENHYDRAMINE HCL 25 MG
50 TABLET ORAL ONCE
Status: COMPLETED | OUTPATIENT
Start: 2022-02-16 | End: 2022-02-16

## 2022-02-16 RX ORDER — ACETAMINOPHEN 325 MG/1
975 TABLET ORAL ONCE
Status: COMPLETED | OUTPATIENT
Start: 2022-02-16 | End: 2022-02-16

## 2022-02-16 RX ADMIN — DIPHENHYDRAMINE HYDROCHLORIDE 50 MG: 25 TABLET ORAL at 10:00

## 2022-02-16 RX ADMIN — METHYLPREDNISOLONE SODIUM SUCCINATE 100 MG: 125 INJECTION, POWDER, FOR SOLUTION INTRAMUSCULAR; INTRAVENOUS at 10:00

## 2022-02-16 RX ADMIN — RITUXIMAB 1000 MG: 10 INJECTION, SOLUTION INTRAVENOUS at 10:47

## 2022-02-16 RX ADMIN — SODIUM CHLORIDE 20 ML/HR: 0.9 INJECTION, SOLUTION INTRAVENOUS at 10:03

## 2022-02-16 RX ADMIN — ACETAMINOPHEN 975 MG: 325 TABLET, FILM COATED ORAL at 10:00

## 2022-04-08 ENCOUNTER — TELEPHONE (OUTPATIENT)
Dept: NEPHROLOGY | Facility: CLINIC | Age: 82
End: 2022-04-08

## 2022-04-11 ENCOUNTER — TELEPHONE (OUTPATIENT)
Dept: INTERNAL MEDICINE CLINIC | Facility: CLINIC | Age: 82
End: 2022-04-11

## 2022-04-11 ENCOUNTER — CONSULT (OUTPATIENT)
Dept: NEPHROLOGY | Facility: CLINIC | Age: 82
End: 2022-04-11
Payer: MEDICARE

## 2022-04-11 VITALS
HEART RATE: 65 BPM | RESPIRATION RATE: 16 BRPM | WEIGHT: 189 LBS | DIASTOLIC BLOOD PRESSURE: 60 MMHG | OXYGEN SATURATION: 94 % | SYSTOLIC BLOOD PRESSURE: 130 MMHG | BODY MASS INDEX: 31.49 KG/M2 | HEIGHT: 65 IN | TEMPERATURE: 97.8 F

## 2022-04-11 DIAGNOSIS — R79.89 ELEVATED SERUM CREATININE: ICD-10-CM

## 2022-04-11 DIAGNOSIS — E83.9 CHRONIC KIDNEY DISEASE-MINERAL AND BONE DISORDER: Primary | ICD-10-CM

## 2022-04-11 DIAGNOSIS — E55.9 VITAMIN D DEFICIENCY: ICD-10-CM

## 2022-04-11 DIAGNOSIS — I10 HYPERTENSION, UNSPECIFIED TYPE: Chronic | ICD-10-CM

## 2022-04-11 DIAGNOSIS — M06.9 RHEUMATOID ARTHRITIS OF LEFT ANKLE, UNSPECIFIED WHETHER RHEUMATOID FACTOR PRESENT (HCC): Chronic | ICD-10-CM

## 2022-04-11 DIAGNOSIS — M54.50 ACUTE BILATERAL LOW BACK PAIN WITHOUT SCIATICA: ICD-10-CM

## 2022-04-11 DIAGNOSIS — N18.9 CHRONIC KIDNEY DISEASE-MINERAL AND BONE DISORDER: Primary | ICD-10-CM

## 2022-04-11 DIAGNOSIS — M89.9 CHRONIC KIDNEY DISEASE-MINERAL AND BONE DISORDER: Primary | ICD-10-CM

## 2022-04-11 DIAGNOSIS — N18.32 STAGE 3B CHRONIC KIDNEY DISEASE (HCC): ICD-10-CM

## 2022-04-11 DIAGNOSIS — M54.16 LUMBAR RADICULOPATHY: ICD-10-CM

## 2022-04-11 PROCEDURE — 99204 OFFICE O/P NEW MOD 45 MIN: CPT | Performed by: INTERNAL MEDICINE

## 2022-04-11 NOTE — ASSESSMENT & PLAN NOTE
Patient does have severe rheumatoid arthritis and on medication for that    Advised to avoid any nonsteroidal pain killer

## 2022-04-11 NOTE — ASSESSMENT & PLAN NOTE
Lab Results   Component Value Date    EGFR 36 02/01/2022    EGFR 41 12/13/2021    EGFR 35 07/27/2021    CREATININE 1 37 (H) 02/01/2022    CREATININE 1 25 12/13/2021    CREATININE 1 43 (H) 07/27/2021   I think patient does have CKD possible because of hypertension that can be multifactorial     Pathophysiology kidney disease discussed with her at length  Advised hydration and avoid any nephrotoxic medication  Asymptomatic and progressive nature of kidney disease also discussed with her    At this point I will repeat blood test urine test after hydration    Will also get kidney ultrasound as a part of the CKD management

## 2022-04-11 NOTE — ASSESSMENT & PLAN NOTE
Blood pressure is reasonably well controlled with present medication which include diuretic    Will continue to monitor

## 2022-04-11 NOTE — TELEPHONE ENCOUNTER
Patient called and asked if you would recommend her to get the second booster shot? Please advise    Osvaldo Childers

## 2022-04-11 NOTE — PROGRESS NOTES
NEPHROLOGY OFFICE CONSULT  Vandana Alejo [de-identified] y o  female MRN: [id number]    Encounter: [id number] 4/11/2022    REASON FOR VISIT: Vandana Alejo is a [de-identified] y  o female who was referred by PUJA Jordan for evaluation of Chronic Kidney Disease and Consult    HPI:    Claude Blend came in today for evaluation management of CKD  [de-identified] year woman with history of hypertension and rheumatoid arthritis though denies taking nonsteroidal pain killer     Patient does have fluctuating kidney function past   She claims she was told by her oncologist she does have CKD though she has never seen any nephrologist in the past    Patient overall feeling quite well except this joint pain  She claims he is not drinking enough water     Denies any chest pain or palpitation     Denies any shortness of breath    Does have some leg swelling    Patient also history of breast cancer requiring mastectomy though seems to be in remission at this point      REVIEW OF SYSTEMS:    Review of Systems   Constitutional: Negative for activity change and fatigue  HENT: Negative for congestion and ear discharge  Eyes: Negative for photophobia and pain  Respiratory: Negative for apnea, choking and shortness of breath  Cardiovascular: Negative for chest pain and palpitations  Gastrointestinal: Negative for abdominal distention and blood in stool  Endocrine: Negative for heat intolerance and polyphagia  Genitourinary: Negative for difficulty urinating, flank pain and urgency  Musculoskeletal: Positive for arthralgias and back pain  Negative for neck pain and neck stiffness  Skin: Negative for color change and wound  Allergic/Immunologic: Negative for food allergies and immunocompromised state  Neurological: Negative for seizures and facial asymmetry  Hematological: Negative for adenopathy  Does not bruise/bleed easily  Psychiatric/Behavioral: Negative for self-injury and suicidal ideas           PAST MEDICAL HISTORY:  Past Medical History: Diagnosis Date    Allergic angioedema     [id number] resolved    Angioedema     [id number] resolved    Arthritis     Breast cancer ([facility] [address] ) 2012    right     Chronic kidney disease     Disease of thyroid gland     Hemorrhage of anus and rectum     [id number] resolved    Hyperlipidemia     Hypertension     Hypocalcemia     [id number] resolved    Neuritis     thoracic and lumbosacral    Peptic ulcer     Rheumatoid arthritis ([facility] [address] )     Stage 3b chronic kidney disease ([facility] [address] ) 5/7/2021    Vitamin D deficiency        PAST SURGICAL HISTORY:  Past Surgical History:   Procedure Laterality Date    ABDOMINAL SURGERY      BREAST BIOPSY Right 2012    BREAST LUMPECTOMY Left 2012    BREAST SURGERY      CHOLECYSTECTOMY      ELBOW SURGERY      GALLBLADDER SURGERY  1981    ORTHOPEDIC SURGERY      for toes       SOCIAL HISTORY:  Social History     Substance and Sexual Activity   Alcohol Use Not Currently     Social History     Substance and Sexual Activity   Drug Use No     Social History     Tobacco Use   Smoking Status Never Smoker   Smokeless Tobacco Never Used       FAMILY HISTORY:  Family History   Problem Relation Age of Onset    Breast cancer Mother     Kidney failure Father     Other Father         uremia    Breast cancer Maternal Aunt     Breast cancer Maternal Aunt     Breast cancer Maternal Aunt     Breast cancer Maternal Aunt     Breast cancer Maternal Aunt     Breast cancer Maternal Uncle     Lung cancer Brother        MEDICATIONS:    Current Outpatient Medications:     ascorbic acid (VITAMIN C) 500 mg tablet, Take 500 mg by mouth daily  , Disp: , Rfl:     Calcium Citrate-Vitamin D (CITRACAL + D PO), Take by mouth 2 (two) times a day, Disp: , Rfl:     carvedilol (COREG) 25 mg tablet, Take 1 tablet (25 mg total) by mouth 2 (two) times a day with meals, Disp: 180 tablet, Rfl: 1    Cholecalciferol (VITAMIN D-3 PO), Take 2,000 Units by mouth daily  , Disp: , Rfl:     folic acid (FOLVITE) 1 mg tablet, 1 daily, Disp: 90 tablet, Rfl: 2    hydrochlorothiazide (HYDRODIURIL) 25 mg tablet, TAKE 1 TABLET BY MOUTH EVERY DAY, Disp: 90 tablet, Rfl: 2    latanoprost (XALATAN) 0 005 % ophthalmic solution,  , Disp: , Rfl:     levothyroxine (Synthroid) 50 mcg tablet, Take 1 tablet (50 mcg total) by mouth every other day, Disp: 45 tablet, Rfl: 1    levothyroxine 75 mcg tablet, Take 1 tab po every other day, alternating with 50 mcg, Disp: 45 tablet, Rfl: 1    loratadine (CLARITIN) 10 mg tablet, Take 1 tablet (10 mg total) by mouth daily, Disp: 90 tablet, Rfl: 5    magnesium oxide (MAG-OX) 400 mg, Take 1 tablet (400 mg total) by mouth 2 (two) times a day, Disp: 90 tablet, Rfl: 0    methotrexate 2 5 mg tablet, Take 4 tablets (10 mg total) by mouth once a week (Patient taking differently: Take 2 5 mg by mouth once a week Patient takes 6 tablets daily ), Disp: 12 tablet, Rfl:     Multiple Vitamin (MULTIVITAMIN) tablet, Take 1 tablet by mouth daily  , Disp: , Rfl:     potassium chloride (Klor-Con M10) 10 mEq tablet, Take 2 tablets (20 mEq total) by mouth 2 (two) times a day, Disp: 360 tablet, Rfl: 1    Psyllium (METAMUCIL) WAFR, Take 2 twice a day (Patient taking differently: Take once daily ), Disp: 100 Wafer, Rfl: 5    riTUXimab (RITUXAN) 500 mg/50 mL, Infuse into a venous catheter every 6 (six) months  , Disp: , Rfl:     rosuvastatin (CRESTOR) 5 mg tablet, Take 1 tablet (5 mg total) by mouth daily, Disp: 90 tablet, Rfl: 2    Na Sulfate-K Sulfate-Mg Sulf (Suprep Bowel Prep Kit) 17 5-3 13-1 6 GM/177ML SOLN, Take 1 kit by mouth once for 1 dose (Patient not taking: Reported on 2/9/2022 ), Disp: 177 mL, Rfl: 0    PHYSICAL EXAM:  Vitals:    04/11/22 1506   BP: 130/60   BP Location: Right arm   Patient Position: Sitting   Pulse: 65   Resp: 16   Temp: 97 8 °F (36 6 °C)   TempSrc: Temporal   SpO2: 94%   Weight: 85 7 kg (189 lb)   Height: 5' 5" (1 651 m)     Body mass index is 31 45 kg/m²      Physical Exam  Constitutional:       General: She is not in acute distress  Appearance: She is well-developed  She is obese  HENT:      Head: Normocephalic  Eyes:      General: No scleral icterus  Conjunctiva/sclera: Conjunctivae normal    Neck:      Vascular: No JVD  Cardiovascular:      Rate and Rhythm: Normal rate  Heart sounds: Normal heart sounds  Pulmonary:      Effort: Pulmonary effort is normal       Breath sounds: Normal breath sounds  No wheezing  Abdominal:      General: Bowel sounds are normal       Palpations: Abdomen is soft  Tenderness: There is no abdominal tenderness  Musculoskeletal:         General: Swelling present  Normal range of motion  Cervical back: Normal range of motion and neck supple  Skin:     General: Skin is warm  Findings: No rash  Neurological:      General: No focal deficit present  Mental Status: She is alert and oriented to person, place, and time     Psychiatric:         Behavior: Behavior normal          LAB RESULTS:  Results for orders placed or performed in visit on 02/01/22   CBC and differential   Result Value Ref Range    WBC 6 21 4 31 - 10 16 Thousand/uL    RBC 3 58 (L) 3 81 - 5 12 Million/uL    Hemoglobin 11 3 (L) 11 5 - 15 4 g/dL    Hematocrit 36 5 34 8 - 46 1 %     (H) 82 - 98 fL    MCH 31 6 26 8 - 34 3 pg    MCHC 31 0 (L) 31 4 - 37 4 g/dL    RDW 15 6 (H) 11 6 - 15 1 %    MPV 9 8 8 9 - 12 7 fL    Platelets 555 375 - 568 Thousands/uL    nRBC 0 /100 WBCs    Neutrophils Relative 62 43 - 75 %    Immat GRANS % 0 0 - 2 %    Lymphocytes Relative 20 14 - 44 %    Monocytes Relative 11 4 - 12 %    Eosinophils Relative 6 0 - 6 %    Basophils Relative 1 0 - 1 %    Neutrophils Absolute 3 89 1 85 - 7 62 Thousands/µL    Immature Grans Absolute 0 01 0 00 - 0 20 Thousand/uL    Lymphocytes Absolute 1 22 0 60 - 4 47 Thousands/µL    Monocytes Absolute 0 69 0 17 - 1 22 Thousand/µL    Eosinophils Absolute 0 34 0 00 - 0 61 Thousand/µL Basophils Absolute 0 06 0 00 - 0 10 Thousands/µL   Comprehensive metabolic panel   Result Value Ref Range    Sodium 137 136 - 145 mmol/L    Potassium 4 4 3 5 - 5 3 mmol/L    Chloride 102 100 - 108 mmol/L    CO2 30 21 - 32 mmol/L    ANION GAP 5 4 - 13 mmol/L    BUN 14 5 - 25 mg/dL    Creatinine 1 37 (H) 0 60 - 1 30 mg/dL    Glucose, Fasting 98 65 - 99 mg/dL    Calcium 9 0 8 3 - 10 1 mg/dL    AST 18 5 - 45 U/L    ALT 21 12 - 78 U/L    Alkaline Phosphatase 61 46 - 116 U/L    Total Protein 6 8 6 4 - 8 2 g/dL    Albumin 3 7 3 5 - 5 0 g/dL    Total Bilirubin 0 62 0 20 - 1 00 mg/dL    eGFR 36 ml/min/1 73sq m   HEMOGLOBIN A1C W/ EAG ESTIMATION   Result Value Ref Range    Hemoglobin A1C 5 6 Normal 3 8-5 6%; PreDiabetic 5 7-6 4%; Diabetic >=6 5%; Glycemic control for adults with diabetes <7 0% %     mg/dl   Lipid panel   Result Value Ref Range    Cholesterol 155 See Comment mg/dL    Triglycerides 117 See Comment mg/dL    HDL, Direct 46 (L) >=50 mg/dL    LDL Calculated 86 0 - 100 mg/dL    Non-HDL-Chol (CHOL-HDL) 109 mg/dl   TSH, 3rd generation with Free T4 reflex   Result Value Ref Range    TSH 3RD GENERATON 2 432 0 358 - 3 740 uIU/mL       ASSESSMENT and PLAN:    Stage 3b chronic kidney disease (HCC)  Lab Results   Component Value Date    EGFR 36 02/01/2022    EGFR 41 12/13/2021    EGFR 35 07/27/2021    CREATININE 1 37 (H) 02/01/2022    CREATININE 1 25 12/13/2021    CREATININE 1 43 (H) 07/27/2021   I think patient does have CKD possible because of hypertension that can be multifactorial     Pathophysiology kidney disease discussed with her at length  Advised hydration and avoid any nephrotoxic medication  Asymptomatic and progressive nature of kidney disease also discussed with her    At this point I will repeat blood test urine test after hydration    Will also get kidney ultrasound as a part of the CKD management    Chronic kidney disease-mineral and bone disorder  Lab Results   Component Value Date    EGFR 36 02/01/2022    EGFR 41 12/13/2021    EGFR 35 07/27/2021    CREATININE 1 37 (H) 02/01/2022    CREATININE 1 25 12/13/2021    CREATININE 1 43 (H) 07/27/2021   PTH and phosphorus along with vitamin-D will be checked as part of the CKD management    Lumbar radiculopathy  Proper chronic joint pain  Advised not to taking nonsteroidal pain killer    Rheumatoid arthritis ([facility] [address] )  Patient does have severe rheumatoid arthritis and on medication for that  Advised to avoid any nonsteroidal pain killer    Hypertension  Blood pressure is reasonably well controlled with present medication which include diuretic  Will continue to monitor       Everything discussed at length with the patient  Blood work also discussed with her  Advised hydration  I will see her back in 3 months  Will get blood and urine test including kidney ultrasound at that time  Thank you very much for the consultation I will monitor the patient with you    Portions of the record may have been created with voice recognition software  Occasional wrong word or "sound a like" substitutions may have occurred due to the inherent limitations of voice recognition software  Read the chart carefully and recognize, using context, where substitutions have occurred  If you have any questions, please contact the dictating provider

## 2022-04-11 NOTE — PATIENT INSTRUCTIONS
Chronic Kidney Disease   AMBULATORY CARE:   Chronic kidney disease (CKD)  is the gradual and permanent loss of kidney function  Normally, the kidneys remove fluid, chemicals, and waste from your blood  These wastes are turned into urine by your kidneys  CKD may worsen over time and lead to kidney failure  Common signs and symptoms include the following:   · Changes in how often you need to urinate    · Swelling in your arms, legs, or feet    · Shortness of breath    · Fatigue or weakness    · Bad or bitter taste in your mouth    · Nausea, vomiting, or loss of appetite    Call your local emergency number (911 in the [address] [facility]) if:   · You have a seizure  · You have shortness of breath  Seek care immediately if:   · You are confused and very drowsy  Call your doctor or nephrologist if:   · You suddenly gain or lose more weight than your healthcare provider has told you is okay  · You have itchy skin or a rash  · You urinate more or less than you normally do  · You have blood in your urine  · You have nausea and are vomiting  · You have fatigue or muscle weakness  · You have hiccups that will not stop  · You have questions or concerns about your condition or care  How CKD is diagnosed:  CKD has 5 stages  Your healthcare provider will use results from the following tests to find the stage of CKD you have:  · Blood and urine tests  show how well your kidneys are working  They may also show the cause of your CKD  · Ultrasound, CT scan, or MRI  pictures may be used to check your kidneys  You may be given contrast liquid to help your kidneys show up better in the pictures  Tell the healthcare provider if you have ever had an allergic reaction to contrast liquid  Do not enter the MRI room with anything metal  Metal can cause serious injury  Tell the healthcare provider if you have any metal in or on your body      · A biopsy  is a procedure to remove a small piece of tissue from your kidney  It is done to find the cause of your CKD  Treatment  can help control signs and symptoms, and prevent a worse stage of CKD  Your care team may include specialists, such as a dietitian or a heart specialist  This depends on the stage of your CKD and if you have other health conditions to manage  Healthcare providers will work with you to create a plan based on your decisions for treatment  Your treatment plan may include any of the following:  · Medicines  may be given to decrease your blood pressure and get rid of extra fluid  You may also receive medicine to manage health conditions that may occur with CKD, such as anemia, diabetes, and heart disease  · Dialysis  is a treatment to remove chemicals and waste from your blood when your kidneys can no longer do this  · Surgery  may be needed to create an arteriovenous fistula (AVF) in your arm or insert a catheter into your abdomen  This is done so you can receive dialysis  · A kidney transplant  may be done if your CKD becomes severe  What you can do to manage CKD: Management may include making some lifestyle changes  Tell your healthcare provider if you have any concerns about being able to make changes  He or she can help you find solutions, including working with specialists  Ask for help creating a plan to break large goals into smaller steps  Your plan may include any of the following:  · Manage other health conditions  Your healthcare provider will work with you to make a care plan that meets your needs  You will be checked regularly for heart disease or other conditions that can make CKD worse, such as diabetes  Your blood pressure will be closely monitored  You will also get a target blood pressure and help making a plan to reach your target  This may include taking your blood pressure at home  · Maintain a healthy weight  Your weight and body mass index (BMI) will be checked regularly   BMI helps find if your weight is healthy for your height  Your healthcare provider will use other tests to check your muscle and protein levels  Extra weight can strain your kidneys  A low weight or low muscle mass can make you feel more tired  You may have trouble doing your daily activities  Ask your provider what a healthy weight is for you  He or she can help you create a plan to lose or gain weight safely, if needed  The plan may include keeping a food diary  This is a list of foods and liquids you have each day  Your provider will use the diary to help you make changes, if needed  Changes are based on your health and any other conditions you have, such as diabetes  · Create an exercise plan  Regular exercise can help you manage CKD, high blood pressure, and diabetes  Exercise also helps control weight  Your provider can help you create exercise goals and a plan to reach those goals  For example, your goal may be to exercise for 30 minutes in a day  Your plan can include breaking exercise into 10 minute sessions, 3 times during the day  · Create a healthy eating plan  Your provider may tell you to eat food low in potassium, phosphorus, or protein  Your provider may also recommend vitamin or mineral supplements  Do not take any supplements without talking to your provider  A dietitian can help you plan meals if needed  Ask how much liquid to drink each day and which liquids are best for you  · Limit sodium (salt) as directed  You may need to limit sodium to less than 2,300 milligrams (mg) each day  Ask your dietitian or healthcare provider how much sodium you can have each day  The amount depends on your stage of kidney disease  Table salt, canned foods, soups, salted snacks, and processed meats, like deli meats and sausage, are high in sodium  Your provider or a dietitian can show you how to read food labels for sodium  · Limit alcohol as directed  Alcohol can cause fluid retention and can affect your kidneys   Ask how much alcohol is safe for you  A drink of alcohol is 12 ounces of beer, 5 ounces of wine, or 1½ ounces of liquor  · Do not smoke  Nicotine and other chemicals in cigarettes and cigars can cause kidney damage  Ask your provider for information if you currently smoke and need help to quit  E-cigarettes or smokeless tobacco still contain nicotine  Talk to your provider before you use these products  · Ask about over-the-counter medicines  Medicines such as NSAIDs and laxatives may harm your kidneys  Some cough and cold medicines can raise your blood pressure  Always ask if a medicine is safe before you take it  · Ask about vaccines you may need  CKD can increase your risk for infections such as pneumonia, influenza, and hepatitis  Vaccines lower your risk for infection  Your healthcare provider will tell you which vaccines you need and when to get them  Follow up with your doctor or nephrologist as directed: You will need to return for tests to monitor your kidney and nerve function, and your parathyroid hormone level  Your medicines may be changed, based on certain test results  Write down your questions so you remember to ask them during your visits  © Copyright [employer] 2022 Information is for End User's use only and may not be sold, redistributed or otherwise used for commercial purposes  All illustrations and images included in CareNotes® are the copyrighted property of [employer]  or Kimberly Garland  The above information is an  only  It is not intended as medical advice for individual conditions or treatments  Talk to your doctor, nurse or pharmacist before following any medical regimen to see if it is safe and effective for you

## 2022-04-11 NOTE — ASSESSMENT & PLAN NOTE
Lab Results   Component Value Date    EGFR 36 02/01/2022    EGFR 41 12/13/2021    EGFR 35 07/27/2021    CREATININE 1 37 (H) 02/01/2022    CREATININE 1 25 12/13/2021    CREATININE 1 43 (H) 07/27/2021   PTH and phosphorus along with vitamin-D will be checked as part of the CKD management

## 2022-04-11 NOTE — TELEPHONE ENCOUNTER
As per Isis, "  I recommend the booster, but hold off  Covid is low nowadays and I want her to have coverage if and when it spikes, possibly in the fall  Spoke with the patient and advised her of this  She verbalized understanding and will get her second booster closer to the Fall

## 2022-04-18 ENCOUNTER — TELEPHONE (OUTPATIENT)
Dept: HEMATOLOGY ONCOLOGY | Facility: CLINIC | Age: 82
End: 2022-04-18

## 2022-04-20 ENCOUNTER — OFFICE VISIT (OUTPATIENT)
Dept: INTERNAL MEDICINE CLINIC | Facility: CLINIC | Age: 82
End: 2022-04-20
Payer: MEDICARE

## 2022-04-20 VITALS
BODY MASS INDEX: 31.48 KG/M2 | WEIGHT: 188.93 LBS | DIASTOLIC BLOOD PRESSURE: 82 MMHG | SYSTOLIC BLOOD PRESSURE: 130 MMHG | HEIGHT: 65 IN

## 2022-04-20 DIAGNOSIS — J32.9 SINUSITIS, UNSPECIFIED CHRONICITY, UNSPECIFIED LOCATION: Primary | ICD-10-CM

## 2022-04-20 PROCEDURE — 99213 OFFICE O/P EST LOW 20 MIN: CPT | Performed by: NURSE PRACTITIONER

## 2022-04-20 RX ORDER — AZITHROMYCIN 250 MG/1
TABLET, FILM COATED ORAL
Qty: 6 TABLET | Refills: 0 | Status: SHIPPED | OUTPATIENT
Start: 2022-04-20 | End: 2022-04-24

## 2022-04-20 NOTE — PROGRESS NOTES
Assessment/Plan:    Sinusitis- Start azithromycin x 5 days  Please let me know if symptoms do not improve after taking medication  Follow up as scheduled, or sooner if needed  Diagnoses and all orders for this visit:    Sinusitis, unspecified chronicity, unspecified location  -     azithromycin (ZITHROMAX) 250 mg tablet; Take 2 tablets today then 1 tablet daily x 4 days        The patient was counseled regarding instructions for management, risk factor reductions, patient and family education,impressions, risks and benefits of treatment options, side effects of medications, importance of compliance with treatment  The treatment plan was reviewed with the patient/guardian and patient/guardian understands and agrees with the treatment plan  Current Outpatient Medications:     ascorbic acid (VITAMIN C) 500 mg tablet, Take 500 mg by mouth daily  , Disp: , Rfl:     Calcium Citrate-Vitamin D (CITRACAL + D PO), Take by mouth 2 (two) times a day, Disp: , Rfl:     carvedilol (COREG) 25 mg tablet, Take 1 tablet (25 mg total) by mouth 2 (two) times a day with meals, Disp: 180 tablet, Rfl: 1    Cholecalciferol (VITAMIN D-3 PO), Take 2,000 Units by mouth daily  , Disp: , Rfl:     folic acid (FOLVITE) 1 mg tablet, 1 daily, Disp: 90 tablet, Rfl: 2    hydrochlorothiazide (HYDRODIURIL) 25 mg tablet, TAKE 1 TABLET BY MOUTH EVERY DAY, Disp: 90 tablet, Rfl: 2    latanoprost (XALATAN) 0 005 % ophthalmic solution,  , Disp: , Rfl:     levothyroxine (Synthroid) 50 mcg tablet, Take 1 tablet (50 mcg total) by mouth every other day, Disp: 45 tablet, Rfl: 1    levothyroxine 75 mcg tablet, Take 1 tab po every other day, alternating with 50 mcg, Disp: 45 tablet, Rfl: 1    loratadine (CLARITIN) 10 mg tablet, Take 1 tablet (10 mg total) by mouth daily, Disp: 90 tablet, Rfl: 5    magnesium oxide (MAG-OX) 400 mg, Take 1 tablet (400 mg total) by mouth 2 (two) times a day, Disp: 90 tablet, Rfl: 0    methotrexate 2 5 mg tablet, Take 4 tablets (10 mg total) by mouth once a week (Patient taking differently: Take 2 5 mg by mouth once a week Patient takes 6 tablets daily ), Disp: 12 tablet, Rfl:     Multiple Vitamin (MULTIVITAMIN) tablet, Take 1 tablet by mouth daily  , Disp: , Rfl:     potassium chloride (Klor-Con M10) 10 mEq tablet, Take 2 tablets (20 mEq total) by mouth 2 (two) times a day, Disp: 360 tablet, Rfl: 1    Psyllium (METAMUCIL) WAFR, Take 2 twice a day (Patient taking differently: Take once daily ), Disp: 100 Wafer, Rfl: 5    riTUXimab (RITUXAN) 500 mg/50 mL, Infuse into a venous catheter every 6 (six) months  , Disp: , Rfl:     rosuvastatin (CRESTOR) 5 mg tablet, Take 1 tablet (5 mg total) by mouth daily, Disp: 90 tablet, Rfl: 2    azithromycin (ZITHROMAX) 250 mg tablet, Take 2 tablets today then 1 tablet daily x 4 days, Disp: 6 tablet, Rfl: 0    Subjective:      Patient ID: Jakob Rosales is a [de-identified] y o  female  Here today with complaints of runny nose, watery left eye, and mild cough that started on Friday  No fever or chills  The following portions of the patient's history were reviewed and updated as appropriate:   She has a past medical history of Allergic angioedema, Angioedema, Arthritis, Breast cancer ([address] ) (2012), Chronic kidney disease, Disease of thyroid gland, Hemorrhage of anus and rectum, Hyperlipidemia, Hypertension, Hypocalcemia, Neuritis, Peptic ulcer, Rheumatoid arthritis ([address] ), Stage 3b chronic kidney disease ([address] ) (5/7/2021), and Vitamin D deficiency  ,  does not have any pertinent problems on file  ,   has a past surgical history that includes Abdominal surgery; Breast surgery; Cholecystectomy; Elbow surgery; orthopedic surgery; Breast biopsy (Right, 2012); Breast lumpectomy (Left, 2012); and Gallbladder surgery (1981)  ,  family history includes Breast cancer in her maternal aunt, maternal aunt, maternal aunt, maternal aunt, maternal aunt, maternal uncle, and mother; Kidney failure in her father; Lung cancer in her brother; Other in her father  ,   reports that she has never smoked  She has never used smokeless tobacco  She reports previous alcohol use  She reports that she does not use drugs  ,  is allergic to lisinopril, codeine, other, oxycodone, penicillins, sulfamethoxazole-trimethoprim, and ciprofloxacin       Review of Systems   Constitutional: Negative  HENT: Positive for postnasal drip  Negative for sneezing and sore throat  Eyes: Positive for discharge (clear L eye)  Respiratory: Positive for cough  Cardiovascular: Negative  Musculoskeletal: Negative  Psychiatric/Behavioral: Negative  Objective:  /82 (BP Location: Left arm, Patient Position: Sitting, Cuff Size: Adult)   Ht 5' 5" (1 651 m)   Wt 85 7 kg (188 lb 15 oz)   LMP  (LMP Unknown)   BMI 31 44 kg/m²     Lab Review  No visits with results within 2 Month(s) from this visit     Latest known visit with results is:   Appointment on 02/01/2022   Component Date Value    WBC 02/01/2022 6 21     RBC 02/01/2022 3 58*    Hemoglobin 02/01/2022 11 3*    Hematocrit 02/01/2022 36 5     MCV 02/01/2022 102*    MCH 02/01/2022 31 6     MCHC 02/01/2022 31 0*    RDW 02/01/2022 15 6*    MPV 02/01/2022 9 8     Platelets 35/57/5367 184     nRBC 02/01/2022 0     Neutrophils Relative 02/01/2022 62     Immat GRANS % 02/01/2022 0     Lymphocytes Relative 02/01/2022 20     Monocytes Relative 02/01/2022 11     Eosinophils Relative 02/01/2022 6     Basophils Relative 02/01/2022 1     Neutrophils Absolute 02/01/2022 3 89     Immature Grans Absolute 02/01/2022 0 01     Lymphocytes Absolute 02/01/2022 1 22     Monocytes Absolute 02/01/2022 0 69     Eosinophils Absolute 02/01/2022 0 34     Basophils Absolute 02/01/2022 0 06     Sodium 02/01/2022 137     Potassium 02/01/2022 4 4     Chloride 02/01/2022 102     CO2 02/01/2022 30     ANION GAP 02/01/2022 5     BUN 02/01/2022 14     Creatinine 02/01/2022 1 37*  Glucose, Fasting 02/01/2022 98     Calcium 02/01/2022 9 0     AST 02/01/2022 18     ALT 02/01/2022 21     Alkaline Phosphatase 02/01/2022 61     Total Protein 02/01/2022 6 8     Albumin 02/01/2022 3 7     Total Bilirubin 02/01/2022 0 62     eGFR 02/01/2022 36     Hemoglobin A1C 02/01/2022 5 6     EAG 02/01/2022 114     Cholesterol 02/01/2022 155     Triglycerides 02/01/2022 117     HDL, Direct 02/01/2022 46*    LDL Calculated 02/01/2022 86     Non-HDL-Chol (CHOL-HDL) 02/01/2022 109     TSH 3RD GENERATON 02/01/2022 2 432         Imaging: No results found  Physical Exam  Constitutional:       Appearance: She is well-developed  HENT:      Right Ear: Tympanic membrane and ear canal normal       Left Ear: Tympanic membrane and ear canal normal       Mouth/Throat:      Mouth: Mucous membranes are moist    Cardiovascular:      Rate and Rhythm: Normal rate and regular rhythm  Heart sounds: Normal heart sounds  Pulmonary:      Effort: Pulmonary effort is normal       Breath sounds: Normal breath sounds  Musculoskeletal:         General: Normal range of motion  Neurological:      Mental Status: She is alert and oriented to person, place, and time  Deep Tendon Reflexes: Reflexes are normal and symmetric  Psychiatric:         Behavior: Behavior normal          Thought Content:  Thought content normal          Judgment: Judgment normal

## 2022-04-20 NOTE — PATIENT INSTRUCTIONS
Sinusitis- Start azithromycin x 5 days  Please let me know if symptoms do not improve after taking medication  Follow up as scheduled, or sooner if needed

## 2022-04-25 ENCOUNTER — TELEPHONE (OUTPATIENT)
Dept: INTERNAL MEDICINE CLINIC | Facility: CLINIC | Age: 82
End: 2022-04-25

## 2022-04-25 NOTE — TELEPHONE ENCOUNTER
Pt called back how doing:     shes better from cold,  Yet still has some eye running and nasal congestion      fyi

## 2022-05-10 ENCOUNTER — OFFICE VISIT (OUTPATIENT)
Dept: HEMATOLOGY ONCOLOGY | Facility: CLINIC | Age: 82
End: 2022-05-10
Payer: MEDICARE

## 2022-05-10 VITALS
WEIGHT: 190 LBS | HEIGHT: 65 IN | DIASTOLIC BLOOD PRESSURE: 82 MMHG | RESPIRATION RATE: 18 BRPM | TEMPERATURE: 98 F | SYSTOLIC BLOOD PRESSURE: 148 MMHG | HEART RATE: 57 BPM | OXYGEN SATURATION: 99 % | BODY MASS INDEX: 31.65 KG/M2

## 2022-05-10 DIAGNOSIS — Z12.31 ENCOUNTER FOR SCREENING MAMMOGRAM FOR MALIGNANT NEOPLASM OF BREAST: ICD-10-CM

## 2022-05-10 DIAGNOSIS — C50.911 MALIGNANT NEOPLASM OF RIGHT BREAST IN FEMALE, ESTROGEN RECEPTOR POSITIVE, UNSPECIFIED SITE OF BREAST (HCC): Primary | ICD-10-CM

## 2022-05-10 DIAGNOSIS — D53.9 MACROCYTIC ANEMIA: ICD-10-CM

## 2022-05-10 DIAGNOSIS — M05.742 RHEUMATOID ARTHRITIS INVOLVING BOTH HANDS WITH POSITIVE RHEUMATOID FACTOR (HCC): ICD-10-CM

## 2022-05-10 DIAGNOSIS — M05.741 RHEUMATOID ARTHRITIS INVOLVING BOTH HANDS WITH POSITIVE RHEUMATOID FACTOR (HCC): ICD-10-CM

## 2022-05-10 DIAGNOSIS — Z17.0 MALIGNANT NEOPLASM OF RIGHT BREAST IN FEMALE, ESTROGEN RECEPTOR POSITIVE, UNSPECIFIED SITE OF BREAST (HCC): Primary | ICD-10-CM

## 2022-05-10 PROCEDURE — 99214 OFFICE O/P EST MOD 30 MIN: CPT | Performed by: INTERNAL MEDICINE

## 2022-05-10 NOTE — PROGRESS NOTES
HEMATOLOGY CLINIC NOTE    Primary Care Provider: Basilio Duane  Referring Provider:    MRN: [id number]  : 1940    Assessment / Plan:   1  Malignant neoplasm of right breast in female, estrogen receptor positive, unspecified site of breast ([facility] [address] )  2  Encounter for screening mammogram for malignant neoplasm of breast   This is 80-year-old female with a history of stage I right breast cancer, invasive ductal carcinoma  ER/TX positive, HER2 positive per fish testing  She was previously seen by a physician at [facility] and then transferred to our office  She was initially diagnosed in  and is status post lumpectomy, radiation, Herceptin treatment  She was  On tamoxifen for 7 years from  to 2019  She was stopped on tamoxifen due to development of DVT  Her most recent mammogram 2021 showed benign findings  I performed her breast examination today which was unremarkable  Most recent CBC, CMP was stable  Her lab work is done every 4-6 months by PCP and rheumatologist   We will follow these results  She will see us on an annual basis and her next mammogram will be 2022  She understands to call our office in the interim should she develop any new concerning findings in the breast, lymph nodes  - Mammo screening bilateral w 3d & cad; Future     3  Macrocytic anemia  Patient's most recent lab shows stable chronic macrocytic anemia  Her most recent hemoglobin was 11 3,   She is on chronic methotrexate for her rheumatoid arthritis  She is taking folic acid with methotrexate  We will continue to monitor this and if needed we can obtain further workup  She has no bleeding seen anywhere  4  Rheumatoid arthritis involving both hands with positive rheumatoid factor (HCC)  Using methotrexate as well as Rituxan per Rheumatology  Follow up in 1 year     Patient voiced understanding and agreement to the above    The patient knows to call the office with any questions or concerns regarding the above  I have spent 30 minutes with Patient  today in which greater than 50% of this time was spent in counseling/coordination of care regarding Diagnostic results, Intructions for management, Patient and family education, Importance of tx compliance, Risk factor reductions and Impressions  Reason for visit:       Chief Complaint   Patient presents with    Follow-up       History of Hematology Illness:     Ronni Sharpe is a [de-identified] y o  female who came in for follow up  1  Stage 1, right breast Cancer, invasive ductal carcinoma, ERPR positive, HER 2 positive on FISH, grade 2   - Previously under the care in [facility] by Dr Scar Raphael & Dr Tracey Louis here at [facility]    D/X:  April 2012  Tr:  Status post lumpectomy, radiation, Herceptin, currently on tamoxifen, x 7  Years from 2012 - 11/2019, stopped  due to DVT     Surveillance since:  Last mammogram 7/2021 unremarkable       2  Deep vein thrombosis (DVT) of proximal vein of left lower extremity   - 10/2019 : 1st clot in life triggered by recent poison ivy infection , on eliquis 5 mg bid x 6m   - 4/2020 : doppler showed DVT resolved     3  History of Rheumatoid Arthritis  - Following rheumatology  - using MTX with folic acid, Rituxan     Interval History:   05/10/22: This is an [de-identified]year old female with a PMH of angioedema, arthritis, breast cancer, hypothyroidism, hyperlipidemia, hypertension, hypocalcemia, rheumatoid arthritis, chronic kidney disease stage 3B presenting for follow-up regarding history of breast cancer  She feels well without any new lumps or masses in breast areas  No new lymphadenopathy  No new headache, visual change, chest pain, shortness of breath, bone pain  She does have rheumatoid arthritis and is taking methotrexate as well as Rituxan chronically by rheumatologist   No bleeding anywhere      Problem list:       Patient Active Problem List   Diagnosis    Hypertension    Rheumatoid arthritis ([address] )    Hypothyroidism    Hyperuricemia    Abnormal blood chemistry    Diastolic dysfunction    Hyperlipidemia    Lumbar radiculopathy    Murmur    Osteopenia    Other chronic pain    Vitamin D deficiency    Adenocarcinoma of breast (HCC)    Localized edema    Abnormal finding on diagnostic imaging of kidney    Fat necrosis (segmental) of breast    Personal history of breast cancer    History of peptic ulcer    Intermediate stage nonexudative 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 eyes    Encounter for FIT (fecal immunochemical test) screening    Lower leg DVT (deep venous thromboembolism), chronic, left (HCC)    Bradycardia with 51-60 beats per minute    Functional diarrhea    Varicose veins of left lower extremity with pain    Medicare annual wellness visit, subsequent    Macrocytosis    Anemia    Stage 3b chronic kidney disease ([facility] [address] )    History of DVT (deep vein thrombosis)    Abnormal glucose    Screening for malignant neoplasm of colon    Fall    Acute bilateral low back pain without sciatica    Post-nasal drip    Cough    COVID    Elevated serum creatinine    Chronic kidney disease-mineral and bone disorder    Sinusitis       REVIEW OF SYMPTOMS:   Review of Systems   Constitutional: Negative for activity change, appetite change, chills, diaphoresis, fatigue, fever and unexpected weight change  HENT: Negative for nosebleeds  Eyes: Negative for visual disturbance  Respiratory: Negative for apnea, cough, choking, chest tightness, shortness of breath, wheezing and stridor  Cardiovascular: Negative for chest pain, palpitations and leg swelling  Gastrointestinal: Negative for abdominal pain, anal bleeding, blood in stool, constipation, diarrhea, nausea and vomiting  Endocrine: Negative for cold intolerance  Genitourinary: Negative for hematuria, menstrual problem and vaginal bleeding  Musculoskeletal: Positive for arthralgias (chronic  Hx RA )     Skin: Negative for color change, pallor and rash  Neurological: Negative for dizziness, syncope, light-headedness and headaches  Hematological: Negative for adenopathy  Does not bruise/bleed easily  Psychiatric/Behavioral: Negative for sleep disturbance  PHYSICAL EXAMINATION:     Vital Signs:   /82 (BP Location: Left arm, Cuff Size: Adult)   Pulse 57   Temp 98 °F (36 7 °C)   Resp 18   Ht 5' 5" (1 651 m)   Wt 86 2 kg (190 lb)   LMP  (LMP Unknown)   SpO2 99%   BMI 31 62 kg/m²   Body surface area is 1 94 meters squared  Ht Readings from Last 8 Encounters:   05/10/22 5' 5" (1 651 m)   04/20/22 5' 5" (1 651 m)   04/11/22 5' 5" (1 651 m)   02/09/22 5' 5" (1 651 m)   01/13/22 5' 5" (1 651 m)   01/11/22 5' 5" (1 651 m)   10/27/21 5' 5" (1 651 m)   08/16/21 5' 5" (1 651 m)       Wt Readings from Last 8 Encounters:   05/10/22 86 2 kg (190 lb)   04/20/22 85 7 kg (188 lb 15 oz)   04/11/22 85 7 kg (189 lb)   02/09/22 80 7 kg (178 lb)   01/13/22 89 8 kg (198 lb)   01/11/22 89 8 kg (198 lb)   10/27/21 89 8 kg (198 lb)   08/16/21 89 7 kg (197 lb 12 8 oz)          Physical Exam  Constitutional:       General: She is not in acute distress  Appearance: Normal appearance  She is not ill-appearing, toxic-appearing or diaphoretic  HENT:      Head: Normocephalic and atraumatic  Eyes:      General: No scleral icterus  Extraocular Movements: Extraocular movements intact  Conjunctiva/sclera: Conjunctivae normal       Pupils: Pupils are equal, round, and reactive to light  Cardiovascular:      Rate and Rhythm: Normal rate and regular rhythm  Heart sounds: Murmur (History of systolic heart murmurs heard across precordium ) heard  Pulmonary:      Effort: Pulmonary effort is normal  No respiratory distress  Breath sounds: Normal breath sounds  No stridor  No wheezing, rhonchi or rales  Chest:      Chest wall: Deformity (Right breast s/p lumpectomy   expected scarring, postop findings present ) present  No mass, lacerations, swelling, tenderness, crepitus or edema  There is no dullness to percussion  Breasts: Breasts are symmetrical       Right: Normal  No swelling, bleeding, inverted nipple, mass, nipple discharge, skin change, tenderness, axillary adenopathy or supraclavicular adenopathy  Left: Normal  No swelling, bleeding, inverted nipple, mass, nipple discharge, skin change, tenderness, axillary adenopathy or supraclavicular adenopathy  Abdominal:      Palpations: Abdomen is soft  Tenderness: There is no abdominal tenderness  Musculoskeletal:         General: No tenderness  Normal range of motion  Cervical back: Normal range of motion and neck supple  Right lower leg: No edema  Left lower leg: No edema  Lymphadenopathy:      Cervical: No cervical adenopathy  Upper Body:      Right upper body: No supraclavicular, axillary or pectoral adenopathy  Left upper body: No supraclavicular, axillary or pectoral adenopathy  Skin:     General: Skin is warm  Coloration: Skin is not jaundiced or pale  Findings: No bruising, erythema, lesion or rash  Neurological:      General: No focal deficit present  Mental Status: She is alert and oriented to person, place, and time  Mental status is at baseline  Cranial Nerves: No cranial nerve deficit  Motor: No weakness  Psychiatric:         Mood and Affect: Mood normal          Behavior: Behavior normal          Thought Content: Thought content normal          Judgment: Judgment normal        Reviewed historical information        PAST MEDICAL HISTORY:    Past Medical History:   Diagnosis Date    Allergic angioedema     [id number] resolved    Angioedema     [id number] resolved    Arthritis     Breast cancer [facility]) 2012    right     Chronic kidney disease     Disease of thyroid gland     Hemorrhage of anus and rectum     [id number] resolved    Hyperlipidemia     Hypertension     Hypocalcemia [id number] resolved    Neuritis     thoracic and lumbosacral    Peptic ulcer     Rheumatoid arthritis ([facility] [address] )     Stage 3b chronic kidney disease ([facility] [address] ) 5/7/2021    Vitamin D deficiency        PAST SURGICAL HISTORY:    Past Surgical History:   Procedure Laterality Date    ABDOMINAL SURGERY      BREAST BIOPSY Right 2012    BREAST LUMPECTOMY Left 2012    BREAST SURGERY      CHOLECYSTECTOMY      ELBOW SURGERY      GALLBLADDER SURGERY  1981    ORTHOPEDIC SURGERY      for toes         CURRENT MEDICATIONS:     Current Outpatient Medications:     ascorbic acid (VITAMIN C) 500 mg tablet, Take 500 mg by mouth daily  , Disp: , Rfl:     Calcium Citrate-Vitamin D (CITRACAL + D PO), Take by mouth 2 (two) times a day, Disp: , Rfl:     carvedilol (COREG) 25 mg tablet, Take 1 tablet (25 mg total) by mouth 2 (two) times a day with meals, Disp: 180 tablet, Rfl: 1    Cholecalciferol (VITAMIN D-3 PO), Take 2,000 Units by mouth daily  , Disp: , Rfl:     folic acid (FOLVITE) 1 mg tablet, 1 daily, Disp: 90 tablet, Rfl: 2    hydrochlorothiazide (HYDRODIURIL) 25 mg tablet, TAKE 1 TABLET BY MOUTH EVERY DAY, Disp: 90 tablet, Rfl: 2    latanoprost (XALATAN) 0 005 % ophthalmic solution,  , Disp: , Rfl:     levothyroxine (Synthroid) 50 mcg tablet, Take 1 tablet (50 mcg total) by mouth every other day, Disp: 45 tablet, Rfl: 1    levothyroxine 75 mcg tablet, Take 1 tab po every other day, alternating with 50 mcg, Disp: 45 tablet, Rfl: 1    loratadine (CLARITIN) 10 mg tablet, Take 1 tablet (10 mg total) by mouth daily, Disp: 90 tablet, Rfl: 5    magnesium oxide (MAG-OX) 400 mg, Take 1 tablet (400 mg total) by mouth 2 (two) times a day, Disp: 90 tablet, Rfl: 0    methotrexate 2 5 mg tablet, Take 4 tablets (10 mg total) by mouth once a week (Patient taking differently: Take 2 5 mg by mouth once a week Patient takes 6 tablets daily ), Disp: 12 tablet, Rfl:     Multiple Vitamin (MULTIVITAMIN) tablet, Take 1 tablet by mouth daily , Disp: , Rfl:     potassium chloride (Klor-Con M10) 10 mEq tablet, Take 2 tablets (20 mEq total) by mouth 2 (two) times a day, Disp: 360 tablet, Rfl: 1    Psyllium (METAMUCIL) WAFR, Take 2 twice a day (Patient taking differently: Take once daily ), Disp: 100 Wafer, Rfl: 5    riTUXimab (RITUXAN) 500 mg/50 mL, Infuse into a venous catheter every 6 (six) months  , Disp: , Rfl:     rosuvastatin (CRESTOR) 5 mg tablet, Take 1 tablet (5 mg total) by mouth daily, Disp: 90 tablet, Rfl: 2    SOCIAL HISTORY:    Social History     Tobacco Use    Smoking status: Never Smoker    Smokeless tobacco: Never Used   Substance Use Topics    Alcohol use: Not Currently    Drug use: No       FAMILY HISTORY:    Family History   Problem Relation Age of Onset    Breast cancer Mother     Kidney failure Father     Other Father         uremia    Breast cancer Maternal Aunt     Breast cancer Maternal Aunt     Breast cancer Maternal Aunt     Breast cancer Maternal Aunt     Breast cancer Maternal Aunt     Breast cancer Maternal Uncle     Lung cancer Brother        ALLERGIES:    Allergies   Allergen Reactions    Lisinopril Anaphylaxis    Codeine Other (See Comments)     Nausea/vomiting      Other      Annotation - [id number]: tounge swelling    Oxycodone      Annotation - [id number]: NOT TOLERATE    Penicillins Hives    Sulfamethoxazole-Trimethoprim     Ciprofloxacin Rash         LAB:    Lab Results   Component Value Date    WBC 6 21 02/01/2022    HGB 11 3 (L) 02/01/2022    HCT 36 5 02/01/2022     (H) 02/01/2022     02/01/2022       Lab Results   Component Value Date    SODIUM 137 02/01/2022    K 4 4 02/01/2022     02/01/2022    CO2 30 02/01/2022    AGAP 5 02/01/2022    BUN 14 02/01/2022    CREATININE 1 37 (H) 02/01/2022    GLUC 97 10/24/2019    GLUF 98 02/01/2022    CALCIUM 9 0 02/01/2022    AST 18 02/01/2022    ALT 21 02/01/2022    ALKPHOS 61 02/01/2022    PROT 6 1 (L) 09/25/2015    TP 6 8 02/01/2022    BILITOT 0 31 09/25/2015    TBILI 0 62 02/01/2022    EGFR 36 02/01/2022       IMAGING:  XR spine lumbar 2 or 3 views injury  Narrative: LUMBAR SPINE    INDICATION:   W19  XXXA: Unspecified fall, initial encounter  M54 5: Low back pain  COMPARISON:  Thoracic spine same day  VIEWS:  XR SPINE LUMBAR 2 OR 3 VIEWS INJURY    FINDINGS:    T12 moderate superior endplate compression fracture    There are 5 non rib bearing lumbar vertebral bodies  Anatomic alignment  Lumbar lordosis straightening  Slight lower levoscoliosis  L1-L2 through L5-S1 moderate degenerative disc changes  The pedicles appear intact  Soft tissues are unremarkable  Impression: T12 compression fracture again noted  Degenerative lumbar spine    Workstation performed: [id number]  XR spine thoracic 3 vw  Narrative: THORACIC SPINE    INDICATION:   W19  XXXA: Unspecified fall, initial encounter  M54 5: Low back pain  COMPARISON:  None    VIEWS:  XR SPINE THORACIC 3 VW    FINDINGS:    T12 moderate 30-50% superior endplate compression fracture  T6 mild anterosuperior chronic appearing wedge compression deformity  T8 slight chronic appearing loss of stature  Thoracic vertebral alignment is within normal limits  Mid and lower thoracic mild spondylosis  There is no displacement of the paraspinal line  The pedicles appear intact  Impression: T12 moderate superior endplate compression fracture  Chronic appearing lesser involvement of T6 and T8  The study was marked in [facility] for immediate notification      Workstation performed: [id number]

## 2022-05-29 DIAGNOSIS — R60.0 LOCALIZED EDEMA: ICD-10-CM

## 2022-05-29 DIAGNOSIS — I10 HYPERTENSION, UNSPECIFIED TYPE: Chronic | ICD-10-CM

## 2022-05-31 ENCOUNTER — HOSPITAL ENCOUNTER (OUTPATIENT)
Dept: ULTRASOUND IMAGING | Facility: HOSPITAL | Age: 82
Discharge: HOME | End: 2022-05-31
Attending: INTERNAL MEDICINE
Payer: MEDICARE

## 2022-05-31 DIAGNOSIS — N18.32 STAGE 3B CHRONIC KIDNEY DISEASE (HCC): ICD-10-CM

## 2022-05-31 DIAGNOSIS — E83.9 CHRONIC KIDNEY DISEASE-MINERAL AND BONE DISORDER: ICD-10-CM

## 2022-05-31 DIAGNOSIS — N18.9 CHRONIC KIDNEY DISEASE-MINERAL AND BONE DISORDER: ICD-10-CM

## 2022-05-31 DIAGNOSIS — M89.9 CHRONIC KIDNEY DISEASE-MINERAL AND BONE DISORDER: ICD-10-CM

## 2022-05-31 PROCEDURE — 76770 US EXAM ABDO BACK WALL COMP: CPT

## 2022-06-01 RX ORDER — HYDROCHLOROTHIAZIDE 25 MG/1
TABLET ORAL
Qty: 90 TABLET | Refills: 2 | Status: SHIPPED | OUTPATIENT
Start: 2022-06-01

## 2022-07-11 ENCOUNTER — APPOINTMENT (OUTPATIENT)
Dept: LAB | Facility: HOSPITAL | Age: 82
End: 2022-07-11
Payer: MEDICARE

## 2022-07-11 DIAGNOSIS — N18.32 STAGE 3B CHRONIC KIDNEY DISEASE (HCC): ICD-10-CM

## 2022-07-11 DIAGNOSIS — M89.9 CHRONIC KIDNEY DISEASE-MINERAL AND BONE DISORDER: ICD-10-CM

## 2022-07-11 DIAGNOSIS — E83.9 CHRONIC KIDNEY DISEASE-MINERAL AND BONE DISORDER: ICD-10-CM

## 2022-07-11 DIAGNOSIS — N18.9 CHRONIC KIDNEY DISEASE-MINERAL AND BONE DISORDER: ICD-10-CM

## 2022-07-11 DIAGNOSIS — M05.79 SEROPOSITIVE RHEUMATOID ARTHRITIS OF MULTIPLE SITES (HCC): ICD-10-CM

## 2022-07-11 LAB
25(OH)D3 SERPL-MCNC: 49.7 NG/ML (ref 30–100)
ALBUMIN SERPL BCP-MCNC: 3.9 G/DL (ref 3.5–5)
ALP SERPL-CCNC: 62 U/L (ref 46–116)
ALT SERPL W P-5'-P-CCNC: 19 U/L (ref 12–78)
ANION GAP SERPL CALCULATED.3IONS-SCNC: 10 MMOL/L (ref 4–13)
AST SERPL W P-5'-P-CCNC: 27 U/L (ref 5–45)
BASOPHILS # BLD AUTO: 0.06 THOUSANDS/ΜL (ref 0–0.1)
BASOPHILS NFR BLD AUTO: 1 % (ref 0–1)
BILIRUB DIRECT SERPL-MCNC: 0.22 MG/DL (ref 0–0.2)
BILIRUB SERPL-MCNC: 0.63 MG/DL (ref 0.2–1)
BILIRUB UR QL STRIP: NEGATIVE
BUN SERPL-MCNC: 22 MG/DL (ref 5–25)
CALCIUM SERPL-MCNC: 9 MG/DL (ref 8.3–10.1)
CHLORIDE SERPL-SCNC: 100 MMOL/L (ref 100–108)
CLARITY UR: CLEAR
CO2 SERPL-SCNC: 29 MMOL/L (ref 21–32)
COLOR UR: YELLOW
CREAT SERPL-MCNC: 1.34 MG/DL (ref 0.6–1.3)
CREAT UR-MCNC: 28.6 MG/DL
CRP SERPL QL: 5.7 MG/L
EOSINOPHIL # BLD AUTO: 0.27 THOUSAND/ΜL (ref 0–0.61)
EOSINOPHIL NFR BLD AUTO: 5 % (ref 0–6)
ERYTHROCYTE [DISTWIDTH] IN BLOOD BY AUTOMATED COUNT: 15.5 % (ref 11.6–15.1)
ERYTHROCYTE [SEDIMENTATION RATE] IN BLOOD: 22 MM/HOUR (ref 0–29)
GFR SERPL CREATININE-BSD FRML MDRD: 37 ML/MIN/1.73SQ M
GLUCOSE P FAST SERPL-MCNC: 96 MG/DL (ref 65–99)
GLUCOSE UR STRIP-MCNC: NEGATIVE MG/DL
HCT VFR BLD AUTO: 33.6 % (ref 34.8–46.1)
HGB BLD-MCNC: 10.8 G/DL (ref 11.5–15.4)
HGB UR QL STRIP.AUTO: NEGATIVE
IGA SERPL-MCNC: 153 MG/DL (ref 70–400)
IGG SERPL-MCNC: 616 MG/DL (ref 700–1600)
IGM SERPL-MCNC: 8 MG/DL (ref 40–230)
IMM GRANULOCYTES # BLD AUTO: 0.02 THOUSAND/UL (ref 0–0.2)
IMM GRANULOCYTES NFR BLD AUTO: 0 % (ref 0–2)
KETONES UR STRIP-MCNC: NEGATIVE MG/DL
LEUKOCYTE ESTERASE UR QL STRIP: NEGATIVE
LYMPHOCYTES # BLD AUTO: 0.84 THOUSANDS/ΜL (ref 0.6–4.47)
LYMPHOCYTES NFR BLD AUTO: 16 % (ref 14–44)
MCH RBC QN AUTO: 32.9 PG (ref 26.8–34.3)
MCHC RBC AUTO-ENTMCNC: 32.1 G/DL (ref 31.4–37.4)
MCV RBC AUTO: 102 FL (ref 82–98)
MONOCYTES # BLD AUTO: 0.65 THOUSAND/ΜL (ref 0.17–1.22)
MONOCYTES NFR BLD AUTO: 12 % (ref 4–12)
NEUTROPHILS # BLD AUTO: 3.55 THOUSANDS/ΜL (ref 1.85–7.62)
NEUTS SEG NFR BLD AUTO: 66 % (ref 43–75)
NITRITE UR QL STRIP: NEGATIVE
NRBC BLD AUTO-RTO: 0 /100 WBCS
PH UR STRIP.AUTO: 6.5 [PH]
PHOSPHATE SERPL-MCNC: 3.4 MG/DL (ref 2.3–4.1)
PLATELET # BLD AUTO: 224 THOUSANDS/UL (ref 149–390)
PMV BLD AUTO: 10.1 FL (ref 8.9–12.7)
POTASSIUM SERPL-SCNC: 4 MMOL/L (ref 3.5–5.3)
PROT SERPL-MCNC: 7 G/DL (ref 6.4–8.2)
PROT UR STRIP-MCNC: NEGATIVE MG/DL
PROT UR-MCNC: <6 MG/DL
PROT/CREAT UR: <0.21 MG/G{CREAT} (ref 0–0.1)
PTH-INTACT SERPL-MCNC: 47.4 PG/ML (ref 18.4–80.1)
RBC # BLD AUTO: 3.28 MILLION/UL (ref 3.81–5.12)
SODIUM SERPL-SCNC: 139 MMOL/L (ref 136–145)
SP GR UR STRIP.AUTO: 1.01 (ref 1–1.03)
UROBILINOGEN UR QL STRIP.AUTO: 0.2 E.U./DL
WBC # BLD AUTO: 5.39 THOUSAND/UL (ref 4.31–10.16)

## 2022-07-11 PROCEDURE — 85652 RBC SED RATE AUTOMATED: CPT

## 2022-07-11 PROCEDURE — 36415 COLL VENOUS BLD VENIPUNCTURE: CPT

## 2022-07-11 PROCEDURE — 86140 C-REACTIVE PROTEIN: CPT

## 2022-07-11 PROCEDURE — 82784 ASSAY IGA/IGD/IGG/IGM EACH: CPT

## 2022-07-11 PROCEDURE — 83970 ASSAY OF PARATHORMONE: CPT

## 2022-07-11 PROCEDURE — 84156 ASSAY OF PROTEIN URINE: CPT

## 2022-07-11 PROCEDURE — 84100 ASSAY OF PHOSPHORUS: CPT

## 2022-07-11 PROCEDURE — 80076 HEPATIC FUNCTION PANEL: CPT

## 2022-07-11 PROCEDURE — 82306 VITAMIN D 25 HYDROXY: CPT

## 2022-07-11 PROCEDURE — 80048 BASIC METABOLIC PNL TOTAL CA: CPT

## 2022-07-11 PROCEDURE — 82785 ASSAY OF IGE: CPT

## 2022-07-11 PROCEDURE — 82570 ASSAY OF URINE CREATININE: CPT

## 2022-07-11 PROCEDURE — 81003 URINALYSIS AUTO W/O SCOPE: CPT

## 2022-07-11 PROCEDURE — 85025 COMPLETE CBC W/AUTO DIFF WBC: CPT

## 2022-07-12 DIAGNOSIS — E78.5 HYPERLIPIDEMIA, UNSPECIFIED HYPERLIPIDEMIA TYPE: ICD-10-CM

## 2022-07-12 DIAGNOSIS — R79.9 ABNORMAL BLOOD CHEMISTRY: ICD-10-CM

## 2022-07-12 DIAGNOSIS — E83.42 HYPOMAGNESEMIA: ICD-10-CM

## 2022-07-12 DIAGNOSIS — E03.9 HYPOTHYROIDISM, UNSPECIFIED TYPE: Chronic | ICD-10-CM

## 2022-07-12 DIAGNOSIS — I10 HYPERTENSION, UNSPECIFIED TYPE: Chronic | ICD-10-CM

## 2022-07-12 LAB — TOTAL IGE SMQN RAST: 2.15 KU/L (ref 0–113)

## 2022-07-12 RX ORDER — CARVEDILOL 25 MG/1
TABLET ORAL
Qty: 180 TABLET | Refills: 1 | Status: SHIPPED | OUTPATIENT
Start: 2022-07-12

## 2022-07-12 RX ORDER — LEVOTHYROXINE SODIUM 50 MCG
TABLET ORAL
Qty: 45 TABLET | Refills: 1 | Status: SHIPPED | OUTPATIENT
Start: 2022-07-12 | End: 2022-07-14

## 2022-07-12 RX ORDER — LEVOTHYROXINE SODIUM 0.07 MG/1
TABLET ORAL
Qty: 45 TABLET | Refills: 1 | Status: SHIPPED | OUTPATIENT
Start: 2022-07-12 | End: 2022-07-14

## 2022-07-14 DIAGNOSIS — R79.9 ABNORMAL BLOOD CHEMISTRY: ICD-10-CM

## 2022-07-14 DIAGNOSIS — I10 HYPERTENSION, UNSPECIFIED TYPE: Chronic | ICD-10-CM

## 2022-07-14 DIAGNOSIS — E03.9 HYPOTHYROIDISM, UNSPECIFIED TYPE: Chronic | ICD-10-CM

## 2022-07-14 DIAGNOSIS — E78.5 HYPERLIPIDEMIA, UNSPECIFIED HYPERLIPIDEMIA TYPE: ICD-10-CM

## 2022-07-14 DIAGNOSIS — E83.42 HYPOMAGNESEMIA: ICD-10-CM

## 2022-07-14 RX ORDER — LEVOTHYROXINE SODIUM 50 MCG
TABLET ORAL
Qty: 45 TABLET | Refills: 1 | Status: SHIPPED | OUTPATIENT
Start: 2022-07-14

## 2022-07-14 RX ORDER — LEVOTHYROXINE SODIUM 0.07 MG/1
TABLET ORAL
Qty: 45 TABLET | Refills: 1 | Status: SHIPPED | OUTPATIENT
Start: 2022-07-14

## 2022-07-19 ENCOUNTER — HOSPITAL ENCOUNTER (OUTPATIENT)
Dept: MAMMOGRAPHY | Facility: CLINIC | Age: 82
Discharge: HOME | End: 2022-07-19
Payer: MEDICARE

## 2022-07-19 VITALS — WEIGHT: 190 LBS | BODY MASS INDEX: 31.65 KG/M2 | HEIGHT: 65 IN

## 2022-07-19 DIAGNOSIS — C50.911 MALIGNANT NEOPLASM OF RIGHT BREAST IN FEMALE, ESTROGEN RECEPTOR POSITIVE, UNSPECIFIED SITE OF BREAST (HCC): ICD-10-CM

## 2022-07-19 DIAGNOSIS — Z17.0 MALIGNANT NEOPLASM OF RIGHT BREAST IN FEMALE, ESTROGEN RECEPTOR POSITIVE, UNSPECIFIED SITE OF BREAST (HCC): ICD-10-CM

## 2022-07-19 DIAGNOSIS — Z12.31 ENCOUNTER FOR SCREENING MAMMOGRAM FOR MALIGNANT NEOPLASM OF BREAST: ICD-10-CM

## 2022-07-19 PROCEDURE — 77063 BREAST TOMOSYNTHESIS BI: CPT

## 2022-07-19 PROCEDURE — 77067 SCR MAMMO BI INCL CAD: CPT

## 2022-07-26 ENCOUNTER — TELEPHONE (OUTPATIENT)
Dept: NEPHROLOGY | Facility: CLINIC | Age: 82
End: 2022-07-26

## 2022-07-27 ENCOUNTER — OFFICE VISIT (OUTPATIENT)
Dept: NEPHROLOGY | Facility: CLINIC | Age: 82
End: 2022-07-27
Payer: MEDICARE

## 2022-07-27 VITALS
BODY MASS INDEX: 33.73 KG/M2 | HEART RATE: 61 BPM | WEIGHT: 190.4 LBS | SYSTOLIC BLOOD PRESSURE: 130 MMHG | OXYGEN SATURATION: 96 % | TEMPERATURE: 96.7 F | HEIGHT: 63 IN | RESPIRATION RATE: 16 BRPM | DIASTOLIC BLOOD PRESSURE: 80 MMHG

## 2022-07-27 DIAGNOSIS — M89.9 CHRONIC KIDNEY DISEASE-MINERAL AND BONE DISORDER: ICD-10-CM

## 2022-07-27 DIAGNOSIS — N18.9 CHRONIC KIDNEY DISEASE-MINERAL AND BONE DISORDER: ICD-10-CM

## 2022-07-27 DIAGNOSIS — I10 HYPERTENSION, UNSPECIFIED TYPE: Chronic | ICD-10-CM

## 2022-07-27 DIAGNOSIS — N18.32 STAGE 3B CHRONIC KIDNEY DISEASE (HCC): Primary | ICD-10-CM

## 2022-07-27 DIAGNOSIS — M54.16 LUMBAR RADICULOPATHY: ICD-10-CM

## 2022-07-27 DIAGNOSIS — R93.429 ABNORMAL FINDING ON DIAGNOSTIC IMAGING OF KIDNEY: ICD-10-CM

## 2022-07-27 DIAGNOSIS — E83.9 CHRONIC KIDNEY DISEASE-MINERAL AND BONE DISORDER: ICD-10-CM

## 2022-07-27 PROCEDURE — 99214 OFFICE O/P EST MOD 30 MIN: CPT | Performed by: INTERNAL MEDICINE

## 2022-07-27 NOTE — ASSESSMENT & PLAN NOTE
Lab Results   Component Value Date    EGFR 37 07/11/2022    EGFR 36 02/01/2022    EGFR 41 12/13/2021    CREATININE 1 34 (H) 07/11/2022    CREATININE 1 37 (H) 02/01/2022    CREATININE 1 25 12/13/2021   Patient's PTH and phosphorus along with vitamin-D are within acceptable range and will continue to monitor

## 2022-07-27 NOTE — ASSESSMENT & PLAN NOTE
Lab Results   Component Value Date    EGFR 37 07/11/2022    EGFR 36 02/01/2022    EGFR 41 12/13/2021    CREATININE 1 34 (H) 07/11/2022    CREATININE 1 37 (H) 02/01/2022    CREATININE 1 25 12/13/2021   Patient's renal function is stable overall  Asymptomatic and progressive nature of kidney disease discussed with the patient    Advised hydration and avoiding nephrotoxic medication

## 2022-07-27 NOTE — PROGRESS NOTES
NEPHROLOGY OFFICE FOLLOW UP  Jeny Mitchell [de-identified] y o  female MRN: [id number]    Encounter: [id number] 7/27/2022    REASON FOR VISIT: Jeny Mitchell is a [de-identified] y o  female who is here on 7/27/2022 for Chronic Kidney Disease and Follow-up    HPI:    Mini Abernathy came in today for follow-up of CKD  27-year-old woman with history of rheumatoid arthritis  She is doing well overall and denies any acute complaint    No chest pain no palpitation because has some joint pain because of arthritis but does not take any nonsteroidal pain killer      REVIEW OF SYSTEMS:    Review of Systems   Constitutional: Negative for activity change and fatigue  HENT: Negative for congestion and ear discharge  Eyes: Negative for photophobia and pain  Respiratory: Negative for apnea and choking  Cardiovascular: Negative for chest pain and palpitations  Gastrointestinal: Negative for abdominal distention and blood in stool  Endocrine: Negative for heat intolerance and polyphagia  Genitourinary: Negative for flank pain and urgency  Musculoskeletal: Negative for neck pain and neck stiffness  Skin: Negative for color change and wound  Allergic/Immunologic: Negative for food allergies and immunocompromised state  Neurological: Negative for seizures and facial asymmetry  Hematological: Negative for adenopathy  Does not bruise/bleed easily  Psychiatric/Behavioral: Negative for self-injury and suicidal ideas           PAST MEDICAL HISTORY:  Past Medical History:   Diagnosis Date    Allergic angioedema     2mar2016 resolved    Angioedema     [id number] resolved    Arthritis     Breast cancer ([facility] [address] ) 2012    right     Chronic kidney disease     Disease of thyroid gland     Hemorrhage of anus and rectum     [id number] resolved    Hyperlipidemia     Hypertension     Hypocalcemia     [id number] resolved    Neuritis     thoracic and lumbosacral    Peptic ulcer     Rheumatoid arthritis ([facility] [address] )     Stage 3b chronic kidney disease ([facility] [address] ) 5/7/2021    Vitamin D deficiency        PAST SURGICAL HISTORY:  Past Surgical History:   Procedure Laterality Date    ABDOMINAL SURGERY      BREAST BIOPSY Right 2012    BREAST LUMPECTOMY Right 2012    BREAST SURGERY      CHOLECYSTECTOMY      ELBOW SURGERY      GALLBLADDER SURGERY  1981    ORTHOPEDIC SURGERY      for toes       SOCIAL HISTORY:  Social History     Substance and Sexual Activity   Alcohol Use Not Currently     Social History     Substance and Sexual Activity   Drug Use No     Social History     Tobacco Use   Smoking Status Never Smoker   Smokeless Tobacco Never Used       FAMILY HISTORY:  Family History   Problem Relation Age of Onset    Breast cancer Mother     Kidney failure Father     Other Father         uremia    Lung cancer Brother     Breast cancer Maternal Aunt     Breast cancer Maternal Aunt     Breast cancer Maternal Aunt     Breast cancer Maternal Aunt     Breast cancer Maternal Aunt     Stomach cancer Maternal Uncle        MEDICATIONS:    Current Outpatient Medications:     ascorbic acid (VITAMIN C) 500 mg tablet, Take 500 mg by mouth daily  , Disp: , Rfl:     Calcium Citrate-Vitamin D (CITRACAL + D PO), Take by mouth 2 (two) times a day, Disp: , Rfl:     carvedilol (COREG) 25 mg tablet, TAKE 1 TABLET TWICE DAILY  WITH MEALS, Disp: 180 tablet, Rfl: 1    Cholecalciferol (VITAMIN D-3 PO), Take 2,000 Units by mouth daily  , Disp: , Rfl:     folic acid (FOLVITE) 1 mg tablet, 1 daily, Disp: 90 tablet, Rfl: 2    hydrochlorothiazide (HYDRODIURIL) 25 mg tablet, TAKE 1 TABLET BY MOUTH EVERY DAY, Disp: 90 tablet, Rfl: 2    levothyroxine (Synthroid) 75 mcg tablet, TAKE 1 TABLET EVERY OTHER  DAY ALTERNATING WITH 50MCG, Disp: 45 tablet, Rfl: 1    loratadine (CLARITIN) 10 mg tablet, Take 1 tablet (10 mg total) by mouth daily, Disp: 90 tablet, Rfl: 5    magnesium oxide (MAG-OX) 400 mg, Take 1 tablet (400 mg total) by mouth 2 (two) times a day, Disp: 90 tablet, Rfl: 0   methotrexate 2 5 mg tablet, Take 4 tablets (10 mg total) by mouth once a week (Patient taking differently: Take 2 5 mg by mouth once a week Patient takes 6 tablets weekly), Disp: 12 tablet, Rfl:     Multiple Vitamin (MULTIVITAMIN) tablet, Take 1 tablet by mouth daily  , Disp: , Rfl:     potassium chloride (Klor-Con M10) 10 mEq tablet, Take 2 tablets (20 mEq total) by mouth 2 (two) times a day, Disp: 360 tablet, Rfl: 1    Psyllium (METAMUCIL) WAFR, Take 2 twice a day (Patient taking differently: Take once daily), Disp: 100 Wafer, Rfl: 5    riTUXimab (RITUXAN) 500 mg/50 mL, Infuse into a venous catheter every 6 (six) months  , Disp: , Rfl:     rosuvastatin (CRESTOR) 5 mg tablet, Take 1 tablet (5 mg total) by mouth daily, Disp: 90 tablet, Rfl: 2    Synthroid 50 MCG tablet, TAKE 1 TABLET EVERY OTHER  DAY, Disp: 45 tablet, Rfl: 1    latanoprost (XALATAN) 0 005 % ophthalmic solution,   (Patient not taking: No sig reported), Disp: , Rfl:     PHYSICAL EXAM:  Vitals:    07/27/22 1420   BP: 130/80   BP Location: Right arm   Patient Position: Sitting   Pulse: 61   Resp: 16   Temp: (!) 96 7 °F (35 9 °C)   TempSrc: Temporal   SpO2: 96%   Weight: 86 4 kg (190 lb 6 4 oz)   Height: 5' 3" (1 6 m)     Body mass index is 33 73 kg/m²  Physical Exam  Constitutional:       General: She is not in acute distress  Appearance: She is well-developed  HENT:      Head: Normocephalic  Eyes:      General: No scleral icterus  Conjunctiva/sclera: Conjunctivae normal    Neck:      Vascular: No JVD  Cardiovascular:      Rate and Rhythm: Normal rate  Heart sounds: Normal heart sounds  Pulmonary:      Effort: Pulmonary effort is normal       Breath sounds: Normal breath sounds  No wheezing  Abdominal:      General: Bowel sounds are normal       Palpations: Abdomen is soft  Tenderness: There is no abdominal tenderness  Musculoskeletal:         General: No swelling  Normal range of motion        Cervical back: Neck supple  Skin:     General: Skin is warm  Findings: No rash  Neurological:      General: No focal deficit present  Mental Status: She is alert and oriented to person, place, and time     Psychiatric:         Behavior: Behavior normal          LAB RESULTS:  Results for orders placed or performed in visit on 47/03/18   Basic metabolic panel   Result Value Ref Range    Sodium 139 136 - 145 mmol/L    Potassium 4 0 3 5 - 5 3 mmol/L    Chloride 100 100 - 108 mmol/L    CO2 29 21 - 32 mmol/L    ANION GAP 10 4 - 13 mmol/L    BUN 22 5 - 25 mg/dL    Creatinine 1 34 (H) 0 60 - 1 30 mg/dL    Glucose, Fasting 96 65 - 99 mg/dL    Calcium 9 0 8 3 - 10 1 mg/dL    eGFR 37 ml/min/1 73sq m   CBC and differential   Result Value Ref Range    WBC 5 39 4 31 - 10 16 Thousand/uL    RBC 3 28 (L) 3 81 - 5 12 Million/uL    Hemoglobin 10 8 (L) 11 5 - 15 4 g/dL    Hematocrit 33 6 (L) 34 8 - 46 1 %     (H) 82 - 98 fL    MCH 32 9 26 8 - 34 3 pg    MCHC 32 1 31 4 - 37 4 g/dL    RDW 15 5 (H) 11 6 - 15 1 %    MPV 10 1 8 9 - 12 7 fL    Platelets 593 297 - 358 Thousands/uL    nRBC 0 /100 WBCs    Neutrophils Relative 66 43 - 75 %    Immat GRANS % 0 0 - 2 %    Lymphocytes Relative 16 14 - 44 %    Monocytes Relative 12 4 - 12 %    Eosinophils Relative 5 0 - 6 %    Basophils Relative 1 0 - 1 %    Neutrophils Absolute 3 55 1 85 - 7 62 Thousands/µL    Immature Grans Absolute 0 02 0 00 - 0 20 Thousand/uL    Lymphocytes Absolute 0 84 0 60 - 4 47 Thousands/µL    Monocytes Absolute 0 65 0 17 - 1 22 Thousand/µL    Eosinophils Absolute 0 27 0 00 - 0 61 Thousand/µL    Basophils Absolute 0 06 0 00 - 0 10 Thousands/µL   Phosphorus   Result Value Ref Range    Phosphorus 3 4 2 3 - 4 1 mg/dL   Protein / creatinine ratio, urine   Result Value Ref Range    Creatinine, Ur 28 6 mg/dL    Protein Urine Random <6 mg/dL    Prot/Creat Ratio, Ur <0 21 (H) 0 00 - 0 10   PTH, intact   Result Value Ref Range    PTH 47 4 18 4 - 80 1 pg/mL   UA (URINE) with reflex to Scope   Result Value Ref Range    Color, UA Yellow     Clarity, UA Clear     Specific [place], UA 1 010 1 003 - 1 030    pH, UA 6 5 4 5, 5 0, 5 5, 6 0, 6 5, 7 0, 7 5, 8 0    Leukocytes, UA Negative Negative    Nitrite, UA Negative Negative    Protein, UA Negative Negative mg/dl    Glucose, UA Negative Negative mg/dl    Ketones, UA Negative Negative mg/dl    Urobilinogen, UA 0 2 0 2, 1 0 E U /dl E U /dl    Bilirubin, UA Negative Negative    Occult Blood, UA Negative Negative   Vitamin D 25 hydroxy   Result Value Ref Range    Vit D, 25-Hydroxy 49 7 30 0 - 100 0 ng/mL   Sedimentation rate, automated   Result Value Ref Range    Sed Rate 22 0 - 29 mm/hour   C-reactive protein   Result Value Ref Range    CRP 5 7 (H) <3 0 mg/L   Hepatic function panel   Result Value Ref Range    Total Bilirubin 0 63 0 20 - 1 00 mg/dL    Bilirubin, Direct 0 22 (H) 0 00 - 0 20 mg/dL    Alkaline Phosphatase 62 46 - 116 U/L    AST 27 5 - 45 U/L    ALT 19 12 - 78 U/L    Total Protein 7 0 6 4 - 8 2 g/dL    Albumin 3 9 3 5 - 5 0 g/dL   IgG, IgA, IgM   Result Value Ref Range     0 70 0 - 400 0 mg/dL     0 (L) 700 0 - 1,600 0 mg/dL    IGM 8 0 (L) 40 0 - 230 0 mg/dL   IgE   Result Value Ref Range    IgE 2 15 0 - 113 kU/l       ASSESSMENT and PLAN:      Stage 3b chronic kidney disease (HCC)  Lab Results   Component Value Date    EGFR 37 07/11/2022    EGFR 36 02/01/2022    EGFR 41 12/13/2021    CREATININE 1 34 (H) 07/11/2022    CREATININE 1 37 (H) 02/01/2022    CREATININE 1 25 12/13/2021   Patient's renal function is stable overall  Asymptomatic and progressive nature of kidney disease discussed with the patient    Advised hydration and avoiding nephrotoxic medication    Chronic kidney disease-mineral and bone disorder  Lab Results   Component Value Date    EGFR 37 07/11/2022    EGFR 36 02/01/2022    EGFR 41 12/13/2021    CREATININE 1 34 (H) 07/11/2022    CREATININE 1 37 (H) 02/01/2022    CREATININE 1 25 12/13/2021   Patient's PTH and phosphorus along with vitamin-D are within acceptable range and will continue to monitor    Lumbar radiculopathy  Overall stable  Advised to avoid any nephrotoxic pain killer    Abnormal finding on diagnostic imaging of kidney  Patient's left kidney is on the smaller side suggesting possible of atrophy  Etiology unclear      Everything discussed with the patient at length  I will see her back in 6 months and we will get blood and urine test before that visit        Portions of the record may have been created with voice recognition software  Occasional wrong word or "sound a like" substitutions may have occurred due to the inherent limitations of voice recognition software  Read the chart carefully and recognize, using context, where substitutions have occurred  If you have any questions, please contact the dictating provider

## 2022-08-03 ENCOUNTER — APPOINTMENT (OUTPATIENT)
Dept: LAB | Facility: HOSPITAL | Age: 82
End: 2022-08-03
Payer: MEDICARE

## 2022-08-03 DIAGNOSIS — I10 HYPERTENSION, UNSPECIFIED TYPE: Chronic | ICD-10-CM

## 2022-08-03 DIAGNOSIS — R73.09 ABNORMAL GLUCOSE: ICD-10-CM

## 2022-08-03 DIAGNOSIS — E78.5 HYPERLIPIDEMIA, UNSPECIFIED HYPERLIPIDEMIA TYPE: ICD-10-CM

## 2022-08-03 DIAGNOSIS — E03.9 HYPOTHYROIDISM, UNSPECIFIED TYPE: Chronic | ICD-10-CM

## 2022-08-03 LAB
ALBUMIN SERPL BCP-MCNC: 4 G/DL (ref 3.5–5)
ALP SERPL-CCNC: 67 U/L (ref 46–116)
ALT SERPL W P-5'-P-CCNC: 21 U/L (ref 12–78)
ANION GAP SERPL CALCULATED.3IONS-SCNC: 9 MMOL/L (ref 4–13)
AST SERPL W P-5'-P-CCNC: 21 U/L (ref 5–45)
BASOPHILS # BLD AUTO: 0.07 THOUSANDS/ΜL (ref 0–0.1)
BASOPHILS NFR BLD AUTO: 1 % (ref 0–1)
BILIRUB SERPL-MCNC: 0.79 MG/DL (ref 0.2–1)
BUN SERPL-MCNC: 21 MG/DL (ref 5–25)
CALCIUM SERPL-MCNC: 9.1 MG/DL (ref 8.3–10.1)
CHLORIDE SERPL-SCNC: 96 MMOL/L (ref 96–108)
CHOLEST SERPL-MCNC: 189 MG/DL
CO2 SERPL-SCNC: 29 MMOL/L (ref 21–32)
CREAT SERPL-MCNC: 1.3 MG/DL (ref 0.6–1.3)
EOSINOPHIL # BLD AUTO: 0.39 THOUSAND/ΜL (ref 0–0.61)
EOSINOPHIL NFR BLD AUTO: 7 % (ref 0–6)
ERYTHROCYTE [DISTWIDTH] IN BLOOD BY AUTOMATED COUNT: 15.6 % (ref 11.6–15.1)
GFR SERPL CREATININE-BSD FRML MDRD: 38 ML/MIN/1.73SQ M
GLUCOSE P FAST SERPL-MCNC: 98 MG/DL (ref 65–99)
HCT VFR BLD AUTO: 33.3 % (ref 34.8–46.1)
HDLC SERPL-MCNC: 47 MG/DL
HGB BLD-MCNC: 10.9 G/DL (ref 11.5–15.4)
IMM GRANULOCYTES # BLD AUTO: 0.01 THOUSAND/UL (ref 0–0.2)
IMM GRANULOCYTES NFR BLD AUTO: 0 % (ref 0–2)
LDLC SERPL CALC-MCNC: 121 MG/DL (ref 0–100)
LYMPHOCYTES # BLD AUTO: 0.93 THOUSANDS/ΜL (ref 0.6–4.47)
LYMPHOCYTES NFR BLD AUTO: 16 % (ref 14–44)
MCH RBC QN AUTO: 33.5 PG (ref 26.8–34.3)
MCHC RBC AUTO-ENTMCNC: 32.7 G/DL (ref 31.4–37.4)
MCV RBC AUTO: 103 FL (ref 82–98)
MONOCYTES # BLD AUTO: 0.58 THOUSAND/ΜL (ref 0.17–1.22)
MONOCYTES NFR BLD AUTO: 10 % (ref 4–12)
NEUTROPHILS # BLD AUTO: 3.68 THOUSANDS/ΜL (ref 1.85–7.62)
NEUTS SEG NFR BLD AUTO: 66 % (ref 43–75)
NONHDLC SERPL-MCNC: 142 MG/DL
NRBC BLD AUTO-RTO: 0 /100 WBCS
PLATELET # BLD AUTO: 208 THOUSANDS/UL (ref 149–390)
PMV BLD AUTO: 10.2 FL (ref 8.9–12.7)
POTASSIUM SERPL-SCNC: 3.6 MMOL/L (ref 3.5–5.3)
PROT SERPL-MCNC: 7.3 G/DL (ref 6.4–8.4)
RBC # BLD AUTO: 3.25 MILLION/UL (ref 3.81–5.12)
SODIUM SERPL-SCNC: 134 MMOL/L (ref 135–147)
TRIGL SERPL-MCNC: 106 MG/DL
TSH SERPL DL<=0.05 MIU/L-ACNC: 4.45 UIU/ML (ref 0.45–4.5)
WBC # BLD AUTO: 5.66 THOUSAND/UL (ref 4.31–10.16)

## 2022-08-03 PROCEDURE — 83036 HEMOGLOBIN GLYCOSYLATED A1C: CPT

## 2022-08-03 PROCEDURE — 85025 COMPLETE CBC W/AUTO DIFF WBC: CPT

## 2022-08-03 PROCEDURE — 36415 COLL VENOUS BLD VENIPUNCTURE: CPT

## 2022-08-03 PROCEDURE — 84443 ASSAY THYROID STIM HORMONE: CPT

## 2022-08-03 PROCEDURE — 80053 COMPREHEN METABOLIC PANEL: CPT

## 2022-08-03 PROCEDURE — 80061 LIPID PANEL: CPT

## 2022-08-04 ENCOUNTER — HOSPITAL ENCOUNTER (OUTPATIENT)
Dept: MAMMOGRAPHY | Facility: CLINIC | Age: 82
Discharge: HOME | End: 2022-08-04
Payer: MEDICARE

## 2022-08-04 ENCOUNTER — HOSPITAL ENCOUNTER (OUTPATIENT)
Dept: ULTRASOUND IMAGING | Facility: CLINIC | Age: 82
Discharge: HOME | End: 2022-08-04
Payer: MEDICARE

## 2022-08-04 ENCOUNTER — RA CDI HCC (OUTPATIENT)
Dept: OTHER | Facility: HOSPITAL | Age: 82
End: 2022-08-04

## 2022-08-04 VITALS — HEIGHT: 63 IN | WEIGHT: 190 LBS | BODY MASS INDEX: 33.66 KG/M2

## 2022-08-04 DIAGNOSIS — R92.8 ABNORMAL MAMMOGRAM: ICD-10-CM

## 2022-08-04 LAB
EST. AVERAGE GLUCOSE BLD GHB EST-MCNC: 111 MG/DL
HBA1C MFR BLD: 5.5 %

## 2022-08-04 PROCEDURE — G0279 TOMOSYNTHESIS, MAMMO: HCPCS

## 2022-08-04 PROCEDURE — 76642 ULTRASOUND BREAST LIMITED: CPT

## 2022-08-04 PROCEDURE — 77065 DX MAMMO INCL CAD UNI: CPT

## 2022-08-04 NOTE — PROGRESS NOTES
[facility] [address]  coding opportunities       Chart reviewed, no opportunity found: CHART REVIEWED, NO OPPORTUNITY FOUND        Patients Insurance     Medicare Insurance: Medicare

## 2022-08-04 NOTE — PROGRESS NOTES
Met with patient and Dr Maikel Renee regarding recommendation for;    __X___ RIGHT ______LEFT      __X___Ultrasound guided  ______Stereotactic breast biopsy  __X___Verbalized understanding        Blood thinners:  No: __X___ Yes: ______ What:                 Biopsy teaching sheet given:  Yes: ___X___ No: ________    Pt given contact information and adv to call with any questions/needs

## 2022-08-10 ENCOUNTER — OFFICE VISIT (OUTPATIENT)
Dept: INTERNAL MEDICINE CLINIC | Facility: CLINIC | Age: 82
End: 2022-08-10
Payer: MEDICARE

## 2022-08-10 VITALS
OXYGEN SATURATION: 97 % | SYSTOLIC BLOOD PRESSURE: 142 MMHG | DIASTOLIC BLOOD PRESSURE: 70 MMHG | BODY MASS INDEX: 33.66 KG/M2 | RESPIRATION RATE: 16 BRPM | HEART RATE: 56 BPM | HEIGHT: 63 IN | WEIGHT: 190 LBS

## 2022-08-10 DIAGNOSIS — R73.09 ABNORMAL GLUCOSE: ICD-10-CM

## 2022-08-10 DIAGNOSIS — R79.9 ABNORMAL BLOOD CHEMISTRY: ICD-10-CM

## 2022-08-10 DIAGNOSIS — M85.80 OSTEOPENIA, UNSPECIFIED LOCATION: ICD-10-CM

## 2022-08-10 DIAGNOSIS — I10 HYPERTENSION, UNSPECIFIED TYPE: Chronic | ICD-10-CM

## 2022-08-10 DIAGNOSIS — E55.9 VITAMIN D DEFICIENCY: ICD-10-CM

## 2022-08-10 DIAGNOSIS — M25.561 CHRONIC PAIN OF BOTH KNEES: ICD-10-CM

## 2022-08-10 DIAGNOSIS — M25.562 CHRONIC PAIN OF BOTH KNEES: ICD-10-CM

## 2022-08-10 DIAGNOSIS — G89.29 CHRONIC PAIN OF BOTH KNEES: ICD-10-CM

## 2022-08-10 DIAGNOSIS — E03.9 HYPOTHYROIDISM, UNSPECIFIED TYPE: Primary | Chronic | ICD-10-CM

## 2022-08-10 DIAGNOSIS — E78.5 HYPERLIPIDEMIA, UNSPECIFIED HYPERLIPIDEMIA TYPE: ICD-10-CM

## 2022-08-10 PROBLEM — R09.82 POST-NASAL DRIP: Status: RESOLVED | Noted: 2021-10-27 | Resolved: 2022-08-10

## 2022-08-10 PROBLEM — R05.9 COUGH: Status: RESOLVED | Noted: 2021-10-27 | Resolved: 2022-08-10

## 2022-08-10 PROBLEM — J32.9 SINUSITIS: Status: RESOLVED | Noted: 2022-04-20 | Resolved: 2022-08-10
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
## 2022-08-10 PROBLEM — Z12.11 ENCOUNTER FOR FIT (FECAL IMMUNOCHEMICAL TEST) SCREENING: Status: RESOLVED | Noted: 2019-06-10 | Resolved: 2022-08-10

## 2022-08-10 PROBLEM — U07.1 COVID: Status: RESOLVED | Noted: 2022-01-13 | Resolved: 2022-08-10

## 2022-08-10 PROCEDURE — 99214 OFFICE O/P EST MOD 30 MIN: CPT | Performed by: NURSE PRACTITIONER

## 2022-08-10 RX ORDER — ROSUVASTATIN CALCIUM 5 MG/1
5 TABLET, COATED ORAL DAILY
Qty: 90 TABLET | Refills: 2 | Status: SHIPPED | OUTPATIENT
Start: 2022-08-10

## 2022-08-10 RX ORDER — ROSUVASTATIN CALCIUM 5 MG/1
5 TABLET, COATED ORAL DAILY
Qty: 90 TABLET | Refills: 2 | Status: SHIPPED | OUTPATIENT
Start: 2022-08-10 | End: 2022-08-10 | Stop reason: SDUPTHER

## 2022-08-10 NOTE — PATIENT INSTRUCTIONS
Hypertension- At goal    Hyperlipidemia- LDL slightly elevated  Please re-start Rosuvastatin weekly and recheck lipid panel in six months  Hypothyroidism- TSH at goal  Continue current dose of Synthroid  Rheumatoid arthritis- Stable  Followed by rheumatologist, Dr Palafox Life   B/L knee pain- X-rays and physical therapy ordered  History of breast cancer- having right breast biopsy next week  Chronic kidney disease- Followed by nephrology  Abnormal glucose- A1c 5 5  Continue diet and lifestyle  Follow up in six months, labs prior

## 2022-08-10 NOTE — PROGRESS NOTES
Depression Screening and Follow-up Plan: Patient was screened for depression during today's encounter  They screened negative with a PHQ-2 score of 0  Assessment/Plan:    1  Hypertension- At goal    2  Hyperlipidemia- LDL slightly elevated  Please re-start Rosuvastatin weekly and recheck lipid panel in six months  3  Hypothyroidism- TSH at goal  Continue current dose of Synthroid  4  Rheumatoid arthritis- Stable  Followed by rheumatologist, Dr Maira Garcia   5  B/L knee pain- X-rays and physical therapy ordered  6  History of breast cancer- having right breast biopsy next week  7  Chronic kidney disease- Followed by nephrology  8  Abnormal glucose- A1c 5 5  Continue diet and lifestyle  Follow up in six months, labs prior  Diagnoses and all orders for this visit:    Hypothyroidism, unspecified type  -     TSH, 3rd generation with Free T4 reflex; Future    Hypertension, unspecified type  -     CBC and differential; Future  -     Comprehensive metabolic panel; Future    Osteopenia, unspecified location    Vitamin D deficiency    Hyperlipidemia, unspecified hyperlipidemia type  -     Discontinue: rosuvastatin (CRESTOR) 5 mg tablet; Take 1 tablet (5 mg total) by mouth daily  -     rosuvastatin (CRESTOR) 5 mg tablet; Take 1 tablet (5 mg total) by mouth daily  -     Lipid panel; Future    Abnormal blood chemistry    Abnormal glucose  -     HEMOGLOBIN A1C W/ EAG ESTIMATION; Future    Chronic pain of both knees  -     Ambulatory Referral to Physical Therapy; Future  -     XR knee 3 vw left non injury; Future  -     XR knee 3 vw right non injury; Future    The patient was counseled regarding instructions for management, risk factor reductions, patient and family education,impressions, risks and benefits of treatment options, side effects of medications, importance of compliance with treatment   The treatment plan was reviewed with the patient/guardian and patient/guardian understands and agrees with the treatment plan         Current Outpatient Medications:     ascorbic acid (VITAMIN C) 500 mg tablet, Take 500 mg by mouth daily  , Disp: , Rfl:     Calcium Citrate-Vitamin D (CITRACAL + D PO), Take by mouth 2 (two) times a day, Disp: , Rfl:     carvedilol (COREG) 25 mg tablet, TAKE 1 TABLET TWICE DAILY  WITH MEALS, Disp: 180 tablet, Rfl: 1    Cholecalciferol (VITAMIN D-3 PO), Take 2,000 Units by mouth daily  , Disp: , Rfl:     folic acid (FOLVITE) 1 mg tablet, 1 daily, Disp: 90 tablet, Rfl: 2    hydrochlorothiazide (HYDRODIURIL) 25 mg tablet, TAKE 1 TABLET BY MOUTH EVERY DAY, Disp: 90 tablet, Rfl: 2    levothyroxine (Synthroid) 75 mcg tablet, TAKE 1 TABLET EVERY OTHER  DAY ALTERNATING WITH 50MCG, Disp: 45 tablet, Rfl: 1    magnesium oxide (MAG-OX) 400 mg, Take 1 tablet (400 mg total) by mouth 2 (two) times a day, Disp: 90 tablet, Rfl: 0    methotrexate 2 5 mg tablet, Take 4 tablets (10 mg total) by mouth once a week (Patient taking differently: Take 2 5 mg by mouth once a week Patient takes 6 tablets weekly), Disp: 12 tablet, Rfl:     Multiple Vitamin (MULTIVITAMIN) tablet, Take 1 tablet by mouth daily  , Disp: , Rfl:     potassium chloride (Klor-Con M10) 10 mEq tablet, Take 2 tablets (20 mEq total) by mouth 2 (two) times a day, Disp: 360 tablet, Rfl: 1    Psyllium (METAMUCIL) WAFR, Take 2 twice a day (Patient taking differently: Take once daily), Disp: 100 Wafer, Rfl: 5    riTUXimab (RITUXAN) 500 mg/50 mL, Infuse into a venous catheter every 6 (six) months  , Disp: , Rfl:     rosuvastatin (CRESTOR) 5 mg tablet, Take 1 tablet (5 mg total) by mouth daily, Disp: 90 tablet, Rfl: 2    Synthroid 50 MCG tablet, TAKE 1 TABLET EVERY OTHER  DAY, Disp: 45 tablet, Rfl: 1    Subjective:      Patient ID: Gio Tirado is a [de-identified] y o  female  Here today for follow up  She ran out of Rosuvastatin, has not been taking it for a while, she usually takes one tablet weekly due to hx RA   B/L knee pain, would like physical therapy  The following portions of the patient's history were reviewed and updated as appropriate:   She has a past medical history of Allergic angioedema, Angioedema, Arthritis, Breast cancer ([facility] [address] ) (2012), Chronic kidney disease, Disease of thyroid gland, Hemorrhage of anus and rectum, Hyperlipidemia, Hypertension, Hypocalcemia, Neuritis, Peptic ulcer, Rheumatoid arthritis ([facility] [address] ), Stage 3b chronic kidney disease ([facility] [address] ) (5/7/2021), and Vitamin D deficiency  ,  does not have any pertinent problems on file  ,   has a past surgical history that includes Abdominal surgery; Breast surgery; Cholecystectomy; Elbow surgery; orthopedic surgery; Breast biopsy (Right, 2012); Breast lumpectomy (Right, 2012); and Gallbladder surgery (1981)  ,  family history includes Breast cancer in her maternal aunt, maternal aunt, maternal aunt, maternal aunt, maternal aunt, and mother; Kidney failure in her father; Lung cancer in her brother; Other in her father; Stomach cancer in her maternal uncle ,   reports that she has never smoked  She has never used smokeless tobacco  She reports previous alcohol use  She reports that she does not use drugs  ,  is allergic to lisinopril, codeine, other, oxycodone, penicillins, sulfamethoxazole-trimethoprim, and ciprofloxacin       Review of Systems   Constitutional: Negative  Respiratory: Negative  Cardiovascular: Negative  Musculoskeletal: Negative  B/L knee pain   Psychiatric/Behavioral: Negative          Objective:  /70 (BP Location: Left arm, Patient Position: Sitting, Cuff Size: Adult)   Pulse 56   Resp 16   Ht 5' 3" (1 6 m)   Wt 86 2 kg (190 lb)   LMP  (LMP Unknown)   SpO2 97%   BMI 33 66 kg/m²     Lab Review  Appointment on 08/03/2022   Component Date Value    WBC 08/03/2022 5 66     RBC 08/03/2022 3 25 (A)    Hemoglobin 08/03/2022 10 9 (A)    Hematocrit 08/03/2022 33 3 (A)    MCV 08/03/2022 103 (A)    MCH 08/03/2022 33 5     MCHC 08/03/2022 32 7     RDW 08/03/2022 15 6 (A)    MPV 08/03/2022 10 2     Platelets 52/08/9324 208     nRBC 08/03/2022 0     Neutrophils Relative 08/03/2022 66     Immat GRANS % 08/03/2022 0     Lymphocytes Relative 08/03/2022 16     Monocytes Relative 08/03/2022 10     Eosinophils Relative 08/03/2022 7 (A)    Basophils Relative 08/03/2022 1     Neutrophils Absolute 08/03/2022 3 68     Immature Grans Absolute 08/03/2022 0 01     Lymphocytes Absolute 08/03/2022 0 93     Monocytes Absolute 08/03/2022 0 58     Eosinophils Absolute 08/03/2022 0 39     Basophils Absolute 08/03/2022 0 07     Sodium 08/03/2022 134 (A)    Potassium 08/03/2022 3 6     Chloride 08/03/2022 96     CO2 08/03/2022 29     ANION GAP 08/03/2022 9     BUN 08/03/2022 21     Creatinine 08/03/2022 1 30     Glucose, Fasting 08/03/2022 98     Calcium 08/03/2022 9 1     AST 08/03/2022 21     ALT 08/03/2022 21     Alkaline Phosphatase 08/03/2022 67     Total Protein 08/03/2022 7 3     Albumin 08/03/2022 4 0     Total Bilirubin 08/03/2022 0 79     eGFR 08/03/2022 38     Hemoglobin A1C 08/03/2022 5 5     EAG 08/03/2022 111     Cholesterol 08/03/2022 189     Triglycerides 08/03/2022 106     HDL, Direct 08/03/2022 47 (A)    LDL Calculated 08/03/2022 121 (A)    Non-HDL-Chol (CHOL-HDL) 08/03/2022 142     TSH 3RD GENERATON 08/03/2022 4 449    Appointment on 07/11/2022   Component Date Value    Sodium 07/11/2022 139     Potassium 07/11/2022 4 0     Chloride 07/11/2022 100     CO2 07/11/2022 29     ANION GAP 07/11/2022 10     BUN 07/11/2022 22     Creatinine 07/11/2022 1 34 (A)    Glucose, Fasting 07/11/2022 96     Calcium 07/11/2022 9 0     eGFR 07/11/2022 37     WBC 07/11/2022 5 39     RBC 07/11/2022 3 28 (A)    Hemoglobin 07/11/2022 10 8 (A)    Hematocrit 07/11/2022 33 6 (A)    MCV 07/11/2022 102 (A)    MCH 07/11/2022 32 9     MCHC 07/11/2022 32 1     RDW 07/11/2022 15 5 (A)    MPV 07/11/2022 10 1     Platelets 72/25/0266 224     nRBC 07/11/2022 0     Neutrophils Relative 07/11/2022 66     Immat GRANS % 07/11/2022 0     Lymphocytes Relative 07/11/2022 16     Monocytes Relative 07/11/2022 12     Eosinophils Relative 07/11/2022 5     Basophils Relative 07/11/2022 1     Neutrophils Absolute 07/11/2022 3 55     Immature Grans Absolute 07/11/2022 0 02     Lymphocytes Absolute 07/11/2022 0 84     Monocytes Absolute 07/11/2022 0 65     Eosinophils Absolute 07/11/2022 0 27     Basophils Absolute 07/11/2022 0 06     Phosphorus 07/11/2022 3 4     Creatinine, Ur 07/11/2022 28 6     Protein Urine Random 07/11/2022 <6     Prot/Creat Ratio, Ur 07/11/2022 <0 21 (A)    PTH 07/11/2022 47 4     Color, UA 07/11/2022 Yellow     Clarity, UA 07/11/2022 Clear     Specific Gravity, UA 07/11/2022 1 010     pH, UA 07/11/2022 6 5     Leukocytes, UA 07/11/2022 Negative     Nitrite, UA 07/11/2022 Negative     Protein, UA 07/11/2022 Negative     Glucose, UA 07/11/2022 Negative     Ketones, UA 07/11/2022 Negative     Urobilinogen, UA 07/11/2022 0 2     Bilirubin, UA 07/11/2022 Negative     Occult Blood, UA 07/11/2022 Negative     Vit D, 25-Hydroxy 07/11/2022 49 7     Sed Rate 07/11/2022 22     CRP 07/11/2022 5 7 (A)    Total Bilirubin 07/11/2022 0 63     Bilirubin, Direct 07/11/2022 0 22 (A)    Alkaline Phosphatase 07/11/2022 62     AST 07/11/2022 27     ALT 07/11/2022 19     Total Protein 07/11/2022 7 0     Albumin 07/11/2022 3 9     IGA 07/11/2022 153 0     IGG 07/11/2022 616 0 (A)    IGM 07/11/2022 8 0 (A)    IgE 07/11/2022 2 15         Imaging: Mammo diagnostic right w 3d & cad, US breast right limited (diagnostic)    Result Date: 8/4/2022  Narrative: DIAGNOSIS: Abnormal mammogram TECHNIQUE: Digital diagnostic mammography was performed  Computer Aided Detection (CAD) analyzed all applicable images  Ultrasound of the right breast(s) was performed   COMPARISONS: Prior breast imaging dated: 07/19/2022, 07/13/2021, 06/03/2020, 04/25/2019, 04/17/2018, 04/10/2018, 03/08/2017, 03/07/2016, 03/09/2015, and 03/02/2015 RELEVANT HISTORY: Family Breast Cancer History: History of breast cancer in Mother, Maternal Aunt, Maternal Aunt, Maternal Aunt, Maternal Aunt, Maternal Aunt  Family Medical History: Family medical history includes breast cancer in 6 relatives (maternal aunt, maternal aunt, maternal aunt, maternal aunt, maternal aunt, mother)  Personal History: No known relevant hormone history  Surgical history includes breast biopsy and lumpectomy  Medical history includes breast cancer  RISK ASSESSMENT: [facility] risk assessment reporting was suppressed due to the patient's history and/or demographic factors  TISSUE DENSITY: There are scattered areas of fibroglandular density  INDICATION: Gelacio Jack is a [de-identified] y o  female presenting for abnormal mammogram  FINDINGS: RIGHT B) MASS: There is an enlarging hypoechoic lesion seen in the upper region of the right breast at 10 o'clock in the posterior depth, 8 cm from the nipple at the lumpectomy site  Targeted ultrasound demonstrates hypoechoic tissue in this area that appears prominent for scarring  Ultrasound-guided core biopsy is recommended to rule out recurrence  Impression: Recommend ultrasound-guided core biopsy of the upper outer right breast  ASSESSMENT/BI-RADS CATEGORY: Right: 4B - Moderate Suspicion for Malignancy Overall: 4 - Suspicious RECOMMENDATION:      - Ultrasound-guided breast biopsy for the right breast  Workstation ID: [id number]     Mammo screening bilateral w 3d & cad    Result Date: 7/21/2022  Narrative: DIAGNOSIS: Malignant neoplasm of right breast in female, estrogen receptor positive, unspecified site of breast ([facility] [address] ); Encounter for screening mammogram for malignant neoplasm of breast  TECHNIQUE: Digital screening mammography was performed  Computer Aided Detection (CAD) analyzed all applicable images   COMPARISONS: Prior breast imaging dated: 07/13/2021, 06/03/2020, 04/25/2019, 12/07/2018, 04/17/2018, 04/10/2018, 03/08/2017, 03/07/2016, 03/09/2015, 03/09/2015, 03/02/2015, and 03/02/2015 RELEVANT HISTORY: Family Breast Cancer History: History of breast cancer in Mother, Maternal Aunt, Maternal Aunt, Maternal Aunt, Maternal Aunt, Maternal Aunt  Family Medical History: Family medical history includes breast cancer in 6 relatives (maternal aunt, maternal aunt, maternal aunt, maternal aunt, maternal aunt, mother)  Personal History: No known relevant hormone history  Surgical history includes breast biopsy and lumpectomy  Medical history includes breast cancer  The patient is scheduled in a reminder system for screening mammography  8-10% of cancers will be missed on mammography  Management of a palpable abnormality must be based on clinical grounds  Patients will be notified of their results via letter from our facility  Accredited by [employer] of [employer] and FDA  RISK ASSESSMENT: [facility] risk assessment reporting was suppressed due to the patient's history and/or demographic factors  TISSUE DENSITY: There are scattered areas of fibroglandular density  INDICATION: Libertad Santoro is a [de-identified] y o  female presenting for screening mammography  FINDINGS: RIGHT B) ASYMMETRY: There is an asymmetry seen in the upper region of the right breast in the posterior depth  This is near the postsurgical site but appears more prominent than on previous examinations  Left There are no suspicious masses, grouped microcalcifications or areas of unexplained architectural distortion  The skin and nipple areolar complex are unremarkable  Impression: Additional imaging required   A breast health care nurse from our facility will be contacting the patient regarding the need for additional imaging ASSESSMENT/BI-RADS CATEGORY: Left: 1 - Negative Right: 0 - Incomplete: Needs Additional Imaging Evaluation Overall: 0 - Incomplete: Needs Additional Imaging Evaluation RECOMMENDATION: - Diagnostic mammogram at the current time for the right breast       - Ultrasound at the current time for the right breast       - Routine screening mammogram in 1 year for the left breast  Workstation ID: [id number]          Physical Exam  Constitutional:       Appearance: She is well-developed  Cardiovascular:      Rate and Rhythm: Normal rate and regular rhythm  Heart sounds: Normal heart sounds  Pulmonary:      Effort: Pulmonary effort is normal       Breath sounds: Normal breath sounds  Musculoskeletal:         General: Normal range of motion  Neurological:      Mental Status: She is alert and oriented to person, place, and time  Deep Tendon Reflexes: Reflexes are normal and symmetric  Psychiatric:         Behavior: Behavior normal          Thought Content:  Thought content normal          Judgment: Judgment normal

## 2022-08-15 ENCOUNTER — HOSPITAL ENCOUNTER (OUTPATIENT)
Dept: MAMMOGRAPHY | Facility: CLINIC | Age: 82
Discharge: HOME | End: 2022-08-15

## 2022-08-15 ENCOUNTER — HOSPITAL ENCOUNTER (OUTPATIENT)
Dept: ULTRASOUND IMAGING | Facility: CLINIC | Age: 82
Discharge: HOME | End: 2022-08-15
Admitting: RADIOLOGY
Payer: MEDICARE

## 2022-08-15 VITALS — SYSTOLIC BLOOD PRESSURE: 183 MMHG | DIASTOLIC BLOOD PRESSURE: 88 MMHG | HEART RATE: 56 BPM

## 2022-08-15 DIAGNOSIS — R92.8 ABNORMAL MAMMOGRAM: ICD-10-CM

## 2022-08-15 PROCEDURE — 88305 TISSUE EXAM BY PATHOLOGIST: CPT | Performed by: PATHOLOGY

## 2022-08-15 PROCEDURE — 88342 IMHCHEM/IMCYTCHM 1ST ANTB: CPT | Performed by: PATHOLOGY

## 2022-08-15 PROCEDURE — A4648 IMPLANTABLE TISSUE MARKER: HCPCS

## 2022-08-15 PROCEDURE — 19083 BX BREAST 1ST LESION US IMAG: CPT

## 2022-08-15 PROCEDURE — 88341 IMHCHEM/IMCYTCHM EA ADD ANTB: CPT | Performed by: PATHOLOGY

## 2022-08-15 RX ORDER — LIDOCAINE HYDROCHLORIDE 10 MG/ML
5 INJECTION, SOLUTION EPIDURAL; INFILTRATION; INTRACAUDAL; PERINEURAL ONCE
Status: DISCONTINUED | OUTPATIENT
Start: 2022-08-15 | End: 2022-08-19 | Stop reason: HOSPADM

## 2022-08-15 RX ORDER — LIDOCAINE HYDROCHLORIDE 10 MG/ML
5 INJECTION, SOLUTION EPIDURAL; INFILTRATION; INTRACAUDAL; PERINEURAL ONCE
Status: COMPLETED | OUTPATIENT
Start: 2022-08-15 | End: 2022-08-15

## 2022-08-15 RX ADMIN — LIDOCAINE HYDROCHLORIDE 5 ML: 10 INJECTION, SOLUTION EPIDURAL; INFILTRATION; INTRACAUDAL; PERINEURAL at 13:10

## 2022-08-15 NOTE — PROGRESS NOTES
Procedure type:    __x___ultrasound guided _____stereotactic    Breast:    _____Left __x___Right    Location: 10 o'clock 8 cmfn    Needle: 14 gauge rony     # of passes: 3    Clip: Ribbon    Performed by: Dr Dave Edmonds held for 5 minutes by: Timi Mcneal     Steri Strips:    __x___yes _____no    Band aid:    __x___yes_____no    Tape and guaze:    _____yes ___x__no    Tolerated procedure:    ___x__yes _____no

## 2022-08-15 NOTE — PROGRESS NOTES
Ice pack given:    ___x__yes _____no    Discharge instructions signed by patient:    __x___yes _____no    Discharge instructions given to patient:    __x___yes _____no    Discharged via:    __x___ambulatory    _____wheelchair    _____stretcher    Stable on discharge:    ___x__yes ____no  Patient would like results over the phone  Ok to leave a message

## 2022-08-15 NOTE — DISCHARGE INSTR - OTHER ORDERS
POST LARGE CORE BREAST BIOPSY PATIENT INFORMATION      Place an ice pack inside your bra over the top of the dressing every hour for 20 minutes (20 minutes on, 60 minutes off)  Do this until bedtime  Do not shower or bathe until the following morning  After bathing, you may remove the bandaid  You may bathe your breast carefully with the steri-strips in place  Be careful not to loosen them  The steri-strips will fall off in 3-5 days  You may have mild discomfort, and you may have some bruising where the needle entered the skin  This should clear within 5-7 days  If you need medicine for discomfort, take acetaminophen products such as Tylenol  You may also take Advil or Motrin products  DO NOT use Aspirin for the first 24 hours  Do not participate in strenuous activities such as-tennis, aerobics, weight lifting, etc  for 24 hours  Refrain from swimming/soaking for 72 hours  Wearing a bra for sleeping may be more comfortable for the first 24-48 hours after your biopsy  Watch for continued bleeding, pain or fever over 101  If any of these symptoms occur, please contact our breast nurse navigator at the location where your biopsy was performed  During normal business hours (7:30am - 4:00pm) please call the nurse navigator at the site     where your procedure was performed:       [address] [facility] [address] [facility] [address]: [phone] or [phone] [address]: [phone] or [phone]     [address]: [address] [facility]: [address]: [phone]        After 4pm - please call your physician or go to the nearest Emergency Department location  The final results of your biopsy are usually available within one week

## 2022-08-16 NOTE — PROGRESS NOTES
Post procedure call completed    Bleeding: _____yes __X___no    Pain: _____yes ___X___no    Redness/Swelling: ______yes ___X___no    Band aid removed: _____yes ___X__no (discussed removing when she showers)    Steri-Strips intact: ___X___yes _____no (discussed with patient to remove steri strips on 7/20/2022 if they have not come off on their own)    Pt with no questions at this time, adv will call when results available, adv to call with any questions or concerns, has name/# for contact

## 2022-08-17 ENCOUNTER — TELEPHONE (OUTPATIENT)
Dept: MAMMOGRAPHY | Facility: CLINIC | Age: 82
End: 2022-08-17

## 2022-08-23 ENCOUNTER — HOSPITAL ENCOUNTER (OUTPATIENT)
Dept: RADIOLOGY | Facility: HOSPITAL | Age: 82
Discharge: HOME | End: 2022-08-23
Payer: MEDICARE

## 2022-08-23 DIAGNOSIS — M25.562 CHRONIC PAIN OF BOTH KNEES: ICD-10-CM

## 2022-08-23 DIAGNOSIS — M25.561 CHRONIC PAIN OF BOTH KNEES: ICD-10-CM

## 2022-08-23 DIAGNOSIS — G89.29 CHRONIC PAIN OF BOTH KNEES: ICD-10-CM

## 2022-08-23 PROCEDURE — 73562 X-RAY EXAM OF KNEE 3: CPT

## 2022-09-07 ENCOUNTER — EVALUATION (OUTPATIENT)
Dept: PHYSICAL THERAPY | Facility: CLINIC | Age: 82
End: 2022-09-07
Payer: MEDICARE

## 2022-09-07 DIAGNOSIS — M25.562 CHRONIC PAIN OF BOTH KNEES: ICD-10-CM

## 2022-09-07 DIAGNOSIS — M25.561 CHRONIC PAIN OF BOTH KNEES: ICD-10-CM

## 2022-09-07 DIAGNOSIS — G89.29 CHRONIC PAIN OF BOTH KNEES: ICD-10-CM

## 2022-09-07 PROCEDURE — 97161 PT EVAL LOW COMPLEX 20 MIN: CPT | Performed by: PHYSICAL THERAPIST

## 2022-09-07 PROCEDURE — 97112 NEUROMUSCULAR REEDUCATION: CPT | Performed by: PHYSICAL THERAPIST

## 2022-09-07 NOTE — PROGRESS NOTES
PT Evaluation     Today's date: 2022  Patient name: Alanis Mendez  : 1940  MRN: [id number]  Referring provider: Janel Hudson  Dx:   Encounter Diagnosis     ICD-10-CM    1  Chronic pain of both knees  M25 561 Ambulatory Referral to Physical Therapy    M25 562     G89 29                   Assessment  Assessment details: Pt is an 80 y/o female who presents to physical therapy with nociceptive pain associated with b/l knee pain related to RA in the environment of significant joint swelling in b/l ankle with large reduction in ROM  Pt does not present with any red flag symptoms at this time  Signs and symptoms consistent with this include large amount of swelling around the knees that coincides with pain, 40 year history of RA that is now advanced requiring IV injections which makes it difficult for her to tolerate ambulating and prolonged standing  Education regarding HEP and trying to keep joints/muscles moving via desk peddler  Pt demonstrated understanding and reported that she will look into it  Due to progressive nature of chronic illness, pt would be a good candidate for maintenance therapy to establish a home exercise program that focuses on maintaining LE strength as ability to reach further functional goals may not be possible at this time  Impairments: abnormal gait, abnormal or restricted ROM, activity intolerance, impaired physical strength, pain with function and poor posture     Symptom irritability: moderateUnderstanding of Dx/Px/POC: good   Prognosis: fair  Prognosis details: Positive Prognostic Factors: Positive attitude toward therapy    Negative Prognostic Factors: chronicity of symptoms    Goals  STG (2 weeks):  Pt will be independent with HEP  Pt will maintain knee ROM  LTG (4-6 weeks):  Pt will maintain MMT grade at this time  Pt will maintain ability to ambulate 150' without AD and no assistance      Plan  Patient would benefit from: skilled physical therapy  Planned modality interventions: cryotherapy  Planned therapy interventions: manual therapy, neuromuscular re-education, patient education, self care, strengthening, stretching, therapeutic activities, therapeutic exercise and home exercise program  Frequency: 1x week  Duration in weeks: 6  Treatment plan discussed with: patient        Subjective Evaluation    History of Present Illness  Mechanism of injury: Chief Complaint: Pt reports having RA for 40 years or so  She reports that she is required to have intravenous injections for her medication  24 hour Pattern: reduced swelling at night, it comes back every morning  HPI:    Severity:  Irritability: high 1-1 5 hours  Nature:  Stage:  Stability:    P1: circumferential around the knees R> L, ache, 5-5-10  Aggs/eases: standing, walking, getting up from the chair; rest, tylenol    Physical Activity: getting up every hour for 10-20 mins to move around, unable to walk much around the house, unable to get aerobic activity  Flags: N/T in L LE ant thigh to knee for many years  Patient Goals  Patient goal: "try to stay that I can walk"        Objective     Observations   Left Knee   Positive for edema and effusion  Right Knee   Positive for edema and effusion       Additional Observation Details  Significant swelling noted in b/l knees    Active Range of Motion   Left Knee   Flexion: 100 degrees   Extension: -3 degrees     Right Knee   Flexion: 100 degrees   Extension: -3 degrees     Strength/Myotome Testing     Left Knee   Flexion: 3+  Extension: 4-    Right Knee   Flexion: 3+  Extension: 4-    Additional Strength Details  Hip:  Flex: 4-/5  Abd: 4-/5   Add: 4/5    General Comments:      Knee Comments  STS: unable to complete without b/l HHA even with airex             Precautions: RA, HTN      Manuals 9/7                                                                Neuro Re-Ed             Standing marching             Side step Pt edu KIKI            Ther Ex             Seated march             Seated LAQ             Seated adduction             Seated clamshell             Heel raise             Toe raise                          bike             Ther Activity                                       Gait Training                                       Modalities

## 2022-09-08 RX ORDER — SODIUM CHLORIDE 9 MG/ML
20 INJECTION, SOLUTION INTRAVENOUS CONTINUOUS
Status: DISCONTINUED | OUTPATIENT
Start: 2022-09-09 | End: 2022-09-12 | Stop reason: HOSPADM

## 2022-09-08 RX ORDER — METHYLPREDNISOLONE SODIUM SUCCINATE 125 MG/2ML
100 INJECTION, POWDER, LYOPHILIZED, FOR SOLUTION INTRAMUSCULAR; INTRAVENOUS ONCE
Status: COMPLETED | OUTPATIENT
Start: 2022-09-09 | End: 2022-09-09

## 2022-09-08 RX ORDER — ACETAMINOPHEN 325 MG/1
975 TABLET ORAL ONCE
Status: COMPLETED | OUTPATIENT
Start: 2022-09-09 | End: 2022-09-09

## 2022-09-08 RX ORDER — DIPHENHYDRAMINE HCL 25 MG
50 TABLET ORAL ONCE
Status: COMPLETED | OUTPATIENT
Start: 2022-09-09 | End: 2022-09-09

## 2022-09-09 ENCOUNTER — HOSPITAL ENCOUNTER (OUTPATIENT)
Dept: INFUSION CENTER | Facility: CLINIC | Age: 82
End: 2022-09-09
Payer: MEDICARE

## 2022-09-09 VITALS
SYSTOLIC BLOOD PRESSURE: 123 MMHG | HEART RATE: 65 BPM | TEMPERATURE: 97.8 F | RESPIRATION RATE: 16 BRPM | DIASTOLIC BLOOD PRESSURE: 57 MMHG

## 2022-09-09 PROCEDURE — 96413 CHEMO IV INFUSION 1 HR: CPT

## 2022-09-09 PROCEDURE — 96375 TX/PRO/DX INJ NEW DRUG ADDON: CPT

## 2022-09-09 PROCEDURE — 96415 CHEMO IV INFUSION ADDL HR: CPT

## 2022-09-09 RX ADMIN — RITUXIMAB 1000 MG: 10 INJECTION, SOLUTION INTRAVENOUS at 10:30

## 2022-09-09 RX ADMIN — DIPHENHYDRAMINE HYDROCHLORIDE 50 MG: 25 TABLET ORAL at 09:44

## 2022-09-09 RX ADMIN — METHYLPREDNISOLONE SODIUM SUCCINATE 100 MG: 125 INJECTION, POWDER, FOR SOLUTION INTRAMUSCULAR; INTRAVENOUS at 09:44

## 2022-09-09 RX ADMIN — SODIUM CHLORIDE 20 ML/HR: 0.9 INJECTION, SOLUTION INTRAVENOUS at 09:44

## 2022-09-09 RX ADMIN — ACETAMINOPHEN 975 MG: 325 TABLET ORAL at 09:44

## 2022-09-09 NOTE — PROGRESS NOTES
Pt here for rituxan tx  Port accessed  Blood return noted and flushed  Pt tolerated treatment without adverse effects  Port flushed and de accessed  AVS declined  Pt walked out of unit

## 2022-09-09 NOTE — PROGRESS NOTES
Pt to clinic for rituxan, offers no complaints today, denies recent illness and abx use, tolerated infusion without complications, aware of next appointment, PIV removed, avs printed and reviewed

## 2022-09-14 ENCOUNTER — OFFICE VISIT (OUTPATIENT)
Dept: PHYSICAL THERAPY | Facility: CLINIC | Age: 82
End: 2022-09-14
Payer: MEDICARE

## 2022-09-14 DIAGNOSIS — M25.562 CHRONIC PAIN OF BOTH KNEES: Primary | ICD-10-CM

## 2022-09-14 DIAGNOSIS — G89.29 CHRONIC PAIN OF BOTH KNEES: Primary | ICD-10-CM

## 2022-09-14 DIAGNOSIS — M25.561 CHRONIC PAIN OF BOTH KNEES: Primary | ICD-10-CM

## 2022-09-14 PROCEDURE — 97110 THERAPEUTIC EXERCISES: CPT | Performed by: PHYSICAL THERAPIST

## 2022-09-14 NOTE — PROGRESS NOTES
Daily Note     Today's date: 2022  Patient name: Bay Arguello  : 1940  MRN: [id number]  Referring provider: Janel Sheehan  Dx:   Encounter Diagnosis     ICD-10-CM    1  Chronic pain of both knees  M25 561     M25 562     G89 29                   Subjective: Pt reports symptoms are about similar to what they have been  She reports completing HEP as prescribed without an increase in knee pain  Objective: See treatment diary below      Assessment: Tolerated treatment well  Cueing for proper exercise technique  Unable to tolerate banded clamshell due to band cutting into skin  Deferred  Added Wbing exercise as well, pt reported fatigue after one set  Updated HEP to include standing hip strengthening and seated adductor squeeze  Patient would benefit from continued PT      Plan: Continue per plan of care  Progress treatment as tolerated         Precautions: RA, HTN      Manuals                                                                Neuro Re-Ed             Standing marching             Side step                                                                 Pt edu KIKI            Ther Ex             Seated march             Seated LAQ  GTB 10x ea           Seated adduction  20x3"           Seated clamshell  deferred due to pain with band                        Toe raise  2x10 seated           Standing hip abd  1x10           Standing hip ext  1x10           bike  5'           Ther Activity                                       Gait Training                                       Modalities

## 2022-09-21 ENCOUNTER — APPOINTMENT (OUTPATIENT)
Dept: PHYSICAL THERAPY | Facility: CLINIC | Age: 82
End: 2022-09-21
Payer: MEDICARE

## 2022-09-21 NOTE — PROGRESS NOTES
Pt fell on 9/17 and fractured her hip  She had surgery to repair that day  D/c'ing from current episode of care as pt will be unable to return for knee pain  Unable to get updated goals

## 2022-10-12 PROBLEM — Z12.11 SCREENING FOR MALIGNANT NEOPLASM OF COLON: Status: RESOLVED | Noted: 2021-08-02 | Resolved: 2022-10-12

## 2022-10-26 ENCOUNTER — RA CDI HCC (OUTPATIENT)
Dept: OTHER | Facility: HOSPITAL | Age: 82
End: 2022-10-26

## 2022-11-08 ENCOUNTER — TELEPHONE (OUTPATIENT)
Dept: OTHER | Facility: OTHER | Age: 82
End: 2022-11-08

## 2022-11-08 DIAGNOSIS — M54.16 LUMBAR RADICULOPATHY: ICD-10-CM

## 2022-11-08 DIAGNOSIS — R26.81 UNSTEADY GAIT: ICD-10-CM

## 2022-11-08 DIAGNOSIS — M25.562 CHRONIC PAIN OF BOTH KNEES: Primary | ICD-10-CM

## 2022-11-08 DIAGNOSIS — G89.29 CHRONIC PAIN OF BOTH KNEES: Primary | ICD-10-CM

## 2022-11-08 DIAGNOSIS — M25.561 CHRONIC PAIN OF BOTH KNEES: Primary | ICD-10-CM

## 2022-11-08 NOTE — TELEPHONE ENCOUNTER
Ness Wilson is requesting a call back from the office regarding obtaining approval for patient to receive home health physical therapy

## 2022-11-11 ENCOUNTER — TELEPHONE (OUTPATIENT)
Dept: INTERNAL MEDICINE CLINIC | Facility: CLINIC | Age: 82
End: 2022-11-11

## 2022-11-14 ENCOUNTER — RA CDI HCC (OUTPATIENT)
Dept: OTHER | Facility: HOSPITAL | Age: 82
End: 2022-11-14

## 2022-11-21 ENCOUNTER — OFFICE VISIT (OUTPATIENT)
Dept: INTERNAL MEDICINE CLINIC | Facility: CLINIC | Age: 82
End: 2022-11-21

## 2022-11-21 VITALS
SYSTOLIC BLOOD PRESSURE: 140 MMHG | HEART RATE: 88 BPM | BODY MASS INDEX: 31.36 KG/M2 | HEIGHT: 63 IN | TEMPERATURE: 99.1 F | WEIGHT: 177 LBS | OXYGEN SATURATION: 100 % | DIASTOLIC BLOOD PRESSURE: 80 MMHG | RESPIRATION RATE: 16 BRPM

## 2022-11-21 DIAGNOSIS — E03.9 HYPOTHYROIDISM, UNSPECIFIED TYPE: Chronic | ICD-10-CM

## 2022-11-21 DIAGNOSIS — E78.5 HYPERLIPIDEMIA, UNSPECIFIED HYPERLIPIDEMIA TYPE: ICD-10-CM

## 2022-11-21 DIAGNOSIS — R73.09 ABNORMAL GLUCOSE: ICD-10-CM

## 2022-11-21 DIAGNOSIS — Z00.00 MEDICARE ANNUAL WELLNESS VISIT, SUBSEQUENT: Primary | ICD-10-CM

## 2022-11-21 DIAGNOSIS — I10 HYPERTENSION, UNSPECIFIED TYPE: Chronic | ICD-10-CM

## 2022-11-21 DIAGNOSIS — M06.9 RHEUMATOID ARTHRITIS OF LEFT ANKLE, UNSPECIFIED WHETHER RHEUMATOID FACTOR PRESENT (HCC): Chronic | ICD-10-CM

## 2022-11-21 RX ORDER — ATORVASTATIN CALCIUM 20 MG/1
TABLET, FILM COATED ORAL
COMMUNITY
Start: 2022-11-06 | End: 2022-11-21 | Stop reason: ALTCHOICE

## 2022-11-21 NOTE — PATIENT INSTRUCTIONS
Medicare Preventive Visit Patient Instructions  Thank you for completing your Welcome to Medicare Visit or Medicare Annual Wellness Visit today  Your next wellness visit will be due in one year (11/22/2023)  The screening/preventive services that you may require over the next 5-10 years are detailed below  Some tests may not apply to you based off risk factors and/or age  Screening tests ordered at today's visit but not completed yet may show as past due  Also, please note that scanned in results may not display below  Preventive Screenings:  Service Recommendations Previous Testing/Comments   Colorectal Cancer Screening  * Colonoscopy    * Fecal Occult Blood Test (FOBT)/Fecal Immunochemical Test (FIT)  * Fecal DNA/Cologuard Test  * Flexible Sigmoidoscopy Age: 39-70 years old   Colonoscopy: every 10 years (may be performed more frequently if at higher risk)  OR  FOBT/FIT: every 1 year  OR  Cologuard: every 3 years  OR  Sigmoidoscopy: every 5 years  Screening may be recommended earlier than age 39 if at higher risk for colorectal cancer  Also, an individualized decision between you and your healthcare provider will decide whether screening between the ages of 74-80 would be appropriate  Colonoscopy: Not on file  FOBT/FIT: Not on file  Cologuard: Not on file  Sigmoidoscopy: Not on file          Breast Cancer Screening Age: 36 years old  Frequency: every 1-2 years  Not required if history of left and right mastectomy Mammogram: 08/04/2022        Cervical Cancer Screening Between the ages of 21-29, pap smear recommended once every 3 years  Between the ages of 33-67, can perform pap smear with HPV co-testing every 5 years     Recommendations may differ for women with a history of total hysterectomy, cervical cancer, or abnormal pap smears in past  Pap Smear: Not on file        Hepatitis C Screening Once for adults born between [facility]  More frequently in patients at high risk for Hepatitis C Hep C Antibody: Not on file        Diabetes Screening 1-2 times per year if you're at risk for diabetes or have pre-diabetes Fasting glucose: 98 mg/dL (8/3/2022)  A1C: 5 5 % (8/3/2022)      Cholesterol Screening Once every 5 years if you don't have a lipid disorder  May order more often based on risk factors  Lipid panel: 08/03/2022          Other Preventive Screenings Covered by Medicare:  Abdominal Aortic Aneurysm (AAA) Screening: covered once if your at risk  You're considered to be at risk if you have a family history of AAA  Lung Cancer Screening: covers low dose CT scan once per year if you meet all of the following conditions: (1) Age 50-69; (2) No signs or symptoms of lung cancer; (3) Current smoker or have quit smoking within the last 15 years; (4) You have a tobacco smoking history of at least 20 pack years (packs per day multiplied by number of years you smoked); (5) You get a written order from a healthcare provider  Glaucoma Screening: covered annually if you're considered high risk: (1) You have diabetes OR (2) Family history of glaucoma OR (3)  aged 48 and older OR (3)  American aged 72 and older  Osteoporosis Screening: covered every 2 years if you meet one of the following conditions: (1) You're estrogen deficient and at risk for osteoporosis based off medical history and other findings; (2) Have a vertebral abnormality; (3) On glucocorticoid therapy for more than 3 months; (4) Have primary hyperparathyroidism; (5) On osteoporosis medications and need to assess response to drug therapy  Last bone density test (DXA Scan): 09/17/2021  HIV Screening: covered annually if you're between the age of 12-76  Also covered annually if you are younger than 13 and older than 72 with risk factors for HIV infection  For pregnant patients, it is covered up to 3 times per pregnancy      Immunizations:  Immunization Recommendations   Influenza Vaccine Annual influenza vaccination during flu season is recommended for all persons aged >= 6 months who do not have contraindications   Pneumococcal Vaccine   * Pneumococcal conjugate vaccine = PCV13 (Prevnar 13), PCV15 (Vaxneuvance), PCV20 (Prevnar 20)  * Pneumococcal polysaccharide vaccine = PPSV23 (Pneumovax) Adults 2364 years old: 1-3 doses may be recommended based on certain risk factors  Adults 72 years old: 1-2 doses may be recommended based off what pneumonia vaccine you previously received   Hepatitis B Vaccine 3 dose series if at intermediate or high risk (ex: diabetes, end stage renal disease, liver disease)   Tetanus (Td) Vaccine - COST NOT COVERED BY MEDICARE PART B Following completion of primary series, a booster dose should be given every 10 years to maintain immunity against tetanus  Td may also be given as tetanus wound prophylaxis  Tdap Vaccine - COST NOT COVERED BY MEDICARE PART B Recommended at least once for all adults  For pregnant patients, recommended with each pregnancy  Shingles Vaccine (Shingrix) - COST NOT COVERED BY MEDICARE PART B  2 shot series recommended in those aged 48 and above     Health Maintenance Due:      Topic Date Due    DXA SCAN  09/17/2023     Immunizations Due:      Topic Date Due    Hepatitis B Vaccine (1 of 3 - 3-dose series) Never done    Pneumococcal Vaccine: 65+ Years (4 - PPSV23 if available, else PCV20) 03/30/2018    COVID-19 Vaccine (2 - Moderna risk series) 12/14/2021    Influenza Vaccine (1) 09/01/2022     Advance Directives   What are advance directives? Advance directives are legal documents that state your wishes and plans for medical care  These plans are made ahead of time in case you lose your ability to make decisions for yourself  Advance directives can apply to any medical decision, such as the treatments you want, and if you want to donate organs  What are the types of advance directives? There are many types of advance directives, and each state has rules about how to use them   You may choose a combination of any of the following:  Living will: This is a written record of the treatment you want  You can also choose which treatments you do not want, which to limit, and which to stop at a certain time  This includes surgery, medicine, IV fluid, and tube feedings  Durable power of  for healthcare [facility]): This is a written record that states who you want to make healthcare choices for you when you are unable to make them for yourself  This person, called a proxy, is usually a family member or a friend  You may choose more than 1 proxy  Do not resuscitate (DNR) order:  A DNR order is used in case your heart stops beating or you stop breathing  It is a request not to have certain forms of treatment, such as CPR  A DNR order may be included in other types of advance directives  Medical directive: This covers the care that you want if you are in a coma, near death, or unable to make decisions for yourself  You can list the treatments you want for each condition  Treatment may include pain medicine, surgery, blood transfusions, dialysis, IV or tube feedings, and a ventilator (breathing machine)  Values history: This document has questions about your views, beliefs, and how you feel and think about life  This information can help others choose the care that you would choose  Why are advance directives important? An advance directive helps you control your care  Although spoken wishes may be used, it is better to have your wishes written down  Spoken wishes can be misunderstood, or not followed  Treatments may be given even if you do not want them  An advance directive may make it easier for your family to make difficult choices about your care  Weight Management   Why it is important to manage your weight:  Being overweight increases your risk of health conditions such as heart disease, high blood pressure, type 2 diabetes, and certain types of cancer   It can also increase your risk for osteoarthritis, sleep apnea, and other respiratory problems  Aim for a slow, steady weight loss  Even a small amount of weight loss can lower your risk of health problems  How to lose weight safely:  A safe and healthy way to lose weight is to eat fewer calories and get regular exercise  You can lose up about 1 pound a week by decreasing the number of calories you eat by 500 calories each day  Healthy meal plan for weight management:  A healthy meal plan includes a variety of foods, contains fewer calories, and helps you stay healthy  A healthy meal plan includes the following:  Eat whole-grain foods more often  A healthy meal plan should contain fiber  Fiber is the part of grains, fruits, and vegetables that is not broken down by your body  Whole-grain foods are healthy and provide extra fiber in your diet  Some examples of whole-grain foods are whole-wheat breads and pastas, oatmeal, brown rice, and bulgur  Eat a variety of vegetables every day  Include dark, leafy greens such as spinach, kale, boo greens, and mustard greens  Eat yellow and orange vegetables such as carrots, sweet potatoes, and winter squash  Eat a variety of fruits every day  Choose fresh or canned fruit (canned in its own juice or light syrup) instead of juice  Fruit juice has very little or no fiber  Eat low-fat dairy foods  Drink fat-free (skim) milk or 1% milk  Eat fat-free yogurt and low-fat cottage cheese  Try low-fat cheeses such as mozzarella and other reduced-fat cheeses  Choose meat and other protein foods that are low in fat  Choose beans or other legumes such as split peas or lentils  Choose fish, skinless poultry (chicken or turkey), or lean cuts of red meat (beef or pork)  Before you cook meat or poultry, cut off any visible fat  Use less fat and oil  Try baking foods instead of frying them  Add less fat, such as margarine, sour cream, regular salad dressing and mayonnaise to foods  Eat fewer high-fat foods   Some examples of high-fat foods include french fries, doughnuts, ice cream, and cakes  Eat fewer sweets  Limit foods and drinks that are high in sugar  This includes candy, cookies, regular soda, and sweetened drinks  Exercise:  Exercise at least 30 minutes per day on most days of the week  Some examples of exercise include walking, biking, dancing, and swimming  You can also fit in more physical activity by taking the stairs instead of the elevator or parking farther away from stores  Ask your healthcare provider about the best exercise plan for you  © Copyright [employer] 2018 Information is for End User's use only and may not be sold, redistributed or otherwise used for commercial purposes  All illustrations and images included in CareNotes® are the copyrighted property of [employer] , [employer]  or [facility] wellness completed  Recent ORIF R hip- Doing well, has physical therapy at home  Hypertension- At goal  Continue HCTZ  Continue to check Bps at home  Call the office if >150/90  Hypothyroidism- Synthroid dose was adjusted in the hospital  Last TSH 1 79  Continue present dose for now  Due for TSH 10/5  Rheumatoid arthritis- Followed by rheumatology, Dr Ondina Taylor   Abnormal glucose in the past- Due for A1c with next labs  Due for full lab work in February

## 2022-11-21 NOTE — PROGRESS NOTES
Assessment and Plan:     Problem List Items Addressed This Visit        Other    Medicare annual wellness visit, subsequent - Primary       Depression Screening and Follow-up Plan: Patient was screened for depression during today's encounter  They screened negative with a PHQ-2 score of 0  Falls Plan of Care: balance, strength, and gait training instructions were provided  Preventive health issues were discussed with patient, and age appropriate screening tests were ordered as noted in patient's After Visit Summary  Personalized health advice and appropriate referrals for health education or preventive services given if needed, as noted in patient's After Visit Summary  History of Present Illness:     Patient presents for a Medicare Wellness Visit    Medicare wellness today  Patient Care Team:  Toya Burton as PCP - General (Internal Medicine)  Andressa Garcia MD     Review of Systems:     Review of Systems   Constitutional: Negative  Respiratory: Negative  Cardiovascular: Negative  Musculoskeletal: Negative  Psychiatric/Behavioral: Negative           Problem List:     Patient Active Problem List   Diagnosis   • Hypertension   • Rheumatoid arthritis ([facility] [address] )   • Hypothyroidism   • Hyperuricemia   • Abnormal blood chemistry   • Diastolic dysfunction   • Hyperlipidemia   • Lumbar radiculopathy   • Murmur   • Osteopenia   • Other chronic pain   • Vitamin D deficiency   • Adenocarcinoma of breast (HCC)   • Localized edema   • Abnormal finding on diagnostic imaging of kidney   • Fat necrosis (segmental) of breast   • Personal history of breast cancer   • History of peptic ulcer   • Intermediate stage nonexudative 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 eyes   • Lower leg DVT (deep venous thromboembolism), chronic, left (HCC)   • Bradycardia with 51-60 beats per minute   • Functional diarrhea   • Varicose veins of left lower extremity with pain   • Medicare annual wellness visit, subsequent   • Macrocytosis   • Anemia   • Stage 3b chronic kidney disease ([facility] [place] [address] )   • History of DVT (deep vein thrombosis)   • Abnormal glucose   • Acute bilateral low back pain without sciatica   • Elevated serum creatinine   • Chronic kidney disease-mineral and bone disorder   • Chronic pain of both knees      Past Medical and Surgical History:     Past Medical History:   Diagnosis Date   • Allergic angioedema     2mar2016 resolved   • Angioedema     [id number] resolved   • Arthritis    • Breast cancer ([facility] [place] [address] ) 2012    right    • Chronic kidney disease    • Disease of thyroid gland    • Hemorrhage of anus and rectum     02mar2016 resolved   • Hyperlipidemia    • Hypertension    • Hypocalcemia     12oct2017 resolved   • Neuritis     thoracic and lumbosacral   • Peptic ulcer    • Rheumatoid arthritis ([facility] [place] [address] )    • Stage 3[facility] ) 5/7/2021   • Vitamin D deficiency      Past Surgical History:   Procedure Laterality Date   • ABDOMINAL SURGERY     • BREAST BIOPSY Right 2012   • BREAST LUMPECTOMY Right 2012   • BREAST SURGERY     • CHOLECYSTECTOMY     • ELBOW SURGERY     • GALLBLADDER SURGERY  1981   • ORTHOPEDIC SURGERY      for toes   • US GUIDED BREAST BIOPSY RIGHT COMPLETE Right 8/15/2022      Family History:     Family History   Problem Relation Age of Onset   • Breast cancer Mother    • Kidney failure Father    • Other Father         uremia   • Lung cancer Brother    • Breast cancer Maternal Aunt    • Breast cancer Maternal Aunt    • Breast cancer Maternal Aunt    • Breast cancer Maternal Aunt    • Breast cancer Maternal Aunt    • Stomach cancer Maternal Uncle       Social History:     Social History     Socioeconomic History   • Marital status:       Spouse name: None   • Number of children: None   • Years of education: None   • Highest education level: None   Occupational History   • None   Tobacco Use   • Smoking status: Never   • Smokeless tobacco: Never   Vaping Use   • Vaping Use: Never used   Substance and Sexual Activity   • Alcohol use: Not Currently   • Drug use: No   • Sexual activity: Not Currently     Partners: Male   Other Topics Concern   • None   Social History Narrative    Active: caffeine use     Social Determinants of Health     Financial Resource Strain: Low Risk    • Difficulty of Paying Living Expenses: Not very hard   Food Insecurity: Not on file   Transportation Needs: No Transportation Needs   • Lack of Transportation (Medical): No   • Lack of Transportation (Non-Medical): No   Physical Activity: Not on file   Stress: Not on file   Social Connections: Not on file   Intimate Partner Violence: Not on file   Housing Stability: Not on file      Medications and Allergies:     Current Outpatient Medications   Medication Sig Dispense Refill   • ascorbic acid (VITAMIN C) 500 mg tablet Take 500 mg by mouth daily  • ASPIRIN 81 PO 1 tablet ONCE DAILY (route: oral)     • atorvastatin (LIPITOR) 20 mg tablet 1 tablet ONCE DAILY (route: oral)     • Calcium Citrate-Vitamin D (CITRACAL + D PO) Take by mouth 2 (two) times a day     • Cholecalciferol (VITAMIN D-3 PO) Take 2,000 Units by mouth daily  • folic acid (FOLVITE) 1 mg tablet 1 daily 90 tablet 2   • hydrochlorothiazide (HYDRODIURIL) 25 mg tablet TAKE 1 TABLET BY MOUTH EVERY DAY 90 tablet 2   • levothyroxine (Synthroid) 75 mcg tablet TAKE 1 TABLET EVERY OTHER  DAY ALTERNATING WITH 50MCG 45 tablet 1   • magnesium oxide (MAG-OX) 400 mg Take 1 tablet (400 mg total) by mouth 2 (two) times a day 90 tablet 0   • methotrexate 2 5 mg tablet Take 4 tablets (10 mg total) by mouth once a week (Patient taking differently: Take 2 5 mg by mouth once a week Patient takes 6 tablets weekly) 12 tablet    • Multiple Vitamin (MULTIVITAMIN) tablet Take 1 tablet by mouth daily       • potassium chloride (Klor-Con M10) 10 mEq tablet Take 2 tablets (20 mEq total) by mouth 2 (two) times a day 360 tablet 1   • Psyllium (METAMUCIL) WAFR Take 2 twice a day (Patient taking differently: Take once daily) 100 Wafer 5   • riTUXimab (RITUXAN) 500 mg/50 mL Infuse into a venous catheter every 6 (six) months       • rosuvastatin (CRESTOR) 5 mg tablet Take 1 tablet (5 mg total) by mouth daily 90 tablet 2   • carvedilol (COREG) 25 mg tablet TAKE 1 TABLET TWICE DAILY  WITH MEALS (Patient not taking: Reported on 11/21/2022) 180 tablet 1   • Synthroid 50 MCG tablet TAKE 1 TABLET EVERY OTHER  DAY (Patient not taking: Reported on 11/21/2022) 45 tablet 1     No current facility-administered medications for this visit  Allergies   Allergen Reactions   • Lisinopril Anaphylaxis   • Codeine Other (See Comments)     Nausea/vomiting     • Other      Annotation - [id number]: tounge swelling   • Oxycodone      Annotation - [id number]: NOT TOLERATE   • Penicillins Hives   • Sulfamethoxazole-Trimethoprim    • Ciprofloxacin Rash      Immunizations:     Immunization History   Administered Date(s) Administered   • COVID-19 MODERNA VACC 0 5 ML IM 11/16/2021   • DT (pediatric) 08/01/2014   • Influenza Split High Dose Preservative Free IM 10/02/2013, 09/25/2014, 10/06/2015, 09/14/2016, 09/29/2017   • Influenza, high dose seasonal 0 7 mL 10/11/2018, 09/30/2019, 09/22/2020, 10/05/2021   • Influenza, seasonal, injectable 09/19/2012   • Pneumococcal Conjugate 13-Valent 03/15/2016, 03/30/2017   • Pneumococcal Polysaccharide PPV23 10/26/1998, 11/07/2005   • Tuberculin Skin Test-PPD Intradermal 11/11/2000   • Zoster Vaccine Recombinant 08/16/2020, 11/09/2020      Health Maintenance:         Topic Date Due   • DXA SCAN  09/17/2023         Topic Date Due   • Hepatitis B Vaccine (1 of 3 - 3-dose series) Never done   • Pneumococcal Vaccine: 65+ Years (4 - PPSV23 if available, else PCV20) 03/30/2018   • COVID-19 Vaccine (2 - Moderna risk series) 12/14/2021   • Influenza Vaccine (1) 09/01/2022      Medicare Screening Tests and Risk Assessments: Kathy Calhoun is here for her Subsequent Wellness visit       Health Risk Assessment:   Patient rates overall health as good  Patient feels that their physical health rating is slightly worse  Patient is satisfied with their life  Eyesight was rated as same  Hearing was rated as same  Patient feels that their emotional and mental health rating is same  Patients states they are never, rarely angry  Patient states they are sometimes unusually tired/fatigued  Pain experienced in the last 7 days has been some  Patient states that she has experienced no weight loss or gain in last 6 months  A little with the hip -- slowly improving    Depression Screening:   PHQ-2 Score: 0      Fall Risk Screening: In the past year, patient has experienced: history of falling in past year    Number of falls: 1  Injured during fall?: Yes    Feels unsteady when standing or walking?: Yes    Worried about falling?: Yes      Urinary Incontinence Screening:   Patient has not leaked urine accidently in the last six months  Home Safety:  Patient has trouble with stairs inside or outside of their home  Patient has working smoke alarms and has working carbon monoxide detector  Home safety hazards include: none  Nutrition:   Current diet is Regular  Medications:   Patient is not currently taking any over-the-counter supplements  Patient is able to manage medications  Activities of Daily Living (ADLs)/Instrumental Activities of Daily Living (IADLs):   Walk and transfer into and out of bed and chair?: Yes  Dress and groom yourself?: Yes    Bathe or shower yourself?: Yes    Feed yourself? Yes  Do your laundry/housekeeping?: Yes  Manage your money, pay your bills and track your expenses?: Yes  Make your own meals?: Yes    Do your own shopping?: Yes    ADL comments:  With minimal assistance    Previous Hospitalizations:   Any hospitalizations or ED visits within the last 12 months?: Yes    How many hospitalizations have you had in the last year?: 1-2    Advance Care Planning:   Living will: Yes    Advanced directive: Yes      PREVENTIVE SCREENINGS      Cardiovascular Screening:    General: Screening Not Indicated and History Lipid Disorder      Diabetes Screening:     General: Screening Current      Colorectal Cancer Screening:     General: Screening Not Indicated      Breast Cancer Screening:     General: History Breast Cancer      Cervical Cancer Screening:    General: Screening Not Indicated      Osteoporosis Screening:    General: Screening Current      Abdominal Aortic Aneurysm (AAA) Screening:        General: Screening Not Indicated      Lung Cancer Screening:     General: Screening Not Indicated      Hepatitis C Screening:    General: Screening Not Indicated    Screening, Brief Intervention, and Referral to Treatment (SBIRT)    Screening      Single Item Drug Screening:  How often have you used an illegal drug (including marijuana) or a prescription medication for non-medical reasons in the past year? never    Single Item Drug Screen Score: 0  Interpretation: Negative screen for possible drug use disorder    No results found  Physical Exam:     /76 (BP Location: Right arm, Patient Position: Sitting, Cuff Size: Adult)   Pulse 88   Temp 99 1 °F (37 3 °C) (Tympanic)   Resp 16   Ht 5' 3" (1 6 m)   Wt 80 3 kg (177 lb)   LMP  (LMP Unknown)   SpO2 100%   BMI 31 35 kg/m²     Physical Exam  Vitals and nursing note reviewed  Constitutional:       General: She is not in acute distress  Appearance: She is well-developed  HENT:      Head: Normocephalic and atraumatic  Eyes:      Conjunctiva/sclera: Conjunctivae normal    Cardiovascular:      Rate and Rhythm: Normal rate and regular rhythm  Heart sounds: No murmur heard  Pulmonary:      Effort: Pulmonary effort is normal  No respiratory distress  Breath sounds: Normal breath sounds  Abdominal:      Palpations: Abdomen is soft  Tenderness: There is no abdominal tenderness  Musculoskeletal:         General: Deformity present  Cervical back: Neck supple  Comments: Ambulating with walker   Skin:     General: Skin is warm and dry  Capillary Refill: Capillary refill takes less than 2 seconds  Neurological:      Mental Status: She is alert     Psychiatric:         Mood and Affect: Mood normal           William Vigil

## 2022-11-21 NOTE — PROGRESS NOTES
Assessment/Plan:    1  Medicare wellness completed  2  Recent ORIF R hip- Doing well, has physical therapy at home  3  Hypertension- At goal  Continue HCTZ  Continue to check Bps at home  Call the office if >150/90   4  Hypothyroidism- Synthroid dose was adjusted in the hospital  Last TSH 1 79  Continue present dose for now  Due for TSH 10/5   5  Rheumatoid arthritis- Followed by rheumatology, Dr Jc Barrera   6  Abnormal glucose in the past- Due for A1c with next labs  Due for full lab work in February  Diagnoses and all orders for this visit:    Medicare annual wellness visit, subsequent    Hypertension, unspecified type    Hypothyroidism, unspecified type    Rheumatoid arthritis of left ankle, unspecified whether rheumatoid factor present ([facility] [address] )    Hyperlipidemia, unspecified hyperlipidemia type    Abnormal glucose    Other orders  -     ASPIRIN 81 PO; 1 tablet ONCE DAILY (route: oral)  -     Discontinue: atorvastatin (LIPITOR) 20 mg tablet; 1 tablet ONCE DAILY (route: oral)    The patient was counseled regarding instructions for management, risk factor reductions, patient and family education,impressions, risks and benefits of treatment options, side effects of medications, importance of compliance with treatment  The treatment plan was reviewed with the patient/guardian and patient/guardian understands and agrees with the treatment plan  Current Outpatient Medications:   •  ascorbic acid (VITAMIN C) 500 mg tablet, Take 500 mg by mouth daily  , Disp: , Rfl:   •  ASPIRIN 81 PO, 1 tablet ONCE DAILY (route: oral), Disp: , Rfl:   •  Calcium Citrate-Vitamin D (CITRACAL + D PO), Take by mouth 2 (two) times a day, Disp: , Rfl:   •  Cholecalciferol (VITAMIN D-3 PO), Take 2,000 Units by mouth daily  , Disp: , Rfl:   •  folic acid (FOLVITE) 1 mg tablet, 1 daily, Disp: 90 tablet, Rfl: 2  •  hydrochlorothiazide (HYDRODIURIL) 25 mg tablet, TAKE 1 TABLET BY MOUTH EVERY DAY, Disp: 90 tablet, Rfl: 2  •  levothyroxine (Synthroid) 75 mcg tablet, TAKE 1 TABLET EVERY OTHER  DAY ALTERNATING WITH 50MCG, Disp: 45 tablet, Rfl: 1  •  magnesium oxide (MAG-OX) 400 mg, Take 1 tablet (400 mg total) by mouth 2 (two) times a day, Disp: 90 tablet, Rfl: 0  •  methotrexate 2 5 mg tablet, Take 4 tablets (10 mg total) by mouth once a week (Patient taking differently: Take 2 5 mg by mouth once a week Patient takes 6 tablets weekly), Disp: 12 tablet, Rfl:   •  Multiple Vitamin (MULTIVITAMIN) tablet, Take 1 tablet by mouth daily  , Disp: , Rfl:   •  potassium chloride (Klor-Con M10) 10 mEq tablet, Take 2 tablets (20 mEq total) by mouth 2 (two) times a day, Disp: 360 tablet, Rfl: 1  •  Psyllium (METAMUCIL) WAFR, Take 2 twice a day (Patient taking differently: Take once daily), Disp: 100 Wafer, Rfl: 5  •  riTUXimab (RITUXAN) 500 mg/50 mL, Infuse into a venous catheter every 6 (six) months  , Disp: , Rfl:   •  rosuvastatin (CRESTOR) 5 mg tablet, Take 1 tablet (5 mg total) by mouth daily, Disp: 90 tablet, Rfl: 2    Subjective:      Patient ID: Perez Corbin is a 81 y o  female  S/P fall in September, right hip fracture, had ORIF at [facility]  Discharged from [facility], using walker now  The following portions of the patient's history were reviewed and updated as appropriate:   She has a past medical history of Allergic angioedema, Angioedema, Arthritis, Breast cancer ([address] ) (2012), Chronic kidney disease, Disease of thyroid gland, Hemorrhage of anus and rectum, Hyperlipidemia, Hypertension, Hypocalcemia, Neuritis, Peptic ulcer, Rheumatoid arthritis ([address] ), Stage 3b chronic kidney disease ([address] ) (5/7/2021), and Vitamin D deficiency  ,  does not have any pertinent problems on file  ,   has a past surgical history that includes Abdominal surgery; Breast surgery; Cholecystectomy; Elbow surgery; orthopedic surgery; Breast biopsy (Right, 2012); Breast lumpectomy (Right, 2012);  Gallbladder surgery (1981); and US guided breast biopsy right complete (Right, 8/15/2022)  ,  family history includes Breast cancer in her maternal aunt, maternal aunt, maternal aunt, maternal aunt, maternal aunt, and mother; Kidney failure in her father; Lung cancer in her brother; Other in her father; Stomach cancer in her maternal uncle ,   reports that she has never smoked  She has never used smokeless tobacco  She reports that she does not currently use alcohol  She reports that she does not use drugs  ,  is allergic to lisinopril, codeine, other, oxycodone, penicillins, sulfamethoxazole-trimethoprim, and ciprofloxacin       Review of Systems   Constitutional: Negative  Respiratory: Negative  Cardiovascular: Negative  Musculoskeletal: Positive for arthralgias and gait problem  Psychiatric/Behavioral: Negative  Objective:  /80   Pulse 88   Temp 99 1 °F (37 3 °C) (Tympanic)   Resp 16   Ht 5' 3" (1 6 m)   Wt 80 3 kg (177 lb)   LMP  (LMP Unknown)   SpO2 100%   BMI 31 35 kg/m²     Lab Review  No visits with results within 2 Month(s) from this visit  Latest known visit with results is:   Hospital Outpatient Visit on 08/15/2022   Component Date Value   • Case Report 08/15/2022                      Value:Surgical Pathology Report                         Case: [id number]                                   Authorizing Provider:  Roberto Warner PA-C       Collected:           08/15/2022 1313              Ordering Location:     [address] [place] [address] Breast Received:            08/15/2022 [facility] Final                                                              Pathologist:           Shannon Barron DO                                                            Specimen:    Breast, Right, US right brst bx 1000 8cmfn, 3 passes, 14g marquee                         • Final Diagnosis 08/15/2022                      Value: This result contains rich text formatting which cannot be displayed here     • Note 08/15/2022 Value:This result contains rich text formatting which cannot be displayed here  • Additional Information 08/15/2022                      Value: This result contains rich text formatting which cannot be displayed here  • Gross Description 08/15/2022                      Value: This result contains rich text formatting which cannot be displayed here  • Clinical Information 08/15/2022                      Value:Fixed in formalin  Please contact Esteban Diallo with results at   MAMMO FINDINGS:   RIGHT  B) MASS: There is an enlarging hypoechoic lesion seen in the upper region of the right breast at 10 o'clock in the posterior depth, 8 cm from the nipple at the lumpectomy site  Targeted ultrasound demonstrates hypoechoic tissue in this area that appears prominent for scarring  Imaging: No results found  Physical Exam  Constitutional:       Appearance: She is well-developed and well-nourished  Cardiovascular:      Rate and Rhythm: Normal rate and regular rhythm  Pulses: Intact distal pulses  Heart sounds: Normal heart sounds  Pulmonary:      Effort: Pulmonary effort is normal       Breath sounds: Normal breath sounds  Musculoskeletal:      Comments: Ambulates with walker   Neurological:      Mental Status: She is alert and oriented to person, place, and time  Deep Tendon Reflexes: Reflexes are normal and symmetric  Psychiatric:         Mood and Affect: Mood and affect normal          Behavior: Behavior normal          Thought Content:  Thought content normal          Judgment: Judgment normal

## 2023-01-03 ENCOUNTER — TELEPHONE (OUTPATIENT)
Dept: NEPHROLOGY | Facility: CLINIC | Age: 83
End: 2023-01-03

## 2023-01-03 DIAGNOSIS — N18.32 STAGE 3B CHRONIC KIDNEY DISEASE (HCC): Primary | ICD-10-CM

## 2023-01-03 NOTE — TELEPHONE ENCOUNTER
Called spoke with patient advised patient to get labs done 1wk prior to 01/30 appointment with Modesta Haro  patient understood and is okay

## 2023-01-10 ENCOUNTER — APPOINTMENT (OUTPATIENT)
Dept: LAB | Facility: CLINIC | Age: 83
End: 2023-01-10

## 2023-01-10 DIAGNOSIS — M89.9 BONE DISEASE: ICD-10-CM

## 2023-01-10 DIAGNOSIS — M89.9 CHRONIC KIDNEY DISEASE-MINERAL AND BONE DISORDER: ICD-10-CM

## 2023-01-10 DIAGNOSIS — E78.5 HYPERLIPIDEMIA, UNSPECIFIED HYPERLIPIDEMIA TYPE: ICD-10-CM

## 2023-01-10 DIAGNOSIS — N18.9 CHRONIC KIDNEY DISEASE-MINERAL AND BONE DISORDER: ICD-10-CM

## 2023-01-10 DIAGNOSIS — N18.32 STAGE 3B CHRONIC KIDNEY DISEASE (HCC): ICD-10-CM

## 2023-01-10 DIAGNOSIS — M05.79 SEROPOSITIVE RHEUMATOID ARTHRITIS OF MULTIPLE SITES (HCC): ICD-10-CM

## 2023-01-10 DIAGNOSIS — I10 HYPERTENSION, UNSPECIFIED TYPE: Chronic | ICD-10-CM

## 2023-01-10 DIAGNOSIS — E03.9 HYPOTHYROIDISM, UNSPECIFIED TYPE: Chronic | ICD-10-CM

## 2023-01-10 DIAGNOSIS — R73.09 ABNORMAL GLUCOSE: ICD-10-CM

## 2023-01-10 DIAGNOSIS — E83.9 CHRONIC KIDNEY DISEASE-MINERAL AND BONE DISORDER: ICD-10-CM

## 2023-01-10 LAB
25(OH)D3 SERPL-MCNC: 54.4 NG/ML (ref 30–100)
ALBUMIN SERPL BCP-MCNC: 3.9 G/DL (ref 3.5–5)
ALP SERPL-CCNC: 101 U/L (ref 46–116)
ALT SERPL W P-5'-P-CCNC: 22 U/L (ref 12–78)
ANION GAP SERPL CALCULATED.3IONS-SCNC: 6 MMOL/L (ref 4–13)
AST SERPL W P-5'-P-CCNC: 24 U/L (ref 5–45)
BASOPHILS # BLD AUTO: 0.1 THOUSANDS/ÂΜL (ref 0–0.1)
BASOPHILS NFR BLD AUTO: 1 % (ref 0–1)
BILIRUB DIRECT SERPL-MCNC: 0.25 MG/DL (ref 0–0.2)
BILIRUB SERPL-MCNC: 1.01 MG/DL (ref 0.2–1)
BUN SERPL-MCNC: 24 MG/DL (ref 5–25)
CALCIUM SERPL-MCNC: 9.5 MG/DL (ref 8.3–10.1)
CHLORIDE SERPL-SCNC: 104 MMOL/L (ref 96–108)
CO2 SERPL-SCNC: 28 MMOL/L (ref 21–32)
CREAT SERPL-MCNC: 1.33 MG/DL (ref 0.6–1.3)
CREAT UR-MCNC: 68.2 MG/DL
CRP SERPL QL: 43.3 MG/L
EOSINOPHIL # BLD AUTO: 0.23 THOUSAND/ÂΜL (ref 0–0.61)
EOSINOPHIL NFR BLD AUTO: 3 % (ref 0–6)
ERYTHROCYTE [DISTWIDTH] IN BLOOD BY AUTOMATED COUNT: 18.4 % (ref 11.6–15.1)
GFR SERPL CREATININE-BSD FRML MDRD: 37 ML/MIN/1.73SQ M
GLUCOSE P FAST SERPL-MCNC: 113 MG/DL (ref 65–99)
HCT VFR BLD AUTO: 34.9 % (ref 34.8–46.1)
HGB BLD-MCNC: 11 G/DL (ref 11.5–15.4)
IGG SERPL-MCNC: 585 MG/DL (ref 700–1600)
IMM GRANULOCYTES # BLD AUTO: 0.04 THOUSAND/UL (ref 0–0.2)
IMM GRANULOCYTES NFR BLD AUTO: 0 % (ref 0–2)
LYMPHOCYTES # BLD AUTO: 1.03 THOUSANDS/ÂΜL (ref 0.6–4.47)
LYMPHOCYTES NFR BLD AUTO: 11 % (ref 14–44)
MCH RBC QN AUTO: 31.7 PG (ref 26.8–34.3)
MCHC RBC AUTO-ENTMCNC: 31.5 G/DL (ref 31.4–37.4)
MCV RBC AUTO: 101 FL (ref 82–98)
MONOCYTES # BLD AUTO: 0.91 THOUSAND/ÂΜL (ref 0.17–1.22)
MONOCYTES NFR BLD AUTO: 10 % (ref 4–12)
NEUTROPHILS # BLD AUTO: 6.79 THOUSANDS/ÂΜL (ref 1.85–7.62)
NEUTS SEG NFR BLD AUTO: 75 % (ref 43–75)
NRBC BLD AUTO-RTO: 0 /100 WBCS
PHOSPHATE SERPL-MCNC: 3.5 MG/DL (ref 2.3–4.1)
PLATELET # BLD AUTO: 290 THOUSANDS/UL (ref 149–390)
PMV BLD AUTO: 10.1 FL (ref 8.9–12.7)
POTASSIUM SERPL-SCNC: 3.7 MMOL/L (ref 3.5–5.3)
PROT SERPL-MCNC: 6.9 G/DL (ref 6.4–8.4)
PROT UR-MCNC: 16 MG/DL
PROT/CREAT UR: 0.23 MG/G{CREAT} (ref 0–0.1)
PTH-INTACT SERPL-MCNC: 61.9 PG/ML (ref 18.4–80.1)
RBC # BLD AUTO: 3.47 MILLION/UL (ref 3.81–5.12)
SODIUM SERPL-SCNC: 138 MMOL/L (ref 135–147)
WBC # BLD AUTO: 9.1 THOUSAND/UL (ref 4.31–10.16)

## 2023-01-11 LAB
BACTERIA UR QL AUTO: ABNORMAL /HPF
BILIRUB UR QL STRIP: NEGATIVE
CLARITY UR: ABNORMAL
COLOR UR: ABNORMAL
GLUCOSE UR STRIP-MCNC: NEGATIVE MG/DL
HGB UR QL STRIP.AUTO: NEGATIVE
KETONES UR STRIP-MCNC: NEGATIVE MG/DL
LEUKOCYTE ESTERASE UR QL STRIP: ABNORMAL
MUCOUS THREADS UR QL AUTO: ABNORMAL
NITRITE UR QL STRIP: NEGATIVE
NON-SQ EPI CELLS URNS QL MICRO: ABNORMAL /HPF
PH UR STRIP.AUTO: 7 [PH]
PROT UR STRIP-MCNC: ABNORMAL MG/DL
RBC #/AREA URNS AUTO: ABNORMAL /HPF
RENAL EPI CELLS #/AREA URNS HPF: PRESENT /[HPF]
SP GR UR STRIP.AUTO: 1.02 (ref 1–1.03)
TRANS CELLS #/AREA URNS HPF: PRESENT /[HPF]
UROBILINOGEN UR STRIP-ACNC: <2 MG/DL
WBC #/AREA URNS AUTO: ABNORMAL /HPF

## 2023-01-13 ENCOUNTER — TELEPHONE (OUTPATIENT)
Dept: INTERNAL MEDICINE CLINIC | Facility: CLINIC | Age: 83
End: 2023-01-13

## 2023-01-27 ENCOUNTER — TELEPHONE (OUTPATIENT)
Dept: NEPHROLOGY | Facility: CLINIC | Age: 83
End: 2023-01-27

## 2023-01-27 NOTE — TELEPHONE ENCOUNTER
I called and left message on patients answering machine confirming appointment for Monday 01/30/2023 with Dr Ronak Perkins

## 2023-01-30 ENCOUNTER — OFFICE VISIT (OUTPATIENT)
Dept: NEPHROLOGY | Facility: CLINIC | Age: 83
End: 2023-01-30

## 2023-01-30 VITALS
DIASTOLIC BLOOD PRESSURE: 80 MMHG | WEIGHT: 177 LBS | HEIGHT: 62 IN | HEART RATE: 86 BPM | BODY MASS INDEX: 32.57 KG/M2 | SYSTOLIC BLOOD PRESSURE: 140 MMHG | OXYGEN SATURATION: 94 % | TEMPERATURE: 97.7 F | RESPIRATION RATE: 16 BRPM

## 2023-01-30 DIAGNOSIS — I10 HYPERTENSION, UNSPECIFIED TYPE: Chronic | ICD-10-CM

## 2023-01-30 DIAGNOSIS — M06.9 RHEUMATOID ARTHRITIS OF LEFT ANKLE, UNSPECIFIED WHETHER RHEUMATOID FACTOR PRESENT (HCC): Chronic | ICD-10-CM

## 2023-01-30 DIAGNOSIS — C50.919 ADENOCARCINOMA OF BREAST, UNSPECIFIED LATERALITY (HCC): ICD-10-CM

## 2023-01-30 DIAGNOSIS — M89.9 CHRONIC KIDNEY DISEASE-MINERAL AND BONE DISORDER: ICD-10-CM

## 2023-01-30 DIAGNOSIS — N18.32 STAGE 3B CHRONIC KIDNEY DISEASE (HCC): Primary | ICD-10-CM

## 2023-01-30 DIAGNOSIS — E83.9 CHRONIC KIDNEY DISEASE-MINERAL AND BONE DISORDER: ICD-10-CM

## 2023-01-30 DIAGNOSIS — N18.9 CHRONIC KIDNEY DISEASE-MINERAL AND BONE DISORDER: ICD-10-CM

## 2023-01-30 NOTE — ASSESSMENT & PLAN NOTE
Lab Results   Component Value Date    EGFR 37 01/10/2023    EGFR 38 08/03/2022    EGFR 37 07/11/2022    CREATININE 1 33 (H) 01/10/2023    CREATININE 1 30 08/03/2022    CREATININE 1 34 (H) 07/11/2022   PTH and phosphorus along with vitamin D are within acceptable range and will continue to monitor

## 2023-01-30 NOTE — PROGRESS NOTES
NEPHROLOGY OFFICE FOLLOW UP  Sintia Pleitez 81 y o  female MRN: [id number]    Encounter: [id number] 1/30/2023    REASON FOR VISIT: Sintia Pleitez is a 81 y o  female who is here on 1/30/2023 for Chronic Kidney Disease and Follow-up    HPI:    Riki Goldman came in today for follow-up of CKD  71-year-old woman with history of stage III CKD along with rheumatoid arthritis and hypertension    Patient overall doing well  Since I saw her last she had a broken hip after a fall requiring surgery    She is walking with a walker but seems to be stable    No other acute complaint    No chest pain no palpitation or shortness of breath    No nausea no vomiting    Denies any urinary complaint      REVIEW OF SYSTEMS:    Review of Systems   Constitutional: Negative for activity change and fatigue  HENT: Negative for congestion and ear discharge  Eyes: Negative for photophobia and pain  Respiratory: Negative for apnea and choking  Cardiovascular: Negative for chest pain and palpitations  Gastrointestinal: Negative for abdominal distention and blood in stool  Endocrine: Negative for heat intolerance and polyphagia  Genitourinary: Negative for flank pain and urgency  Musculoskeletal: Negative for neck pain and neck stiffness  Skin: Negative for color change and wound  Allergic/Immunologic: Negative for food allergies and immunocompromised state  Neurological: Negative for seizures and facial asymmetry  Hematological: Negative for adenopathy  Does not bruise/bleed easily  Psychiatric/Behavioral: Negative for self-injury and suicidal ideas           PAST MEDICAL HISTORY:  Past Medical History:   Diagnosis Date   • Allergic angioedema     2mar2016 resolved   • Angioedema     [id number] resolved   • Arthritis    • Breast cancer [facility]) 2012    right    • Chronic kidney disease    • Disease of thyroid gland    • Hemorrhage of anus and rectum     02mar2016 resolved   • Hyperlipidemia    • Hypertension    • Hypocalcemia [id number] resolved   • Neuritis     thoracic and lumbosacral   • Peptic ulcer    • Rheumatoid arthritis ([facility] [address] )    • Stage 3b chronic kidney disease ([facility] [address] ) 5/7/2021   • Vitamin D deficiency        PAST SURGICAL HISTORY:  Past Surgical History:   Procedure Laterality Date   • ABDOMINAL SURGERY     • BREAST BIOPSY Right 2012   • BREAST LUMPECTOMY Right 2012   • BREAST SURGERY     • CHOLECYSTECTOMY     • ELBOW SURGERY     • GALLBLADDER SURGERY  1981   • ORTHOPEDIC SURGERY      for toes   • US GUIDED BREAST BIOPSY RIGHT COMPLETE Right 8/15/2022       SOCIAL HISTORY:  Social History     Substance and Sexual Activity   Alcohol Use Not Currently     Social History     Substance and Sexual Activity   Drug Use No     Social History     Tobacco Use   Smoking Status Never   Smokeless Tobacco Never       FAMILY HISTORY:  Family History   Problem Relation Age of Onset   • Breast cancer Mother    • Kidney failure Father    • Other Father         uremia   • Lung cancer Brother    • Breast cancer Maternal Aunt    • Breast cancer Maternal Aunt    • Breast cancer Maternal Aunt    • Breast cancer Maternal Aunt    • Breast cancer Maternal Aunt    • Stomach cancer Maternal Uncle        MEDICATIONS:    Current Outpatient Medications:   •  ascorbic acid (VITAMIN C) 500 mg tablet, Take 500 mg by mouth daily  , Disp: , Rfl:   •  ASPIRIN 81 PO, 1 tablet ONCE DAILY (route: oral), Disp: , Rfl:   •  Calcium Citrate-Vitamin D (CITRACAL + D PO), Take by mouth 2 (two) times a day, Disp: , Rfl:   •  Cholecalciferol (VITAMIN D-3 PO), Take 2,000 Units by mouth daily  , Disp: , Rfl:   •  folic acid (FOLVITE) 1 mg tablet, 1 daily, Disp: 90 tablet, Rfl: 2  •  hydrochlorothiazide (HYDRODIURIL) 25 mg tablet, TAKE 1 TABLET BY MOUTH EVERY DAY, Disp: 90 tablet, Rfl: 2  •  levothyroxine (Synthroid) 75 mcg tablet, TAKE 1 TABLET EVERY OTHER  DAY ALTERNATING WITH 50MCG (Patient taking differently: in the morning), Disp: 45 tablet, Rfl: 1  •  magnesium oxide (MAG-OX) 400 mg, Take 1 tablet (400 mg total) by mouth 2 (two) times a day, Disp: 90 tablet, Rfl: 0  •  methotrexate 2 5 mg tablet, Take 4 tablets (10 mg total) by mouth once a week (Patient taking differently: Take 2 5 mg by mouth once a week Patient takes 6 tablets weekly), Disp: 12 tablet, Rfl:   •  Multiple Vitamin (MULTIVITAMIN) tablet, Take 1 tablet by mouth daily  , Disp: , Rfl:   •  potassium chloride (Klor-Con M10) 10 mEq tablet, Take 2 tablets (20 mEq total) by mouth 2 (two) times a day, Disp: 360 tablet, Rfl: 1  •  Psyllium (METAMUCIL) WAFR, Take 2 twice a day (Patient taking differently: Take once daily), Disp: 100 Wafer, Rfl: 5  •  riTUXimab (RITUXAN) 500 mg/50 mL, Infuse into a venous catheter every 6 (six) months  , Disp: , Rfl:   •  rosuvastatin (CRESTOR) 5 mg tablet, Take 1 tablet (5 mg total) by mouth daily, Disp: 90 tablet, Rfl: 2    PHYSICAL EXAM:  Vitals:    01/30/23 1425   BP: 140/80   BP Location: Right arm   Patient Position: Sitting   Pulse: 86   Resp: 16   Temp: 97 7 °F (36 5 °C)   TempSrc: Temporal   SpO2: 94%   Weight: 80 3 kg (177 lb)   Height: 5' 2" (1 575 m)     Body mass index is 32 37 kg/m²  Physical Exam  Constitutional:       General: She is not in acute distress  Appearance: She is well-developed  HENT:      Head: Normocephalic  Eyes:      General: No scleral icterus  Conjunctiva/sclera: Conjunctivae normal    Neck:      Vascular: No JVD  Cardiovascular:      Rate and Rhythm: Normal rate  Heart sounds: Normal heart sounds  Pulmonary:      Effort: Pulmonary effort is normal       Breath sounds: No wheezing  Abdominal:      Palpations: Abdomen is soft  Tenderness: There is no abdominal tenderness  Musculoskeletal:         General: Swelling present  Normal range of motion  Cervical back: Neck supple  Skin:     General: Skin is warm  Findings: No rash  Neurological:      Mental Status: She is alert and oriented to person, place, and time  Psychiatric:         Behavior: Behavior normal          LAB RESULTS:  Results for orders placed or performed in visit on 26/60/95   Basic metabolic panel   Result Value Ref Range    Sodium 138 135 - 147 mmol/L    Potassium 3 7 3 5 - 5 3 mmol/L    Chloride 104 96 - 108 mmol/L    CO2 28 21 - 32 mmol/L    ANION GAP 6 4 - 13 mmol/L    BUN 24 5 - 25 mg/dL    Creatinine 1 33 (H) 0 60 - 1 30 mg/dL    Glucose, Fasting 113 (H) 65 - 99 mg/dL    Calcium 9 5 8 3 - 10 1 mg/dL    eGFR 37 ml/min/1 73sq m   CBC and differential   Result Value Ref Range    WBC 9 10 4 31 - 10 16 Thousand/uL    RBC 3 47 (L) 3 81 - 5 12 Million/uL    Hemoglobin 11 0 (L) 11 5 - 15 4 g/dL    Hematocrit 34 9 34 8 - 46 1 %     (H) 82 - 98 fL    MCH 31 7 26 8 - 34 3 pg    MCHC 31 5 31 4 - 37 4 g/dL    RDW 18 4 (H) 11 6 - 15 1 %    MPV 10 1 8 9 - 12 7 fL    Platelets 666 555 - 291 Thousands/uL    nRBC 0 /100 WBCs    Neutrophils Relative 75 43 - 75 %    Immat GRANS % 0 0 - 2 %    Lymphocytes Relative 11 (L) 14 - 44 %    Monocytes Relative 10 4 - 12 %    Eosinophils Relative 3 0 - 6 %    Basophils Relative 1 0 - 1 %    Neutrophils Absolute 6 79 1 85 - 7 62 Thousands/µL    Immature Grans Absolute 0 04 0 00 - 0 20 Thousand/uL    Lymphocytes Absolute 1 03 0 60 - 4 47 Thousands/µL    Monocytes Absolute 0 91 0 17 - 1 22 Thousand/µL    Eosinophils Absolute 0 23 0 00 - 0 61 Thousand/µL    Basophils Absolute 0 10 0 00 - 0 10 Thousands/µL   Phosphorus   Result Value Ref Range    Phosphorus 3 5 2 3 - 4 1 mg/dL   PTH, intact   Result Value Ref Range    PTH 61 9 18 4 - 80 1 pg/mL   Vitamin D 25 hydroxy   Result Value Ref Range    Vit D, 25-Hydroxy 54 4 30 0 - 100 0 ng/mL   Hepatic function panel   Result Value Ref Range    Total Bilirubin 1 01 (H) 0 20 - 1 00 mg/dL    Bilirubin, Direct 0 25 (H) 0 00 - 0 20 mg/dL    Alkaline Phosphatase 101 46 - 116 U/L    AST 24 5 - 45 U/L    ALT 22 12 - 78 U/L    Total Protein 6 9 6 4 - 8 4 g/dL    Albumin 3 9 3 5 - 5 0 g/dL C-reactive protein   Result Value Ref Range    CRP 43 3 (H) <3 0 mg/L   IgG   Result Value Ref Range     0 (L) 700 0 - 1,600 0 mg/dL       ASSESSMENT and PLAN:      Stage 3b chronic kidney disease (HCC)  Lab Results   Component Value Date    EGFR 37 01/10/2023    EGFR 38 08/03/2022    EGFR 37 07/11/2022    CREATININE 1 33 (H) 01/10/2023    CREATININE 1 30 08/03/2022    CREATININE 1 34 (H) 07/11/2022   Patient renal function is quite stable at this point  Will advise hydration and avoiding nephrotoxic medication  Chronic kidney disease-mineral and bone disorder  Lab Results   Component Value Date    EGFR 37 01/10/2023    EGFR 38 08/03/2022    EGFR 37 07/11/2022    CREATININE 1 33 (H) 01/10/2023    CREATININE 1 30 08/03/2022    CREATININE 1 34 (H) 07/11/2022   PTH and phosphorus along with vitamin D are within acceptable range and will continue to monitor    Hypertension  Very well controlled      Everything discussed with the patient at length  I will see her back in 6 months  We will get blood and urine test before that visit        Portions of the record may have been created with voice recognition software  Occasional wrong word or "sound a like" substitutions may have occurred due to the inherent limitations of voice recognition software  Read the chart carefully and recognize, using context, where substitutions have occurred  If you have any questions, please contact the dictating provider

## 2023-01-30 NOTE — ASSESSMENT & PLAN NOTE
Lab Results   Component Value Date    EGFR 37 01/10/2023    EGFR 38 08/03/2022    EGFR 37 07/11/2022    CREATININE 1 33 (H) 01/10/2023    CREATININE 1 30 08/03/2022    CREATININE 1 34 (H) 07/11/2022   Patient renal function is quite stable at this point  Will advise hydration and avoiding nephrotoxic medication

## 2023-02-13 ENCOUNTER — TELEPHONE (OUTPATIENT)
Dept: INTERNAL MEDICINE CLINIC | Facility: CLINIC | Age: 83
End: 2023-02-13

## 2023-02-13 NOTE — TELEPHONE ENCOUNTER
Nataly Simpson MD  [facility] Internal Med Clinical Just now (2:50 PM)     No labs needed; she just did labs in February

## 2023-02-13 NOTE — TELEPHONE ENCOUNTER
Are there labs dr Souza Co would want for her for next appt? Theres active labs in there but they say its from jan 2023 from [place]? Which would be impossible, so maybe order fresh ones?

## 2023-02-15 ENCOUNTER — RA CDI HCC (OUTPATIENT)
Dept: OTHER | Facility: HOSPITAL | Age: 83
End: 2023-02-15

## 2023-02-25 DIAGNOSIS — I10 HYPERTENSION, UNSPECIFIED TYPE: Chronic | ICD-10-CM

## 2023-02-27 RX ORDER — POTASSIUM CHLORIDE 750 MG/1
TABLET, EXTENDED RELEASE ORAL
Qty: 360 TABLET | Refills: 1 | Status: SHIPPED | OUTPATIENT
Start: 2023-02-27

## 2023-03-07 ENCOUNTER — OFFICE VISIT (OUTPATIENT)
Dept: INTERNAL MEDICINE CLINIC | Facility: CLINIC | Age: 83
End: 2023-03-07

## 2023-03-07 VITALS
BODY MASS INDEX: 32.65 KG/M2 | RESPIRATION RATE: 16 BRPM | SYSTOLIC BLOOD PRESSURE: 142 MMHG | HEIGHT: 62 IN | DIASTOLIC BLOOD PRESSURE: 68 MMHG | HEART RATE: 70 BPM | OXYGEN SATURATION: 98 % | WEIGHT: 177.4 LBS

## 2023-03-07 DIAGNOSIS — I82.5Z2 LOWER LEG DVT (DEEP VENOUS THROMBOEMBOLISM), CHRONIC, LEFT (HCC): ICD-10-CM

## 2023-03-07 DIAGNOSIS — K59.1 FUNCTIONAL DIARRHEA: ICD-10-CM

## 2023-03-07 DIAGNOSIS — E03.9 HYPOTHYROIDISM, UNSPECIFIED TYPE: Chronic | ICD-10-CM

## 2023-03-07 DIAGNOSIS — E83.42 HYPOMAGNESEMIA: ICD-10-CM

## 2023-03-07 DIAGNOSIS — L24.9 IRRITANT CONTACT DERMATITIS, UNSPECIFIED TRIGGER: ICD-10-CM

## 2023-03-07 DIAGNOSIS — E78.5 HYPERLIPIDEMIA, UNSPECIFIED HYPERLIPIDEMIA TYPE: ICD-10-CM

## 2023-03-07 DIAGNOSIS — I10 HYPERTENSION, UNSPECIFIED TYPE: Primary | ICD-10-CM

## 2023-03-07 DIAGNOSIS — M06.9 RHEUMATOID ARTHRITIS OF LEFT ANKLE, UNSPECIFIED WHETHER RHEUMATOID FACTOR PRESENT (HCC): ICD-10-CM

## 2023-03-07 DIAGNOSIS — R79.9 ABNORMAL BLOOD CHEMISTRY: ICD-10-CM

## 2023-03-07 DIAGNOSIS — R79.9 ABNORMAL FINDING OF BLOOD CHEMISTRY, UNSPECIFIED: ICD-10-CM

## 2023-03-07 PROBLEM — R79.89 ELEVATED SERUM CREATININE: Status: RESOLVED | Noted: 2022-02-09 | Resolved: 2023-03-07

## 2023-03-07 PROBLEM — R73.09 ABNORMAL GLUCOSE: Status: RESOLVED | Noted: 2021-08-02 | Resolved: 2023-03-07

## 2023-03-07 PROBLEM — D75.89 MACROCYTOSIS: Status: RESOLVED | Noted: 2021-05-07 | Resolved: 2023-03-07

## 2023-03-07 PROBLEM — D64.9 ANEMIA: Status: RESOLVED | Noted: 2021-05-07 | Resolved: 2023-03-07

## 2023-03-07 PROBLEM — Z00.00 MEDICARE ANNUAL WELLNESS VISIT, SUBSEQUENT: Status: RESOLVED | Noted: 2020-06-29 | Resolved: 2023-03-07

## 2023-03-07 PROBLEM — M54.50 ACUTE BILATERAL LOW BACK PAIN WITHOUT SCIATICA: Status: RESOLVED | Noted: 2021-08-09 | Resolved: 2023-03-07

## 2023-03-07 PROBLEM — R01.1 MURMUR: Status: RESOLVED | Noted: 2017-10-12 | Resolved: 2023-03-07

## 2023-03-07 RX ORDER — LEVOTHYROXINE SODIUM 0.07 MG/1
75 TABLET ORAL DAILY
Status: SHIPPED
Start: 2023-03-07

## 2023-03-07 RX ORDER — DIAPER,BRIEF,INFANT-TODD,DISP
EACH MISCELLANEOUS 2 TIMES DAILY
Qty: 30 G | Refills: 0 | Status: SHIPPED | OUTPATIENT
Start: 2023-03-07

## 2023-03-07 RX ORDER — PSYLLIUM SEED (WITH DEXTROSE)
POWDER (GRAM) ORAL
Qty: 100 WAFER | Refills: 5 | Status: SHIPPED
Start: 2023-03-07

## 2023-03-07 NOTE — PROGRESS NOTES
Assessment/Plan:     Keith Polanco is here to establish care; she is following with rheumatology for RA and recently restarted her rituxan and methotrexate; deformities noted to b/l hands; She is ambulating with a walker; may need PT but she would like to first complete at least two TX's of rituxan  Following with nephrology for CKD; labs stable  HTN well controlled  Hydrocortisone for cream to her upper forehead  Quality Measures:     BMI Counseling: There is no height or weight on file to calculate BMI  The BMI is above normal  Nutrition recommendations include decreasing portion sizes and encouraging healthy choices of fruits and vegetables  Exercise recommendations include moderate physical activity 150 minutes/week  Rationale for BMI follow-up plan is due to patient being overweight or obese  Return in about 6 months (around 9/7/2023)  No problem-specific Assessment & Plan notes found for this encounter  Diagnoses and all orders for this visit:    Hypertension, unspecified type  -     levothyroxine (Synthroid) 75 mcg tablet; Take 1 tablet (75 mcg total) by mouth in the morning  -     CBC and differential; Future  -     Comprehensive metabolic panel; Future    Rheumatoid arthritis of left ankle, unspecified whether rheumatoid factor present (HCC)    Hyperlipidemia, unspecified hyperlipidemia type  -     levothyroxine (Synthroid) 75 mcg tablet; Take 1 tablet (75 mcg total) by mouth in the morning  -     Lipid Panel with Direct LDL reflex; Future  -     Hemoglobin A1C; Future    Lower leg DVT (deep venous thromboembolism), chronic, left (HCC)    Hypothyroidism, unspecified type  -     levothyroxine (Synthroid) 75 mcg tablet; Take 1 tablet (75 mcg total) by mouth in the morning  -     TSH, 3rd generation with Free T4 reflex; Future    Abnormal blood chemistry  -     levothyroxine (Synthroid) 75 mcg tablet;  Take 1 tablet (75 mcg total) by mouth in the morning    Hypomagnesemia  - levothyroxine (Synthroid) 75 mcg tablet; Take 1 tablet (75 mcg total) by mouth in the morning    Functional diarrhea  -     Psyllium (Metamucil) WAFR; Take once daily    Abnormal finding of blood chemistry, unspecified  -     Hemoglobin A1C; Future    Irritant contact dermatitis, unspecified trigger  -     hydrocortisone 0 5 % cream; Apply topically 2 (two) times a day    Other orders  -     methotrexate 2 5 mg tablet; Take 1 tablet (2 5 mg total) by mouth once a week Patient takes 6 tablets weekly          Subjective:      Patient ID: Tapan Dean is a 81 y o  female  Here to establish care; former [facility] pt  ALLERGIES:  Allergies   Allergen Reactions   • Lisinopril Anaphylaxis   • Codeine Other (See Comments)     Nausea/vomiting     • Other      Annotation - [id number]: tounge swelling   • Oxycodone      Annotation - [id number]: NOT TOLERATE   • Penicillins Hives   • Sulfamethoxazole-Trimethoprim    • Ciprofloxacin Rash       CURRENT MEDICATIONS:    Current Outpatient Medications:   •  ascorbic acid (VITAMIN C) 500 mg tablet, Take 500 mg by mouth daily  , Disp: , Rfl:   •  ASPIRIN 81 PO, 1 tablet ONCE DAILY (route: oral), Disp: , Rfl:   •  Calcium Citrate-Vitamin D (CITRACAL + D PO), Take by mouth 2 (two) times a day, Disp: , Rfl:   •  Cholecalciferol (VITAMIN D-3 PO), Take 2,000 Units by mouth daily  , Disp: , Rfl:   •  folic acid (FOLVITE) 1 mg tablet, 1 daily, Disp: 90 tablet, Rfl: 2  •  hydrochlorothiazide (HYDRODIURIL) 25 mg tablet, TAKE 1 TABLET BY MOUTH EVERY DAY, Disp: 90 tablet, Rfl: 2  •  hydrocortisone 0 5 % cream, Apply topically 2 (two) times a day, Disp: 30 g, Rfl: 0  •  Klor-Con M10 10 MEQ tablet, TAKE 2 TABLETS TWICE A DAY, Disp: 360 tablet, Rfl: 1  •  levothyroxine (Synthroid) 75 mcg tablet, Take 1 tablet (75 mcg total) by mouth in the morning, Disp: , Rfl:   •  magnesium oxide (MAG-OX) 400 mg, Take 1 tablet (400 mg total) by mouth 2 (two) times a day, Disp: 90 tablet, Rfl: 0  •  methotrexate 2 5 mg tablet, Take 1 tablet (2 5 mg total) by mouth once a week Patient takes 6 tablets weekly, Disp: , Rfl:   •  Multiple Vitamin (MULTIVITAMIN) tablet, Take 1 tablet by mouth daily  , Disp: , Rfl:   •  Psyllium (Metamucil) WAFR, Take once daily, Disp: 100 Wafer, Rfl: 5  •  riTUXimab (RITUXAN) 500 mg/50 mL, Infuse into a venous catheter every 6 (six) months  , Disp: , Rfl:   •  rosuvastatin (CRESTOR) 5 mg tablet, Take 1 tablet (5 mg total) by mouth daily, Disp: 90 tablet, Rfl: 2    ACTIVE PROBLEM LIST:  Patient Active Problem List   Diagnosis   • Hypertension   • Rheumatoid arthritis ([facility] [address] )   • Hypothyroidism   • Hyperuricemia   • Diastolic dysfunction   • Hyperlipidemia   • Lumbar radiculopathy   • Osteopenia   • Other chronic pain   • Vitamin D deficiency   • Adenocarcinoma of breast (HCC)   • Localized edema   • Abnormal finding on diagnostic imaging of kidney   • Fat necrosis (segmental) of breast   • Personal history of breast cancer   • History of peptic ulcer   • Intermediate stage nonexudative 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 eyes   • Lower leg DVT (deep venous thromboembolism), chronic, left (HCC)   • Bradycardia with 51-60 beats per minute   • Varicose veins of left lower extremity with pain   • Stage 3b chronic kidney disease ([facility] [address] )   • History of DVT (deep vein thrombosis)   • Chronic kidney disease-mineral and bone disorder   • Chronic pain of both knees       PAST MEDICAL HISTORY:  Past Medical History:   Diagnosis Date   • Allergic angioedema     2mar2016 resolved   • Angioedema     [id number] resolved   • Arthritis    • Breast cancer ([facility] [address] ) 2012    right    • Chronic kidney disease    • Disease of thyroid gland    • Hemorrhage of anus and rectum     02mar2016 resolved   • Hyperlipidemia    • Hypertension    • Hypocalcemia     12oct2017 resolved   • Neuritis     thoracic and lumbosacral   • Peptic ulcer    • Rheumatoid arthritis ([facility] [address] )    • Stage 3b chronic kidney disease ([facility] [address] ) 5/7/2021   • Vitamin D deficiency        PAST SURGICAL HISTORY:  Past Surgical History:   Procedure Laterality Date   • ABDOMINAL SURGERY     • BREAST BIOPSY Right 2012   • BREAST LUMPECTOMY Right 2012   • BREAST SURGERY     • CHOLECYSTECTOMY     • ELBOW SURGERY     • GALLBLADDER SURGERY  1981   • HIP SURGERY Right 09/18/2022   • ORTHOPEDIC SURGERY      for toes   • US GUIDED BREAST BIOPSY RIGHT COMPLETE Right 08/15/2022       FAMILY HISTORY:  Family History   Problem Relation Age of Onset   • Breast cancer Mother    • Kidney failure Father    • Other Father         uremia   • Lung cancer Brother    • Breast cancer Maternal Aunt    • Breast cancer Maternal Aunt    • Breast cancer Maternal Aunt    • Breast cancer Maternal Aunt    • Breast cancer Maternal Aunt    • Stomach cancer Maternal Uncle        SOCIAL HISTORY:  Social History     Socioeconomic History   • Marital status:      Spouse name: Not on file   • Number of children: Not on file   • Years of education: Not on file   • Highest education level: Not on file   Occupational History   • Not on file   Tobacco Use   • Smoking status: Never   • Smokeless tobacco: Never   Vaping Use   • Vaping Use: Never used   Substance and Sexual Activity   • Alcohol use: Not Currently   • Drug use: No   • Sexual activity: Not Currently     Partners: Male   Other Topics Concern   • Not on file   Social History Narrative    Active: caffeine use     Social Determinants of Health     Financial Resource Strain: Low Risk    • Difficulty of Paying Living Expenses: Not very hard   Food Insecurity: Not on file   Transportation Needs: No Transportation Needs   • Lack of Transportation (Medical): No   • Lack of Transportation (Non-Medical):  No   Physical Activity: Not on file   Stress: Not on file   Social Connections: Not on file   Intimate Partner Violence: Not on file   Housing Stability: Not on file       Review of Systems   Musculoskeletal: Positive for arthralgias, gait problem, joint swelling and myalgias  Skin: Positive for color change and rash  All other systems reviewed and are negative  Objective:  Vitals:    03/07/23 1146   BP: 142/68   BP Location: Left arm   Patient Position: Sitting   Cuff Size: Adult   Pulse: 70   Resp: 16   SpO2: 98%   Weight: 80 5 kg (177 lb 6 4 oz)   Height: 5' 2" (1 575 m)     Body mass index is 32 45 kg/m²  Physical Exam  Vitals and nursing note reviewed  Constitutional:       Appearance: Normal appearance  HENT:      Head: Normocephalic and atraumatic  Cardiovascular:      Rate and Rhythm: Normal rate and regular rhythm  Heart sounds: Normal heart sounds  Pulmonary:      Effort: Pulmonary effort is normal       Breath sounds: Normal breath sounds  Musculoskeletal:         General: Tenderness and deformity present  Normal range of motion  Cervical back: Normal range of motion  Right lower leg: No edema  Left lower leg: No edema  Comments: Holdrege neck deformities to b/l hands  Lymphadenopathy:      Cervical: No cervical adenopathy  Skin:     General: Skin is warm and dry  Neurological:      General: No focal deficit present  Mental Status: She is alert and oriented to person, place, and time  Mental status is at baseline  Gait: Gait abnormal    Psychiatric:         Mood and Affect: Mood normal            RESULTS:    In chart    This note was created with voice recognition software  Phonic, grammatical and spelling errors may be present within the note as a result

## 2023-03-09 RX ORDER — SODIUM CHLORIDE 9 MG/ML
20 INJECTION, SOLUTION INTRAVENOUS CONTINUOUS
Status: DISCONTINUED | OUTPATIENT
Start: 2023-03-10 | End: 2023-03-13 | Stop reason: HOSPADM

## 2023-03-09 RX ORDER — DIPHENHYDRAMINE HCL 25 MG
50 TABLET ORAL ONCE
Status: COMPLETED | OUTPATIENT
Start: 2023-03-10 | End: 2023-03-10

## 2023-03-09 RX ORDER — ACETAMINOPHEN 325 MG/1
975 TABLET ORAL ONCE
Status: COMPLETED | OUTPATIENT
Start: 2023-03-10 | End: 2023-03-10

## 2023-03-09 RX ORDER — METHYLPREDNISOLONE SODIUM SUCCINATE 125 MG/2ML
100 INJECTION, POWDER, LYOPHILIZED, FOR SOLUTION INTRAMUSCULAR; INTRAVENOUS ONCE
Status: COMPLETED | OUTPATIENT
Start: 2023-03-10 | End: 2023-03-10

## 2023-03-10 ENCOUNTER — HOSPITAL ENCOUNTER (OUTPATIENT)
Dept: INFUSION CENTER | Facility: CLINIC | Age: 83
End: 2023-03-10

## 2023-03-10 ENCOUNTER — TELEPHONE (OUTPATIENT)
Dept: HEMATOLOGY ONCOLOGY | Facility: CLINIC | Age: 83
End: 2023-03-10

## 2023-03-10 VITALS
SYSTOLIC BLOOD PRESSURE: 166 MMHG | HEART RATE: 77 BPM | OXYGEN SATURATION: 96 % | TEMPERATURE: 97.9 F | DIASTOLIC BLOOD PRESSURE: 70 MMHG

## 2023-03-10 RX ADMIN — METHYLPREDNISOLONE SODIUM SUCCINATE 100 MG: 125 INJECTION, POWDER, FOR SOLUTION INTRAMUSCULAR; INTRAVENOUS at 08:26

## 2023-03-10 RX ADMIN — DIPHENHYDRAMINE HYDROCHLORIDE 50 MG: 25 TABLET ORAL at 08:26

## 2023-03-10 RX ADMIN — RITUXIMAB 1000 MG: 10 INJECTION, SOLUTION INTRAVENOUS at 09:13

## 2023-03-10 RX ADMIN — ACETAMINOPHEN 975 MG: 325 TABLET, FILM COATED ORAL at 08:26

## 2023-03-10 RX ADMIN — SODIUM CHLORIDE 20 ML/HR: 0.9 INJECTION, SOLUTION INTRAVENOUS at 08:25

## 2023-03-10 NOTE — PROGRESS NOTES
Pt presents for Rituxan offering no complaints, denied any current or recent antibiotics or infections  Pt tolerated treatment without incident  AVS printed  Next appointment reviewed

## 2023-03-10 NOTE — TELEPHONE ENCOUNTER
CHIEF COMPLAINT:  Chief Complaint   Patient presents with   • Routine Foot Care     [facility] foot care. Last visit to Dr Osman Mayers was 9/16/19.       SUBJECTIVE:     Jorge Medina is a 82 year old male who presents to the clinic today complaining of long, thickened and painful toenails which are difficult to debride by the patient. Patient relates pain has been present for several months' duration. The patient denies any nausea, vomiting, fever, chills or calf pain.  No other significant pedal complaints are noted at this time.  The patient does relate that periodic debridements with a podiatric physician do help resolve symptoms. Patient is diabetic.    PHYSICAL EXAMINATION:  INTEGUMENT:  Nails are long, thickened, discolored, dystrophic, and friable with subungual debris 1-5 bilaterally and pain on palpation bilaterally.  Skin is somewhat cool, mildly dry, and somewhat atrophic. No current open lesions or signs of infection. Pedal hair is absent. no calluses are present. No other significant integumentary findings.  Vascular: Dorsalis Pedis 1/4 Posterior Tibial, 1/4 bilaterally, no Edema present bilaterally. Capillary Filling Time = 3 seconds bilaterally.  Neurologic: Intact to light touch and painful stimuli as well as Ely Alejandra monofilament.  Musculoskeletal: Dorsally contracted digits 2-5 and mildly laterally deviated hallux bilaterally. Pain at all nails on palpation bilaterally.    ASSESSMENT:  1. Bilateral foot pain    2. Type 2 diabetes mellitus without complication, without long-term current use of insulin (CMS/[facility])    3. Onychomycosis        PLAN:  Discussed in depth in detail with the patient conditions and treatment options.  All nails were debrided sharply and mechanically as medically necessary to a level safe and comfortable for the patient to include all necrotic nail down to the nailbed.  I did discuss proper foot care and hygiene with the patient.  Advised patient on use of  LUIS FERNANDO  Route to [facility]                        Patient appointment rescheduled due to Transfer of care      Speaking with   Patient   If you not speaking with the patient, is the person listed on patients Medical Communication Consent?  N/A   Physician patient is established with Dr Vee Peña appointment date and time 05/10/23 1PM   Appointment Location [place]   Physician patient is transferring care to   Dr Digna Du appointment date and time 05/17/23 1PM   Reason for SONALI NICHOLAS  Provider left practice emollients.  No orders of the defined types were placed in this encounter.    I did advise the patient to continue to monitor for signs of infection and return to the clinic in Return in about 3 months (around 3/5/2020).    Evert Kiran DPM

## 2023-03-23 RX ORDER — METHYLPREDNISOLONE SODIUM SUCCINATE 125 MG/2ML
100 INJECTION, POWDER, LYOPHILIZED, FOR SOLUTION INTRAMUSCULAR; INTRAVENOUS ONCE
Status: COMPLETED | OUTPATIENT
Start: 2023-03-24 | End: 2023-03-24

## 2023-03-23 RX ORDER — ACETAMINOPHEN 325 MG/1
975 TABLET ORAL ONCE
Status: COMPLETED | OUTPATIENT
Start: 2023-03-24 | End: 2023-03-24

## 2023-03-23 RX ORDER — DIPHENHYDRAMINE HCL 25 MG
50 TABLET ORAL ONCE
Status: COMPLETED | OUTPATIENT
Start: 2023-03-24 | End: 2023-03-24

## 2023-03-24 ENCOUNTER — HOSPITAL ENCOUNTER (OUTPATIENT)
Dept: INFUSION CENTER | Facility: CLINIC | Age: 83
End: 2023-03-24

## 2023-03-24 VITALS
RESPIRATION RATE: 18 BRPM | DIASTOLIC BLOOD PRESSURE: 66 MMHG | SYSTOLIC BLOOD PRESSURE: 156 MMHG | TEMPERATURE: 98.5 F | HEART RATE: 63 BPM

## 2023-03-24 RX ADMIN — DIPHENHYDRAMINE HYDROCHLORIDE 50 MG: 25 TABLET ORAL at 08:39

## 2023-03-24 RX ADMIN — METHYLPREDNISOLONE SODIUM SUCCINATE 100 MG: 125 INJECTION, POWDER, FOR SOLUTION INTRAMUSCULAR; INTRAVENOUS at 08:39

## 2023-03-24 RX ADMIN — ACETAMINOPHEN 975 MG: 325 TABLET ORAL at 08:39

## 2023-03-24 RX ADMIN — RITUXIMAB 1000 MG: 10 INJECTION, SOLUTION INTRAVENOUS at 09:50

## 2023-03-24 NOTE — PROGRESS NOTES
Pt here for rituxan infusion, offering no complaints  Left arm IV placed with positive blood return noted  Tolerated infusion without incident  PIV removed  AVS declined  Walked out in stable condition

## 2023-05-01 DIAGNOSIS — E03.9 HYPOTHYROIDISM, UNSPECIFIED TYPE: Chronic | ICD-10-CM

## 2023-05-01 DIAGNOSIS — R79.9 ABNORMAL BLOOD CHEMISTRY: ICD-10-CM

## 2023-05-01 DIAGNOSIS — I10 HYPERTENSION, UNSPECIFIED TYPE: ICD-10-CM

## 2023-05-01 DIAGNOSIS — E83.42 HYPOMAGNESEMIA: ICD-10-CM

## 2023-05-01 DIAGNOSIS — E78.5 HYPERLIPIDEMIA, UNSPECIFIED HYPERLIPIDEMIA TYPE: ICD-10-CM

## 2023-05-01 RX ORDER — LEVOTHYROXINE SODIUM 0.07 MG/1
75 TABLET ORAL DAILY
Qty: 90 TABLET | Refills: 3 | Status: SHIPPED | OUTPATIENT
Start: 2023-05-01

## 2023-05-09 ENCOUNTER — TELEPHONE (OUTPATIENT)
Dept: HEMATOLOGY ONCOLOGY | Facility: CLINIC | Age: 83
End: 2023-05-09

## 2023-05-09 NOTE — TELEPHONE ENCOUNTER
Appointment Confirmation   Who are you speaking with? Patient   If it is not the patient, are they listed on an active communication consent form? N/A   Which provider is the appointment scheduled with? Dr Matilda Espinosa   When is the appointment scheduled? Please list date and time 5/17/23   At which location is the appointment scheduled to take place? [facility]   Did caller verbalize understanding of appointment details?  Yes

## 2023-05-15 ENCOUNTER — TELEPHONE (OUTPATIENT)
Dept: HEMATOLOGY ONCOLOGY | Facility: CLINIC | Age: 83
End: 2023-05-15

## 2023-05-15 NOTE — TELEPHONE ENCOUNTER
Spoke with patient regarding her appointment being switched to Dr Dax Almanzar nurse probationers schedule, and appointment will be held over the telephone, patient verbalized understanding

## 2023-05-16 ENCOUNTER — APPOINTMENT (OUTPATIENT)
Dept: LAB | Facility: HOSPITAL | Age: 83
End: 2023-05-16

## 2023-05-16 DIAGNOSIS — M05.79 SEROPOSITIVE RHEUMATOID ARTHRITIS OF MULTIPLE SITES (HCC): ICD-10-CM

## 2023-05-16 LAB
ALBUMIN SERPL BCP-MCNC: 4.3 G/DL (ref 3.5–5)
ALP SERPL-CCNC: 64 U/L (ref 34–104)
ALT SERPL W P-5'-P-CCNC: 13 U/L (ref 7–52)
AST SERPL W P-5'-P-CCNC: 22 U/L (ref 13–39)
BASOPHILS # BLD AUTO: 0.08 THOUSANDS/ÂΜL (ref 0–0.1)
BASOPHILS NFR BLD AUTO: 2 % (ref 0–1)
BILIRUB DIRECT SERPL-MCNC: 0.11 MG/DL (ref 0–0.2)
BILIRUB SERPL-MCNC: 0.74 MG/DL (ref 0.2–1)
CREAT SERPL-MCNC: 1.28 MG/DL (ref 0.6–1.3)
CRP SERPL QL: 7.2 MG/L
EOSINOPHIL # BLD AUTO: 0.26 THOUSAND/ÂΜL (ref 0–0.61)
EOSINOPHIL NFR BLD AUTO: 5 % (ref 0–6)
ERYTHROCYTE [DISTWIDTH] IN BLOOD BY AUTOMATED COUNT: 15.4 % (ref 11.6–15.1)
ERYTHROCYTE [SEDIMENTATION RATE] IN BLOOD: 28 MM/HOUR (ref 0–29)
GFR SERPL CREATININE-BSD FRML MDRD: 39 ML/MIN/1.73SQ M
HCT VFR BLD AUTO: 34.2 % (ref 34.8–46.1)
HGB BLD-MCNC: 10.9 G/DL (ref 11.5–15.4)
IMM GRANULOCYTES # BLD AUTO: 0.01 THOUSAND/UL (ref 0–0.2)
IMM GRANULOCYTES NFR BLD AUTO: 0 % (ref 0–2)
LYMPHOCYTES # BLD AUTO: 0.98 THOUSANDS/ÂΜL (ref 0.6–4.47)
LYMPHOCYTES NFR BLD AUTO: 20 % (ref 14–44)
MCH RBC QN AUTO: 32.4 PG (ref 26.8–34.3)
MCHC RBC AUTO-ENTMCNC: 31.9 G/DL (ref 31.4–37.4)
MCV RBC AUTO: 102 FL (ref 82–98)
MONOCYTES # BLD AUTO: 0.39 THOUSAND/ÂΜL (ref 0.17–1.22)
MONOCYTES NFR BLD AUTO: 8 % (ref 4–12)
NEUTROPHILS # BLD AUTO: 3.27 THOUSANDS/ÂΜL (ref 1.85–7.62)
NEUTS SEG NFR BLD AUTO: 65 % (ref 43–75)
NRBC BLD AUTO-RTO: 0 /100 WBCS
PLATELET # BLD AUTO: 223 THOUSANDS/UL (ref 149–390)
PMV BLD AUTO: 9.9 FL (ref 8.9–12.7)
PROT SERPL-MCNC: 6.6 G/DL (ref 6.4–8.4)
RBC # BLD AUTO: 3.36 MILLION/UL (ref 3.81–5.12)
WBC # BLD AUTO: 4.99 THOUSAND/UL (ref 4.31–10.16)

## 2023-07-03 NOTE — PROGRESS NOTES
HEMATOLOGY CLINIC NOTE    Primary Care Provider: Marjorie Mccall MD  Referring Provider:    MRN: [id number]  : 1940    Assessment / Plan:     Has a longstanding history of rheumatoid arthritis currently on methotrexate and Rituxan for evaluation of microcytic anemia asymptomatic, no dizziness no palpitations no chest pain        · Discussion of decision making    I personally reviewed the following lab results, the image studies, pathology, other specialty/physicians consult notes and recommendations, and outside medical records from [facility]/other institutions. I had a lengthy discussion with the patient and shared the work-up findings. I spent 40 minutes reviewing the records (labs, clinician notes, outside records, medical history, ordering medicine/tests/procedures, interpreting the imaging/labs previously done) and coordination of care as well as direct time with the patient today, of which greater than 50% of the time was spent in counseling and coordination of care with the patient/family. Follow Up: Follow-up in 6 months this point the anemia seem to be related to rheumatoid arthritis medication methotrexate    All questions were answered to the patient's satisfaction during this encounter. The patient knows the contact information for our office and knows to reach out for any relevant concerns related to this encounter. They are to call for any temperature 100.4 or higher, new symptoms including but not restricted to shaking chills, decreased appetite, nausea, vomiting, diarrhea, increased fatigue, shortness of breath or chest pain, confusion, and not feeling the strength to come to the clinic. For all other listed problems and medical diagnosis in their chart - they are managed by PCP and/or other specialists, which the patient acknowledges. Thank you very much for your consultation and making us a part of this patient's care. We are continuing to follow closely with you.  Please do not hesitate to reach out to me with any additional questions or concerns. Reason for visit:     No chief complaint on file. Annual follow-up for anemia    History of Hematology Illness:     Bridgett Shultz is a 81 y.o. female who came in for follow up/consultation ascitic anemia    Interval History:   07/05/23: Has a 45-year history of rheumatoid arthritis currently on methotrexate and Rituxan      Problem list:       Patient Active Problem List   Diagnosis   • Hypertension   • Rheumatoid arthritis ([address])   • Hypothyroidism   • Hyperuricemia   • Diastolic dysfunction   • Hyperlipidemia   • Lumbar radiculopathy   • Osteopenia   • Other chronic pain   • Vitamin D deficiency   • Adenocarcinoma of breast (HCC)   • Localized edema   • Abnormal finding on diagnostic imaging of kidney   • Fat necrosis (segmental) of breast   • Personal history of breast cancer   • History of peptic ulcer   • Intermediate stage nonexudative 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 eyes   • Lower leg DVT (deep venous thromboembolism), chronic, left (HCC)   • Bradycardia with 51-60 beats per minute   • Varicose veins of left lower extremity with pain   • Stage 3b chronic kidney disease (HCC)   • History of DVT (deep vein thrombosis)   • Chronic kidney disease-mineral and bone disorder   • Chronic pain of both knees       REVIEW OF SYMPTOMS:   Review of Systems   Musculoskeletal: Positive for arthralgias, gait problem and joint swelling (deformities from rheumatoid arthritis ). PHYSICAL EXAMINATION:     Vital Signs:   /70   Pulse 67   Temp 98.5 °F (36.9 °C) (Temporal)   Resp 18   Ht 5' 2" (1.575 m)   Wt 78.9 kg (174 lb)   LMP  (LMP Unknown)   SpO2 95%   BMI 31.83 kg/m²   Body surface area is 1.8 meters squared.    Ht Readings from Last 8 Encounters:   07/05/23 5' 2" (1.575 m)   03/07/23 5' 2" (1.575 m)   01/30/23 5' 2" (1.575 m)   11/21/22 5' 3" (1.6 m)   08/10/22 5' 3" (1.6 m)   08/04/22 5' 3" (1.6 m)   07/27/22 5' 3" (1.6 m)   07/19/22 5' 5" (1.651 m)       Wt Readings from Last 8 Encounters:   07/05/23 78.9 kg (174 lb)   03/07/23 80.5 kg (177 lb 6.4 oz)   01/30/23 80.3 kg (177 lb)   11/21/22 80.3 kg (177 lb)   08/10/22 86.2 kg (190 lb)   08/04/22 86.2 kg (190 lb)   07/27/22 86.4 kg (190 lb 6.4 oz)   07/19/22 86.2 kg (190 lb)          Physical Exam    Patient has severe deformities of upper and lower extremities from rheumatoid arthritis patient ambulates by walker he is able to drive and take care of herself  Reviewed historical information. PAST MEDICAL HISTORY:    Past Medical History:   Diagnosis Date   • Allergic angioedema     [id number] resolved   • Angioedema     [id number] resolved   • Arthritis    • Breast cancer ([address]) 2012    right    • Chronic kidney disease    • Disease of thyroid gland    • Hemorrhage of anus and rectum     [id number] resolved   • Hyperlipidemia    • Hypertension    • Hypocalcemia     12oct2017 resolved   • Neuritis     thoracic and lumbosacral   • Peptic ulcer    • Rheumatoid arthritis ([address])    • Stage 3b chronic kidney disease ([address]) 5/7/2021   • Vitamin D deficiency        PAST SURGICAL HISTORY:    Past Surgical History:   Procedure Laterality Date   • ABDOMINAL SURGERY     • BREAST BIOPSY Right 2012   • BREAST LUMPECTOMY Right 2012   • BREAST SURGERY     • CHOLECYSTECTOMY     • ELBOW SURGERY     • GALLBLADDER SURGERY  1981   • HIP SURGERY Right 09/18/2022   • ORTHOPEDIC SURGERY      for toes   • US GUIDED BREAST BIOPSY RIGHT COMPLETE Right 08/15/2022         CURRENT MEDICATIONS:     Current Outpatient Medications:   •  ascorbic acid (VITAMIN C) 500 mg tablet, Take 500 mg by mouth daily. , Disp: , Rfl:   •  ASPIRIN 81 PO, 1 tablet ONCE DAILY (route: oral), Disp: , Rfl:   •  Calcium Citrate-Vitamin D (CITRACAL + D PO), Take by mouth 2 (two) times a day, Disp: , Rfl:   •  Cholecalciferol (VITAMIN D-3 PO), Take 2,000 Units by mouth daily. , Disp: , Rfl:   •  folic acid (FOLVITE) 1 mg tablet, 1 daily, Disp: 90 tablet, Rfl: 2  •  hydrochlorothiazide (HYDRODIURIL) 25 mg tablet, TAKE 1 TABLET BY MOUTH EVERY DAY, Disp: 90 tablet, Rfl: 2  •  Klor-Con M10 10 MEQ tablet, TAKE 2 TABLETS TWICE A DAY, Disp: 360 tablet, Rfl: 1  •  levothyroxine (Synthroid) 75 mcg tablet, Take 1 tablet (75 mcg total) by mouth in the morning, Disp: 90 tablet, Rfl: 3  •  magnesium oxide (MAG-OX) 400 mg, Take 1 tablet (400 mg total) by mouth 2 (two) times a day, Disp: 90 tablet, Rfl: 0  •  methotrexate 2.5 mg tablet, Take 1 tablet (2.5 mg total) by mouth once a week Patient takes 6 tablets weekly, Disp: , Rfl:   •  Multiple Vitamin (MULTIVITAMIN) tablet, Take 1 tablet by mouth daily. , Disp: , Rfl:   •  Psyllium (Metamucil) WAFR, Take once daily, Disp: 100 Wafer, Rfl: 5  •  riTUXimab (RITUXAN) 500 mg/50 mL, Infuse into a venous catheter every 6 (six) months  , Disp: , Rfl:   •  rosuvastatin (CRESTOR) 5 mg tablet, Take 1 tablet (5 mg total) by mouth daily, Disp: 90 tablet, Rfl: 2  •  hydrocortisone 0.5 % cream, Apply topically 2 (two) times a day (Patient not taking: Reported on 7/5/2023), Disp: 30 g, Rfl: 0    Tobacco Use   • Sm  Social History     Tobacco Use   • Smoking status: Never   • Smokeless tobacco: Never   Vaping Use   • Vaping Use: Never used   Substance Use Topics   • Alcohol use: Not Currently   • Drug use: No   oking status: Never   • Smokeless tobacco: Never   Vaping Use   • Vaping Use: Never used   Substance Use Topics   • Alcohol use: Not Currently   • Drug use: No      Problem Relation Age of Onset   • Breast cancer Mother    • Kidney failure Father    • Other Father         uremia   • Lung cancer Brother    • Breast cancer Maternal Aunt    • Breast cancer Maternal Aunt    • Breast cancer Maternal Aunt    • Breast cancer Maternal Aunt    • Breast cancer Maternal Aunt    • Stomach cancer Maternal Uncle        ALLERGIES:    Allergies   Allergen Reactions   • Lisinopril Anaphylaxis   • Codeine Other (See Comments) Nausea/vomiting     • Other      Annotation - [id number]: tounge swelling   • Oxycodone      Annotation - [id number]: NOT TOLERATE   • Penicillins Hives   • Sulfamethoxazole-Trimethoprim    • Ciprofloxacin Rash         LAB:    Lab Results   Component Value Date    WBC 4.99 05/16/2023    HGB 10.9 (L) 05/16/2023    HCT 34.2 (L) 05/16/2023     (H) 05/16/2023     05/16/2023       Lab Results   Component Value Date    SODIUM 138 01/10/2023    K 3.7 01/10/2023     01/10/2023    CO2 28 01/10/2023    AGAP 6 01/10/2023    BUN 24 01/10/2023    CREATININE 1.28 05/16/2023    GLUC 97 10/24/2019    GLUF 113 (H) 01/10/2023    CALCIUM 9.5 01/10/2023    AST 22 05/16/2023    ALT 13 05/16/2023    ALKPHOS 64 05/16/2023    PROT 6.1 (L) 09/25/2015    TP 6.6 05/16/2023    BILITOT 0.31 09/25/2015    TBILI 0.74 05/16/2023    EGFR 39 05/16/2023       IMAGING:  XR knee 3 vw right non injury  Narrative: RIGHT KNEE    INDICATION:   M25.561: Pain in right knee  M25.562: Pain in left knee  G89.29: Other chronic pain. COMPARISON:  May 12, 2017. VIEWS:  XR KNEE 3 VW RIGHT NON INJURY     FINDINGS:    There is no acute fracture or dislocation. There is no joint effusion. Severe tricompartmental osteoarthritis as evidenced by joint space narrowing, osteophyte formation and subchondral sclerosis. Stable small sclerotic lesion in the distal femoral metadiaphysis, likely an enchondroma. Atherosclerotic calcifications. Impression: No acute osseous abnormality. Severe tricompartmental osteoarthritis. Workstation performed: [id number]  XR knee 3 vw left non injury  Narrative: LEFT KNEE    INDICATION:   M25.561: Pain in right knee  M25.562: Pain in left knee  G89.29: Other chronic pain. COMPARISON:  None    VIEWS:  XR KNEE 3 VW LEFT NON INJURY     FINDINGS:    There is no acute fracture or dislocation. There is no joint effusion.     Moderate to severe tricompartmental osteoarthritis as evidenced by joint space narrowing, osteophyte formation and subchondral sclerosis. No lytic or blastic osseous lesion. Atherosclerotic calcifications. Impression: No acute osseous abnormality. Moderate to severe tricompartmental osteoarthritis. Workstation performed: [id number]  Assessment and plan:  To observe with no treatment at this point if the hemoglobin goes under 10 she would require a Procrit injections okay consideration should be given to his previous history of DVT Procrit may increase the risk of thrombosis this moment I ordered the bilateral venous Doppler to make sure there are no clots patient also requested bilateral mammogram

## 2023-07-05 ENCOUNTER — OFFICE VISIT (OUTPATIENT)
Dept: HEMATOLOGY ONCOLOGY | Facility: CLINIC | Age: 83
End: 2023-07-05
Payer: MEDICARE

## 2023-07-05 VITALS
DIASTOLIC BLOOD PRESSURE: 70 MMHG | TEMPERATURE: 98.5 F | SYSTOLIC BLOOD PRESSURE: 148 MMHG | HEART RATE: 67 BPM | WEIGHT: 174 LBS | OXYGEN SATURATION: 95 % | BODY MASS INDEX: 32.02 KG/M2 | RESPIRATION RATE: 18 BRPM | HEIGHT: 62 IN

## 2023-07-05 DIAGNOSIS — D75.89 MACROCYTOSIS: Primary | ICD-10-CM

## 2023-07-05 DIAGNOSIS — Z12.39 SCREENING BREAST EXAMINATION: ICD-10-CM

## 2023-07-05 DIAGNOSIS — I82.593 CHRONIC DEEP VEIN THROMBOSIS (DVT) OF OTHER VEIN OF BOTH LOWER EXTREMITIES (HCC): ICD-10-CM

## 2023-07-05 DIAGNOSIS — Z12.31 ENCOUNTER FOR SCREENING MAMMOGRAM FOR MALIGNANT NEOPLASM OF BREAST: ICD-10-CM

## 2023-07-05 PROCEDURE — 99214 OFFICE O/P EST MOD 30 MIN: CPT | Performed by: INTERNAL MEDICINE

## 2023-07-06 ENCOUNTER — HOSPITAL ENCOUNTER (OUTPATIENT)
Dept: VASCULAR ULTRASOUND | Facility: HOSPITAL | Age: 83
Discharge: HOME | End: 2023-07-06
Attending: INTERNAL MEDICINE
Payer: MEDICARE

## 2023-07-06 DIAGNOSIS — I82.593 CHRONIC DEEP VEIN THROMBOSIS (DVT) OF OTHER VEIN OF BOTH LOWER EXTREMITIES (HCC): ICD-10-CM

## 2023-07-06 PROCEDURE — 93970 EXTREMITY STUDY: CPT | Performed by: INTERNAL MEDICINE

## 2023-07-06 PROCEDURE — 93970 EXTREMITY STUDY: CPT

## 2023-07-12 ENCOUNTER — TELEPHONE (OUTPATIENT)
Dept: NEPHROLOGY | Facility: CLINIC | Age: 83
End: 2023-07-12

## 2023-07-12 NOTE — TELEPHONE ENCOUNTER
Called spoke with patient advised patient to get labs done prior to 07/28/23 fu appt with . patient understood and is okay with it.
16

## 2023-07-13 ENCOUNTER — OFFICE VISIT (OUTPATIENT)
Dept: INTERNAL MEDICINE CLINIC | Facility: CLINIC | Age: 83
End: 2023-07-13
Payer: MEDICARE

## 2023-07-13 VITALS
HEIGHT: 62 IN | BODY MASS INDEX: 31.47 KG/M2 | OXYGEN SATURATION: 97 % | WEIGHT: 171 LBS | DIASTOLIC BLOOD PRESSURE: 86 MMHG | HEART RATE: 63 BPM | SYSTOLIC BLOOD PRESSURE: 144 MMHG

## 2023-07-13 DIAGNOSIS — H10.33 ACUTE CONJUNCTIVITIS OF BOTH EYES, UNSPECIFIED ACUTE CONJUNCTIVITIS TYPE: Primary | ICD-10-CM

## 2023-07-13 PROCEDURE — 99213 OFFICE O/P EST LOW 20 MIN: CPT | Performed by: PHYSICIAN ASSISTANT

## 2023-07-13 RX ORDER — TOBRAMYCIN 3 MG/ML
1 SOLUTION/ DROPS OPHTHALMIC
Qty: 5 ML | Refills: 0 | Status: SHIPPED | OUTPATIENT
Start: 2023-07-13

## 2023-07-13 NOTE — PATIENT INSTRUCTIONS
Continue use of warm compresses to eyes for comfort. Start antibiotic eyedrops, 1 drop each eye every 4-6 hours while awake. Use for up to 5 to 7 days. Keep me informed if not steadily improving.
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
## 2023-07-18 ENCOUNTER — APPOINTMENT (OUTPATIENT)
Dept: LAB | Facility: HOSPITAL | Age: 83
End: 2023-07-18
Payer: MEDICARE

## 2023-07-18 DIAGNOSIS — M89.9 CHRONIC KIDNEY DISEASE-MINERAL AND BONE DISORDER: ICD-10-CM

## 2023-07-18 DIAGNOSIS — E83.9 CHRONIC KIDNEY DISEASE-MINERAL AND BONE DISORDER: ICD-10-CM

## 2023-07-18 DIAGNOSIS — N18.9 CHRONIC KIDNEY DISEASE-MINERAL AND BONE DISORDER: ICD-10-CM

## 2023-07-18 DIAGNOSIS — N18.32 STAGE 3B CHRONIC KIDNEY DISEASE (HCC): ICD-10-CM

## 2023-07-18 LAB
25(OH)D3 SERPL-MCNC: 69.2 NG/ML (ref 30–100)
ANION GAP SERPL CALCULATED.3IONS-SCNC: 6 MMOL/L
BASOPHILS # BLD AUTO: 0.06 THOUSANDS/ÂΜL (ref 0–0.1)
BASOPHILS NFR BLD AUTO: 1 % (ref 0–1)
BILIRUB UR QL STRIP: NEGATIVE
BUN SERPL-MCNC: 24 MG/DL (ref 5–25)
CALCIUM SERPL-MCNC: 9.3 MG/DL (ref 8.4–10.2)
CHLORIDE SERPL-SCNC: 103 MMOL/L (ref 96–108)
CLARITY UR: CLEAR
CO2 SERPL-SCNC: 29 MMOL/L (ref 21–32)
COLOR UR: NORMAL
CREAT SERPL-MCNC: 1.32 MG/DL (ref 0.6–1.3)
CREAT UR-MCNC: 43.9 MG/DL
EOSINOPHIL # BLD AUTO: 0.28 THOUSAND/ÂΜL (ref 0–0.61)
EOSINOPHIL NFR BLD AUTO: 5 % (ref 0–6)
ERYTHROCYTE [DISTWIDTH] IN BLOOD BY AUTOMATED COUNT: 15.8 % (ref 11.6–15.1)
GFR SERPL CREATININE-BSD FRML MDRD: 37 ML/MIN/1.73SQ M
GLUCOSE P FAST SERPL-MCNC: 98 MG/DL (ref 65–99)
GLUCOSE UR STRIP-MCNC: NEGATIVE MG/DL
HCT VFR BLD AUTO: 30.5 % (ref 34.8–46.1)
HGB BLD-MCNC: 9.7 G/DL (ref 11.5–15.4)
HGB UR QL STRIP.AUTO: NEGATIVE
IMM GRANULOCYTES # BLD AUTO: 0.03 THOUSAND/UL (ref 0–0.2)
IMM GRANULOCYTES NFR BLD AUTO: 1 % (ref 0–2)
KETONES UR STRIP-MCNC: NEGATIVE MG/DL
LEUKOCYTE ESTERASE UR QL STRIP: NEGATIVE
LYMPHOCYTES # BLD AUTO: 0.94 THOUSANDS/ÂΜL (ref 0.6–4.47)
LYMPHOCYTES NFR BLD AUTO: 17 % (ref 14–44)
MCH RBC QN AUTO: 32.3 PG (ref 26.8–34.3)
MCHC RBC AUTO-ENTMCNC: 31.8 G/DL (ref 31.4–37.4)
MCV RBC AUTO: 102 FL (ref 82–98)
MONOCYTES # BLD AUTO: 0.49 THOUSAND/ÂΜL (ref 0.17–1.22)
MONOCYTES NFR BLD AUTO: 9 % (ref 4–12)
NEUTROPHILS # BLD AUTO: 3.68 THOUSANDS/ÂΜL (ref 1.85–7.62)
NEUTS SEG NFR BLD AUTO: 67 % (ref 43–75)
NITRITE UR QL STRIP: NEGATIVE
NRBC BLD AUTO-RTO: 0 /100 WBCS
PH UR STRIP.AUTO: 6.5 [PH]
PHOSPHATE SERPL-MCNC: 3.7 MG/DL (ref 2.3–4.1)
PLATELET # BLD AUTO: 215 THOUSANDS/UL (ref 149–390)
PMV BLD AUTO: 10.2 FL (ref 8.9–12.7)
POTASSIUM SERPL-SCNC: 4.2 MMOL/L (ref 3.5–5.3)
PROT UR STRIP-MCNC: NEGATIVE MG/DL
PROT UR-MCNC: 5 MG/DL
PROT/CREAT UR: 0.11 MG/G{CREAT} (ref 0–0.1)
PTH-INTACT SERPL-MCNC: 44.4 PG/ML (ref 12–88)
RBC # BLD AUTO: 3 MILLION/UL (ref 3.81–5.12)
SODIUM SERPL-SCNC: 138 MMOL/L (ref 135–147)
SP GR UR STRIP.AUTO: 1.01 (ref 1–1.03)
UROBILINOGEN UR STRIP-ACNC: <2 MG/DL
WBC # BLD AUTO: 5.48 THOUSAND/UL (ref 4.31–10.16)

## 2023-07-18 PROCEDURE — 84156 ASSAY OF PROTEIN URINE: CPT

## 2023-07-18 PROCEDURE — 82570 ASSAY OF URINE CREATININE: CPT

## 2023-07-18 PROCEDURE — 83970 ASSAY OF PARATHORMONE: CPT

## 2023-07-18 PROCEDURE — 81003 URINALYSIS AUTO W/O SCOPE: CPT

## 2023-07-18 PROCEDURE — 80048 BASIC METABOLIC PNL TOTAL CA: CPT

## 2023-07-18 PROCEDURE — 85025 COMPLETE CBC W/AUTO DIFF WBC: CPT

## 2023-07-18 PROCEDURE — 36415 COLL VENOUS BLD VENIPUNCTURE: CPT

## 2023-07-18 PROCEDURE — 84100 ASSAY OF PHOSPHORUS: CPT

## 2023-07-18 PROCEDURE — 82306 VITAMIN D 25 HYDROXY: CPT

## 2023-07-27 ENCOUNTER — TELEPHONE (OUTPATIENT)
Dept: NEPHROLOGY | Facility: CLINIC | Age: 83
End: 2023-07-27

## 2023-07-28 ENCOUNTER — OFFICE VISIT (OUTPATIENT)
Dept: NEPHROLOGY | Facility: CLINIC | Age: 83
End: 2023-07-28

## 2023-07-28 VITALS
WEIGHT: 171 LBS | BODY MASS INDEX: 31.47 KG/M2 | HEIGHT: 62 IN | OXYGEN SATURATION: 99 % | TEMPERATURE: 97.5 F | HEART RATE: 64 BPM | RESPIRATION RATE: 16 BRPM | SYSTOLIC BLOOD PRESSURE: 140 MMHG | DIASTOLIC BLOOD PRESSURE: 80 MMHG

## 2023-07-28 DIAGNOSIS — M54.16 LUMBAR RADICULOPATHY: ICD-10-CM

## 2023-07-28 DIAGNOSIS — H10.33 ACUTE CONJUNCTIVITIS OF BOTH EYES, UNSPECIFIED ACUTE CONJUNCTIVITIS TYPE: ICD-10-CM

## 2023-07-28 DIAGNOSIS — E83.9 CHRONIC KIDNEY DISEASE-MINERAL AND BONE DISORDER: ICD-10-CM

## 2023-07-28 DIAGNOSIS — I10 HYPERTENSION, UNSPECIFIED TYPE: Chronic | ICD-10-CM

## 2023-07-28 DIAGNOSIS — M89.9 CHRONIC KIDNEY DISEASE-MINERAL AND BONE DISORDER: ICD-10-CM

## 2023-07-28 DIAGNOSIS — N18.9 CHRONIC KIDNEY DISEASE-MINERAL AND BONE DISORDER: ICD-10-CM

## 2023-07-28 DIAGNOSIS — N18.32 STAGE 3B CHRONIC KIDNEY DISEASE (HCC): Primary | ICD-10-CM

## 2023-07-28 RX ORDER — TOBRAMYCIN 3 MG/ML
1 SOLUTION/ DROPS OPHTHALMIC
Qty: 5 ML | Refills: 0 | Status: SHIPPED | OUTPATIENT
Start: 2023-07-28

## 2023-07-28 NOTE — TELEPHONE ENCOUNTER
Tobramycin 0.3 % SOLN-administer 1 drop to both eyes every 4 hours while awake    [facility]    Patient said that she will be out of these drops for the weekend and wanted to know if you can refill this script. She said that her eyes were better but still red around the outside and itchy.

## 2023-07-28 NOTE — ASSESSMENT & PLAN NOTE
Lab Results   Component Value Date    EGFR 37 07/18/2023    EGFR 39 05/16/2023    EGFR 37 01/10/2023    CREATININE 1.32 (H) 07/18/2023    CREATININE 1.28 05/16/2023    CREATININE 1.33 (H) 01/10/2023   PTH and phosphorus along with vitamin D are within acceptable range and will continue to monitor

## 2023-07-28 NOTE — PROGRESS NOTES
NEPHROLOGY OFFICE FOLLOW UP  Bandar Toth 81 y.o. female MRN: [id number]    Encounter: [id number] 7/28/2023    REASON FOR VISIT: Bandar Toth is a 81 y.o. female who is here on 7/28/2023 for Chronic Kidney Disease and Follow-up  . HPI:    Chava Napoles came interval follow-up of CKD. 20-year-old woman who is doing well overall. Does have rheumatoid arthritis and walk with a walker and being monitored by rheumatologist    No other acute complaint    Denies taking an nosteroid painkiller    No chest pain no palpitation or shortness of breath    No nausea no vomiting      REVIEW OF SYSTEMS:    Review of Systems   Constitutional: Negative for activity change and fatigue. HENT: Negative for congestion and ear discharge. Eyes: Negative for photophobia and pain. Respiratory: Negative for apnea and choking. Cardiovascular: Negative for chest pain and palpitations. Gastrointestinal: Negative for abdominal distention and blood in stool. Endocrine: Negative for heat intolerance and polyphagia. Genitourinary: Negative for flank pain and urgency. Musculoskeletal: Negative for neck pain and neck stiffness. Skin: Negative for color change and wound. Allergic/Immunologic: Negative for food allergies and immunocompromised state. Neurological: Negative for seizures and facial asymmetry. Hematological: Negative for adenopathy. Does not bruise/bleed easily. Psychiatric/Behavioral: Negative for self-injury and suicidal ideas.          PAST MEDICAL HISTORY:  Past Medical History:   Diagnosis Date   • Allergic angioedema     [id number] resolved   • Angioedema     [id number] resolved   • Arthritis    • Breast cancer [facility]) 2012    right    • Chronic kidney disease    • Disease of thyroid gland    • Hemorrhage of anus and rectum     [id number] resolved   • Hyperlipidemia    • Hypertension    • Hypocalcemia     12oct2017 resolved   • Neuritis     thoracic and lumbosacral   • Peptic ulcer    • Rheumatoid arthritis ([address])    • Stage 3b chronic kidney disease ([address] [facility]) 5/7/2021   • Vitamin D deficiency        PAST SURGICAL HISTORY:  Past Surgical History:   Procedure Laterality Date   • ABDOMINAL SURGERY     • BREAST BIOPSY Right 2012   • BREAST LUMPECTOMY Right 2012   • BREAST SURGERY     • CHOLECYSTECTOMY     • ELBOW SURGERY     • GALLBLADDER SURGERY  1981   • HIP SURGERY Right 09/18/2022   • ORTHOPEDIC SURGERY      for toes   • US GUIDED BREAST BIOPSY RIGHT COMPLETE Right 08/15/2022       SOCIAL HISTORY:  Social History     Substance and Sexual Activity   Alcohol Use Not Currently     Social History     Substance and Sexual Activity   Drug Use No     Social History     Tobacco Use   Smoking Status Never   Smokeless Tobacco Never       FAMILY HISTORY:  Family History   Problem Relation Age of Onset   • Breast cancer Mother    • Kidney failure Father    • Other Father         uremia   • Lung cancer Brother    • Breast cancer Maternal Aunt    • Breast cancer Maternal Aunt    • Breast cancer Maternal Aunt    • Breast cancer Maternal Aunt    • Breast cancer Maternal Aunt    • Stomach cancer Maternal Uncle        MEDICATIONS:    Current Outpatient Medications:   •  ascorbic acid (VITAMIN C) 500 mg tablet, Take 500 mg by mouth daily. , Disp: , Rfl:   •  ASPIRIN 81 PO, 1 tablet ONCE DAILY (route: oral), Disp: , Rfl:   •  Calcium Citrate-Vitamin D (CITRACAL + D PO), Take by mouth 2 (two) times a day, Disp: , Rfl:   •  Cholecalciferol (VITAMIN D-3 PO), Take 2,000 Units by mouth daily. , Disp: , Rfl:   •  folic acid (FOLVITE) 1 mg tablet, 1 daily, Disp: 90 tablet, Rfl: 2  •  hydrochlorothiazide (HYDRODIURIL) 25 mg tablet, TAKE 1 TABLET BY MOUTH EVERY DAY, Disp: 90 tablet, Rfl: 2  •  Klor-Con M10 10 MEQ tablet, TAKE 2 TABLETS TWICE A DAY, Disp: 360 tablet, Rfl: 1  •  levothyroxine (Synthroid) 75 mcg tablet, Take 1 tablet (75 mcg total) by mouth in the morning, Disp: 90 tablet, Rfl: 3  •  magnesium oxide (MAG-OX) 400 mg, Take 1 tablet (400 mg total) by mouth 2 (two) times a day, Disp: 90 tablet, Rfl: 0  •  methotrexate 2.5 mg tablet, Take 1 tablet (2.5 mg total) by mouth once a week Patient takes 6 tablets weekly, Disp: , Rfl:   •  Multiple Vitamin (MULTIVITAMIN) tablet, Take 1 tablet by mouth daily. , Disp: , Rfl:   •  Psyllium (Metamucil) WAFR, Take once daily, Disp: 100 Wafer, Rfl: 5  •  riTUXimab (RITUXAN) 500 mg/50 mL, Infuse into a venous catheter every 6 (six) months  , Disp: , Rfl:   •  rosuvastatin (CRESTOR) 5 mg tablet, Take 1 tablet (5 mg total) by mouth daily, Disp: 90 tablet, Rfl: 2  •  tobramycin (TOBREX) 0.3 % SOLN, Administer 1 drop to both eyes every 4 (four) hours while awake, Disp: 5 mL, Rfl: 0  •  hydrocortisone 0.5 % cream, Apply topically 2 (two) times a day (Patient not taking: Reported on 7/5/2023), Disp: 30 g, Rfl: 0    PHYSICAL EXAM:  Vitals:    07/28/23 1059   BP: 140/80   BP Location: Right arm   Patient Position: Sitting   Pulse: 64   Resp: 16   Temp: 97.5 °F (36.4 °C)   TempSrc: Temporal   SpO2: 99%   Weight: 77.6 kg (171 lb)   Height: 5' 2" (1.575 m)     Body mass index is 31.28 kg/m². Physical Exam  Constitutional:       General: She is not in acute distress. Appearance: She is well-developed. HENT:      Head: Normocephalic. Eyes:      General: No scleral icterus. Conjunctiva/sclera: Conjunctivae normal.   Neck:      Vascular: No JVD. Cardiovascular:      Rate and Rhythm: Normal rate. Heart sounds: Normal heart sounds. Pulmonary:      Effort: Pulmonary effort is normal.      Breath sounds: No wheezing. Abdominal:      Palpations: Abdomen is soft. Tenderness: There is no abdominal tenderness. Musculoskeletal:         General: Normal range of motion. Cervical back: Neck supple. Skin:     General: Skin is warm. Findings: No rash. Neurological:      Mental Status: She is alert and oriented to person, place, and time.    Psychiatric:         Behavior: Behavior normal.         LAB RESULTS:  Results for orders placed or performed in visit on 34/15/99   Basic metabolic panel   Result Value Ref Range    Sodium 138 135 - 147 mmol/L    Potassium 4.2 3.5 - 5.3 mmol/L    Chloride 103 96 - 108 mmol/L    CO2 29 21 - 32 mmol/L    ANION GAP 6 mmol/L    BUN 24 5 - 25 mg/dL    Creatinine 1.32 (H) 0.60 - 1.30 mg/dL    Glucose, Fasting 98 65 - 99 mg/dL    Calcium 9.3 8.4 - 10.2 mg/dL    eGFR 37 ml/min/1.73sq m   CBC and differential   Result Value Ref Range    WBC 5.48 4.31 - 10.16 Thousand/uL    RBC 3.00 (L) 3.81 - 5.12 Million/uL    Hemoglobin 9.7 (L) 11.5 - 15.4 g/dL    Hematocrit 30.5 (L) 34.8 - 46.1 %     (H) 82 - 98 fL    MCH 32.3 26.8 - 34.3 pg    MCHC 31.8 31.4 - 37.4 g/dL    RDW 15.8 (H) 11.6 - 15.1 %    MPV 10.2 8.9 - 12.7 fL    Platelets 104 003 - 265 Thousands/uL    nRBC 0 /100 WBCs    Neutrophils Relative 67 43 - 75 %    Immat GRANS % 1 0 - 2 %    Lymphocytes Relative 17 14 - 44 %    Monocytes Relative 9 4 - 12 %    Eosinophils Relative 5 0 - 6 %    Basophils Relative 1 0 - 1 %    Neutrophils Absolute 3.68 1.85 - 7.62 Thousands/µL    Immature Grans Absolute 0.03 0.00 - 0.20 Thousand/uL    Lymphocytes Absolute 0.94 0.60 - 4.47 Thousands/µL    Monocytes Absolute 0.49 0.17 - 1.22 Thousand/µL    Eosinophils Absolute 0.28 0.00 - 0.61 Thousand/µL    Basophils Absolute 0.06 0.00 - 0.10 Thousands/µL   Phosphorus   Result Value Ref Range    Phosphorus 3.7 2.3 - 4.1 mg/dL   Protein / creatinine ratio, urine   Result Value Ref Range    Creatinine, Ur 43.9 mg/dL    Protein Urine Random 5 mg/dL    Prot/Creat Ratio, Ur 0.11 (H) 0.00 - 0.10   PTH, intact   Result Value Ref Range    PTH 44.4 12.0 - 88.0 pg/mL   UA (URINE) with reflex to Scope   Result Value Ref Range    Color, UA Light Yellow     Clarity, UA Clear     Specific Gravity, UA 1.009 1.003 - 1.030    pH, UA 6.5 4.5, 5.0, 5.5, 6.0, 6.5, 7.0, 7.5, 8.0    Leukocytes, UA Negative Negative    Nitrite, UA Negative Negative    Protein, UA Negative Negative mg/dl    Glucose, UA Negative Negative mg/dl    Ketones, UA Negative Negative mg/dl    Urobilinogen, UA <2.0 <2.0 mg/dl mg/dl    Bilirubin, UA Negative Negative    Occult Blood, UA Negative Negative   Vitamin D 25 hydroxy   Result Value Ref Range    Vit D, 25-Hydroxy 69.2 30.0 - 100.0 ng/mL       ASSESSMENT and PLAN:      Stage 3b chronic kidney disease (HCC)  Lab Results   Component Value Date    EGFR 37 07/18/2023    EGFR 39 05/16/2023    EGFR 37 01/10/2023    CREATININE 1.32 (H) 07/18/2023    CREATININE 1.28 05/16/2023    CREATININE 1.33 (H) 01/10/2023   Renal function stable overall. Advise hydration and avoiding nephrotoxic medication    Chronic kidney disease-mineral and bone disorder  Lab Results   Component Value Date    EGFR 37 07/18/2023    EGFR 39 05/16/2023    EGFR 37 01/10/2023    CREATININE 1.32 (H) 07/18/2023    CREATININE 1.28 05/16/2023    CREATININE 1.33 (H) 01/10/2023   PTH and phosphorus along with vitamin D are within acceptable range and will continue to monitor    Hypertension  Very well controlled        Everything discussed with the patient at length. I will see her back in 6 months and we will get blood and urine test before that visit      Portions of the record may have been created with voice recognition software. Occasional wrong word or "sound a like" substitutions may have occurred due to the inherent limitations of voice recognition software. Read the chart carefully and recognize, using context, where substitutions have occurred. If you have any questions, please contact the dictating provider.

## 2023-07-28 NOTE — ASSESSMENT & PLAN NOTE
Lab Results   Component Value Date    EGFR 37 07/18/2023    EGFR 39 05/16/2023    EGFR 37 01/10/2023    CREATININE 1.32 (H) 07/18/2023    CREATININE 1.28 05/16/2023    CREATININE 1.33 (H) 01/10/2023   Renal function stable overall.   Advise hydration and avoiding nephrotoxic medication

## 2023-08-23 DIAGNOSIS — E78.5 HYPERLIPIDEMIA, UNSPECIFIED HYPERLIPIDEMIA TYPE: ICD-10-CM

## 2023-08-23 RX ORDER — ROSUVASTATIN CALCIUM 5 MG/1
5 TABLET, COATED ORAL DAILY
Qty: 90 TABLET | Refills: 2 | Status: SHIPPED | OUTPATIENT
Start: 2023-08-23

## 2023-09-06 ENCOUNTER — APPOINTMENT (OUTPATIENT)
Dept: LAB | Facility: HOSPITAL | Age: 83
End: 2023-09-06
Payer: MEDICARE

## 2023-09-06 DIAGNOSIS — E03.9 HYPOTHYROIDISM, UNSPECIFIED TYPE: Chronic | ICD-10-CM

## 2023-09-06 DIAGNOSIS — E78.5 HYPERLIPIDEMIA, UNSPECIFIED HYPERLIPIDEMIA TYPE: ICD-10-CM

## 2023-09-06 DIAGNOSIS — I10 HYPERTENSION, UNSPECIFIED TYPE: ICD-10-CM

## 2023-09-06 DIAGNOSIS — R79.9 ABNORMAL FINDING OF BLOOD CHEMISTRY, UNSPECIFIED: ICD-10-CM

## 2023-09-06 DIAGNOSIS — M05.79 SEROPOSITIVE RHEUMATOID ARTHRITIS OF MULTIPLE SITES (HCC): ICD-10-CM

## 2023-09-06 LAB
ALBUMIN SERPL BCP-MCNC: 4.1 G/DL (ref 3.5–5)
ALP SERPL-CCNC: 49 U/L (ref 34–104)
ALT SERPL W P-5'-P-CCNC: 10 U/L (ref 7–52)
ANION GAP SERPL CALCULATED.3IONS-SCNC: 6 MMOL/L
AST SERPL W P-5'-P-CCNC: 17 U/L (ref 13–39)
BASOPHILS # BLD AUTO: 0.06 THOUSANDS/ÂΜL (ref 0–0.1)
BASOPHILS NFR BLD AUTO: 1 % (ref 0–1)
BILIRUB DIRECT SERPL-MCNC: 0.14 MG/DL (ref 0–0.2)
BILIRUB SERPL-MCNC: 0.72 MG/DL (ref 0.2–1)
BUN SERPL-MCNC: 26 MG/DL (ref 5–25)
CALCIUM SERPL-MCNC: 9.2 MG/DL (ref 8.4–10.2)
CHLORIDE SERPL-SCNC: 106 MMOL/L (ref 96–108)
CHOLEST SERPL-MCNC: 113 MG/DL
CO2 SERPL-SCNC: 28 MMOL/L (ref 21–32)
CREAT SERPL-MCNC: 1.36 MG/DL (ref 0.6–1.3)
CRP SERPL QL: 5.6 MG/L
EOSINOPHIL # BLD AUTO: 0.37 THOUSAND/ÂΜL (ref 0–0.61)
EOSINOPHIL NFR BLD AUTO: 7 % (ref 0–6)
ERYTHROCYTE [DISTWIDTH] IN BLOOD BY AUTOMATED COUNT: 16.1 % (ref 11.6–15.1)
ERYTHROCYTE [SEDIMENTATION RATE] IN BLOOD: 22 MM/HOUR (ref 0–29)
GFR SERPL CREATININE-BSD FRML MDRD: 36 ML/MIN/1.73SQ M
GLUCOSE P FAST SERPL-MCNC: 96 MG/DL (ref 65–99)
HCT VFR BLD AUTO: 30.1 % (ref 34.8–46.1)
HDLC SERPL-MCNC: 47 MG/DL
HGB BLD-MCNC: 9.5 G/DL (ref 11.5–15.4)
IGA SERPL-MCNC: 127 MG/DL (ref 66–433)
IGG SERPL-MCNC: 575 MG/DL (ref 635–1741)
IGM SERPL-MCNC: <20 MG/DL (ref 45–281)
IMM GRANULOCYTES # BLD AUTO: 0.02 THOUSAND/UL (ref 0–0.2)
IMM GRANULOCYTES NFR BLD AUTO: 0 % (ref 0–2)
LDLC SERPL CALC-MCNC: 49 MG/DL (ref 0–100)
LYMPHOCYTES # BLD AUTO: 0.82 THOUSANDS/ÂΜL (ref 0.6–4.47)
LYMPHOCYTES NFR BLD AUTO: 16 % (ref 14–44)
MCH RBC QN AUTO: 32.2 PG (ref 26.8–34.3)
MCHC RBC AUTO-ENTMCNC: 31.6 G/DL (ref 31.4–37.4)
MCV RBC AUTO: 102 FL (ref 82–98)
MONOCYTES # BLD AUTO: 0.38 THOUSAND/ÂΜL (ref 0.17–1.22)
MONOCYTES NFR BLD AUTO: 7 % (ref 4–12)
NEUTROPHILS # BLD AUTO: 3.51 THOUSANDS/ÂΜL (ref 1.85–7.62)
NEUTS SEG NFR BLD AUTO: 69 % (ref 43–75)
NRBC BLD AUTO-RTO: 0 /100 WBCS
PLATELET # BLD AUTO: 194 THOUSANDS/UL (ref 149–390)
PMV BLD AUTO: 10.1 FL (ref 8.9–12.7)
POTASSIUM SERPL-SCNC: 3.9 MMOL/L (ref 3.5–5.3)
PROT SERPL-MCNC: 6.5 G/DL (ref 6.4–8.4)
RBC # BLD AUTO: 2.95 MILLION/UL (ref 3.81–5.12)
SODIUM SERPL-SCNC: 140 MMOL/L (ref 135–147)
TRIGL SERPL-MCNC: 84 MG/DL
TSH SERPL DL<=0.05 MIU/L-ACNC: 1.33 UIU/ML (ref 0.45–4.5)
WBC # BLD AUTO: 5.16 THOUSAND/UL (ref 4.31–10.16)

## 2023-09-06 PROCEDURE — 36415 COLL VENOUS BLD VENIPUNCTURE: CPT

## 2023-09-06 PROCEDURE — 86140 C-REACTIVE PROTEIN: CPT

## 2023-09-06 PROCEDURE — 80061 LIPID PANEL: CPT

## 2023-09-06 PROCEDURE — 86361 T CELL ABSOLUTE COUNT: CPT

## 2023-09-06 PROCEDURE — 82784 ASSAY IGA/IGD/IGG/IGM EACH: CPT

## 2023-09-06 PROCEDURE — 82248 BILIRUBIN DIRECT: CPT

## 2023-09-06 PROCEDURE — 85652 RBC SED RATE AUTOMATED: CPT

## 2023-09-07 LAB
EST. AVERAGE GLUCOSE BLD GHB EST-MCNC: 114 MG/DL
HBA1C MFR BLD: 5.6 %

## 2023-09-08 ENCOUNTER — RA CDI HCC (OUTPATIENT)
Dept: OTHER | Facility: HOSPITAL | Age: 83
End: 2023-09-08

## 2023-09-12 ENCOUNTER — OFFICE VISIT (OUTPATIENT)
Dept: INTERNAL MEDICINE CLINIC | Facility: CLINIC | Age: 83
End: 2023-09-12
Payer: MEDICARE

## 2023-09-12 VITALS
WEIGHT: 171.6 LBS | BODY MASS INDEX: 31.58 KG/M2 | HEIGHT: 62 IN | HEART RATE: 61 BPM | DIASTOLIC BLOOD PRESSURE: 84 MMHG | SYSTOLIC BLOOD PRESSURE: 150 MMHG | OXYGEN SATURATION: 98 %

## 2023-09-12 DIAGNOSIS — L24.9 IRRITANT CONTACT DERMATITIS, UNSPECIFIED TRIGGER: ICD-10-CM

## 2023-09-12 DIAGNOSIS — L03.213 PERIORBITAL CELLULITIS OF RIGHT EYE: Primary | ICD-10-CM

## 2023-09-12 PROCEDURE — 99214 OFFICE O/P EST MOD 30 MIN: CPT | Performed by: INTERNAL MEDICINE

## 2023-09-12 RX ORDER — AZITHROMYCIN 250 MG/1
TABLET, FILM COATED ORAL
Qty: 6 TABLET | Refills: 0 | Status: SHIPPED | OUTPATIENT
Start: 2023-09-12 | End: 2023-09-16

## 2023-09-12 RX ORDER — DIAPER,BRIEF,INFANT-TODD,DISP
EACH MISCELLANEOUS 2 TIMES DAILY
Qty: 30 G | Refills: 0 | Status: SHIPPED | OUTPATIENT
Start: 2023-09-12

## 2023-09-12 RX ORDER — METHYLPREDNISOLONE 4 MG/1
TABLET ORAL
Qty: 21 EACH | Refills: 0 | Status: SHIPPED | OUTPATIENT
Start: 2023-09-12

## 2023-09-12 NOTE — PROGRESS NOTES
Assessment/Plan:     Right I cellulitis, times few weeks. Recently treated for conjunctivitis. We will send p.o. antibiotics and steroids. She has an eye doctor appointment in October. Itchy rash to both ankles. Hydrocortisone sent. Follow-up in 1 week. Quality Measures:     BMI Counseling: Body mass index is 31.39 kg/m². The BMI is above normal. Nutrition recommendations include decreasing portion sizes and encouraging healthy choices of fruits and vegetables. Exercise recommendations include moderate physical activity 150 minutes/week. Rationale for BMI follow-up plan is due to patient being overweight or obese. Falls Plan of Care: balance, strength, and gait training instructions were provided. Recommended assistive device to help with gait and balance. Return in about 1 week (around 9/19/2023). No problem-specific Assessment & Plan notes found for this encounter. Diagnoses and all orders for this visit:    Periorbital cellulitis of right eye  -     azithromycin (Zithromax) 250 mg tablet; Take 2 tablets (500 mg total) by mouth daily for 1 day, THEN 1 tablet (250 mg total) daily for 4 days. -     methylPREDNISolone 4 MG tablet therapy pack; Use as directed on package    Irritant contact dermatitis, unspecified trigger  -     hydrocortisone 0.5 % cream; Apply topically 2 (two) times a day          Subjective:      Patient ID: Luan Del Rosario is a 81 y.o. female. Here with sick complaints. ALLERGIES:  Allergies   Allergen Reactions   • Lisinopril Anaphylaxis   • Codeine Other (See Comments)     Nausea/vomiting     • Other      Annotation - [id number]: tounge swelling   • Oxycodone      Annotation - [id number]: NOT TOLERATE   • Penicillins Hives   • Sulfamethoxazole-Trimethoprim    • Ciprofloxacin Rash       CURRENT MEDICATIONS:    Current Outpatient Medications:   •  ascorbic acid (VITAMIN C) 500 mg tablet, Take 500 mg by mouth daily. , Disp: , Rfl:   •  ASPIRIN 81 PO, 1 tablet ONCE DAILY (route: oral), Disp: , Rfl:   •  azithromycin (Zithromax) 250 mg tablet, Take 2 tablets (500 mg total) by mouth daily for 1 day, THEN 1 tablet (250 mg total) daily for 4 days. , Disp: 6 tablet, Rfl: 0  •  Calcium Citrate-Vitamin D (CITRACAL + D PO), Take by mouth 2 (two) times a day, Disp: , Rfl:   •  Cholecalciferol (VITAMIN D-3 PO), Take 2,000 Units by mouth daily. , Disp: , Rfl:   •  folic acid (FOLVITE) 1 mg tablet, 1 daily, Disp: 90 tablet, Rfl: 2  •  hydrochlorothiazide (HYDRODIURIL) 25 mg tablet, TAKE 1 TABLET BY MOUTH EVERY DAY, Disp: 90 tablet, Rfl: 2  •  hydrocortisone 0.5 % cream, Apply topically 2 (two) times a day, Disp: 30 g, Rfl: 0  •  Klor-Con M10 10 MEQ tablet, TAKE 2 TABLETS TWICE A DAY, Disp: 360 tablet, Rfl: 1  •  levothyroxine (Synthroid) 75 mcg tablet, Take 1 tablet (75 mcg total) by mouth in the morning, Disp: 90 tablet, Rfl: 3  •  magnesium oxide (MAG-OX) 400 mg, Take 1 tablet (400 mg total) by mouth 2 (two) times a day, Disp: 90 tablet, Rfl: 0  •  methotrexate 2.5 mg tablet, Take 1 tablet (2.5 mg total) by mouth once a week Patient takes 6 tablets weekly, Disp: , Rfl:   •  methylPREDNISolone 4 MG tablet therapy pack, Use as directed on package, Disp: 21 each, Rfl: 0  •  Multiple Vitamin (MULTIVITAMIN) tablet, Take 1 tablet by mouth daily. , Disp: , Rfl:   •  Psyllium (Metamucil) WAFR, Take once daily, Disp: 100 Wafer, Rfl: 5  •  riTUXimab (RITUXAN) 500 mg/50 mL, Infuse into a venous catheter every 6 (six) months  , Disp: , Rfl:   •  rosuvastatin (CRESTOR) 5 mg tablet, TAKE 1 TABLET DAILY, Disp: 90 tablet, Rfl: 2  •  tobramycin (TOBREX) 0.3 % SOLN, Administer 1 drop to both eyes every 4 (four) hours while awake, Disp: 5 mL, Rfl: 0    ACTIVE PROBLEM LIST:  Patient Active Problem List   Diagnosis   • Hypertension   • Rheumatoid arthritis ([address])   • Hypothyroidism   • Hyperuricemia   • Diastolic dysfunction   • Hyperlipidemia   • Lumbar radiculopathy   • Osteopenia   • Other chronic pain • Vitamin D deficiency   • Adenocarcinoma of breast ([address])   • Localized edema   • Abnormal finding on diagnostic imaging of kidney   • Fat necrosis (segmental) of breast   • Personal history of breast cancer   • History of peptic ulcer   • Intermediate stage nonexudative 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 eyes   • Lower leg DVT (deep venous thromboembolism), chronic, left (HCC)   • Bradycardia with 51-60 beats per minute   • Varicose veins of left lower extremity with pain   • Stage 3b chronic kidney disease ([address])   • History of DVT (deep vein thrombosis)   • Chronic kidney disease-mineral and bone disorder   • Chronic pain of both knees       PAST MEDICAL HISTORY:  Past Medical History:   Diagnosis Date   • Allergic angioedema     [id number] resolved   • Angioedema     [id number] resolved   • Arthritis    • Breast cancer ([address]) 2012    right    • Chronic kidney disease    • Disease of thyroid gland    • Hemorrhage of anus and rectum     [id number] resolved   • Hyperlipidemia    • Hypertension    • Hypocalcemia     12oct2017 resolved   • Neuritis     thoracic and lumbosacral   • Peptic ulcer    • Rheumatoid arthritis ([address])    • Stage 3b chronic kidney disease ([address]) 5/7/2021   • Vitamin D deficiency        PAST SURGICAL HISTORY:  Past Surgical History:   Procedure Laterality Date   • ABDOMINAL SURGERY     • BREAST BIOPSY Right 2012   • BREAST LUMPECTOMY Right 2012   • BREAST SURGERY     • CHOLECYSTECTOMY     • ELBOW SURGERY     • GALLBLADDER SURGERY  1981   • HIP SURGERY Right 09/18/2022   • ORTHOPEDIC SURGERY      for toes   • US GUIDED BREAST BIOPSY RIGHT COMPLETE Right 08/15/2022       FAMILY HISTORY:  Family History   Problem Relation Age of Onset   • Breast cancer Mother    • Kidney failure Father    • Other Father         uremia   • Lung cancer Brother    • Breast cancer Maternal Aunt    • Breast cancer Maternal Aunt    • Breast cancer Maternal Aunt    • Breast cancer Maternal Aunt    • Breast cancer Maternal Aunt    • Stomach cancer Maternal Uncle        SOCIAL HISTORY:  Social History     Socioeconomic History   • Marital status:      Spouse name: Not on file   • Number of children: Not on file   • Years of education: Not on file   • Highest education level: Not on file   Occupational History   • Not on file   Tobacco Use   • Smoking status: Never   • Smokeless tobacco: Never   Vaping Use   • Vaping Use: Never used   Substance and Sexual Activity   • Alcohol use: Not Currently   • Drug use: No   • Sexual activity: Not Currently     Partners: Male   Other Topics Concern   • Not on file   Social History Narrative    Active: caffeine use     Social Determinants of Health     Financial Resource Strain: Low Risk  (11/21/2022)    Overall Financial Resource Strain (CARDIA)    • Difficulty of Paying Living Expenses: Not very hard   Food Insecurity: Not on file   Transportation Needs: No Transportation Needs (11/21/2022)    PRAPARE - Transportation    • Lack of Transportation (Medical): No    • Lack of Transportation (Non-Medical): No   Physical Activity: Not on file   Stress: Not on file   Social Connections: Not on file   Intimate Partner Violence: Not on file   Housing Stability: Not on file       Review of Systems   Constitutional: Negative for chills and fever. HENT: Negative for ear pain and sore throat. Eyes: Positive for pain, discharge and redness. Negative for visual disturbance. Respiratory: Negative for cough and shortness of breath. Cardiovascular: Negative for chest pain and palpitations. Gastrointestinal: Negative for abdominal pain and vomiting. Genitourinary: Negative for dysuria and hematuria. Musculoskeletal: Negative for arthralgias and back pain. Skin: Positive for color change and rash. Neurological: Negative for seizures and syncope. All other systems reviewed and are negative.         Objective:  Vitals:    09/12/23 1318   BP: 150/84   BP Location: Left arm   Patient Position: Sitting   Cuff Size: Adult   Pulse: 61   SpO2: 98%   Weight: 77.8 kg (171 lb 9.6 oz)   Height: 5' 2" (1.575 m)     Body mass index is 31.39 kg/m². Physical Exam  Vitals and nursing note reviewed. Constitutional:       Appearance: Normal appearance. She is obese. HENT:      Head: Normocephalic and atraumatic. Eyes:      General:         Right eye: Discharge present. Conjunctiva/sclera:      Right eye: Right conjunctiva is injected. Exudate and hemorrhage present. Cardiovascular:      Rate and Rhythm: Normal rate. Pulmonary:      Effort: Pulmonary effort is normal.   Musculoskeletal:         General: Normal range of motion. Cervical back: Normal range of motion. Right lower leg: No edema. Left lower leg: No edema. Skin:     General: Skin is dry. Coloration: Skin is pale. Neurological:      Mental Status: She is alert and oriented to person, place, and time. Mental status is at baseline. Gait: Gait abnormal.   Psychiatric:         Mood and Affect: Mood normal.           RESULTS:  Hemoglobin A1C   Date/Time Value Ref Range Status   09/06/2023 10:05 AM 5.6 Normal 4.0-5.6%; PreDiabetic 5.7-6.4%;  Diabetic >=6.5%; Glycemic control for adults with diabetes <7.0% % Final     Cholesterol   Date/Time Value Ref Range Status   09/06/2023 10:05  See Comment mg/dL Final     Comment:     Cholesterol:         Pediatric <18 Years        Desirable          <170 mg/dL      Borderline High    170-199 mg/dL      High               >=200 mg/dL        Adult >=18 Years            Desirable        <200 mg/dL      Borderline High  200-239 mg/dL      High             >239 mg/dL       Triglycerides   Date/Time Value Ref Range Status   09/06/2023 10:05 AM 85 See Comment mg/dL Final     Comment:     Triglyceride:     0-9Y            <75mg/dL     10Y-17Y         <90 mg/dL       >=18Y     Normal          <150 mg/dL     Borderline High 150-199 mg/dL     High            200-499 mg/dL Very High       >499 mg/dL    Specimen collection should occur prior to Metamizole administration due to the potential for falsely depressed results. 04/06/2015 03:44 PM 84  Final     HDL   Date/Time Value Ref Range Status   04/06/2015 03:44 PM 55  Final     HDL, Direct   Date/Time Value Ref Range Status   09/06/2023 10:05 AM 47 (L) >=50 mg/dL Final     LDL Calculated   Date/Time Value Ref Range Status   09/06/2023 10:05 AM 49 0 - 100 mg/dL Final     Comment:     LDL Cholesterol:     Optimal           <100 mg/dl     Near Optimal      100-129 mg/dl     Above Optimal       Borderline High 130-159 mg/dl       High            160-189 mg/dl       Very High       >189 mg/dl         This screening LDL is a calculated result. It does not have the accuracy of the Direct Measured LDL in the monitoring of patients with hyperlipidemia and/or statin therapy. Direct Measure LDL (JEI128) must be ordered separately in these patients. Hemoglobin   Date/Time Value Ref Range Status   09/06/2023 10:05 AM 9.5 (L) 11.5 - 15.4 g/dL Final   09/25/2015 10:31 AM 10.7 (L) 11.5 - 15.4 g/dL Final     Hematocrit   Date/Time Value Ref Range Status   09/06/2023 10:05 AM 30.1 (L) 34.8 - 46.1 % Final   09/25/2015 10:31 AM 33.6 (L) 34.8 - 46.1 % Final     Platelets   Date/Time Value Ref Range Status   09/06/2023 10:05  149 - 390 Thousands/uL Final   09/25/2015 10:31  149 - 390 Thousand/uL Final     TSH 3RD GENERATON   Date/Time Value Ref Range Status   09/06/2023 10:05 AM 1.331 0.450 - 4.500 uIU/mL Final     Comment:     The recommended reference ranges for TSH during pregnancy are as follows:   First trimester 0.1 to 2.5 uIU/mL   Second trimester  0.2 to 3.0 uIU/mL   Third trimester 0.3 to 3.0 uIU/m    Note: Normal ranges may not apply to patients who are transgender, non-binary, or whose legal sex, sex at birth, and gender identity differ.   Adult TSH (3rd generation) reference range follows the recommended guidelines of the [employer] Association, January, 2020. Free T4   Date/Time Value Ref Range Status   12/09/2019 10:25 AM 1.20 0.76 - 1.46 ng/dL Final     Comment:       Specimen collection should occur prior to Sulfasalazine administration due to the potential for falsely elevated results. Sodium   Date/Time Value Ref Range Status   09/06/2023 10:05  135 - 147 mmol/L Final     BUN   Date/Time Value Ref Range Status   09/06/2023 10:05 AM 26 (H) 5 - 25 mg/dL Final     Creatinine   Date/Time Value Ref Range Status   09/06/2023 10:05 AM 1.36 (H) 0.60 - 1.30 mg/dL Final     Comment:     Standardized to IDMS reference method   09/25/2015 11:46 AM 1.27 0.60 - 1.30 mg/dL Final     Comment:     Standardized to IDMS reference method      In chart    This note was created with voice recognition software. Phonic, grammatical and spelling errors may be present within the note as a result.

## 2023-09-15 LAB — MISCELLANEOUS LAB TEST RESULT: NORMAL

## 2023-09-20 ENCOUNTER — OFFICE VISIT (OUTPATIENT)
Dept: INTERNAL MEDICINE CLINIC | Facility: CLINIC | Age: 83
End: 2023-09-20
Payer: MEDICARE

## 2023-09-20 VITALS
WEIGHT: 171 LBS | SYSTOLIC BLOOD PRESSURE: 110 MMHG | DIASTOLIC BLOOD PRESSURE: 54 MMHG | HEART RATE: 54 BPM | OXYGEN SATURATION: 98 % | HEIGHT: 62 IN | RESPIRATION RATE: 16 BRPM | BODY MASS INDEX: 31.47 KG/M2

## 2023-09-20 DIAGNOSIS — N18.32 STAGE 3B CHRONIC KIDNEY DISEASE (HCC): ICD-10-CM

## 2023-09-20 DIAGNOSIS — I10 PRIMARY HYPERTENSION: Chronic | ICD-10-CM

## 2023-09-20 DIAGNOSIS — E78.5 HYPERLIPIDEMIA, UNSPECIFIED HYPERLIPIDEMIA TYPE: ICD-10-CM

## 2023-09-20 DIAGNOSIS — R79.9 ABNORMAL FINDING OF BLOOD CHEMISTRY, UNSPECIFIED: ICD-10-CM

## 2023-09-20 DIAGNOSIS — M06.9 RHEUMATOID ARTHRITIS OF LEFT ANKLE, UNSPECIFIED WHETHER RHEUMATOID FACTOR PRESENT (HCC): Chronic | ICD-10-CM

## 2023-09-20 DIAGNOSIS — L03.213 PERIORBITAL CELLULITIS OF RIGHT EYE: Primary | ICD-10-CM

## 2023-09-20 PROCEDURE — 99214 OFFICE O/P EST MOD 30 MIN: CPT | Performed by: INTERNAL MEDICINE

## 2023-09-20 RX ORDER — CARVEDILOL 25 MG/1
25 TABLET ORAL 2 TIMES DAILY WITH MEALS
COMMUNITY

## 2023-09-20 NOTE — PROGRESS NOTES
Assessment/Plan:      Orbital cellulitis resolved, still with some pink eye, will start tobra eye drops, f/u with ophthalmology; Other medical issues reviewed, labs reviewed, cbc, CMP, TSH, a1c, lipid ordered for next visit;    RA is managed by rheum, she is on methotrexate and rituxan; steroids prn;    HLD managed with statin; HTN controlled, although she restarted coreg 25 mg bid, watch HR; on HCTZ; cr just above baseline, she follows with nephro. Quality Measures:     BMI Counseling: Body mass index is 31.28 kg/m². Follow-up plan was not completed due to patient being in urgent or emergent medical situation. Depression Screening and Follow-up Plan: Patient was screened for depression during today's encounter. They screened negative with a PHQ-2 score of 0. Falls Plan of Care: Recommended assistive device to help with gait and balance. Return in about 3 months (around 12/20/2023) for regular and medicare. No problem-specific Assessment & Plan notes found for this encounter. Diagnoses and all orders for this visit:    Periorbital cellulitis of right eye    Primary hypertension  -     CBC and differential; Future  -     Comprehensive metabolic panel; Future    Rheumatoid arthritis of left ankle, unspecified whether rheumatoid factor present (HCC)  -     Comprehensive metabolic panel; Future    Stage 3b chronic kidney disease (HCC)  -     TSH, 3rd generation with Free T4 reflex; Future    Hyperlipidemia, unspecified hyperlipidemia type  -     TSH, 3rd generation with Free T4 reflex; Future  -     Hemoglobin A1C; Future  -     Lipid Panel with Direct LDL reflex; Future    Abnormal finding of blood chemistry, unspecified  -     Hemoglobin A1C; Future    Other orders  -     carvedilol (COREG) 25 mg tablet; Take 25 mg by mouth 2 (two) times a day with meals          Subjective:      Patient ID: Robert Lugo is a 81 y.o. female. Here for 1 week check.       ALLERGIES:  Allergies   Allergen Reactions   • Lisinopril Anaphylaxis   • Codeine Other (See Comments)     Nausea/vomiting     • Other      Annotation - [id number]: tounge swelling   • Oxycodone      Annotation - [id number]: NOT TOLERATE   • Penicillins Hives   • Sulfamethoxazole-Trimethoprim    • Ciprofloxacin Rash       CURRENT MEDICATIONS:    Current Outpatient Medications:   •  ascorbic acid (VITAMIN C) 500 mg tablet, Take 500 mg by mouth daily. , Disp: , Rfl:   •  ASPIRIN 81 PO, 1 tablet ONCE DAILY (route: oral), Disp: , Rfl:   •  Calcium Citrate-Vitamin D (CITRACAL + D PO), Take by mouth 2 (two) times a day, Disp: , Rfl:   •  carvedilol (COREG) 25 mg tablet, Take 25 mg by mouth 2 (two) times a day with meals, Disp: , Rfl:   •  Cholecalciferol (VITAMIN D-3 PO), Take 2,000 Units by mouth daily. , Disp: , Rfl:   •  folic acid (FOLVITE) 1 mg tablet, 1 daily, Disp: 90 tablet, Rfl: 2  •  hydrochlorothiazide (HYDRODIURIL) 25 mg tablet, TAKE 1 TABLET BY MOUTH EVERY DAY, Disp: 90 tablet, Rfl: 2  •  hydrocortisone 0.5 % cream, Apply topically 2 (two) times a day, Disp: 30 g, Rfl: 0  •  Klor-Con M10 10 MEQ tablet, TAKE 2 TABLETS TWICE A DAY, Disp: 360 tablet, Rfl: 1  •  levothyroxine (Synthroid) 75 mcg tablet, Take 1 tablet (75 mcg total) by mouth in the morning, Disp: 90 tablet, Rfl: 3  •  magnesium oxide (MAG-OX) 400 mg, Take 1 tablet (400 mg total) by mouth 2 (two) times a day, Disp: 90 tablet, Rfl: 0  •  methotrexate 2.5 mg tablet, Take 1 tablet (2.5 mg total) by mouth once a week Patient takes 6 tablets weekly, Disp: , Rfl:   •  methylPREDNISolone 4 MG tablet therapy pack, Use as directed on package, Disp: 21 each, Rfl: 0  •  Multiple Vitamin (MULTIVITAMIN) tablet, Take 1 tablet by mouth daily. , Disp: , Rfl:   •  Psyllium (Metamucil) WAFR, Take once daily, Disp: 100 Wafer, Rfl: 5  •  riTUXimab (RITUXAN) 500 mg/50 mL, Infuse into a venous catheter every 6 (six) months  , Disp: , Rfl:   •  rosuvastatin (CRESTOR) 5 mg tablet, TAKE 1 TABLET DAILY, Disp: 90 tablet, Rfl: 2  •  tobramycin (TOBREX) 0.3 % SOLN, Administer 1 drop to both eyes every 4 (four) hours while awake, Disp: 5 mL, Rfl: 0    ACTIVE PROBLEM LIST:  Patient Active Problem List   Diagnosis   • Hypertension   • Rheumatoid arthritis ([address])   • Hypothyroidism   • Hyperuricemia   • Diastolic dysfunction   • Hyperlipidemia   • Lumbar radiculopathy   • Osteopenia   • Other chronic pain   • Vitamin D deficiency   • Adenocarcinoma of breast (HCC)   • Localized edema   • Abnormal finding on diagnostic imaging of kidney   • Fat necrosis (segmental) of breast   • Personal history of breast cancer   • History of peptic ulcer   • Intermediate stage nonexudative 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 eyes   • Lower leg DVT (deep venous thromboembolism), chronic, left (HCC)   • Bradycardia with 51-60 beats per minute   • Varicose veins of left lower extremity with pain   • Stage 3b chronic kidney disease ([address])   • History of DVT (deep vein thrombosis)   • Chronic kidney disease-mineral and bone disorder   • Chronic pain of both knees       PAST MEDICAL HISTORY:  Past Medical History:   Diagnosis Date   • Allergic angioedema     [id number] resolved   • Angioedema     [id number] resolved   • Arthritis    • Breast cancer ([address]) 2012    right    • Chronic kidney disease    • Disease of thyroid gland    • Hemorrhage of anus and rectum     02mar2016 resolved   • Hyperlipidemia    • Hypertension    • Hypocalcemia     12oct2017 resolved   • Neuritis     thoracic and lumbosacral   • Peptic ulcer    • Rheumatoid arthritis ([address])    • Stage 3b chronic kidney disease ([address]) 5/7/2021   • Vitamin D deficiency        PAST SURGICAL HISTORY:  Past Surgical History:   Procedure Laterality Date   • ABDOMINAL SURGERY     • BREAST BIOPSY Right 2012   • BREAST LUMPECTOMY Right 2012   • BREAST SURGERY     • CHOLECYSTECTOMY     • ELBOW SURGERY     • GALLBLADDER SURGERY  1981   • HIP SURGERY Right 09/18/2022   • ORTHOPEDIC SURGERY      for toes   • US GUIDED BREAST BIOPSY RIGHT COMPLETE Right 08/15/2022       FAMILY HISTORY:  Family History   Problem Relation Age of Onset   • Breast cancer Mother    • Kidney failure Father    • Other Father         uremia   • Lung cancer Brother    • Breast cancer Maternal Aunt    • Breast cancer Maternal Aunt    • Breast cancer Maternal Aunt    • Breast cancer Maternal Aunt    • Breast cancer Maternal Aunt    • Stomach cancer Maternal Uncle        SOCIAL HISTORY:  Social History     Socioeconomic History   • Marital status:      Spouse name: Not on file   • Number of children: Not on file   • Years of education: Not on file   • Highest education level: Not on file   Occupational History   • Not on file   Tobacco Use   • Smoking status: Never   • Smokeless tobacco: Never   Vaping Use   • Vaping Use: Never used   Substance and Sexual Activity   • Alcohol use: Not Currently   • Drug use: No   • Sexual activity: Not Currently     Partners: Male   Other Topics Concern   • Not on file   Social History Narrative    Active: caffeine use     Social Determinants of Health     Financial Resource Strain: Low Risk  (11/21/2022)    Overall Financial Resource Strain (CARDIA)    • Difficulty of Paying Living Expenses: Not very hard   Food Insecurity: Not on file   Transportation Needs: No Transportation Needs (11/21/2022)    PRAPARE - Transportation    • Lack of Transportation (Medical): No    • Lack of Transportation (Non-Medical): No   Physical Activity: Not on file   Stress: Not on file   Social Connections: Not on file   Intimate Partner Violence: Not on file   Housing Stability: Not on file       Review of Systems   Constitutional: Negative for chills and fever. HENT: Negative for ear pain and sore throat. Eyes: Positive for redness. Negative for pain and visual disturbance. Respiratory: Negative for cough and shortness of breath. Cardiovascular: Negative for chest pain and palpitations.    Gastrointestinal: Negative for abdominal pain and vomiting. Genitourinary: Negative for dysuria and hematuria. Musculoskeletal: Positive for arthralgias, back pain and gait problem (using walker). Skin: Negative for color change and rash. Neurological: Negative for seizures and syncope. All other systems reviewed and are negative. Objective:  Vitals:    09/20/23 1117   BP: 110/54   BP Location: Left arm   Patient Position: Sitting   Cuff Size: Adult   Pulse: (!) 54   Resp: 16   SpO2: 98%   Weight: 77.6 kg (171 lb)   Height: 5' 2" (1.575 m)     Body mass index is 31.28 kg/m². Physical Exam  Vitals and nursing note reviewed. Constitutional:       Appearance: Normal appearance. Comments: Elderly appearing     HENT:      Head: Normocephalic and atraumatic. Cardiovascular:      Rate and Rhythm: Normal rate and regular rhythm. Heart sounds: Normal heart sounds. Pulmonary:      Effort: Pulmonary effort is normal.      Breath sounds: Normal breath sounds. Musculoskeletal:         General: Normal range of motion. Cervical back: Normal range of motion. Right lower leg: No edema. Left lower leg: No edema. Skin:     General: Skin is warm and dry. Neurological:      Mental Status: She is alert and oriented to person, place, and time. Mental status is at baseline. Gait: Gait abnormal.   Psychiatric:         Mood and Affect: Mood normal.           RESULTS:  Hemoglobin A1C   Date/Time Value Ref Range Status   09/06/2023 10:05 AM 5.6 Normal 4.0-5.6%; PreDiabetic 5.7-6.4%;  Diabetic >=6.5%; Glycemic control for adults with diabetes <7.0% % Final     Cholesterol   Date/Time Value Ref Range Status   09/06/2023 10:05  See Comment mg/dL Final     Comment:     Cholesterol:         Pediatric <18 Years        Desirable          <170 mg/dL      Borderline High    170-199 mg/dL      High               >=200 mg/dL        Adult >=18 Years            Desirable        <200 mg/dL      Borderline High  200-239 mg/dL      High             >239 mg/dL       Triglycerides   Date/Time Value Ref Range Status   09/06/2023 10:05 AM 84 See Comment mg/dL Final     Comment:     Triglyceride:     0-9Y            <75mg/dL     10Y-17Y         <90 mg/dL       >=18Y     Normal          <150 mg/dL     Borderline High 150-199 mg/dL     High            200-499 mg/dL        Very High       >499 mg/dL    Specimen collection should occur prior to Metamizole administration due to the potential for falsely depressed results. 04/06/2015 03:44 PM 84  Final     HDL   Date/Time Value Ref Range Status   04/06/2015 03:44 PM 55  Final     HDL, Direct   Date/Time Value Ref Range Status   09/06/2023 10:05 AM 47 (L) >=50 mg/dL Final     LDL Calculated   Date/Time Value Ref Range Status   09/06/2023 10:05 AM 49 0 - 100 mg/dL Final     Comment:     LDL Cholesterol:     Optimal           <100 mg/dl     Near Optimal      100-129 mg/dl     Above Optimal       Borderline High 130-159 mg/dl       High            160-189 mg/dl       Very High       >189 mg/dl         This screening LDL is a calculated result. It does not have the accuracy of the Direct Measured LDL in the monitoring of patients with hyperlipidemia and/or statin therapy. Direct Measure LDL (RWA476) must be ordered separately in these patients.      Hemoglobin   Date/Time Value Ref Range Status   09/06/2023 10:05 AM 9.5 (L) 11.5 - 15.4 g/dL Final   09/25/2015 10:31 AM 10.7 (L) 11.5 - 15.4 g/dL Final     Hematocrit   Date/Time Value Ref Range Status   09/06/2023 10:05 AM 30.1 (L) 34.8 - 46.1 % Final   09/25/2015 10:31 AM 33.6 (L) 34.8 - 46.1 % Final     Platelets   Date/Time Value Ref Range Status   09/06/2023 10:05  149 - 390 Thousands/uL Final   09/25/2015 10:31  149 - 390 Thousand/uL Final     TSH 3RD GENERATON   Date/Time Value Ref Range Status   09/06/2023 10:05 AM 1.331 0.450 - 4.500 uIU/mL Final     Comment:     The recommended reference ranges for TSH during pregnancy are as follows:   First trimester 0.1 to 2.5 uIU/mL   Second trimester  0.2 to 3.0 uIU/mL   Third trimester 0.3 to 3.0 uIU/m    Note: Normal ranges may not apply to patients who are transgender, non-binary, or whose legal sex, sex at birth, and gender identity differ. Adult TSH (3rd generation) reference range follows the recommended guidelines of the American Thyroid Association, January, 2020. Free T4   Date/Time Value Ref Range Status   12/09/2019 10:25 AM 1.20 0.76 - 1.46 ng/dL Final     Comment:       Specimen collection should occur prior to Sulfasalazine administration due to the potential for falsely elevated results. Sodium   Date/Time Value Ref Range Status   09/06/2023 10:05  135 - 147 mmol/L Final     BUN   Date/Time Value Ref Range Status   09/06/2023 10:05 AM 26 (H) 5 - 25 mg/dL Final     Creatinine   Date/Time Value Ref Range Status   09/06/2023 10:05 AM 1.36 (H) 0.60 - 1.30 mg/dL Final     Comment:     Standardized to IDMS reference method   09/25/2015 11:46 AM 1.27 0.60 - 1.30 mg/dL Final     Comment:     Standardized to IDMS reference method      In chart    This note was created with voice recognition software. Phonic, grammatical and spelling errors may be present within the note as a result.

## 2023-09-26 ENCOUNTER — TELEPHONE (OUTPATIENT)
Dept: INTERNAL MEDICINE CLINIC | Facility: CLINIC | Age: 83
End: 2023-09-26

## 2023-09-26 NOTE — TELEPHONE ENCOUNTER
Patient's rheumatologist said that patient should really be on medication for her low iron. She is very concerned and wanted to know why she hasn't been put on medication for this issue.     [facility]

## 2023-09-27 ENCOUNTER — TELEPHONE (OUTPATIENT)
Dept: HEMATOLOGY ONCOLOGY | Facility: CLINIC | Age: 83
End: 2023-09-27

## 2023-09-27 NOTE — TELEPHONE ENCOUNTER
I contacted the patient regarding in-basket message to reschedule her 1/18/24 appointment with Dr. Saran Gerber to a sooner appointment. The patient declined to reschedule the appointment to a sooner appointment stating that she has already spoken to her PCP and was given Iron pills and will speak with her doctor again to see if she should come in sooner. The patient states she will call back if she needs to be seen sooner to reschedule.

## 2023-09-27 NOTE — TELEPHONE ENCOUNTER
Appointment Change  Cancel, Reschedule, Change to Virtual      Who are you speaking with? Patient   If it is not the patient, is the caller listed on the communication consent form? No   Which provider is the appointment scheduled with? Dr. Marcellus Ledezma   When was the original appointment scheduled? Please list date and time 01/08/24 @ 1pm   At which location is the appointment scheduled to take place? [facility]   Was the appointment rescheduled? Was the appointment changed from an in person visit to a virtual visit? If so, please list the details of the change. 10/31/23 @ 9:40am   What is the reason for the appointment change? Patient needs to be seen sooner       Was [facility] transport scheduled? no   Does [facility] transport need to be scheduled for the new visit (if applicable) no   Does the patient need an infusion appointment rescheduled? no   Does the patient have an upcoming infusion appointment scheduled? If so, when? no   Is the patient undergoing chemotherapy? no   For appointments cancelled with less than 24 hours:  Was the no-show policy reviewed?  yes

## 2023-10-12 ENCOUNTER — TELEPHONE (OUTPATIENT)
Age: 83
End: 2023-10-12

## 2023-10-12 NOTE — TELEPHONE ENCOUNTER
New Patient    What is the reason for the patient’s appointment?:[facility] called stating patient is a new patient with them. Patient was in the hospital for broken hip and patient has herrera catheter.   She wants to know how to proceed on setting up an appointment for herrera removal.     Marivel Walton can be reached [phone]    What office location does the patient prefer?:[place]     Does patient have Imaging/Lab Results:    Have patient records been requested?:  If No, are the records showing in Epic:       INSURANCE:   Do we accept the patient's insurance or is the patient Self-Pay?:    Insurance Provider:  Plan Type/Number:   Member ID#:       HISTORY:   Has the patient had any previous Urologist(s)?:no    Was the patient seen in the ED?:    Has the patient had any outside testing done?:    Does the patient have a personal history of cancer?:no

## 2023-10-17 NOTE — TELEPHONE ENCOUNTER
Called and spoke to Dougie at [facility] and confirmed the appts with her for the pts void trial and to establish care.

## 2023-10-27 NOTE — PROGRESS NOTES
10/30/2023      Chief Complaint   Patient presents with    Urinary Retention         Assessment and Plan    81 y.o. female -- New patient    1. Acute urinary retention s/p hip fracture  2. Vaginal atrophy  - Herrera attempted to be placed today for PVR of 300 mL. This was unsuccessful due to severe vaginal atrophy  - Started on terazosin   - Frequent voiding and increase water to prevent infection  - Discussed estrace cream, however patient has current chronic DVT and history of breast cancer  - ER precautions  - Call with any questions or concerns in the meantime  - All questions answered; patient understands and agrees with plan       History of Present Illness  Soto Vitale is a 81 y.o. female new patient here for evaluation of urinary retention     Present with nephew for today's visit. Denies seeing urology in the past. Patient had hip fracture 10/4/23 and had catheter placed for urinary retention. Patient denies prior history of retention or need for herrera catheter in the past. Herrera was removed last week. States she has frequent urination and stream is weak. Patient states she is not uncomfortable at this time. Denies family history of  malignancies. Review of Systems   Constitutional:  Negative for activity change, appetite change, chills and fever. HENT:  Negative for congestion and trouble swallowing. Respiratory:  Negative for cough and shortness of breath. Cardiovascular:  Negative for chest pain, palpitations and leg swelling. Gastrointestinal:  Negative for abdominal pain, constipation, diarrhea, nausea and vomiting. Genitourinary:  Negative for difficulty urinating, dysuria, flank pain, frequency, hematuria and urgency. Musculoskeletal:  Negative for back pain and gait problem. Skin:  Negative for wound. Allergic/Immunologic: Negative for immunocompromised state. Neurological:  Negative for dizziness and syncope. Hematological:  Does not bruise/bleed easily. Psychiatric/Behavioral:  Negative for confusion. All other systems reviewed and are negative. Universal Protocol:  Procedure performed by: Vernell Brooks PA-C)  Consent: Verbal consent obtained. Consent given by: patient  Patient understanding: patient states understanding of the procedure being performed  Patient identity confirmed: verbally with patient    Bladder catheterization    Date/Time: 10/30/2023 7:20 AM    Performed by: Micah Oseguera PA-C  Authorized by: Miach Oseguera PA-C    Consent:     Consent given by:  Patient  Universal protocol:     Procedure explained and questions answered to patient or proxy's satisfaction: yes      Patient identity confirmed:  Verbally with patient  Pre-procedure details:     Procedure purpose:  Therapeutic  Anesthesia (see MAR for exact dosages): Anesthesia method:  None  Procedure details:     Bladder irrigation: no      Approach: natural orifice      Catheter type: Carrera and latex    Catheter size:  16 Fr    Number of attempts:  3    Successful placement: no    Post-procedure details:     Patient tolerance of procedure: Tolerated well, no immediate complications  Comments:      Unsuccessful due to severe atrophy      Vitals  Vitals:    10/30/23 0719   BP: 112/68   Pulse: 69   SpO2: 96%   Weight: 69.4 kg (153 lb)   Height: 5' 2" (1.575 m)       Physical Exam  Constitutional:       General: She is not in acute distress. Appearance: Normal appearance. She is not ill-appearing, toxic-appearing or diaphoretic. HENT:      Head: Normocephalic. Nose: No congestion. Eyes:      General: No scleral icterus. Right eye: No discharge. Left eye: No discharge. Conjunctiva/sclera: Conjunctivae normal.      Pupils: Pupils are equal, round, and reactive to light. Pulmonary:      Effort: Pulmonary effort is normal.   Genitourinary:     Comments: Severe vaginal atrophy  Musculoskeletal:      Cervical back: Normal range of motion.    Skin: General: Skin is warm and dry. Coloration: Skin is not jaundiced or pale. Findings: No bruising, erythema, lesion or rash. Neurological:      General: No focal deficit present. Mental Status: She is alert and oriented to person, place, and time. Mental status is at baseline. Gait: Gait normal.   Psychiatric:         Mood and Affect: Mood normal.         Behavior: Behavior normal.         Thought Content: Thought content normal.         Judgment: Judgment normal.           Past History  Past Medical History:   Diagnosis Date    Allergic angioedema     2mar2016 resolved    Angioedema     [id number] resolved    Arthritis     Breast cancer ([facility]) 2012    right     Chronic kidney disease     Disease of thyroid gland     Hemorrhage of anus and rectum     02mar2016 resolved    Hyperlipidemia     Hypertension     Hypocalcemia     12oct2017 resolved    Neuritis     thoracic and lumbosacral    Peptic ulcer     Rheumatoid arthritis ([address] [place] [address])     Stage 3b chronic kidney disease ([address] [place] [address]) 5/7/2021    Vitamin D deficiency      Social History     Socioeconomic History    Marital status:      Spouse name: None    Number of children: None    Years of education: None    Highest education level: None   Occupational History    None   Tobacco Use    Smoking status: Never     Passive exposure: Past    Smokeless tobacco: Never   Vaping Use    Vaping Use: Never used   Substance and Sexual Activity    Alcohol use: Not Currently    Drug use: No    Sexual activity: Not Currently     Partners: Male   Other Topics Concern    None   Social History Narrative    Active: caffeine use     Social Determinants of Health     Financial Resource Strain: Low Risk  (11/21/2022)    Overall Financial Resource Strain (CARDIA)     Difficulty of Paying Living Expenses: Not very hard   Food Insecurity: Not on file   Transportation Needs: No Transportation Needs (11/21/2022)    PRAPARE - Transportation     Lack of Transportation (Medical):  No Lack of Transportation (Non-Medical):  No   Physical Activity: Not on file   Stress: Not on file   Social Connections: Not on file   Intimate Partner Violence: Not on file   Housing Stability: Not on file     Social History     Tobacco Use   Smoking Status Never    Passive exposure: Past   Smokeless Tobacco Never     Family History   Problem Relation Age of Onset    Breast cancer Mother     Kidney failure Father     Other Father         uremia    Lung cancer Brother     Breast cancer Maternal Aunt     Breast cancer Maternal Aunt     Breast cancer Maternal Aunt     Breast cancer Maternal Aunt     Breast cancer Maternal Aunt     Stomach cancer Maternal Uncle        The following portions of the patient's history were reviewed and updated as appropriate: allergies, current medications, past medical history, past social history, past surgical history and problem list.    Results  Recent Results (from the past 1 hour(s))   POCT Measure PVR    Collection Time: 10/30/23  7:29 AM   Result Value Ref Range    POST-VOID RESIDUAL VOLUME, ML  mL   ]  No results found for: "PSA"  Lab Results   Component Value Date    CALCIUM 9.2 09/06/2023    K 3.9 09/06/2023    CO2 28 09/06/2023     09/06/2023    BUN 26 (H) 09/06/2023    CREATININE 1.36 (H) 09/06/2023     Lab Results   Component Value Date    WBC 5.16 09/06/2023    HGB 9.5 (L) 09/06/2023    HCT 30.1 (L) 09/06/2023     (H) 09/06/2023     09/06/2023       Rosa Mckeon PA-C

## 2023-10-30 ENCOUNTER — OFFICE VISIT (OUTPATIENT)
Dept: UROLOGY | Facility: CLINIC | Age: 83
End: 2023-10-30
Payer: MEDICARE

## 2023-10-30 VITALS
SYSTOLIC BLOOD PRESSURE: 112 MMHG | HEART RATE: 69 BPM | HEIGHT: 62 IN | OXYGEN SATURATION: 96 % | BODY MASS INDEX: 28.16 KG/M2 | WEIGHT: 153 LBS | DIASTOLIC BLOOD PRESSURE: 68 MMHG

## 2023-10-30 DIAGNOSIS — N95.2 VAGINAL ATROPHY: ICD-10-CM

## 2023-10-30 DIAGNOSIS — R33.9 URINARY RETENTION: Primary | ICD-10-CM

## 2023-10-30 LAB — POST-VOID RESIDUAL VOLUME, ML POC: 303 ML

## 2023-10-30 PROCEDURE — 51798 US URINE CAPACITY MEASURE: CPT | Performed by: PHYSICIAN ASSISTANT

## 2023-10-30 PROCEDURE — 99204 OFFICE O/P NEW MOD 45 MIN: CPT | Performed by: PHYSICIAN ASSISTANT

## 2023-10-30 RX ORDER — RIVAROXABAN 10 MG/1
10 TABLET, FILM COATED ORAL DAILY
Status: ON HOLD | COMMUNITY
Start: 2023-10-10 | End: 2024-10-09

## 2023-10-30 RX ORDER — TERAZOSIN 1 MG/1
1 CAPSULE ORAL
Qty: 30 CAPSULE | Refills: 3 | Status: SHIPPED | OUTPATIENT
Start: 2023-10-30 | End: 2023-11-03

## 2023-10-31 ENCOUNTER — APPOINTMENT (OUTPATIENT)
Dept: LAB | Facility: HOSPITAL | Age: 83
End: 2023-10-31
Payer: MEDICARE

## 2023-10-31 ENCOUNTER — OFFICE VISIT (OUTPATIENT)
Dept: HEMATOLOGY ONCOLOGY | Facility: CLINIC | Age: 83
End: 2023-10-31
Payer: MEDICARE

## 2023-10-31 ENCOUNTER — TELEPHONE (OUTPATIENT)
Dept: HEMATOLOGY ONCOLOGY | Facility: CLINIC | Age: 83
End: 2023-10-31

## 2023-10-31 VITALS
BODY MASS INDEX: 27.79 KG/M2 | TEMPERATURE: 98 F | RESPIRATION RATE: 16 BRPM | HEIGHT: 62 IN | DIASTOLIC BLOOD PRESSURE: 62 MMHG | SYSTOLIC BLOOD PRESSURE: 112 MMHG | WEIGHT: 151 LBS

## 2023-10-31 DIAGNOSIS — D64.9 ANEMIA, UNSPECIFIED TYPE: Primary | ICD-10-CM

## 2023-10-31 DIAGNOSIS — D64.9 ANEMIA, UNSPECIFIED TYPE: ICD-10-CM

## 2023-10-31 DIAGNOSIS — D53.9 NUTRITIONAL ANEMIA, UNSPECIFIED: ICD-10-CM

## 2023-10-31 LAB
ALBUMIN SERPL BCP-MCNC: 3.6 G/DL (ref 3.5–5)
ALP SERPL-CCNC: 112 U/L (ref 34–104)
ALT SERPL W P-5'-P-CCNC: 58 U/L (ref 7–52)
ANION GAP SERPL CALCULATED.3IONS-SCNC: 8 MMOL/L
AST SERPL W P-5'-P-CCNC: 56 U/L (ref 13–39)
BILIRUB SERPL-MCNC: 0.68 MG/DL (ref 0.2–1)
BUN SERPL-MCNC: 53 MG/DL (ref 5–25)
CALCIUM SERPL-MCNC: 9.5 MG/DL (ref 8.4–10.2)
CHLORIDE SERPL-SCNC: 102 MMOL/L (ref 96–108)
CO2 SERPL-SCNC: 23 MMOL/L (ref 21–32)
CREAT SERPL-MCNC: 2.04 MG/DL (ref 0.6–1.3)
ERYTHROCYTE [DISTWIDTH] IN BLOOD BY AUTOMATED COUNT: 16 % (ref 11.6–15.1)
FERRITIN SERPL-MCNC: 572 NG/ML (ref 11–307)
FOLATE SERPL-MCNC: >22.3 NG/ML
GFR SERPL CREATININE-BSD FRML MDRD: 22 ML/MIN/1.73SQ M
GLUCOSE P FAST SERPL-MCNC: 124 MG/DL (ref 65–99)
HCT VFR BLD AUTO: 27.6 % (ref 34.8–46.1)
HGB BLD-MCNC: 9 G/DL (ref 11.5–15.4)
HGB RETIC QN AUTO: 31.1 PG (ref 30–38.3)
IMM RETICS NFR: 4.1 % (ref 0–14)
IRON SATN MFR SERPL: 26 % (ref 15–50)
IRON SERPL-MCNC: 94 UG/DL (ref 50–212)
MCH RBC QN AUTO: 31 PG (ref 26.8–34.3)
MCHC RBC AUTO-ENTMCNC: 32.6 G/DL (ref 31.4–37.4)
MCV RBC AUTO: 95 FL (ref 82–98)
PLATELET # BLD AUTO: 402 THOUSANDS/UL (ref 149–390)
PMV BLD AUTO: 9 FL (ref 8.9–12.7)
POTASSIUM SERPL-SCNC: 5.1 MMOL/L (ref 3.5–5.3)
PROT SERPL-MCNC: 6.6 G/DL (ref 6.4–8.4)
RBC # BLD AUTO: 2.9 MILLION/UL (ref 3.81–5.12)
RETICS # AUTO: NORMAL 10*3/UL (ref 14097–95744)
RETICS # CALC: 1.1 % (ref 0.37–1.87)
SODIUM SERPL-SCNC: 133 MMOL/L (ref 135–147)
TIBC SERPL-MCNC: 367 UG/DL (ref 250–450)
UIBC SERPL-MCNC: 273 UG/DL (ref 155–355)
VIT B12 SERPL-MCNC: 1679 PG/ML (ref 180–914)
WBC # BLD AUTO: 1.13 THOUSAND/UL (ref 4.31–10.16)

## 2023-10-31 PROCEDURE — 85046 RETICYTE/HGB CONCENTRATE: CPT

## 2023-10-31 PROCEDURE — 82607 VITAMIN B-12: CPT

## 2023-10-31 PROCEDURE — 83540 ASSAY OF IRON: CPT

## 2023-10-31 PROCEDURE — 82728 ASSAY OF FERRITIN: CPT

## 2023-10-31 PROCEDURE — 82746 ASSAY OF FOLIC ACID SERUM: CPT

## 2023-10-31 PROCEDURE — 83550 IRON BINDING TEST: CPT

## 2023-10-31 PROCEDURE — 80053 COMPREHEN METABOLIC PANEL: CPT

## 2023-10-31 PROCEDURE — 85027 COMPLETE CBC AUTOMATED: CPT

## 2023-10-31 PROCEDURE — 36415 COLL VENOUS BLD VENIPUNCTURE: CPT

## 2023-10-31 PROCEDURE — 99204 OFFICE O/P NEW MOD 45 MIN: CPT | Performed by: INTERNAL MEDICINE

## 2023-10-31 NOTE — TELEPHONE ENCOUNTER
Appointment Confirmation   Who are you speaking with? Patient   If it is not the patient, are they listed on an active communication consent form? N/A   Which provider is the appointment scheduled with? Dr. Rizwan Hernández   When is the appointment scheduled? Please list date and time 0/31/23 9:40AM   At which location is the appointment scheduled to take place? [facility]   Did caller verbalize understanding of appointment details?  Yes

## 2023-10-31 NOTE — PROGRESS NOTES
Shantelle Conrad  1940  [address] [facility] [place] [id number] [facility] [id number]    CHIEF COMPLAINT:     Anemia    HISTORY OF PRESENT ILLNESS:  Patient is a 81year old female, referred for evaluation of macrocytic anemia. Patient was seen previously, and her anemia was attributed to her severe Rheumatoid arthritis and use of Methotrexate. Patient has a history of mild renal impairment. Patient's H/H has not changed significant over the past number of years. Patient claims she is up to date on her colonoscopies. No symptoms of gross bleeding. Recent surgery for hip fracture, abd required 2 units of RBC's transfusion.         Patient Active Problem List   Diagnosis    Hypertension    Rheumatoid arthritis ([address])    Hypothyroidism    Hyperuricemia    Diastolic dysfunction    Hyperlipidemia    Lumbar radiculopathy    Osteopenia    Other chronic pain    Vitamin D deficiency    Adenocarcinoma of breast (HCC)    Localized edema    Abnormal finding on diagnostic imaging of kidney    Fat necrosis (segmental) of breast    Personal history of breast cancer    History of peptic ulcer    Intermediate stage nonexudative 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 eyes    Lower leg DVT (deep venous thromboembolism), chronic, left (HCC)    Bradycardia with 51-60 beats per minute    Varicose veins of left lower extremity with pain    Stage 3b chronic kidney disease ([address])    History of DVT (deep vein thrombosis)    Chronic kidney disease-mineral and bone disorder    Chronic pain of both knees     Past Medical History:   Diagnosis Date    Allergic angioedema     [id number] resolved    Angioedema     [id number] resolved    Arthritis     Breast cancer ([address]) 2012    right     Chronic kidney disease     Disease of thyroid gland     Hemorrhage of anus and rectum     [id number] resolved    Hyperlipidemia     Hypertension     Hypocalcemia     [id number] resolved    Neuritis thoracic and lumbosacral    Peptic ulcer     Rheumatoid arthritis ([address])     Stage 3b chronic kidney disease ([address]) 5/7/2021    Vitamin D deficiency      Oncology History    No history exists. Past Surgical History:   Procedure Laterality Date    ABDOMINAL SURGERY      BREAST BIOPSY Right 2012    BREAST LUMPECTOMY Right 2012    BREAST SURGERY      CHOLECYSTECTOMY      ELBOW SURGERY      GALLBLADDER SURGERY  1981    HIP SURGERY Right 09/18/2022    ORTHOPEDIC SURGERY      for toes    US GUIDED BREAST BIOPSY RIGHT COMPLETE Right 08/15/2022     Family History   Problem Relation Age of Onset    Breast cancer Mother     Kidney failure Father     Other Father         uremia    Lung cancer Brother     Breast cancer Maternal Aunt     Breast cancer Maternal Aunt     Breast cancer Maternal Aunt     Breast cancer Maternal Aunt     Breast cancer Maternal Aunt     Stomach cancer Maternal Uncle      Social History     Socioeconomic History    Marital status:      Spouse name: Not on file    Number of children: Not on file    Years of education: Not on file    Highest education level: Not on file   Occupational History    Not on file   Tobacco Use    Smoking status: Never     Passive exposure: Past    Smokeless tobacco: Never   Vaping Use    Vaping Use: Never used   Substance and Sexual Activity    Alcohol use: Not Currently    Drug use: No    Sexual activity: Not Currently     Partners: Male   Other Topics Concern    Not on file   Social History Narrative    Active: caffeine use     Social Determinants of Health     Financial Resource Strain: Low Risk  (11/21/2022)    Overall Financial Resource Strain (CARDIA)     Difficulty of Paying Living Expenses: Not very hard   Food Insecurity: Not on file   Transportation Needs: No Transportation Needs (11/21/2022)    PRAPARE - Transportation     Lack of Transportation (Medical): No     Lack of Transportation (Non-Medical):  No   Physical Activity: Not on file   Stress: Not on file Social Connections: Not on file   Intimate Partner Violence: Not on file   Housing Stability: Not on file       Allergies   Allergen Reactions    Lisinopril Anaphylaxis    Codeine Other (See Comments)     Nausea/vomiting      Other      Annotation - [id number]: tounge swelling    Oxycodone      Annotation - [id number]: NOT TOLERATE    Penicillins Hives    Sulfamethoxazole-Trimethoprim     Ciprofloxacin Rash           Meds:    Current Outpatient Medications:     ascorbic acid (VITAMIN C) 500 mg tablet, Take 500 mg by mouth daily. , Disp: , Rfl:     ASPIRIN 81 PO, 1 tablet ONCE DAILY (route: oral), Disp: , Rfl:     Calcium Citrate-Vitamin D (CITRACAL + D PO), Take by mouth 2 (two) times a day, Disp: , Rfl:     carvedilol (COREG) 25 mg tablet, Take 25 mg by mouth 2 (two) times a day with meals, Disp: , Rfl:     Cholecalciferol (VITAMIN D-3 PO), Take 2,000 Units by mouth daily. , Disp: , Rfl:     folic acid (FOLVITE) 1 mg tablet, 1 daily, Disp: 90 tablet, Rfl: 2    hydrochlorothiazide (HYDRODIURIL) 25 mg tablet, TAKE 1 TABLET BY MOUTH EVERY DAY, Disp: 90 tablet, Rfl: 2    hydrocortisone 0.5 % cream, Apply topically 2 (two) times a day, Disp: 30 g, Rfl: 0    Klor-Con M10 10 MEQ tablet, TAKE 2 TABLETS TWICE A DAY, Disp: 360 tablet, Rfl: 1    levothyroxine (Synthroid) 75 mcg tablet, Take 1 tablet (75 mcg total) by mouth in the morning, Disp: 90 tablet, Rfl: 3    magnesium oxide (MAG-OX) 400 mg, Take 1 tablet (400 mg total) by mouth 2 (two) times a day, Disp: 90 tablet, Rfl: 0    methotrexate 2.5 mg tablet, Take 1 tablet (2.5 mg total) by mouth once a week Patient takes 6 tablets weekly, Disp: , Rfl:     methylPREDNISolone 4 MG tablet therapy pack, Use as directed on package, Disp: 21 each, Rfl: 0    Multiple Vitamin (MULTIVITAMIN) tablet, Take 1 tablet by mouth daily. , Disp: , Rfl:     Psyllium (Metamucil) WAFR, Take once daily, Disp: 100 Wafer, Rfl: 5    riTUXimab (RITUXAN) 500 mg/50 mL, Infuse into a venous catheter every 6 (six) months  , Disp: , Rfl:     rosuvastatin (CRESTOR) 5 mg tablet, TAKE 1 TABLET DAILY, Disp: 90 tablet, Rfl: 2    terazosin (HYTRIN) 1 mg capsule, Take 1 capsule (1 mg total) by mouth daily at bedtime, Disp: 30 capsule, Rfl: 3    tobramycin (TOBREX) 0.3 % SOLN, Administer 1 drop to both eyes every 4 (four) hours while awake, Disp: 5 mL, Rfl: 0    Xarelto 10 MG tablet, Take 10 mg by mouth daily, Disp: , Rfl:          REVIEW OF SYSTEMS:  Constitutional:  Denies any fever; denies night sweats; denies weight loss. HEENT:  Denies blurred vission; denies eue drainage; denies bulging eyes; denies hearing impairment; denies tinnitis; denies vertigo; denies sore-throat; denies sinues drainage or post nasal drip. Neck:  Denies neck swelling. Respiratory:  Denies cough; denises coughing up blood; denies chest pains on breathing; denies shortness of breath. Cardiovascular:  Denies chest pains on exertion; denies heart palpitations; denies light headedness or feeling of fainting; denies leg swelling; denies pains in calves; denies pain in legs on walking. GI:  Denies difficulty swallowing; denies heartburn; denies vomiting blood; denies black-colored stools; denies nausea; denies vomiting; denies abdominal paindenies passage of bright red blood. :  Denies blood in urine; denies urinary frequency; denies pain on urination; denies difficulty in passing urine. Spine:  Denies neck pain; denies radiation of pain into arms or legs; denies lower back pain. Hematology:  Denies easy bruising. Lymphatics:  Denies new lumps anywhere  Neurological:  Denies headaches; denies dizziness; denies numbness in extremities; denies weakness in extremities; denies poor balance or disequilibrium. Dermatologic:  denies rash; denies itching.       PHYSICAL EXAM:  /62   Temp 98 °F (36.7 °C) (Temporal)   Resp 16   Ht 5' 2" (1.575 m)   Wt 68.5 kg (151 lb)   LMP  (LMP Unknown)   BMI 27.62 kg/m²      General:  Patient alert; oriented. HEENT:  PERRL; EOM intact; conjunctiva normal; no scleral icterus. Neck:  Trachea midline; thyroid not enlarged; No carotid bruits  Lymphatics:  No submandibular adenopathy; no cervical adenopathy; no supraclavicular adenopathy; no axillary adenopathy; no inguinal adenopathy. Heart:  Regular rhythm; S1 and S2 normal; No murmurs; no rubs; no gallops; peripheral pulses normal and equal bilaterally. Lungs:  Clear to ausculation and percussion  Abdomen:  Soft; non-tender; no masses; no organomegaly; no superficial veins; no hernia; no abdominal bruise. Extremities:  Severe deforming RA in hands. Spine:  No gross spinal deformity; no spinal tenderness; no CVA tenderness. Neurological: patient alert and oriented; crainials II - XII intact; no motor deficit; no sensory deficit; Gait slow. Uses walker. Psychiatric:  Mood is appropriate, affect is normal, no active hallucinations or delusions.     Labs:   Latest Reference Range & Units 10/31/23 11:01   WBC 4.31 - 10.16 Thousand/uL 1.13 (LL) (P)   Red Blood Cell Count 3.81 - 5.12 Million/uL 2.90 (L) (P)   Hemoglobin 11.5 - 15.4 g/dL 9.0 (L) (P)   HCT 34.8 - 46.1 % 27.6 (L) (P)   MCV 82 - 98 fL 95 (P)   MCH 26.8 - 34.3 pg 31.0 (P)   MCHC 31.4 - 37.4 g/dL 32.6 (P)   RDW 11.6 - 15.1 % 16.0 (H) (P)   Platelet Count 396 - 390 Thousands/uL 402 (H) (P)   MPV 8.9 - 12.7 fL 9.0 (P)      Latest Reference Range & Units 10/31/23 11:01   Sodium 135 - 147 mmol/L 133 (L)   Potassium 3.5 - 5.3 mmol/L 5.1   Chloride 96 - 108 mmol/L 102   CO2 21 - 32 mmol/L 23   Anion Gap mmol/L 8   BUN 5 - 25 mg/dL 53 (H)   Creatinine 0.60 - 1.30 mg/dL 2.04 (H)   GLUCOSE FASTING 65 - 99 mg/dL 124 (H)   Calcium 8.4 - 10.2 mg/dL 9.5   AST 13 - 39 U/L 56 (H)   ALT 7 - 52 U/L 58 (H)   Alkaline Phosphatase 34 - 104 U/L 112 (H)   Total Protein 6.4 - 8.4 g/dL 6.6   Albumin 3.5 - 5.0 g/dL 3.6   TOTAL BILIRUBIN 0.20 - 1.00 mg/dL 0.68   eGFR ml/min/1.73sq m 22         Assessment/Plan:   81year old female with chronic anemia, attributed to mild renal impairment and treatment of deforming RA with Methotrexate. H/H has been relatively stable, except recent hip surgery, where she required blood transfusions. Will get routine anemia work-up ,including FE panel, B12, Folate, Retic count, B12 and Folate. Will follow up in 1 month for review of results.

## 2023-11-01 ENCOUNTER — OFFICE VISIT (OUTPATIENT)
Dept: INTERNAL MEDICINE CLINIC | Facility: CLINIC | Age: 83
End: 2023-11-01
Payer: MEDICARE

## 2023-11-01 VITALS
RESPIRATION RATE: 16 BRPM | SYSTOLIC BLOOD PRESSURE: 102 MMHG | HEART RATE: 60 BPM | BODY MASS INDEX: 28.6 KG/M2 | WEIGHT: 155.4 LBS | HEIGHT: 62 IN | DIASTOLIC BLOOD PRESSURE: 58 MMHG

## 2023-11-01 DIAGNOSIS — M06.9 RHEUMATOID ARTHRITIS OF LEFT ANKLE, UNSPECIFIED WHETHER RHEUMATOID FACTOR PRESENT (HCC): Primary | ICD-10-CM

## 2023-11-01 DIAGNOSIS — N18.4 CHRONIC RENAL DISEASE, STAGE IV (HCC): ICD-10-CM

## 2023-11-01 DIAGNOSIS — N32.3 BLADDER DIVERTICULUM: ICD-10-CM

## 2023-11-01 DIAGNOSIS — R79.89 ABNORMAL LFTS: ICD-10-CM

## 2023-11-01 PROBLEM — E46 PROTEIN-CALORIE MALNUTRITION, UNSPECIFIED SEVERITY (HCC): Status: ACTIVE | Noted: 2023-11-01

## 2023-11-01 PROCEDURE — 99214 OFFICE O/P EST MOD 30 MIN: CPT | Performed by: INTERNAL MEDICINE

## 2023-11-01 NOTE — PROGRESS NOTES
Assessment/Plan:     Patient is here for rehab follow-up. [facility] Was admitted to the hospital for a hip arthroplasty and hardware removal.  She has since seen orthopedics. Her hospitalization was complicated by need for blood transfusion x2. She had some urinary retention and needed a Carrera catheter. At abnormal ultrasound kidney bladder revealing bladder diverticulum. Radiology recommended CT imaging. Which has been ordered today. Sent a message to staff about starting Prolia. DEXA scan is due. Continue nephrology follow-up. Recheck BMP in a week. Get imaging done. Elevated LFTs. Recheck hepatic function panel. May need to hold methotrexate we will reach out to rheumatology. Abnormal CBC revealing low white blood cell count. Denies any fever. I sent a message to hematology for their recommendations. They are the ones that ordered the test.    Quality Measures:       Depression Screening and Follow-up Plan: Patient was screened for depression during today's encounter. They screened negative with a PHQ-2 score of 0. Falls Plan of Care: balance, strength, and gait training instructions were provided. Medications that increase falls were reviewed. Assessed feet and footwear. Vitamin D supplementation was recommended. Return for Next scheduled follow up, regular and medicare. No problem-specific Assessment & Plan notes found for this encounter. Diagnoses and all orders for this visit:    Rheumatoid arthritis of left ankle, unspecified whether rheumatoid factor present (HCC)    Chronic renal disease, stage IV ([address])  -     Basic metabolic panel; Future    Abnormal LFTs  -     Hepatic function panel; Future    Bladder diverticulum  -     CT abdomen pelvis wo contrast; Future          Subjective:      Patient ID: Susannah Bass is a 81 y.o. female. Here for rehab f/u.         ALLERGIES:  Allergies   Allergen Reactions    Lisinopril Anaphylaxis Codeine Other (See Comments)     Nausea/vomiting      Other      Annotation - [id number]: tounge swelling    Oxycodone      Annotation - [id number]: NOT TOLERATE    Penicillins Hives    Sulfamethoxazole-Trimethoprim     Ciprofloxacin Rash       CURRENT MEDICATIONS:    Current Outpatient Medications:     ascorbic acid (VITAMIN C) 500 mg tablet, Take 500 mg by mouth daily. , Disp: , Rfl:     ASPIRIN 81 PO, 1 tablet ONCE DAILY (route: oral), Disp: , Rfl:     Calcium Citrate-Vitamin D (CITRACAL + D PO), Take by mouth 2 (two) times a day, Disp: , Rfl:     carvedilol (COREG) 25 mg tablet, Take 25 mg by mouth 2 (two) times a day with meals, Disp: , Rfl:     Cholecalciferol (VITAMIN D-3 PO), Take 2,000 Units by mouth daily. , Disp: , Rfl:     folic acid (FOLVITE) 1 mg tablet, 1 daily, Disp: 90 tablet, Rfl: 2    hydrochlorothiazide (HYDRODIURIL) 25 mg tablet, TAKE 1 TABLET BY MOUTH EVERY DAY, Disp: 90 tablet, Rfl: 2    hydrocortisone 0.5 % cream, Apply topically 2 (two) times a day, Disp: 30 g, Rfl: 0    Klor-Con M10 10 MEQ tablet, TAKE 2 TABLETS TWICE A DAY, Disp: 360 tablet, Rfl: 1    levothyroxine (Synthroid) 75 mcg tablet, Take 1 tablet (75 mcg total) by mouth in the morning, Disp: 90 tablet, Rfl: 3    magnesium oxide (MAG-OX) 400 mg, Take 1 tablet (400 mg total) by mouth 2 (two) times a day, Disp: 90 tablet, Rfl: 0    methotrexate 2.5 mg tablet, Take 1 tablet (2.5 mg total) by mouth once a week Patient takes 6 tablets weekly, Disp: , Rfl:     Multiple Vitamin (MULTIVITAMIN) tablet, Take 1 tablet by mouth daily. , Disp: , Rfl:     Psyllium (Metamucil) WAFR, Take once daily, Disp: 100 Wafer, Rfl: 5    riTUXimab (RITUXAN) 500 mg/50 mL, Infuse into a venous catheter every 6 (six) months  , Disp: , Rfl:     rosuvastatin (CRESTOR) 5 mg tablet, TAKE 1 TABLET DAILY, Disp: 90 tablet, Rfl: 2    terazosin (HYTRIN) 1 mg capsule, Take 1 capsule (1 mg total) by mouth daily at bedtime, Disp: 30 capsule, Rfl: 3    Xarelto 10 MG tablet, Take 10 mg by mouth daily, Disp: , Rfl:     ACTIVE PROBLEM LIST:  Patient Active Problem List   Diagnosis    Hypertension    Rheumatoid arthritis ([address])    Hypothyroidism    Hyperuricemia    Diastolic dysfunction    Hyperlipidemia    Lumbar radiculopathy    Osteopenia    Other chronic pain    Vitamin D deficiency    Adenocarcinoma of breast (HCC)    Localized edema    Abnormal finding on diagnostic imaging of kidney    Fat necrosis (segmental) of breast    Personal history of breast cancer    History of peptic ulcer    Intermediate stage nonexudative 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 eyes    Lower leg DVT (deep venous thromboembolism), chronic, left (HCC)    Bradycardia with 51-60 beats per minute    Varicose veins of left lower extremity with pain    Stage 3b chronic kidney disease ([address])    History of DVT (deep vein thrombosis)    Chronic kidney disease-mineral and bone disorder    Chronic pain of both knees    Protein-calorie malnutrition, unspecified severity ([address])    Chronic renal disease, stage IV ([address])       PAST MEDICAL HISTORY:  Past Medical History:   Diagnosis Date    Allergic angioedema     [id number] resolved    Angioedema     [id number] resolved    Arthritis     Breast cancer ([address]) 2012    right     Chronic kidney disease     Disease of thyroid gland     Hemorrhage of anus and rectum     [id number] resolved    Hyperlipidemia     Hypertension     Hypocalcemia     [id number] resolved    Neuritis     thoracic and lumbosacral    Peptic ulcer     Rheumatoid arthritis ([address])     Stage 3b chronic kidney disease ([address]) 5/7/2021    Vitamin D deficiency        PAST SURGICAL HISTORY:  Past Surgical History:   Procedure Laterality Date    ABDOMINAL SURGERY      BREAST BIOPSY Right 2012    BREAST LUMPECTOMY Right 2012    BREAST SURGERY      CHOLECYSTECTOMY      ELBOW SURGERY      GALLBLADDER SURGERY  1981    HIP SURGERY Right 09/18/2022    10/2023    ORTHOPEDIC SURGERY      for toes    US GUIDED BREAST BIOPSY RIGHT COMPLETE Right 08/15/2022       FAMILY HISTORY:  Family History   Problem Relation Age of Onset    Breast cancer Mother     Kidney failure Father     Other Father         uremia    Lung cancer Brother     Breast cancer Maternal Aunt     Breast cancer Maternal Aunt     Breast cancer Maternal Aunt     Breast cancer Maternal Aunt     Breast cancer Maternal Aunt     Stomach cancer Maternal Uncle        SOCIAL HISTORY:  Social History     Socioeconomic History    Marital status:      Spouse name: Not on file    Number of children: Not on file    Years of education: Not on file    Highest education level: Not on file   Occupational History    Not on file   Tobacco Use    Smoking status: Never     Passive exposure: Past    Smokeless tobacco: Never   Vaping Use    Vaping Use: Never used   Substance and Sexual Activity    Alcohol use: Not Currently    Drug use: No    Sexual activity: Not Currently     Partners: Male   Other Topics Concern    Not on file   Social History Narrative    Active: caffeine use     Social Determinants of Health     Financial Resource Strain: Low Risk  (11/21/2022)    Overall Financial Resource Strain (CARDIA)     Difficulty of Paying Living Expenses: Not very hard   Food Insecurity: Not on file   Transportation Needs: No Transportation Needs (11/21/2022)    PRAPARE - Transportation     Lack of Transportation (Medical): No     Lack of Transportation (Non-Medical): No   Physical Activity: Not on file   Stress: Not on file   Social Connections: Not on file   Intimate Partner Violence: Not on file   Housing Stability: Not on file       Review of Systems   Constitutional:  Negative for chills and fever. HENT:  Negative for ear pain and sore throat. Eyes:  Negative for pain and visual disturbance. Respiratory:  Negative for cough and shortness of breath. Cardiovascular:  Negative for chest pain and palpitations. Gastrointestinal:  Negative for abdominal pain and vomiting. Genitourinary:  Negative for dysuria and hematuria. Musculoskeletal:  Negative for arthralgias and back pain. Skin:  Negative for color change and rash. Neurological:  Negative for seizures and syncope. All other systems reviewed and are negative. Objective:  Vitals:    11/01/23 1536   BP: 102/58   BP Location: Left arm   Patient Position: Sitting   Cuff Size: Adult   Pulse: 60   Resp: 16   Weight: 70.5 kg (155 lb 6.4 oz)   Height: 5' 2" (1.575 m)     Body mass index is 28.42 kg/m². Physical Exam  Vitals and nursing note reviewed. Constitutional:       Appearance: Normal appearance. She is obese. HENT:      Head: Normocephalic and atraumatic. Cardiovascular:      Rate and Rhythm: Normal rate and regular rhythm. Heart sounds: Normal heart sounds. Pulmonary:      Effort: Pulmonary effort is normal.      Breath sounds: Normal breath sounds. Musculoskeletal:         General: Normal range of motion. Cervical back: Normal range of motion. Right lower leg: No edema. Left lower leg: No edema. Skin:     General: Skin is warm and dry. Neurological:      General: No focal deficit present. Mental Status: She is alert and oriented to person, place, and time. Mental status is at baseline. Psychiatric:         Mood and Affect: Mood normal.           RESULTS:  Hemoglobin A1C   Date/Time Value Ref Range Status   09/06/2023 10:05 AM 5.6 Normal 4.0-5.6%; PreDiabetic 5.7-6.4%;  Diabetic >=6.5%; Glycemic control for adults with diabetes <7.0% % Final     Cholesterol   Date/Time Value Ref Range Status   09/06/2023 10:05  See Comment mg/dL Final     Comment:     Cholesterol:         Pediatric <18 Years        Desirable          <170 mg/dL      Borderline High    170-199 mg/dL      High               >=200 mg/dL        Adult >=18 Years            Desirable        <200 mg/dL      Borderline High  200-239 mg/dL      High             >239 mg/dL       Triglycerides   Date/Time Value Ref Range Status   09/06/2023 10:05 AM 84 See Comment mg/dL Final     Comment:     Triglyceride:     0-9Y            <75mg/dL     10Y-17Y         <90 mg/dL       >=18Y     Normal          <150 mg/dL     Borderline High 150-199 mg/dL     High            200-499 mg/dL        Very High       >499 mg/dL    Specimen collection should occur prior to Metamizole administration due to the potential for falsely depressed results. 04/06/2015 03:44 PM 84  Final     HDL   Date/Time Value Ref Range Status   04/06/2015 03:44 PM 55  Final     HDL, Direct   Date/Time Value Ref Range Status   09/06/2023 10:05 AM 47 (L) >=50 mg/dL Final     LDL Calculated   Date/Time Value Ref Range Status   09/06/2023 10:05 AM 49 0 - 100 mg/dL Final     Comment:     LDL Cholesterol:     Optimal           <100 mg/dl     Near Optimal      100-129 mg/dl     Above Optimal       Borderline High 130-159 mg/dl       High            160-189 mg/dl       Very High       >189 mg/dl         This screening LDL is a calculated result. It does not have the accuracy of the Direct Measured LDL in the monitoring of patients with hyperlipidemia and/or statin therapy. Direct Measure LDL ([id number]) must be ordered separately in these patients.      Hemoglobin   Date/Time Value Ref Range Status   10/31/2023 11:01 AM 9.0 (L) 11.5 - 15.4 g/dL Final   09/25/2015 10:31 AM 10.7 (L) 11.5 - 15.4 g/dL Final     Hematocrit   Date/Time Value Ref Range Status   10/31/2023 11:01 AM 27.6 (L) 34.8 - 46.1 % Final   09/25/2015 10:31 AM 33.6 (L) 34.8 - 46.1 % Final     Platelets   Date/Time Value Ref Range Status   10/31/2023 11:01  (H) 149 - 390 Thousands/uL Final   09/25/2015 10:31  149 - 390 Thousand/uL Final     TSH 3RD GENERATON   Date/Time Value Ref Range Status   09/06/2023 10:05 AM 1.331 0.450 - 4.500 uIU/mL Final     Comment:     The recommended reference ranges for TSH during pregnancy are as follows:   First trimester 0.1 to 2.5 uIU/mL   Second trimester 0.2 to 3.0 uIU/mL   Third trimester 0.3 to 3.0 uIU/m    Note: Normal ranges may not apply to patients who are transgender, non-binary, or whose legal sex, sex at birth, and gender identity differ. Adult TSH (3rd generation) reference range follows the recommended guidelines of the American Thy[employer] Association, January, 2020. Free T4   Date/Time Value Ref Range Status   12/09/2019 10:25 AM 1.20 0.76 - 1.46 ng/dL Final     Comment:       Specimen collection should occur prior to Sulfasalazine administration due to the potential for falsely elevated results. Sodium   Date/Time Value Ref Range Status   10/31/2023 11:01  (L) 135 - 147 mmol/L Final     BUN   Date/Time Value Ref Range Status   10/31/2023 11:01 AM 53 (H) 5 - 25 mg/dL Final     Creatinine   Date/Time Value Ref Range Status   10/31/2023 11:01 AM 2.04 (H) 0.60 - 1.30 mg/dL Final     Comment:     Standardized to IDMS reference method   09/25/2015 11:46 AM 1.27 0.60 - 1.30 mg/dL Final     Comment:     Standardized to IDMS reference method      In chart    This note was created with voice recognition software. Phonic, grammatical and spelling errors may be present within the note as a result.

## 2023-11-02 ENCOUNTER — NURSE TRIAGE (OUTPATIENT)
Age: 83
End: 2023-11-02

## 2023-11-02 NOTE — TELEPHONE ENCOUNTER
Regarding: hematuria and extremely low blood pressure  ----- Message from Zack Collier sent at 11/2/2023  3:09 PM EDT -----  [facility] calling and stated that the patient has hematuria and she is looking very pale. They are concerned. She has a blood pressure is very very low.  She has an appointment tomorrow but they don't know if she should wait

## 2023-11-02 NOTE — TELEPHONE ENCOUNTER
Regarding: hematuria and extremely low blood pressure  ----- Message from Amita Mathew sent at 11/2/2023  3:09 PM EDT -----  [facility] calling and stated that the patient has hematuria and she is looking very pale. They are concerned. She has a blood pressure is very very low.  She has an appointment tomorrow but they don't know if she should wait

## 2023-11-02 NOTE — TELEPHONE ENCOUNTER
Naveen Sharpe( patients nurse)  ,called  and stated that the patient has hematuria , recent pain but improving. To patients Physical Therapist  patient looks very pal, BP 90/60 per nurse Marcie's nurse this is normal. Advised patients nurse Naveen Sharpe to monitor symptoms and if bleeding increases etc. please take patient to ER. Denies, dizziness, SOB, fever, chills.       Patient has appt tomorrow scheduled in [facility] at 9:30am

## 2023-11-03 ENCOUNTER — PROCEDURE VISIT (OUTPATIENT)
Dept: UROLOGY | Facility: CLINIC | Age: 83
End: 2023-11-03
Payer: MEDICARE

## 2023-11-03 VITALS
DIASTOLIC BLOOD PRESSURE: 68 MMHG | BODY MASS INDEX: 28.52 KG/M2 | SYSTOLIC BLOOD PRESSURE: 112 MMHG | WEIGHT: 155 LBS | HEIGHT: 62 IN

## 2023-11-03 DIAGNOSIS — R31.0 GROSS HEMATURIA: ICD-10-CM

## 2023-11-03 DIAGNOSIS — N93.9 VAGINAL BLEEDING: ICD-10-CM

## 2023-11-03 DIAGNOSIS — R33.9 URINARY RETENTION: Primary | ICD-10-CM

## 2023-11-03 LAB
BACTERIA UR QL AUTO: ABNORMAL /HPF
BILIRUB UR QL STRIP: NEGATIVE
CLARITY UR: ABNORMAL
COLOR UR: ABNORMAL
GLUCOSE UR STRIP-MCNC: NEGATIVE MG/DL
HGB UR QL STRIP.AUTO: ABNORMAL
KETONES UR STRIP-MCNC: NEGATIVE MG/DL
LEUKOCYTE ESTERASE UR QL STRIP: ABNORMAL
NITRITE UR QL STRIP: NEGATIVE
NON-SQ EPI CELLS URNS QL MICRO: ABNORMAL /HPF
PH UR STRIP.AUTO: 6.5 [PH]
POST-VOID RESIDUAL VOLUME, ML POC: 396 ML
PROT UR STRIP-MCNC: ABNORMAL MG/DL
RBC #/AREA URNS AUTO: ABNORMAL /HPF
SP GR UR STRIP.AUTO: 1.02 (ref 1–1.03)
URATE CRY URNS QL MICRO: ABNORMAL /HPF
UROBILINOGEN UR STRIP-ACNC: <2 MG/DL
WBC #/AREA URNS AUTO: ABNORMAL /HPF

## 2023-11-03 PROCEDURE — 51700 IRRIGATION OF BLADDER: CPT | Performed by: PHYSICIAN ASSISTANT

## 2023-11-03 PROCEDURE — 81001 URINALYSIS AUTO W/SCOPE: CPT | Performed by: PHYSICIAN ASSISTANT

## 2023-11-03 PROCEDURE — 87086 URINE CULTURE/COLONY COUNT: CPT | Performed by: PHYSICIAN ASSISTANT

## 2023-11-03 PROCEDURE — 88112 CYTOPATH CELL ENHANCE TECH: CPT | Performed by: PATHOLOGY

## 2023-11-03 PROCEDURE — 99214 OFFICE O/P EST MOD 30 MIN: CPT | Performed by: PHYSICIAN ASSISTANT

## 2023-11-03 PROCEDURE — 51798 US URINE CAPACITY MEASURE: CPT | Performed by: PHYSICIAN ASSISTANT

## 2023-11-03 PROCEDURE — 87077 CULTURE AEROBIC IDENTIFY: CPT | Performed by: PHYSICIAN ASSISTANT

## 2023-11-03 PROCEDURE — 87186 SC STD MICRODIL/AGAR DIL: CPT | Performed by: PHYSICIAN ASSISTANT

## 2023-11-03 RX ORDER — DOXYCYCLINE 100 MG/1
100 TABLET ORAL 2 TIMES DAILY
Qty: 14 TABLET | Refills: 0 | Status: ON HOLD | OUTPATIENT
Start: 2023-11-03 | End: 2023-11-10

## 2023-11-03 NOTE — PROGRESS NOTES
11/3/2023      Chief Complaint   Patient presents with    Urinary Retention     PVR         Assessment and Plan    81 y.o. female     1. Gross hematuria  2. Vaginal atrophy  3. Possible vaginal bleeding  4. Urinary retention   - Urine sent for culture, UA micro, and cytology  - Catheter successfully placed by Dr. Erica Mathis  - Patient will require complete gross hematuria workup. Unable to have contrasted dye due to GFR of 22. CT abdomen pelvis wo contrast ordered for December. Patient to follow up for ASAP cystoscopy  - Antibiotics sent to pharmacy. Patient has many allergies to antibiotics and CKD with GFR of 22. Doxycycline sent to pharmacy  - GYN referral for possible vaginal bleeding  - Call with any questions or concerns in the meantime  - All questions answered; patient understands and agrees with plan     I have spent a total time of 60 minutes on 11/03/23 in caring for this patient including Diagnostic results, Prognosis, Risks and benefits of tx options, and catheter placement . History of Present Illness  Luan Del Rosario is a 81 y.o. female patient with history of urinary retention and atrophic vaginitis here for follow up. Patient with severe vaginal atrophy and elevated PVR. Attempted catheterization without success. PVR today remains elevated and patient symptomatic. Unsure if she is having vaginal bleeding as well. GFR 22 and many drug allergies. Universal Protocol:  Procedure performed by: (Dr. Erica Mathis)  Consent: Verbal consent obtained.   Consent given by: patient  Patient understanding: patient states understanding of the procedure being performed  Patient identity confirmed: verbally with patient    Bladder catheterization    Date/Time: 11/3/2023 9:30 AM    Performed by: Martín Dobson PA-C  Authorized by: Martín Dobson PA-C    Consent:     Consent given by:  Patient  Universal protocol:     Procedure explained and questions answered to patient or proxy's satisfaction: yes      Patient identity confirmed:  Verbally with patient  Pre-procedure details:     Procedure purpose:  Therapeutic  Anesthesia (see MAR for exact dosages): Anesthesia method:  None  Procedure details:     Bladder irrigation: yes      Catheter insertion:  Indwelling    Approach: natural orifice      Catheter type: Carrera and non-latex    Catheter size:  16 Fr    Number of attempts:  3    Successful placement: yes      Urine characteristics:  Bloody and dark    Provider performed due to:  Nurse unable to complete  Post-procedure details:     Patient tolerance of procedure: Tolerated well, no immediate complications  Comments:      Patient with severe vaginal atrophy. Attempted to place coude catheter and silicone catheter with RN without success. Betadine was used prior to procedure. Patient prepped and draped in sterile fashion. Dr. Drea Cortes then attempted catheterization with success. 16 Fr silicone catheter in place. Dark red urine with small clots drained, 400 mL. Catheter then was irrigated with sterile water until cherry red. Draining well. Urine collected in sterile fashion and will be sent for UA micro, culture, and cytology. Patient tolerated procedure well and had no immediate concerns. Review of Systems   Constitutional:  Negative for activity change, appetite change, chills and fever. HENT:  Negative for congestion and trouble swallowing. Respiratory:  Negative for cough and shortness of breath. Cardiovascular:  Negative for chest pain, palpitations and leg swelling. Gastrointestinal:  Negative for abdominal pain, constipation, diarrhea, nausea and vomiting. Genitourinary:  Positive for difficulty urinating and hematuria. Negative for dysuria, flank pain, frequency and urgency. Musculoskeletal:  Negative for back pain and gait problem. Skin:  Negative for wound. Allergic/Immunologic: Negative for immunocompromised state. Neurological:  Negative for dizziness and syncope. Hematological:  Does not bruise/bleed easily. Psychiatric/Behavioral:  Negative for confusion. All other systems reviewed and are negative. Vitals  Vitals:    11/03/23 0934   BP: 112/68   Weight: 70.3 kg (155 lb)   Height: 5' 2" (1.575 m)       Physical Exam  Constitutional:       General: She is not in acute distress. Appearance: Normal appearance. She is not ill-appearing, toxic-appearing or diaphoretic. HENT:      Head: Normocephalic. Nose: No congestion. Eyes:      General: No scleral icterus. Right eye: No discharge. Left eye: No discharge. Conjunctiva/sclera: Conjunctivae normal.      Pupils: Pupils are equal, round, and reactive to light. Pulmonary:      Effort: Pulmonary effort is normal.   Genitourinary:     Comments: Severe vaginal atrophy  Musculoskeletal:      Cervical back: Normal range of motion. Skin:     General: Skin is warm and dry. Coloration: Skin is not jaundiced or pale. Findings: No bruising, erythema, lesion or rash. Neurological:      General: No focal deficit present. Mental Status: She is alert and oriented to person, place, and time. Mental status is at baseline. Gait: Gait normal.   Psychiatric:         Mood and Affect: Mood normal.         Behavior: Behavior normal.         Thought Content:  Thought content normal.         Judgment: Judgment normal.           Past History  Past Medical History:   Diagnosis Date    Allergic angioedema     2dry4364 resolved    Angioedema     20aug2015 resolved    Arthritis     Breast cancer ([address]) 2012    right     Chronic kidney disease     Disease of thyroid gland     Hemorrhage of anus and rectum     02mar2016 resolved    Hyperlipidemia     Hypertension     Hypocalcemia     12oct2017 resolved    Neuritis     thoracic and lumbosacral    Peptic ulcer     Rheumatoid arthritis ([address])     Stage 3b chronic kidney disease ([address]) 5/7/2021    Vitamin D deficiency      Social History Socioeconomic History    Marital status:      Spouse name: None    Number of children: None    Years of education: None    Highest education level: None   Occupational History    None   Tobacco Use    Smoking status: Never     Passive exposure: Past    Smokeless tobacco: Never   Vaping Use    Vaping Use: Never used   Substance and Sexual Activity    Alcohol use: Not Currently    Drug use: No    Sexual activity: Not Currently     Partners: Male   Other Topics Concern    None   Social History Narrative    Active: caffeine use     Social Determinants of Health     Financial Resource Strain: Low Risk  (11/21/2022)    Overall Financial Resource Strain (CARDIA)     Difficulty of Paying Living Expenses: Not very hard   Food Insecurity: Not on file   Transportation Needs: No Transportation Needs (11/21/2022)    PRAPARE - Transportation     Lack of Transportation (Medical): No     Lack of Transportation (Non-Medical): No   Physical Activity: Not on file   Stress: Not on file   Social Connections: Not on file   Intimate Partner Violence: Not on file   Housing Stability: Not on file     Social History     Tobacco Use   Smoking Status Never    Passive exposure: Past   Smokeless Tobacco Never     Family History   Problem Relation Age of Onset    Breast cancer Mother     Kidney failure Father     Other Father         uremia    Lung cancer Brother     Breast cancer Maternal Aunt     Breast cancer Maternal Aunt     Breast cancer Maternal Aunt     Breast cancer Maternal Aunt     Breast cancer Maternal Aunt     Stomach cancer Maternal Uncle        The following portions of the patient's history were reviewed and updated as appropriate: allergies, current medications, past medical history, past social history, past surgical history and problem list.    Results  No results found for this or any previous visit (from the past 1 hour(s)). ]  No results found for: "PSA"  Lab Results   Component Value Date    CALCIUM 9.5 10/31/2023    K 5.1 10/31/2023    CO2 23 10/31/2023     10/31/2023    BUN 53 (H) 10/31/2023    CREATININE 2.04 (H) 10/31/2023     Lab Results   Component Value Date    WBC 1.13 (LL) 10/31/2023    HGB 9.0 (L) 10/31/2023    HCT 27.6 (L) 10/31/2023    MCV 95 10/31/2023     (H) 10/31/2023       Mike Ward PA-C

## 2023-11-04 ENCOUNTER — HOSPITAL ENCOUNTER (INPATIENT)
Facility: HOSPITAL | Age: 83
LOS: 4 days | Discharge: HOME HEALTH | DRG: 663 | End: 2023-11-08
Attending: STUDENT IN AN ORGANIZED HEALTH CARE EDUCATION/TRAINING PROGRAM | Admitting: STUDENT IN AN ORGANIZED HEALTH CARE EDUCATION/TRAINING PROGRAM
Payer: MEDICARE

## 2023-11-04 ENCOUNTER — APPOINTMENT (EMERGENCY)
Dept: CT IMAGING | Facility: HOSPITAL | Age: 83
End: 2023-11-04
Payer: MEDICARE

## 2023-11-04 ENCOUNTER — HOSPITAL ENCOUNTER (EMERGENCY)
Facility: HOSPITAL | Age: 83
End: 2023-11-04
Attending: EMERGENCY MEDICINE
Payer: MEDICARE

## 2023-11-04 VITALS
SYSTOLIC BLOOD PRESSURE: 107 MMHG | OXYGEN SATURATION: 99 % | HEART RATE: 70 BPM | TEMPERATURE: 97.9 F | RESPIRATION RATE: 20 BRPM | DIASTOLIC BLOOD PRESSURE: 55 MMHG

## 2023-11-04 DIAGNOSIS — E87.5 HYPERKALEMIA: ICD-10-CM

## 2023-11-04 DIAGNOSIS — R31.0 GROSS HEMATURIA: Primary | ICD-10-CM

## 2023-11-04 DIAGNOSIS — D62 ACUTE BLOOD LOSS ANEMIA: Primary | ICD-10-CM

## 2023-11-04 DIAGNOSIS — R31.0 GROSS HEMATURIA: ICD-10-CM

## 2023-11-04 DIAGNOSIS — N17.9 AKI (ACUTE KIDNEY INJURY) (HCC): ICD-10-CM

## 2023-11-04 PROBLEM — N18.9 ACUTE KIDNEY INJURY SUPERIMPOSED ON CHRONIC KIDNEY DISEASE: Status: ACTIVE | Noted: 2023-11-04

## 2023-11-04 PROBLEM — R74.01 TRANSAMINITIS: Status: ACTIVE | Noted: 2023-11-04

## 2023-11-04 LAB
ABO GROUP BLD: NORMAL
ABO GROUP BLD: NORMAL
ALBUMIN SERPL BCP-MCNC: 3 G/DL (ref 3.5–5)
ALP SERPL-CCNC: 85 U/L (ref 34–104)
ALT SERPL W P-5'-P-CCNC: 62 U/L (ref 7–52)
ANION GAP SERPL CALCULATED.3IONS-SCNC: 6 MMOL/L
APTT PPP: 49 SECONDS (ref 23–37)
AST SERPL W P-5'-P-CCNC: 63 U/L (ref 13–39)
BASOPHILS # BLD MANUAL: 0 THOUSAND/UL (ref 0–0.1)
BASOPHILS NFR MAR MANUAL: 0 % (ref 0–1)
BILIRUB SERPL-MCNC: 0.39 MG/DL (ref 0.2–1)
BLD GP AB SCN SERPL QL: NEGATIVE
BUN SERPL-MCNC: 58 MG/DL (ref 5–25)
BURR CELLS BLD QL SMEAR: PRESENT
CALCIUM ALBUM COR SERPL-MCNC: 9.3 MG/DL (ref 8.3–10.1)
CALCIUM SERPL-MCNC: 8.5 MG/DL (ref 8.4–10.2)
CHLORIDE SERPL-SCNC: 103 MMOL/L (ref 96–108)
CO2 SERPL-SCNC: 22 MMOL/L (ref 21–32)
CREAT SERPL-MCNC: 1.97 MG/DL (ref 0.6–1.3)
EOSINOPHIL # BLD MANUAL: 0.15 THOUSAND/UL (ref 0–0.4)
EOSINOPHIL NFR BLD MANUAL: 4 % (ref 0–6)
ERYTHROCYTE [DISTWIDTH] IN BLOOD BY AUTOMATED COUNT: 16.3 % (ref 11.6–15.1)
GFR SERPL CREATININE-BSD FRML MDRD: 23 ML/MIN/1.73SQ M
GLUCOSE SERPL-MCNC: 119 MG/DL (ref 65–140)
HCT VFR BLD AUTO: 18.8 % (ref 34.8–46.1)
HCT VFR BLD AUTO: 21.5 % (ref 34.8–46.1)
HGB BLD-MCNC: 6 G/DL (ref 11.5–15.4)
HGB BLD-MCNC: 7 G/DL (ref 11.5–15.4)
INR PPP: 1.73 (ref 0.84–1.19)
LYMPHOCYTES # BLD AUTO: 0.87 THOUSAND/UL (ref 0.6–4.47)
LYMPHOCYTES # BLD AUTO: 23 % (ref 14–44)
MCH RBC QN AUTO: 30.9 PG (ref 26.8–34.3)
MCHC RBC AUTO-ENTMCNC: 31.9 G/DL (ref 31.4–37.4)
MCV RBC AUTO: 97 FL (ref 82–98)
MONOCYTES # BLD AUTO: 0 THOUSAND/UL (ref 0–1.22)
MONOCYTES NFR BLD: 0 % (ref 4–12)
NEUTROPHILS # BLD MANUAL: 2.76 THOUSAND/UL (ref 1.85–7.62)
NEUTS BAND NFR BLD MANUAL: 1 % (ref 0–8)
NEUTS SEG NFR BLD AUTO: 72 % (ref 43–75)
OVALOCYTES BLD QL SMEAR: PRESENT
PLATELET # BLD AUTO: 230 THOUSANDS/UL (ref 149–390)
PLATELET BLD QL SMEAR: ADEQUATE
PMV BLD AUTO: 9.6 FL (ref 8.9–12.7)
POIKILOCYTOSIS BLD QL SMEAR: PRESENT
POLYCHROMASIA BLD QL SMEAR: PRESENT
POTASSIUM SERPL-SCNC: 5.6 MMOL/L (ref 3.5–5.3)
PROT SERPL-MCNC: 5.4 G/DL (ref 6.4–8.4)
PROTHROMBIN TIME: 21.1 SECONDS (ref 11.6–14.5)
RBC # BLD AUTO: 1.94 MILLION/UL (ref 3.81–5.12)
RH BLD: POSITIVE
RH BLD: POSITIVE
SCHISTOCYTES BLD QL SMEAR: PRESENT
SODIUM SERPL-SCNC: 131 MMOL/L (ref 135–147)
SPECIMEN EXPIRATION DATE: NORMAL
WBC # BLD AUTO: 3.78 THOUSAND/UL (ref 4.31–10.16)

## 2023-11-04 PROCEDURE — 85730 THROMBOPLASTIN TIME PARTIAL: CPT | Performed by: EMERGENCY MEDICINE

## 2023-11-04 PROCEDURE — 86900 BLOOD TYPING SEROLOGIC ABO: CPT | Performed by: EMERGENCY MEDICINE

## 2023-11-04 PROCEDURE — 86920 COMPATIBILITY TEST SPIN: CPT

## 2023-11-04 PROCEDURE — 85014 HEMATOCRIT: CPT | Performed by: STUDENT IN AN ORGANIZED HEALTH CARE EDUCATION/TRAINING PROGRAM

## 2023-11-04 PROCEDURE — 96360 HYDRATION IV INFUSION INIT: CPT

## 2023-11-04 PROCEDURE — 85007 BL SMEAR W/DIFF WBC COUNT: CPT | Performed by: EMERGENCY MEDICINE

## 2023-11-04 PROCEDURE — 99223 1ST HOSP IP/OBS HIGH 75: CPT | Performed by: STUDENT IN AN ORGANIZED HEALTH CARE EDUCATION/TRAINING PROGRAM

## 2023-11-04 PROCEDURE — 99284 EMERGENCY DEPT VISIT MOD MDM: CPT

## 2023-11-04 PROCEDURE — 86901 BLOOD TYPING SEROLOGIC RH(D): CPT | Performed by: EMERGENCY MEDICINE

## 2023-11-04 PROCEDURE — 85027 COMPLETE CBC AUTOMATED: CPT | Performed by: EMERGENCY MEDICINE

## 2023-11-04 PROCEDURE — 85018 HEMOGLOBIN: CPT | Performed by: STUDENT IN AN ORGANIZED HEALTH CARE EDUCATION/TRAINING PROGRAM

## 2023-11-04 PROCEDURE — P9016 RBC LEUKOCYTES REDUCED: HCPCS

## 2023-11-04 PROCEDURE — 36415 COLL VENOUS BLD VENIPUNCTURE: CPT | Performed by: EMERGENCY MEDICINE

## 2023-11-04 PROCEDURE — 80053 COMPREHEN METABOLIC PANEL: CPT | Performed by: EMERGENCY MEDICINE

## 2023-11-04 PROCEDURE — 74176 CT ABD & PELVIS W/O CONTRAST: CPT

## 2023-11-04 PROCEDURE — 86850 RBC ANTIBODY SCREEN: CPT | Performed by: EMERGENCY MEDICINE

## 2023-11-04 PROCEDURE — 85610 PROTHROMBIN TIME: CPT | Performed by: EMERGENCY MEDICINE

## 2023-11-04 PROCEDURE — 36430 TRANSFUSION BLD/BLD COMPNT: CPT

## 2023-11-04 RX ORDER — ASPIRIN 81 MG/1
81 TABLET, CHEWABLE ORAL DAILY
Status: DISCONTINUED | OUTPATIENT
Start: 2023-11-05 | End: 2023-11-08 | Stop reason: HOSPADM

## 2023-11-04 RX ORDER — HYDROCHLOROTHIAZIDE 25 MG/1
25 TABLET ORAL DAILY
Status: DISCONTINUED | OUTPATIENT
Start: 2023-11-05 | End: 2023-11-04

## 2023-11-04 RX ORDER — SODIUM CHLORIDE, SODIUM GLUCONATE, SODIUM ACETATE, POTASSIUM CHLORIDE, MAGNESIUM CHLORIDE, SODIUM PHOSPHATE, DIBASIC, AND POTASSIUM PHOSPHATE .53; .5; .37; .037; .03; .012; .00082 G/100ML; G/100ML; G/100ML; G/100ML; G/100ML; G/100ML; G/100ML
500 INJECTION, SOLUTION INTRAVENOUS ONCE
Status: COMPLETED | OUTPATIENT
Start: 2023-11-04 | End: 2023-11-05

## 2023-11-04 RX ORDER — CARVEDILOL 25 MG/1
25 TABLET ORAL 2 TIMES DAILY WITH MEALS
Status: DISCONTINUED | OUTPATIENT
Start: 2023-11-04 | End: 2023-11-05

## 2023-11-04 RX ORDER — METHOTREXATE 2.5 MG/1
15 TABLET ORAL WEEKLY
Status: DISCONTINUED | OUTPATIENT
Start: 2023-11-04 | End: 2023-11-05

## 2023-11-04 RX ORDER — SODIUM CHLORIDE, SODIUM GLUCONATE, SODIUM ACETATE, POTASSIUM CHLORIDE, MAGNESIUM CHLORIDE, SODIUM PHOSPHATE, DIBASIC, AND POTASSIUM PHOSPHATE .53; .5; .37; .037; .03; .012; .00082 G/100ML; G/100ML; G/100ML; G/100ML; G/100ML; G/100ML; G/100ML
100 INJECTION, SOLUTION INTRAVENOUS CONTINUOUS
Status: DISCONTINUED | OUTPATIENT
Start: 2023-11-04 | End: 2023-11-05

## 2023-11-04 RX ORDER — LEVOTHYROXINE SODIUM 0.07 MG/1
75 TABLET ORAL
Status: DISCONTINUED | OUTPATIENT
Start: 2023-11-05 | End: 2023-11-08 | Stop reason: HOSPADM

## 2023-11-04 RX ORDER — PRAVASTATIN SODIUM 40 MG
40 TABLET ORAL
Status: DISCONTINUED | OUTPATIENT
Start: 2023-11-05 | End: 2023-11-04

## 2023-11-04 RX ADMIN — SODIUM CHLORIDE 1000 ML: 0.9 INJECTION, SOLUTION INTRAVENOUS at 16:36

## 2023-11-04 RX ADMIN — SODIUM CHLORIDE, SODIUM GLUCONATE, SODIUM ACETATE, POTASSIUM CHLORIDE, MAGNESIUM CHLORIDE, SODIUM PHOSPHATE, DIBASIC, AND POTASSIUM PHOSPHATE 500 ML: .53; .5; .37; .037; .03; .012; .00082 INJECTION, SOLUTION INTRAVENOUS at 22:48

## 2023-11-04 RX ADMIN — SODIUM CHLORIDE, SODIUM GLUCONATE, SODIUM ACETATE, POTASSIUM CHLORIDE, MAGNESIUM CHLORIDE, SODIUM PHOSPHATE, DIBASIC, AND POTASSIUM PHOSPHATE 100 ML/HR: .53; .5; .37; .037; .03; .012; .00082 INJECTION, SOLUTION INTRAVENOUS at 22:37

## 2023-11-04 NOTE — ASSESSMENT & PLAN NOTE
In the setting of gross hematuria as noted above.   Receiving 1 unit pRBC   Recheck H/H after 1 unit and continue to monitor and transfuse as needed to keep Hgb>7

## 2023-11-04 NOTE — ASSESSMENT & PLAN NOTE
Recent right hip fracture status post hemoarthroplasty on October 5 complicated by acute urinary retention. Patient has been following with urology and most recent visit on 11/3 due to mild hematuria and urinary retention with Carrera catheter placed. Patient presented today to [address] [place] [address] with Carrera catheter malfunction due to gross hematuria. CT abdomen noted for large amount of clot within the bladder lumen with inability to exclude underlying lesion. Hemoglobin was noted to be 6 for which she received 2 packed RBC. Urology recommended exchange Carrera catheter for three-way Carrera and to begin begin manual irrigation with 500 mL normal saline aspirating until clear and start CBI. Transferred to [address] [place] [address] for close evaluation with CBI overnight by urology and for potential cystoscopy and clot evacuation tomorrow. Continue to monitor H&H and transfuse to keep hemoglobin over 7.

## 2023-11-04 NOTE — ASSESSMENT & PLAN NOTE
Mild elevation. Suspect reactive to above. Also consider secondary to methotrexate use. Hold crestor until LFTs stabilize.

## 2023-11-04 NOTE — EMTALA/ACUTE CARE TRANSFER
[address]  [facility] [place] [address]  Dept: [phone]      [id number] TRANSFER CONSENT    NAME Emeterio Sosa                                         1940                              MRN [id number]    I have been informed of my rights regarding examination, treatment, and transfer   by Dr. Connie Covarrubias DO    Benefits: Specialized equipment and/or services available at the receiving facility (Include comment)________________________    Risks: Potential deterioration of medical condition, Loss of IV, Increased discomfort during transfer, Possible worsening of condition or death during transfer      Consent for Transfer:  I acknowledge that my medical condition has been evaluated and explained to me by the emergency department physician or other qualified medical person and/or my attending physician, who has recommended that I be transferred to the service of  Accepting Physician: Dr. Lizandro Erwin at [address] Name, [facility]. The above potential benefits of such transfer, the potential risks associated with such transfer, and the probable risks of not being transferred have been explained to me, and I fully understand them. The doctor has explained that, in my case, the benefits of transfer outweigh the risks. I agree to be transferred. I authorize the performance of emergency medical procedures and treatments upon me in both transit and upon arrival at the receiving facility. Additionally, I authorize the release of any and all medical records to the receiving facility and request they be transported with me, if possible. I understand that the safest mode of transportation during a medical emergency is an ambulance and that the Hospital advocates the use of this mode of transport.  Risks of traveling to the receiving facility by car, including absence of medical control, life sustaining equipment, such as oxygen, and medical personnel has been explained to me and I fully understand them. (NANCY CORRECT BOX BELOW)  [  ]  I consent to the stated transfer and to be transported by ambulance/helicopter. [  ]  I consent to the stated transfer, but refuse transportation by ambulance and accept full responsibility for my transportation by car. I understand the risks of non-ambulance transfers and I exonerate the Hospital and its staff from any deterioration in my condition that results from this refusal.    X___________________________________________    DATE  23  TIME________  Signature of patient or legally responsible individual signing on patient behalf           RELATIONSHIP TO PATIENT_________________________          Provider Certification    NAME Aurora Wilkes                                         1940                              MRN [id number]    A medical screening exam was performed on the above named patient. Based on the examination:    Condition Necessitating Transfer There were no encounter diagnoses. Patient Condition: The patient has been stabilized such that within reasonable medical probability, no material deterioration of the patient condition or the condition of the unborn child(pretty) is likely to result from the transfer    Reason for Transfer: Level of Care needed not available at this facility    Transfer Requirements: [address] available and qualified personnel available for treatment as acknowledged by    Agreed to accept transfer and to provide appropriate medical treatment as acknowledged by       Dr. Funmi Salas  Appropriate medical records of the examination and treatment of the patient are provided at the time of transfer   [address] _______  Transfer will be performed by qualified personnel from    and appropriate transfer equipment as required, including the use of necessary and appropriate life support measures.     Provider Certification: I have examined the patient and explained the following risks and benefits of being transferred/refusing transfer to the patient/family:  General risk, such as traffic hazards, adverse weather conditions, rough terrain or turbulence, possible failure of equipment (including vehicle or aircraft), or consequences of actions of persons outside the control of the transport personnel, Unanticipated needs of medical equipment and personnel during transport, Risk of worsening condition, The possibility of a transport vehicle being unavailable      Based on these reasonable risks and benefits to the patient and/or the unborn child(pretty), and based upon the information available at the time of the patient’s examination, I certify that the medical benefits reasonably to be expected from the provision of appropriate medical treatments at another medical facility outweigh the increasing risks, if any, to the individual’s medical condition, and in the case of labor to the unborn child, from effecting the transfer.     X____________________________________________ DATE 11/04/23        TIME_______      ORIGINAL - SEND TO MEDICAL RECORDS   COPY - SEND WITH PATIENT DURING TRANSFER

## 2023-11-04 NOTE — ASSESSMENT & PLAN NOTE
Baseline around 1-1.3. Noted MARCELO since 10/31 with Cr of 2.04. Currently Cr 1.97  Suspect secondary to urinary retention and acute blood loss anemia. Monitor BMP. Hold HCTZ. Management of urinary retention per urology.    Isolyte 100 ml/hr x10 hours

## 2023-11-04 NOTE — ED NOTES
Transfer information:    Transfer to: [facility] P923    : 1914 with [facility] CCT    Accepting: Dr. J Luis Avina    Report: [facility], [address] [place] [address]  11/04/23 3128

## 2023-11-04 NOTE — ED PROVIDER NOTES
Pt Name: Yomi Yancey  MRN: [id number]  [address] [place] 1940  Age/Sex: 81 y.o. female  Date of evaluation: 11/4/2023  PCP: Laurann Goodell, MD    CHIEF COMPLAINT    Chief Complaint   Patient presents with    Urinary Catheter Problem     Pt presents from home via EMS. Per EMS, pt "has noticed blood in her herrera bag x2 days. ." Pt states she had the catheter placed last Friday. HPI and MDM    81 y.o. female presenting with hematuria. Patient states yesterday she had a Herrera catheter placed by urology, since then she has had blood in her urine. She states she was not told how to switch out the bags and she spilled a bloody urine everywhere as well. She states she was still bleeding. No blood clots noted per patient. Denies any pain. No fevers or chills, no nausea or vomiting, denies any anticoagulants or antiplatelet medications. Differential diagnosis considered includes but not limited to anemia, electrolyte abnormalities, bladder mass, clots in bladder. I reviewed urology note from yesterday by Micah Oseguera, had Herrera catheter placed, dark red urine with small clots were drained. Urine was sent to lab. She was having gross hematuria when she saw urology yesterday as well. Has history of vaginal atrophy. CT abdomen/pelvis without contrast was ordered for December. Patient to follow-up for cystoscopy as well. She was started on doxycycline. ED Course as of 11/04/23 1948   Sat Nov 04, 2023   1640 Hemoglobin(!!): 6.0  4 days ago was 9. Concern for acute blood loss anemia. Patient is on Xarelto, last dose was last evening. States 1 year ago after her hip surgery she developed blood clots, she had surgery recently few weeks ago, and she was started on Xarelto prophylactically. She has only taken 3 doses.    1641 Potassium(!): 5.6  Obtained EKG which per my independent interpretation shows normal sinus rhythm heart of 70, narrow QRS, intervals reassuring, no STEMI, no significant peaked T waves. No other significant hyperkalemic EKG changes. Will give IV fluids. 1642 Creatinine(!): 1.97  Increased from baseline of around 1.3.   1746 Discussed with on-call urology, Frankie Vasquez, requesting switching Carrera catheter with three-way catheter for irrigation, transferring patient down to [facility] on internal medicine service. Urology will be consulted there. 3788 Discussed with internal medicine, Dr. Rossi Mendes at [facility], accepted patient for transfer. 3259 [facility] catheter placed, 500 cc of normal saline solution manual irrigation to be done.    3662 Will start CBI        Medications   sodium chloride 0.9 % bolus 1,000 mL (0 mL Intravenous Stopped 11/4/23 1757)         Past Medical and Surgical History    Past Medical History:   Diagnosis Date    Allergic angioedema     [id number] resolved    Angioedema     [id number] resolved    Arthritis     Breast cancer ([address]) 2012    right     Chronic kidney disease     Disease of thyroid gland     Hemorrhage of anus and rectum     [id number] resolved    Hyperlipidemia     Hypertension     Hypocalcemia     [id number] resolved    Neuritis     thoracic and lumbosacral    Peptic ulcer     Rheumatoid arthritis ([address])     Stage 3b chronic kidney disease ([address]) 5/7/2021    Vitamin D deficiency        Past Surgical History:   Procedure Laterality Date    ABDOMINAL SURGERY      BREAST BIOPSY Right 2012    BREAST LUMPECTOMY Right 2012    BREAST SURGERY      CHOLECYSTECTOMY      [address]    HIP SURGERY Right 09/18/2022    10/2023    ORTHOPEDIC SURGERY      for toes    US GUIDED BREAST BIOPSY RIGHT COMPLETE Right 08/15/2022       Family History   Problem Relation Age of Onset    Breast cancer Mother     Kidney failure Father     Other Father         uremia    Lung cancer Brother     Breast cancer Maternal Aunt     Breast cancer Maternal Aunt     Breast cancer Maternal Aunt     Breast cancer Maternal Aunt     Breast cancer Maternal Aunt     Stomach cancer Maternal Uncle        Social History     Tobacco Use    Smoking status: Never     Passive exposure: Past    Smokeless tobacco: Never   Vaping Use    Vaping Use: Never used   Substance Use Topics    Alcohol use: Not Currently    Drug use: No           Allergies    Allergies   Allergen Reactions    Lisinopril Anaphylaxis    Codeine Other (See Comments)     Nausea/vomiting      Other      Annotation - [id number]: tounge swelling    Oxycodone      Annotation - [id number]: NOT TOLERATE    Penicillins Hives    Sulfamethoxazole-Trimethoprim     Ciprofloxacin Rash       Home Medications    Prior to Admission medications    Medication Sig Start Date End Date Taking? Authorizing Provider   ascorbic acid (VITAMIN C) 500 mg tablet Take 500 mg by mouth daily. Historical Provider, MD   ASPIRIN 81 PO 1 tablet ONCE DAILY (route: oral) 11/6/22   Historical Provider, MD   Calcium Citrate-Vitamin D (CITRACAL + D PO) Take by mouth 2 (two) times a day    Historical Provider, MD   carvedilol (COREG) 25 mg tablet Take 25 mg by mouth 2 (two) times a day with meals    Historical Provider, MD   Cholecalciferol (VITAMIN D-3 PO) Take 2,000 Units by mouth daily.     Historical Provider, MD   doxycycline (ADOXA) 100 MG tablet Take 1 tablet (100 mg total) by mouth 2 (two) times a day for 7 days 11/3/23 11/10/23  Shelton Messer PA-C   folic acid (FOLVITE) 1 mg tablet 1 daily 1/6/21   [facility] PUJA Wyatt   hydrochlorothiazide (HYDRODIURIL) 25 mg tablet TAKE 1 TABLET BY MOUTH EVERY DAY 4/21/23   Della Maria MD   hydrocortisone 0.5 % cream Apply topically 2 (two) times a day 9/12/23   Della Maria MD   Klor-Con M10 10 MEQ tablet TAKE 2 TABLETS TWICE A DAY 2/27/23   Della Maria MD   levothyroxine (Synthroid) 75 mcg tablet Take 1 tablet (75 mcg total) by mouth in the morning 5/1/23   Della Maria MD   magnesium oxide (MAG-OX) 400 mg Take 1 tablet (400 mg total) by mouth 2 (two) times a day 11/5/18   [place] PUJA Wyatt   methotrexate 2.5 mg tablet Take 1 tablet (2.5 mg total) by mouth once a week Patient takes 6 tablets weekly 3/7/23   Gregg Griffin MD   Multiple Vitamin (MULTIVITAMIN) tablet Take 1 tablet by mouth daily. Historical Provider, MD   Psyllium (Metamucil) WAFR Take once daily 3/7/23   Gregg Griffin MD   riTUXimab (RITUXAN) 500 mg/50 mL Infuse into a venous catheter every 6 (six) months   8/20/15   Historical Provider, MD   rosuvastatin (CRESTOR) 5 mg tablet TAKE 1 TABLET DAILY 8/23/23   Gregg Griffin MD   Xarelto 10 MG tablet Take 10 mg by mouth daily 10/10/23 10/9/24  Historical Provider, MD           Physical Exam      ED Triage Vitals [11/04/23 1512]   Temperature Pulse Respirations Blood Pressure SpO2   (!) 97.4 °F (36.3 °C) 77 20 113/56 93 %      Temp Source Heart Rate Source Patient Position - Orthostatic VS BP Location FiO2 (%)   Oral Monitor Lying Right arm --      Pain Score       No Pain               Physical Exam  Constitutional:       General: She is not in acute distress. HENT:      Head: Normocephalic and atraumatic. Mouth/Throat:      Mouth: Mucous membranes are moist.   Eyes:      Conjunctiva/sclera: Conjunctivae normal.   Cardiovascular:      Rate and Rhythm: Normal rate. Pulmonary:      Effort: Pulmonary effort is normal. No respiratory distress. Abdominal:      General: There is no distension. Palpations: Abdomen is soft. Tenderness: There is no abdominal tenderness. Genitourinary:     Comments: Carrera catheter in place, Carrera bag with dark red urine  Musculoskeletal:         General: Normal range of motion. Cervical back: Normal range of motion. Skin:     General: Skin is warm. Neurological:      Mental Status: She is alert. Mental status is at baseline.               Diagnostic Results      Labs:    Results Reviewed       Procedure Component Value Units Date/Time    CBC and differential [[id number]]  (Abnormal) Collected: 11/04/23 4007 Lab Status: Final result Specimen: Blood from Arm, Right Updated: 11/04/23 1705     WBC 3.78 Thousand/uL      RBC 1.94 Million/uL      Hemoglobin 6.0 g/dL      Hematocrit 18.8 %      MCV 97 fL      MCH 30.9 pg      MCHC 31.9 g/dL      RDW 16.3 %      MPV 9.6 fL      Platelets 398 Thousands/uL     RBC Morphology Reflex Test [[id number]] Collected: 11/04/23 1540    Lab Status: Final result Specimen: Blood from Arm, Right Updated: 11/04/23 1701    Manual Differential(PHLEBS Do Not Order) [[id number]]  (Abnormal) Collected: 11/04/23 1540    Lab Status: Final result Specimen: Blood from Arm, Right Updated: 11/04/23 1622     Segmented % 72 %      Bands % 1 %      Lymphocytes % 23 %      Monocytes % 0 %      Eosinophils, % 4 %      Basophils % 0 %      Absolute Neutrophils 2.76 Thousand/uL      Lymphocytes Absolute 0.87 Thousand/uL      Monocytes Absolute 0.00 Thousand/uL      Eosinophils Absolute 0.15 Thousand/uL      Basophils Absolute 0.00 Thousand/uL      Total Counted --     Platelet Estimate Adequate     Tracey Cells Present     Ovalocytes Present     Poikilocytes Present     Polychromasia Present     Schistocytes Present    Protime-INR [[id number]]  (Abnormal) Collected: 11/04/23 1540    Lab Status: Final result Specimen: Blood from Arm, Right Updated: 11/04/23 1606     Protime 21.1 seconds      INR 1.73    APTT [[id number]]  (Abnormal) Collected: 11/04/23 1540    Lab Status: Final result Specimen: Blood from Arm, Right Updated: 11/04/23 1606     PTT 49 seconds     Comprehensive metabolic panel [[id number]]  (Abnormal) Collected: 11/04/23 1540    Lab Status: Final result Specimen: Blood from Arm, Right Updated: 11/04/23 1606     Sodium 131 mmol/L      Potassium 5.6 mmol/L      Chloride 103 mmol/L      CO2 22 mmol/L      ANION GAP 6 mmol/L      BUN 58 mg/dL      Creatinine 1.97 mg/dL      Glucose 119 mg/dL      Calcium 8.5 mg/dL      Corrected Calcium 9.3 mg/dL      AST 63 U/L      ALT 62 U/L      Alkaline Phosphatase 85 U/L      Total Protein 5.4 g/dL      Albumin 3.0 g/dL      Total Bilirubin 0.39 mg/dL      eGFR 23 ml/min/1.73sq m     Narrative:      [place] guidelines for Chronic Kidney Disease (CKD):     Stage 1 with normal or high GFR (GFR > 90 mL/min/1.73 square meters)    Stage 2 Mild CKD (GFR = 60-89 mL/min/1.73 square meters)    Stage 3A Moderate CKD (GFR = 45-59 mL/min/1.73 square meters)    Stage 3B Moderate CKD (GFR = 30-44 mL/min/1.73 square meters)    Stage 4 Severe CKD (GFR = 15-29 mL/min/1.73 square meters)    Stage 5 End Stage CKD (GFR <15 mL/min/1.73 square meters)  Note: GFR calculation is accurate only with a steady state creatinine            All labs reviewed and utilized in the medical decision making process    Radiology:    CT abdomen pelvis wo contrast   Final Result      Large amount of clot within the bladder lumen. Underlying lesion cannot be excluded. Consider urologic consult and direct visualization with cystoscopy. Bilateral nonobstructing renal calculi. Incompletely imaged 6 mm groundglass nodule right lower lobe. Per report from [facility], a lesion of this size was present on a CT scan from 2022. PET/CT scan will be requested and an addendum to address management will be added at that    time.          Workstation performed: [id number]             All radiology studies independently viewed by me and interpreted by the radiologist.    Procedure    CriticalCare Time    Date/Time: 11/4/2023 7:47 PM    Performed by: Shannen Wilkes DO  Authorized by: Shannen Wilkes DO    Critical care provider statement:     Critical care time (minutes):  45    Critical care time was exclusive of:  Separately billable procedures and treating other patients    Critical care was necessary to treat or prevent imminent or life-threatening deterioration of the following conditions:  Shock    Critical care was time spent personally by me on the following activities:  Obtaining history from patient or surrogate, development of treatment plan with patient or surrogate, discussions with consultants, discussions with primary provider, examination of patient, review of old charts, re-evaluation of patient's condition and ordering and review of laboratory studies          FINAL IMPRESSION    Final diagnoses:   Acute blood loss anemia   Gross hematuria   MARCELO (acute kidney injury) ([address])   Hyperkalemia         DISPOSITION    Time reflects when diagnosis was documented in both MDM as applicable and the Disposition within this note       Time User Action Codes Description Comment    11/4/2023  6:15 PM Oletha Michel Add [D62] Acute blood loss anemia     11/4/2023  6:15 PM Oletha Michel Add [R31.0] Gross hematuria     11/4/2023  6:16 PM Oletha Michel Add [N17.9] MARCELO (acute kidney injury) ([address])     11/4/2023  6:16 PM Oletha Michel Add [E87.5] Hyperkalemia           ED Disposition       ED Disposition   Transfer to Another Facility-In Network    Condition   --    Date/Time   Sat Nov 4, 2023  6:14 PM    Comment   Laurent Wagoner should be transferred out to B.                MD Documentation      Roxy Cardona Most Recent Value   Patient Condition The patient has been stabilized such that within reasonable medical probability, no material deterioration of the patient condition or the condition of the unborn child(pretty) is likely to result from the transfer   Reason for Transfer Level of Care needed not available at this facility   Benefits of Transfer Specialized equipment and/or services available at the receiving facility (Include comment)________________________   Risks of Transfer Potential deterioration of medical condition, Loss of IV, Increased discomfort during transfer, Possible worsening of condition or death during transfer   Accepting Physician Dr. Varela Host Name, Moy Alba   Provider Certification General risk, such as traffic hazards, adverse weather conditions, rough terrain or turbulence, possible failure of equipment (including vehicle or aircraft), or consequences of actions of persons outside the control of the transport personnel, Unanticipated needs of medical equipment and personnel during transport, Risk of worsening condition, The possibility of a transport vehicle being unavailable          RN Documentation      [address] [place] Assignment [id number]   Report Given to Mandi Blackburn          Follow-up Information    None           PATIENT REFERRED TO:    No follow-up provider specified. DISCHARGE MEDICATIONS:    Discharge Medication List as of 11/4/2023  7:37 PM        CONTINUE these medications which have NOT CHANGED    Details   ascorbic acid (VITAMIN C) 500 mg tablet Take 500 mg by mouth daily. , Historical Med      ASPIRIN 81 PO 1 tablet ONCE DAILY (route: oral), Historical Med      Calcium Citrate-Vitamin D (CITRACAL + D PO) Take by mouth 2 (two) times a day, Historical Med      carvedilol (COREG) 25 mg tablet Take 25 mg by mouth 2 (two) times a day with meals, Historical Med      Cholecalciferol (VITAMIN D-3 PO) Take 2,000 Units by mouth daily. , Historical Med      doxycycline (ADOXA) 100 MG tablet Take 1 tablet (100 mg total) by mouth 2 (two) times a day for 7 days, Starting Fri 11/3/2023, Until Fri 97/26/0885, Normal      folic acid (FOLVITE) 1 mg tablet 1 daily, Print      hydrochlorothiazide (HYDRODIURIL) 25 mg tablet TAKE 1 TABLET BY MOUTH EVERY DAY, Normal      hydrocortisone 0.5 % cream Apply topically 2 (two) times a day, Starting Tue 9/12/2023, Normal      Klor-Con M10 10 MEQ tablet TAKE 2 TABLETS TWICE A DAY, Normal      levothyroxine (Synthroid) 75 mcg tablet Take 1 tablet (75 mcg total) by mouth in the morning, Starting Mon 5/1/2023, Normal      magnesium oxide (MAG-OX) 400 mg Take 1 tablet (400 mg total) by mouth 2 (two) times a day, Starting Mon 11/5/2018, No Print methotrexate 2.5 mg tablet Take 1 tablet (2.5 mg total) by mouth once a week Patient takes 6 tablets weekly, Starting Tue 3/7/2023, No Print      Multiple Vitamin (MULTIVITAMIN) tablet Take 1 tablet by mouth daily. , Historical Med      Psyllium (Metamucil) WAFR Take once daily, No Print      riTUXimab (RITUXAN) 500 mg/50 mL Infuse into a venous catheter every 6 (six) months  , Starting Thu 8/20/2015, Historical Med      rosuvastatin (CRESTOR) 5 mg tablet TAKE 1 TABLET DAILY, Starting Wed 8/23/2023, Normal      Xarelto 10 MG tablet Take 10 mg by mouth daily, Starting Tue 10/10/2023, Until Wed 10/9/2024, Historical Med             No discharge procedures on file. Facundo Lundberg DO        This note was partially completed using voice recognition technology, and was scanned for gross errors; however some errors may still exist. Please contact the author with any questions or requests for clarification.       Facundo Lundberg DO  11/04/23 1948

## 2023-11-04 NOTE — TREATMENT PLAN
Ashley Garcia  1940  [id number]  11/04/23                 Urology Interim Treatment Plan        Ashley Garcia is an 78-year-old female who was seen in the office during the week for hospital follow-up after patient presented with urinary retention status post Carrera catheter insertion. She is chronically anticoagulated. Patient presented to [facility] emergency room with Carrera catheter malfunction secondary to gross hematuria. Of note, patient was evaluated in the office with mild hematuria. Carrera catheter was inserted by Dr. Ashley Costello due to difficulty per urology AP in office. Patient took a nighttime dose of Xarelto after office visit last night. We will contacted by the emergency room for further management. Of note, patient with notable anemia and presenting hemoglobin of 6. She has been transfused. Vitals:    11/04/23 1724   BP: (!) 99/49   Pulse: 68   Resp: 20   Temp: 97.9 °F (36.6 °C)   SpO2: 99%               Lab Results   Component Value Date    WBC 3.78 (L) 11/04/2023    HGB 6.0 (LL) 11/04/2023    HCT 18.8 (L) 11/04/2023    MCV 97 11/04/2023     11/04/2023     Lab Results   Component Value Date    SODIUM 131 (L) 11/04/2023    K 5.6 (H) 11/04/2023     11/04/2023    CO2 22 11/04/2023    BUN 58 (H) 11/04/2023    CREATININE 1.97 (H) 11/04/2023    GLUC 119 11/04/2023    CALCIUM 8.5 11/04/2023       Image Report    Pending. Gross review of images show bladder distention with blood products. Mildly atrophic renal units with some mild left hydronephrosis. Bilateral nonobstructing intrarenal calculi. Multiple distal calcifications. Plan:    Instructed emergency room staff to exchange Carrera catheter for three-way Carrera, begin manual irrigation with 500 mL normal saline aspirating until clear. Begin CBI. Per office note, Carrera catheter insertion was difficult however placed by  attending onsite that day without requirement for instrumentation.   Transfer to [place] on the medical service with urology consultation. Our service will see and evaluate tonight to manage CBI. N.p.o. after midnight. Trend labs in the interim  Antibiotics for recent infection. Urine analysis was still grossly positive. Hold anticoagulation.   Discussed with patient access center, Emergency room provider Julienne Uriostegui DO, and ROBERTO attending on call brionna Garcia MD        [facility], [address] [place] [address]

## 2023-11-04 NOTE — ED NOTES
Called [facility] P9 to give report on pt. Receiving RN asking to call back in a few minutes. Will call back to give report.      Dena Hill RN  11/04/23 1100 Darion Garland RN  11/04/23 3132

## 2023-11-04 NOTE — ASSESSMENT & PLAN NOTE
Hold Xarelto 10 mg daily that was recently started after hip surgery for DVT ppx due to gross hematuria.   Resume dvt ppx once cleared by urology

## 2023-11-04 NOTE — H&P
[address] [facility]  H&P  Name: Magalie García 81 y.o. female I MRN: [id number]  [facility] I Date of Admission: 11/4/2023   Date of Service: 11/4/2023 I Hospital Day: 0      Assessment/Plan   * Gross hematuria  Assessment & Plan  Recent right hip fracture status post hemoarthroplasty on October 5 complicated by acute urinary retention. Patient has been following with urology and most recent visit on 11/3 due to mild hematuria and urinary retention with Carrera catheter placed. Patient presented today to [address] [place] [address] with Carrera catheter malfunction due to gross hematuria. CT abdomen noted for large amount of clot within the bladder lumen with inability to exclude underlying lesion. Hemoglobin was noted to be 6 for which she received 2 packed RBC. Urology recommended exchange Carrera catheter for three-way Carrera and to begin begin manual irrigation with 500 mL normal saline aspirating until clear and start CBI. Transferred to [address] [place] [address] for close evaluation with CBI overnight by urology and for potential cystoscopy and clot evacuation tomorrow. Continue to monitor H&H and transfuse to keep hemoglobin over 7. Acute kidney injury superimposed on chronic kidney disease   Assessment & Plan  Baseline around 1-1.3. Noted MARCELO since 10/31 with Cr of 2.04. Currently Cr 1.97  Suspect secondary to urinary retention and acute blood loss anemia. Monitor BMP. Hold HCTZ. Management of urinary retention per urology. Isolyte 100 ml/hr x10 hours     Transaminitis  Assessment & Plan  Mild elevation. Suspect reactive to above. Also consider secondary to methotrexate use. Hold crestor until LFTs stabilize. Acute blood loss anemia (ABLA)  Assessment & Plan  In the setting of gross hematuria as noted above.   Receiving 1 unit pRBC   Recheck H/H after 1 unit and continue to monitor and transfuse as needed to keep Hgb>7    History of DVT (deep vein thrombosis)  Assessment & Plan  Hold Xarelto 10 mg daily that was recently started after hip surgery for DVT ppx due to gross hematuria. Resume dvt ppx once cleared by urology    Adenocarcinoma of breast [facility])  Assessment & Plan  History of stage I right breast cancer, invasive ductal carcinoma. ER/VT positive, HER2 positive per fish testing. She was initially diagnosed in 2012 and is status post lumpectomy, radiation, Herceptin treatment. She was on tamoxifen for 7 years from 2012 to 11/2019 which was discontinued due development of DVT. Her most recent mammogram 07/2021 showed benign findings. Hyperlipidemia  Assessment & Plan  Hold statin for now given transaminitis resume once LFTs stabilize. Hypothyroidism  Assessment & Plan  Continue levothyroxine 75 mcg daily. Rheumatoid arthritis [facility])  Assessment & Plan  Maintain on methotrexate (every Friday) and Rituxan. Hypertension  Assessment & Plan  Continue coreg 25 mg BID  Hold HCTZ due to MARCELO. VTE Pharmacologic Prophylaxis: VTE Score: 4 Moderate Risk (Score 3-4) - Pharmacological DVT Prophylaxis Contraindicated. Sequential Compression Devices Ordered. Code Status: Level 1 - Full Code   Discussion with family: Updated  (nephew) at bedside. Anticipated Length of Stay: Patient will be admitted on an inpatient basis with an anticipated length of stay of greater than 2 midnights secondary to gross hematuria and acute blood loss anemia. Total Time Spent on Date of Encounter in care of patient: 73 mins. This time was spent on one or more of the following: performing physical exam; counseling and coordination of care; obtaining or reviewing history; documenting in the medical record; reviewing/ordering tests, medications or procedures; communicating with other healthcare professionals and discussing with patient's family/caregivers. Chief Complaint: hematuria    History of Present Illness:  Armen Franco is a 81 y.o. female with a PMH of recent periprosthetic hip fracture and intertrochanteric right femur fracture s/p right hemiarthroplasty on 10/5, hx of DVT, HTN, RA, hypothroidism, urinary retention who presents with gross hematuria. Patient was recently discharged from rehab center and had the herrera catheter removed at the rehab. Patient presented to the urology clinic on 10/30 where patient was noted to have ongoing symptoms of weak stream and frequent urination with PVR showing 300 mL. Urology attempted herrera catheter placement without success. Patient was discharged on terazosin with strict ER return precautions. Patient was noted to have hematuria at home and patient was reevaluated by urology on 11/3 with successful placement of herrera catheter with dark blood on return per chart review. Patient presented to the ED today 11/4 with hematuria since herrera as she was not shown how to switch the bags and she spilled bloody urine everywhere. No clots noted per patient. No other symptoms of lightheadedness, dizziness, weakness, fevers, chills, chest pain, shortness of breath, abdominal pain, nausea, vomiting. Patient was evaluated by urology in the ED who recommended transfer to [facility] for cystoscopy. Patient was started on CBI. At [facility], patient reports no complaints aside from blood in the urine. Nephew updated at bedside. Holding DVT ppx after discussion with urology for now. Review of Systems:  Review of Systems   Constitutional:  Negative for chills, fatigue and fever. HENT:  Negative for congestion and trouble swallowing. Eyes:  Negative for pain and visual disturbance. Respiratory:  Negative for cough and shortness of breath. Cardiovascular:  Negative for chest pain and leg swelling. Gastrointestinal:  Negative for abdominal pain, constipation, diarrhea, nausea and vomiting. Genitourinary:  Positive for difficulty urinating and hematuria. Musculoskeletal:  Positive for gait problem. Negative for joint swelling. Skin:  Negative for rash and wound. Neurological:  Positive for weakness (right leg after recent surgery). Negative for light-headedness and headaches. Psychiatric/Behavioral:  Negative for agitation and confusion. Past Medical and Surgical History:   Past Medical History:   Diagnosis Date    Allergic angioedema     [id number] resolved    Angioedema     [id number] resolved    Arthritis     Breast cancer ([address]) 2012    right     Chronic kidney disease     Disease of thyroid gland     Hemorrhage of anus and rectum     [id number] resolved    Hyperlipidemia     Hypertension     Hypocalcemia     [id number] resolved    Neuritis     thoracic and lumbosacral    Peptic ulcer     Rheumatoid arthritis ([address])     Stage 3b chronic kidney disease ([address]) 5/7/2021    Vitamin D deficiency        Past Surgical History:   Procedure Laterality Date    ABDOMINAL SURGERY      BREAST BIOPSY Right 2012    BREAST LUMPECTOMY Right 2012    [address]    HIP SURGERY Right 09/18/2022    10/2023    ORTHOPEDIC SURGERY      for toes    US GUIDED BREAST BIOPSY RIGHT COMPLETE Right 08/15/2022       Meds/Allergies:  Prior to Admission medications    Medication Sig Start Date End Date Taking? Authorizing Provider   ascorbic acid (VITAMIN C) 500 mg tablet Take 500 mg by mouth daily. Historical Provider, MD   ASPIRIN 81 PO 1 tablet ONCE DAILY (route: oral) 11/6/22   Historical Provider, MD   Calcium Citrate-Vitamin D (CITRACAL + D PO) Take by mouth 2 (two) times a day    Historical Provider, MD   carvedilol (COREG) 25 mg tablet Take 25 mg by mouth 2 (two) times a day with meals    Historical Provider, MD   Cholecalciferol (VITAMIN D-3 PO) Take 2,000 Units by mouth daily.     Historical Provider, MD   doxycycline (ADOXA) 100 MG tablet Take 1 tablet (100 mg total) by mouth 2 (two) times a day for 7 days 11/3/23 11/10/23  Estelle Cid PA-C folic acid (FOLVITE) 1 mg tablet 1 daily 1/6/21   Tamramia, CRNP   hydrochlorothiazide (HYDRODIURIL) 25 mg tablet TAKE 1 TABLET BY MOUTH EVERY DAY 4/21/23   Alfred Metz MD   hydrocortisone 0.5 % cream Apply topically 2 (two) times a day 9/12/23   Alfred Metz MD   Klor-Con M10 10 MEQ tablet TAKE 2 TABLETS TWICE A DAY 2/27/23   Alfred Metz MD   levothyroxine (Synthroid) 75 mcg tablet Take 1 tablet (75 mcg total) by mouth in the morning 5/1/23   Alfred Metz MD   magnesium oxide (MAG-OX) 400 mg Take 1 tablet (400 mg total) by mouth 2 (two) times a day 11/5/18   PUJA Robles   methotrexate 2.5 mg tablet Take 1 tablet (2.5 mg total) by mouth once a week Patient takes 6 tablets weekly 3/7/23   Alfred Metz MD   Multiple Vitamin (MULTIVITAMIN) tablet Take 1 tablet by mouth daily. Historical Provider, MD   Psyllium (Metamucil) WAFR Take once daily 3/7/23   Alfred Metz MD   riTUXimab (RITUXAN) 500 mg/50 mL Infuse into a venous catheter every 6 (six) months   8/20/15   Historical Provider, MD   rosuvastatin (CRESTOR) 5 mg tablet TAKE 1 TABLET DAILY 8/23/23   Alfred Metz MD   Xarelto 10 MG tablet Take 10 mg by mouth daily 10/10/23 10/9/24  Historical Provider, MD KEANE have reviewed home medications with patient personally. Allergies: Allergies   Allergen Reactions    Lisinopril Anaphylaxis    Codeine Other (See Comments)     Nausea/vomiting      Other      Annotation - [id number]: tounge swelling    Oxycodone      Annotation - [id number]: NOT TOLERATE    Penicillins Hives    Sulfamethoxazole-Trimethoprim     Ciprofloxacin Rash       Social History:  Marital Status:     Occupation:   Patient Pre-hospital Living Situation: Home  Patient Pre-hospital Level of Mobility: walks with walker  Patient Pre-hospital Diet Restrictions: none  Substance Use History:   Social History     Substance and Sexual Activity   Alcohol Use Not Currently     Social History Tobacco Use   Smoking Status Never    Passive exposure: Past   Smokeless Tobacco Never     Social History     Substance and Sexual Activity   Drug Use No       Family History:  Family History   Problem Relation Age of Onset    Breast cancer Mother     Kidney failure Father     Other Father         uremia    Lung cancer Brother     Breast cancer Maternal Aunt     Breast cancer Maternal Aunt     Breast cancer Maternal Aunt     Breast cancer Maternal Aunt     Breast cancer Maternal Aunt     Stomach cancer Maternal Uncle        Physical Exam:     Vitals:   Blood Pressure: 113/81 (11/04/23 1900)  Pulse: 71 (11/04/23 1900)  Temperature: 98.2 °F (36.8 °C) (11/04/23 1900)  Temp Source: Oral (11/04/23 1900)  Respirations: 20 (11/04/23 1900)  SpO2: 100 % (11/04/23 1900)    Physical Exam  Vitals reviewed. Constitutional:       General: She is not in acute distress. Appearance: Normal appearance. She is not ill-appearing. HENT:      Head: Normocephalic and atraumatic. Eyes:      General: No scleral icterus. Conjunctiva/sclera: Conjunctivae normal.   Cardiovascular:      Rate and Rhythm: Normal rate and regular rhythm. Heart sounds: Normal heart sounds. No murmur heard. Pulmonary:      Effort: Pulmonary effort is normal. No respiratory distress. Breath sounds: Normal breath sounds. No wheezing or rales. Abdominal:      General: Bowel sounds are normal.      Palpations: Abdomen is soft. Tenderness: There is no abdominal tenderness. Genitourinary:     Comments: Carrera draining pink colored urine, CBI running  Musculoskeletal:      Right lower leg: No edema. Left lower leg: No edema. Skin:     General: Skin is warm and dry. Neurological:      Mental Status: She is alert and oriented to person, place, and time. Mental status is at baseline. Motor: Weakness (RLE 4/5 secondary to pain) present.    Psychiatric:         Mood and Affect: Mood normal.         Behavior: Behavior normal. Additional Data:     Lab Results:  Results from last 7 days   Lab Units 11/04/23  1540   WBC Thousand/uL 3.78*   HEMOGLOBIN g/dL 6.0*   HEMATOCRIT % 18.8*   PLATELETS Thousands/uL 230   BANDS PCT % 1   LYMPHO PCT % 23   MONO PCT % 0*   EOS PCT % 4     Results from last 7 days   Lab Units 11/04/23  1540   SODIUM mmol/L 131*   POTASSIUM mmol/L 5.6*   CHLORIDE mmol/L 103   CO2 mmol/L 22   BUN mg/dL 58*   CREATININE mg/dL 1.97*   ANION GAP mmol/L 6   CALCIUM mg/dL 8.5   ALBUMIN g/dL 3.0*   TOTAL BILIRUBIN mg/dL 0.39   ALK PHOS U/L 85   ALT U/L 62*   AST U/L 63*   GLUCOSE RANDOM mg/dL 119     Results from last 7 days   Lab Units 11/04/23  1540   INR  1.73*                   Lines/Drains:  Invasive Devices       Peripheral Intravenous Line  Duration             Peripheral IV 11/04/23 Left Antecubital <1 day    Peripheral IV 11/04/23 Right Antecubital <1 day              Drain  Duration             Urethral Catheter Three way 18 Fr. <1 day                  Urinary Catheter:  Goal for removal: Voiding trial when ambulation improves             Imaging: Reviewed radiology reports from this admission including: abdominal/pelvic CT  No orders to display       EKG and Other Studies Reviewed on Admission:   EKG: No EKG obtained. ** Please Note: This note has been constructed using a voice recognition system.  **

## 2023-11-04 NOTE — ASSESSMENT & PLAN NOTE
History of stage I right breast cancer, invasive ductal carcinoma. ER/AZ positive, HER2 positive per fish testing. She was initially diagnosed in 2012 and is status post lumpectomy, radiation, Herceptin treatment. She was on tamoxifen for 7 years from 2012 to 11/2019 which was discontinued due development of DVT. Her most recent mammogram 07/2021 showed benign findings.

## 2023-11-05 ENCOUNTER — ANESTHESIA EVENT (INPATIENT)
Dept: PERIOP | Facility: HOSPITAL | Age: 83
DRG: 663 | End: 2023-11-05
Payer: MEDICARE

## 2023-11-05 ENCOUNTER — ANESTHESIA (INPATIENT)
Dept: PERIOP | Facility: HOSPITAL | Age: 83
DRG: 663 | End: 2023-11-05
Payer: MEDICARE

## 2023-11-05 LAB
ABO GROUP BLD BPU: NORMAL
ABO GROUP BLD: NORMAL
ALBUMIN SERPL BCP-MCNC: 2.5 G/DL (ref 3.5–5)
ALP SERPL-CCNC: 71 U/L (ref 34–104)
ALT SERPL W P-5'-P-CCNC: 53 U/L (ref 7–52)
ANION GAP SERPL CALCULATED.3IONS-SCNC: 7 MMOL/L
AST SERPL W P-5'-P-CCNC: 53 U/L (ref 13–39)
BACTERIA UR CULT: ABNORMAL
BILIRUB SERPL-MCNC: 0.68 MG/DL (ref 0.2–1)
BLD GP AB SCN SERPL QL: NEGATIVE
BPU ID: NORMAL
BUN SERPL-MCNC: 40 MG/DL (ref 5–25)
CALCIUM ALBUM COR SERPL-MCNC: 8.6 MG/DL (ref 8.3–10.1)
CALCIUM SERPL-MCNC: 7.4 MG/DL (ref 8.4–10.2)
CHLORIDE SERPL-SCNC: 107 MMOL/L (ref 96–108)
CO2 SERPL-SCNC: 23 MMOL/L (ref 21–32)
CREAT SERPL-MCNC: 1.41 MG/DL (ref 0.6–1.3)
CROSSMATCH: NORMAL
ERYTHROCYTE [DISTWIDTH] IN BLOOD BY AUTOMATED COUNT: 15.3 % (ref 11.6–15.1)
GFR SERPL CREATININE-BSD FRML MDRD: 34 ML/MIN/1.73SQ M
GLUCOSE SERPL-MCNC: 97 MG/DL (ref 65–140)
HCT VFR BLD AUTO: 23.7 % (ref 34.8–46.1)
HGB BLD-MCNC: 8.2 G/DL (ref 11.5–15.4)
MCH RBC QN AUTO: 32.5 PG (ref 26.8–34.3)
MCHC RBC AUTO-ENTMCNC: 34.6 G/DL (ref 31.4–37.4)
MCV RBC AUTO: 94 FL (ref 82–98)
NRBC BLD AUTO-RTO: 0 /100 WBCS
PLATELET # BLD AUTO: 151 THOUSANDS/UL (ref 149–390)
PMV BLD AUTO: 9.3 FL (ref 8.9–12.7)
POTASSIUM SERPL-SCNC: 4.2 MMOL/L (ref 3.5–5.3)
PROT SERPL-MCNC: 4.5 G/DL (ref 6.4–8.4)
RBC # BLD AUTO: 2.52 MILLION/UL (ref 3.81–5.12)
RH BLD: POSITIVE
SODIUM SERPL-SCNC: 137 MMOL/L (ref 135–147)
SPECIMEN EXPIRATION DATE: NORMAL
UNIT DISPENSE STATUS: NORMAL
UNIT PRODUCT CODE: NORMAL
UNIT PRODUCT VOLUME: 350 ML
UNIT RH: NORMAL
WBC # BLD AUTO: 3.21 THOUSAND/UL (ref 4.31–10.16)

## 2023-11-05 PROCEDURE — 86920 COMPATIBILITY TEST SPIN: CPT

## 2023-11-05 PROCEDURE — 85027 COMPLETE CBC AUTOMATED: CPT | Performed by: STUDENT IN AN ORGANIZED HEALTH CARE EDUCATION/TRAINING PROGRAM

## 2023-11-05 PROCEDURE — 52204 CYSTOSCOPY W/BIOPSY(S): CPT | Performed by: UROLOGY

## 2023-11-05 PROCEDURE — 0T5B8ZZ DESTRUCTION OF BLADDER, VIA NATURAL OR ARTIFICIAL OPENING ENDOSCOPIC: ICD-10-PCS | Performed by: HOSPITALIST

## 2023-11-05 PROCEDURE — 52001 CYSTO W/IRRG&EVAC MLT CLOTS: CPT | Performed by: UROLOGY

## 2023-11-05 PROCEDURE — 88305 TISSUE EXAM BY PATHOLOGIST: CPT | Performed by: PATHOLOGY

## 2023-11-05 PROCEDURE — 99232 SBSQ HOSP IP/OBS MODERATE 35: CPT | Performed by: PHYSICIAN ASSISTANT

## 2023-11-05 PROCEDURE — 0TCB8ZZ EXTIRPATION OF MATTER FROM BLADDER, VIA NATURAL OR ARTIFICIAL OPENING ENDOSCOPIC: ICD-10-PCS | Performed by: HOSPITALIST

## 2023-11-05 PROCEDURE — 80053 COMPREHEN METABOLIC PANEL: CPT | Performed by: STUDENT IN AN ORGANIZED HEALTH CARE EDUCATION/TRAINING PROGRAM

## 2023-11-05 PROCEDURE — 86901 BLOOD TYPING SEROLOGIC RH(D): CPT | Performed by: STUDENT IN AN ORGANIZED HEALTH CARE EDUCATION/TRAINING PROGRAM

## 2023-11-05 PROCEDURE — 0TBB8ZZ EXCISION OF BLADDER, VIA NATURAL OR ARTIFICIAL OPENING ENDOSCOPIC: ICD-10-PCS | Performed by: HOSPITALIST

## 2023-11-05 PROCEDURE — 86850 RBC ANTIBODY SCREEN: CPT | Performed by: STUDENT IN AN ORGANIZED HEALTH CARE EDUCATION/TRAINING PROGRAM

## 2023-11-05 PROCEDURE — 99222 1ST HOSP IP/OBS MODERATE 55: CPT | Performed by: UROLOGY

## 2023-11-05 PROCEDURE — 0W3R8ZZ CONTROL BLEEDING IN GENITOURINARY TRACT, VIA NATURAL OR ARTIFICIAL OPENING ENDOSCOPIC: ICD-10-PCS | Performed by: HOSPITALIST

## 2023-11-05 PROCEDURE — 86900 BLOOD TYPING SEROLOGIC ABO: CPT | Performed by: STUDENT IN AN ORGANIZED HEALTH CARE EDUCATION/TRAINING PROGRAM

## 2023-11-05 PROCEDURE — P9016 RBC LEUKOCYTES REDUCED: HCPCS

## 2023-11-05 PROCEDURE — 88342 IMHCHEM/IMCYTCHM 1ST ANTB: CPT | Performed by: PATHOLOGY

## 2023-11-05 PROCEDURE — 88313 SPECIAL STAINS GROUP 2: CPT | Performed by: PATHOLOGY

## 2023-11-05 RX ORDER — FENTANYL CITRATE 50 UG/ML
INJECTION, SOLUTION INTRAMUSCULAR; INTRAVENOUS AS NEEDED
Status: DISCONTINUED | OUTPATIENT
Start: 2023-11-05 | End: 2023-11-05

## 2023-11-05 RX ORDER — PHENYLEPHRINE HCL IN 0.9% NACL 1 MG/10 ML
SYRINGE (ML) INTRAVENOUS AS NEEDED
Status: DISCONTINUED | OUTPATIENT
Start: 2023-11-05 | End: 2023-11-05

## 2023-11-05 RX ORDER — HYDROMORPHONE HCL/PF 1 MG/ML
0.5 SYRINGE (ML) INJECTION
Status: DISCONTINUED | OUTPATIENT
Start: 2023-11-05 | End: 2023-11-05

## 2023-11-05 RX ORDER — DEXAMETHASONE SODIUM PHOSPHATE 10 MG/ML
INJECTION, SOLUTION INTRAMUSCULAR; INTRAVENOUS AS NEEDED
Status: DISCONTINUED | OUTPATIENT
Start: 2023-11-05 | End: 2023-11-05

## 2023-11-05 RX ORDER — ONDANSETRON 2 MG/ML
4 INJECTION INTRAMUSCULAR; INTRAVENOUS ONCE AS NEEDED
Status: DISCONTINUED | OUTPATIENT
Start: 2023-11-05 | End: 2023-11-05

## 2023-11-05 RX ORDER — ONDANSETRON 2 MG/ML
INJECTION INTRAMUSCULAR; INTRAVENOUS AS NEEDED
Status: DISCONTINUED | OUTPATIENT
Start: 2023-11-05 | End: 2023-11-05

## 2023-11-05 RX ORDER — MAGNESIUM HYDROXIDE 1200 MG/15ML
LIQUID ORAL AS NEEDED
Status: DISCONTINUED | OUTPATIENT
Start: 2023-11-05 | End: 2023-11-05 | Stop reason: HOSPADM

## 2023-11-05 RX ORDER — FENTANYL CITRATE/PF 50 MCG/ML
25 SYRINGE (ML) INJECTION
Status: DISCONTINUED | OUTPATIENT
Start: 2023-11-05 | End: 2023-11-05

## 2023-11-05 RX ORDER — LIDOCAINE HYDROCHLORIDE 10 MG/ML
INJECTION, SOLUTION EPIDURAL; INFILTRATION; INTRACAUDAL; PERINEURAL AS NEEDED
Status: DISCONTINUED | OUTPATIENT
Start: 2023-11-05 | End: 2023-11-05

## 2023-11-05 RX ORDER — GLYCINE 1.5 G/100ML
SOLUTION IRRIGATION AS NEEDED
Status: DISCONTINUED | OUTPATIENT
Start: 2023-11-05 | End: 2023-11-05 | Stop reason: HOSPADM

## 2023-11-05 RX ORDER — PROPOFOL 10 MG/ML
INJECTION, EMULSION INTRAVENOUS AS NEEDED
Status: DISCONTINUED | OUTPATIENT
Start: 2023-11-05 | End: 2023-11-05

## 2023-11-05 RX ADMIN — ONDANSETRON 4 MG: 2 INJECTION INTRAMUSCULAR; INTRAVENOUS at 08:21

## 2023-11-05 RX ADMIN — Medication 5 MG: at 08:29

## 2023-11-05 RX ADMIN — FENTANYL CITRATE 25 MCG: 50 INJECTION INTRAMUSCULAR; INTRAVENOUS at 08:32

## 2023-11-05 RX ADMIN — GENTAMICIN SULFATE 40 MG: 40 INJECTION, SOLUTION INTRAMUSCULAR; INTRAVENOUS at 08:17

## 2023-11-05 RX ADMIN — Medication 100 MCG: at 08:29

## 2023-11-05 RX ADMIN — Medication 100 MCG: at 08:15

## 2023-11-05 RX ADMIN — Medication 100 MCG: at 08:23

## 2023-11-05 RX ADMIN — LEVOTHYROXINE SODIUM 75 MCG: 75 TABLET ORAL at 05:09

## 2023-11-05 RX ADMIN — FENTANYL CITRATE 25 MCG: 50 INJECTION INTRAMUSCULAR; INTRAVENOUS at 08:15

## 2023-11-05 RX ADMIN — LIDOCAINE HYDROCHLORIDE 50 MG: 10 INJECTION, SOLUTION EPIDURAL; INFILTRATION; INTRACAUDAL; PERINEURAL at 08:15

## 2023-11-05 RX ADMIN — DEXAMETHASONE SODIUM PHOSPHATE 10 MG: 10 INJECTION, SOLUTION INTRAMUSCULAR; INTRAVENOUS at 08:21

## 2023-11-05 RX ADMIN — Medication 10 MG: at 08:24

## 2023-11-05 RX ADMIN — Medication 5 MG: at 08:18

## 2023-11-05 RX ADMIN — SODIUM CHLORIDE, SODIUM GLUCONATE, SODIUM ACETATE, POTASSIUM CHLORIDE, MAGNESIUM CHLORIDE, SODIUM PHOSPHATE, DIBASIC, AND POTASSIUM PHOSPHATE 100 ML/HR: .53; .5; .37; .037; .03; .012; .00082 INJECTION, SOLUTION INTRAVENOUS at 05:12

## 2023-11-05 RX ADMIN — PROPOFOL 100 MG: 10 INJECTION, EMULSION INTRAVENOUS at 08:15

## 2023-11-05 RX ADMIN — Medication 10 MG: at 08:21

## 2023-11-05 NOTE — QUICK NOTE
Blood pressure 85/45 with SPO2 89%. Patient assessed at bedside and offers no complaints. Mentation at baseline. Lungs clear. Carrera bag with bloody urine. Follow-up repeat H&H. Patient just completed 1 unit PRBC. Ordered additional unit of blood and provide 500 cc fluid bolus to maintain MAP. Fluid bolus can be discontinued once blood transfusion is started again.

## 2023-11-05 NOTE — CONSULTS
Inpatient consult to Urology  Consult performed by: Alicia Moreira PA-C  Consult ordered by: Fredis Stroud DO      : Nico Bender 81 y.o. female [id number]   Unit/Bed #: [facility] 923-01  Encounter: [id number]        Assessment  & Plan  :  Gross hematuria:  -Unknown origin possibility of decompressive as hematuria started at time of Carrera catheter insertion for retention, anticoagulation with Xarelto only recently started/new medication, possible hemorrhagic cystitis  -Presenting with acute blood loss anemia hemoglobin of 6, transfused, repeat 7 will defer to primary team for transfusion  -CT scan on admission revealing large amount of clot within the bladder lumen underlying lesion cannot be excluded  -Patient evaluated earlier on 11/5 Carrera catheter draining adequately manually irrigated with 500 cc no clots removed although only had 18 three-way at the time. Patient reevaluated 11/5 at 1 AM.  Carrera catheter poorly draining, Carrera catheter exchanged for 20 Maltese three-way Carrera catheter. Manual irrigation approximately 200 cc of clot, started on CBI running MCFP draining light pink.  -Keep patient n.p.o.  -Hold Xarelto and DVT prophylaxis, will reevaluate again later this a.m.  -Given patient's clot burden, acute blood loss anemia and persistent hematuria believe patient should move forward with inpatient cystoscopy clot evacuation fulguration  -Keep n.p.o.  -UA 11/3 revealing negative nitrate urine, large leukocytes large" blood, innumerable RBCs WBCs and bacteria. Consider antibiotics until culture has finalized given multiple Carrera catheters and possible surgical intervention. Adjust antibiotics based on culture data. Culture pending  -MARCELO on admission to baseline 1.3 continue to trend. Subjective : Vincent Keenan  is a 81 y.o. female with past medical history of breast cancer DVT currently anticoagulated on Xarelto.   Known to our practice with a history of urinary retention status post hip fracture had Carrera catheter removed at facility and evaluated in the office. Noted to be retaining. Carrera catheter reinserted. Patient also with hematuria. Believed to be vaginal undergoing outpatient gross hematuria work-up. With outpatient CT and plan for cystoscopy on 12/5. Presented to [address] [place] [address] after malfunctioning Carrera catheter and gross hematuria. Status post exchange for three-way Carrera catheter and initiation of CBI transferred to [address] [place] [address] for close urologic evaluation due to limited urologic availability over the weekend at [facility]. Patient also noted to have acute blood loss anemia with hemoglobin of 6 status post transfusion. CT scan revealing large amount of blood within the bladder lumen underlying lesion cannot be excluded. Patient evaluated at bedside she is currently lying comfortably in no acute distress. She is very pleasant along with family member at bedside. Denies any current abdominal pain or discomfort. Denies any nausea or vomiting or flank pain.         Allergies   Allergen Reactions    Lisinopril Anaphylaxis    Codeine Other (See Comments)     Nausea/vomiting      Other      Annotation - [id number]: tounge swelling    Oxycodone      Annotation - [id number]: NOT TOLERATE    Penicillins Hives    Sulfamethoxazole-Trimethoprim     Ciprofloxacin Rash      Current Outpatient Medications   Medication Instructions    ascorbic acid (VITAMIN C) 500 mg, Oral, Daily    ASPIRIN 81 PO 1 tablet ONCE DAILY (route: oral)    Calcium Citrate-Vitamin D (CITRACAL + D PO) Oral, 2 times daily    carvedilol (COREG) 25 mg, Oral, 2 times daily with meals    Cholecalciferol (VITAMIN D-3 PO) 2,000 Units, Oral, Daily    doxycycline (ADOXA) 100 mg, Oral, 2 times daily    folic acid (FOLVITE) 1 mg tablet 1 daily    hydrochlorothiazide (HYDRODIURIL) 25 mg tablet TAKE 1 TABLET BY MOUTH EVERY DAY    hydrocortisone 0.5 % cream Topical, 2 times daily    Klor-Con M10 10 MEQ tablet TAKE 2 TABLETS TWICE A DAY    levothyroxine (SYNTHROID) 75 mcg, Oral, Daily    magnesium oxide (MAG-OX) 400 mg, Oral, 2 times daily    methotrexate 2.5 mg, Oral, Weekly, Patient takes 6 tablets weekly    Multiple Vitamin (MULTIVITAMIN) tablet 1 tablet, Oral, Daily    Psyllium (Metamucil) WAFR Take once daily    riTUXimab (RITUXAN) 500 mg/50 mL Intravenous, Every 6 months    rosuvastatin (CRESTOR) 5 mg, Oral, Daily    Xarelto 10 mg, Oral, Daily      Past Medical History:   Diagnosis Date    Allergic angioedema     2mar2016 resolved    Angioedema     [id number] resolved    Arthritis     Breast cancer ([address] ) 2012    right     Chronic kidney disease     Disease of thyroid gland     Hemorrhage of anus and rectum     02mar2016 resolved    Hyperlipidemia     Hypertension     Hypocalcemia     12oct2017 resolved    Neuritis     thoracic and lumbosacral    Peptic ulcer     Rheumatoid arthritis ([address] )     Stage 3b chronic kidney disease ([address] [facility]) 5/7/2021    Vitamin D deficiency      Past Surgical History:   Procedure Laterality Date    ABDOMINAL SURGERY      BREAST BIOPSY Right 2012    BREAST LUMPECTOMY Right 2012    BREAST SURGERY      CHOLECYSTECTOMY      [address]    HIP SURGERY Right 09/18/2022    10/2023    ORTHOPEDIC SURGERY      for toes    US GUIDED BREAST BIOPSY RIGHT COMPLETE Right 08/15/2022     Family History   Problem Relation Age of Onset    Breast cancer Mother     Kidney failure Father     Other Father         uremia    Lung cancer Brother     Breast cancer Maternal Aunt     Breast cancer Maternal Aunt     Breast cancer Maternal Aunt     Breast cancer Maternal Aunt     Breast cancer Maternal Aunt     Stomach cancer Maternal Uncle      Social History     Socioeconomic History    Marital status:       Spouse name: Not on file    Number of children: Not on file    Years of education: Not on file    Highest education level: Not on file   Occupational History Not on file   Tobacco Use    Smoking status: Never     Passive exposure: Past    Smokeless tobacco: Never   Vaping Use    Vaping Use: Never used   Substance and Sexual Activity    Alcohol use: Not Currently    Drug use: No    Sexual activity: Not Currently     Partners: Male   Other Topics Concern    Not on file   Social History Narrative    Active: caffeine use     Social Determinants of Health     Financial Resource Strain: Low Risk  (11/21/2022)    Overall Financial Resource Strain (CARDIA)     Difficulty of Paying Living Expenses: Not very hard   Food Insecurity: Not on file   Transportation Needs: No Transportation Needs (11/21/2022)    PRAPARE - Transportation     Lack of Transportation (Medical): No     Lack of Transportation (Non-Medical): No   Physical Activity: Not on file   Stress: Not on file   Social Connections: Not on file   Intimate Partner Violence: Not on file   Housing Stability: Not on file        Review of Systems     Objective     Physical Exam  Constitutional:       General: She is not in acute distress. Appearance: She is normal weight. She is not ill-appearing, toxic-appearing or diaphoretic. HENT:      Head: Normocephalic and atraumatic. Right Ear: External ear normal.      Left Ear: External ear normal.      Nose: Nose normal.      Mouth/Throat:      Pharynx: Oropharynx is clear. Eyes:      Conjunctiva/sclera: Conjunctivae normal.   Cardiovascular:      Rate and Rhythm: Normal rate and regular rhythm. Heart sounds: No murmur heard. No friction rub. No gallop. Pulmonary:      Effort: Pulmonary effort is normal. No respiratory distress. Breath sounds: No wheezing, rhonchi or rales. Abdominal:      General: Bowel sounds are normal.   Genitourinary:     Comments: Initial manual irrigation revealed punch colored urine 500 cc irrigation. Second evaluation of Carrera catheter noted to be draining poorly CBI not running.   Removal of 18 Pakistani Carrera catheter revealed occluded Carrera due to clot. 20 Haitian inserted, dark red-colored urine obtained, manual irrigation with approximately 200 cc of clot removed. -CBI running light pink long term open  Musculoskeletal:      Cervical back: Normal range of motion. Skin:     General: Skin is warm and dry. Neurological:      General: No focal deficit present. Mental Status: She is alert and oriented to person, place, and time. Imaging:    Narrative & Impression   CT ABDOMEN AND PELVIS WITHOUT IV CONTRAST     INDICATION:   hematuria. COMPARISON: Comparison is made to a prior CT scan of the abdomen and pelvis dated October 18, 2018. TECHNIQUE:  CT examination of the abdomen and pelvis was performed without intravenous contrast.  Axial, sagittal, and coronal 2D reformatted images were created from the source data and submitted for interpretation. Radiation dose length product (DLP) for this visit:  565 mGy-cm . This examination, like all CT scans performed in the [facility], was performed utilizing techniques to minimize radiation dose exposure, including the use of iterative   reconstruction and automated exposure control. Enteric contrast was not administered. FINDINGS:     ABDOMEN     LOWER CHEST: Incompletely imaged 6 mm right lower lobe groundglass nodule. This is new when compared to a CT scan of the chest dated March 28, 2019, but a lesion of similar size is described on a CT scan of the chest from [facility] network   dated 9/17/2022.  :     LIVER/BILIARY TREE:  Unremarkable. GALLBLADDER: Status post cholecystectomy. SPLEEN:  Unremarkable. PANCREAS:  Unremarkable. ADRENAL GLANDS:  Unremarkable. KIDNEYS/URETERS: Multiple bilateral nonobstructing renal calculi measuring up to 5 mm. No collecting system dilatation. Moderate left renal atrophy. STOMACH AND BOWEL: No obstruction. Multiple colonic diverticula. No bowel wall thickening.      Small hiatal hernia. APPENDIX:  No findings to suggest appendicitis. ABDOMINOPELVIC CAVITY:  No ascites. No pneumoperitoneum. No lymphadenopathy. VESSELS: Atherosclerotic changes are present. No evidence of aneurysm. PELVIS     REPRODUCTIVE ORGANS: 2.9 x 2.5 cm left ovarian cyst which has been present since at least 2018 when it measured 2.6 x 2 cm (this was remeasured for purposes of direct comparison. Right ovary normal. Few calcified uterine fibroids. URINARY BLADDER: There is a Carrera catheter in the bladder which is surrounded by clot. The bladder is thickened with infiltration of the adjacent fat. ABDOMINAL WALL/INGUINAL REGIONS:  Unremarkable. OSSEOUS STRUCTURES:  right hip arthroplasty. Degenerative changes throughout the spine with compression of the T12 vertebral body and mild retropulsion, which has progressed since a film of the thoracic spine dated August 9, 2021. IMPRESSION:     Large amount of clot within the bladder lumen. Underlying lesion cannot be excluded. Consider urologic consult and direct visualization with cystoscopy. Bilateral nonobstructing renal calculi. Incompletely imaged 6 mm groundglass nodule right lower lobe. Per report from [facility], a lesion of this size was present on a CT scan from 2022. PET/CT scan will be requested and an addendum to address management will be added at that   time.      Labs:  Lab Results   Component Value Date    SODIUM 131 (L) 11/04/2023    K 5.6 (H) 11/04/2023     11/04/2023    CO2 22 11/04/2023    BUN 58 (H) 11/04/2023    CREATININE 1.97 (H) 11/04/2023    GLUC 119 11/04/2023    CALCIUM 8.5 11/04/2023         Lab Results   Component Value Date    WBC 3.78 (L) 11/04/2023    HGB 6.0 (LL) 11/04/2023    HCT 18.8 (L) 11/04/2023    MCV 97 11/04/2023     11/04/2023         VTE Pharmacologic Prophylaxis: Reason for no pharmacologic prophylaxis gross hematuria, acute blood loss anemia  VTE Mechanical Prophylaxis: sequential compression device     Yony Quinones PA-C

## 2023-11-05 NOTE — ASSESSMENT & PLAN NOTE
Baseline around 1-1.3. Noted MARCELO since 10/31 with Cr of 2.04. Suspect secondary to urinary retention and acute blood loss anemia. Cr. Improved to 1.41 today   Monitor BMP. Continue to hold HCTZ, s/p IVF hydration, currently being monitored off, encourage good PO hydration after cystoscopy   Management of urinary retention per urology.

## 2023-11-05 NOTE — ANESTHESIA PREPROCEDURE EVALUATION
Procedure:  CYSTOSCOPY EVACUATION OF CLOTS & fulguration (Bladder)    Relevant Problems   CARDIO   (+) Hyperlipidemia   (+) Hypertension      ENDO   (+) Hypothyroidism      /RENAL   (+) Acute kidney injury superimposed on chronic kidney disease    (+) Chronic kidney disease-mineral and bone disorder   (+) Chronic renal disease, stage IV (HCC)   (+) Stage 3b chronic kidney disease (HCC)      GYN   (+) Adenocarcinoma of breast (HCC)      HEMATOLOGY   (+) Acute blood loss anemia (ABLA)      MUSCULOSKELETAL   (+) Rheumatoid arthritis (HCC)      NEURO/PSYCH   (+) Other chronic pain      10/2023: normal biventricular systolic function, mod AS, mild MR    Hgb 7.0, plt 230, cr 2       Anesthesia Plan  ASA Score- 3     Anesthesia Type- general with ASA Monitors. Additional Monitors:     Airway Plan: LMA. Comment: General anesthesia, LMA; standard ASA monitors. Risks and benefits discussed with patient; patient consented and agrees to proceed. I saw and evaluated the patient. If seen with CRNA, we have discussed the anesthetic plan and I am in agreement that the plan is appropriate for the patient. .       Plan Factors-    Chart reviewed. Existing labs reviewed. Induction- intravenous. Postoperative Plan- Plan for postoperative opioid use. Planned trial extubation    Informed Consent- Anesthetic plan and risks discussed with patient. I personally reviewed this patient with the CRNA. Discussed and agreed on the Anesthesia Plan with the CRNA. Andrew Sepulveda

## 2023-11-05 NOTE — OP NOTE
Operative Note     PATIENT:  Jeremy Starr (MRN [id number])    DATE OF PROCEDURE:   11/5/2023    PRE-OP DIAGNOSES:   1) gross hematuria  2. Clot urinary retention  3. Acute blood loss anemia     POST-OP DIAGNOSES AND OPERATIVE FINDINGS:   1) gross hematuria  2. Clot urinary retention  3. Acute blood loss anemia      PROCEDURES:  1) cystoscopy with clot evacuation and fulguration  2. Transurethral resection of bladder lesion (1 cm ulcerative lesion, right lateral wall)  3. Bladder biopsy    SURGEON:   Becky Armenta MD    ANESTHESIA TYPE:  General anesthesia    ESTIMATED BLOOD LOSS:   Minimal    COMPLICATIONS:   None    ANTIBIOTICS:  Cefazolin    INTRAOPERATIVE THROMBOEMBOLISM PROPHYLAXIS:  Pneumatic compression stockings      PROCEDURE SUMMARY:    The patient was brought to the operating room and anesthesia obtained. The patient was then placed in the lithotomy position and prepped and draped using standard sterile technique. All pressure points were carefully padded. A surgical time-out occurred, antibiotics were administered, and thromboembolism prophylaxis was given. A 27 F saline resectoscope was inserted into the urethra after dilation of the urethral meatus to 28 F using standard urethral sounds. The urethra and bladder were carefully inspected. A well organized clot burden is present in the bladder. This required a formal clot evacuation. I initially utilized a cystoscope to minimize any urethral dilatation given the patient's atrophic vaginitis and retracted introitus. However I did need to convert to a resectoscope to evacuate all the clots after performing gentle dilation using sounds. Approximately a 150 cc to 200 cc of old clotted blood was evacuated. Following this the bladder was inspected in its entirety using a 30 degree and 70 degree lens    Bladder is generally trabeculated showing general age-related changes.   There are scattered areas of erythema and an ulcerative area lateral to the right ureteral orifice. In general this appeared to be benign however these area was abnormal and I did perform a formal transurethral resection utilizing a cold cup device in order to minimize the risk of any perforation or injury    Specimens were collected from this lesion as well as additional bladder biopsies. I then performed extensive targeted electrocautery using the Bugbee in this region. Letter was then observed with multiple cycles of filling and emptying hemostasis is excellent. I placed a 20 [place] three-way catheter initiated slow CBI which is running clear. DISPOSITION:   PACU - hemodynamically stable. PLAN:  -Wean CBI over the next 24 hours  - Hold all therapeutic anticoagulation. Patient's nephew states that her Xarelto was initiated prophylactically following orthopedic procedure but she has not had any documentation of an acute DVT. In the setting of acute blood loss anemia, I do not recommend initiating any therapeutic anticoagulation but prophylactic would be reasonable from my standpoint  - The patient's recent episode of retention we will plan to maintain her Carrera catheter and schedule an outpatient voiding trial in our [facility] urology office at discharge.

## 2023-11-05 NOTE — ASSESSMENT & PLAN NOTE
Noted in 2019 on venous duplex per review of records   Holding Xarelto 10 mg daily that was recently started after hip surgery for DVT ppx due to gross hematuria and anemia requiring blood transfusion   Resume once cleared by urology

## 2023-11-05 NOTE — ASSESSMENT & PLAN NOTE
History of stage I right breast cancer, invasive ductal carcinoma. ER/NY positive, HER2 positive per fish testing. She was initially diagnosed in 2012 and is status post lumpectomy, radiation, Herceptin treatment. She was on tamoxifen for 7 years from 2012 to 11/2019 which was discontinued due development of DVT. Her most recent mammogram 07/2021 showed benign findings.   Continue with outpatient follow up

## 2023-11-05 NOTE — ASSESSMENT & PLAN NOTE
In the setting of gross hematuria as noted above, hgb 6.0 on admit  S/p 1 unit pRBC with improvement of hgb to 8.2   Trend CBC and transfuse as needed to keep Hgb>7

## 2023-11-05 NOTE — ASSESSMENT & PLAN NOTE
Recent right hip fracture status post hemoarthroplasty on October 5 complicated by acute urinary retention. Patient has been following with urology and most recent visit on 11/3 due to mild hematuria and urinary retention with Carrera catheter placed. Presented to [facility] on 11/4 with Carrera catheter malfunction due to gross hematuria. CT abdomen noted for large amount of clot within the bladder lumen with inability to exclude underlying lesion. Transferred to [facility] for urology evaluation  Urology following,  Three-way Carrera placed with CBI, however without much improvement   Therefore patient taken to OR for urgent cystoscopy with clot evacuation for source control, bladder lesion also noted that biopsy was taken, wean CBI over next 24 hours    Continue to hold Xarelto, resume once cleared by urology   UA (+) For blood, leukocytes, bacteria, urine culture pending, hold off on abx for now  Continue to monitor H&H and transfuse to keep hemoglobin over 7.   Hgb noted to be 6.0 on admission, s/p 1 U PRBC with improvement now to 8.2

## 2023-11-05 NOTE — ANESTHESIA POSTPROCEDURE EVALUATION
Post-Op Assessment Note    CV Status:  Stable  Pain Score: 0    Pain management: adequate     Mental Status:  Alert and awake   Hydration Status:  Euvolemic   PONV Controlled:  Controlled   Airway Patency:  Patent      Post Op Vitals Reviewed: Yes      Staff: CRNA         No notable events documented.     /57 (11/05/23 0907)    Temp 97.7 °F (36.5 °C) (11/05/23 0907)    Pulse 64 (11/05/23 0907)   Resp 15 (11/05/23 0907)    SpO2 99 % (11/05/23 0907)

## 2023-11-05 NOTE — ASSESSMENT & PLAN NOTE
Mild elevation. Suspect reactive to above. Also consider secondary to methotrexate use. Hold crestor until LFTs stabilize.    Improving spontaneously, AST 53, ALT 53  Recheck CMP in the AM

## 2023-11-05 NOTE — PHYSICAL THERAPY NOTE
Physical Therapy Cancellation Note    Patient's Name: Irlanda Art       11/05/23 0930   Note Type   Note type Cancelled Session   Cancel Reasons Patient to operating room   Additional Comments Pt admit with Hematuria, Anemia. Off floor for cystoscopy. Will follow for PT evaluation. Thank you.        Elia Lamas, PT, DPT, GCS

## 2023-11-05 NOTE — ASSESSMENT & PLAN NOTE
BP with some soft pressures noted overnight, therefore antihypertensives are on hold   PTA home regimen of coreg 25 mg BID and HCTZ 25 mg daily, continue to hold for now  Monitor

## 2023-11-05 NOTE — PLAN OF CARE
Problem: MOBILITY - ADULT  Goal: Maintain or return to baseline ADL function  Description: INTERVENTIONS:  -  Assess patient's ability to carry out ADLs; assess patient's baseline for ADL function and identify physical deficits which impact ability to perform ADLs (bathing, care of mouth/teeth, toileting, grooming, dressing, etc.)  - Assess/evaluate cause of self-care deficits   - Assess range of motion  - Assess patient's mobility; develop plan if impaired  - Assess patient's need for assistive devices and provide as appropriate  - Encourage maximum independence but intervene and supervise when necessary  - Involve family in performance of ADLs  - Assess for home care needs following discharge   - Consider OT consult to assist with ADL evaluation and planning for discharge  - Provide patient education as appropriate  Outcome: Adequate for Discharge     Problem: PAIN - ADULT  Goal: Verbalizes/displays adequate comfort level or baseline comfort level  Description: Interventions:  - Encourage patient to monitor pain and request assistance  - Assess pain using appropriate pain scale  - Administer analgesics based on type and severity of pain and evaluate response  - Implement non-pharmacological measures as appropriate and evaluate response  - Consider cultural and social influences on pain and pain management  - Notify physician/advanced practitioner if interventions unsuccessful or patient reports new pain  Outcome: Adequate for Discharge     Problem: INFECTION - ADULT  Goal: Absence or prevention of progression during hospitalization  Description: INTERVENTIONS:  - Assess and monitor for signs and symptoms of infection  - Monitor lab/diagnostic results  - Monitor all insertion sites, i.e. indwelling lines, tubes, and drains  - Monitor endotracheal if appropriate and nasal secretions for changes in amount and color  - [place] appropriate cooling/warming therapies per order  - Administer medications as ordered  - Instruct and encourage patient and family to use good hand hygiene technique  - Identify and instruct in appropriate isolation precautions for identified infection/condition  Outcome: Adequate for Discharge     Problem: SAFETY ADULT  Goal: Maintain or return to baseline ADL function  Description: INTERVENTIONS:  -  Assess patient's ability to carry out ADLs; assess patient's baseline for ADL function and identify physical deficits which impact ability to perform ADLs (bathing, care of mouth/teeth, toileting, grooming, dressing, etc.)  - Assess/evaluate cause of self-care deficits   - Assess range of motion  - Assess patient's mobility; develop plan if impaired  - Assess patient's need for assistive devices and provide as appropriate  - Encourage maximum independence but intervene and supervise when necessary  - Involve family in performance of ADLs  - Assess for home care needs following discharge   - Consider OT consult to assist with ADL evaluation and planning for discharge  - Provide patient education as appropriate  Outcome: Adequate for Discharge  Goal: Patient will remain free of falls  Description: INTERVENTIONS:  - Educate patient/family on patient safety including physical limitations  - Instruct patient to call for assistance with activity   - Consult OT/PT to assist with strengthening/mobility   - Keep Call bell within reach  - Keep bed low and locked with side rails adjusted as appropriate  - Keep care items and personal belongings within reach  - Initiate and maintain comfort rounds  - Make Fall Risk Sign visible to staff  - Offer Toileting every 2 Hours, in advance of need  - Initiate/Maintain alarm  - Obtain necessary fall risk management equipment:   - Apply yellow socks and bracelet for high fall risk patients  - Consider moving patient to room near nurses station  Outcome: Adequate for Discharge     Problem: DISCHARGE PLANNING  Goal: Discharge to home or other facility with appropriate resources  Description: INTERVENTIONS:  - Identify barriers to discharge w/patient and caregiver  - Arrange for needed discharge resources and transportation as appropriate  - Identify discharge learning needs (meds, wound care, etc.)  - Arrange for interpretive services to assist at discharge as needed  - Refer to Case Management Department for coordinating discharge planning if the patient needs post-hospital services based on physician/advanced practitioner order or complex needs related to functional status, cognitive ability, or social support system  Outcome: Adequate for Discharge     Problem: Knowledge Deficit  Goal: Patient/family/caregiver demonstrates understanding of disease process, treatment plan, medications, and discharge instructions  Description: Complete learning assessment and assess knowledge base.   Interventions:  - Provide teaching at level of understanding  - Provide teaching via preferred learning methods  Outcome: Adequate for Discharge     Problem: PAIN - ADULT  Goal: Verbalizes/displays adequate comfort level or baseline comfort level  Description: Interventions:  - Encourage patient to monitor pain and request assistance  - Assess pain using appropriate pain scale  - Administer analgesics based on type and severity of pain and evaluate response  - Implement non-pharmacological measures as appropriate and evaluate response  - Consider cultural and social influences on pain and pain management  - Notify physician/advanced practitioner if interventions unsuccessful or patient reports new pain  Outcome: Progressing     Problem: INFECTION - ADULT  Goal: Absence or prevention of progression during hospitalization  Description: INTERVENTIONS:  - Assess and monitor for signs and symptoms of infection  - Monitor lab/diagnostic results  - Monitor all insertion sites, i.e. indwelling lines, tubes, and drains  - Monitor endotracheal if appropriate and nasal secretions for changes in amount and color  - [place] appropriate cooling/warming therapies per order  - Administer medications as ordered  - Instruct and encourage patient and family to use good hand hygiene technique  - Identify and instruct in appropriate isolation precautions for identified infection/condition  Outcome: Progressing     Problem: SAFETY ADULT  Goal: Patient will remain free of falls  Description: INTERVENTIONS:  - Educate patient/family on patient safety including physical limitations  - Instruct patient to call for assistance with activity   - Consult OT/PT to assist with strengthening/mobility   - Keep Call bell within reach  - Keep bed low and locked with side rails adjusted as appropriate  - Keep care items and personal belongings within reach  - Initiate and maintain comfort rounds  - Make Fall Risk Sign visible to staff  - Offer Toileting every 2 Hours, in advance of need  - Initiate/Maintain alarm  - Obtain necessary fall risk management equipment:   - Apply yellow socks and bracelet for high fall risk patients  - Consider moving patient to room near nurses station  Outcome: Progressing  Goal: Maintain or return to baseline ADL function  Description: INTERVENTIONS:  -  Assess patient's ability to carry out ADLs; assess patient's baseline for ADL function and identify physical deficits which impact ability to perform ADLs (bathing, care of mouth/teeth, toileting, grooming, dressing, etc.)  - Assess/evaluate cause of self-care deficits   - Assess range of motion  - Assess patient's mobility; develop plan if impaired  - Assess patient's need for assistive devices and provide as appropriate  - Encourage maximum independence but intervene and supervise when necessary  - Involve family in performance of ADLs  - Assess for home care needs following discharge   - Consider OT consult to assist with ADL evaluation and planning for discharge  - Provide patient education as appropriate  Outcome: Progressing     Problem: DISCHARGE PLANNING  Goal: Discharge to home or other facility with appropriate resources  Description: INTERVENTIONS:  - Identify barriers to discharge w/patient and caregiver  - Arrange for needed discharge resources and transportation as appropriate  - Identify discharge learning needs (meds, wound care, etc.)  - Arrange for interpretive services to assist at discharge as needed  - Refer to Case Management Department for coordinating discharge planning if the patient needs post-hospital services based on physician/advanced practitioner order or complex needs related to functional status, cognitive ability, or social support system  Outcome: Progressing     Problem: Knowledge Deficit  Goal: Patient/family/caregiver demonstrates understanding of disease process, treatment plan, medications, and discharge instructions  Description: Complete learning assessment and assess knowledge base.   Interventions:  - Provide teaching at level of understanding  - Provide teaching via preferred learning methods  Outcome: Progressing     Problem: GENITOURINARY - ADULT  Goal: Urinary catheter remains patent  Description: INTERVENTIONS:  - Assess patency of urinary catheter  - If patient has a chronic herrera, consider changing catheter if non-functioning  - Follow guidelines for intermittent irrigation of non-functioning urinary catheter  Outcome: Adequate for Discharge     Problem: GENITOURINARY - ADULT  Goal: Maintains or returns to baseline urinary function  Description: INTERVENTIONS:  - Assess urinary function  - Encourage oral fluids to ensure adequate hydration if ordered  - Administer IV fluids as ordered to ensure adequate hydration  - Administer ordered medications as needed  - Offer frequent toileting  - Follow urinary retention protocol if ordered  Outcome: Progressing  Goal: Urinary catheter remains patent  Description: INTERVENTIONS:  - Assess patency of urinary catheter  - If patient has a chronic herrera, consider changing catheter if non-functioning  - Follow guidelines for intermittent irrigation of non-functioning urinary catheter  Outcome: Progressing

## 2023-11-05 NOTE — PROGRESS NOTES
[facility] [facility]  Progress Note  Name: Mary Almazan  MRN: [id number]  Unit/Bed#: [facility] 923-01 I Date of Admission: 11/4/2023   Date of Service: 11/5/2023 I Hospital Day: 1  DOS: 11/5/2023  Assessment/Plan   * Francisco Cisneros hematuria  Assessment & Plan  Recent right hip fracture status post hemoarthroplasty on October 5 complicated by acute urinary retention. Patient has been following with urology and most recent visit on 11/3 due to mild hematuria and urinary retention with Carrera catheter placed. Presented to [address] [place] [address] on 11/4 with Carrera catheter malfunction due to gross hematuria. CT abdomen noted for large amount of clot within the bladder lumen with inability to exclude underlying lesion. Transferred to [facility] for urology evaluation  Urology following,  Three-way Carrera placed with CBI, however without much improvement   Therefore patient taken to OR for urgent cystoscopy with clot evacuation for source control, bladder lesion also noted that biopsy was taken, wean CBI over next 24 hours    Continue to hold Xarelto, resume once cleared by urology   UA (+) For blood, leukocytes, bacteria, urine culture pending, hold off on abx for now  Continue to monitor H&H and transfuse to keep hemoglobin over 7. Hgb noted to be 6.0 on admission, s/p 1 U PRBC with improvement now to 8.2      Transaminitis  Assessment & Plan  Mild elevation. Suspect reactive to above. Also consider secondary to methotrexate use. Hold crestor until LFTs stabilize. Improving spontaneously, AST 53, ALT 53  Recheck CMP in the AM    Acute kidney injury superimposed on chronic kidney disease   Assessment & Plan  Baseline around 1-1.3. Noted MARCELO since 10/31 with Cr of 2.04. Suspect secondary to urinary retention and acute blood loss anemia. Cr. Improved to 1.41 today   Monitor BMP.   Continue to hold HCTZ, s/p IVF hydration, currently being monitored off, encourage good PO hydration after cystoscopy   Management of urinary retention per urology. Acute blood loss anemia (ABLA)  Assessment & Plan  In the setting of gross hematuria as noted above, hgb 6.0 on admit  S/p 1 unit pRBC with improvement of hgb to 8.2   Trend CBC and transfuse as needed to keep Hgb>7    History of DVT (deep vein thrombosis)  Assessment & Plan  Noted in 2019 on venous duplex per review of records   Holding Xarelto 10 mg daily that was recently started after hip surgery for DVT ppx due to gross hematuria and anemia requiring blood transfusion   Resume once cleared by urology    Adenocarcinoma of breast [facility])  Assessment & Plan  History of stage I right breast cancer, invasive ductal carcinoma. ER/[place] positive, HER2 positive per fish testing. She was initially diagnosed in 2012 and is status post lumpectomy, radiation, Herceptin treatment. She was on tamoxifen for 7 years from 2012 to 11/2019 which was discontinued due development of DVT. Her most recent mammogram 07/2021 showed benign findings. Continue with outpatient follow up     Hyperlipidemia  Assessment & Plan  Hold statin for now given transaminitis, resume once LFTs stabilize. Hypothyroidism  Assessment & Plan  Continue levothyroxine 75 mcg daily. Rheumatoid arthritis ([address])  Assessment & Plan  Maintained on methotrexate (every Friday) and Rituxan. Hold for now pending urine culture results    Hypertension  Assessment & Plan  BP with some soft pressures noted overnight, therefore antihypertensives are on hold   PTA home regimen of coreg 25 mg BID and HCTZ 25 mg daily, continue to hold for now  Monitor         VTE Pharmacologic Prophylaxis: VTE Score: 4 Moderate Risk (Score 3-4) - Pharmacological DVT Prophylaxis Contraindicated. Sequential Compression Devices Ordered.  Per urology clearance, gross hematuria    Patient Centered Rounds: I evaluated the patient without nursing staff present due to speaking to nurse outside patient's room   Discussions with Specialists or Other Care Team Provider: Discussed with RNAURELIA and reviewed urology notes     Education and Discussions with Family / Patient: Updated  (niece) at bedside. Total Time Spent on Date of Encounter in care of patient: 40 mins. This time was spent on one or more of the following: performing physical exam; counseling and coordination of care; obtaining or reviewing history; documenting in the medical record; reviewing/ordering tests, medications or procedures; communicating with other healthcare professionals and discussing with patient's family/caregivers. Current Length of Stay: 1 day(s)  Current Patient Status: Inpatient   Certification Statement: The patient will continue to require additional inpatient hospital stay due to weaning CBI over next 24 hours, monitoring hgb, improvement of gume   Discharge Plan: Anticipate discharge in 24-48 hrs to discharge location to be determined pending rehab evaluations. Code Status: Level 1 - Full Code    Subjective:   Pt reports that she is feeling overall well after her cystoscopy. Reports that she is hungry. No other complaints at this time. Objective:     Vitals:   Temp (24hrs), Av.8 °F (36.6 °C), Min:96.9 °F (36.1 °C), Max:98.3 °F (36.8 °C)    Temp:  [96.9 °F (36.1 °C)-98.3 °F (36.8 °C)] 97.8 °F (36.6 °C)  HR:  [56-77] 56  Resp:  [12-22] 18  BP: ()/(45-81) 120/60  SpO2:  [89 %-100 %] 99 %  There is no height or weight on file to calculate BMI. Input and Output Summary (last 24 hours): Intake/Output Summary (Last 24 hours) at 2023 1207  Last data filed at 2023 7368  Gross per 24 hour   Intake 850 ml   Output 46738 ml   Net -44466 ml       Physical Exam:   Physical Exam  Vitals reviewed. Constitutional:       General: She is not in acute distress. Comments: Pt is in no acute distress lying in her hospital bed resting comfortably    HENT:      Head: Normocephalic.    Cardiovascular:      Rate and Rhythm: Normal rate and regular rhythm. Pulmonary:      Effort: No respiratory distress. Breath sounds: Normal breath sounds. No wheezing. Abdominal:      General: Bowel sounds are normal. There is no distension. Palpations: Abdomen is soft. Tenderness: There is no abdominal tenderness. Genitourinary:     Comments: Urinary catheter in place, no urine noted yet in bag  Skin:     General: Skin is warm and dry. Neurological:      Mental Status: She is alert. Psychiatric:         Mood and Affect: Mood normal.          Additional Data:     Labs:  Results from last 7 days   Lab Units 11/05/23  0737 11/04/23  2259 11/04/23  1540   WBC Thousand/uL 3.21*  --  3.78*   HEMOGLOBIN g/dL 8.2*   < > 6.0*   HEMATOCRIT % 23.7*   < > 18.8*   PLATELETS Thousands/uL 151  --  230   BANDS PCT %  --   --  1   LYMPHO PCT %  --   --  23   MONO PCT %  --   --  0*   EOS PCT %  --   --  4    < > = values in this interval not displayed.      Results from last 7 days   Lab Units 11/05/23  0737   SODIUM mmol/L 137   POTASSIUM mmol/L 4.2   CHLORIDE mmol/L 107   CO2 mmol/L 23   BUN mg/dL 40*   CREATININE mg/dL 1.41*   ANION GAP mmol/L 7   CALCIUM mg/dL 7.4*   ALBUMIN g/dL 2.5*   TOTAL BILIRUBIN mg/dL 0.68   ALK PHOS U/L 71   ALT U/L 53*   AST U/L 53*   GLUCOSE RANDOM mg/dL 97     Results from last 7 days   Lab Units 11/04/23  1540   INR  1.73*                   Lines/Drains:  Invasive Devices       Peripheral Intravenous Line  Duration             Peripheral IV 11/04/23 Left Antecubital <1 day    Peripheral IV 11/04/23 Right Antecubital <1 day              Drain  Duration             Continuous Bladder Irrigation Three-way 1 day                          Imaging: Reviewed radiology reports from this admission including: abdominal/pelvic CT    Recent Cultures (last 7 days):   Results from last 7 days   Lab Units 11/03/23  1217   URINE CULTURE  Culture too young- will reincubate       Last 24 Hours Medication List:   Current Facility-Administered Medications   Medication Dose Route Frequency Provider Last Rate    aspirin  81 mg Oral Daily Nia Perez MD      levothyroxine  75 mcg Oral Early Morning Nia Perez MD          Today, Patient Was Seen By: Nataliia Daniels PA-C    **Please Note: This note may have been constructed using a voice recognition system. **

## 2023-11-05 NOTE — ASSESSMENT & PLAN NOTE
Maintained on methotrexate (every Friday) and Rituxan.    Hold for now pending urine culture results

## 2023-11-06 LAB
ALBUMIN SERPL BCP-MCNC: 2.7 G/DL (ref 3.5–5)
ALP SERPL-CCNC: 72 U/L (ref 34–104)
ALT SERPL W P-5'-P-CCNC: 54 U/L (ref 7–52)
ANION GAP SERPL CALCULATED.3IONS-SCNC: 9 MMOL/L
AST SERPL W P-5'-P-CCNC: 47 U/L (ref 13–39)
BACTERIA UR CULT: ABNORMAL
BILIRUB SERPL-MCNC: 0.44 MG/DL (ref 0.2–1)
BUN SERPL-MCNC: 38 MG/DL (ref 5–25)
CALCIUM ALBUM COR SERPL-MCNC: 8.6 MG/DL (ref 8.3–10.1)
CALCIUM SERPL-MCNC: 7.6 MG/DL (ref 8.4–10.2)
CHLORIDE SERPL-SCNC: 105 MMOL/L (ref 96–108)
CO2 SERPL-SCNC: 21 MMOL/L (ref 21–32)
CREAT SERPL-MCNC: 1.45 MG/DL (ref 0.6–1.3)
ERYTHROCYTE [DISTWIDTH] IN BLOOD BY AUTOMATED COUNT: 16.2 % (ref 11.6–15.1)
GFR SERPL CREATININE-BSD FRML MDRD: 33 ML/MIN/1.73SQ M
GLUCOSE SERPL-MCNC: 126 MG/DL (ref 65–140)
HCT VFR BLD AUTO: 26.1 % (ref 34.8–46.1)
HGB BLD-MCNC: 8.5 G/DL (ref 11.5–15.4)
MCH RBC QN AUTO: 32.1 PG (ref 26.8–34.3)
MCHC RBC AUTO-ENTMCNC: 32.6 G/DL (ref 31.4–37.4)
MCV RBC AUTO: 99 FL (ref 82–98)
PLATELET # BLD AUTO: 170 THOUSANDS/UL (ref 149–390)
PMV BLD AUTO: 9.9 FL (ref 8.9–12.7)
POTASSIUM SERPL-SCNC: 3.7 MMOL/L (ref 3.5–5.3)
PROT SERPL-MCNC: 4.8 G/DL (ref 6.4–8.4)
RBC # BLD AUTO: 2.65 MILLION/UL (ref 3.81–5.12)
SODIUM SERPL-SCNC: 135 MMOL/L (ref 135–147)
WBC # BLD AUTO: 7.49 THOUSAND/UL (ref 4.31–10.16)

## 2023-11-06 PROCEDURE — 99232 SBSQ HOSP IP/OBS MODERATE 35: CPT | Performed by: STUDENT IN AN ORGANIZED HEALTH CARE EDUCATION/TRAINING PROGRAM

## 2023-11-06 PROCEDURE — 97162 PT EVAL MOD COMPLEX 30 MIN: CPT

## 2023-11-06 PROCEDURE — 80053 COMPREHEN METABOLIC PANEL: CPT | Performed by: PHYSICIAN ASSISTANT

## 2023-11-06 PROCEDURE — 85027 COMPLETE CBC AUTOMATED: CPT | Performed by: PHYSICIAN ASSISTANT

## 2023-11-06 PROCEDURE — 97166 OT EVAL MOD COMPLEX 45 MIN: CPT

## 2023-11-06 PROCEDURE — 99232 SBSQ HOSP IP/OBS MODERATE 35: CPT | Performed by: UROLOGY

## 2023-11-06 RX ORDER — DOXYCYCLINE HYCLATE 100 MG/1
100 CAPSULE ORAL EVERY 12 HOURS SCHEDULED
Status: DISCONTINUED | OUTPATIENT
Start: 2023-11-06 | End: 2023-11-07

## 2023-11-06 RX ORDER — SODIUM CHLORIDE 9 MG/ML
75 INJECTION, SOLUTION INTRAVENOUS CONTINUOUS
Status: DISPENSED | OUTPATIENT
Start: 2023-11-06 | End: 2023-11-07

## 2023-11-06 RX ADMIN — LEVOTHYROXINE SODIUM 75 MCG: 75 TABLET ORAL at 05:10

## 2023-11-06 RX ADMIN — ASPIRIN 81 MG CHEWABLE TABLET 81 MG: 81 TABLET CHEWABLE at 08:08

## 2023-11-06 RX ADMIN — DOXYCYCLINE 100 MG: 100 CAPSULE ORAL at 21:50

## 2023-11-06 RX ADMIN — SODIUM CHLORIDE 75 ML/HR: 0.9 INJECTION, SOLUTION INTRAVENOUS at 17:27

## 2023-11-06 NOTE — DISCHARGE INSTR - OTHER ORDERS
Skin Care orders:  1-Hydraguard to bilateral  heels BID and PRN  2-EHOB  cushion when out of bed in chair. 3-Moisturize skin daily with skin nourishing cream  4-Elevate heels to offload pressure. 5-Turn/reposition q2h for pressure re-distribution on skin  6.  Cleanse sacrum , buttock with soap and water apply allevyn foam neli with a P and date check skin integrity every shift change every 3 days or when soiled

## 2023-11-06 NOTE — PHYSICAL THERAPY NOTE
Physical Therapy Evaluation     Patient's Name: Susannah Bass    Admitting Diagnosis  gross hematuria    Problem List  Patient Active Problem List   Diagnosis    Hypertension    Rheumatoid arthritis ([address])    Hypothyroidism    Hyperuricemia    Diastolic dysfunction    Hyperlipidemia    Lumbar radiculopathy    Osteopenia    Other chronic pain    Vitamin D deficiency    Adenocarcinoma of breast ([address])    Localized edema    Abnormal finding on diagnostic imaging of kidney    Fat necrosis (segmental) of breast    Personal history of breast cancer    History of peptic ulcer    Intermediate stage nonexudative 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 eyes    Lower leg DVT (deep venous thromboembolism), chronic, left (HCC)    Bradycardia with 51-60 beats per minute    Varicose veins of left lower extremity with pain    Stage 3b chronic kidney disease ([address])    History of DVT (deep vein thrombosis)    Chronic kidney disease-mineral and bone disorder    Chronic pain of both knees    Protein-calorie malnutrition, unspecified severity ([address])    Chronic renal disease, stage IV ([address])    Gross hematuria    Acute blood loss anemia (ABLA)    Acute kidney injury superimposed on chronic kidney disease     Transaminitis       Past Medical History  Past Medical History:   Diagnosis Date    Allergic angioedema     [id number] resolved    Angioedema     [id number] resolved    Arthritis     Breast cancer ([address]) 2012    right     Chronic kidney disease     Disease of thyroid gland     Hemorrhage of anus and rectum     [id number] resolved    Hyperlipidemia     Hypertension     Hypocalcemia     [id number] resolved    Neuritis     thoracic and lumbosacral    Peptic ulcer     Rheumatoid arthritis ([address])     Stage 3b chronic kidney disease ([address]) 5/7/2021    Vitamin D deficiency        Past Surgical History  Past Surgical History:   Procedure Laterality Date    ABDOMINAL SURGERY      BREAST BIOPSY Right 2012    BREAST LUMPECTOMY Right 2012    BREAST SURGERY      CHOLECYSTECTOMY      ELBOW SURGERY      GALLBLADDER SURGERY  1981    HIP SURGERY Right 09/18/2022    10/2023    ORTHOPEDIC SURGERY      for toes    NE CYSTO W/IRRIG & EVAC MULTPLE OBSTRUCTING CLOTS N/A 11/5/2023    Procedure: CYSTOSCOPY EVACUATION OF CLOTS & fulguration;  Surgeon: Donis Casey MD;  [facility];  Service: Urology    TRANSURETHRAL RESECTION OF BLADDER TUMOR N/A 11/5/2023    Procedure: TRANSURETHRAL RESECTION OF BLADDER TUMOR (TURBT); Surgeon: Donis Casey MD;  Location[facility];  Service: Urology    US GUIDED BREAST BIOPSY RIGHT COMPLETE Right 08/15/2022        11/06/23 1008   PT Last Visit   PT Visit Date 11/06/23   Note Type   Note type Evaluation   Pain Assessment   Pain Assessment Tool 0-10   Pain Score No Pain   Restrictions/Precautions   Weight Bearing Precautions Per Order Yes   RLE Weight Bearing Per Order WBAT  (s/p R hemiarthroplasty on 10/5/23 @[facility])   Other Precautions WBS; Multiple lines; Fall Risk;THR  (posterior hip precuations)   Home Living   Type of [facility] One level;Ramped entrance;Performs ADLs on one level; Able to live on main level with bedroom/bathroom   Bathroom Shower/Tub Walk-in shower   Bathroom Toilet Standard   Bathroom Equipment Grab bars in shower; Shower chair   [place]  (was using PTA)   Prior Function   Level of [place] Independent with ADLs; Independent with functional mobility; Independent with IADLS   Lives With Other (Comment)  (roommate)   Receives Help From Family;Friend(s)   IADLs Independent with driving; Independent with meal prep; Independent with medication management   Falls in the last 6 months 0   Vocational Retired   General   Family/Caregiver Present No   Cognition   Overall Cognitive Status WFL   Arousal/Participation Cooperative   Orientation Level Oriented X4   Memory Within functional limits   Following Commands Follows all commands and directions without difficulty Comments pleasant and cooperative. Was able to provide detailed hx of medical status as well as recall hip precuations from recent procedure   RLE Assessment   RLE Assessment WFL   LLE Assessment   LLE Assessment WFL   Bed Mobility   Supine to Sit 6  Modified independent   Additional items HOB elevated; Bedrails   Sit to Supine Unable to assess   Additional Comments pt found supine in bed upon arrival. Pt left sitting OOB in recliner with all needs wihtin reach   Transfers   Sit to Stand 6  Modified independent   Stand to Sit 6  Modified independent   Additional Comments transfers with RW   Ambulation/Elevation   Gait pattern Short stride;Decreased foot clearance   Gait Assistance 5  Supervision   Assistive Device Rolling walker   Distance 2 x 90'   Balance   Static Sitting Fair +   Dynamic Sitting Fair +   Static Standing Fair   Dynamic Standing Fair   Ambulatory Fair  (with RW)   Activity Tolerance   Activity Tolerance Patient tolerated treatment well   Medical Staff Made Aware SPT Jeanette Patten; co-session completed this date 2* increased medical complexity and multiple co-morbdities   Nurse Made Aware RN cleared pt to participate in therapy session   Assessment   Prognosis Good   Assessment Pt seen for moderate complexity PT evaluation. Pt with active PT eval/treat and up and OOB as tolerated orders. Pt is a 81 y.o. female who was admitted to [address] [place] [address] on 11/4 with gross hematuria s/p cystoscopy + evacuation of clots + resection of bladder tumor. Pt's active problems include: HTN, RA, hypothyroidism, hyperlipidemia, adenocarcinoma of breast, hx of DVT, anemia, MARCELO, transaminitis. Pt  has a past medical history of Allergic angioedema, Angioedema, Arthritis, Breast cancer ([facility]) (2012), Chronic kidney disease, Disease of thyroid gland, Hemorrhage of anus and rectum, Hyperlipidemia, Hypertension, Hypocalcemia, Neuritis, Peptic ulcer, Rheumatoid arthritis ([facility]), Stage 3b chronic kidney disease ([facility]) (5/7/2021), and Vitamin D deficiency. Pt resides with roommate in 1 level mobile home with ramped entrance and was independent with use of RW prior to hospital admission. Currently upon evaluation pt performing bed mobility tasks at mod I level, funational transfers at mod I level and ambulation at S level. Pt was left sitting OOB in recliner at the end of PT session with all needs within reach. Pt with no questions or concerns regarding d/c home; appears to be functioning at/ near baseline mobility levels. Pt with no further acute inpatient PT needs at this time- please re-consult if needed. PT to d/c pt and recommends home with HHPT. Encourage pt to ambulate at least 3-4x/day with restorative/ nursing staff while remaining in hospital. The patient's [facility] Mobility Inpatient Short Form Raw Score is 22. A Raw score of greater than 16 suggests the patient may benefit from discharge to home. Please also refer to the recommendation of the Physical Therapist for safe discharge planning.    Goals   Patient Goals to go home   Plan   PT Frequency   (eval only- d/c IPPT)   Discharge Recommendation   Rehab Resource Intensity Level, PT III (Minimum Resource Intensity)  (HHPT)   Equipment Recommended Walker  (pt reports owning)   AM-PAC Basic Mobility Inpatient   Turning in Flat Bed Without Bedrails 4   Lying on Back to Sitting on Edge of Flat Bed Without Bedrails 4   Moving Bed to Chair 4   Standing Up From Chair Using Arms 4   Walk in Room 3   Climb 3-5 Stairs With Railing 3   Basic Mobility Inpatient Raw Score 22   Basic Mobility Standardized Score 47.4   Highest Level Of Mobility   JH-HLM Goal 7: Walk 25 feet or more   JH-HLM Achieved 7: Walk 25 feet or more   Modified Ernestine Scale   Modified Farrar Scale 2           Hanna Zapata, PT DPT

## 2023-11-06 NOTE — PROGRESS NOTES
[facility]  Progress Note  Name: Esequiel Roberts  MRN: [id number]  Unit/Bed#: [facility] 923-01 I Date of Admission: 11/4/2023   Date of Service: 11/6/2023 I Hospital Day: 2    Assessment/Plan   * Gross hematuria  Assessment & Plan  Patient s/p R hip arthroplasty 10/05 with subsequent urinary rentention. Urology placed herrera catheter on 11/03. Patient noticed blood in her herrera bag which brought her to [facility] ED on 11/04. CT abdomen & pelvis:  Large amount of clot within the bladder lumen. Underlying lesion cannot be excluded. Consider urologic consult and direct visualization with cystoscopy. Bilateral nonobstructing renal calculi. Initial management with three-way herrera with CBI without improvement  S/p cystoscopy with clot evacuation and fulguration with transurethral resection of the bladder lesion with biopsy by urology 11/05. Bx pending              Hold all AC per urology              Patient will f/u outpatient for herrera removal and voiding trial              Urine culture positive for GNR, patient is sensitive to penicillin which allergy listed as hives. She was started on doxycycline prior to this hospitalization, continue same, final sensitivity. Transaminitis  Assessment & Plan  Mild elevation. Suspect reactive to above. Also consider secondary to methotrexate use. Hold crestor until LFTs stabilize. Recheck CMP in the AM    Acute kidney injury superimposed on chronic kidney disease   Assessment & Plan  Baseline around 1-1.3. Noted MARCELO since 10/31 with Cr of 2.04. Suspect secondary to urinary retention and acute blood loss anemia. Cr. Pleatued at 1.45. Monitor BMP. Continue to hold HCTZ, s/p IVF hydration, currently being monitored off, encourage good PO hydration after cystoscopy   Management of urinary retention per urology.      Acute blood loss anemia (ABLA)  Assessment & Plan  In the setting of gross hematuria as noted above, hgb 6.0 on admit  S/p 1 unit pRBC with improvement of hgb to 8.2   Trend CBC and transfuse as needed to keep Hgb>7    History of DVT (deep vein thrombosis)  Assessment & Plan  Noted in 2019 on venous duplex per review of records   Holding Xarelto 10 mg daily that was recently started after hip surgery for DVT ppx due to gross hematuria and anemia requiring blood transfusion   Resume once cleared by urology    Adenocarcinoma of breast [facility])  Assessment & Plan  History of stage I right breast cancer, invasive ductal carcinoma. ER/MN positive, HER2 positive per fish testing. She was initially diagnosed in 2012 and is status post lumpectomy, radiation, Herceptin treatment. She was on tamoxifen for 7 years from 2012 to 11/2019 which was discontinued due development of DVT. Her most recent mammogram 07/2021 showed benign findings. Continue with outpatient follow up     Hyperlipidemia  Assessment & Plan  Hold statin for now given transaminitis, resume once LFTs stabilize. Hypothyroidism  Assessment & Plan  Continue levothyroxine 75 mcg daily. Rheumatoid arthritis ([address])  Assessment & Plan  Maintained on methotrexate (every Friday) and Rituxan. Hold for now pending urine culture results    Hypertension  Assessment & Plan  BP with some soft pressures noted overnight, therefore antihypertensives are on hold   PTA home regimen of coreg 25 mg BID and HCTZ 25 mg daily, continue to hold for now  Monitor               VTE Pharmacologic Prophylaxis: VTE Score: 4 Moderate Risk (Score 3-4) - Pharmacological DVT Prophylaxis Contraindicated. Sequential Compression Devices Ordered. Patient Centered Rounds: I performed bedside rounds with nursing staff today. Discussions with Specialists or Other Care Team Provider: Urology, Cm    Education and Discussions with Family / Patient:  Pt at bedside . Total Time Spent on Date of Encounter in care of patient: 22 mins.  This time was spent on one or more of the following: performing physical exam; counseling and coordination of care; obtaining or reviewing history; documenting in the medical record; reviewing/ordering tests, medications or procedures; communicating with other healthcare professionals and discussing with patient's family/caregivers. Current Length of Stay: 2 day(s)  Current Patient Status: Inpatient   Certification Statement: The patient will continue to require additional inpatient hospital stay due to ongoing management as outlined above. Discharge Plan: Anticipate discharge in 24-48 hrs to discharge location to be determined pending rehab evaluations. Code Status: Level 1 - Full Code    Subjective:   Patient examined at bedside, reports feeling "weak and continues with nausea. Objective:     Vitals:   Temp (24hrs), Av.6 °F (36.4 °C), Min:97.2 °F (36.2 °C), Max:98.1 °F (36.7 °C)    Temp:  [97.2 °F (36.2 °C)-98.1 °F (36.7 °C)] 97.4 °F (36.3 °C)  HR:  [68-74] 74  Resp:  [14-17] 14  BP: (101-121)/(43-91) 114/54  SpO2:  [98 %-100 %] 100 %  There is no height or weight on file to calculate BMI. Input and Output Summary (last 24 hours): Intake/Output Summary (Last 24 hours) at 2023 1643  Last data filed at 2023 0700  Gross per 24 hour   Intake 720 ml   Output 2025 ml   Net -1305 ml       Physical Exam:   Physical Exam  Constitutional:       Appearance: Normal appearance. HENT:      Head: Normocephalic. Right Ear: Tympanic membrane normal.      Left Ear: Tympanic membrane normal.      Mouth/Throat:      Mouth: Mucous membranes are moist.      Pharynx: Oropharynx is clear. Eyes:      Conjunctiva/sclera: Conjunctivae normal.      Pupils: Pupils are equal, round, and reactive to light. Cardiovascular:      Rate and Rhythm: Normal rate and regular rhythm. Pulses: Normal pulses. Heart sounds: Murmur heard.    Pulmonary:      Effort: Pulmonary effort is normal.      Comments: Dec breath sounds b/l  Abdominal:      General: Abdomen is flat. Bowel sounds are normal.   Musculoskeletal:         General: Normal range of motion. Cervical back: Normal range of motion. Skin:     General: Skin is warm. Neurological:      General: No focal deficit present. Mental Status: She is alert and oriented to person, place, and time. Psychiatric:         Mood and Affect: Mood normal.         Behavior: Behavior normal.          Additional Data:     Labs:  Results from last 7 days   Lab Units 11/06/23  0449 11/04/23  2259 11/04/23  1540   WBC Thousand/uL 7.49   < > 3.78*   HEMOGLOBIN g/dL 8.5*   < > 6.0*   HEMATOCRIT % 26.1*   < > 18.8*   PLATELETS Thousands/uL 170   < > 230   BANDS PCT %  --   --  1   LYMPHO PCT %  --   --  23   MONO PCT %  --   --  0*   EOS PCT %  --   --  4    < > = values in this interval not displayed. Results from last 7 days   Lab Units 11/06/23  0449   SODIUM mmol/L 135   POTASSIUM mmol/L 3.7   CHLORIDE mmol/L 105   CO2 mmol/L 21   BUN mg/dL 38*   CREATININE mg/dL 1.45*   ANION GAP mmol/L 9   CALCIUM mg/dL 7.6*   ALBUMIN g/dL 2.7*   TOTAL BILIRUBIN mg/dL 0.44   ALK PHOS U/L 72   ALT U/L 54*   AST U/L 47*   GLUCOSE RANDOM mg/dL 126     Results from last 7 days   Lab Units 11/04/23  1540   INR  1.73*                   Lines/Drains:  Invasive Devices       Peripheral Intravenous Line  Duration             Peripheral IV 11/04/23 Right Antecubital 2 days    Peripheral IV 11/04/23 Left Antecubital 1 day              Drain  Duration             Continuous Bladder Irrigation Three-way 2 days                          Imaging: No pertinent imaging reviewed.     Recent Cultures (last 7 days):   Results from last 7 days   Lab Units 11/03/23  1217   URINE CULTURE  >100,000 cfu/ml Gram Negative Paul*       Last 24 Hours Medication List:   Current Facility-Administered Medications   Medication Dose Route Frequency Provider Last Rate    aspirin  81 mg Oral Daily Alirio Hawk MD      doxycycline hyclate  100 mg Oral Q12H [address] [facility] Daisha Garnica MD      levothyroxine  75 mcg Oral Early Morning Donis Casey MD      sodium chloride  75 mL/hr Intravenous Continuous Saumya Renae MD          Today, Patient Was Seen By: Saumya Renae MD    **Please Note: This note may have been constructed using a voice recognition system. **

## 2023-11-06 NOTE — ASSESSMENT & PLAN NOTE
POD #2 cystoscopy,Evacuation of clots and fulguration with transurethral resection of bladder tumor  Maintain Carrera catheter and discharge with outpatient voiding trial in [facility] office  Continue to monitor urine color off of CBI  Would avoid therapeutic anticoagulation but prophylactic anticoagulation is reasonable from urological standpoint  Hgb 7.7  Continue to trend hemoglobin and transfusion per primary team  WBC 6.83  Creat 1.49, stable  Medical optimization and discharge per primary team  Urine culture positive Pseudomonas aeruginosa, on doxycycline

## 2023-11-06 NOTE — WOUND OSTOMY CARE
Consult Note - Wound   Vincent Keenan 81 y.o. female MRN: [id number]  Unit/Bed#: [facility] 923-01 Encounter: [id number]        History and Present Illness: Patient is seen for wound care consult today . The patient is a 81year old female that is admitted with gross hematuria , recent hip fracture status post hemoarthroplasty on October 5 complicated with urinary retention . Presented t [address] [place] [address] on 11/4 with herrera catheter malfunction due to gross hematuria , transaminitis , MARCELO , acute blood loss anemia , history of DVT , adenocarcinoma of breast  2012 , hyperlipidemia , hypothyroidism , RA, HTN, Urology following for gross hematuria . She is alert and pleasant for the assessment of the skin and a Min A of 2 for rolling in the bed. Continent of bowel and herrera in place . Assessment Findings:   Bilateral heels dry and intact   Sacral buttocks - Noted pink blanchable area on the bilateral buttocks . All areas blanchable across the area . - allevyn foam applied for prevention . Skin Care orders:  1-Hydraguard to bilateral  heels BID and PRN  2-EHOB  cushion when out of bed in chair. 3-Moisturize skin daily with skin nourishing cream  4-Elevate heels to offload pressure. 5-Turn/reposition q2h for pressure re-distribution on skin  6.  Cleanse sacrum , buttock with soap and water apply allevyn foam neli with a P and date check skin integrity every shift change every 3 days or when soiled         Wounds:             Wound care will sign off call or tiger text with questions or concerns     Rozina Stanley RN BSN CWOCN

## 2023-11-06 NOTE — OCCUPATIONAL THERAPY NOTE
Occupational Therapy Evaluation     Patient Name: Filomena Dos Santos  RUCPW'E Date: 11/6/2023  Problem List  Principal Problem:    Gross hematuria  Active Problems:    Hypertension    Rheumatoid arthritis ([address])    Hypothyroidism    Hyperlipidemia    Adenocarcinoma of breast ([address])    History of DVT (deep vein thrombosis)    Acute blood loss anemia (ABLA)    Acute kidney injury superimposed on chronic kidney disease     Transaminitis    Past Medical History  Past Medical History:   Diagnosis Date    Allergic angioedema     [id number] resolved    Angioedema     [id number] resolved    Arthritis     Breast cancer ([address]) 2012    right     Chronic kidney disease     Disease of thyroid gland     Hemorrhage of anus and rectum     [id number] resolved    Hyperlipidemia     Hypertension     Hypocalcemia     [id number] resolved    Neuritis     thoracic and lumbosacral    Peptic ulcer     Rheumatoid arthritis ([address])     Stage 3b chronic kidney disease ([address]) 5/7/2021    Vitamin D deficiency      Past Surgical History  Past Surgical History:   Procedure Laterality Date    ABDOMINAL SURGERY      BREAST BIOPSY Right 2012    BREAST LUMPECTOMY Right 2012    BREAST SURGERY      CHOLECYSTECTOMY      ELBOW SURGERY      GALLBLADDER SURGERY  1981    HIP SURGERY Right 09/18/2022    10/2023    ORTHOPEDIC SURGERY      for toes    TX CYSTO W/IRRIG & EVAC MULTPLE OBSTRUCTING CLOTS N/A 11/5/2023    Procedure: CYSTOSCOPY EVACUATION OF CLOTS & fulguration;  Surgeon: Angi Stone MD;  Location: [facility];  Service: Urology    TRANSURETHRAL RESECTION OF BLADDER TUMOR N/A 11/5/2023    Procedure: TRANSURETHRAL RESECTION OF BLADDER TUMOR (TURBT);   Surgeon: Angi Stone MD;  Location: [facility];  Service: Urology    US GUIDED BREAST BIOPSY RIGHT COMPLETE Right 08/15/2022        11/06/23 1007   OT Last Visit   OT Visit Date 11/06/23   Note Type   Note type Evaluation   Pain Assessment   Pain Assessment Tool 0-10   Pain Score No Pain Restrictions/Precautions   Weight Bearing Precautions Per Order Yes   RLE Weight Bearing Per Order WBAT  (s/p R hemiarthroplasty on 10/5/23 @ [facility])   Other Precautions WBS;THR;Multiple lines; Fall Risk  (bladder irrigation)   Home Living   Type of Home Mobile home   Home Layout One level;Performs ADLs on one level; Able to live on main level with bedroom/bathroom; Ramped entrance   [employer] bars in shower; [address]  (Pt reports using RW prior to admission.)   Prior Function   Level of [place] Independent with ADLs; Independent with functional mobility; Independent with IADLS   Lives With Other (Comment)  (roommate)   Receives Help From Family;Friend(s)   IADLs Independent with driving; Independent with meal prep; Independent with medication management   Falls in the last 6 months 0   Vocational Retired   Lifestyle   Autonomy Pt reports I w/ ADLs, IADLs, and fxnl mobility at baseline; + driving. Reciprocal Relationships Pt lives w/ a roommate. Service to Others Pt is retired. Intrinsic Gratification Pt enjoys going out to eat w/ her friends. General   Family/Caregiver Present No   ADL   Eating Assistance 7  Independent   Grooming Assistance 7  Independent   UB Bathing Assistance 6  Modified Independent   LB Bathing Assistance 5  Supervision/Setup  (w/ LHAE)   UB Dressing Assistance 6  Modified independent   LB Dressing Assistance 4  Minimal Assistance   LB Dressing Deficit Setup;Don/doff R sock; Don/doff L sock  (Pt is unable to bend over to don/doff her socks due to her posteiror hip precautions. Pt is also unable to use sock aide due to finger deformities (b/l ulnar drift). Pt reports moving foot/sock along the carpet to assist her don her socks at home.)   Toileting Assistance  5  Supervision/Setup   Bed Mobility   Supine to Sit 6  Modified independent   Additional items HOB elevated; Bedrails   Sit to Supine Unable to assess   Additional Comments Pt seated OOB in chair at end of OT evaluation w/ all needs within reach. Transfers   Sit to Stand 6  Modified independent   Stand to Sit 6  Modified independent   Additional Comments w/ RW for suport   Functional Mobility   Functional Mobility 5  Supervision   Additional Comments Pt ambulated household distance w/ S using a RW. Additional items Rolling walker   Balance   Static Sitting Fair +   Dynamic Sitting Fair +   Static Standing Fair   Dynamic Standing [facility] -   Activity Tolerance   Activity Tolerance Patient tolerated treatment well   Medical Staff Made Aware PT, Ab Ramirez, and SPT, Kasandar Syed, due to pt's medical complexity and multiple comorbidities   Nurse Made Aware RN clearance prior to session   RUE Assessment   RUE Assessment WFL   LUE Assessment   LUE Assessment WFL   Hand Function   Gross Motor Coordination Functional   Fine Motor Coordination Impaired  (b/l finger deformities (ulnar drift))   Cognition   Overall Cognitive Status WFL   Arousal/Participation Alert; Responsive; Cooperative   Attention Within functional limits   Orientation Level Oriented X4   Memory Within functional limits   Following Commands Follows all commands and directions without difficulty   Comments Pt pleasant, cooperative, and willing to participate in OT evaluation. Pt able to recall and maintain posterior hip precautions w/o vc from therapist.   Assessment   Assessment Pt is a 81 y.o. female seen for OT evaluation s/p admission to [address] [place] [address] on 11/4/2023 due to urinary catheter problem. Pt diagnosed with Gross hematuria. Pt s/p cystoscopy on 11/5. Pt has a significant PMH impacting occupational performance including:  Allergic angioedema, Angioedema, Arthritis, Breast cancer ([address]), Chronic kidney disease, Disease of thyroid gland, Hemorrhage of anus and rectum, Hyperlipidemia, Hypertension, Hypocalcemia, Neuritis, Peptic ulcer, Rheumatoid arthritis ([address]), Stage 3b chronic kidney disease ([address]), and Vitamin D deficiency. Pt with active OT evaluation and treatment orders and activity orders. PTA, pt living with a roommate in a 1 SH w/ a ramped entrance. Pt reports I w/ ADLs, IADLs, and fxnl mobility w/ RW at baseline; + driving. Pt agreeable and willing to participate in OT evaluation. During evaluation, pt was I for eating and grooming, mod I for UB ADLs, S-min A for LB ADLs, and S for toileting. Pt was mod I for bed mobility and transfers and S for fxnl mobility w/ RW. Pt performing ADLs at/near baseline level of independence. No further acute OT needs identified at this time. Recommend continued active ADL participation and mobilization with hospital staff while in the hospital to increase pt’s endurance and strength upon D/C. From OT standpoint, recommend D/C to home with social support and [address] when medically cleared. D/C pt from OT caseload at this time. Goals   Patient Goals to go home   Plan   OT Frequency Eval only   Discharge Recommendation   Rehab Resource Intensity Level, OT III (Minimum Resource Intensity)   -PAC Daily Activity Inpatient   Lower Body Dressing 3   Bathing 3   Toileting 3   Upper Body Dressing 4   Grooming 4   Eating 4   Daily Activity Raw Score 21   Daily Activity Standardized Score (Calc for Raw Score >=11) 44.27   -PAC Applied Cognition Inpatient   Following a Speech/Presentation 4   Understanding Ordinary Conversation 4   Taking Medications 4   Remembering Where Things Are Placed or Put Away 4   Remembering List of 4-5 Errands 4   Taking Care of Complicated Tasks 4   Applied Cognition Raw Score 24   Applied Cognition Standardized Score 62.21       The patient's raw score on the -PAC Daily Activity Inpatient Short Form is 21. A raw score of greater than or equal to 19 suggests the patient may benefit from discharge to . Please refer to the recommendation of the Occupational Therapist for safe discharge planning.     Viridiana Whitten MS [place], OTR/L

## 2023-11-06 NOTE — RESTORATIVE TECHNICIAN NOTE
Restorative Technician Note      Patient Name: Maggie Mercado     Restorative Tech Visit Date: 11/06/23  Note Type: Mobility  Patient Position Upon Consult: Bedside chair  Activity Performed: Ambulated  Assistive Device: Roller walker  Patient Position at End of Consult: Supine;  All needs within reach    Yomi Hicks Restorative Technician

## 2023-11-06 NOTE — ASSESSMENT & PLAN NOTE
History of stage I right breast cancer, invasive ductal carcinoma. ER/KS positive, HER2 positive per fish testing. She was initially diagnosed in 2012 and is status post lumpectomy, radiation, Herceptin treatment. She was on tamoxifen for 7 years from 2012 to 11/2019 which was discontinued due development of DVT. Her most recent mammogram 07/2021 showed benign findings.   Continue with outpatient follow up

## 2023-11-06 NOTE — ASSESSMENT & PLAN NOTE
Baseline around 1-1.3. Noted MARCELO since 10/31 with Cr of 2.04. Suspect secondary to urinary retention and acute blood loss anemia. Cr. Pleatued at 1.45. Monitor BMP. Continue to hold HCTZ, s/p IVF hydration, currently being monitored off, encourage good PO hydration after cystoscopy   Management of urinary retention per urology.

## 2023-11-06 NOTE — APP STUDENT NOTE
Gross hematuria  Patient s/p R hip arthroplasty 10/05 with subsequent urinary rentention. Urology placed herrera catheter on 11/03. Patient noticed blood in her herrera bag which brought her to [facility] ED on 11/04. CT abdomen & pelvis:  Large amount of clot within the bladder lumen. Underlying lesion cannot be excluded. Consider urologic consult and direct visualization with cystoscopy. Bilateral nonobstructing renal calculi. Incompletely imaged 6 mm groundglass nodule right lower lobe. Per report from [facility], a lesion of this size was present on a CT scan from 2022. PET/CT scan will be requested and an addendum to address management will be added at that time. Initial management with three-way herrera with CBI without improvement  S/p cystoscopy with clot evacuation and fulguration with transurethral resection of the bladder lesion with biopsy by urology 11/05. Bx pending   Hold all AC per urology   Patient will f/u outpatient for herrera removal and voiding trial   Urine culture positive for GNR  Continue to monitor H&H   Transaminitis    Likely secondary to statin and methotrexate use   AST,ALT improving 53>> 47, 53>>54   Medications held at this time  Acute kidney injury superimposed on chronic kidney disease    Baseline Cr 1.2-1.3   On admission 2.04, likely secondary to acute blood loss anemia and urinary retention   Improving 1.45   Patient off IV fluids  Monitor BMP    Urinary retention managed by urology  Acute blood loss anemia (ABLA)    Patients hgb on admission 6.0 in setting of gross hematuria.    S/p transfusion 1 unit PRBC   Hemoglobin 8.5 this am   Transfuse below 7.0  No active signs of bleeding at this time  History of DVT (deep vein thrombosis)    Was on Xarelto per ortho s/p hip arthroplasty 10/05   Currently held due to acute blood loss anemia   No evidence of acute DVT at this time   Resume per urology  Adenocarcinoma of breast ([address])   Hx of stage I right breast cancer, invasive ductal carcinoma. ER/AR positive, HER2 positive per fish testing. She was diagnosed in 2012, s/p treatment via lumpectomy, radiation, and herceptin. She was on Tamoxifen 2012>> 11/2019. Patient was d/c on it due to development of DVT. Her most recent mammogram 07/2021 was benign. Continue outpatient follow up   Hyperlipidemia    Statin currently held due to transaminitis   Resume once LFT's normalize  Hypothyroidism   Continue levothyroxine 75 mcg daily   Rheumatoid arthritis (HCC)    Methotrexate held due to transaminitis  Hypertension    BP stable   Continue to hold HCTZ 25mg and carvedilol 25mg   VTE Pharmacologic Prophylaxis: VTE Score: 4 Moderate Risk (Score 3-4) - Pharmacological DVT Prophylaxis Contraindicated. Sequential Compression Devices Ordered. Patient with gross hematuria. Patient Centered Rounds: I performed bedside rounds with nursing staff today. Discussions with Specialists or Other Care Team Provider: none    Education and Discussions with Family / Patient:   . Total Time Spent on Date of Encounter in care of patient: 30 mins. This time was spent on one or more of the following: performing physical exam; counseling and coordination of care; obtaining or reviewing history; documenting in the medical record; reviewing/ordering tests, medications or procedures; communicating with other healthcare professionals and discussing with patient's family/caregivers. Current Length of Stay: 2 day(s)  Current Patient Status: Inpatient   Certification Statement: The patient will continue to require additional inpatient hospital stay due to monitoring labs and clearance per urology  Discharge Plan: As above. Discharge likely within 24-48 hours. Code Status: Level 1 - Full Code    Subjective:   Patient is feeling overall improved. She reports no blood in her herrera since her procedure 11/05. She reports no other signs of bleeding.  She denies any pain, fever, chills, SOB, cough, CP, palpitations, dizziness, nausea, vomiting, diarrhea, or constipation. She is tolerating eating and drinking. She is able to ambulate around the room independently without difficultly. Objective:     Vitals:   Temp (24hrs), Av.6 °F (36.4 °C), Min:97.2 °F (36.2 °C), Max:98.1 °F (36.7 °C)    Temp:  [97.2 °F (36.2 °C)-98.1 °F (36.7 °C)] 97.2 °F (36.2 °C)  HR:  [56-68] 68  Resp:  [15-18] 16  BP: ()/(43-91) 121/91  SpO2:  [97 %-99 %] 99 %  There is no height or weight on file to calculate BMI. Input and Output Summary (last 24 hours): Intake/Output Summary (Last 24 hours) at 2023 0905  Last data filed at 2023 0700  Gross per 24 hour   Intake 900 ml   Output 2025 ml   Net -1125 ml       Physical Exam:   Physical Exam  Constitutional:       Appearance: Normal appearance. Comments: Patient sitting up in bed, sitting comfortably   Cardiovascular:      Rate and Rhythm: Normal rate and regular rhythm. Pulses: Normal pulses. Pulmonary:      Effort: Pulmonary effort is normal.      Breath sounds: Normal breath sounds. Abdominal:      General: Bowel sounds are normal.      Palpations: Abdomen is soft. Genitourinary:     Comments: No CVA tenderness. Skin:     General: Skin is warm and dry. Neurological:      Mental Status: She is alert. Additional Data:     Labs:  Results from last 7 days   Lab Units 23  0449 23  2259 23  1540   WBC Thousand/uL 7.49   < > 3.78*   HEMOGLOBIN g/dL 8.5*   < > 6.0*   HEMATOCRIT % 26.1*   < > 18.8*   PLATELETS Thousands/uL 170   < > 230   BANDS PCT %  --   --  1   LYMPHO PCT %  --   --  23   MONO PCT %  --   --  0*   EOS PCT %  --   --  4    < > = values in this interval not displayed.      Results from last 7 days   Lab Units 23  0449   SODIUM mmol/L 135   POTASSIUM mmol/L 3.7   CHLORIDE mmol/L 105   CO2 mmol/L 21   BUN mg/dL 38*   CREATININE mg/dL 1.45*   ANION GAP mmol/L 9   CALCIUM mg/dL 7.6*   ALBUMIN g/dL 2.7* TOTAL BILIRUBIN mg/dL 0.44   ALK PHOS U/L 72   ALT U/L 54*   AST U/L 47*   GLUCOSE RANDOM mg/dL 126     Results from last 7 days   Lab Units 11/04/23  1540   INR  1.73*                   Lines/Drains:  Invasive Devices       Peripheral Intravenous Line  Duration             Peripheral IV 11/04/23 Left Antecubital 1 day    Peripheral IV 11/04/23 Right Antecubital 1 day              Drain  Duration             Continuous Bladder Irrigation Three-way 2 days                          Imaging: No pertinent imaging reviewed. Recent Cultures (last 7 days):   Results from last 7 days   Lab Units 11/03/23  1217   URINE CULTURE  >100,000 cfu/ml Gram Negative Paul*       Last 24 Hours Medication List:   Current Facility-Administered Medications   Medication Dose Route Frequency Provider Last Rate    aspirin  81 mg Oral Daily Vianca Manzanares MD      levothyroxine  75 mcg Oral Early Morning Vianca Manzanares MD          Today, Patient Was Seen By: Davin ARCE    **Please Note: This note may have been constructed using a voice recognition system. **

## 2023-11-06 NOTE — PROGRESS NOTES
Progress Note - Urology  Robbie Jung 1940, 81 y.o. female MRN: [id number]    Unit/Bed#: [facility] 923-01 Encounter: [id number]    Acute kidney injury superimposed on chronic kidney disease   Assessment & Plan  Lab Results   Component Value Date    EGFR 33 11/06/2023    EGFR 34 11/05/2023    EGFR 23 11/04/2023    CREATININE 1.45 (H) 11/06/2023    CREATININE 1.41 (H) 11/05/2023    CREATININE 1.97 (H) 11/04/2023   Maintain Carrera catheter discharge  Continue to trend creatinine and avoid nephrotoxins  Bilateral nonobstructing stones without hydronephrosis    * Gross hematuria  Assessment & Plan  POD #1 cystoscopy,Evacuation of clots and fulguration with transurethral resection of bladder tumor  Maintain Carrera catheter and discharge with outpatient voiding trial in [facility] office  Continue to monitor urine color off of CBI  Would avoid therapeutic anticoagulation but prophylactic anticoagulation is reasonable from urological standpoint  Hgb 8.5, improving/HCT 26.1  WBC 7.49  Creat 1.45, stable  Medical optimization and discharge per primary team  Urine culture positive gram-negative claudia, sensitivities pending, on gentamicin  Tailor antibiotics to culture once available            Subjective: Patient ambulating in adam with physical therapy. 24 HR EVENTS:   no significant events. Patient has  no complaints. Review of Systems   Constitutional:  Negative for activity change, appetite change, chills, fatigue, fever and unexpected weight change. HENT:  Negative for facial swelling. Eyes:  Negative for discharge. Respiratory: Negative. Negative for cough and shortness of breath. Cardiovascular:  Negative for chest pain and leg swelling. Gastrointestinal: Negative. Negative for abdominal distention, abdominal pain, constipation, diarrhea, nausea and vomiting. Endocrine: Negative. Genitourinary: Negative.   Negative for decreased urine volume, dysuria, enuresis, flank pain, genital sores and hematuria. Musculoskeletal:  Negative for back pain and myalgias. Skin:  Negative for pallor and rash. Allergic/Immunologic: Negative. Negative for immunocompromised state. Neurological:  Negative for facial asymmetry and speech difficulty. Psychiatric/Behavioral:  Negative for agitation and confusion. Objective:  Nursing Rounds:   Vitals: Blood pressure 121/91, pulse 68, temperature (!) 97.2 °F (36.2 °C), temperature source Oral, resp. rate 16, SpO2 99 %, not currently breastfeeding. ,There is no height or weight on file to calculate BMI. INS & OUTS:  I/O last 24 hours: In: 1400 [P.O.:900; I.V.:400; IV Piggyback:100]  Out: 2025 [Urine:2025]    Physical Exam  Vitals and nursing note reviewed. Constitutional:       General: She is not in acute distress. Appearance: Normal appearance. She is not ill-appearing, toxic-appearing or diaphoretic. HENT:      Head: Normocephalic. Pulmonary:      Effort: Pulmonary effort is normal. No respiratory distress. Abdominal:      General: Abdomen is flat. There is no distension. Palpations: Abdomen is soft. Tenderness: There is no abdominal tenderness. There is no guarding or rebound. Genitourinary:     Comments: Carrera catheter draining clear yellow urine. CBI discontinued  Musculoskeletal:         General: No swelling. Cervical back: Normal range of motion. Skin:     General: Skin is warm and dry. Neurological:      General: No focal deficit present. Mental Status: She is alert and oriented to person, place, and time. Psychiatric:         Mood and Affect: Mood normal.         Behavior: Behavior normal.         Thought Content: Thought content normal.         Judgment: Judgment normal.         Imaging:  CT ABDOMEN AND PELVIS WITHOUT IV CONTRAST     INDICATION:   hematuria. COMPARISON: Comparison is made to a prior CT scan of the abdomen and pelvis dated October 18, 2018.      TECHNIQUE:  CT examination of the abdomen and pelvis was performed without intravenous contrast.  Axial, sagittal, and coronal 2D reformatted images were created from the source data and submitted for interpretation. Radiation dose length product (DLP) for this visit:  565 mGy-cm . This examination, like all CT scans performed in the [facility], was performed utilizing techniques to minimize radiation dose exposure, including the use of iterative   reconstruction and automated exposure control. Enteric contrast was not administered. FINDINGS:     ABDOMEN     LOWER CHEST: Incompletely imaged 6 mm right lower lobe groundglass nodule. This is new when compared to a CT scan of the chest dated March 28, 2019, but a lesion of similar size is described on a CT scan of the chest from [facility] network   dated 9/17/2022.  :     LIVER/BILIARY TREE:  Unremarkable. GALLBLADDER: Status post cholecystectomy. SPLEEN:  Unremarkable. PANCREAS:  Unremarkable. ADRENAL GLANDS:  Unremarkable. KIDNEYS/URETERS: Multiple bilateral nonobstructing renal calculi measuring up to 5 mm. No collecting system dilatation. Moderate left renal atrophy. STOMACH AND BOWEL: No obstruction. Multiple colonic diverticula. No bowel wall thickening. Small hiatal hernia. APPENDIX:  No findings to suggest appendicitis. ABDOMINOPELVIC CAVITY:  No ascites. No pneumoperitoneum. No lymphadenopathy. VESSELS: Atherosclerotic changes are present. No evidence of aneurysm. PELVIS     REPRODUCTIVE ORGANS: 2.9 x 2.5 cm left ovarian cyst which has been present since at least 2018 when it measured 2.6 x 2 cm (this was remeasured for purposes of direct comparison. Right ovary normal. Few calcified uterine fibroids. URINARY BLADDER: There is a Carrera catheter in the bladder which is surrounded by clot. The bladder is thickened with infiltration of the adjacent fat. ABDOMINAL WALL/INGUINAL REGIONS:  Unremarkable.      OSSEOUS STRUCTURES:  right hip arthroplasty. Degenerative changes throughout the spine with compression of the T12 vertebral body and mild retropulsion, which has progressed since a film of the thoracic spine dated August 9, 2021. IMPRESSION:     Large amount of clot within the bladder lumen. Underlying lesion cannot be excluded. Consider urologic consult and direct visualization with cystoscopy. Bilateral nonobstructing renal calculi. Incompletely imaged 6 mm groundglass nodule right lower lobe. Per report from [facility], a lesion of this size was present on a CT scan from 2022. PET/CT scan will be requested and an addendum to address management will be added at that   time. Workstation performed: [id number]     Imaging reviewed - both report and images personally reviewed.      Labs:  Recent Labs     11/04/23  1540 11/05/23  0737 11/06/23  0449   WBC 3.78* 3.21* 7.49       Recent Labs     11/04/23  1540 11/04/23  2259 11/05/23  0737 11/06/23  0449   HGB 6.0* 7.0* 8.2* 8.5*     Recent Labs     11/04/23  1540 11/04/23  2259 11/05/23  0737 11/06/23  0449   HCT 18.8* 21.5* 23.7* 26.1*     Recent Labs     11/04/23  1540 11/05/23  0737 11/06/23  0449   CREATININE 1.97* 1.41* 1.45*     Lab Results   Component Value Date    HGB 8.5 (L) 11/06/2023    HCT 26.1 (L) 11/06/2023    WBC 7.49 11/06/2023     11/06/2023     Lab Results   Component Value Date    K 3.7 11/06/2023     11/06/2023    CO2 21 11/06/2023    BUN 38 (H) 11/06/2023    CREATININE 1.45 (H) 11/06/2023    CALCIUM 7.6 (L) 11/06/2023     Urinalysis: Innumerable WBC's per HPF, innumerable RBC's per HPF, and innumerable + bacteria  Urine Culture: Growth: Gram-negative claudia and Sensitivities Pending    History:    Past Medical History:   Diagnosis Date    Allergic angioedema     [id number] resolved    Angioedema     [id number] resolved    Arthritis     Breast cancer ([address]) 2012    right     Chronic kidney disease     Disease of thyroid gland     Hemorrhage of anus and rectum     [id number] resolved    Hyperlipidemia     Hypertension     Hypocalcemia     12oct2017 resolved    Neuritis     thoracic and lumbosacral    Peptic ulcer     Rheumatoid arthritis ([address])     Stage 3b chronic kidney disease ([address]) 5/7/2021    Vitamin D deficiency      Past Surgical History:   Procedure Laterality Date    ABDOMINAL SURGERY      BREAST BIOPSY Right 2012    BREAST LUMPECTOMY Right 2012    BREAST SURGERY      CHOLECYSTECTOMY      ELBOW SURGERY      GALLBLADDER SURGERY  1981    HIP SURGERY Right 09/18/2022    10/2023    ORTHOPEDIC SURGERY      for toes    [place] CYSTO W/IRRIG & EVAC MULTPLE OBSTRUCTING CLOTS N/A 11/5/2023    Procedure: CYSTOSCOPY EVACUATION OF CLOTS & fulguration;  Surgeon: Alvarado Murray MD;  Location: [facility];  Service: Urology    TRANSURETHRAL RESECTION OF BLADDER TUMOR N/A 11/5/2023    Procedure: TRANSURETHRAL RESECTION OF BLADDER TUMOR (TURBT); Surgeon: Alvarado Murray MD;  Location: [facility];  Service: Urology    US GUIDED BREAST BIOPSY RIGHT COMPLETE Right 08/15/2022     Family History   Problem Relation Age of Onset    Breast cancer Mother     Kidney failure Father     Other Father         uremia    Lung cancer Brother     Breast cancer Maternal Aunt     Breast cancer Maternal Aunt     Breast cancer Maternal Aunt     Breast cancer Maternal Aunt     Breast cancer Maternal Aunt     Stomach cancer Maternal Uncle      Social History     Socioeconomic History    Marital status:       Spouse name: None    Number of children: None    Years of education: None    Highest education level: None   Occupational History    None   Tobacco Use    Smoking status: Never     Passive exposure: Past    Smokeless tobacco: Never   Vaping Use    Vaping Use: Never used   Substance and Sexual Activity    Alcohol use: Not Currently    Drug use: No    Sexual activity: Not Currently     Partners: Male   Other Topics Concern    None   Social History Narrative    Active: caffeine use     Social Determinants of Health     Financial Resource Strain: Low Risk  (11/21/2022)    Overall Financial Resource Strain (CARDIA)     Difficulty of Paying Living Expenses: Not very hard   Food Insecurity: Not on file   Transportation Needs: No Transportation Needs (11/21/2022)    PRAPARE - Transportation     Lack of Transportation (Medical): No     Lack of Transportation (Non-Medical):  No   Physical Activity: Not on file   Stress: Not on file   Social Connections: Not on file   Intimate Partner Violence: Not on file   Housing Stability: Not on file       The following portions of the patient's history were reviewed and updated as appropriate: allergies, current medications, past family history, past medical history, past social history, past surgical history and problem list    PUJA Flowers  Date: 11/6/2023 Time: 11:36 AM

## 2023-11-06 NOTE — RESTORATIVE TECHNICIAN NOTE
Restorative Technician Note      Patient Name: Vincent Keenan     Restorative Tech Visit Date: 11/06/23  Note Type: Mobility  Patient Position Upon Consult: Bedside chair  Activity Performed: Repositioned  Patient Position at End of Consult: Bedside chair;  All needs within reach    Juliet Knox Restorative Techndidier

## 2023-11-06 NOTE — ASSESSMENT & PLAN NOTE
Patient s/p R hip arthroplasty 10/05 with subsequent urinary rentention. Urology placed herrera catheter on 11/03. Patient noticed blood in her herrera bag which brought her to [facility] ED on 11/04. CT abdomen & pelvis:  Large amount of clot within the bladder lumen. Underlying lesion cannot be excluded. Consider urologic consult and direct visualization with cystoscopy. Bilateral nonobstructing renal calculi. Initial management with three-way herrera with CBI without improvement  S/p cystoscopy with clot evacuation and fulguration with transurethral resection of the bladder lesion with biopsy by urology 11/05. Bx pending              Hold all AC per urology              Patient will f/u outpatient for herrera removal and voiding trial              Urine culture positive for GNR, patient is sensitive to penicillin which allergy listed as hives. She was started on doxycycline prior to this hospitalization, continue same, final sensitivity.

## 2023-11-06 NOTE — ASSESSMENT & PLAN NOTE
Lab Results   Component Value Date    EGFR 32 11/07/2023    EGFR 33 11/06/2023    EGFR 34 11/05/2023    CREATININE 1.49 (H) 11/07/2023    CREATININE 1.45 (H) 11/06/2023    CREATININE 1.41 (H) 11/05/2023   Maintain Carrera catheter discharge  Continue to trend creatinine and avoid nephrotoxins  Bilateral nonobstructing stones without hydronephrosis

## 2023-11-06 NOTE — ASSESSMENT & PLAN NOTE
Mild elevation. Suspect reactive to above. Also consider secondary to methotrexate use. Hold crestor until LFTs stabilize.   Recheck CMP in the AM

## 2023-11-06 NOTE — PLAN OF CARE
Problem: PAIN - ADULT  Goal: Verbalizes/displays adequate comfort level or baseline comfort level  Description: Interventions:  - Encourage patient to monitor pain and request assistance  - Assess pain using appropriate pain scale  - Administer analgesics based on type and severity of pain and evaluate response  - Implement non-pharmacological measures as appropriate and evaluate response  - Consider cultural and social influences on pain and pain management  - Notify physician/advanced practitioner if interventions unsuccessful or patient reports new pain  Outcome: Progressing     Problem: INFECTION - ADULT  Goal: Absence or prevention of progression during hospitalization  Description: INTERVENTIONS:  - Assess and monitor for signs and symptoms of infection  - Monitor lab/diagnostic results  - Monitor all insertion sites, i.e. indwelling lines, tubes, and drains  - Monitor endotracheal if appropriate and nasal secretions for changes in amount and color  - [place] appropriate cooling/warming therapies per order  - Administer medications as ordered  - Instruct and encourage patient and family to use good hand hygiene technique  - Identify and instruct in appropriate isolation precautions for identified infection/condition  Outcome: Progressing     Problem: SAFETY ADULT  Goal: Patient will remain free of falls  Description: INTERVENTIONS:  - Educate patient/family on patient safety including physical limitations  - Instruct patient to call for assistance with activity   - Consult OT/PT to assist with strengthening/mobility   - Keep Call bell within reach  - Keep bed low and locked with side rails adjusted as appropriate  - Keep care items and personal belongings within reach  - Initiate and maintain comfort rounds  - Make Fall Risk Sign visible to staff  - Offer Toileting every  Hours, in advance of need  - Initiate/Maintain alarm  - Obtain necessary fall risk management equipment:   - Apply yellow socks and bracelet for high fall risk patients  - Consider moving patient to room near nurses station  Outcome: Progressing

## 2023-11-06 NOTE — CASE MANAGEMENT
Case Management Assessment & Discharge Planning Note    Patient name Carol Ann Espinal  Location [facility] 923/[facility] 271-12 MRN [id number]  : 1940 Date 2023       Current Admission Date: 2023  Current Admission Diagnosis:Gross hematuria   Patient Active Problem List    Diagnosis Date Noted    Gross hematuria 2023    Acute blood loss anemia (ABLA) 2023    Acute kidney injury superimposed on chronic kidney disease  2023    Transaminitis 2023    Protein-calorie malnutrition, unspecified severity ([address]) 2023    Chronic renal disease, stage IV ([address]) 2023    Chronic pain of both knees 08/10/2022    Chronic kidney disease-mineral and bone disorder 2022    Stage 3b chronic kidney disease ([address]) 2021    History of DVT (deep vein thrombosis) 2021    Bradycardia with 51-60 beats per minute 2020    Varicose veins of left lower extremity with pain 2020    Lower leg DVT (deep venous thromboembolism), chronic, left (HCC) 2020    Intermediate stage nonexudative 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 eyes 2019    History of peptic ulcer 10/11/2018    Fat necrosis (segmental) of breast 2018    Personal history of breast cancer 2018    Abnormal finding on diagnostic imaging of kidney 2018    Localized edema 2018    Hypothyroidism 2018    Hyperuricemia 2018    Hypertension 2018    Rheumatoid arthritis ([address])     Diastolic dysfunction     Vitamin D deficiency 2014    Lumbar radiculopathy 2013    Hyperlipidemia 2013    Osteopenia 2013    Other chronic pain 2013    Adenocarcinoma of breast ([address]) 2013      LOS (days): 2  Geometric Mean LOS (GMLOS) (days): 3.50  Days to GMLOS:1.8     OBJECTIVE:    Risk of Unplanned Readmission Score: 20.68         Current admission status: Inpatient       Preferred Pharmacy:   [facility]/pharmacy #[id number] - [place] - [phone]  [address] [address]  Phone: [phone] Fax: [phone]    [facility] [place] [address]  Phone: [phone] Fax: [phone]    Primary Care Provider: Jered Samayoa MD    Primary Insurance: MEDICARE  Secondary Insurance: BLUE CROSS    ASSESSMENT:  Brown[place] Proxies    There are no active Health Care Proxies on file. Readmission Root Cause  30 Day Readmission: No    Patient Information  Admitted from[de-identified] Home  Mental Status: Alert  During Assessment patient was accompanied by: Not accompanied during assessment  Assessment information provided by[de-identified] Patient  Primary Caregiver: Self  Support Systems: Self, Family members  [place] of Residence: [address] [place] [address] do you live in?: [address] [place] [address] entry access options.  Select all that apply.: Ramp  Type of Current Residence: Travel Trailer/ Mobile Home  In the last 12 months, was there a time when you were not able to pay the mortgage or rent on time?: No  In the last 12 months, how many places have you lived?: 1  In the last 12 months, was there a time when you did not have a steady place to sleep or slept in a shelter (including now)?: No  Homeless/housing insecurity resource given?: N/A  Living Arrangements: Other (Comment) (Friend)  Is patient a ?: No    Activities of Daily Living Prior to Admission  Functional Status: Independent  Completes ADLs independently?: Yes  Ambulates independently?: Yes  Does patient use assisted devices?: Yes  Assisted Devices (DME) used: Anali Gonsalez  Does patient currently own DME?: Yes  What DME does the patient currently own?: Anali Gonsalez  Does patient have a history of Outpatient Therapy (PT/OT)?: No  Does the patient have a history of Short-Term Rehab?: No  Does patient have a history of HHC?: Yes  Does patient currently have [facility]?: Yes    Current Home Health Care  Type of Current Home Care Services: Home PT, [address][de-identified] Other (please enter name in comment) (Pt reported she has home health, but did not recall the name of the agency)  [facility] Provider[de-identified] PCP    Patient Information Continued  Income Source: Pension/residential  Does patient have prescription coverage?: Yes  Within the past 12 months, you worried that your food would run out before you got the money to buy more.: Never true  Within the past 12 months, the food you bought just didn't last and you didn't have money to get more.: Never true  Food insecurity resource given?: N/A  Does patient receive dialysis treatments?: No  Does patient have a history of substance abuse?: No  Does patient have a history of Mental Health Diagnosis?: No         Means of Transportation  Means of Transport to Appts[de-identified] Drives Self  In the past 12 months, has lack of transportation kept you from medical appointments or from getting medications?: No  In the past 12 months, has lack of transportation kept you from meetings, work, or from getting things needed for daily living?: No  Was application for public transport provided?: N/A        DISCHARGE DETAILS:    Discharge planning discussed with[de-identified] Patient  Freedom of Choice: Yes     CM contacted family/caregiver?: No- see comments (Pt alert and oriented)          Other Referral/Resources/Interventions Provided:  Interventions: [facility]  Referral Comments: Pt recommended by PT/OT for home health. Pt reports she is already open with an agency for services. Treatment Team Recommendation: Home with [address] [facility]  Discharge Destination Plan[de-identified] Home with [address] [facility] [address] at Discharge : Family (Pt's grandson, Benigno Mann)                    Additional Comments: CM met with pt to complete open assessment. CM introduced self and role. Pt independent with ADLs. Uses RW. CM to follow for discharge needs.

## 2023-11-07 PROBLEM — N30.01 ACUTE CYSTITIS WITH HEMATURIA: Status: ACTIVE | Noted: 2023-11-07

## 2023-11-07 LAB
ANION GAP SERPL CALCULATED.3IONS-SCNC: 7 MMOL/L
BUN SERPL-MCNC: 32 MG/DL (ref 5–25)
CALCIUM SERPL-MCNC: 7.5 MG/DL (ref 8.4–10.2)
CHLORIDE SERPL-SCNC: 107 MMOL/L (ref 96–108)
CO2 SERPL-SCNC: 24 MMOL/L (ref 21–32)
CREAT SERPL-MCNC: 1.49 MG/DL (ref 0.6–1.3)
ERYTHROCYTE [DISTWIDTH] IN BLOOD BY AUTOMATED COUNT: 16.2 % (ref 11.6–15.1)
GFR SERPL CREATININE-BSD FRML MDRD: 32 ML/MIN/1.73SQ M
GLUCOSE SERPL-MCNC: 93 MG/DL (ref 65–140)
HCT VFR BLD AUTO: 23.9 % (ref 34.8–46.1)
HGB BLD-MCNC: 7.7 G/DL (ref 11.5–15.4)
MCH RBC QN AUTO: 31.4 PG (ref 26.8–34.3)
MCHC RBC AUTO-ENTMCNC: 32.2 G/DL (ref 31.4–37.4)
MCV RBC AUTO: 98 FL (ref 82–98)
PLATELET # BLD AUTO: 176 THOUSANDS/UL (ref 149–390)
PMV BLD AUTO: 10 FL (ref 8.9–12.7)
POTASSIUM SERPL-SCNC: 3.8 MMOL/L (ref 3.5–5.3)
RBC # BLD AUTO: 2.45 MILLION/UL (ref 3.81–5.12)
SODIUM SERPL-SCNC: 138 MMOL/L (ref 135–147)
WBC # BLD AUTO: 6.83 THOUSAND/UL (ref 4.31–10.16)

## 2023-11-07 PROCEDURE — 85027 COMPLETE CBC AUTOMATED: CPT | Performed by: STUDENT IN AN ORGANIZED HEALTH CARE EDUCATION/TRAINING PROGRAM

## 2023-11-07 PROCEDURE — 99232 SBSQ HOSP IP/OBS MODERATE 35: CPT | Performed by: HOSPITALIST

## 2023-11-07 PROCEDURE — 80048 BASIC METABOLIC PNL TOTAL CA: CPT | Performed by: STUDENT IN AN ORGANIZED HEALTH CARE EDUCATION/TRAINING PROGRAM

## 2023-11-07 PROCEDURE — 99232 SBSQ HOSP IP/OBS MODERATE 35: CPT | Performed by: UROLOGY

## 2023-11-07 RX ORDER — POLYETHYLENE GLYCOL 3350 17 G/17G
17 POWDER, FOR SOLUTION ORAL DAILY PRN
Status: DISCONTINUED | OUTPATIENT
Start: 2023-11-07 | End: 2023-11-08 | Stop reason: HOSPADM

## 2023-11-07 RX ORDER — AMOXICILLIN 250 MG
1 CAPSULE ORAL 2 TIMES DAILY
Status: DISCONTINUED | OUTPATIENT
Start: 2023-11-07 | End: 2023-11-08 | Stop reason: HOSPADM

## 2023-11-07 RX ADMIN — LEVOTHYROXINE SODIUM 75 MCG: 75 TABLET ORAL at 05:32

## 2023-11-07 RX ADMIN — DOXYCYCLINE 100 MG: 100 CAPSULE ORAL at 07:47

## 2023-11-07 RX ADMIN — SENNOSIDES, DOCUSATE SODIUM 1 TABLET: 8.6; 5 TABLET ORAL at 17:33

## 2023-11-07 RX ADMIN — ASPIRIN 81 MG CHEWABLE TABLET 81 MG: 81 TABLET CHEWABLE at 07:47

## 2023-11-07 RX ADMIN — POLYETHYLENE GLYCOL 3350 17 G: 17 POWDER, FOR SOLUTION ORAL at 15:18

## 2023-11-07 NOTE — CASE MANAGEMENT
Case Management Discharge Planning Note    Patient name All Torres  Location [facility] 923/[facility] 601-96 MRN [id number]  : 1940 Date 2023       Current Admission Date: 2023  Current Admission Diagnosis:Gross hematuria   Patient Active Problem List    Diagnosis Date Noted    Acute cystitis with hematuria 2023    Gross hematuria 2023    Acute blood loss anemia (ABLA) 2023    Acute kidney injury superimposed on chronic kidney disease  2023    Transaminitis 2023    Protein-calorie malnutrition, unspecified severity ([address]) 2023    Chronic renal disease, stage IV ([address]) 2023    Chronic pain of both knees 08/10/2022    Chronic kidney disease-mineral and bone disorder 2022    Stage 3b chronic kidney disease ([address]) 2021    History of DVT (deep vein thrombosis) 2021    Bradycardia with 51-60 beats per minute 2020    Varicose veins of left lower extremity with pain 2020    Lower leg DVT (deep venous thromboembolism), chronic, left (HCC) 2020    Intermediate stage nonexudative 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 eyes 2019    History of peptic ulcer 10/11/2018    Fat necrosis (segmental) of breast 2018    Personal history of breast cancer 2018    Abnormal finding on diagnostic imaging of kidney 2018    Localized edema 2018    Hypothyroidism 2018    Hyperuricemia 2018    Hypertension 2018    Rheumatoid arthritis ([address])     Diastolic dysfunction     Vitamin D deficiency 2014    Lumbar radiculopathy 2013    Hyperlipidemia 2013    Osteopenia 2013    Other chronic pain 2013    Adenocarcinoma of breast ([address]) 2013      LOS (days): 3  Geometric Mean LOS (GMLOS) (days): 3.50  Days to GMLOS:0.9     OBJECTIVE:  Risk of Unplanned Readmission Score: 21.34         Current admission status: Inpatient   Preferred Pharmacy:   [id number] [address], [address] [address]  [address]  [facility] [address]  Phone: [phone] Fax: [phone]    [facility] [address] [facility] [facility] [address]  Phone: [phone] Fax: [phone]    Primary Care Provider: Valencia German MD    Primary Insurance: MEDICARE  Secondary Insurance: BLUE CROSS    DISCHARGE DETAILS:    Discharge planning discussed with[de-identified] Patient  Freedom of Choice: Yes  Comments - Freedom of Choice: CM received fax from [facility] - sent [facility] referral via [address] Notified pt.   CM contacted family/caregiver?: No- see comments (Pt alert and oriented.)  Were Treatment Team discharge recommendations reviewed with patient/caregiver?: Yes  Did patient/caregiver verbalize understanding of patient care needs?: Yes  Were patient/caregiver advised of the risks associated with not following Treatment Team discharge recommendations?: Yes         Requested [address] [facility]         Is the patient interested in [address] at discharge?: Yes  [facility] requested[de-identified] Nursing, Occupational Therapy, Physical [address] [facility] Name[de-identified] [facility] External Referral Reason (only applicable if external HHA name selected): Patient has established relationship with provider  [address] Provider[de-identified] PCP  Home Health Services Needed[de-identified] Evaluate Functional Status and Safety, Gait/ADL Training, Strengthening/Theraputic Exercises to Improve Function  Homebound Criteria Met[de-identified] Requires the Assistance of Another Person for Safe Ambulation or to Leave the Home, Uses an Assist Device (i.e. cane, walker, etc)  Supporting Clincal Findings[de-identified] Limited Endurance, Fatigues Easliy in [employer]    DME Referral Provided  Referral made for DME?: No (Pt owns a RW)    Other Referral/Resources/Interventions Provided:  Interventions: HHC  Referral Comments: [facility] with [facility] Treatment Team Recommendation: Home with [address] [facility]  Discharge Destination Plan[de-identified] Home with [address] [place] [address] at Discharge : Family              IMM Given (Date):: 11/07/23 (11:48am)  IMM Given to[de-identified] Patient     Additional Comments: Per provider, pt possible to discharge today. CM met with pt to review IMM, pt signed. IMM placed in medical records bin and pt was provided a copy.

## 2023-11-07 NOTE — ASSESSMENT & PLAN NOTE
Maintained on methotrexate (every Friday) and Rituxan.    Hold for now pending urine culture results  Restart on DC

## 2023-11-07 NOTE — PROGRESS NOTES
Patient:    MRN:  [id number]    Julio Cesar Request ID:  [id number]    Level of care reserved:  [address]    Partner Reserved:  [id number] [facility], [place], [address] [facility] [phone]    Clinical needs requested:  skilled nursing, occupational therapy, physical therapy    Geography searched:  [phone]    Start of Service:    Request sent:  11:44am EST on 11/7/2023 by Niko Tovar    Partner reserved:  1:08pm EST on 11/7/2023 by Niko Tovar    Choice list shared:  1:08pm EST on 11/7/2023 by Niko Tovar

## 2023-11-07 NOTE — PLAN OF CARE
Problem: PAIN - ADULT  Goal: Verbalizes/displays adequate comfort level or baseline comfort level  Description: Interventions:  - Encourage patient to monitor pain and request assistance  - Assess pain using appropriate pain scale  - Administer analgesics based on type and severity of pain and evaluate response  - Implement non-pharmacological measures as appropriate and evaluate response  - Consider cultural and social influences on pain and pain management  - Notify physician/advanced practitioner if interventions unsuccessful or patient reports new pain  Outcome: Progressing     Problem: INFECTION - ADULT  Goal: Absence or prevention of progression during hospitalization  Description: INTERVENTIONS:  - Assess and monitor for signs and symptoms of infection  - Monitor lab/diagnostic results  - Monitor all insertion sites, i.e. indwelling lines, tubes, and drains  - Monitor endotracheal if appropriate and nasal secretions for changes in amount and color  - [place] appropriate cooling/warming therapies per order  - Administer medications as ordered  - Instruct and encourage patient and family to use good hand hygiene technique  - Identify and instruct in appropriate isolation precautions for identified infection/condition  Outcome: Progressing     Problem: SAFETY ADULT  Goal: Patient will remain free of falls  Description: INTERVENTIONS:  - Educate patient/family on patient safety including physical limitations  - Instruct patient to call for assistance with activity   - Consult OT/PT to assist with strengthening/mobility   - Keep Call bell within reach  - Keep bed low and locked with side rails adjusted as appropriate  - Keep care items and personal belongings within reach  - Initiate and maintain comfort rounds  - Make Fall Risk Sign visible to staff  - Apply yellow socks and bracelet for high fall risk patients  - Consider moving patient to room near nurses station  Outcome: Progressing  Goal: Maintain or return to baseline ADL function  Description: INTERVENTIONS:  -  Assess patient's ability to carry out ADLs; assess patient's baseline for ADL function and identify physical deficits which impact ability to perform ADLs (bathing, care of mouth/teeth, toileting, grooming, dressing, etc.)  - Assess/evaluate cause of self-care deficits   - Assess range of motion  - Assess patient's mobility; develop plan if impaired  - Assess patient's need for assistive devices and provide as appropriate  - Encourage maximum independence but intervene and supervise when necessary  - Involve family in performance of ADLs  - Assess for home care needs following discharge   - Consider OT consult to assist with ADL evaluation and planning for discharge  - Provide patient education as appropriate  Outcome: Progressing     Problem: DISCHARGE PLANNING  Goal: Discharge to home or other facility with appropriate resources  Description: INTERVENTIONS:  - Identify barriers to discharge w/patient and caregiver  - Arrange for needed discharge resources and transportation as appropriate  - Identify discharge learning needs (meds, wound care, etc.)  - Arrange for interpretive services to assist at discharge as needed  - Refer to Case Management Department for coordinating discharge planning if the patient needs post-hospital services based on physician/advanced practitioner order or complex needs related to functional status, cognitive ability, or social support system  Outcome: Progressing     Problem: Knowledge Deficit  Goal: Patient/family/caregiver demonstrates understanding of disease process, treatment plan, medications, and discharge instructions  Description: Complete learning assessment and assess knowledge base.   Interventions:  - Provide teaching at level of understanding  - Provide teaching via preferred learning methods  Outcome: Progressing     Problem: GENITOURINARY - ADULT  Goal: Maintains or returns to baseline urinary function  Description: INTERVENTIONS:  - Assess urinary function  - Encourage oral fluids to ensure adequate hydration if ordered  - Administer IV fluids as ordered to ensure adequate hydration  - Administer ordered medications as needed  - Offer frequent toileting  - Follow urinary retention protocol if ordered  Outcome: Progressing  Goal: Urinary catheter remains patent  Description: INTERVENTIONS:  - Assess patency of urinary catheter  - If patient has a chronic herrera, consider changing catheter if non-functioning  - Follow guidelines for intermittent irrigation of non-functioning urinary catheter  Outcome: Progressing

## 2023-11-07 NOTE — PROGRESS NOTES
Progress Note - Urology  Bri Cheng 1940, 81 y.o. female MRN: [id number]    Unit/Bed#: [facility] 923-01 Encounter: [id number]    Acute cystitis with hematuria  Assessment & Plan  Urine culture positive for Pseudomonas aeruginosa  Antibiotics and medical optimization per primary team; on doxycycline due to multiple allergies    Acute kidney injury superimposed on chronic kidney disease   Assessment & Plan  Lab Results   Component Value Date    EGFR 32 11/07/2023    EGFR 33 11/06/2023    EGFR 34 11/05/2023    CREATININE 1.49 (H) 11/07/2023    CREATININE 1.45 (H) 11/06/2023    CREATININE 1.41 (H) 11/05/2023   Maintain Carrera catheter discharge  Continue to trend creatinine and avoid nephrotoxins  Bilateral nonobstructing stones without hydronephrosis    * Gross hematuria  Assessment & Plan  POD #2 cystoscopy,Evacuation of clots and fulguration with transurethral resection of bladder tumor  Maintain Carrera catheter and discharge with outpatient voiding trial in [facility] office  Continue to monitor urine color off of CBI  Would avoid therapeutic anticoagulation but prophylactic anticoagulation is reasonable from urological standpoint  Hgb 7.7  Continue to trend hemoglobin and transfusion per primary team  WBC 6.83  Creat 1.49, stable  Medical optimization and discharge per primary team  Urine culture positive Pseudomonas aeruginosa, on doxycycline          Urology will sign off but remain available for any further inpatient needs. Please feel free to contact the provider currently covering the Urology [facility] role for this [place] with questions or concerns. Subjective: Patient sitting out of bed in chair. Offers no complaints other than constipation. Carrera catheter is draining clear yellow. She reported some mild hematuria after straining for a bowel movement yesterday which has since resolved. 24 HR EVENTS:   no significant events. Patient has  complaints of constipation.        Review of Systems Constitutional:  Negative for activity change, appetite change, chills, fatigue, fever and unexpected weight change. HENT:  Negative for facial swelling. Eyes:  Negative for discharge. Respiratory: Negative. Negative for cough and shortness of breath. Cardiovascular:  Negative for chest pain and leg swelling. Gastrointestinal:  Positive for constipation. Negative for abdominal distention, abdominal pain, nausea and vomiting. Endocrine: Negative. Genitourinary: Negative. Negative for decreased urine volume, dysuria, enuresis, flank pain, genital sores and hematuria. Musculoskeletal:  Negative for back pain and myalgias. Skin:  Negative for pallor and rash. Allergic/Immunologic: Negative. Negative for immunocompromised state. Neurological:  Negative for facial asymmetry and speech difficulty. Psychiatric/Behavioral:  Negative for agitation and confusion. Objective:  Nursing Rounds:   Vitals: Blood pressure 114/56, pulse 70, temperature 98.5 °F (36.9 °C), resp. rate 18, SpO2 97 %, not currently breastfeeding. ,There is no height or weight on file to calculate BMI. INS & OUTS:  I/O last 24 hours: In: 298 [P.O.:298]  Out: 2700 [Urine:2700]    Physical Exam  Vitals and nursing note reviewed. Constitutional:       General: She is not in acute distress. Appearance: Normal appearance. She is not ill-appearing, toxic-appearing or diaphoretic. HENT:      Head: Normocephalic. Pulmonary:      Effort: Pulmonary effort is normal. No respiratory distress. Abdominal:      General: Abdomen is flat. There is no distension. Genitourinary:     Comments: Carrera catheter draining clear yellow urine  Musculoskeletal:         General: No swelling. Cervical back: Normal range of motion. Skin:     General: Skin is warm and dry. Neurological:      General: No focal deficit present. Mental Status: She is alert and oriented to person, place, and time.    Psychiatric:         Mood and Affect: Mood normal.         Behavior: Behavior normal.         Thought Content:  Thought content normal.         Judgment: Judgment normal.           Labs:  Recent Labs     11/04/23  1540 11/05/23  0737 11/06/23  0449 11/07/23  0519   WBC 3.78* 3.21* 7.49 6.83       Recent Labs     11/04/23  1540 11/04/23  2259 11/05/23  0737 11/06/23  0449 11/07/23  0519   HGB 6.0* 7.0* 8.2* 8.5* 7.7*     Recent Labs     11/04/23  1540 11/04/23  2259 11/05/23  0737 11/06/23  0449 11/07/23  0519   HCT 18.8* 21.5* 23.7* 26.1* 23.9*     Recent Labs     11/04/23  1540 11/05/23  0737 11/06/23  0449 11/07/23  0519   CREATININE 1.97* 1.41* 1.45* 1.49*     Lab Results   Component Value Date    HGB 7.7 (L) 11/07/2023    HCT 23.9 (L) 11/07/2023    WBC 6.83 11/07/2023     11/07/2023     Lab Results   Component Value Date    K 3.8 11/07/2023     11/07/2023    CO2 24 11/07/2023    BUN 32 (H) 11/07/2023    CREATININE 1.49 (H) 11/07/2023    CALCIUM 7.5 (L) 11/07/2023     Urine Culture: Growth: Pseudomonas aeruginosa    History:    Past Medical History:   Diagnosis Date    Allergic angioedema     [id number] resolved    Angioedema     [id number] resolved    Arthritis     Breast cancer ([address]) 2012    right     Chronic kidney disease     Disease of thyroid gland     Hemorrhage of anus and rectum     [id number] resolved    Hyperlipidemia     Hypertension     Hypocalcemia     12oct2017 resolved    Neuritis     thoracic and lumbosacral    Peptic ulcer     Rheumatoid arthritis ([address])     Stage 3b chronic kidney disease ([address]) 5/7/2021    Vitamin D deficiency      Past Surgical History:   Procedure Laterality Date    ABDOMINAL SURGERY      BREAST BIOPSY Right 2012    BREAST LUMPECTOMY Right 2012    BREAST SURGERY      CHOLECYSTECTOMY      ELBOW SURGERY      GALLBLADDER SURGERY  1981    HIP SURGERY Right 09/18/2022    10/2023    ORTHOPEDIC SURGERY      for toes    VA CYSTO W/IRRIG & EVAC MULTPLE OBSTRUCTING CLOTS N/A 11/5/2023    Procedure: CYSTOSCOPY EVACUATION OF CLOTS & fulguration;  Surgeon: Nia Perez MD;  [facility]: [facility];  Service: Urology    TRANSURETHRAL RESECTION OF BLADDER TUMOR N/A 11/5/2023    Procedure: TRANSURETHRAL RESECTION OF BLADDER TUMOR (TURBT); Surgeon: Nia Perez MD;  Location: [facility];  Service: Urology    US GUIDED BREAST BIOPSY RIGHT COMPLETE Right 08/15/2022     Family History   Problem Relation Age of Onset    Breast cancer Mother     Kidney failure Father     Other Father         uremia    Lung cancer Brother     Breast cancer Maternal Aunt     Breast cancer Maternal Aunt     Breast cancer Maternal Aunt     Breast cancer Maternal Aunt     Breast cancer Maternal Aunt     Stomach cancer Maternal Uncle      Social History     Socioeconomic History    Marital status:      Spouse name: None    Number of children: None    Years of education: None    Highest education level: None   Occupational History    None   Tobacco Use    Smoking status: Never     Passive exposure: Past    Smokeless tobacco: Never   Vaping Use    Vaping Use: Never used   Substance and Sexual Activity    Alcohol use: Not Currently    Drug use: No    Sexual activity: Not Currently     Partners: Male   Other Topics Concern    None   Social History Narrative    Active: caffeine use     Social Determinants of Health     Financial Resource Strain: Low Risk  (11/21/2022)    Overall Financial Resource Strain (CARDIA)     Difficulty of Paying Living Expenses: Not very hard   Food Insecurity: No Food Insecurity (11/6/2023)    Hunger Vital Sign     Worried About Running Out of Food in the Last Year: Never true     Ran Out of Food in the Last Year: Never true   Transportation Needs: No Transportation Needs (11/6/2023)    PRAPARE - Transportation     Lack of Transportation (Medical): No     Lack of Transportation (Non-Medical):  No   Physical Activity: Not on file   Stress: Not on file   Social Connections: Not on file   Intimate Partner Violence: Not on file   Housing Stability: Low Risk  (11/6/2023)    Housing Stability Vital Sign     Unable to Pay for Housing in the Last Year: No     Number of Places Lived in the Last Year: 1     Unstable Housing in the Last Year: No       The following portions of the patient's history were reviewed and updated as appropriate: allergies, current medications, past family history, past medical history, past social history, past surgical history and problem list    PUJA Howard  Date: 11/7/2023 Time: 11:14 AM

## 2023-11-07 NOTE — ASSESSMENT & PLAN NOTE
History of stage I right breast cancer, invasive ductal carcinoma. ER/SD positive, HER2 positive per fish testing. She was initially diagnosed in 2012 and is status post lumpectomy, radiation, Herceptin treatment. She was on tamoxifen for 7 years from 2012 to 11/2019 which was discontinued due development of DVT. Her most recent mammogram 07/2021 showed benign findings.   Continue with outpatient follow up

## 2023-11-07 NOTE — ASSESSMENT & PLAN NOTE
Unclear if true infection as was not showing s/s infection on admission  Also has been on Abx that wouldn't work for pseudomonas grown in urine cultures  Will stop Abx

## 2023-11-07 NOTE — ASSESSMENT & PLAN NOTE
Patient s/p R hip arthroplasty 10/05 with subsequent urinary rentention. Urology placed herrera catheter on 11/03. Patient noticed blood in her herrera bag which brought her to [place] ED on 11/04. CT abdomen & pelvis:  Large amount of clot within the bladder lumen. Underlying lesion cannot be excluded. Consider urologic consult and direct visualization with cystoscopy. Bilateral nonobstructing renal calculi. Initial management with three-way herrera with CBI without improvement  S/p cystoscopy with clot evacuation and fulguration with transurethral resection of the bladder lesion with biopsy by urology 11/05. Bx pending              Hold all AC per urology  CBI clampped today - monitor urine color              Patient will f/u outpatient for herrera removal and voiding trial              Urine culture positive for GNR, patient is sensitive to penicillin which allergy listed as hives.   She was started on doxycycline prior to this hospitalization btu she has psuedomonas in urine which is not covered by [employer] & she was not septic on admission & also now > 24 hrs post procedure, hence will DC Abx

## 2023-11-07 NOTE — PROGRESS NOTES
[address] [facility]  Progress Note  Name: Mary Almazan  MRN: [id number]  Unit/Bed#: [facility] 923-01 I Date of Admission: 11/4/2023   Date of Service: 11/7/2023 I Hospital Day: 3    Assessment/Plan   * Gross hematuria  Assessment & Plan  Patient s/p R hip arthroplasty 10/05 with subsequent urinary rentention. Urology placed herrera catheter on 11/03. Patient noticed blood in her herrera bag which brought her to [facility] ED on 11/04. CT abdomen & pelvis:  Large amount of clot within the bladder lumen. Underlying lesion cannot be excluded. Consider urologic consult and direct visualization with cystoscopy. Bilateral nonobstructing renal calculi. Initial management with three-way herrera with CBI without improvement  S/p cystoscopy with clot evacuation and fulguration with transurethral resection of the bladder lesion with biopsy by urology 11/05. Bx pending              Hold all AC per urology  CBI clampped today - monitor urine color              Patient will f/u outpatient for herrera removal and voiding trial              Urine culture positive for GNR, patient is sensitive to penicillin which allergy listed as hives. She was started on doxycycline prior to this hospitalization btu she has psuedomonas in urine which is not covered by Doxy & she was not septic on admission & also now > 24 hrs post procedure, hence will DC Abx    Acute cystitis with hematuria  Assessment & Plan  Unclear if true infection as was not showing s/s infection on admission  Also has been on Abx that wouldn't work for pseudomonas grown in urine cultures  Will stop Abx    Transaminitis  Assessment & Plan  Mild elevation. Suspect reactive to above. Also consider secondary to methotrexate use. Hold crestor until LFTs stabilize. Recheck CMP in the AM    Acute kidney injury superimposed on chronic kidney disease   Assessment & Plan  Baseline around 1-1.3. Noted MARCELO since 10/31 with Cr of 2.04.   Suspect secondary to urinary retention and acute blood loss anemia. Cr. Pleatued at 1.45. Monitor BMP. Continue to hold HCTZ, s/p IVF hydration, currently being monitored off, encourage good PO hydration after cystoscopy   Management of urinary retention per urology. Acute blood loss anemia (ABLA)  Assessment & Plan  In the setting of gross hematuria as noted above, hgb 6.0 on admit  S/p 1 unit pRBC with improvement of hgb to 8.2 -> now 7.7  Trend CBC and transfuse as needed to keep Hgb>7    History of DVT (deep vein thrombosis)  Assessment & Plan  Noted in 2019 on venous duplex per review of records   Holding Xarelto 10 mg daily that was recently started after hip surgery for DVT ppx due to gross hematuria and anemia requiring blood transfusion   per urology since its prophylaxis please hold it given bleed    Adenocarcinoma of breast [facility])  Assessment & Plan  History of stage I right breast cancer, invasive ductal carcinoma. ER/CO positive, HER2 positive per fish testing. She was initially diagnosed in 2012 and is status post lumpectomy, radiation, Herceptin treatment. She was on tamoxifen for 7 years from 2012 to 11/2019 which was discontinued due development of DVT. Her most recent mammogram 07/2021 showed benign findings. Continue with outpatient follow up     Hyperlipidemia  Assessment & Plan  Hold statin for now given transaminitis, resume once LFTs stabilize. Hypothyroidism  Assessment & Plan  Continue levothyroxine 75 mcg daily. Rheumatoid arthritis ([address])  Assessment & Plan  Maintained on methotrexate (every Friday) and Rituxan.    Hold for now pending urine culture results  Restart on DC    Hypertension  Assessment & Plan  BP with some soft pressures noted overnight, therefore antihypertensives are on hold   PTA home regimen of coreg 25 mg BID and HCTZ 25 mg daily, continue to hold for now  Monitor             VTE Pharmacologic Prophylaxis: VTE Score: 4 Moderate Risk (Score 3-4) - Pharmacological DVT Prophylaxis Contraindicated. Sequential Compression Devices Ordered. Patient Centered Rounds: I performed bedside rounds with nursing staff today. Discussions with Specialists or Other Care Team Provider:     Education and Discussions with Family / Patient:  patient. Total Time Spent on Date of Encounter in care of patient: 35 mins. This time was spent on one or more of the following: performing physical exam; counseling and coordination of care; obtaining or reviewing history; documenting in the medical record; reviewing/ordering tests, medications or procedures; communicating with other healthcare professionals and discussing with patient's family/caregivers. Current Length of Stay: 3 day(s)  Current Patient Status: Inpatient   Certification Statement: The patient will continue to require additional inpatient hospital stay due to monitor urine off CBI & Hb, no BM  Discharge Plan: Anticipate discharge tomorrow to home with home services. Code Status: Level 1 - Full Code    Subjective:   Feels ok, no CP or SOB, no BM     Objective:     Vitals:   Temp (24hrs), Av.4 °F (36.9 °C), Min:98.2 °F (36.8 °C), Max:98.5 °F (36.9 °C)    Temp:  [98.2 °F (36.8 °C)-98.5 °F (36.9 °C)] 98.5 °F (36.9 °C)  HR:  [70-74] 70  Resp:  [16-18] 18  BP: ()/(52-56) 114/56  SpO2:  [97 %-98 %] 97 %  There is no height or weight on file to calculate BMI. Input and Output Summary (last 24 hours): Intake/Output Summary (Last 24 hours) at 2023 1618  Last data filed at 2023 1300  Gross per 24 hour   Intake 478 ml   Output 2700 ml   Net -2222 ml       Physical Exam:   Physical Exam  Vitals reviewed. HENT:      Head: Normocephalic and atraumatic. Cardiovascular:      Rate and Rhythm: Normal rate and regular rhythm. Pulmonary:      Effort: Pulmonary effort is normal.      Breath sounds: Normal breath sounds. Abdominal:      General: There is no distension.       Palpations: Abdomen is soft.      Tenderness: There is no abdominal tenderness. Musculoskeletal:      Right lower leg: No edema. Left lower leg: No edema. Neurological:      Mental Status: She is alert and oriented to person, place, and time. Additional Data:     Labs:  Results from last 7 days   Lab Units 11/07/23  0519 11/04/23  2259 11/04/23  1540   WBC Thousand/uL 6.83   < > 3.78*   HEMOGLOBIN g/dL 7.7*   < > 6.0*   HEMATOCRIT % 23.9*   < > 18.8*   PLATELETS Thousands/uL 176   < > 230   BANDS PCT %  --   --  1   LYMPHO PCT %  --   --  23   MONO PCT %  --   --  0*   EOS PCT %  --   --  4    < > = values in this interval not displayed. Results from last 7 days   Lab Units 11/07/23  0519 11/06/23  0449   SODIUM mmol/L 138 135   POTASSIUM mmol/L 3.8 3.7   CHLORIDE mmol/L 107 105   CO2 mmol/L 24 21   BUN mg/dL 32* 38*   CREATININE mg/dL 1.49* 1.45*   ANION GAP mmol/L 7 9   CALCIUM mg/dL 7.5* 7.6*   ALBUMIN g/dL  --  2.7*   TOTAL BILIRUBIN mg/dL  --  0.44   ALK PHOS U/L  --  72   ALT U/L  --  54*   AST U/L  --  47*   GLUCOSE RANDOM mg/dL 93 126     Results from last 7 days   Lab Units 11/04/23  1540   INR  1.73*                   Lines/Drains:  Invasive Devices       Peripheral Intravenous Line  Duration             Peripheral IV 11/04/23 Right Antecubital 3 days              Drain  Duration             Continuous Bladder Irrigation Three-way 3 days                          Imaging: No pertinent imaging reviewed.     Recent Cultures (last 7 days):   Results from last 7 days   Lab Units 11/03/23  1217   URINE CULTURE  >100,000 cfu/ml Pseudomonas aeruginosa*       Last 24 Hours Medication List:   Current Facility-Administered Medications   Medication Dose Route Frequency Provider Last Rate    aspirin  81 mg Oral Daily Louis Richardson MD      levothyroxine  75 mcg Oral Early Morning Louis Richardson MD      polyethylene glycol  17 g Oral Daily PRN Zhanna Fang MD      senna-docusate sodium  1 tablet Oral BID Roxi Zhong MD      sodium chloride  75 mL/hr Intravenous Continuous Pauline Kendall MD 75 mL/hr (11/06/23 1727)        Today, Patient Was Seen By: Roxi Zhong MD    **Please Note: This note may have been constructed using a voice recognition system. **

## 2023-11-07 NOTE — ASSESSMENT & PLAN NOTE
Urine culture positive for Pseudomonas aeruginosa  Antibiotics and medical optimization per primary team; on doxycycline due to multiple allergies

## 2023-11-07 NOTE — ASSESSMENT & PLAN NOTE
Chief Complaint  Nocturia (1 month follow up.)        MATEO Napoles is a 61 y.o. male who returns today for follow-up primarily concerned about nocturia it was originally 5 times per night.  He had been started on Flomax without any improvement and was noted to be emptying the bladder well.  Therefore this was discontinued and I suggested Myrbetriq.  Unfortunately this contributed very little benefit as well so he is quit taking both of these medicines.  An apparent conclusion from the sleep clinic was no sleep apnea but he does have restless leg syndrome and is taking Requip.  This could be disturbing his sleep enough to make him get up some.  He also admits to eating out and eats a lot of salt and has lower extremity edema which could contribute to nocturia as well.  Therefore he is taking a Dyazide daily at 2 PM with significant improvement in his nocturia.    There were no vitals filed for this visit.    Past Medical History  Past Medical History:   Diagnosis Date   • Allergic        Past Surgical History  Past Surgical History:   Procedure Laterality Date   • HEEL SPUR SURGERY         Medications  has a current medication list which includes the following prescription(s): diclofenac, mirabegron er, ropinirole, tamsulosin, triamterene-hydrochlorothiazide, and oxybutynin.      Allergies  No Known Allergies    Social History  Social History     Socioeconomic History   • Marital status:      Spouse name: Not on file   • Number of children: Not on file   • Years of education: Not on file   • Highest education level: Not on file   Tobacco Use   • Smoking status: Never Smoker   • Smokeless tobacco: Never Used   Substance and Sexual Activity   • Alcohol use: No   • Drug use: No   • Sexual activity: Defer       Family History  He has no family history of bladder or kidney cancer  He has no family history of kidney stones      AUA Symptom Score:      Review of Systems  Review of Systems   Constitutional: Negative for  Continue levothyroxine 75 mcg daily. activity change, appetite change, chills, fatigue, fever, unexpected weight gain and unexpected weight loss.   Respiratory: Negative for apnea, cough, chest tightness, shortness of breath, wheezing and stridor.    Cardiovascular: Negative for chest pain, palpitations and leg swelling.   Gastrointestinal: Negative for abdominal distention, abdominal pain, anal bleeding, blood in stool, constipation, diarrhea, nausea, rectal pain, vomiting, GERD and indigestion.   Genitourinary: Positive for nocturia. Negative for decreased libido, decreased urine volume, difficulty urinating, discharge, dysuria, flank pain, frequency, genital sores, hematuria, penile pain, erectile dysfunction, penile swelling, scrotal swelling, testicular pain, urgency and urinary incontinence.   Musculoskeletal: Negative for back pain and joint swelling.   Neurological: Negative for tremors, seizures, speech difficulty, weakness and numbness.   Psychiatric/Behavioral: Negative for agitation, decreased concentration, sleep disturbance, depressed mood and stress. The patient is not nervous/anxious.        Physical Exam  Physical Exam   Constitutional: He is oriented to person, place, and time. He appears well-developed and well-nourished.   HENT:   Head: Normocephalic and atraumatic.   Neck: Normal range of motion.   Pulmonary/Chest: Effort normal. No respiratory distress.   Abdominal: He exhibits no distension and no mass. There is no tenderness. No hernia.   Musculoskeletal: Normal range of motion. He exhibits edema.   Lymphadenopathy:     He has no cervical adenopathy.   Neurological: He is alert and oriented to person, place, and time.   Skin: Skin is warm and dry.   Psychiatric: He has a normal mood and affect. His behavior is normal.   Vitals reviewed.      Labs Recent and today in the office:  Results for orders placed or performed in visit on 02/19/20   POC Urinalysis Dipstick, Automated   Result Value Ref Range    Color Yellow Yellow, Straw,  Dark Yellow, Mamta    Clarity, UA Clear Clear    Specific Gravity  1.020 1.005 - 1.030    pH, Urine 6.5 5.0 - 8.0    Leukocytes Negative Negative    Nitrite, UA Negative Negative    Protein, POC Negative Negative mg/dL    Glucose, UA Negative Negative, 1000 mg/dL (3+) mg/dL    Ketones, UA Negative Negative    Urobilinogen, UA Normal Normal    Bilirubin Negative Negative    Blood, UA Negative Negative         Assessment & Plan  Nocturia: Multifactorial in etiology and currently improved after correcting several factors including lower extremity edema with a diuretic and salt restriction.

## 2023-11-07 NOTE — ASSESSMENT & PLAN NOTE
Noted in 2019 on venous duplex per review of records   Holding Xarelto 10 mg daily that was recently started after hip surgery for DVT ppx due to gross hematuria and anemia requiring blood transfusion   per urology since its prophylaxis please hold it given bleed

## 2023-11-07 NOTE — ASSESSMENT & PLAN NOTE
In the setting of gross hematuria as noted above, hgb 6.0 on admit  S/p 1 unit pRBC with improvement of hgb to 8.2 -> now 7.7  Trend CBC and transfuse as needed to keep Hgb>7

## 2023-11-08 VITALS
DIASTOLIC BLOOD PRESSURE: 58 MMHG | HEART RATE: 70 BPM | TEMPERATURE: 98.6 F | RESPIRATION RATE: 18 BRPM | SYSTOLIC BLOOD PRESSURE: 133 MMHG | OXYGEN SATURATION: 97 %

## 2023-11-08 LAB
ABO GROUP BLD BPU: NORMAL
ABO GROUP BLD BPU: NORMAL
ALBUMIN SERPL BCP-MCNC: 2.5 G/DL (ref 3.5–5)
ALP SERPL-CCNC: 79 U/L (ref 34–104)
ALT SERPL W P-5'-P-CCNC: 58 U/L (ref 7–52)
ANION GAP SERPL CALCULATED.3IONS-SCNC: 10 MMOL/L
ANISOCYTOSIS BLD QL SMEAR: PRESENT
AST SERPL W P-5'-P-CCNC: 51 U/L (ref 13–39)
BASOPHILS # BLD MANUAL: 0.06 THOUSAND/UL (ref 0–0.1)
BASOPHILS NFR MAR MANUAL: 1 % (ref 0–1)
BILIRUB SERPL-MCNC: 0.55 MG/DL (ref 0.2–1)
BPU ID: NORMAL
BPU ID: NORMAL
BUN SERPL-MCNC: 26 MG/DL (ref 5–25)
CALCIUM ALBUM COR SERPL-MCNC: 8.7 MG/DL (ref 8.3–10.1)
CALCIUM SERPL-MCNC: 7.5 MG/DL (ref 8.4–10.2)
CHLORIDE SERPL-SCNC: 106 MMOL/L (ref 96–108)
CO2 SERPL-SCNC: 22 MMOL/L (ref 21–32)
CREAT SERPL-MCNC: 1.31 MG/DL (ref 0.6–1.3)
CROSSMATCH: NORMAL
CROSSMATCH: NORMAL
EOSINOPHIL # BLD MANUAL: 0.12 THOUSAND/UL (ref 0–0.4)
EOSINOPHIL NFR BLD MANUAL: 2 % (ref 0–6)
ERYTHROCYTE [DISTWIDTH] IN BLOOD BY AUTOMATED COUNT: 16.5 % (ref 11.6–15.1)
GFR SERPL CREATININE-BSD FRML MDRD: 37 ML/MIN/1.73SQ M
GLUCOSE SERPL-MCNC: 90 MG/DL (ref 65–140)
HCT VFR BLD AUTO: 23 % (ref 34.8–46.1)
HGB BLD-MCNC: 7.6 G/DL (ref 11.5–15.4)
LYMPHOCYTES # BLD AUTO: 1.12 THOUSAND/UL (ref 0.6–4.47)
LYMPHOCYTES # BLD AUTO: 19 % (ref 14–44)
MACROCYTES BLD QL AUTO: PRESENT
MCH RBC QN AUTO: 32.5 PG (ref 26.8–34.3)
MCHC RBC AUTO-ENTMCNC: 33 G/DL (ref 31.4–37.4)
MCV RBC AUTO: 98 FL (ref 82–98)
MONOCYTES # BLD AUTO: 0.35 THOUSAND/UL (ref 0–1.22)
MONOCYTES NFR BLD: 6 % (ref 4–12)
NEUTROPHILS # BLD MANUAL: 4.23 THOUSAND/UL (ref 1.85–7.62)
NEUTS BAND NFR BLD MANUAL: 4 % (ref 0–8)
NEUTS SEG NFR BLD AUTO: 68 % (ref 43–75)
PLATELET # BLD AUTO: 196 THOUSANDS/UL (ref 149–390)
PLATELET BLD QL SMEAR: ADEQUATE
PMV BLD AUTO: 9.9 FL (ref 8.9–12.7)
POLYCHROMASIA BLD QL SMEAR: PRESENT
POTASSIUM SERPL-SCNC: 3.6 MMOL/L (ref 3.5–5.3)
PROT SERPL-MCNC: 4.4 G/DL (ref 6.4–8.4)
RBC # BLD AUTO: 2.34 MILLION/UL (ref 3.81–5.12)
RBC MORPH BLD: PRESENT
SODIUM SERPL-SCNC: 138 MMOL/L (ref 135–147)
TARGETS BLD QL SMEAR: PRESENT
UNIT DISPENSE STATUS: NORMAL
UNIT DISPENSE STATUS: NORMAL
UNIT PRODUCT CODE: NORMAL
UNIT PRODUCT CODE: NORMAL
UNIT PRODUCT VOLUME: 350 ML
UNIT PRODUCT VOLUME: 350 ML
UNIT RH: NORMAL
UNIT RH: NORMAL
WBC # BLD AUTO: 5.87 THOUSAND/UL (ref 4.31–10.16)

## 2023-11-08 PROCEDURE — 80053 COMPREHEN METABOLIC PANEL: CPT | Performed by: HOSPITALIST

## 2023-11-08 PROCEDURE — 88112 CYTOPATH CELL ENHANCE TECH: CPT | Performed by: PATHOLOGY

## 2023-11-08 PROCEDURE — 85027 COMPLETE CBC AUTOMATED: CPT | Performed by: HOSPITALIST

## 2023-11-08 PROCEDURE — 99239 HOSP IP/OBS DSCHRG MGMT >30: CPT | Performed by: HOSPITALIST

## 2023-11-08 PROCEDURE — 85007 BL SMEAR W/DIFF WBC COUNT: CPT | Performed by: HOSPITALIST

## 2023-11-08 RX ORDER — AMOXICILLIN 250 MG
1 CAPSULE ORAL 2 TIMES DAILY
Qty: 10 TABLET | Refills: 0 | Status: SHIPPED | OUTPATIENT
Start: 2023-11-08 | End: 2023-11-13

## 2023-11-08 RX ORDER — POLYETHYLENE GLYCOL 3350 17 G/17G
17 POWDER, FOR SOLUTION ORAL DAILY PRN
Qty: 5 EACH | Refills: 0 | Status: SHIPPED | OUTPATIENT
Start: 2023-11-08

## 2023-11-08 RX ADMIN — LEVOTHYROXINE SODIUM 75 MCG: 75 TABLET ORAL at 04:55

## 2023-11-08 RX ADMIN — SENNOSIDES, DOCUSATE SODIUM 1 TABLET: 8.6; 5 TABLET ORAL at 08:27

## 2023-11-08 RX ADMIN — ASPIRIN 81 MG CHEWABLE TABLET 81 MG: 81 TABLET CHEWABLE at 08:27

## 2023-11-08 NOTE — DISCHARGE INSTR - AVS FIRST PAGE
You are recommended NOT to take xarelto anymore    Your BP meds were held while here & your BP are normal without right now due to blood loss.  So HOLD taking your Coreg & HCTZ at home for now until you follow up with your primary doctor in next few days    You will be going home with the herrera catheter    For constipation - take the sennokot twice a day & metamucil daily, if not helping then you can pikc up the script I have sent for miralax & can take miralax 1 to 2 times a day

## 2023-11-08 NOTE — PROGRESS NOTES
Primary nurse provided pt with education on how to empty and care for indwelling herrera catheter after patient discharge. Pt used teachback to demonstrate the education provided. Pt was provided with extra supplies to take home upon discharge and was encouraged to ask any questions.

## 2023-11-08 NOTE — PLAN OF CARE
Problem: PAIN - ADULT  Goal: Verbalizes/displays adequate comfort level or baseline comfort level  Description: Interventions:  - Encourage patient to monitor pain and request assistance  - Assess pain using appropriate pain scale  - Administer analgesics based on type and severity of pain and evaluate response  - Implement non-pharmacological measures as appropriate and evaluate response  - Consider cultural and social influences on pain and pain management  - Notify physician/advanced practitioner if interventions unsuccessful or patient reports new pain  Outcome: Progressing     Problem: INFECTION - ADULT  Goal: Absence or prevention of progression during hospitalization  Description: INTERVENTIONS:  - Assess and monitor for signs and symptoms of infection  - Monitor lab/diagnostic results  - Monitor all insertion sites, i.e. indwelling lines, tubes, and drains  - Monitor endotracheal if appropriate and nasal secretions for changes in amount and color  - [place] appropriate cooling/warming therapies per order  - Administer medications as ordered  - Instruct and encourage patient and family to use good hand hygiene technique  - Identify and instruct in appropriate isolation precautions for identified infection/condition  Outcome: Progressing     Problem: SAFETY ADULT  Goal: Maintain or return to baseline ADL function  Description: INTERVENTIONS:  -  Assess patient's ability to carry out ADLs; assess patient's baseline for ADL function and identify physical deficits which impact ability to perform ADLs (bathing, care of mouth/teeth, toileting, grooming, dressing, etc.)  - Assess/evaluate cause of self-care deficits   - Assess range of motion  - Assess patient's mobility; develop plan if impaired  - Assess patient's need for assistive devices and provide as appropriate  - Encourage maximum independence but intervene and supervise when necessary  - Involve family in performance of ADLs  - Assess for home care needs following discharge   - Consider OT consult to assist with ADL evaluation and planning for discharge  - Provide patient education as appropriate  Outcome: Progressing     Problem: DISCHARGE PLANNING  Goal: Discharge to home or other facility with appropriate resources  Description: INTERVENTIONS:  - Identify barriers to discharge w/patient and caregiver  - Arrange for needed discharge resources and transportation as appropriate  - Identify discharge learning needs (meds, wound care, etc.)  - Arrange for interpretive services to assist at discharge as needed  - Refer to Case Management Department for coordinating discharge planning if the patient needs post-hospital services based on physician/advanced practitioner order or complex needs related to functional status, cognitive ability, or social support system  Outcome: Progressing

## 2023-11-08 NOTE — RESTORATIVE TECHNICIAN NOTE
Restorative Technician Note      Patient Name: Ashley Garcia     Restorative Tech Visit Date: 11/08/23  Note Type: Mobility  Patient Position Upon Consult: Bedside chair  Activity Performed: Ambulated  Assistive Device: Roller walker  Patient Position at End of Consult: Bedside chair;  All needs within reach  Nurse Communication: Nurse aware of consult, application of brace    Nam Lopez, Restorative Technician

## 2023-11-08 NOTE — RESTORATIVE TECHNICIAN NOTE
Restorative Technician Note      Patient Name: Freedom Door     Restorative Tech Visit Date: 11/08/23  Note Type: Mobility  Patient Position Upon Consult: Supine  Activity Performed: Ambulated  Assistive Device: Roller walker  Patient Position at End of Consult: Bedside chair;  All needs within reach    Abelardo Chopra, Restorative Technician

## 2023-11-08 NOTE — CASE MANAGEMENT
Case Management Discharge Planning Note    Patient name Maggie Mercado  Location [facility] MRN [id number]  : 1940 Date 2023       Current Admission Date: 2023  Current Admission Diagnosis:Gross hematuria   Patient Active Problem List    Diagnosis Date Noted    Acute cystitis with hematuria 2023    Gross hematuria 2023    Acute blood loss anemia (ABLA) 2023    Acute kidney injury superimposed on chronic kidney disease  2023    Transaminitis 2023    Protein-calorie malnutrition, unspecified severity ([address]) 2023    Chronic renal disease, stage IV ([address]) 2023    Chronic pain of both knees 08/10/2022    Chronic kidney disease-mineral and bone disorder 2022    Stage 3b chronic kidney disease ([address]) 2021    History of DVT (deep vein thrombosis) 2021    Bradycardia with 51-60 beats per minute 2020    Varicose veins of left lower extremity with pain 2020    Lower leg DVT (deep venous thromboembolism), chronic, left (HCC) 2020    Intermediate stage nonexudative 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 eyes 2019    History of peptic ulcer 10/11/2018    Fat necrosis (segmental) of breast 2018    Personal history of breast cancer 2018    Abnormal finding on diagnostic imaging of kidney 2018    Localized edema 2018    Hypothyroidism 2018    Hyperuricemia 2018    Hypertension 2018    Rheumatoid arthritis ([address])     Diastolic dysfunction 1382    Vitamin D deficiency 2014    Lumbar radiculopathy 2013    Hyperlipidemia 2013    Osteopenia 2013    Other chronic pain 2013    Adenocarcinoma of breast ([address]) 2013      LOS (days): 4  Geometric Mean LOS (GMLOS) (days): 3.50  Days to GMLOS:-0.2     OBJECTIVE:  Risk of Unplanned Readmission Score: 19.72         Current admission status: Inpatient   Preferred Pharmacy:   [facility]/pharmacy #[id number] - [address], [address]  [address]  [facility] [address]  Phone: [phone] Fax: [phone]    [facility] [address] [address]  Phone: [phone] Fax: [phone]    Primary Care Provider: Jose Warner MD    Primary Insurance: MEDICARE  Secondary Insurance: BLUE CROSS    DISCHARGE DETAILS:                                          Other Referral/Resources/Interventions Provided:  Referral Comments: Aidin message sent to [facility] to notify of dc today.

## 2023-11-08 NOTE — PLAN OF CARE
Problem: PAIN - ADULT  Goal: Verbalizes/displays adequate comfort level or baseline comfort level  Description: Interventions:  - Encourage patient to monitor pain and request assistance  - Assess pain using appropriate pain scale  - Administer analgesics based on type and severity of pain and evaluate response  - Implement non-pharmacological measures as appropriate and evaluate response  - Consider cultural and social influences on pain and pain management  - Notify physician/advanced practitioner if interventions unsuccessful or patient reports new pain  Outcome: Progressing     Problem: INFECTION - ADULT  Goal: Absence or prevention of progression during hospitalization  Description: INTERVENTIONS:  - Assess and monitor for signs and symptoms of infection  - Monitor lab/diagnostic results  - Monitor all insertion sites, i.e. indwelling lines, tubes, and drains  - Monitor endotracheal if appropriate and nasal secretions for changes in amount and color  - [place] appropriate cooling/warming therapies per order  - Administer medications as ordered  - Instruct and encourage patient and family to use good hand hygiene technique  - Identify and instruct in appropriate isolation precautions for identified infection/condition  Outcome: Progressing     Problem: SAFETY ADULT  Goal: Patient will remain free of falls  Description: INTERVENTIONS:  - Educate patient/family on patient safety including physical limitations  - Instruct patient to call for assistance with activity   - Consult OT/PT to assist with strengthening/mobility   - Keep Call bell within reach  - Keep bed low and locked with side rails adjusted as appropriate  - Keep care items and personal belongings within reach  - Initiate and maintain comfort rounds  - Make Fall Risk Sign visible to staff  - Offer Toileting every 2 Hours, in advance of need  - Initiate/Maintain alarm  - Obtain necessary fall risk management equipment  - Apply yellow socks and bracelet for high fall risk patients  - Consider moving patient to room near nurses station  Outcome: Progressing  Goal: Maintain or return to baseline ADL function  Description: INTERVENTIONS:  -  Assess patient's ability to carry out ADLs; assess patient's baseline for ADL function and identify physical deficits which impact ability to perform ADLs (bathing, care of mouth/teeth, toileting, grooming, dressing, etc.)  - Assess/evaluate cause of self-care deficits   - Assess range of motion  - Assess patient's mobility; develop plan if impaired  - Assess patient's need for assistive devices and provide as appropriate  - Encourage maximum independence but intervene and supervise when necessary  - Involve family in performance of ADLs  - Assess for home care needs following discharge   - Consider OT consult to assist with ADL evaluation and planning for discharge  - Provide patient education as appropriate  Outcome: Progressing     Problem: DISCHARGE PLANNING  Goal: Discharge to home or other facility with appropriate resources  Description: INTERVENTIONS:  - Identify barriers to discharge w/patient and caregiver  - Arrange for needed discharge resources and transportation as appropriate  - Identify discharge learning needs (meds, wound care, etc.)  - Arrange for interpretive services to assist at discharge as needed  - Refer to Case Management Department for coordinating discharge planning if the patient needs post-hospital services based on physician/advanced practitioner order or complex needs related to functional status, cognitive ability, or social support system  Outcome: Progressing     Problem: Knowledge Deficit  Goal: Patient/family/caregiver demonstrates understanding of disease process, treatment plan, medications, and discharge instructions  Description: Complete learning assessment and assess knowledge base.   Interventions:  - Provide teaching at level of understanding  - Provide teaching via preferred learning methods  Outcome: Progressing     Problem: GENITOURINARY - ADULT  Goal: Maintains or returns to baseline urinary function  Description: INTERVENTIONS:  - Assess urinary function  - Encourage oral fluids to ensure adequate hydration if ordered  - Administer IV fluids as ordered to ensure adequate hydration  - Administer ordered medications as needed  - Offer frequent toileting  - Follow urinary retention protocol if ordered  Outcome: Progressing  Goal: Urinary catheter remains patent  Description: INTERVENTIONS:  - Assess patency of urinary catheter  - If patient has a chronic herrera, consider changing catheter if non-functioning  - Follow guidelines for intermittent irrigation of non-functioning urinary catheter  Outcome: Progressing

## 2023-11-09 ENCOUNTER — TRANSITIONAL CARE MANAGEMENT (OUTPATIENT)
Dept: INTERNAL MEDICINE CLINIC | Facility: CLINIC | Age: 83
End: 2023-11-09

## 2023-11-09 DIAGNOSIS — Z71.89 COMPLEX CARE COORDINATION: Primary | ICD-10-CM

## 2023-11-09 NOTE — ASSESSMENT & PLAN NOTE
BP with some soft pressures noted overnight, therefore antihypertensives are on hold   PTA home regimen of coreg 25 mg BID and HCTZ 25 mg daily, continue to hold on discharge until OP f/u with PCP

## 2023-11-09 NOTE — ASSESSMENT & PLAN NOTE
History of stage I right breast cancer, invasive ductal carcinoma. ER/[place] positive, HER2 positive per fish testing. She was initially diagnosed in 2012 and is status post lumpectomy, radiation, Herceptin treatment. She was on tamoxifen for 7 years from 2012 to 11/2019 which was discontinued due development of DVT. Her most recent mammogram 07/2021 showed benign findings.   Continue with outpatient follow up

## 2023-11-09 NOTE — ASSESSMENT & PLAN NOTE
Mild elevation. Suspect reactive to above. Also consider secondary to methotrexate use.   They have been stable, ok to take statin

## 2023-11-09 NOTE — ASSESSMENT & PLAN NOTE
Patient s/p R hip arthroplasty 10/05 with subsequent urinary rentention. Urology placed herrera catheter on 11/03. Patient noticed blood in her herrera bag which brought her to [facility] ED on 11/04. CT abdomen & pelvis:  Large amount of clot within the bladder lumen. Underlying lesion cannot be excluded. Consider urologic consult and direct visualization with cystoscopy. Bilateral nonobstructing renal calculi. Initial management with three-way herrera with CBI without improvement  S/p cystoscopy with clot evacuation and fulguration with transurethral resection of the bladder lesion with biopsy by urology 11/05. Bx pending              Hold all AC per urology  CBI clamped yesterday & urine continues to be clear              Patient will f/u outpatient for herrera removal and voiding trial              Urine culture positive for GNR, patient is sensitive to penicillin which allergy listed as hives.   She was started on doxycycline prior to this hospitalization btu she has psuedomonas in urine which is not covered by Doxy & she was not septic on admission & also now > 24 hrs post procedure, hence will DC Abx  DC home today with SERGIOA

## 2023-11-09 NOTE — DISCHARGE SUMMARY
[address] [facility]  Discharge- Chad Corral 1940, 81 y.o. female MRN: [id number]  Unit/Bed#: [facility] 923-01 Encounter: [id number]  Primary Care Provider: Madeline Patrick MD   Date and time admitted to hospital: 11/4/2023  8:28 PM    * Gross hematuria  Assessment & Plan  Patient s/p R hip arthroplasty 10/05 with subsequent urinary rentention. Urology placed herrera catheter on 11/03. Patient noticed blood in her herrera bag which brought her to [facility] ED on 11/04. CT abdomen & pelvis:  Large amount of clot within the bladder lumen. Underlying lesion cannot be excluded. Consider urologic consult and direct visualization with cystoscopy. Bilateral nonobstructing renal calculi. Initial management with three-way herrera with CBI without improvement  S/p cystoscopy with clot evacuation and fulguration with transurethral resection of the bladder lesion with biopsy by urology 11/05. Bx pending              Hold all AC per urology  CBI clamped yesterday & urine continues to be clear              Patient will f/u outpatient for herrera removal and voiding trial              Urine culture positive for GNR, patient is sensitive to penicillin which allergy listed as hives. She was started on doxycycline prior to this hospitalization btu she has psuedomonas in urine which is not covered by Doxy & she was not septic on admission & also now > 24 hrs post procedure, hence will DC Abx  DC home today with [facility]    Acute cystitis with hematuria  Assessment & Plan  Unclear if true infection as was not showing s/s infection on admission  Also has been on Abx that wouldn't work for pseudomonas grown in urine cultures  Will stop Abx, has been stable off ABx    Transaminitis  Assessment & Plan  Mild elevation. Suspect reactive to above. Also consider secondary to methotrexate use.   They have been stable, ok to take statin    Acute kidney injury superimposed on chronic kidney disease   Assessment & Plan  Baseline around 1-1.3. Noted MARCELO since 10/31 with Cr of 2.04. Suspect secondary to urinary retention and acute blood loss anemia. Cr. Pleatued at 1.45. Monitor BMP. Continue to hold HCTZ, s/p IVF hydration, currently being monitored off, encourage good PO hydration after cystoscopy   Management of urinary retention per urology. Acute blood loss anemia (ABLA)  Assessment & Plan  In the setting of gross hematuria as noted above, hgb 6.0 on admit  S/p 1 unit pRBC with improvement of hgb to 8.2 -> now 7.7 -> 7.6 but no active bleed  Trend CBC and transfuse as needed to keep Hgb>7    History of DVT (deep vein thrombosis)  Assessment & Plan  Noted in 2019 on venous duplex per review of records   Holding Xarelto 10 mg daily that was recently started after hip surgery for DVT ppx due to gross hematuria and anemia requiring blood transfusion   per urology since its prophylaxis please hold it given bleed    Adenocarcinoma of breast [facility])  Assessment & Plan  History of stage I right breast cancer, invasive ductal carcinoma. ER/[place] positive, HER2 positive per fish testing. She was initially diagnosed in 2012 and is status post lumpectomy, radiation, Herceptin treatment. She was on tamoxifen for 7 years from 2012 to 11/2019 which was discontinued due development of DVT. Her most recent mammogram 07/2021 showed benign findings. Continue with outpatient follow up     Hyperlipidemia  Assessment & Plan  Hold statin for now given transaminitis, resume once LFTs stabilize. Hypothyroidism  Assessment & Plan  Continue levothyroxine 75 mcg daily. Rheumatoid arthritis ([address])  Assessment & Plan  Maintained on methotrexate (every Friday) and Rituxan.    Hold for now pending urine culture results  Restart on DC    Hypertension  Assessment & Plan  BP with some soft pressures noted overnight, therefore antihypertensives are on hold   PTA home regimen of coreg 25 mg BID and HCTZ 25 mg daily, continue to hold on discharge until OP f/u with PCP        Medical Problems       Resolved Problems  Date Reviewed: 11/8/2023   None       Discharging Physician / Practitioner: Zhanna Fang MD  PCP: Malou Devi MD  Admission Date:   Admission Orders (From admission, onward)       Ordered        11/04/23 2041  Inpatient Admission  Once                          Discharge Date: 11/08/23    Consultations During Hospital Stay:  urology    Procedures Performed:   Cystoscopy:  1) cystoscopy with clot evacuation and fulguration  2. Transurethral resection of bladder lesion (1 cm ulcerative lesion, right lateral wall)  3. Bladder biopsy    Significant Findings / Test Results:   No orders to display         Test Results Pending at Discharge (will require follow up): Bladder biopsy       Reason for Admission: hematuria     Hospital Course: Vincent Keenan is a 81 y.o. female patient who originally presented to the hospital on 11/4/2023 due to hematuria . recent periprosthetic hip fracture and intertrochanteric right femur fracture s/p right hemiarthroplasty on 10/5, hx of DVT, HTN, RA, hypothroidism, urinary retention who presents with gross hematuria. Patient was recently discharged from rehab center and had the herrera catheter removed at the rehab. Patient presented to the urology clinic on 10/30 where patient was noted to have ongoing symptoms of weak stream and frequent urination with PVR showing 300 mL. Urology attempted herrera catheter placement without success. Patient was discharged on terazosin with strict ER return precautions. Patient was noted to have hematuria at home and patient was reevaluated by urology on 11/3 with successful placement of herrera catheter with dark blood on return per chart review.  Patient presented to the ED again on 11/4 with hematuria in herrera & she was not shown how to switch the bags and she spilled bloody urine everywhere       Please see above list of diagnoses and related plan for additional information. Condition at Discharge: stable    Discharge Day Visit / Exam:   Subjective:  feels well, has no major complains    Vitals: Blood Pressure: 133/58 (11/08/23 0746)  Pulse: 70 (11/08/23 0746)  Temperature: 98.6 °F (37 °C) (11/08/23 0746)  Temp Source: Oral (11/06/23 0617)  Respirations: 18 (11/07/23 0703)  SpO2: 97 % (11/08/23 0746)  Exam:   Physical Exam  Vitals reviewed. HENT:      Head: Normocephalic and atraumatic. Cardiovascular:      Rate and Rhythm: Normal rate and regular rhythm. Pulmonary:      Effort: Pulmonary effort is normal.      Breath sounds: Normal breath sounds. Abdominal:      General: There is no distension. Palpations: Abdomen is soft. Tenderness: There is no abdominal tenderness. Musculoskeletal:      Right lower leg: No edema. Left lower leg: No edema. Neurological:      Mental Status: She is alert and oriented to person, place, and time. Discussion with Family: Updated  (nephew) via phone. Discharge instructions/Information to patient and family:   See after visit summary for information provided to patient and family. Provisions for Follow-Up Care:  See after visit summary for information related to follow-up care and any pertinent home health orders. Disposition:   Home with [facility] Services (Reminder: Complete face to face encounter)    Planned Readmission: no     Discharge Statement:  I spent 45 minutes discharging the patient. This time was spent on the day of discharge. I had direct contact with the patient on the day of discharge. Greater than 50% of the total time was spent examining patient, answering all patient questions, arranging and discussing plan of care with patient as well as directly providing post-discharge instructions. Additional time then spent on discharge activities. Discharge Medications:  See after visit summary for reconciled discharge medications provided to patient and/or family. **Please Note: This note may have been constructed using a voice recognition system**

## 2023-11-09 NOTE — ASSESSMENT & PLAN NOTE
In the setting of gross hematuria as noted above, hgb 6.0 on admit  S/p 1 unit pRBC with improvement of hgb to 8.2 -> now 7.7 -> 7.6 but no active bleed  Trend CBC and transfuse as needed to keep Hgb>7

## 2023-11-09 NOTE — ASSESSMENT & PLAN NOTE
Unclear if true infection as was not showing s/s infection on admission  Also has been on Abx that wouldn't work for pseudomonas grown in urine cultures  Will stop Abx, has been stable off ABx

## 2023-11-10 ENCOUNTER — HOSPITAL ENCOUNTER (EMERGENCY)
Facility: HOSPITAL | Age: 83
Discharge: HOME | End: 2023-11-10
Attending: EMERGENCY MEDICINE
Payer: MEDICARE

## 2023-11-10 VITALS
DIASTOLIC BLOOD PRESSURE: 62 MMHG | RESPIRATION RATE: 19 BRPM | SYSTOLIC BLOOD PRESSURE: 137 MMHG | HEART RATE: 76 BPM | TEMPERATURE: 98.2 F | OXYGEN SATURATION: 100 %

## 2023-11-10 DIAGNOSIS — R31.9 HEMATURIA: Primary | ICD-10-CM

## 2023-11-10 LAB
ANION GAP SERPL CALCULATED.3IONS-SCNC: 6 MMOL/L
ANISOCYTOSIS BLD QL SMEAR: PRESENT
BASOPHILS # BLD MANUAL: 0 THOUSAND/UL (ref 0–0.1)
BASOPHILS NFR MAR MANUAL: 0 % (ref 0–1)
BUN SERPL-MCNC: 21 MG/DL (ref 5–25)
CALCIUM SERPL-MCNC: 8.4 MG/DL (ref 8.4–10.2)
CHLORIDE SERPL-SCNC: 103 MMOL/L (ref 96–108)
CO2 SERPL-SCNC: 26 MMOL/L (ref 21–32)
CREAT SERPL-MCNC: 1.14 MG/DL (ref 0.6–1.3)
EOSINOPHIL # BLD MANUAL: 0.35 THOUSAND/UL (ref 0–0.4)
EOSINOPHIL NFR BLD MANUAL: 6 % (ref 0–6)
ERYTHROCYTE [DISTWIDTH] IN BLOOD BY AUTOMATED COUNT: 16.1 % (ref 11.6–15.1)
GFR SERPL CREATININE-BSD FRML MDRD: 44 ML/MIN/1.73SQ M
GLUCOSE SERPL-MCNC: 117 MG/DL (ref 65–140)
HCT VFR BLD AUTO: 27.9 % (ref 34.8–46.1)
HGB BLD-MCNC: 8.9 G/DL (ref 11.5–15.4)
HYPERCHROMIA BLD QL SMEAR: PRESENT
LYMPHOCYTES # BLD AUTO: 2.89 THOUSAND/UL (ref 0.6–4.47)
LYMPHOCYTES # BLD AUTO: 49 % (ref 14–44)
MCH RBC QN AUTO: 31.2 PG (ref 26.8–34.3)
MCHC RBC AUTO-ENTMCNC: 31.9 G/DL (ref 31.4–37.4)
MCV RBC AUTO: 98 FL (ref 82–98)
METAMYELOCYTES NFR BLD MANUAL: 1 % (ref 0–1)
MONOCYTES # BLD AUTO: 0.83 THOUSAND/UL (ref 0–1.22)
MONOCYTES NFR BLD: 14 % (ref 4–12)
NEUTROPHILS # BLD MANUAL: 1.77 THOUSAND/UL (ref 1.85–7.62)
NEUTS BAND NFR BLD MANUAL: 2 % (ref 0–8)
NEUTS SEG NFR BLD AUTO: 28 % (ref 43–75)
PLATELET # BLD AUTO: 330 THOUSANDS/UL (ref 149–390)
PLATELET BLD QL SMEAR: ADEQUATE
PMV BLD AUTO: 9.3 FL (ref 8.9–12.7)
POLYCHROMASIA BLD QL SMEAR: PRESENT
POTASSIUM SERPL-SCNC: 4.5 MMOL/L (ref 3.5–5.3)
RBC # BLD AUTO: 2.85 MILLION/UL (ref 3.81–5.12)
SODIUM SERPL-SCNC: 135 MMOL/L (ref 135–147)
WBC # BLD AUTO: 5.9 THOUSAND/UL (ref 4.31–10.16)

## 2023-11-10 PROCEDURE — 80048 BASIC METABOLIC PNL TOTAL CA: CPT | Performed by: EMERGENCY MEDICINE

## 2023-11-10 PROCEDURE — 85027 COMPLETE CBC AUTOMATED: CPT | Performed by: EMERGENCY MEDICINE

## 2023-11-10 PROCEDURE — 36415 COLL VENOUS BLD VENIPUNCTURE: CPT | Performed by: EMERGENCY MEDICINE

## 2023-11-10 PROCEDURE — 85007 BL SMEAR W/DIFF WBC COUNT: CPT | Performed by: EMERGENCY MEDICINE

## 2023-11-10 PROCEDURE — 99284 EMERGENCY DEPT VISIT MOD MDM: CPT | Performed by: EMERGENCY MEDICINE

## 2023-11-10 PROCEDURE — 99283 EMERGENCY DEPT VISIT LOW MDM: CPT

## 2023-11-10 PROCEDURE — 96360 HYDRATION IV INFUSION INIT: CPT

## 2023-11-10 RX ADMIN — SODIUM CHLORIDE 500 ML: 0.9 INJECTION, SOLUTION INTRAVENOUS at 20:02

## 2023-11-10 NOTE — ED PROVIDER NOTES
History  Chief Complaint   Patient presents with    Urinary Catheter Problem     Patient arrives with EMS from home, with reports of blood in her urine. Pt reports that she had her catheter placed when she was at [facility] for bladder surgery. Pt denies any pain, but states that she does feel irritated from where the herrera is placed. 81-year-old female history of hypertension and breast cancer on aspirin presenting with hematuria. Patiently recently seen and evaluated about a week ago for hematuria. Was found to have significant clots in her bladder requiring cystoscopy and manual removal of clots after failing CBI. Patient report significant improvement in hematuria since that time and has been having normal urine up until this afternoon when she noted light hematuria. Denies any difficulty urinating. Reports passage of possible 1 small blood clot. Denies any abdominal pain nausea vomiting diarrhea. Denies any chest pain shortness of breath. Denies any other complaints. Chart reviewed    Past Medical History:  No date: Allergic angioedema      Comment:  2016 resolved  No date: Angioedema      Comment:  2015 resolved  No date: Arthritis  : Breast cancer ([address])      Comment:  right   No date: Chronic kidney disease  No date: Disease of thyroid gland  No date: Hemorrhage of anus and rectum      Comment:  2016 resolved  No date: Hyperlipidemia  No date: Hypertension  No date: Hypocalcemia      Comment:  2017 resolved  No date: Neuritis      Comment:  thoracic and lumbosacral  No date: Peptic ulcer  No date: Rheumatoid arthritis ([address])  2021: Stage 3b chronic kidney disease ([address])  No date: Vitamin D deficiency  Family History: non-contributory  Social History          Prior to Admission Medications   Prescriptions Last Dose Informant Patient Reported? Taking?    ASPIRIN 81 PO  Self Yes No   Si tablet ONCE DAILY (route: oral)   Calcium Citrate-Vitamin D (CITRACAL + D PO)  Self Yes No Sig: Take by mouth 2 (two) times a day   Cholecalciferol (VITAMIN D-3 PO)  Self Yes No   Sig: Take 2,000 Units by mouth daily. Multiple Vitamin (MULTIVITAMIN) tablet  Self Yes No   Sig: Take 1 tablet by mouth daily. Psyllium (Metamucil) WAFR  Self No No   Sig: Take once daily   ascorbic acid (VITAMIN C) 500 mg tablet  Self Yes No   Sig: Take 500 mg by mouth daily.    folic acid (FOLVITE) 1 mg tablet  Self No No   Si daily   levothyroxine (Synthroid) 75 mcg tablet  Self No No   Sig: Take 1 tablet (75 mcg total) by mouth in the morning   magnesium oxide (MAG-OX) 400 mg  Self No No   Sig: Take 1 tablet (400 mg total) by mouth 2 (two) times a day   methotrexate 2.5 mg tablet  Self No No   Sig: Take 1 tablet (2.5 mg total) by mouth once a week Patient takes 6 tablets weekly   polyethylene glycol (MIRALAX) 17 g packet   No No   Sig: Take 17 g by mouth daily as needed (constipation)   riTUXimab (RITUXAN) 500 mg/50 mL  Self Yes No   Sig: Infuse into a venous catheter every 6 (six) months     rosuvastatin (CRESTOR) 5 mg tablet  Self No No   Sig: TAKE 1 TABLET DAILY   senna-docusate sodium (SENOKOT S) 8.6-50 mg per tablet   No No   Sig: Take 1 tablet by mouth 2 (two) times a day for 5 days      Facility-Administered Medications: None       Past Medical History:   Diagnosis Date    Allergic angioedema     [id number] resolved    Angioedema     [id number] resolved    Arthritis     Breast cancer ([address]) 2012    right     Chronic kidney disease     Disease of thyroid gland     Hemorrhage of anus and rectum     2016 resolved    Hyperlipidemia     Hypertension     Hypocalcemia     2017 resolved    Neuritis     thoracic and lumbosacral    Peptic ulcer     Rheumatoid arthritis ([address])     Stage 3b chronic kidney disease ([address] [facility]) 2021    Vitamin D deficiency        Past Surgical History:   Procedure Laterality Date    ABDOMINAL SURGERY      BREAST BIOPSY Right 2012    BREAST LUMPECTOMY Right 2012    BREAST SURGERY [address]    HIP SURGERY Right 09/18/2022    10/2023    ORTHOPEDIC SURGERY      for toes    MA CYSTO W/IRRIG & EVAC MULTPLE OBSTRUCTING CLOTS N/A 11/5/2023    Procedure: CYSTOSCOPY EVACUATION OF CLOTS & fulguration;  Surgeon: Barry Daily MD;  Location: [facility];  Service: Urology    TRANSURETHRAL RESECTION OF BLADDER TUMOR N/A 11/5/2023    Procedure: TRANSURETHRAL RESECTION OF BLADDER TUMOR (TURBT); Surgeon: Barry Daily MD;  Location[facility];  Service: Urology    US GUIDED BREAST BIOPSY RIGHT COMPLETE Right 08/15/2022       Family History   Problem Relation Age of Onset    Breast cancer Mother     Kidney failure Father     Other Father         uremia    Lung cancer Brother     Breast cancer Maternal Aunt     Breast cancer Maternal Aunt     Breast cancer Maternal Aunt     Breast cancer Maternal Aunt     Breast cancer Maternal Aunt     Stomach cancer Maternal Uncle      I have reviewed and agree with the history as documented. E-Cigarette/Vaping    E-Cigarette Use Never User      E-Cigarette/Vaping Substances    Nicotine No     THC No     CBD No     Flavoring No     Other No     Unknown No      Social History     Tobacco Use    Smoking status: Never     Passive exposure: Past    Smokeless tobacco: Never   Vaping Use    Vaping Use: Never used   Substance Use Topics    Alcohol use: Not Currently    Drug use: No       Review of Systems   Constitutional:  Negative for appetite change, chills, diaphoresis, fever and unexpected weight change. HENT:  Negative for congestion and rhinorrhea. Eyes:  Negative for photophobia and visual disturbance. Respiratory:  Negative for cough, chest tightness and shortness of breath. Cardiovascular:  Negative for chest pain, palpitations and leg swelling. Gastrointestinal:  Negative for abdominal distention, abdominal pain, blood in stool, constipation, diarrhea, nausea and vomiting. Genitourinary:  Positive for hematuria. Negative for dysuria. Musculoskeletal:  Negative for back pain, joint swelling, neck pain and neck stiffness. Skin:  Negative for color change, pallor, rash and wound. Neurological:  Negative for dizziness, syncope, weakness, light-headedness and headaches. Psychiatric/Behavioral:  Negative for agitation. All other systems reviewed and are negative. Physical Exam  Physical Exam  Vitals and nursing note reviewed. Constitutional:       General: She is not in acute distress. Appearance: Normal appearance. She is well-developed. She is not ill-appearing, toxic-appearing or diaphoretic. HENT:      Head: Normocephalic and atraumatic. Nose: Nose normal. No congestion or rhinorrhea. Mouth/Throat:      Mouth: Mucous membranes are moist.      Pharynx: Oropharynx is clear. No oropharyngeal exudate or posterior oropharyngeal erythema. Eyes:      General: No scleral icterus. Right eye: No discharge. Left eye: No discharge. Extraocular Movements: Extraocular movements intact. Conjunctiva/sclera: Conjunctivae normal.      Pupils: Pupils are equal, round, and reactive to light. Neck:      Vascular: No JVD. Trachea: No tracheal deviation. Comments: Supple. Normal range of motion. Cardiovascular:      Rate and Rhythm: Normal rate and regular rhythm. Heart sounds: Normal heart sounds. No murmur heard. No friction rub. No gallop. Comments: Normal rate and regular rhythm  Pulmonary:      Effort: Pulmonary effort is normal. No respiratory distress. Breath sounds: Normal breath sounds. No stridor. No wheezing or rales. Comments: Clear to auscultation bilaterally  Chest:      Chest wall: No tenderness. Abdominal:      General: Bowel sounds are normal. There is no distension. Palpations: Abdomen is soft. Tenderness: There is no abdominal tenderness.  There is no right CVA tenderness, left CVA tenderness, guarding or rebound. Comments: Soft, nontender, nondistended. Normal bowel sounds throughout   Musculoskeletal:         General: No swelling, tenderness, deformity or signs of injury. Normal range of motion. Cervical back: Normal range of motion and neck supple. No rigidity. No muscular tenderness. Right lower leg: No edema. Left lower leg: No edema. Lymphadenopathy:      Cervical: No cervical adenopathy. Skin:     General: Skin is warm and dry. Coloration: Skin is not pale. Findings: No erythema or rash. Neurological:      General: No focal deficit present. Mental Status: She is alert. Mental status is at baseline. Sensory: No sensory deficit. Motor: No weakness or abnormal muscle tone. Coordination: Coordination normal.      Gait: Gait normal.      Comments: Alert. Strength and sensation grossly intact. Ambulatory without difficulty at baseline. Psychiatric:         Behavior: Behavior normal.         Thought Content:  Thought content normal.         Vital Signs  ED Triage Vitals   Temperature Pulse Respirations Blood Pressure SpO2   11/10/23 2002 11/10/23 1840 11/10/23 1840 11/10/23 1840 11/10/23 1840   98.2 °F (36.8 °C) 76 19 137/62 100 %      Temp Source Heart Rate Source Patient Position - Orthostatic VS BP Location FiO2 (%)   11/10/23 2002 11/10/23 1840 11/10/23 1840 11/10/23 1840 --   Oral Monitor Sitting Right arm       Pain Score       --                  Vitals:    11/10/23 1840   BP: 137/62   Pulse: 76   Patient Position - Orthostatic VS: Sitting         Visual Acuity      ED Medications  Medications   sodium chloride 0.9 % bolus 500 mL (500 mL Intravenous New Bag 11/10/23 2002)       Diagnostic Studies  Results Reviewed       Procedure Component Value Units Date/Time    Manual Differential(PHLEBS Do Not Order) [[id number]]  (Abnormal) Collected: 11/10/23 1918    Lab Status: Final result Specimen: Blood from Arm, Left Updated: 11/10/23 2001     Segmented % 28 %      Bands % 2 %      Lymphocytes % 49 %      Monocytes % 14 %      Eosinophils, % 6 %      Basophils % 0 %      Metamyelocytes% 1 %      Absolute Neutrophils 1.77 Thousand/uL      Lymphocytes Absolute 2.89 Thousand/uL      Monocytes Absolute 0.83 Thousand/uL      Eosinophils Absolute 0.35 Thousand/uL      Basophils Absolute 0.00 Thousand/uL      Total Counted --     Platelet Estimate Adequate     Anisocytosis Present     Hypochromia Present     Polychromasia Present    Basic metabolic panel [[id number]] Collected: 11/10/23 1918    Lab Status: Final result Specimen: Blood from Arm, Left Updated: 11/10/23 1958     Sodium 135 mmol/L      Potassium 4.5 mmol/L      Chloride 103 mmol/L      CO2 26 mmol/L      ANION GAP 6 mmol/L      BUN 21 mg/dL      Creatinine 1.14 mg/dL      Glucose 117 mg/dL      Calcium 8.4 mg/dL      eGFR 44 ml/min/1.73sq m     Narrative:      [facility] (CKD):     Stage 1 with normal or high GFR (GFR > 90 mL/min/1.73 square meters)    Stage 2 Mild CKD (GFR = 60-89 mL/min/1.73 square meters)    Stage 3A Moderate CKD (GFR = 45-59 mL/min/1.73 square meters)    Stage 3B Moderate CKD (GFR = 30-44 mL/min/1.73 square meters)    Stage 4 Severe CKD (GFR = 15-29 mL/min/1.73 square meters)    Stage 5 End Stage CKD (GFR <15 mL/min/1.73 square meters)  Note: GFR calculation is accurate only with a steady state creatinine    CBC and differential [[id number]]  (Abnormal) Collected: 11/10/23 1918    Lab Status: Final result Specimen: Blood from Arm, Left Updated: 11/10/23 1940     WBC 5.90 Thousand/uL      RBC 2.85 Million/uL      Hemoglobin 8.9 g/dL      Hematocrit 27.9 %      MCV 98 fL      MCH 31.2 pg      MCHC 31.9 g/dL      RDW 16.1 %      MPV 9.3 fL      Platelets 144 Thousands/uL                    No orders to display              Procedures  Procedures         ED Course Medical Decision Making  28-year-old female history of hypertension and breast cancer on aspirin presenting with hematuria. Recurrence of hematuria with Carrera catheter. Minimal clotting at this time. Benign abdominal exam.  Plan for basic labs and will attempt manual irrigation to start. Reassess. Labs interpreted by me and hemoglobin of 9 improved from the previous. No significant acute process. Urine clear after irrigation. Discussed results and recommendations. Advised follow up PCP. Medication recommendations. Given instructions and return precautions. Patient/family at bedside acknowledged understanding of all written and verbal instructions and return precautions. Discharged. Amount and/or Complexity of Data Reviewed  Labs: ordered. Disposition  Final diagnoses:   Hematuria     Time reflects when diagnosis was documented in both MDM as applicable and the Disposition within this note       Time User Action Codes Description Comment    11/10/2023  6:50 PM Antonino Peralta Add [R31.9] Hematuria           ED Disposition       ED Disposition   Discharge    Condition   Stable    Date/Time   Fri Nov 10, 2023  8:03 PM    Comment   Susannah Bass discharge to home/self care.                    Follow-up Information       Follow up With Specialties Details Why Contact Info Additional Information    Jose Crawford MD Internal Medicine Schedule an appointment as soon as possible for a visit in 1 week  [address]  [address]       [facility] Schedule an appointment as soon as possible for a visit in 1 week  [address] [address] [address] [facility], [facility] [address], [address] [phone]            Patient's Medications   Discharge Prescriptions    No medications on file       No discharge procedures on file.     PDMP Review       None            ED Provider  Electronically Signed by             Stevie Nash MD  11/10/23 8148

## 2023-11-13 ENCOUNTER — TELEPHONE (OUTPATIENT)
Dept: UROLOGY | Facility: CLINIC | Age: 83
End: 2023-11-13

## 2023-11-13 ENCOUNTER — OFFICE VISIT (OUTPATIENT)
Dept: INTERNAL MEDICINE CLINIC | Facility: CLINIC | Age: 83
End: 2023-11-13
Payer: MEDICARE

## 2023-11-13 VITALS
HEIGHT: 62 IN | RESPIRATION RATE: 16 BRPM | SYSTOLIC BLOOD PRESSURE: 114 MMHG | BODY MASS INDEX: 28.35 KG/M2 | OXYGEN SATURATION: 91 % | TEMPERATURE: 99.8 F | HEART RATE: 65 BPM | DIASTOLIC BLOOD PRESSURE: 60 MMHG

## 2023-11-13 DIAGNOSIS — R31.0 GROSS HEMATURIA: ICD-10-CM

## 2023-11-13 DIAGNOSIS — E46 PROTEIN-CALORIE MALNUTRITION, UNSPECIFIED SEVERITY (HCC): ICD-10-CM

## 2023-11-13 DIAGNOSIS — R74.01 TRANSAMINITIS: ICD-10-CM

## 2023-11-13 DIAGNOSIS — D62 ACUTE BLOOD LOSS ANEMIA (ABLA): ICD-10-CM

## 2023-11-13 DIAGNOSIS — N30.01 ACUTE CYSTITIS WITH HEMATURIA: Primary | ICD-10-CM

## 2023-11-13 DIAGNOSIS — M79.89 SWELLING OF LOWER EXTREMITY: ICD-10-CM

## 2023-11-13 DIAGNOSIS — Z86.718 HISTORY OF DVT (DEEP VEIN THROMBOSIS): ICD-10-CM

## 2023-11-13 DIAGNOSIS — I10 PRIMARY HYPERTENSION: ICD-10-CM

## 2023-11-13 PROBLEM — R00.1 BRADYCARDIA WITH 51-60 BEATS PER MINUTE: Status: RESOLVED | Noted: 2020-04-30 | Resolved: 2023-11-13

## 2023-11-13 PROCEDURE — 88313 SPECIAL STAINS GROUP 2: CPT | Performed by: PATHOLOGY

## 2023-11-13 PROCEDURE — 99496 TRANSJ CARE MGMT HIGH F2F 7D: CPT | Performed by: INTERNAL MEDICINE

## 2023-11-13 PROCEDURE — 88342 IMHCHEM/IMCYTCHM 1ST ANTB: CPT | Performed by: PATHOLOGY

## 2023-11-13 PROCEDURE — 88305 TISSUE EXAM BY PATHOLOGIST: CPT | Performed by: PATHOLOGY

## 2023-11-13 RX ORDER — HYDROCHLOROTHIAZIDE 12.5 MG/1
12.5 TABLET ORAL DAILY
Qty: 90 TABLET | Refills: 3
Start: 2023-11-13 | End: 2024-11-07

## 2023-11-13 NOTE — TELEPHONE ENCOUNTER
Please contact the patient or her relative Kali. Please let them know the excellent news that the biopsies taken during her recent bladder surgery were negative for bladder cancer. I see that she has a scheduled follow-up for Carrera removal.    We can cancel her upcoming cystoscopy appointment with Dr. Pamela Gannon as this was performed as an inpatient due to hematuria. Please schedule a routine follow-up with our advanced practitioner team in 3 months. If she is doing well at that point she can follow-up as needed thereafter.

## 2023-11-13 NOTE — PROGRESS NOTES
Assessment & Plan     1. Acute cystitis with hematuria    2. Protein-calorie malnutrition, unspecified severity ([address])    3. Gross hematuria    4. History of DVT (deep vein thrombosis)    5. Acute blood loss anemia (ABLA)  -     CBC and differential; Future    6. Transaminitis  -     Comprehensive metabolic panel; Future    7. Primary hypertension  -     hydrochlorothiazide (HYDRODIURIL) 12.5 mg tablet; Take 1 tablet (12.5 mg total) by mouth daily    8. Swelling of lower extremity  -     hydrochlorothiazide (HYDRODIURIL) 12.5 mg tablet; Take 1 tablet (12.5 mg total) by mouth daily        Depression Screening and Follow-up Plan: Patient was screened for depression during today's encounter. They screened negative with a PHQ-2 score of 0. Clincally patient does not have depression. No treatment is required. Falls Plan of Care: balance, strength, and gait training instructions were provided. Recommended assistive device to help with gait and balance. Subjective     Transitional Care Management Review: Oziel Macias is a 81 y.o. female here for TCM follow up. During the TCM phone call patient stated:  TCM Call       Date and time call was made  11/10/2023  3:32 PM    Hospital care reviewed  Records reviewed    Patient was hospitialized at  [address] [place] [address]    Date of Admission  11/04/23    Date of discharge  11/08/23    Diagnosis  Gross hematuria    Disposition  Home    Were the patients medications reviewed and updated  Yes    Current Symptoms  Lower abdominal pain          TCM Call       Post hospital issues  Reduced activity; Poor ADL (Activities of Daily Living) performance    Should patient be enrolled in anticoag monitoring? No    Scheduled for follow up?   Yes    Did you obtain your prescribed medications  Yes    Do you need help managing your prescriptions or medications  No    Is transportation to your appointment needed  No    I have advised the patient to call PCP with any new or worsening symptoms  Kolbyrayne , RAYMUNDO    Living Arrangements  Family members    Are you recieving home care services  Yes    Interperter language line needed  No    Counseling  Patient; Family    Counseling topics  Activities of daily living; Importance of RX compliance; Prognosis          Patient is here for TCM visit; reviewed her hospitalization, reviewed dc instructions and dc summary; she presented to the hospital with gross hematuria. Of note, she was recently discharged from rehab center and had the herrera catheter removed at the rehab. FC was attempted to be reinserted by urology but without success; she was noted to have hematuria at home and patient was reevaluated by urology on 11/3 with successful placement of herrera catheter with dark blood on return per chart review. Patient presented to the ED again on 11/4 with hematuria, was transferred to [facility] where she underwent  Cystoscopy and large blood clot was removed;     HTN medications were held on admission given soft blood pressures. Would like to restart low-dose given the bilateral lower extremity swelling. Keep an eye on the blood pressure. Recheck CBC CMP for liver function. Hemoglobin. Review of Systems   Constitutional:  Negative for chills and fever. HENT:  Negative for ear pain and sore throat. Eyes:  Negative for pain and visual disturbance. Respiratory:  Negative for cough and shortness of breath. Cardiovascular:  Negative for chest pain and palpitations. Gastrointestinal:  Negative for abdominal pain and vomiting. Genitourinary:  Negative for dysuria and hematuria. Musculoskeletal:  Negative for arthralgias and back pain. Skin:  Negative for color change and rash. Neurological:  Negative for seizures and syncope. All other systems reviewed and are negative.       Objective     /60 (BP Location: Left arm, Patient Position: Sitting, Cuff Size: Adult)   Pulse 65   Temp 99.8 °F (37.7 °C) (Tympanic)   Resp 16   Ht 5' 2" (1.575 m)   LMP  (LMP Unknown)   SpO2 91%   BMI 28.35 kg/m²      Physical Exam  Vitals and nursing note reviewed. Constitutional:       General: She is not in acute distress. Appearance: Normal appearance. She is well-developed. HENT:      Head: Normocephalic and atraumatic. Eyes:      Conjunctiva/sclera: Conjunctivae normal.   Cardiovascular:      Rate and Rhythm: Normal rate and regular rhythm. Heart sounds: Murmur heard. Pulmonary:      Effort: Pulmonary effort is normal. No respiratory distress. Breath sounds: Normal breath sounds. Abdominal:      Palpations: Abdomen is soft. Tenderness: There is no abdominal tenderness. Musculoskeletal:         General: Deformity present. No swelling. Normal range of motion. Cervical back: Normal range of motion and neck supple. Right lower leg: Edema present. Left lower leg: Edema present. Skin:     General: Skin is dry. Capillary Refill: Capillary refill takes less than 2 seconds. Coloration: Skin is pale. Neurological:      Mental Status: She is alert and oriented to person, place, and time. Gait: Gait abnormal (using walker).    Psychiatric:         Mood and Affect: Mood normal.     Medications have been reviewed by provider in current encounter    Laurann Goodell, MD

## 2023-11-14 NOTE — TELEPHONE ENCOUNTER
Spoke to pt and advised:    Shani Pina MD18 hours ago (2:31 PM)     Please contact the patient or her relative Maged Barriga. Please let them know the excellent news that the biopsies taken during her recent bladder surgery were negative for bladder cancer. I see that she has a scheduled follow-up for Carrera removal.     We can cancel her upcoming cystoscopy appointment with Dr. Faby Shah as this was performed as an inpatient due to hematuria. Please schedule a routine follow-up with our advanced practitioner team in 3 months. If she is doing well at that point she can follow-up as needed thereafter.

## 2023-11-15 ENCOUNTER — PATIENT OUTREACH (OUTPATIENT)
Dept: INTERNAL MEDICINE CLINIC | Facility: CLINIC | Age: 83
End: 2023-11-15

## 2023-11-15 NOTE — PROGRESS NOTES
Complex Care Management Note:  Inbasket notification for complex outpatient care management received as identified via HRR report. Chart review completed. ED visits and Hospitalizations:   Patient recently hospitalized at [place] [facility] from 11/04/23 to 11/08/23 for gross hematuria after presenting [facility] with concerns for bloody urine in herrera bag X 2 days . On 11/05/23 patient underwent cystoscopy with clot evacuation and fulguration with transurethral resection of a bladder lesion with biopsy  Patient was cleared to discharge to home with [facility] services and recommendation to follow up with PCP. Patient was discharged with a herrera catheter in place. Patient returned to [facility] on 11/10/23 with complaints of hematuria. Catheter irrigated with return or clear urine. Patient followed up with PCP on 11/13/23 for tCM visit. PMH Includes: S/P R hip arthroplasty 10/05/23, hx breat ca, hyperlipidemia, hypothyroidism, RA, HTN  Refer to problem list for complete list of medical diagnosis. Admission or ED risk score is 73. Future Appointments are:      11/27/23 with Urology  11/30/23 with Hem Onc    Case meets screening criteria for complex care management. I plan to attempt first outreach within the next 2 days. IB message reminder has been set. Case does not meet screening criteria for complex care management. Referral closed and I've removed myself from care team.     Telephone outreach made to introduce self and role of outpatient care management, follow up on general health, and outreach for any possible transitional services needed. Spoke with patient. CM explained reason for call and that support would be telephonic. Patient consents to [facility]. - Confirmed [facility] with [facility]. Per patient she has not had a SN vist since being discharged from the hospital.  - Patient was instructed to restart hctz by her PCP.     Patient reports she has restarted her medication, and reports feet remain swollen at end of day. Patient is unable to wear compression stockings. Patient reports elevating feet when resting and reclining at home. - Follow up appointments reviewed   - Reviewed appropriate access to care    Patient consents to future outreach after appointment with Urology. Future outreach agreed upon on 11/28/23 . IB message reminder has been set. Phone call attempted to [facility]. No answer. Voice mail message left requesting a return phone call.

## 2023-11-16 NOTE — TELEPHONE ENCOUNTER
Digna from [address] [place] [address] called to request an order (can be verbal) to be able to remove the patient's herrera. The caller states that it is very taxing for the patient to move around. And would like to have Home Health be the one to remove the herrera in the morning of 11/27. Patient will still keep her 11/27 afternoon appointment.      Call back [phone]    Fax [phone]

## 2023-11-16 NOTE — TELEPHONE ENCOUNTER
[facility] RN to remove herrera catheter between [id number] on 11/27/2023  Encourage patient to hydrate with 60-80oz of water throughout the day. Patient to follow up in the office as scheduled for PVR recheck on 11/27/2023 at 3:00pm.   If patient is uncomfortable or cannot urinate, please instruct patient to return to office earlier than scheduled time.     Paulie Rizo PA-C

## 2023-11-16 NOTE — TELEPHONE ENCOUNTER
Orders faxed to [address] [place] [address] [phone]. Spoke with Digna, advised her of verbal to remove catheter as well as orders faxed.

## 2023-11-17 ENCOUNTER — PATIENT OUTREACH (OUTPATIENT)
Dept: INTERNAL MEDICINE CLINIC | Facility: CLINIC | Age: 83
End: 2023-11-17

## 2023-11-17 NOTE — PROGRESS NOTES
OutPatient Complex Care Management Note:  Phone call received from [address] [facility] with resumption of care confirmation on this patient. Last SN OV made 11/16/23. Plan of care remains ongoing. Will plan for future outreach on 11/28/23.

## 2023-11-21 ENCOUNTER — TELEPHONE (OUTPATIENT)
Dept: INTERNAL MEDICINE CLINIC | Facility: CLINIC | Age: 83
End: 2023-11-21

## 2023-11-21 NOTE — TELEPHONE ENCOUNTER
KALIA QUIGLEY     Yes, Hi, good afternoon. This is Victorino Wade PT, [facility] the reason for the call with that the patient requested to miss her visit this week or her evaluation this week to miss it and reschedule it for next week due to the holiday or Thanksgiving. So I'm going to be sending a delay order for the missed visit this week and delay of physical therapy evaluation to next week. Kindly call me back please upon receipt of this message and hopefully the for the approval of Doctor Inés Carpenter as well. My number is [phone]. Again, my number is [phone]. Thank you.

## 2023-11-27 ENCOUNTER — PROCEDURE VISIT (OUTPATIENT)
Dept: UROLOGY | Facility: CLINIC | Age: 83
End: 2023-11-27
Payer: MEDICARE

## 2023-11-27 DIAGNOSIS — R33.9 URINARY RETENTION: Primary | ICD-10-CM

## 2023-11-27 LAB — POST-VOID RESIDUAL VOLUME, ML POC: 220 ML

## 2023-11-27 PROCEDURE — 51798 US URINE CAPACITY MEASURE: CPT

## 2023-11-27 PROCEDURE — 99024 POSTOP FOLLOW-UP VISIT: CPT

## 2023-11-27 NOTE — PROGRESS NOTES
11/27/2023    [facility]  1940  [id number]    Diagnosis  Chief Complaint    Urinary Retention         Patient presents for voiding trial managed by our office    Plan  Plan to return as scheduled for PVR recheck. Patient advised if she is unable to urinate or uncomfortable she will need to proceed to the ER. Procedure Carrera removal/voiding trial    Carrera catheter removed by [facility] RN. Although orders for patient to hydrate well and return this afternoon for post void residual, patient states she was told not to drink much. Patient states she drank half a bottle of water and has no urge to urinate. Bladder ultrasound performed and PVR measured 220 ml. After discussing with the provider in the office, strict ER precautions if patient is unable to urinate and return for PVR recheck in 1 week. Patient agreeable to plan and verbalized understanding. Recent Results (from the past 4 hour(s))   POCT Measure PVR    Collection Time: 11/27/23  3:12 PM   Result Value Ref Range    POST-VOID RESIDUAL VOLUME, ML  mL           There were no vitals filed for this visit.         Dakota Bocanegra RN

## 2023-11-27 NOTE — PATIENT INSTRUCTIONS
ER precautions if you are unable to urinate or are uncomfortable  Return as scheduled for PVR recheck  Contact office for any questions or concerns.

## 2023-11-28 ENCOUNTER — PATIENT OUTREACH (OUTPATIENT)
Dept: INTERNAL MEDICINE CLINIC | Facility: CLINIC | Age: 83
End: 2023-11-28

## 2023-11-28 NOTE — PROGRESS NOTES
[facility] Follow Up Note:  Followup inbasket reminder received. Chart review completed. Carrera removed yesterday by [id number] nurse, and patient followed up with Nephrology in the afternoon, but was unable to void at time of visit. Telephone call completed with patient today. Patient states she are doing well. Patient states she has been urinating well since yesterday afternoon. Denies any pain, or bleeding. Reminded patient to continue to drink plenty of fluids. Patient remains under the care of [facility]. Patient informs me she has a planned visit with SN scheduled for noon today. Patient reporting no feet or ankle swelling. Patient's medical condition is stable. Patient is independent with self management treatment plan. Patient expresses no other needs / concerns at this time. Complex CM episode will be closed. I have removed myself from the care team.  Patient has my contact information and was encouraged to contact me should any future medical needs / concerns arise.

## 2023-11-30 ENCOUNTER — TELEPHONE (OUTPATIENT)
Dept: HEMATOLOGY ONCOLOGY | Facility: CLINIC | Age: 83
End: 2023-11-30

## 2023-11-30 NOTE — TELEPHONE ENCOUNTER
Appointment Schedule   Who are you speaking with? Patient   If it is not the patient, are they listed on an active communication consent form? N/A   Which provider is the appointment scheduled with? Dr. Romano Person   At which location is the appointment scheduled for? [facility]   When is the appointment scheduled? Please list date and time 12/21/23 1020   What is the reason for this appointment? F/u   Did patient voice understanding of the details of this appointment? Yes   Was the no show policy reviewed with patient?  Yes

## 2023-11-30 NOTE — TELEPHONE ENCOUNTER
I called patient and left a voicemail with the [facility] line phone number to call back and reschedule her missed appointment from 11/30/2023 with Dr. yLn Ernst.

## 2023-12-06 ENCOUNTER — TELEPHONE (OUTPATIENT)
Dept: UROLOGY | Facility: CLINIC | Age: 83
End: 2023-12-06

## 2023-12-06 NOTE — TELEPHONE ENCOUNTER
PT was scheduled to be seen in Urology on 12/6/23, but missed the appt. Called and LVM for pt to call back if reschedule is needed. Letter was also mailed to the pt.

## 2023-12-07 ENCOUNTER — HOSPITAL ENCOUNTER (INPATIENT)
Facility: HOSPITAL | Age: 83
LOS: 11 days | Discharge: HOME HEALTH | DRG: 871 | End: 2023-12-18
Attending: EMERGENCY MEDICINE | Admitting: INTERNAL MEDICINE
Payer: MEDICARE

## 2023-12-07 ENCOUNTER — APPOINTMENT (EMERGENCY)
Dept: CT IMAGING | Facility: HOSPITAL | Age: 83
DRG: 871 | End: 2023-12-07
Payer: MEDICARE

## 2023-12-07 DIAGNOSIS — K27.9 PEPTIC ULCER: ICD-10-CM

## 2023-12-07 DIAGNOSIS — K27.9 PUD (PEPTIC ULCER DISEASE): ICD-10-CM

## 2023-12-07 DIAGNOSIS — R33.9 URINARY RETENTION: ICD-10-CM

## 2023-12-07 DIAGNOSIS — N17.9 ACUTE RENAL FAILURE (ARF) (HCC): ICD-10-CM

## 2023-12-07 DIAGNOSIS — K92.2 GI BLEED: Primary | ICD-10-CM

## 2023-12-07 DIAGNOSIS — N30.01 ACUTE CYSTITIS WITH HEMATURIA: ICD-10-CM

## 2023-12-07 DIAGNOSIS — N13.30 HYDRONEPHROSIS, UNSPECIFIED HYDRONEPHROSIS TYPE: ICD-10-CM

## 2023-12-07 PROBLEM — A41.9 SEPSIS (HCC): Status: ACTIVE | Noted: 2023-12-07

## 2023-12-07 LAB
2HR DELTA HS TROPONIN: 0 NG/L
4HR DELTA HS TROPONIN: 40 NG/L
ABO GROUP BLD: NORMAL
ALBUMIN SERPL BCP-MCNC: 2.9 G/DL (ref 3.5–5)
ALP SERPL-CCNC: 154 U/L (ref 34–104)
ALT SERPL W P-5'-P-CCNC: 58 U/L (ref 7–52)
ANION GAP SERPL CALCULATED.3IONS-SCNC: 10 MMOL/L
ANISOCYTOSIS BLD QL SMEAR: PRESENT
APTT PPP: 39 SECONDS (ref 23–37)
AST SERPL W P-5'-P-CCNC: 73 U/L (ref 13–39)
ATRIAL RATE: 92 BPM
BACTERIA UR QL AUTO: ABNORMAL /HPF
BASOPHILS # BLD MANUAL: 0 THOUSAND/UL (ref 0–0.1)
BASOPHILS NFR MAR MANUAL: 0 % (ref 0–1)
BILIRUB SERPL-MCNC: 0.83 MG/DL (ref 0.2–1)
BILIRUB UR QL STRIP: NEGATIVE
BLD GP AB SCN SERPL QL: NEGATIVE
BUN SERPL-MCNC: 78 MG/DL (ref 5–25)
CALCIUM ALBUM COR SERPL-MCNC: 9.4 MG/DL (ref 8.3–10.1)
CALCIUM SERPL-MCNC: 8.5 MG/DL (ref 8.4–10.2)
CARDIAC TROPONIN I PNL SERPL HS: 27 NG/L
CARDIAC TROPONIN I PNL SERPL HS: 27 NG/L
CARDIAC TROPONIN I PNL SERPL HS: 67 NG/L
CHLORIDE SERPL-SCNC: 97 MMOL/L (ref 96–108)
CLARITY UR: ABNORMAL
CO2 SERPL-SCNC: 22 MMOL/L (ref 21–32)
COLOR UR: YELLOW
CREAT SERPL-MCNC: 3.69 MG/DL (ref 0.6–1.3)
EOSINOPHIL # BLD MANUAL: 0 THOUSAND/UL (ref 0–0.4)
EOSINOPHIL NFR BLD MANUAL: 0 % (ref 0–6)
ERYTHROCYTE [DISTWIDTH] IN BLOOD BY AUTOMATED COUNT: 18.6 % (ref 11.6–15.1)
GFR SERPL CREATININE-BSD FRML MDRD: 10 ML/MIN/1.73SQ M
GLUCOSE SERPL-MCNC: 125 MG/DL (ref 65–140)
GLUCOSE UR STRIP-MCNC: NEGATIVE MG/DL
HCT VFR BLD AUTO: 21.4 % (ref 34.8–46.1)
HCT VFR BLD AUTO: 24.1 % (ref 34.8–46.1)
HGB BLD-MCNC: 7.3 G/DL (ref 11.5–15.4)
HGB BLD-MCNC: 8.1 G/DL (ref 11.5–15.4)
HGB UR QL STRIP.AUTO: ABNORMAL
INR PPP: 1.25 (ref 0.84–1.19)
KETONES UR STRIP-MCNC: NEGATIVE MG/DL
LACTATE SERPL-SCNC: 1.6 MMOL/L (ref 0.5–2)
LEUKOCYTE ESTERASE UR QL STRIP: ABNORMAL
LIPASE SERPL-CCNC: 38 U/L (ref 11–82)
LYMPHOCYTES # BLD AUTO: 0.63 THOUSAND/UL (ref 0.6–4.47)
LYMPHOCYTES # BLD AUTO: 3 % (ref 14–44)
MCH RBC QN AUTO: 30.9 PG (ref 26.8–34.3)
MCHC RBC AUTO-ENTMCNC: 33.6 G/DL (ref 31.4–37.4)
MCV RBC AUTO: 92 FL (ref 82–98)
MONOCYTES # BLD AUTO: 0.21 THOUSAND/UL (ref 0–1.22)
MONOCYTES NFR BLD: 1 % (ref 4–12)
NEUTROPHILS # BLD MANUAL: 20.25 THOUSAND/UL (ref 1.85–7.62)
NEUTS BAND NFR BLD MANUAL: 2 % (ref 0–8)
NEUTS HYPERSEG BLD QL SMEAR: PRESENT
NEUTS SEG NFR BLD AUTO: 94 % (ref 43–75)
NITRITE UR QL STRIP: NEGATIVE
NON-SQ EPI CELLS URNS QL MICRO: ABNORMAL /HPF
P AXIS: 88 DEGREES
PH UR STRIP.AUTO: 6.5 [PH]
PLATELET # BLD AUTO: 202 THOUSANDS/UL (ref 149–390)
PLATELET BLD QL SMEAR: ADEQUATE
PLATELET CLUMP BLD QL SMEAR: PRESENT
PMV BLD AUTO: 10.1 FL (ref 8.9–12.7)
POTASSIUM SERPL-SCNC: 4.7 MMOL/L (ref 3.5–5.3)
PR INTERVAL: 168 MS
PROCALCITONIN SERPL-MCNC: 4.47 NG/ML
PROT SERPL-MCNC: 5.4 G/DL (ref 6.4–8.4)
PROT UR STRIP-MCNC: ABNORMAL MG/DL
PROTHROMBIN TIME: 16.4 SECONDS (ref 11.6–14.5)
QRS AXIS: 38 DEGREES
QRSD INTERVAL: 86 MS
QT INTERVAL: 374 MS
QTC INTERVAL: 462 MS
RBC # BLD AUTO: 2.62 MILLION/UL (ref 3.81–5.12)
RBC #/AREA URNS AUTO: ABNORMAL /HPF
RH BLD: POSITIVE
SODIUM SERPL-SCNC: 129 MMOL/L (ref 135–147)
SP GR UR STRIP.AUTO: 1.01 (ref 1–1.03)
SPECIMEN EXPIRATION DATE: NORMAL
T WAVE AXIS: 50 DEGREES
UROBILINOGEN UR STRIP-ACNC: <2 MG/DL
VENTRICULAR RATE: 92 BPM
WBC # BLD AUTO: 21.09 THOUSAND/UL (ref 4.31–10.16)
WBC #/AREA URNS AUTO: ABNORMAL /HPF
WBC CLUMPS # UR AUTO: PRESENT /UL

## 2023-12-07 PROCEDURE — 80053 COMPREHEN METABOLIC PANEL: CPT | Performed by: EMERGENCY MEDICINE

## 2023-12-07 PROCEDURE — 86901 BLOOD TYPING SEROLOGIC RH(D): CPT | Performed by: EMERGENCY MEDICINE

## 2023-12-07 PROCEDURE — 85007 BL SMEAR W/DIFF WBC COUNT: CPT | Performed by: EMERGENCY MEDICINE

## 2023-12-07 PROCEDURE — G1004 CDSM NDSC: HCPCS

## 2023-12-07 PROCEDURE — 99223 1ST HOSP IP/OBS HIGH 75: CPT | Performed by: INTERNAL MEDICINE

## 2023-12-07 PROCEDURE — 87077 CULTURE AEROBIC IDENTIFY: CPT | Performed by: EMERGENCY MEDICINE

## 2023-12-07 PROCEDURE — 85018 HEMOGLOBIN: CPT | Performed by: NURSE PRACTITIONER

## 2023-12-07 PROCEDURE — 86900 BLOOD TYPING SEROLOGIC ABO: CPT | Performed by: EMERGENCY MEDICINE

## 2023-12-07 PROCEDURE — 99285 EMERGENCY DEPT VISIT HI MDM: CPT

## 2023-12-07 PROCEDURE — 87086 URINE CULTURE/COLONY COUNT: CPT | Performed by: EMERGENCY MEDICINE

## 2023-12-07 PROCEDURE — 96375 TX/PRO/DX INJ NEW DRUG ADDON: CPT

## 2023-12-07 PROCEDURE — 96361 HYDRATE IV INFUSION ADD-ON: CPT

## 2023-12-07 PROCEDURE — 86923 COMPATIBILITY TEST ELECTRIC: CPT

## 2023-12-07 PROCEDURE — 36415 COLL VENOUS BLD VENIPUNCTURE: CPT | Performed by: EMERGENCY MEDICINE

## 2023-12-07 PROCEDURE — 83605 ASSAY OF LACTIC ACID: CPT | Performed by: EMERGENCY MEDICINE

## 2023-12-07 PROCEDURE — 96365 THER/PROPH/DIAG IV INF INIT: CPT

## 2023-12-07 PROCEDURE — 87040 BLOOD CULTURE FOR BACTERIA: CPT | Performed by: EMERGENCY MEDICINE

## 2023-12-07 PROCEDURE — 85610 PROTHROMBIN TIME: CPT | Performed by: EMERGENCY MEDICINE

## 2023-12-07 PROCEDURE — 71250 CT THORAX DX C-: CPT

## 2023-12-07 PROCEDURE — 93005 ELECTROCARDIOGRAM TRACING: CPT

## 2023-12-07 PROCEDURE — 85027 COMPLETE CBC AUTOMATED: CPT | Performed by: EMERGENCY MEDICINE

## 2023-12-07 PROCEDURE — 84484 ASSAY OF TROPONIN QUANT: CPT | Performed by: EMERGENCY MEDICINE

## 2023-12-07 PROCEDURE — 83690 ASSAY OF LIPASE: CPT | Performed by: EMERGENCY MEDICINE

## 2023-12-07 PROCEDURE — C9113 INJ PANTOPRAZOLE SODIUM, VIA: HCPCS | Performed by: EMERGENCY MEDICINE

## 2023-12-07 PROCEDURE — 74176 CT ABD & PELVIS W/O CONTRAST: CPT

## 2023-12-07 PROCEDURE — 85730 THROMBOPLASTIN TIME PARTIAL: CPT | Performed by: EMERGENCY MEDICINE

## 2023-12-07 PROCEDURE — 85014 HEMATOCRIT: CPT | Performed by: NURSE PRACTITIONER

## 2023-12-07 PROCEDURE — 99285 EMERGENCY DEPT VISIT HI MDM: CPT | Performed by: EMERGENCY MEDICINE

## 2023-12-07 PROCEDURE — 81001 URINALYSIS AUTO W/SCOPE: CPT | Performed by: EMERGENCY MEDICINE

## 2023-12-07 PROCEDURE — 86850 RBC ANTIBODY SCREEN: CPT | Performed by: EMERGENCY MEDICINE

## 2023-12-07 PROCEDURE — 84145 PROCALCITONIN (PCT): CPT | Performed by: EMERGENCY MEDICINE

## 2023-12-07 PROCEDURE — 87186 SC STD MICRODIL/AGAR DIL: CPT | Performed by: EMERGENCY MEDICINE

## 2023-12-07 RX ORDER — CALCIUM CARBONATE 500(1250)
1 TABLET ORAL
Status: DISCONTINUED | OUTPATIENT
Start: 2023-12-08 | End: 2023-12-18 | Stop reason: HOSPADM

## 2023-12-07 RX ORDER — PRAVASTATIN SODIUM 40 MG
40 TABLET ORAL
Status: DISCONTINUED | OUTPATIENT
Start: 2023-12-07 | End: 2023-12-18 | Stop reason: HOSPADM

## 2023-12-07 RX ORDER — PANTOPRAZOLE SODIUM 40 MG/1
40 TABLET, DELAYED RELEASE ORAL
Status: DISCONTINUED | OUTPATIENT
Start: 2023-12-08 | End: 2023-12-09

## 2023-12-07 RX ORDER — DOCUSATE SODIUM 100 MG/1
100 CAPSULE, LIQUID FILLED ORAL 2 TIMES DAILY
Status: DISCONTINUED | OUTPATIENT
Start: 2023-12-07 | End: 2023-12-18 | Stop reason: HOSPADM

## 2023-12-07 RX ORDER — HEPARIN SODIUM 5000 [USP'U]/ML
5000 INJECTION, SOLUTION INTRAVENOUS; SUBCUTANEOUS EVERY 8 HOURS SCHEDULED
Status: DISCONTINUED | OUTPATIENT
Start: 2023-12-07 | End: 2023-12-08

## 2023-12-07 RX ORDER — ACETAMINOPHEN 325 MG/1
650 TABLET ORAL EVERY 6 HOURS PRN
Status: DISCONTINUED | OUTPATIENT
Start: 2023-12-07 | End: 2023-12-18 | Stop reason: HOSPADM

## 2023-12-07 RX ORDER — MAGNESIUM HYDROXIDE/ALUMINUM HYDROXICE/SIMETHICONE 120; 1200; 1200 MG/30ML; MG/30ML; MG/30ML
30 SUSPENSION ORAL EVERY 6 HOURS PRN
Status: DISCONTINUED | OUTPATIENT
Start: 2023-12-07 | End: 2023-12-18 | Stop reason: HOSPADM

## 2023-12-07 RX ORDER — LEVOTHYROXINE SODIUM 0.03 MG/1
75 TABLET ORAL DAILY
Status: DISCONTINUED | OUTPATIENT
Start: 2023-12-07 | End: 2023-12-18 | Stop reason: HOSPADM

## 2023-12-07 RX ORDER — CEFTRIAXONE 2 G/50ML
2000 INJECTION, SOLUTION INTRAVENOUS EVERY 24 HOURS
Status: DISCONTINUED | OUTPATIENT
Start: 2023-12-08 | End: 2023-12-10

## 2023-12-07 RX ORDER — ONDANSETRON 2 MG/ML
4 INJECTION INTRAMUSCULAR; INTRAVENOUS EVERY 6 HOURS PRN
Status: DISCONTINUED | OUTPATIENT
Start: 2023-12-07 | End: 2023-12-18 | Stop reason: HOSPADM

## 2023-12-07 RX ORDER — ONDANSETRON 2 MG/ML
4 INJECTION INTRAMUSCULAR; INTRAVENOUS ONCE AS NEEDED
Status: COMPLETED | OUTPATIENT
Start: 2023-12-07 | End: 2023-12-07

## 2023-12-07 RX ORDER — PANTOPRAZOLE SODIUM 40 MG/10ML
40 INJECTION, POWDER, LYOPHILIZED, FOR SOLUTION INTRAVENOUS ONCE
Status: COMPLETED | OUTPATIENT
Start: 2023-12-07 | End: 2023-12-07

## 2023-12-07 RX ORDER — CEFTRIAXONE 1 G/50ML
1000 INJECTION, SOLUTION INTRAVENOUS ONCE
Status: COMPLETED | OUTPATIENT
Start: 2023-12-07 | End: 2023-12-07

## 2023-12-07 RX ADMIN — PANTOPRAZOLE SODIUM 40 MG: 40 INJECTION, POWDER, FOR SOLUTION INTRAVENOUS at 10:31

## 2023-12-07 RX ADMIN — SODIUM CHLORIDE 1000 ML: 0.9 INJECTION, SOLUTION INTRAVENOUS at 17:25

## 2023-12-07 RX ADMIN — HEPARIN SODIUM 5000 UNITS: 5000 INJECTION INTRAVENOUS; SUBCUTANEOUS at 22:12

## 2023-12-07 RX ADMIN — ONDANSETRON 4 MG: 2 INJECTION INTRAMUSCULAR; INTRAVENOUS at 10:31

## 2023-12-07 RX ADMIN — HEPARIN SODIUM 5000 UNITS: 5000 INJECTION INTRAVENOUS; SUBCUTANEOUS at 16:50

## 2023-12-07 RX ADMIN — LEVOTHYROXINE SODIUM 75 MCG: 25 TABLET ORAL at 16:50

## 2023-12-07 RX ADMIN — SODIUM CHLORIDE 1000 ML: 0.9 INJECTION, SOLUTION INTRAVENOUS at 10:31

## 2023-12-07 RX ADMIN — ONDANSETRON 4 MG: 2 INJECTION INTRAMUSCULAR; INTRAVENOUS at 17:30

## 2023-12-07 RX ADMIN — CEFTRIAXONE 1000 MG: 1 INJECTION, SOLUTION INTRAVENOUS at 13:30

## 2023-12-07 RX ADMIN — SODIUM CHLORIDE 1000 ML: 0.9 INJECTION, SOLUTION INTRAVENOUS at 18:34

## 2023-12-07 RX ADMIN — PRAVASTATIN SODIUM 40 MG: 40 TABLET ORAL at 16:50

## 2023-12-07 RX ADMIN — SODIUM CHLORIDE 40 ML: 0.9 INJECTION, SOLUTION INTRAVENOUS at 20:32

## 2023-12-07 NOTE — ED PROVIDER NOTES
History  Chief Complaint   Patient presents with    Vomiting    Black or Bloody Stool     BIBA from home for complaints of black tarry stool and black colored vomitus. Started this last night.     84 y/o female presents to the ED for black stool and n/v. Patient states that she recently had hip surgery a few months ago. Has had issues with urinary retention since. She states that she had a herrera removed 2 weeks ago and feels like she can not fully empty her bladder. She states that yesterday she developed back tarry stools. Today she developed nausea and vomitiing and states he was black/ coffee ground. She denies any hx of GI bleed in the past. States that she is on aspririn but denies other thinners. Denies any abd pain, cp, sob, or other complaints.       History provided by:  Patient      Prior to Admission Medications   Prescriptions Last Dose Informant Patient Reported? Taking?   ASPIRIN 81 PO 2023 Self Yes Yes   Si tablet ONCE DAILY (route: oral)   Calcium Citrate-Vitamin D (CITRACAL + D PO) 2023 Self Yes Yes   Sig: Take by mouth 2 (two) times a day   Cholecalciferol (VITAMIN D-3 PO) 2023 Self Yes Yes   Sig: Take 2,000 Units by mouth daily.   Multiple Vitamin (MULTIVITAMIN) tablet 2023 Self Yes Yes   Sig: Take 1 tablet by mouth daily.   Psyllium (Metamucil) WAFR Unknown Self No No   Sig: Take once daily   ascorbic acid (VITAMIN C) 500 mg tablet 2023 Self Yes Yes   Sig: Take 500 mg by mouth daily.   folic acid (FOLVITE) 1 mg tablet 2023 Self No Yes   Si daily   hydrochlorothiazide (HYDRODIURIL) 12.5 mg tablet Not Taking  No No   Sig: Take 1 tablet (12.5 mg total) by mouth daily   Patient not taking: Reported on 2023   levothyroxine (Synthroid) 75 mcg tablet 2023 Self No Yes   Sig: Take 1 tablet (75 mcg total) by mouth in the morning   magnesium oxide (MAG-OX) 400 mg 2023 Self No Yes   Sig: Take 1 tablet (400 mg total) by mouth 2 (two) times a day    methotrexate 2.5 mg tablet Not Taking Self No No   Sig: Take 1 tablet (2.5 mg total) by mouth once a week Patient takes 6 tablets weekly   Patient not taking: Reported on 12/7/2023   polyethylene glycol (MIRALAX) 17 g packet   No No   Sig: Take 17 g by mouth daily as needed (constipation)   riTUXimab (RITUXAN) 500 mg/50 mL More than a month Self Yes No   Sig: Infuse into a venous catheter every 6 (six) months     rosuvastatin (CRESTOR) 5 mg tablet 12/6/2023 Self No Yes   Sig: TAKE 1 TABLET DAILY      Facility-Administered Medications: None       Past Medical History:   Diagnosis Date    Allergic angioedema     [id number] resolved    Angioedema     [id number] resolved    Arthritis     Breast cancer (HCC) 2012    right     Chronic kidney disease     Disease of thyroid gland     Hemorrhage of anus and rectum     [id number] resolved    Hyperlipidemia     Hypertension     Hypocalcemia     12oct2017 resolved    Neuritis     thoracic and lumbosacral    Peptic ulcer     Rheumatoid arthritis (HCC)     Stage 3b chronic kidney disease (HCC) 5/7/2021    Vitamin D deficiency        Past Surgical History:   Procedure Laterality Date    ABDOMINAL SURGERY      BREAST BIOPSY Right 2012    BREAST LUMPECTOMY Right 2012    BREAST SURGERY      CHOLECYSTECTOMY      ELBOW SURGERY      GALLBLADDER SURGERY  1981    HIP SURGERY Right 09/18/2022    10/2023    ORTHOPEDIC SURGERY      for toes    IA CYSTO W/IRRIG & EVAC MULTPLE OBSTRUCTING CLOTS N/A 11/5/2023    Procedure: CYSTOSCOPY EVACUATION OF CLOTS & fulguration;  Surgeon: Uriah Schwartz MD;  Location: [facility];  Service: Urology    TRANSURETHRAL RESECTION OF BLADDER TUMOR N/A 11/5/2023    Procedure: TRANSURETHRAL RESECTION OF BLADDER TUMOR (TURBT);  Surgeon: Uriah Schwartz MD;  Location: [facility];  Service: Urology    US GUIDED BREAST BIOPSY RIGHT COMPLETE Right 08/15/2022       Family History   Problem Relation Age of Onset    Breast cancer Mother     Kidney failure Father      Other Father         uremia    Lung cancer Brother     Breast cancer Maternal Aunt     Breast cancer Maternal Aunt     Breast cancer Maternal Aunt     Breast cancer Maternal Aunt     Breast cancer Maternal Aunt     Stomach cancer Maternal Uncle      I have reviewed and agree with the history as documented.    E-Cigarette/Vaping    E-Cigarette Use Never User      E-Cigarette/Vaping Substances    Nicotine No     THC No     CBD No     Flavoring No     Other No     Unknown No      Social History     Tobacco Use    Smoking status: Never     Passive exposure: Past    Smokeless tobacco: Never   Vaping Use    Vaping Use: Never used   Substance Use Topics    Alcohol use: Not Currently    Drug use: No       Review of Systems   Constitutional:  Negative for chills and fever.   HENT:  Negative for congestion, ear pain and sore throat.    Eyes:  Negative for pain and visual disturbance.   Respiratory:  Negative for cough, shortness of breath and wheezing.    Cardiovascular:  Negative for chest pain and leg swelling.   Gastrointestinal:  Positive for blood in stool, nausea and vomiting. Negative for abdominal pain and diarrhea.   Genitourinary:  Positive for decreased urine volume. Negative for dysuria, frequency, hematuria and urgency.   Musculoskeletal:  Negative for neck pain and neck stiffness.   Skin:  Negative for rash and wound.   Neurological:  Negative for weakness, numbness and headaches.   Psychiatric/Behavioral:  Negative for agitation and confusion.    All other systems reviewed and are negative.      Physical Exam  Physical Exam  Vitals and nursing note reviewed.   Constitutional:       Appearance: She is well-developed.   HENT:      Head: Normocephalic and atraumatic.   Eyes:      Pupils: Pupils are equal, round, and reactive to light.   Cardiovascular:      Rate and Rhythm: Normal rate and regular rhythm.   Pulmonary:      Effort: Pulmonary effort is normal.      Breath sounds: Normal breath sounds.    Abdominal:      General: Bowel sounds are normal.      Palpations: Abdomen is soft.   Genitourinary:     Rectum: Guaiac result positive.   Musculoskeletal:         General: Normal range of motion.      Cervical back: Normal range of motion and neck supple.   Skin:     General: Skin is warm and dry.   Neurological:      General: No focal deficit present.      Mental Status: She is alert and oriented to person, place, and time.      Comments: No focal deficits         Vital Signs  ED Triage Vitals [12/07/23 0942]   Temperature Pulse Respirations Blood Pressure SpO2   98 °F (36.7 °C) (!) 116 16 140/63 100 %      Temp Source Heart Rate Source Patient Position - Orthostatic VS BP Location FiO2 (%)   Oral Monitor Sitting Right arm --      Pain Score       No Pain           Vitals:    12/07/23 1300 12/07/23 1500 12/07/23 1557 12/07/23 1557   BP: 154/72 97/65 124/75 124/75   Pulse: 90 94     Patient Position - Orthostatic VS: Lying Lying           Visual Acuity      ED Medications  Medications   calcium carbonate (OYSTER SHELL,OSCAL) 500 mg tablet 1 tablet (has no administration in time range)   levothyroxine tablet 75 mcg (75 mcg Oral Given 12/7/23 1650)   psyllium (METAMUCIL) 1 packet (1 packet Oral Not Given 12/7/23 1650)   pravastatin (PRAVACHOL) tablet 40 mg (40 mg Oral Given 12/7/23 1650)   acetaminophen (TYLENOL) tablet 650 mg (has no administration in time range)   docusate sodium (COLACE) capsule 100 mg (has no administration in time range)   ondansetron (ZOFRAN) injection 4 mg (4 mg Intravenous Given 12/7/23 1730)   aluminum-magnesium hydroxide-simethicone (MAALOX) oral suspension 30 mL (has no administration in time range)   cefTRIAXone (ROCEPHIN) IVPB (premix in dextrose) 2,000 mg 50 mL (has no administration in time range)   heparin (porcine) subcutaneous injection 5,000 Units (5,000 Units Subcutaneous Given 12/7/23 1650)   sodium chloride 0.9 % bolus 1,000 mL (1,000 mL Intravenous New Bag 12/7/23 1725)      Followed by   sodium chloride 0.9 % bolus 1,000 mL (has no administration in time range)     Followed by   sodium chloride 0.9 % bolus 1,000 mL (has no administration in time range)   pantoprazole (PROTONIX) EC tablet 40 mg (has no administration in time range)   pantoprazole (PROTONIX) injection 40 mg (40 mg Intravenous Given 12/7/23 1031)   ondansetron (ZOFRAN) injection 4 mg (4 mg Intravenous Given 12/7/23 1031)   sodium chloride 0.9 % bolus 1,000 mL (0 mL Intravenous Stopped 12/7/23 1204)   cefTRIAXone (ROCEPHIN) IVPB (premix in dextrose) 1,000 mg 50 mL (0 mg Intravenous Stopped 12/7/23 1400)       Diagnostic Studies  Results Reviewed       Procedure Component Value Units Date/Time    Hemoglobin and hematocrit, blood [[id number]]  (Abnormal) Collected: 12/07/23 1715    Lab Status: Final result Specimen: Blood from Arm, Left Updated: 12/07/23 1725     Hemoglobin 7.3 g/dL      Hematocrit 21.4 %     HS Troponin I 4hr [[id number]]  (Abnormal) Collected: 12/07/23 1417    Lab Status: Final result Specimen: Blood from Arm, Right Updated: 12/07/23 1448     hs TnI 4hr 67 ng/L      Delta 4hr hsTnI 40 ng/L     Procalcitonin [[id number]]  (Abnormal) Collected: 12/07/23 1312    Lab Status: Final result Specimen: Blood from Arm, Right Updated: 12/07/23 1401     Procalcitonin 4.47 ng/ml     Urine Microscopic [[id number]]  (Abnormal) Collected: 12/07/23 1311    Lab Status: Final result Specimen: Urine, Clean Catch Updated: 12/07/23 1336     RBC, UA Innumerable /hpf      WBC, UA Innumerable /hpf      Epithelial Cells Occasional /hpf      Bacteria, UA Occasional /hpf      WBC Clumps Present    Urine culture [[id number]] Collected: 12/07/23 1311    Lab Status: In process Specimen: Urine, Clean Catch Updated: 12/07/23 1336    Blood culture #1 [[id number]] Collected: 12/07/23 1329    Lab Status: In process Specimen: Blood from Arm, Left Updated: 12/07/23 1335    UA w Reflex to Microscopic w Reflex to Culture [[id number]]  (Abnormal)  Collected: 12/07/23 1311    Lab Status: Final result Specimen: Urine, Clean Catch Updated: 12/07/23 1333     Color, UA Yellow     Clarity, UA Extra Turbid     Specific Gravity, UA 1.013     pH, UA 6.5     Leukocytes, UA Large     Nitrite, UA Negative     Protein,  (2+) mg/dl      Glucose, UA Negative mg/dl      Ketones, UA Negative mg/dl      Urobilinogen, UA <2.0 mg/dl      Bilirubin, UA Negative     Occult Blood, UA Large    Blood culture #2 [[id number]] Collected: 12/07/23 1312    Lab Status: In process Specimen: Blood from Arm, Right Updated: 12/07/23 1318    HS Troponin I 2hr [[id number]]  (Normal) Collected: 12/07/23 1205    Lab Status: Final result Specimen: Blood from Arm, Right Updated: 12/07/23 1254     hs TnI 2hr 27 ng/L      Delta 2hr hsTnI 0 ng/L     Manual Differential(PHLEBS Do Not Order) [[id number]]  (Abnormal) Collected: 12/07/23 1004    Lab Status: Final result Specimen: Blood from Arm, Right Updated: 12/07/23 1109     Segmented % 94 %      Bands % 2 %      Lymphocytes % 3 %      Monocytes % 1 %      Eosinophils, % 0 %      Basophils % 0 %      Absolute Neutrophils 20.25 Thousand/uL      Lymphocytes Absolute 0.63 Thousand/uL      Monocytes Absolute 0.21 Thousand/uL      Eosinophils Absolute 0.00 Thousand/uL      Basophils Absolute 0.00 Thousand/uL      Total Counted --     Hypersegmented Neutrophils Present     Platelet Estimate Adequate     Clumped Platelets Present     Anisocytosis Present    HS Troponin 0hr (reflex protocol) [[id number]]  (Normal) Collected: 12/07/23 1004    Lab Status: Final result Specimen: Blood from Arm, Right Updated: 12/07/23 1037     hs TnI 0hr 27 ng/L     Comprehensive metabolic panel [[id number]]  (Abnormal) Collected: 12/07/23 1004    Lab Status: Final result Specimen: Blood from Arm, Right Updated: 12/07/23 1030     Sodium 129 mmol/L      Potassium 4.7 mmol/L      Chloride 97 mmol/L      CO2 22 mmol/L      ANION GAP 10 mmol/L      BUN 78 mg/dL      Creatinine 3.69  mg/dL      Glucose 125 mg/dL      Calcium 8.5 mg/dL      Corrected Calcium 9.4 mg/dL      AST 73 U/L      ALT 58 U/L      Alkaline Phosphatase 154 U/L      Total Protein 5.4 g/dL      Albumin 2.9 g/dL      Total Bilirubin 0.83 mg/dL      eGFR 10 ml/min/1.73sq m     Narrative:      [employer] guidelines for Chronic Kidney Disease (CKD):     Stage 1 with normal or high GFR (GFR > 90 mL/min/1.73 square meters)    Stage 2 Mild CKD (GFR = 60-89 mL/min/1.73 square meters)    Stage 3A Moderate CKD (GFR = 45-59 mL/min/1.73 square meters)    Stage 3B Moderate CKD (GFR = 30-44 mL/min/1.73 square meters)    Stage 4 Severe CKD (GFR = 15-29 mL/min/1.73 square meters)    Stage 5 End Stage CKD (GFR <15 mL/min/1.73 square meters)  Note: GFR calculation is accurate only with a steady state creatinine    Lipase [[id number]]  (Normal) Collected: 12/07/23 1004    Lab Status: Final result Specimen: Blood from Arm, Right Updated: 12/07/23 1030     Lipase 38 u/L     Lactic acid, plasma (w/reflex if result > 2.0) [[id number]]  (Normal) Collected: 12/07/23 1004    Lab Status: Final result Specimen: Blood from Arm, Right Updated: 12/07/23 1029     LACTIC ACID 1.6 mmol/L     Narrative:      Result may be elevated if tourniquet was used during collection.    Protime-INR [[id number]]  (Abnormal) Collected: 12/07/23 1004    Lab Status: Final result Specimen: Blood from Arm, Right Updated: 12/07/23 1025     Protime 16.4 seconds      INR 1.25    APTT [[id number]]  (Abnormal) Collected: 12/07/23 1004    Lab Status: Final result Specimen: Blood from Arm, Right Updated: 12/07/23 1025     PTT 39 seconds     CBC and differential [[id number]]  (Abnormal) Collected: 12/07/23 1004    Lab Status: Final result Specimen: Blood from Arm, Right Updated: 12/07/23 1014     WBC 21.09 Thousand/uL      RBC 2.62 Million/uL      Hemoglobin 8.1 g/dL      Hematocrit 24.1 %      MCV 92 fL      MCH 30.9 pg      MCHC 33.6 g/dL      RDW 18.6 %      MPV  10.1 fL      Platelets 202 Thousands/uL                    CT chest abdomen pelvis wo contrast   Final Result by Jozef Preston MD (12/07 1233)      Bilateral hydroureteronephrosis, moderate to severe on the left, to the level of the urinary bladder, likely related to significant bladder distention. No obstructing ureteral calculi identified. Bilateral nephrolithiasis.      Mild diffuse urinary bladder wall thickening which may be due to cystitis and/or some degree of underlying bladder wall trabeculation; correlate with urinalysis. Evaluation for intraluminal hemorrhage is markedly limited due to streak artifact from    adjacent right hip hardware.      Uncomplicated colonic diverticulosis.      Small hiatal hernia.      Stable chronic compression fracture of the T12 vertebral body with mild retropulsion.      Few calcified uterine fibroids.      1.3 x 1.1 cm right lower lobe nodule and 0.7 cm groundglass right lower lobe nodule, new from 2019, indeterminate. PET/CT correlation should be considered.      The study was marked in EPIC for significant notification.                     Workstation performed: [id number]                    Procedures  ECG 12 Lead Documentation Only    Date/Time: 12/7/2023 6:13 PM    Performed by: Anastasia Muniz DO  Authorized by: Anastasia Muniz DO    Patient location:  ED  Interpretation:     Interpretation: normal    Rate:     ECG rate:  92    ECG rate assessment: normal    Rhythm:     Rhythm: sinus rhythm    Ectopy:     Ectopy: none    QRS:     QRS axis:  Normal    QRS intervals:  Normal  Conduction:     Conduction: normal    ST segments:     ST segments:  Normal  T waves:     T waves: normal             ED Course  ED Course as of 12/07/23 1816   Thu Dec 07, 2023   1334 Spoke with Luiza Fountain from urology - recommends placing herrera, giving abx, and they will see in consult tomorrow    1347 WBC, UA(!): Innumerable  Now with source of infection- will start abx   "                           Initial Sepsis Screening       Row Name 12/07/23 1501                Is the patient's history suggestive of a new or worsening infection? Yes (Proceed)  -DD        Suspected source of infection urinary tract infection  -DD        Indicate SIRS criteria Leukocytosis (WBC > 61912 IJL) OR Leukopenia (WBC <4000 IJL) OR Bandemia (WBC >10% bands);Tachycardia > 90 bpm  -DD        Are two or more of the above signs & symptoms of infection both present and new to the patient? Yes (Proceed)  -DD        Assess for evidence of organ dysfunction: Are any of the below criteria present within 6 hours of suspected infection and SIRS criteria that are NOT considered to be chronic conditions? INR > 1.5 or aPTT > 60 secs;Creatinine > 2.0  -DD        Date of presentation of severe sepsis --        Time of presentation of severe sepsis --        Sepsis Note: Click \"NEXT\" below (NOT \"close\") to generate sepsis note based on above information. YES (proceed by clicking \"NEXT\")  -DD                  User Key  (r) = Recorded By, (t) = Taken By, (c) = Cosigned By      Initials Name Provider Type    DD PUJA Sagastume Nurse Practitioner                    SBIRT 20yo+      Flowsheet Row Most Recent Value   Initial Alcohol Screen: US AUDIT-C     1. How often do you have a drink containing alcohol? 0 Filed at: 12/07/2023 1006   2. How many drinks containing alcohol do you have on a typical day you are drinking?  0 Filed at: 12/07/2023 1006   3a. Male UNDER 65: How often do you have five or more drinks on one occasion? 0 Filed at: 12/07/2023 1006   3b. FEMALE Any Age, or MALE 65+: How often do you have 4 or more drinks on one occassion? 0 Filed at: 12/07/2023 1006   Audit-C Score 0 Filed at: 12/07/2023 1006   CARLI: How many times in the past year have you...    Used an illegal drug or used a prescription medication for non-medical reasons? Never Filed at: 12/07/2023 1006                      Medical Decision " Making  84 y/o female with coffee ground emesis, black stools, and urinary retention. Will get labs, ct scan, trop/EKG, and admit     Amount and/or Complexity of Data Reviewed  Labs: ordered. Decision-making details documented in ED Course.  Radiology: ordered.    Risk  Prescription drug management.  Decision regarding hospitalization.             Disposition  Final diagnoses:   GI bleed   Urinary retention   Acute renal failure (ARF) (HCC)     Time reflects when diagnosis was documented in both MDM as applicable and the Disposition within this note       Time User Action Codes Description Comment    12/7/2023  1:46 PM Anastasia Muniz Add [K92.2] GI bleed     12/7/2023  1:47 PM Anastasia Muniz Add [R33.9] Urinary retention     12/7/2023  1:47 PM Anastasia Muniz Add [N17.9] Acute renal failure (ARF) (HCC)     12/7/2023  2:51 PM Alisha Scott Add [N13.30] Hydronephrosis, unspecified hydronephrosis type     12/7/2023  2:51 PM Alisha Scott Add [N30.01] Acute cystitis with hematuria           ED Disposition       ED Disposition   Admit    Condition   Stable    Date/Time   u Dec 7, 2023 1346    Comment   Case was discussed with CHRISTOPHER and the patient's admission status was agreed to be Admission Status: inpatient status to the service of Dr. Douglass  .               Follow-up Information    None         Current Discharge Medication List        CONTINUE these medications which have NOT CHANGED    Details   ascorbic acid (VITAMIN C) 500 mg tablet Take 500 mg by mouth daily.      ASPIRIN 81 PO 1 tablet ONCE DAILY (route: oral)      Calcium Citrate-Vitamin D (CITRACAL + D PO) Take by mouth 2 (two) times a day      Cholecalciferol (VITAMIN D-3 PO) Take 2,000 Units by mouth daily.      folic acid (FOLVITE) 1 mg tablet 1 daily  Qty: 90 tablet, Refills: 2    Associated Diagnoses: Rheumatoid arthritis of left ankle (HCC)      levothyroxine (Synthroid) 75 mcg tablet Take 1 tablet (75 mcg total) by mouth in the  morning  Qty: 90 tablet, Refills: 3    Associated Diagnoses: Hypertension, unspecified type; Hyperlipidemia, unspecified hyperlipidemia type; Hypothyroidism, unspecified type; Abnormal blood chemistry; Hypomagnesemia      magnesium oxide (MAG-OX) 400 mg Take 1 tablet (400 mg total) by mouth 2 (two) times a day  Qty: 90 tablet, Refills: 0    Associated Diagnoses: Hypomagnesemia      Multiple Vitamin (MULTIVITAMIN) tablet Take 1 tablet by mouth daily.      rosuvastatin (CRESTOR) 5 mg tablet TAKE 1 TABLET DAILY  Qty: 90 tablet, Refills: 2    Associated Diagnoses: Hyperlipidemia, unspecified hyperlipidemia type      hydrochlorothiazide (HYDRODIURIL) 12.5 mg tablet Take 1 tablet (12.5 mg total) by mouth daily  Qty: 90 tablet, Refills: 3    Associated Diagnoses: Primary hypertension; Swelling of lower extremity      methotrexate 2.5 mg tablet Take 1 tablet (2.5 mg total) by mouth once a week Patient takes 6 tablets weekly      polyethylene glycol (MIRALAX) 17 g packet Take 17 g by mouth daily as needed (constipation)  Qty: 5 each, Refills: 0    Associated Diagnoses: Gross hematuria      Psyllium (Metamucil) WAFR Take once daily  Qty: 100 Wafer, Refills: 5    Associated Diagnoses: Functional diarrhea      riTUXimab (RITUXAN) 500 mg/50 mL Infuse into a venous catheter every 6 (six) months               No discharge procedures on file.    PDMP Review       None            ED Provider  Electronically Signed by             Anastasia Muniz DO  12/07/23 8115

## 2023-12-07 NOTE — QUICK NOTE
Received TT regarding patient.  82-year-old female presenting with urinary retention, blood in stools, hematemesis.  Patient has a history of urinary retention with Herrera placed a few weeks ago, had it removed 2 weeks ago.  Hx of hematuria with Clot evac on 11/5/2023 with TURBT by Dr. Schwartz, pathology negative for bladder cancer. WBC 21.  Creatinine 3.69, baseline 1.1-1.4.  UA concerning for infection. Vital signs stable, afebrile.  CT scan showing moderate to severe left-sided hydroureteronephrosis, mild right-sided hydroureteronephrosis likely related to significant bladder distention.  No obstructing ureteral calculi.  Bladder significantly distended.    Recommendations:  - Place herrera catheter  - Empiric antibiosis  - Trend all labs  - Likely repeat US once Cr nadirs  - Full consult note to follow tomorrow

## 2023-12-07 NOTE — SEPSIS NOTE
"  Sepsis Note   Samira Allred 83 y.o. female MRN: [id number]  Unit/Bed#: ED 19 Encounter: [id number]       Initial Sepsis Screening       Row Name 12/07/23 1501                Is the patient's history suggestive of a new or worsening infection? Yes (Proceed)  -DD        Suspected source of infection urinary tract infection  -DD        Indicate SIRS criteria Leukocytosis (WBC > 35945 IJL) OR Leukopenia (WBC <4000 IJL) OR Bandemia (WBC >10% bands);Tachycardia > 90 bpm  -DD        Are two or more of the above signs & symptoms of infection both present and new to the patient? Yes (Proceed)  -DD        Assess for evidence of organ dysfunction: Are any of the below criteria present within 6 hours of suspected infection and SIRS criteria that are NOT considered to be chronic conditions? INR > 1.5 or aPTT > 60 secs;Creatinine > 2.0  -DD        Date of presentation of severe sepsis --        Time of presentation of severe sepsis --        Sepsis Note: Click \"NEXT\" below (NOT \"close\") to generate sepsis note based on above information. YES (proceed by clicking \"NEXT\")  -DD                  User Key  (r) = Recorded By, (t) = Taken By, (c) = Cosigned By      Initials Name Provider Type    DD PUJA Sagastume Nurse Practitioner                        Body mass index is 27.44 kg/m².  Wt Readings from Last 1 Encounters:   12/07/23 68 kg (150 lb)     IBW (Ideal Body Weight): 50.1 kg    Ideal body weight: 50.1 kg (110 lb 7.2 oz)  Adjusted ideal body weight: 57.3 kg (126 lb 4.3 oz)    "

## 2023-12-07 NOTE — ASSESSMENT & PLAN NOTE
Patient reports dark tarry stool and dark emesis  Has been maintained on aspirin for extended period of time without any history of GI bleed  Patient's baseline anemic likely in setting of chronic disease and she seems to be within her baseline  Start PPI  Holding off on GI consult pending hemoglobin trend  Hemoglobins every 8  Clear liquid diet

## 2023-12-07 NOTE — ASSESSMENT & PLAN NOTE
Lab Results   Component Value Date    EGFR 10 12/07/2023    EGFR 44 11/10/2023    EGFR 37 11/08/2023    CREATININE 3.69 (H) 12/07/2023    CREATININE 1.14 11/10/2023    CREATININE 1.31 (H) 11/08/2023   Baseline creatinine appears to be about 1.4  3.69 on admission  Likely in the setting of UTI and urinary retention  Carrera catheter in place  Continue antibiotics  Hold off on nephrology consult pending improvement

## 2023-12-07 NOTE — PLAN OF CARE
Problem: Potential for Falls  Goal: Patient will remain free of falls  Description: INTERVENTIONS:  - Educate patient/family on patient safety including physical limitations  - Instruct patient to call for assistance with activity   - Consult OT/PT to assist with strengthening/mobility   - Keep Call bell within reach  - Keep bed low and locked with side rails adjusted as appropriate  - Keep care items and personal belongings within reach  - Initiate and maintain comfort rounds  - Make Fall Risk Sign visible to staff  - Offer Toileting every 2 Hours, in advance of need  - Initiate/Maintain alarm  - Obtain necessary fall risk management equipment:   - Apply yellow socks and bracelet for high fall risk patients  - Consider moving patient to room near nurses station  Outcome: Progressing

## 2023-12-07 NOTE — ASSESSMENT & PLAN NOTE
As evidenced by tachycardia, leukocytosis, MARCELO on CKD  In the setting of UTI  Appropriate fluid resuscitation ordered  Continue ceftriaxone

## 2023-12-07 NOTE — ASSESSMENT & PLAN NOTE
Patient has had ongoing retention issues since hip revision in the beginning of October, has had outpatient cystoscopy, and intermittent Carrera catheterizations  CT revealed hydroureteronephrosis with MARCELO on CKD  Carrera catheter placed in the ED  Urology consulted   appropriate

## 2023-12-07 NOTE — H&P
[facility]  H&P  Name: Samira lAlred 83 y.o. female I MRN: [id number]  Unit/Bed#: ED 19 I Date of Admission: 12/7/2023   Date of Service: 12/7/2023 I Hospital Day: 0      Assessment/Plan   Sepsis (HCC)  Assessment & Plan  As evidenced by tachycardia, leukocytosis, MARCELO on CKD  In the setting of UTI  Appropriate fluid resuscitation ordered  Continue ceftriaxone    Urinary retention  Assessment & Plan  Patient has had ongoing retention issues since hip revision in the beginning of October, has had outpatient cystoscopy, and intermittent Carrera catheterizations  CT revealed hydroureteronephrosis with MARCELO on CKD  Carrera catheter placed in the ED  Urology consulted    Hydronephrosis  Assessment & Plan  Related to urinary retention  Carrera in place    GI bleed  Assessment & Plan  Patient reports dark tarry stool and dark emesis  Has been maintained on aspirin for extended period of time without any history of GI bleed  Patient's baseline anemic likely in setting of chronic disease and she seems to be within her baseline  Start PPI  Holding off on GI consult pending hemoglobin trend  Hemoglobins every 8  Clear liquid diet    Acute cystitis with hematuria  Assessment & Plan  UA grossly positive  Follow-up urine culture  Continue ceftriaxone    Acute kidney injury superimposed on chronic kidney disease   Assessment & Plan  Lab Results   Component Value Date    EGFR 10 12/07/2023    EGFR 44 11/10/2023    EGFR 37 11/08/2023    CREATININE 3.69 (H) 12/07/2023    CREATININE 1.14 11/10/2023    CREATININE 1.31 (H) 11/08/2023   Baseline creatinine appears to be about 1.4  3.69 on admission  Likely in the setting of UTI and urinary retention  Carrera catheter in place  Continue antibiotics  Hold off on nephrology consult pending improvement          VTE Prophylaxis:  bleeding   / sequential compression device   Code Status: full code  Discussion with family: at bedside    Anticipated Length of Stay:  Patient will be  admitted on an Inpatient basis with an anticipated length of stay of  > 2 midnights.   Justification for Hospital Stay: Questionable GI bleed UTI urinary retention    Total Time for Visit, including Counseling / Coordination of Care: 70 minutes.  Greater than 50% of this total time spent on direct patient counseling and coordination of care.    Past Medical History:    Past Medical History:   Diagnosis Date    Allergic angioedema     [id number] resolved    Angioedema     [id number] resolved    Arthritis     Breast cancer (HCC) 2012    right     Chronic kidney disease     Disease of thyroid gland     Hemorrhage of anus and rectum     [id number] resolved    Hyperlipidemia     Hypertension     Hypocalcemia     [id number] resolved    Neuritis     thoracic and lumbosacral    Peptic ulcer     Rheumatoid arthritis (HCC)     Stage 3b chronic kidney disease (HCC) 5/7/2021    Vitamin D deficiency        Chief Complaint:   Vomiting and Black or Bloody Stool (BIBA from home for complaints of black tarry stool and black colored vomitus. Started this last night.)      History of Present Illness:    Samira Allred is a 83 y.o. female with past medical history of the above who presents with Vomiting and Black or Bloody Stool (BIBA from home for complaints of black tarry stool and black colored vomitus. Started this last night.)    Patient presented to the ED with bloody emesis and dark tarry stool with urinary retention bloody emesis and stool for about 3 days and urinary retention off and on for the last few weeks to months since her hip surgery in October.  She denies any chest pain chest tightness shortness of breath difficulty breathing no fevers no chills no nausea no abdominal pain denies any constipation    Review of Systems:    Review of Systems   Constitutional:  Positive for fatigue. Negative for chills and fever.   Gastrointestinal:  Positive for blood in stool and vomiting. Negative for abdominal pain, diarrhea and nausea.    Genitourinary:  Positive for difficulty urinating, dysuria and hematuria.   Musculoskeletal: Negative.    Neurological: Negative.    Hematological: Negative.    Psychiatric/Behavioral: Negative.     All other systems reviewed and are negative.      Past Surgical History:     Past Medical History:   Diagnosis Date    Allergic angioedema     [id number] resolved    Angioedema     [id number] resolved    Arthritis     Breast cancer (HCC) 2012    right     Chronic kidney disease     Disease of thyroid gland     Hemorrhage of anus and rectum     [id number] resolved    Hyperlipidemia     Hypertension     Hypocalcemia     12oct2017 resolved    Neuritis     thoracic and lumbosacral    Peptic ulcer     Rheumatoid arthritis (HCC)     Stage 3b chronic kidney disease (HCC) 5/7/2021    Vitamin D deficiency        Past Surgical History:   Procedure Laterality Date    ABDOMINAL SURGERY      BREAST BIOPSY Right 2012    BREAST LUMPECTOMY Right 2012    BREAST SURGERY      CHOLECYSTECTOMY      ELBOW SURGERY      GALLBLADDER SURGERY  1981    HIP SURGERY Right 09/18/2022    10/2023    ORTHOPEDIC SURGERY      for toes    DC CYSTO W/IRRIG & EVAC MULTPLE OBSTRUCTING CLOTS N/A 11/5/2023    Procedure: CYSTOSCOPY EVACUATION OF CLOTS & fulguration;  Surgeon: Uriah Schwartz MD;  Location: [facility];  Service: Urology    TRANSURETHRAL RESECTION OF BLADDER TUMOR N/A 11/5/2023    Procedure: TRANSURETHRAL RESECTION OF BLADDER TUMOR (TURBT);  Surgeon: Uriah Schwartz MD;  Location: [facility];  Service: Urology    US GUIDED BREAST BIOPSY RIGHT COMPLETE Right 08/15/2022       Meds/Allergies:    Prior to Admission medications    Medication Sig Start Date End Date Taking? Authorizing Provider   ascorbic acid (VITAMIN C) 500 mg tablet Take 500 mg by mouth daily.    Historical Provider, MD   ASPIRIN 81 PO 1 tablet ONCE DAILY (route: oral) 11/6/22   Historical Provider, MD   Calcium Citrate-Vitamin D (CITRACAL + D PO) Take by mouth 2 (two) times a  day    Historical Provider, MD   Cholecalciferol (VITAMIN D-3 PO) Take 2,000 Units by mouth daily.    Historical Provider, MD   folic acid (FOLVITE) 1 mg tablet 1 daily 1/6/21   PUJA Horner   hydrochlorothiazide (HYDRODIURIL) 12.5 mg tablet Take 1 tablet (12.5 mg total) by mouth daily 11/13/23 11/7/24  Layne Bacon MD   levothyroxine (Synthroid) 75 mcg tablet Take 1 tablet (75 mcg total) by mouth in the morning 5/1/23   Layne Bacon MD   magnesium oxide (MAG-OX) 400 mg Take 1 tablet (400 mg total) by mouth 2 (two) times a day 11/5/18   PUJA Horner   methotrexate 2.5 mg tablet Take 1 tablet (2.5 mg total) by mouth once a week Patient takes 6 tablets weekly 3/7/23   Layne Bacon MD   Multiple Vitamin (MULTIVITAMIN) tablet Take 1 tablet by mouth daily.    Historical Provider, MD   polyethylene glycol (MIRALAX) 17 g packet Take 17 g by mouth daily as needed (constipation) 11/8/23   Shruthi Baker MD   Psyllium (Metamucil) WAFR Take once daily 3/7/23   Layne Bacon MD   riTUXimab (RITUXAN) 500 mg/50 mL Infuse into a venous catheter every 6 (six) months   8/20/15   Historical Provider, MD   rosuvastatin (CRESTOR) 5 mg tablet TAKE 1 TABLET DAILY 8/23/23   Layne Bacon MD     I have reviewed home medications using allscripts.    Allergies:   Allergies   Allergen Reactions    Lisinopril Anaphylaxis    Codeine Other (See Comments)     Nausea/vomiting      Other      Annotation - [id number]: tounge swelling    Oxycodone      Annotation - 11Mar2013: NOT TOLERATE    Penicillins Hives    Sulfamethoxazole-Trimethoprim     Ciprofloxacin Rash       Social History:     Marital Status:    Occupation: NA  Patient Pre-hospital Living Situation: home  Patient Pre-hospital Level of Mobility: with min assistance  Patient Pre-hospital Diet Restrictions: no  Substance Use History:   Social History     Substance and Sexual Activity   Alcohol Use Not Currently     Social History     Tobacco  "Use   Smoking Status Never    Passive exposure: Past   Smokeless Tobacco Never     Social History     Substance and Sexual Activity   Drug Use No       Family History:    Family History   Problem Relation Age of Onset    Breast cancer Mother     Kidney failure Father     Other Father         uremia    Lung cancer Brother     Breast cancer Maternal Aunt     Breast cancer Maternal Aunt     Breast cancer Maternal Aunt     Breast cancer Maternal Aunt     Breast cancer Maternal Aunt     Stomach cancer Maternal Uncle        Physical Exam:     Vitals:   Blood Pressure: 97/65 (12/07/23 1500)  Pulse: 94 (12/07/23 1500)  Temperature: 98 °F (36.7 °C) (12/07/23 0942)  Temp Source: Oral (12/07/23 0942)  Respirations: 20 (12/07/23 1500)  Height: 5' 2\" (157.5 cm) (12/07/23 0942)  Weight - Scale: 68 kg (150 lb) (12/07/23 0942)  SpO2: 98 % (12/07/23 1500)    Physical Exam  Vitals and nursing note reviewed.   Constitutional:       General: She is not in acute distress.     Appearance: She is ill-appearing.   Cardiovascular:      Rate and Rhythm: Normal rate.   Pulmonary:      Effort: No respiratory distress.   Musculoskeletal:         General: Normal range of motion.   Skin:     General: Skin is warm and dry.   Neurological:      Mental Status: She is alert. Mental status is at baseline.   Psychiatric:         Mood and Affect: Mood normal.       Additional Data:     Lab Results: I have personally reviewed pertinent reports.      Results from last 7 days   Lab Units 12/07/23  1004   WBC Thousand/uL 21.09*   HEMOGLOBIN g/dL 8.1*   HEMATOCRIT % 24.1*   PLATELETS Thousands/uL 202   BANDS PCT % 2   LYMPHO PCT % 3*   MONO PCT % 1*   EOS PCT % 0     Results from last 7 days   Lab Units 12/07/23  1004   SODIUM mmol/L 129*   POTASSIUM mmol/L 4.7   CHLORIDE mmol/L 97   CO2 mmol/L 22   BUN mg/dL 78*   CREATININE mg/dL 3.69*   ANION GAP mmol/L 10   CALCIUM mg/dL 8.5   ALBUMIN g/dL 2.9*   TOTAL BILIRUBIN mg/dL 0.83   ALK PHOS U/L 154*   ALT U/L " 58*   AST U/L 73*   GLUCOSE RANDOM mg/dL 125     Results from last 7 days   Lab Units 12/07/23  1004   INR  1.25*             Results from last 7 days   Lab Units 12/07/23  1312 12/07/23  1004   LACTIC ACID mmol/L  --  1.6   PROCALCITONIN ng/ml 4.47*  --        Imaging: I have personally reviewed pertinent reports.      CT chest abdomen pelvis wo contrast   Final Result by Jozef Preston MD (12/07 1233)      Bilateral hydroureteronephrosis, moderate to severe on the left, to the level of the urinary bladder, likely related to significant bladder distention. No obstructing ureteral calculi identified. Bilateral nephrolithiasis.      Mild diffuse urinary bladder wall thickening which may be due to cystitis and/or some degree of underlying bladder wall trabeculation; correlate with urinalysis. Evaluation for intraluminal hemorrhage is markedly limited due to streak artifact from    adjacent right hip hardware.      Uncomplicated colonic diverticulosis.      Small hiatal hernia.      Stable chronic compression fracture of the T12 vertebral body with mild retropulsion.      Few calcified uterine fibroids.      1.3 x 1.1 cm right lower lobe nodule and 0.7 cm groundglass right lower lobe nodule, new from 2019, indeterminate. PET/CT correlation should be considered.      The study was marked in EPIC for significant notification.                     Workstation performed: [id number]             [employer] Records Reviewed: Yes     ** Please Note: This note has been constructed using a voice recognition system. **

## 2023-12-08 ENCOUNTER — ANESTHESIA EVENT (INPATIENT)
Dept: GASTROENTEROLOGY | Facility: HOSPITAL | Age: 83
DRG: 871 | End: 2023-12-08
Payer: MEDICARE

## 2023-12-08 ENCOUNTER — ANESTHESIA (INPATIENT)
Dept: GASTROENTEROLOGY | Facility: HOSPITAL | Age: 83
DRG: 871 | End: 2023-12-08
Payer: MEDICARE

## 2023-12-08 ENCOUNTER — APPOINTMENT (INPATIENT)
Dept: GASTROENTEROLOGY | Facility: HOSPITAL | Age: 83
DRG: 871 | End: 2023-12-08
Payer: MEDICARE

## 2023-12-08 PROBLEM — K27.9 PEPTIC ULCER: Status: ACTIVE | Noted: 2023-12-08

## 2023-12-08 PROBLEM — E43 SEVERE PROTEIN-CALORIE MALNUTRITION (HCC): Status: ACTIVE | Noted: 2023-12-08

## 2023-12-08 PROBLEM — I35.0 AORTIC VALVE STENOSIS, MODERATE: Status: ACTIVE | Noted: 2023-12-08

## 2023-12-08 LAB
ABO GROUP BLD: NORMAL
ALBUMIN SERPL BCP-MCNC: 2.5 G/DL (ref 3.5–5)
ALP SERPL-CCNC: 154 U/L (ref 34–104)
ALT SERPL W P-5'-P-CCNC: 44 U/L (ref 7–52)
ANION GAP SERPL CALCULATED.3IONS-SCNC: 8 MMOL/L
AST SERPL W P-5'-P-CCNC: 48 U/L (ref 13–39)
BILIRUB SERPL-MCNC: 0.63 MG/DL (ref 0.2–1)
BLD GP AB SCN SERPL QL: NEGATIVE
BUN SERPL-MCNC: 72 MG/DL (ref 5–25)
CALCIUM ALBUM COR SERPL-MCNC: 8.7 MG/DL (ref 8.3–10.1)
CALCIUM SERPL-MCNC: 7.5 MG/DL (ref 8.4–10.2)
CHLORIDE SERPL-SCNC: 106 MMOL/L (ref 96–108)
CO2 SERPL-SCNC: 21 MMOL/L (ref 21–32)
CREAT SERPL-MCNC: 2.91 MG/DL (ref 0.6–1.3)
ERYTHROCYTE [DISTWIDTH] IN BLOOD BY AUTOMATED COUNT: 17.7 % (ref 11.6–15.1)
GFR SERPL CREATININE-BSD FRML MDRD: 14 ML/MIN/1.73SQ M
GLUCOSE SERPL-MCNC: 102 MG/DL (ref 65–140)
HCT VFR BLD AUTO: 19 % (ref 34.8–46.1)
HCT VFR BLD AUTO: 21.4 % (ref 34.8–46.1)
HCT VFR BLD AUTO: 25.2 % (ref 34.8–46.1)
HGB BLD-MCNC: 6.6 G/DL (ref 11.5–15.4)
HGB BLD-MCNC: 7.3 G/DL (ref 11.5–15.4)
HGB BLD-MCNC: 8.4 G/DL (ref 11.5–15.4)
MAGNESIUM SERPL-MCNC: 2.1 MG/DL (ref 1.9–2.7)
MCH RBC QN AUTO: 30.9 PG (ref 26.8–34.3)
MCHC RBC AUTO-ENTMCNC: 34.1 G/DL (ref 31.4–37.4)
MCV RBC AUTO: 91 FL (ref 82–98)
PHOSPHATE SERPL-MCNC: 3.2 MG/DL (ref 2.3–4.1)
PLATELET # BLD AUTO: 146 THOUSANDS/UL (ref 149–390)
PMV BLD AUTO: 10.2 FL (ref 8.9–12.7)
POTASSIUM SERPL-SCNC: 4.1 MMOL/L (ref 3.5–5.3)
PROCALCITONIN SERPL-MCNC: 3.35 NG/ML
PROT SERPL-MCNC: 4.6 G/DL (ref 6.4–8.4)
RBC # BLD AUTO: 2.36 MILLION/UL (ref 3.81–5.12)
RH BLD: POSITIVE
SODIUM SERPL-SCNC: 135 MMOL/L (ref 135–147)
SPECIMEN EXPIRATION DATE: NORMAL
WBC # BLD AUTO: 14.78 THOUSAND/UL (ref 4.31–10.16)

## 2023-12-08 PROCEDURE — 86901 BLOOD TYPING SEROLOGIC RH(D): CPT | Performed by: INTERNAL MEDICINE

## 2023-12-08 PROCEDURE — 85014 HEMATOCRIT: CPT | Performed by: INTERNAL MEDICINE

## 2023-12-08 PROCEDURE — 99222 1ST HOSP IP/OBS MODERATE 55: CPT | Performed by: PHYSICIAN ASSISTANT

## 2023-12-08 PROCEDURE — 84145 PROCALCITONIN (PCT): CPT | Performed by: INTERNAL MEDICINE

## 2023-12-08 PROCEDURE — 86900 BLOOD TYPING SEROLOGIC ABO: CPT | Performed by: INTERNAL MEDICINE

## 2023-12-08 PROCEDURE — 99232 SBSQ HOSP IP/OBS MODERATE 35: CPT | Performed by: NURSE PRACTITIONER

## 2023-12-08 PROCEDURE — 85027 COMPLETE CBC AUTOMATED: CPT | Performed by: INTERNAL MEDICINE

## 2023-12-08 PROCEDURE — 83735 ASSAY OF MAGNESIUM: CPT | Performed by: INTERNAL MEDICINE

## 2023-12-08 PROCEDURE — 85018 HEMOGLOBIN: CPT | Performed by: NURSE PRACTITIONER

## 2023-12-08 PROCEDURE — 85018 HEMOGLOBIN: CPT | Performed by: INTERNAL MEDICINE

## 2023-12-08 PROCEDURE — 86850 RBC ANTIBODY SCREEN: CPT | Performed by: INTERNAL MEDICINE

## 2023-12-08 PROCEDURE — 80053 COMPREHEN METABOLIC PANEL: CPT | Performed by: INTERNAL MEDICINE

## 2023-12-08 PROCEDURE — 99223 1ST HOSP IP/OBS HIGH 75: CPT | Performed by: INTERNAL MEDICINE

## 2023-12-08 PROCEDURE — 84100 ASSAY OF PHOSPHORUS: CPT | Performed by: INTERNAL MEDICINE

## 2023-12-08 PROCEDURE — 85014 HEMATOCRIT: CPT | Performed by: NURSE PRACTITIONER

## 2023-12-08 PROCEDURE — P9016 RBC LEUKOCYTES REDUCED: HCPCS

## 2023-12-08 PROCEDURE — 88305 TISSUE EXAM BY PATHOLOGIST: CPT | Performed by: PATHOLOGY

## 2023-12-08 PROCEDURE — 43239 EGD BIOPSY SINGLE/MULTIPLE: CPT | Performed by: INTERNAL MEDICINE

## 2023-12-08 PROCEDURE — 0DB68ZX EXCISION OF STOMACH, VIA NATURAL OR ARTIFICIAL OPENING ENDOSCOPIC, DIAGNOSTIC: ICD-10-PCS | Performed by: INTERNAL MEDICINE

## 2023-12-08 RX ORDER — PANTOPRAZOLE SODIUM 40 MG/1
40 TABLET, DELAYED RELEASE ORAL
Status: CANCELLED | OUTPATIENT
Start: 2023-12-08

## 2023-12-08 RX ORDER — PHENYLEPHRINE HCL IN 0.9% NACL 1 MG/10 ML
SYRINGE (ML) INTRAVENOUS AS NEEDED
Status: DISCONTINUED | OUTPATIENT
Start: 2023-12-08 | End: 2023-12-08

## 2023-12-08 RX ORDER — SODIUM CHLORIDE, SODIUM LACTATE, POTASSIUM CHLORIDE, CALCIUM CHLORIDE 600; 310; 30; 20 MG/100ML; MG/100ML; MG/100ML; MG/100ML
INJECTION, SOLUTION INTRAVENOUS CONTINUOUS PRN
Status: DISCONTINUED | OUTPATIENT
Start: 2023-12-08 | End: 2023-12-08

## 2023-12-08 RX ORDER — LIDOCAINE HYDROCHLORIDE 20 MG/ML
INJECTION, SOLUTION EPIDURAL; INFILTRATION; INTRACAUDAL; PERINEURAL AS NEEDED
Status: DISCONTINUED | OUTPATIENT
Start: 2023-12-08 | End: 2023-12-08

## 2023-12-08 RX ORDER — PROPOFOL 10 MG/ML
INJECTION, EMULSION INTRAVENOUS AS NEEDED
Status: DISCONTINUED | OUTPATIENT
Start: 2023-12-08 | End: 2023-12-08

## 2023-12-08 RX ADMIN — DOCUSATE SODIUM 100 MG: 100 CAPSULE, LIQUID FILLED ORAL at 17:24

## 2023-12-08 RX ADMIN — PRAVASTATIN SODIUM 40 MG: 40 TABLET ORAL at 15:38

## 2023-12-08 RX ADMIN — SODIUM CHLORIDE, SODIUM LACTATE, POTASSIUM CHLORIDE, AND CALCIUM CHLORIDE: .6; .31; .03; .02 INJECTION, SOLUTION INTRAVENOUS at 14:02

## 2023-12-08 RX ADMIN — DOCUSATE SODIUM 100 MG: 100 CAPSULE, LIQUID FILLED ORAL at 08:32

## 2023-12-08 RX ADMIN — LIDOCAINE HYDROCHLORIDE 30 MG: 20 INJECTION, SOLUTION EPIDURAL; INFILTRATION; INTRACAUDAL; PERINEURAL at 14:02

## 2023-12-08 RX ADMIN — CALCIUM 1 TABLET: 500 TABLET ORAL at 08:32

## 2023-12-08 RX ADMIN — Medication 100 MCG: at 14:03

## 2023-12-08 RX ADMIN — ONDANSETRON 4 MG: 2 INJECTION INTRAMUSCULAR; INTRAVENOUS at 03:50

## 2023-12-08 RX ADMIN — Medication 100 MCG: at 14:04

## 2023-12-08 RX ADMIN — PANTOPRAZOLE SODIUM 40 MG: 40 TABLET, DELAYED RELEASE ORAL at 05:41

## 2023-12-08 RX ADMIN — PROPOFOL 50 MG: 10 INJECTION, EMULSION INTRAVENOUS at 14:02

## 2023-12-08 RX ADMIN — LEVOTHYROXINE SODIUM 75 MCG: 25 TABLET ORAL at 08:31

## 2023-12-08 RX ADMIN — PROPOFOL 30 MG: 10 INJECTION, EMULSION INTRAVENOUS at 14:06

## 2023-12-08 RX ADMIN — CEFTRIAXONE 2000 MG: 2 INJECTION, SOLUTION INTRAVENOUS at 15:46

## 2023-12-08 NOTE — ASSESSMENT & PLAN NOTE
Patient reports dark tarry stool and dark emesis  Has been maintained on aspirin for extended period of time without any history of GI bleed  Patient's baseline anemic likely in setting of chronic disease and she seems to be within her baseline  Continue PPI  GI consulted  Plan for EGD 12/80  Hemoglobins every 8  Hemoglobin dropped to 6.6-received 1 unit PRBC overnight 12/7  Currently n.p.o.

## 2023-12-08 NOTE — CONSULTS
Consultation -  [facility]  Samira Allred 83 y.o. female MRN: [id number]  Unit/Bed#: -01 Encounter: [id number]      Inpatient consult to gastroenterology  Consult performed by: Ana Maria Harvey PA-C  Consult ordered by: PUJA Palacios          Reason for Consult / Principal Problem: Coffee-ground emesis/melena    HPI: Samira Allred is a 83 y.o. year old female who presents with past medical history consistent for peptic ulcer disease greater than 30 years ago, hyperlipidemia, hypertension, CKD, thyroid disease, arthritis, history of breast cancer, rheumatoid arthritis, and vitamin D deficiency.    Patient presents to [facility] emergency department yesterday December 7, 2023 with a chief complaint of coffee-ground emesis and black tarry stool.    Patient reports that her GI symptoms started several days prior to admission.  She reports that she has never suffered with episodes of melena or coffee-ground emesis in the past.  Unfortunately in the emergency department patient was found to be septic secondary to urinary tract infection.  Patient reports that she has been suffering on and off with episodes of urinary retention.  Initial imaging in the emergency department does show evidence of hydronephrosis.    Hemoglobin level from admission was 8.1.  Patient did drop her hemoglobin level early this morning to 6.6.  Patient was transfused 1 unit of packed red blood cells.    Overall today patient is feeling well.  She is currently n.p.o. in preparation for EGD this afternoon.  Patient denies any abdominal pain.  Patient denies any episodes of rectal bleeding.  Patient denies any significant episodes of diarrhea or constipation.  Patient reports that she was diagnosed with an ulcer in the 1980s.  She denies any chronic NSAID use.  She reports that she utilizes Tylenol.  Patient does take 181 mg aspirin daily.    There is a reported uncle with stomach cancer.    REVIEW OF SYSTEMS:      CONSTITUTIONAL: Denies any fever, chills, or rigors. Good appetite, and no recent weight loss.  HEENT: No earache or tinnitus. Denies hearing loss or visual disturbances.  CARDIOVASCULAR: No chest pain or palpitations.   RESPIRATORY: Denies any cough, hemoptysis, shortness of breath or dyspnea on exertion.  GASTROINTESTINAL: As noted in the History of Present Illness.   GENITOURINARY: No problems with urination. Denies any hematuria or dysuria.  NEUROLOGIC: No dizziness or vertigo, denies headaches.   MUSCULOSKELETAL: Denies any muscle or joint pain.   SKIN: Denies skin rashes or itching.   ENDOCRINE: Denies excessive thirst. Denies intolerance to heat or cold.  PSYCHOSOCIAL: Denies depression or anxiety. Denies any recent memory loss.     Historical Information   Past Medical History:   Diagnosis Date    Allergic angioedema     [id number] resolved    Angioedema     [id number] resolved    Arthritis     Breast cancer (HCC) 2012    right     Chronic kidney disease     Disease of thyroid gland     Hemorrhage of anus and rectum     [id number] resolved    Hyperlipidemia     Hypertension     Hypocalcemia     [id number] resolved    Neuritis     thoracic and lumbosacral    Peptic ulcer     Rheumatoid arthritis (HCC)     Stage 3b chronic kidney disease (HCC) 5/7/2021    Vitamin D deficiency      Past Surgical History:   Procedure Laterality Date    ABDOMINAL SURGERY      BREAST BIOPSY Right 2012    BREAST LUMPECTOMY Right 2012    BREAST SURGERY      CHOLECYSTECTOMY      ELBOW SURGERY      GALLBLADDER SURGERY  1981    HIP SURGERY Right 09/18/2022    10/2023    ORTHOPEDIC SURGERY      for toes    WV CYSTO W/IRRIG & EVAC MULTPLE OBSTRUCTING CLOTS N/A 11/5/2023    Procedure: CYSTOSCOPY EVACUATION OF CLOTS & fulguration;  Surgeon: Uriah Schwartz MD;  Location: [facility];  Service: Urology    TRANSURETHRAL RESECTION OF BLADDER TUMOR N/A 11/5/2023    Procedure: TRANSURETHRAL RESECTION OF BLADDER TUMOR (TURBT);  Surgeon:  Uriah Schwartz MD;  Location: [facility];  Service: Urology    US GUIDED BREAST BIOPSY RIGHT COMPLETE Right 08/15/2022     Social History   Social History     Substance and Sexual Activity   Alcohol Use Not Currently     Social History     Substance and Sexual Activity   Drug Use No     Social History     Tobacco Use   Smoking Status Never    Passive exposure: Past   Smokeless Tobacco Never     Family History   Problem Relation Age of Onset    Breast cancer Mother     Kidney failure Father     Other Father         uremia    Lung cancer Brother     Breast cancer Maternal Aunt     Breast cancer Maternal Aunt     Breast cancer Maternal Aunt     Breast cancer Maternal Aunt     Breast cancer Maternal Aunt     Stomach cancer Maternal Uncle        Meds/Allergies     Medications Prior to Admission   Medication    ascorbic acid (VITAMIN C) 500 mg tablet    ASPIRIN 81 PO    Calcium Citrate-Vitamin D (CITRACAL + D PO)    Cholecalciferol (VITAMIN D-3 PO)    folic acid (FOLVITE) 1 mg tablet    levothyroxine (Synthroid) 75 mcg tablet    magnesium oxide (MAG-OX) 400 mg    Multiple Vitamin (MULTIVITAMIN) tablet    rosuvastatin (CRESTOR) 5 mg tablet    hydrochlorothiazide (HYDRODIURIL) 12.5 mg tablet    methotrexate 2.5 mg tablet    polyethylene glycol (MIRALAX) 17 g packet    Psyllium (Metamucil) WAFR    riTUXimab (RITUXAN) 500 mg/50 mL     Current Facility-Administered Medications   Medication Dose Route Frequency    acetaminophen (TYLENOL) tablet 650 mg  650 mg Oral Q6H PRN    aluminum-magnesium hydroxide-simethicone (MAALOX) oral suspension 30 mL  30 mL Oral Q6H PRN    calcium carbonate (OYSTER SHELL,OSCAL) 500 mg tablet 1 tablet  1 tablet Oral Daily With Breakfast    cefTRIAXone (ROCEPHIN) IVPB (premix in dextrose) 2,000 mg 50 mL  2,000 mg Intravenous Q24H    docusate sodium (COLACE) capsule 100 mg  100 mg Oral BID    levothyroxine tablet 75 mcg  75 mcg Oral Daily    ondansetron (ZOFRAN) injection 4 mg  4 mg Intravenous  "Q6H PRN    pantoprazole (PROTONIX) EC tablet 40 mg  40 mg Oral Early Morning    pravastatin (PRAVACHOL) tablet 40 mg  40 mg Oral Daily With Dinner    psyllium (METAMUCIL) 1 packet  1 packet Oral Daily       Allergies   Allergen Reactions    Lisinopril Anaphylaxis    Codeine Other (See Comments)     Nausea/vomiting      Other      Annotation - [id number]: tounge swelling    Oxycodone      Annotation - 11Mar2013: NOT TOLERATE    Penicillins Hives    Sulfamethoxazole-Trimethoprim     Ciprofloxacin Rash       Objective   Blood pressure 112/54, pulse 90, temperature 98.6 °F (37 °C), resp. rate 18, height 5' 2\" (1.575 m), weight 70.9 kg (156 lb 4.9 oz), SpO2 97 %, not currently breastfeeding.    Intake/Output Summary (Last 24 hours) at 12/8/2023 1101  Last data filed at 12/8/2023 0642  Gross per 24 hour   Intake 2440 ml   Output 2925 ml   Net -485 ml         PHYSICAL EXAM:      General Appearance:   Alert, cooperative, no distress, appears stated age    HEENT:   Normocephalic, atraumatic, anicteric.     Neck:  Supple, symmetrical, trachea midline, no adenopathy;    thyroid: no enlargement/tenderness/nodules; no carotid  bruit or JVD    Lungs:   Clear to auscultation bilaterally; no rales, rhonchi or wheezing; respirations unlabored    Heart::   S1 and S2 normal; regular rate and rhythm; no murmur, rub, or gallop.   Abdomen:   Soft, non-tender, non-distended; normal bowel sounds; no masses, no organomegaly    Genitalia:   Deferred    Rectal:   Deferred    Extremities:  No cyanosis, clubbing or edema    Pulses:  2+ and symmetric all extremities    Skin:  Skin color, texture, turgor normal, no rashes or lesions    Lymph nodes:  No palpable cervical, axillary or inguinal lymphadenopathy        Lab Results:   Admission on 12/07/2023   Component Date Value    Ventricular Rate 12/07/2023 92     Atrial Rate 12/07/2023 92     LA Interval 12/07/2023 168     QRSD Interval 12/07/2023 86     QT Interval 12/07/2023 374     QTC Interval " 12/07/2023 462     P Axis 12/07/2023 88     QRS [place] 12/07/2023 38     T Wave [place] 12/07/2023 50     WBC 12/07/2023 21.09 (H)     RBC 12/07/2023 2.62 (L)     Hemoglobin 12/07/2023 8.1 (L)     Hematocrit 12/07/2023 24.1 (L)     MCV 12/07/2023 92     MCH 12/07/2023 30.9     MCHC 12/07/2023 33.6     RDW 12/07/2023 18.6 (H)     MPV 12/07/2023 10.1     Platelets 12/07/2023 202     Sodium 12/07/2023 129 (L)     Potassium 12/07/2023 4.7     Chloride 12/07/2023 97     CO2 12/07/2023 22     ANION GAP 12/07/2023 10     BUN 12/07/2023 78 (H)     Creatinine 12/07/2023 3.69 (H)     Glucose 12/07/2023 125     Calcium 12/07/2023 8.5     Corrected Calcium 12/07/2023 9.4     AST 12/07/2023 73 (H)     ALT 12/07/2023 58 (H)     Alkaline Phosphatase 12/07/2023 154 (H)     Total Protein 12/07/2023 5.4 (L)     Albumin 12/07/2023 2.9 (L)     Total Bilirubin 12/07/2023 0.83     eGFR 12/07/2023 10     Protime 12/07/2023 16.4 (H)     INR 12/07/2023 1.25 (H)     ABO Grouping 12/07/2023 O     Rh Factor 12/07/2023 Positive     Antibody Screen 12/07/2023 Negative     Specimen Expiration Date 12/07/2023 20231210     PTT 12/07/2023 39 (H)     hs TnI 0hr 12/07/2023 27     Lipase 12/07/2023 38     LACTIC ACID 12/07/2023 1.6     Segmented % 12/07/2023 94 (H)     Bands % 12/07/2023 2     Lymphocytes % 12/07/2023 3 (L)     Monocytes % 12/07/2023 1 (L)     Eosinophils, % 12/07/2023 0     Basophils % 12/07/2023 0     Absolute Neutrophils 12/07/2023 20.25 (H)     Lymphocytes Absolute 12/07/2023 0.63     Monocytes Absolute 12/07/2023 0.21     Eosinophils Absolute 12/07/2023 0.00     Basophils Absolute 12/07/2023 0.00     Hypersegmented Neutrophi* 12/07/2023 Present     Platelet Estimate 12/07/2023 Adequate     Clumped Platelets 12/07/2023 Present     Anisocytosis 12/07/2023 Present     hs TnI 2hr 12/07/2023 27     Delta 2hr hsTnI 12/07/2023 0     Color, UA 12/07/2023 Yellow     Clarity, UA 12/07/2023 Extra Turbid     Specific Gravity, UA 12/07/2023 1.013      pH, UA 12/07/2023 6.5     Leukocytes, UA 12/07/2023 Large (A)     Nitrite, UA 12/07/2023 Negative     Protein, UA 12/07/2023 100 (2+) (A)     Glucose, UA 12/07/2023 Negative     Ketones, UA 12/07/2023 Negative     Urobilinogen, UA 12/07/2023 <2.0     Bilirubin, UA 12/07/2023 Negative     Occult Blood, UA 12/07/2023 Large (A)     hs TnI 4hr 12/07/2023 67 (H)     Delta 4hr hsTnI 12/07/2023 40 (H)     Blood Culture 12/07/2023 Received in Microbiology Lab. Culture in Progress.     Blood Culture 12/07/2023 Received in Microbiology Lab. Culture in Progress.     Procalcitonin 12/07/2023 4.47 (H)     RBC, UA 12/07/2023 Innumerable (A)     WBC, UA 12/07/2023 Innumerable (A)     Epithelial Cells 12/07/2023 Occasional     Bacteria, UA 12/07/2023 Occasional     WBC Clumps 12/07/2023 Present     Hemoglobin 12/07/2023 7.3 (L)     Hematocrit 12/07/2023 21.4 (L)     Hemoglobin 12/08/2023 6.6 (LL)     Hematocrit 12/08/2023 19.0 (L)     Unit Product Code 12/08/2023 [id number]     Unit Number 12/08/2023 [id number]     Unit ABO 12/08/2023 O     Unit RH 12/08/2023 POS     Crossmatch 12/08/2023 Compatible     Unit Dispense Status 12/08/2023 Issued     Unit Product Volume 12/08/2023 350     Procalcitonin 12/08/2023 3.35 (H)     Magnesium 12/08/2023 2.1     Sodium 12/08/2023 135     Potassium 12/08/2023 4.1     Chloride 12/08/2023 106     CO2 12/08/2023 21     ANION GAP 12/08/2023 8     BUN 12/08/2023 72 (H)     Creatinine 12/08/2023 2.91 (H)     Glucose 12/08/2023 102     Calcium 12/08/2023 7.5 (L)     Corrected Calcium 12/08/2023 8.7     AST 12/08/2023 48 (H)     ALT 12/08/2023 44     Alkaline Phosphatase 12/08/2023 154 (H)     Total Protein 12/08/2023 4.6 (L)     Albumin 12/08/2023 2.5 (L)     Total Bilirubin 12/08/2023 0.63     eGFR 12/08/2023 14     ABO Grouping 12/08/2023 O     Rh Factor 12/08/2023 Positive     Antibody Screen 12/08/2023 Negative     Specimen Expiration Date 12/08/2023 20231211     WBC 12/08/2023 14.78 (H)      RBC 12/08/2023 2.36 (L)     Hemoglobin 12/08/2023 7.3 (L)     Hematocrit 12/08/2023 21.4 (L)     MCV 12/08/2023 91     MCH 12/08/2023 30.9     MCHC 12/08/2023 34.1     RDW 12/08/2023 17.7 (H)     Platelets 12/08/2023 146 (L)     MPV 12/08/2023 10.2     Phosphorus 12/08/2023 3.2        Imaging Studies: I have personally reviewed pertinent imaging studies.        ASSESSMENT & PLAN:  Acute blood loss anemia  Coffee-ground emesis  Melena  Sepsis secondary to UTI  -Patient presents to [facility] emergency department December 7, 2023 with a chief complaint of coffee-ground emesis and melena.  Patient reports that her symptoms had been persistent for several days prior to coming to the emergency department.  -Patient does report a history of peptic ulcer disease many years ago.  -Hemoglobin level from admission was 8.1.  Patient did drop her hemoglobin level early this morning to 6.6.  Patient was transfused 1 unit of packed red blood cells.  Hemoglobin now 7.3  -Continue pantoprazole 40 mg IV twice daily.  -Patient is currently n.p.o. in preparation for EGD this afternoon.  -Continue to monitor CBC and transfuse as necessary.  -Continue to monitor stool output and color.  -Further recommendations are made after patient's endoscopic evaluation is complete.  -Serial abdominal exams  -Ambulate as tolerated  -Continue medical management in regards to sepsis being caused by UTI.  -Urology note reviewed.    Patient will be seen and examined by Dr. Mario.  All sanz medical decisions be made by Dr. Mario.      Ana Maria Harvey PA-C  12/8/2023,11:01 AM

## 2023-12-08 NOTE — QUICK NOTE
Received notice of critical hgb value 6.6. Hgb 8.1 on admission. Of note patient having episodes of coffee ground emesis.   Transfuse 1 unit PRBC, consent obtained  GI consult ordered  Hold heparin, for now  Patient NPO for now  Continue PPI  Continue to monitor closely

## 2023-12-08 NOTE — ANESTHESIA PREPROCEDURE EVALUATION
Procedure:  EGD    Relevant Problems   CARDIO   (+) Aortic valve stenosis, moderate   (+) Hyperlipidemia   (+) Hypertension      ENDO   (+) Hypothyroidism      GI/HEPATIC   (+) GI bleed   (+) Peptic ulcer      /RENAL   (+) Acute kidney injury superimposed on chronic kidney disease    (+) Chronic kidney disease-mineral and bone disorder   (+) Chronic renal disease, stage IV (HCC)   (+) Hydronephrosis   (+) Stage 3b chronic kidney disease (HCC)      GYN   (+) Adenocarcinoma of breast (HCC)      MUSCULOSKELETAL   (+) Rheumatoid arthritis (HCC)      NEURO/PSYCH   (+) Other chronic pain      Nervous and Auditory   (+) Lumbar radiculopathy      Other   (+) Diastolic dysfunction   (+) History of peptic ulcer        Physical Exam    Airway    Mallampati score: III  TM Distance: >3 FB  Neck ROM: limited     Dental   No notable dental hx upper dentures and implants,     Cardiovascular      Pulmonary      Other Findings  post-pubertal.      Anesthesia Plan  ASA Score- 4     Anesthesia Type- IV sedation with anesthesia with ASA Monitors. Additional Monitors:     Airway Plan:     Comment: Only minimal anesthesia will be possible given history of moderate AV stenosis. I explained she will possibly remembers some intraprocedure events. .       Plan Factors-Exercise tolerance (METS): <4 METS. Chart reviewed. EKG reviewed. Imaging results reviewed. Existing labs reviewed. Patient summary reviewed. Patient is not a current smoker. Induction- intravenous. Postoperative Plan-     Informed Consent- Anesthetic plan and risks discussed with patient. I personally reviewed this patient with the CRNA. Discussed and agreed on the Anesthesia Plan with the CRNA. Cristy Fuentes                 VITALS  /70   Pulse 88   Temp 97.9 °F (36.6 °C) (Temporal)   Resp 16   Ht 5' 2" (1.575 m)   Wt 70.9 kg (156 lb 4.9 oz)   LMP  (LMP Unknown)   SpO2 94%   BMI 28.59 kg/m²   BP Readings from Last 3 Encounters:   12/08/23 129/70 11/13/23 114/60   11/10/23 137/62     LABS  Results from Last 12 Months   Lab Units 12/08/23  0750 12/08/23  0411 12/07/23  1004 09/06/23  1005 07/18/23  1028   SODIUM mmol/L  --  135 129*   < > 138   POTASSIUM mmol/L  --  4.1 4.7   < > 4.2   CHLORIDE mmol/L  --  106 97   < > 103   CO2 mmol/L  --  21 22   < > 29   ANION GAP mmol/L  --  8 10   < > 6   BUN mg/dL  --  72* 78*   < > 24   CREATININE mg/dL  --  2.91* 3.69*   < > 1.32*   CALCIUM mg/dL  --  7.5* 8.5   < > 9.3   GLUCOSE RANDOM mg/dL  --  102 125   < >  --    PHOSPHORUS mg/dL 3.2  --   --   --  3.7   MAGNESIUM mg/dL  --  2.1  --   --   --    AST U/L  --  48* 73*   < >  --    ALT U/L  --  44 58*   < >  --    ALK PHOS U/L  --  154* 154*   < >  --    TOTAL BILIRUBIN mg/dL  --  0.63 0.83   < >  --    ALBUMIN g/dL  --  2.5* 2.9*   < >  --     < > = values in this interval not displayed. Results from Last 12 Months   Lab Units 12/08/23  0750 12/08/23  0255 12/07/23  1715 12/07/23  1004   WBC Thousand/uL 14.78*  --   --  21.09*   HEMOGLOBIN g/dL 7.3* 6.6*   < > 8.1*   HEMATOCRIT % 21.4* 19.0*   < > 24.1*   PLATELETS Thousands/uL 146*  --   --  202    < > = values in this interval not displayed. Results from Last 12 Months   Lab Units 12/07/23  1004 11/04/23  1540   INR  1.25* 1.73*   PTT seconds 39* 49*       ECG  Encounter Date: 12/07/23   ECG 12 lead   Result Value    Ventricular Rate 92    Atrial Rate 92    ID Interval 168    QRSD Interval 86    QT Interval 374    QTC Interval 462    P Axis 88    QRS Axis 38    T Wave Axis 50    Narrative    Sinus rhythm with occasional Premature ventricular complexes  Low voltage QRS    Confirmed by Betty Kimble ([id number]) on 12/7/2023 3:13:07 PM     No results found for this or any previous visit. ECHOCARDIOGRAPHY AND OTHER TESTING/IMAGING  2023 TTE  •  Left Ventricle: Systolic function is normal with an ejection fraction of 60-65%. •  Left Atrium: Left atrium cavity is mildly dilated. •  Aortic Valve:  There is moderate stenosis. Peak and mean gradients of 47/30 mmHg,   •  Mitral Valve: There is mild regurgitation. Left Ventricle   Left ventricle is normal in size. Wall thickness is normal. Systolic function is normal with an ejection fraction of 60-65%. Wall motion is within normal limits. There is normal diastolic function. Right Ventricle   Right ventricle cavity is normal. Systolic function is normal.     Left Atrium   Left atrium cavity is mildly dilated. Right Atrium   Right atrium cavity is normal.     ANESTHESIA RISK-BENEFIT DISCUSSION  BENEFITS INCLUDE ([address] [id number], PMID [id number]):   (1) A specialized anesthesia team reduces mortality and morbidity for major surgeries. (2) The team provides analgesia/sedation/amnesia/akinesia as safely as possible. (3) The team strives to reduce discomfort as much as reasonably possible. RISKS ASSESSMENT (AND PLANS TO MITIGATE RISKS) INCLUDE:    Neurologic system: IntraOp awareness (Risk is ~1:1,000 - 1:14,000; PMID [id number]), Stroke (Risk ~<0.1-2% for most cases; PMID [id number]), nerve injury, vision loss, and POCD. Airway and Pulmonary system: Dental or mouth injury, throat pain, critical hypoxia, pneumothorax, prolonged intubation, post-op respiratory compromise. Airway/Intubation risks and prior data: Mask Ventilation: Mask ventilation not attempted (0); Brand: LMA; Size: 3; Insertion Attempts: 1; Placement Verify: Auscultation, End tidal CO2   Major ARISCAT risk factors for pulmonary complications include: Age >80: 1 pt, yielding a score 0-1= Low risk, 1.6%. Cardiovascular system: Hypotension/Vasoplegia, arrhythmias, blood clot, tiffany-op cardiac injury/MACE, bleeding, infection, or injury to vascular structures. Signs of active, severe cardiac instability: none  Ozzie's RCRI score items: CHF and Cr>2, yielding an RCRI Score of 2= 7% risk of MACE.  Discussion of exercise tolerance or stress testing is warranted: Urgent case for GIB  Are tiffany-op or intra-op beta blockers indicated? (PMID [id number]): no  FEN/GI system: Aspiration risk (~0.5% [address] [id number]) and PONV (10-80% per Apfel score). ASA NPO guideline compliance?: Yes  Medication risk assessment: Allergic reactions, bleeding due to anticoagulant use, overdoses, drug-drug interactions, injury to a fetus or  in pregnant or breastfeeding patients, sedation while driving/operating heavy machinery.   Recent relevant medications: See MAR or Med Review  Personal or family history of anesthesia complications: yes: Delayed arrousal  Pregnancy Status: N/A  Estimate mortality risks associated with anesthesia based on ASA-PS (PMID [id number]): ASA-PS IV: risk 1:1,800

## 2023-12-08 NOTE — PLAN OF CARE
Problem: Potential for Falls  Goal: Patient will remain free of falls  Description: INTERVENTIONS:  - Educate patient/family on patient safety including physical limitations  - Instruct patient to call for assistance with activity   - Consult OT/PT to assist with strengthening/mobility   - Keep Call bell within reach  - Keep bed low and locked with side rails adjusted as appropriate  - Keep care items and personal belongings within reach  - Initiate and maintain comfort rounds  - Make Fall Risk Sign visible to staff  - Offer Toileting every 2 Hours, in advance of need  - Initiate/Maintain alarm  - Obtain necessary fall risk management equipment:   - Apply yellow socks and bracelet for high fall risk patients  - Consider moving patient to room near nurses station  Outcome: Progressing     Problem: SAFETY ADULT  Goal: Maintain or return to baseline ADL function  Description: INTERVENTIONS:  -  Assess patient's ability to carry out ADLs; assess patient's baseline for ADL function and identify physical deficits which impact ability to perform ADLs (bathing, care of mouth/teeth, toileting, grooming, dressing, etc.)  - Assess/evaluate cause of self-care deficits   - Assess range of motion  - Assess patient's mobility; develop plan if impaired  - Assess patient's need for assistive devices and provide as appropriate  - Encourage maximum independence but intervene and supervise when necessary  - Involve family in performance of ADLs  - Assess for home care needs following discharge   - Consider OT consult to assist with ADL evaluation and planning for discharge  - Provide patient education as appropriate  Outcome: Progressing

## 2023-12-08 NOTE — MALNUTRITION/BMI
This medical record reflects one or more clinical indicators suggestive of malnutrition    Malnutrition Findings:   Adult Malnutrition type: Chronic illness  Adult Degree of Malnutrition: Other severe protein calorie malnutrition  Malnutrition Characteristics: Inadequate energy, Weight loss          360 Statement: Related to medical condition as evidenced by 8.7% weight loss over the past 3 months and <75% energy intake needs met >1 month treated with pending diet advancement      See Nutrition note dated 12/8/23 for additional details.  Completed nutrition assessment is viewable in the nutrition documentation.

## 2023-12-08 NOTE — PLAN OF CARE
Problem: Potential for Falls  Goal: Patient will remain free of falls  Description: INTERVENTIONS:  - Educate patient/family on patient safety including physical limitations  - Instruct patient to call for assistance with activity   - Consult OT/PT to assist with strengthening/mobility   - Keep Call bell within reach  - Keep bed low and locked with side rails adjusted as appropriate  - Keep care items and personal belongings within reach  - Initiate and maintain comfort rounds  - Make Fall Risk Sign visible to staff  - Initiate/Maintain bed/chair alarm  - Apply yellow socks and bracelet for high fall risk patients  - Consider moving patient to room near nurses station  Outcome: Progressing

## 2023-12-08 NOTE — ANESTHESIA POSTPROCEDURE EVALUATION
Post-Op Assessment Note    CV Status:  Stable  Pain Score: 0    Pain management: adequate       Mental Status:  Alert   Hydration Status:  Stable   PONV Controlled:  None   Airway Patency:  Patent     Post Op Vitals Reviewed: Yes      Staff: CRNA               /55 (12/08/23 1424)    Temp (!) 97.3 °F (36.3 °C) (12/08/23 1424)    Pulse 80 (12/08/23 1424)   Resp 16 (12/08/23 1424)    SpO2 100 % (12/08/23 1424)

## 2023-12-08 NOTE — PROGRESS NOTES
[facility]  Progress Note  Name: Samira Allred I  MRN: [id number]  Unit/Bed#: -01 I Date of Admission: 12/7/2023   Date of Service: 12/8/2023 I Hospital Day: 1    Assessment/Plan   * Sepsis (HCC)  Assessment & Plan  As evidenced by tachycardia, leukocytosis, MARCELO on CKD  In the setting of UTI  Appropriate fluid resuscitation ordered  Continue ceftriaxone  Procalcitonin 4.47/3.35    GI bleed  Assessment & Plan  Patient reports dark tarry stool and dark emesis  Has been maintained on aspirin for extended period of time without any history of GI bleed  Patient's baseline anemic likely in setting of chronic disease and she seems to be within her baseline  Continue PPI  GI consulted  Plan for EGD 12/80  Hemoglobins every 8  Hemoglobin dropped to 6.6-received 1 unit PRBC overnight 12/7  Currently n.p.o.    Acute kidney injury superimposed on chronic kidney disease   Assessment & Plan  Lab Results   Component Value Date    EGFR 14 12/08/2023    EGFR 10 12/07/2023    EGFR 44 11/10/2023    CREATININE 2.91 (H) 12/08/2023    CREATININE 3.69 (H) 12/07/2023    CREATININE 1.14 11/10/2023   Baseline creatinine appears to be about 1.4  3.69 on admission  Likely in the setting of UTI and urinary retention  Carrera catheter in place  Continue antibiotics  Improved with fluids and antibiotic    Urinary retention  Assessment & Plan  Patient has had ongoing retention issues since hip revision in the beginning of October, has had outpatient cystoscopy, and intermittent Carrera catheterizations  CT revealed hydroureteronephrosis with MARCELO on CKD  Carrera catheter placed in the ED  Urology consulted    Hydronephrosis  Assessment & Plan  Related to urinary retention  Carrera in place    Acute cystitis with hematuria  Assessment & Plan  UA grossly positive  Follow-up urine culture  Continue ceftriaxone             VTE Pharmacologic Prophylaxis:   Moderate Risk (Score 3-4) - Pharmacological DVT Prophylaxis Contraindicated.  Sequential Compression Devices Ordered.    Mobility:   Basic Mobility Inpatient Raw Score: 18  -HLM Goal: 6: Walk 10 steps or more  JH-HLM Achieved: 1: Laying in bed  HLM Goal NOT achieved. Continue with multidisciplinary rounding and encourage appropriate mobility to improve upon HLM goals.    Patient Centered Rounds: I performed bedside rounds with nursing staff today.   Discussions with Specialists or Other Care Team Provider: Notes    Education and Discussions with Family / Patient: Updated  (family) at bedside.    Total Time Spent on Date of Encounter in care of patient: 35 mins. This time was spent on one or more of the following: performing physical exam; counseling and coordination of care; obtaining or reviewing history; documenting in the medical record; reviewing/ordering tests, medications or procedures; communicating with other healthcare professionals and discussing with patient's family/caregivers.    Current Length of Stay: 1 day(s)  Current Patient Status: Inpatient   Certification Statement: The patient will continue to require additional inpatient hospital stay due to EGD monitoring of hemoglobin antibiotics pending cultures  Discharge Plan: Anticipate discharge in 48-72 hrs to discharge location to be determined pending rehab evaluations.    Code Status: Level 1 - Full Code    Subjective:   Patient is out of bed in the chair feels tired today denies any chest pain shortness of breath or difficulty breathing awaiting EGD    Objective:     Vitals:   Temp (24hrs), Av.6 °F (37 °C), Min:98.1 °F (36.7 °C), Max:99.3 °F (37.4 °C)    Temp:  [98.1 °F (36.7 °C)-99.3 °F (37.4 °C)] 98.6 °F (37 °C)  HR:  [] 90  Resp:  [16-20] 18  BP: ()/(53-75) 112/54  SpO2:  [96 %-100 %] 97 %  Body mass index is 28.59 kg/m².     Input and Output Summary (last 24 hours):     Intake/Output Summary (Last 24 hours) at 2023 1125  Last data filed at 2023 0642  Gross per 24 hour   Intake  2440 ml   Output 2925 ml   Net -485 ml       Physical Exam:   Physical Exam  Vitals reviewed.   Cardiovascular:      Rate and Rhythm: Normal rate.   Pulmonary:      Effort: No respiratory distress.   Abdominal:      Palpations: Abdomen is soft.   Skin:     General: Skin is warm.      Coloration: Skin is pale.   Neurological:      Mental Status: She is alert. Mental status is at baseline.   Psychiatric:         Mood and Affect: Mood normal.          Additional Data:     Labs:  Results from last 7 days   Lab Units 12/08/23  0750 12/07/23  1715 12/07/23  1004   WBC Thousand/uL 14.78*  --  21.09*   HEMOGLOBIN g/dL 7.3*   < > 8.1*   HEMATOCRIT % 21.4*   < > 24.1*   PLATELETS Thousands/uL 146*  --  202   BANDS PCT %  --   --  2   LYMPHO PCT %  --   --  3*   MONO PCT %  --   --  1*   EOS PCT %  --   --  0    < > = values in this interval not displayed.     Results from last 7 days   Lab Units 12/08/23  0411   SODIUM mmol/L 135   POTASSIUM mmol/L 4.1   CHLORIDE mmol/L 106   CO2 mmol/L 21   BUN mg/dL 72*   CREATININE mg/dL 2.91*   ANION GAP mmol/L 8   CALCIUM mg/dL 7.5*   ALBUMIN g/dL 2.5*   TOTAL BILIRUBIN mg/dL 0.63   ALK PHOS U/L 154*   ALT U/L 44   AST U/L 48*   GLUCOSE RANDOM mg/dL 102     Results from last 7 days   Lab Units 12/07/23  1004   INR  1.25*             Results from last 7 days   Lab Units 12/08/23  0410 12/07/23  1312 12/07/23  1004   LACTIC ACID mmol/L  --   --  1.6   PROCALCITONIN ng/ml 3.35* 4.47*  --        Lines/Drains:  Invasive Devices       Peripheral Intravenous Line  Duration             Peripheral IV 12/07/23 Right Antecubital 1 day              Drain  Duration             Urethral Catheter Non-latex;Double-lumen 16 Fr. <1 day                  Urinary Catheter:  Goal for removal: N/A- Discharging with Carrera             Recent Cultures (last 7 days):   Results from last 7 days   Lab Units 12/07/23  1329 12/07/23  1312   BLOOD CULTURE  Received in Microbiology Lab. Culture in Progress. Received in  Microbiology Lab. Culture in Progress.       Last 24 Hours Medication List:   Current Facility-Administered Medications   Medication Dose Route Frequency Provider Last Rate    acetaminophen  650 mg Oral Q6H PRN PUJA Sagastume      aluminum-magnesium hydroxide-simethicone  30 mL Oral Q6H PRN PUJA Sagastume      calcium carbonate  1 tablet Oral Daily With Breakfast PUJA Sagastume      cefTRIAXone  2,000 mg Intravenous Q24H PUJA Sagastume      docusate sodium  100 mg Oral BID PUJA Sagastume      levothyroxine  75 mcg Oral Daily PUJA Sagastume      ondansetron  4 mg Intravenous Q6H PRN UPJA Sagastume      pantoprazole  40 mg Oral Early Morning PUJA Sagastume      pravastatin  40 mg Oral Daily With Dinner PUJA Sagastume      psyllium  1 packet Oral Daily PUJA Sagastume          Today, Patient Was Seen By: PUJA Sagastume    **Please Note: This note may have been constructed using a voice recognition system.**

## 2023-12-08 NOTE — ASSESSMENT & PLAN NOTE
Lab Results   Component Value Date    EGFR 14 12/08/2023    EGFR 10 12/07/2023    EGFR 44 11/10/2023    CREATININE 2.91 (H) 12/08/2023    CREATININE 3.69 (H) 12/07/2023    CREATININE 1.14 11/10/2023   Baseline creatinine appears to be about 1.4  3.69 on admission  Likely in the setting of UTI and urinary retention  Carrera catheter in place  Continue antibiotics  Improved with fluids and antibiotic

## 2023-12-08 NOTE — CONSULTS
Consult - Urology   Samira Allred 1940, 83 y.o. female MRN: [id number]    Unit/Bed#: -01 Encounter: [id number]    Assessment and Plan:  Urinary retention  Bilateral hydronephrosis  MARCELO  - CT chest abdomen pelvis showing bilateral hydronephrosis and distended bladder  - Creatinine on admission 3.69, baseline 1.3-1.4. Creatinine improving, now 2.91. Continue to trend  - Hemoglobin 6.6, transfuse per primary team  - Continue medical optimization and antibiotics per primary team  - Maintain herrera catheter  - Consider repeat imaging with US to ensure resolution of hydronephrosis or if fevers/worsening MARCELO  - No plan for urgent Urologic surgical intervention at this time  - Urology will continue to follow peripherally over weekend    Subjective:   Samira Allred is an 83 year old female known to urology for urinary retention and gross hematuria. Patient underwent TURBT 11/5/23 for bladder lesion with pathology showing marked acute cystitis with mucosal ulceration and denudation. Patient presented to ED with nausea and vomiting, abdominal pain. Patient had CT showing bilateral hydronephrosis and distended bladder. Herrera catheter was placed. Baseline creatinine 1.3-1.4, creatinine on admission was 3.69. Creatinine now 2.91. WBC of 21 on admission. Patient receiving antibiotics per primary team. Improved pain. Sitting comfortably in bed, in no acute distress. Herrera catheter in place draining clear yellow urine.     Review of Systems   Constitutional:  Negative for activity change, appetite change, chills and fever.   HENT:  Negative for congestion and trouble swallowing.    Respiratory:  Negative for cough and shortness of breath.    Cardiovascular:  Negative for chest pain, palpitations and leg swelling.   Gastrointestinal:  Negative for abdominal pain, constipation, diarrhea, nausea and vomiting.   Genitourinary:  Negative for difficulty urinating, dysuria, flank pain, frequency, hematuria and urgency.  "  Musculoskeletal:  Negative for back pain and gait problem.   Skin:  Negative for wound.   Allergic/Immunologic: Negative for immunocompromised state.   Neurological:  Negative for dizziness and syncope.   Hematological:  Does not bruise/bleed easily.   Psychiatric/Behavioral:  Negative for confusion.    All other systems reviewed and are negative.      Objective:  Vitals: Blood pressure 112/54, pulse 90, temperature 98.6 °F (37 °C), resp. rate 18, height 5' 2\" (1.575 m), weight 70.9 kg (156 lb 4.9 oz), SpO2 97 %, not currently breastfeeding.,Body mass index is 28.59 kg/m².    Physical Exam  Constitutional:       General: She is not in acute distress.     Appearance: Normal appearance. She is not ill-appearing, toxic-appearing or diaphoretic.   HENT:      Head: Normocephalic.      Nose: No congestion.   Eyes:      General: No scleral icterus.        Right eye: No discharge.         Left eye: No discharge.      Conjunctiva/sclera: Conjunctivae normal.      Pupils: Pupils are equal, round, and reactive to light.   Pulmonary:      Effort: Pulmonary effort is normal.   Genitourinary:     Comments: Carrera catheter in place draining clear yellow urine  Musculoskeletal:      Cervical back: Normal range of motion.   Skin:     General: Skin is warm and dry.      Coloration: Skin is not jaundiced or pale.      Findings: No bruising, erythema, lesion or rash.   Neurological:      General: No focal deficit present.      Mental Status: She is alert and oriented to person, place, and time. Mental status is at baseline.      Gait: Gait normal.   Psychiatric:         Mood and Affect: Mood normal.         Behavior: Behavior normal.         Thought Content: Thought content normal.         Judgment: Judgment normal.         Imaging:  CT chest abdomen pelvis wo contrast  Status: Final result     PACS Images     Show images for CT chest abdomen pelvis wo contrast  Study Result    Narrative & Impression   CT CHEST, ABDOMEN AND PELVIS " WITHOUT IV CONTRAST     INDICATION:   hematochezia and hematemesis.     COMPARISON: CT abdomen/pelvis dated 11/4/2023, CT chest dated 3/28/2019.     TECHNIQUE: CT examination of the chest, abdomen and pelvis was performed without intravenous contrast. Multiplanar 2D reformatted images were created from the source data.     This examination, like all CT scans performed in the [facility], was performed utilizing techniques to minimize radiation dose exposure, including the use of iterative reconstruction and automated exposure control. Radiation dose length   product (DLP) for this visit:  636 mGy-cm     Enteric contrast was not administered.     FINDINGS:     CHEST     LUNGS: 1.3 x 1.1 cm right lower lobe nodule (image 159, series 3), new from 2019. 0.7 x 0.6 cm right lower lobe groundglass nodule (image 159, series 3), also new from 2019. Other tiny scattered micronodules are not significantly changed from 2019.   Several small scattered thin-walled pulmonary cysts. There is no tracheal or endobronchial lesion.     PLEURA:  Unremarkable.     HEART/GREAT VESSELS: Heavy atherosclerotic coronary artery calcification is noted.  No thoracic aortic aneurysm.     MEDIASTINUM AND TK:  Unremarkable.     CHEST WALL AND LOWER NECK:  Unremarkable.     ABDOMEN     LIVER/BILIARY TREE:  Unremarkable.     GALLBLADDER: Gallbladder is surgically absent.     SPLEEN:  Unremarkable.     PANCREAS:  Unremarkable.     ADRENAL GLANDS:  Unremarkable.     KIDNEYS/URETERS: Moderate to severe left-sided hydroureteronephrosis and mild right-sided hydroureteronephrosis to the level of the urinary bladder, likely related to significant bladder distention. No obstructing ureteral calculi are identified. 6 mm   nonobstructing right intrarenal calculus. Several nonobstructing left intrarenal calculi measuring up to 6 mm.     STOMACH AND BOWEL: No evidence for bowel obstruction. Colonic diverticulosis without evidence for acute  diverticulitis. Small hiatal hernia.     APPENDIX:  No findings to suggest appendicitis.     ABDOMINOPELVIC CAVITY:  No ascites.  No pneumoperitoneum.  No lymphadenopathy.     VESSELS: Atherosclerotic disease. No abdominal aortic aneurysm.     PELVIS     REPRODUCTIVE ORGANS: Few calcified uterine fibroids.     URINARY BLADDER: Markedly distended urinary bladder with mild diffuse urinary bladder wall thickening which may be due to cystitis and/or some degree of underlying bladder wall trabeculation. Evaluation for intraluminal hemorrhage is markedly limited due   to streak artifact from adjacent right hip hardware.     ABDOMINAL WALL/INGUINAL REGIONS:  Unremarkable.     OSSEOUS STRUCTURES: Right hip arthroplasty, similar to prior. Stable chronic compression fracture of the T12 vertebral body with mild retropulsion. Advanced multilevel degenerative disc disease of the lumbar spine.     IMPRESSION:     Bilateral hydroureteronephrosis, moderate to severe on the left, to the level of the urinary bladder, likely related to significant bladder distention. No obstructing ureteral calculi identified. Bilateral nephrolithiasis.     Mild diffuse urinary bladder wall thickening which may be due to cystitis and/or some degree of underlying bladder wall trabeculation; correlate with urinalysis. Evaluation for intraluminal hemorrhage is markedly limited due to streak artifact from   adjacent right hip hardware.     Uncomplicated colonic diverticulosis.     Small hiatal hernia.     Stable chronic compression fracture of the T12 vertebral body with mild retropulsion.     Few calcified uterine fibroids.     1.3 x 1.1 cm right lower lobe nodule and 0.7 cm groundglass right lower lobe nodule, new from 2019, indeterminate. PET/CT correlation should be considered.     Imaging reviewed - both report and images personally reviewed.     Labs:  Recent Labs     12/07/23  1004 12/08/23  0750   WBC 21.09* 14.78*     Recent Labs      12/07/23  1004 12/07/23  1715 12/08/23  0255 12/08/23  0750   HGB 8.1* 7.3* 6.6* 7.3*       Recent Labs     12/07/23  1004 12/08/23  0411   CREATININE 3.69* 2.91*       Microbiology:          Component  Ref Range & Units 12/7/23 1311 11/3/23 1217 1/10/23 1017   RBC, UA  None Seen, 1-2 /hpf Innumerable Abnormal  Innumerable Abnormal  1-2   WBC, UA  None Seen, 1-2 /hpf Innumerable Abnormal  Innumerable Abnormal  Innumerable Abnormal    Epithelial Cells  None Seen, Occasional /hpf Occasional None Seen Occasional   Bacteria, UA  None Seen, Occasional /hpf Occasional Innumerable Abnormal  Occasional   WBC Clumps Present            History:  Social History     Socioeconomic History    Marital status:      Spouse name: None    Number of children: None    Years of education: None    Highest education level: None   Occupational History    None   Tobacco Use    Smoking status: Never     Passive exposure: Past    Smokeless tobacco: Never   Vaping Use    Vaping Use: Never used   Substance and Sexual Activity    Alcohol use: Not Currently    Drug use: No    Sexual activity: Not Currently     Partners: Male   Other Topics Concern    None   Social History Narrative    Active: caffeine use     Social Determinants of Health     Financial Resource Strain: Low Risk  (11/21/2022)    Overall Financial Resource Strain (CARDIA)     Difficulty of Paying Living Expenses: Not very hard   Food Insecurity: No Food Insecurity (11/6/2023)    Hunger Vital Sign     Worried About Running Out of Food in the Last Year: Never true     Ran Out of Food in the Last Year: Never true   Transportation Needs: No Transportation Needs (11/6/2023)    PRAPARE - Transportation     Lack of Transportation (Medical): No     Lack of Transportation (Non-Medical): No   Physical Activity: Not on file   Stress: Not on file   Social Connections: Not on file   Intimate Partner Violence: Not on file   Housing Stability: Low Risk  (11/6/2023)    Housing Stability  Vital Sign     Unable to Pay for Housing in the Last Year: No     Number of Places Lived in the Last Year: 1     Unstable Housing in the Last Year: No       Past Medical History:   Diagnosis Date    Allergic angioedema     [id number] resolved    Angioedema     [id number] resolved    Arthritis     Breast cancer (HCC) 2012    right     Chronic kidney disease     Disease of thyroid gland     Hemorrhage of anus and rectum     [id number] resolved    Hyperlipidemia     Hypertension     Hypocalcemia     [id number] resolved    Neuritis     thoracic and lumbosacral    Peptic ulcer     Rheumatoid arthritis (HCC)     Stage 3b chronic kidney disease (HCC) 5/7/2021    Vitamin D deficiency      Past Surgical History:   Procedure Laterality Date    ABDOMINAL SURGERY      BREAST BIOPSY Right 2012    BREAST LUMPECTOMY Right 2012    BREAST SURGERY      CHOLECYSTECTOMY      ELBOW SURGERY      GALLBLADDER SURGERY  1981    HIP SURGERY Right 09/18/2022    10/2023    ORTHOPEDIC SURGERY      for toes    UT CYSTO W/IRRIG & EVAC MULTPLE OBSTRUCTING CLOTS N/A 11/5/2023    Procedure: CYSTOSCOPY EVACUATION OF CLOTS & fulguration;  Surgeon: Uriah Schwartz MD;  Location: [facility];  Service: Urology    TRANSURETHRAL RESECTION OF BLADDER TUMOR N/A 11/5/2023    Procedure: TRANSURETHRAL RESECTION OF BLADDER TUMOR (TURBT);  Surgeon: Uriah Schwartz MD;  Location: [facility];  Service: Urology    US GUIDED BREAST BIOPSY RIGHT COMPLETE Right 08/15/2022     Family History   Problem Relation Age of Onset    Breast cancer Mother     Kidney failure Father     Other Father         uremia    Lung cancer Brother     Breast cancer Maternal Aunt     Breast cancer Maternal Aunt     Breast cancer Maternal Aunt     Breast cancer Maternal Aunt     Breast cancer Maternal Aunt     Stomach cancer Maternal Uncle        Rosa Mckeon PA-C  Date: 12/8/2023 Time: 8:44 AM

## 2023-12-08 NOTE — ASSESSMENT & PLAN NOTE
Patient has had ongoing retention issues since hip revision in the beginning of October, has had outpatient cystoscopy, and intermittent Carrera catheterizations  CT revealed hydroureteronephrosis with MARCELO on CKD  Carrera catheter placed in the ED  Urology consulted

## 2023-12-08 NOTE — CASE MANAGEMENT
Case Management Progress Note    Patient name Samira Allred  Location /-01 MRN [id number]  : 1940 Date 2023       LOS (days): 1  Geometric Mean LOS (GMLOS) (days): 2.10  Days to GMLOS:0.9        OBJECTIVE:        Current admission status: Inpatient  Preferred Pharmacy:   [id number] - [place] - [address]  [address]  [place] [address]  Phone: [phone] Fax: [phone]    [facility] - [place] - [address] [facility]  [address] [facility]  [place] [address]  Phone: [phone] Fax: [phone]    Primary Care Provider: Layne Bacon MD    Primary Insurance: MEDICARE  Secondary Insurance: BLUE CROSS    PROGRESS NOTE:    CM reviewed chart and conferred with CHRISTOPHER in interdisciplinary rounds.  Patient was in EGD today  PLAN:  CM will complete assessment , offer Freedom of Choice for d/c support options and coordinate plan.

## 2023-12-08 NOTE — ASSESSMENT & PLAN NOTE
As evidenced by tachycardia, leukocytosis, MARCELO on CKD  In the setting of UTI  Appropriate fluid resuscitation ordered  Continue ceftriaxone  Procalcitonin 4.47/3.35

## 2023-12-09 LAB
ABO GROUP BLD BPU: NORMAL
ANION GAP SERPL CALCULATED.3IONS-SCNC: 7 MMOL/L
BACTERIA UR CULT: ABNORMAL
BACTERIA UR CULT: ABNORMAL
BPU ID: NORMAL
BUN SERPL-MCNC: 62 MG/DL (ref 5–25)
CALCIUM SERPL-MCNC: 7.3 MG/DL (ref 8.4–10.2)
CHLORIDE SERPL-SCNC: 105 MMOL/L (ref 96–108)
CO2 SERPL-SCNC: 21 MMOL/L (ref 21–32)
CREAT SERPL-MCNC: 2.41 MG/DL (ref 0.6–1.3)
CROSSMATCH: NORMAL
GFR SERPL CREATININE-BSD FRML MDRD: 18 ML/MIN/1.73SQ M
GLUCOSE SERPL-MCNC: 93 MG/DL (ref 65–140)
HCT VFR BLD AUTO: 19 % (ref 34.8–46.1)
HCT VFR BLD AUTO: 20.5 % (ref 34.8–46.1)
HCT VFR BLD AUTO: 25.1 % (ref 34.8–46.1)
HGB BLD-MCNC: 6.7 G/DL (ref 11.5–15.4)
HGB BLD-MCNC: 7 G/DL (ref 11.5–15.4)
HGB BLD-MCNC: 8.4 G/DL (ref 11.5–15.4)
POTASSIUM SERPL-SCNC: 3.8 MMOL/L (ref 3.5–5.3)
SODIUM SERPL-SCNC: 133 MMOL/L (ref 135–147)
UNIT DISPENSE STATUS: NORMAL
UNIT PRODUCT CODE: NORMAL
UNIT PRODUCT VOLUME: 350 ML
UNIT RH: NORMAL

## 2023-12-09 PROCEDURE — 99232 SBSQ HOSP IP/OBS MODERATE 35: CPT | Performed by: NURSE PRACTITIONER

## 2023-12-09 PROCEDURE — 85014 HEMATOCRIT: CPT | Performed by: INTERNAL MEDICINE

## 2023-12-09 PROCEDURE — 85018 HEMOGLOBIN: CPT | Performed by: INTERNAL MEDICINE

## 2023-12-09 PROCEDURE — 80048 BASIC METABOLIC PNL TOTAL CA: CPT | Performed by: NURSE PRACTITIONER

## 2023-12-09 PROCEDURE — 30233N1 TRANSFUSION OF NONAUTOLOGOUS RED BLOOD CELLS INTO PERIPHERAL VEIN, PERCUTANEOUS APPROACH: ICD-10-PCS | Performed by: INTERNAL MEDICINE

## 2023-12-09 PROCEDURE — P9016 RBC LEUKOCYTES REDUCED: HCPCS

## 2023-12-09 RX ORDER — SODIUM CHLORIDE 9 MG/ML
50 INJECTION, SOLUTION INTRAVENOUS ONCE
Status: COMPLETED | OUTPATIENT
Start: 2023-12-09 | End: 2023-12-09

## 2023-12-09 RX ORDER — PANTOPRAZOLE SODIUM 40 MG/1
40 TABLET, DELAYED RELEASE ORAL
Status: DISCONTINUED | OUTPATIENT
Start: 2023-12-09 | End: 2023-12-14

## 2023-12-09 RX ADMIN — PANTOPRAZOLE SODIUM 40 MG: 40 TABLET, DELAYED RELEASE ORAL at 05:25

## 2023-12-09 RX ADMIN — DOCUSATE SODIUM 100 MG: 100 CAPSULE, LIQUID FILLED ORAL at 19:08

## 2023-12-09 RX ADMIN — DOCUSATE SODIUM 100 MG: 100 CAPSULE, LIQUID FILLED ORAL at 08:10

## 2023-12-09 RX ADMIN — PANTOPRAZOLE SODIUM 40 MG: 40 TABLET, DELAYED RELEASE ORAL at 15:55

## 2023-12-09 RX ADMIN — CALCIUM 1 TABLET: 500 TABLET ORAL at 08:10

## 2023-12-09 RX ADMIN — PSYLLIUM HUSK 1 PACKET: 3.4 POWDER ORAL at 08:11

## 2023-12-09 RX ADMIN — SODIUM CHLORIDE 50 ML/HR: 0.9 INJECTION, SOLUTION INTRAVENOUS at 14:10

## 2023-12-09 RX ADMIN — CEFTRIAXONE 2000 MG: 2 INJECTION, SOLUTION INTRAVENOUS at 14:12

## 2023-12-09 RX ADMIN — LEVOTHYROXINE SODIUM 75 MCG: 25 TABLET ORAL at 08:10

## 2023-12-09 RX ADMIN — PRAVASTATIN SODIUM 40 MG: 40 TABLET ORAL at 15:55

## 2023-12-09 NOTE — QUICK NOTE
Notified patient's hemoglobin this morning is 6.7 which is down from 7.0 around midnight.  Type and screen obtained yesterday.  Blood consent on file.  Will order to prepare and transfuse 1 unit of packed red blood cells this morning.

## 2023-12-09 NOTE — PROGRESS NOTES
[facility]  Progress Note  Name: Samira Allred I  MRN: [id number]  Unit/Bed#: -01 I Date of Admission: 12/7/2023   Date of Service: 12/9/2023 I Hospital Day: 2    Assessment/Plan   * Sepsis (HCC)  Assessment & Plan  As evidenced by tachycardia, leukocytosis, MARCELO on CKD  In the setting of UTI  Appropriate fluid resuscitation ordered  Continue ceftriaxone  Procalcitonin 4.47/3.35    GI bleed  Assessment & Plan  Patient reports dark tarry stool and dark emesis  Has been maintained on aspirin for extended period of time without any history of GI bleed  Patient's baseline anemic likely in setting of chronic disease and she seems to be within her baseline  Continue PPI  GI consulted  EGD-3 cm hiatal hernia  Grade C esophagitis in the lower third of the esophagus  10 large ulcers in the stomach and duodenum with clean base  Follow up H-pylori  Hemoglobins every 8hs  Hemoglobin dropped to 6.6-received 1 unit PRBC overnight 12/7  Hemoglobin dropped again to 6.7 received 1 unit PRBCs on 12/9      Acute kidney injury superimposed on chronic kidney disease   Assessment & Plan  Lab Results   Component Value Date    EGFR 18 12/09/2023    EGFR 14 12/08/2023    EGFR 10 12/07/2023    CREATININE 2.41 (H) 12/09/2023    CREATININE 2.91 (H) 12/08/2023    CREATININE 3.69 (H) 12/07/2023   Baseline creatinine appears to be about 1.4  3.69 on admission  Likely in the setting of UTI and urinary retention  Herrera catheter in place  Continue antibiotics  Improved with fluids and antibiotic    Urinary retention  Assessment & Plan  Patient has had ongoing retention issues since hip revision in the beginning of October, has had outpatient cystoscopy, and intermittent Herrera catheterizations  CT revealed hydroureteronephrosis with MARCELO on CKD  Herrera catheter placed in the ED  Urology consulted  Maintain herrera catheter  Consider repeat imaging with US to ensure resolution of hydronephrosis or if fevers/worsening MARCELO  No  plan for urgent Urologic surgical intervention at this time    Hydronephrosis  Assessment & Plan  Related to urinary retention  Carrera in place    Acute cystitis with hematuria  Assessment & Plan  UA grossly positive  urine culture-Pseudomonas aeruginosa    Continue ceftriaxone           VTE Pharmacologic Prophylaxis:   Moderate Risk (Score 3-4) - Pharmacological DVT Prophylaxis Contraindicated. Sequential Compression Devices Ordered.    Mobility:   Basic Mobility Inpatient Raw Score: 18  JH-HLM Goal: 6: Walk 10 steps or more  JH-HLM Achieved: 6: Walk 10 steps or more  HLM Goal achieved. Continue to encourage appropriate mobility.    Patient Centered Rounds: I performed bedside rounds with nursing staff today.   Discussions with Specialists or Other Care Team Provider: Reviewed notes discussed with primary RN and GI    Education and Discussions with Family / Patient: Discussed with patient and family members at bedside    Total Time Spent on Date of Encounter in care of patient: 35 mins. This time was spent on one or more of the following: performing physical exam; counseling and coordination of care; obtaining or reviewing history; documenting in the medical record; reviewing/ordering tests, medications or procedures; communicating with other healthcare professionals and discussing with patient's family/caregivers.    Current Length of Stay: 2 day(s)  Current Patient Status: Inpatient   Certification Statement: The patient will continue to require additional inpatient hospital stay due to continued antibiotics monitoring of renal function monitoring of hemoglobin  Discharge Plan: Anticipate discharge in 24-48 hrs to home.    Code Status: Level 1 - Full Code    Subjective:   Patient is lying in bed resting comfortably is much more alert today than yesterday she denies any chest pain chest tightness shortness of breath or difficulty breathing reports that she has been tolerating a clear diet and is requesting an  advancement which is at agreeable at this time family is at bedside encouraged patient to continue ambulation verbalizes understanding    Objective:     Vitals:   Temp (24hrs), Av.6 °F (37 °C), Min:97.3 °F (36.3 °C), Max:99.2 °F (37.3 °C)    Temp:  [97.3 °F (36.3 °C)-99.2 °F (37.3 °C)] 99 °F (37.2 °C)  HR:  [80-95] 87  Resp:  [14-16] 14  BP: (109-130)/(49-70) 130/56  SpO2:  [94 %-100 %] 99 %  Body mass index is 28.59 kg/m².     Input and Output Summary (last 24 hours):     Intake/Output Summary (Last 24 hours) at 2023 1219  Last data filed at 2023 1008  Gross per 24 hour   Intake 922.5 ml   Output 1700 ml   Net -777.5 ml       Physical Exam:   Physical Exam  Vitals reviewed.   Constitutional:       General: She is not in acute distress.     Appearance: She is not ill-appearing.   Cardiovascular:      Rate and Rhythm: Normal rate.   Skin:     General: Skin is warm.      Coloration: Skin is pale.   Neurological:      Mental Status: She is alert. Mental status is at baseline.   Psychiatric:         Mood and Affect: Mood normal.        Additional Data:     Labs:  Results from last 7 days   Lab Units 23  0514 23  1914 23  0750 23  1715 23  1004   WBC Thousand/uL  --   --  14.78*  --  21.09*   HEMOGLOBIN g/dL 6.7*   < > 7.3*   < > 8.1*   HEMATOCRIT % 19.0*   < > 21.4*   < > 24.1*   PLATELETS Thousands/uL  --   --  146*  --  202   BANDS PCT %  --   --   --   --  2   LYMPHO PCT %  --   --   --   --  3*   MONO PCT %  --   --   --   --  1*   EOS PCT %  --   --   --   --  0    < > = values in this interval not displayed.     Results from last 7 days   Lab Units 23  0514 23  0411   SODIUM mmol/L 133* 135   POTASSIUM mmol/L 3.8 4.1   CHLORIDE mmol/L 105 106   CO2 mmol/L 21 21   BUN mg/dL 62* 72*   CREATININE mg/dL 2.41* 2.91*   ANION GAP mmol/L 7 8   CALCIUM mg/dL 7.3* 7.5*   ALBUMIN g/dL  --  2.5*   TOTAL BILIRUBIN mg/dL  --  0.63   ALK PHOS U/L  --  154*   ALT U/L  --  44    AST U/L  --  48*   GLUCOSE RANDOM mg/dL 93 102     Results from last 7 days   Lab Units 12/07/23  1004   INR  1.25*             Results from last 7 days   Lab Units 12/08/23  0410 12/07/23  1312 12/07/23  1004   LACTIC ACID mmol/L  --   --  1.6   PROCALCITONIN ng/ml 3.35* 4.47*  --        Lines/Drains:  Invasive Devices       Peripheral Intravenous Line  Duration             Peripheral IV 12/07/23 Right Antecubital 2 days              Drain  Duration             Urethral Catheter Non-latex;Double-lumen 16 Fr. 1 day                  Urinary Catheter:  Goal for removal: N/A- Discharging with Carrera         Recent Cultures (last 7 days):   Results from last 7 days   Lab Units 12/07/23  1329 12/07/23  1312 12/07/23  1311   BLOOD CULTURE  No Growth at 24 hrs. No Growth at 24 hrs.  --    URINE CULTURE   --   --  >100,000 cfu/ml Pseudomonas aeruginosa*  <10,000 cfu/ml Gram Negative Paul Enteric Like*       Last 24 Hours Medication List:   Current Facility-Administered Medications   Medication Dose Route Frequency Provider Last Rate    acetaminophen  650 mg Oral Q6H PRN PUJA Sagastume      aluminum-magnesium hydroxide-simethicone  30 mL Oral Q6H PRN PUJA Sagastume      calcium carbonate  1 tablet Oral Daily With Breakfast PUJA Sagastume      cefTRIAXone  2,000 mg Intravenous Q24H PUJA Sagastume 2,000 mg (12/08/23 1546)    docusate sodium  100 mg Oral BID PUJA Sagastume      levothyroxine  75 mcg Oral Daily PUJA Sagastume      ondansetron  4 mg Intravenous Q6H PRN PUJA Sagastume      pantoprazole  40 mg Oral Early Morning PUJA Sagastume      pravastatin  40 mg Oral Daily With Dinner PJUA Sagastume      psyllium  1 packet Oral Daily PUJA Sagastume          Today, Patient Was Seen By: PUJA Sagastume    **Please Note: This note may have been constructed using a voice recognition system.**

## 2023-12-09 NOTE — ASSESSMENT & PLAN NOTE
Patient reports dark tarry stool and dark emesis  Has been maintained on aspirin for extended period of time without any history of GI bleed  Patient's baseline anemic likely in setting of chronic disease and she seems to be within her baseline  Continue PPI  GI consulted  EGD-3 cm hiatal hernia  Grade C esophagitis in the lower third of the esophagus  10 large ulcers in the stomach and duodenum with clean base  Follow up H-pylori  Hemoglobins every 8hs  Hemoglobin dropped to 6.6-received 1 unit PRBC overnight 12/7  Hemoglobin dropped again to 6.7 received 1 unit PRBCs on 12/9

## 2023-12-09 NOTE — ASSESSMENT & PLAN NOTE
Patient has had ongoing retention issues since hip revision in the beginning of October, has had outpatient cystoscopy, and intermittent Herrera catheterizations  CT revealed hydroureteronephrosis with MARCELO on CKD  Herrera catheter placed in the ED  Urology consulted  Maintain herrera catheter  Consider repeat imaging with US to ensure resolution of hydronephrosis or if fevers/worsening MARCELO  No plan for urgent Urologic surgical intervention at this time

## 2023-12-09 NOTE — ASSESSMENT & PLAN NOTE
Lab Results   Component Value Date    EGFR 18 12/09/2023    EGFR 14 12/08/2023    EGFR 10 12/07/2023    CREATININE 2.41 (H) 12/09/2023    CREATININE 2.91 (H) 12/08/2023    CREATININE 3.69 (H) 12/07/2023   Baseline creatinine appears to be about 1.4  3.69 on admission  Likely in the setting of UTI and urinary retention  Carrera catheter in place  Continue antibiotics  Improved with fluids and antibiotic

## 2023-12-10 LAB
ABO GROUP BLD BPU: NORMAL
ANION GAP SERPL CALCULATED.3IONS-SCNC: 6 MMOL/L
BPU ID: NORMAL
BUN SERPL-MCNC: 50 MG/DL (ref 5–25)
CALCIUM SERPL-MCNC: 7.4 MG/DL (ref 8.4–10.2)
CHLORIDE SERPL-SCNC: 103 MMOL/L (ref 96–108)
CO2 SERPL-SCNC: 22 MMOL/L (ref 21–32)
CREAT SERPL-MCNC: 2.12 MG/DL (ref 0.6–1.3)
CROSSMATCH: NORMAL
GFR SERPL CREATININE-BSD FRML MDRD: 21 ML/MIN/1.73SQ M
GLUCOSE SERPL-MCNC: 95 MG/DL (ref 65–140)
HCT VFR BLD AUTO: 22.2 % (ref 34.8–46.1)
HGB BLD-MCNC: 7.5 G/DL (ref 11.5–15.4)
POTASSIUM SERPL-SCNC: 3.5 MMOL/L (ref 3.5–5.3)
SODIUM SERPL-SCNC: 131 MMOL/L (ref 135–147)
UNIT DISPENSE STATUS: NORMAL
UNIT PRODUCT CODE: NORMAL
UNIT PRODUCT VOLUME: 350 ML
UNIT RH: NORMAL

## 2023-12-10 PROCEDURE — 85018 HEMOGLOBIN: CPT | Performed by: INTERNAL MEDICINE

## 2023-12-10 PROCEDURE — 80048 BASIC METABOLIC PNL TOTAL CA: CPT | Performed by: NURSE PRACTITIONER

## 2023-12-10 PROCEDURE — 99232 SBSQ HOSP IP/OBS MODERATE 35: CPT | Performed by: NURSE PRACTITIONER

## 2023-12-10 PROCEDURE — 85014 HEMATOCRIT: CPT | Performed by: INTERNAL MEDICINE

## 2023-12-10 RX ORDER — CEFEPIME HYDROCHLORIDE 1 G/50ML
1000 INJECTION, SOLUTION INTRAVENOUS EVERY 12 HOURS
Status: DISCONTINUED | OUTPATIENT
Start: 2023-12-10 | End: 2023-12-15

## 2023-12-10 RX ADMIN — PSYLLIUM HUSK 1 PACKET: 3.4 POWDER ORAL at 10:05

## 2023-12-10 RX ADMIN — CALCIUM 1 TABLET: 500 TABLET ORAL at 07:22

## 2023-12-10 RX ADMIN — DOCUSATE SODIUM 100 MG: 100 CAPSULE, LIQUID FILLED ORAL at 10:02

## 2023-12-10 RX ADMIN — CEFEPIME HYDROCHLORIDE 1000 MG: 1 INJECTION, SOLUTION INTRAVENOUS at 13:40

## 2023-12-10 RX ADMIN — PANTOPRAZOLE SODIUM 40 MG: 40 TABLET, DELAYED RELEASE ORAL at 16:10

## 2023-12-10 RX ADMIN — PANTOPRAZOLE SODIUM 40 MG: 40 TABLET, DELAYED RELEASE ORAL at 07:22

## 2023-12-10 RX ADMIN — PRAVASTATIN SODIUM 40 MG: 40 TABLET ORAL at 16:10

## 2023-12-10 RX ADMIN — LEVOTHYROXINE SODIUM 75 MCG: 25 TABLET ORAL at 10:02

## 2023-12-10 RX ADMIN — DOCUSATE SODIUM 100 MG: 100 CAPSULE, LIQUID FILLED ORAL at 17:55

## 2023-12-10 NOTE — ASSESSMENT & PLAN NOTE
Lab Results   Component Value Date    EGFR 21 12/10/2023    EGFR 18 12/09/2023    EGFR 14 12/08/2023    CREATININE 2.12 (H) 12/10/2023    CREATININE 2.41 (H) 12/09/2023    CREATININE 2.91 (H) 12/08/2023   Baseline creatinine appears to be about 1.4  Peaked at 3.69 on admission  Likely in the setting of UTI and urinary retention  Carrera catheter in place (discharge with per urology)  Continue antibiotics  Improved with fluids and antibiotic   Universal Safety Interventions

## 2023-12-10 NOTE — PROGRESS NOTES
[facility]  Progress Note  Name: Samira Allred I  MRN: [id number]  Unit/Bed#: -01 I Date of Admission: 12/7/2023   Date of Service: 12/10/2023 I Hospital Day: 3    Assessment/Plan   * Sepsis (HCC)  Assessment & Plan  As evidenced by tachycardia, leukocytosis, MARCELO on CKD  In the setting of UTI  Appropriate fluid resuscitation ordered  Continue ceftriaxone  Procalcitonin 4.47/3.35    GI bleed  Assessment & Plan  Patient reports dark tarry stool and dark emesis  Has been maintained on aspirin for extended period of time without any history of GI bleed  Patient's baseline anemic likely in setting of chronic disease and she seems to be within her baseline  Continue PPI  GI consulted  EGD-3 cm hiatal hernia  Grade C esophagitis in the lower third of the esophagus  10 large ulcers in the stomach and duodenum with clean base  Follow up H-pylori  Hemoglobins every 8hs  Hemoglobin dropped to 6.6-received 1 unit PRBC overnight 12/7  Hemoglobin dropped again to 6.7 received 1 unit PRBCs on 12/9      Acute kidney injury superimposed on chronic kidney disease   Assessment & Plan  Lab Results   Component Value Date    EGFR 21 12/10/2023    EGFR 18 12/09/2023    EGFR 14 12/08/2023    CREATININE 2.12 (H) 12/10/2023    CREATININE 2.41 (H) 12/09/2023    CREATININE 2.91 (H) 12/08/2023   Baseline creatinine appears to be about 1.4  Peaked at 3.69 on admission  Likely in the setting of UTI and urinary retention  Carrera catheter in place (discharge with per urology)  Continue antibiotics  Improved with fluids and antibiotic    Peptic ulcer  Assessment & Plan  Continue PPI  EGD revealed up to 10 ulcers    Urinary retention  Assessment & Plan  Patient has had ongoing retention issues since hip revision in the beginning of October, has had outpatient cystoscopy, and intermittent Carrera catheterizations  CT revealed hydroureteronephrosis with MARCELO on CKD  Carrera catheter placed in the ED  Urology consulted  Maintain  herrera catheter  Consider repeat imaging with US to ensure resolution of hydronephrosis or if fevers/worsening MARCELO  No plan for urgent Urologic surgical intervention at this time    Hydronephrosis  Assessment & Plan  Related to urinary retention  Herrera in place    Acute cystitis with hematuria  Assessment & Plan  UA grossly positive  urine culture-Pseudomonas aeruginosa  Continue ceftriaxone             VTE Pharmacologic Prophylaxis:   Moderate Risk (Score 3-4) - Pharmacological DVT Prophylaxis Contraindicated. Sequential Compression Devices Ordered.    Mobility:   Basic Mobility Inpatient Raw Score: 19  -HLM Goal: 6: Walk 10 steps or more  JH-HLM Achieved: 3: Sit at edge of bed  HLM Goal NOT achieved. Continue with multidisciplinary rounding and encourage appropriate mobility to improve upon HLM goals.    Patient Centered Rounds: I performed bedside rounds with nursing staff today.   Discussions with Specialists or Other Care Team Provider: Reviewed notes    Education and Discussions with Family / Patient: Discussed with patient and family at bedside    Total Time Spent on Date of Encounter in care of patient: 35 mins. This time was spent on one or more of the following: performing physical exam; counseling and coordination of care; obtaining or reviewing history; documenting in the medical record; reviewing/ordering tests, medications or procedures; communicating with other healthcare professionals and discussing with patient's family/caregivers.    Current Length of Stay: 3 day(s)  Current Patient Status: Inpatient   Certification Statement: The patient will continue to require additional inpatient hospital stay due to monitoring of hemoglobin  Discharge Plan: Anticipate discharge tomorrow to home.    Code Status: Level 1 - Full Code    Subjective:   Patient is delightful and pleasant denies any nausea vomiting or abdominal pain reports some mild constipation she is tolerating her diet    Objective:     Vitals:    Temp (24hrs), Av.6 °F (37 °C), Min:98.1 °F (36.7 °C), Max:99 °F (37.2 °C)    Temp:  [98.1 °F (36.7 °C)-99 °F (37.2 °C)] 98.1 °F (36.7 °C)  HR:  [71-92] 71  BP: (105-130)/(51-56) 121/53  SpO2:  [98 %-99 %] 98 %  Body mass index is 28.59 kg/m².     Input and Output Summary (last 24 hours):     Intake/Output Summary (Last 24 hours) at 12/10/2023 0957  Last data filed at 2023 2100  Gross per 24 hour   Intake 822.5 ml   Output 1750 ml   Net -927.5 ml       Physical Exam:   Physical Exam  Vitals reviewed.   Constitutional:       General: She is not in acute distress.     Appearance: She is not ill-appearing.   Cardiovascular:      Rate and Rhythm: Normal rate.   Pulmonary:      Effort: No respiratory distress.   Abdominal:      Palpations: Abdomen is soft.   Skin:     General: Skin is warm.      Coloration: Skin is pale.   Neurological:      Mental Status: She is alert. Mental status is at baseline.   Psychiatric:         Mood and Affect: Mood normal.          Additional Data:     Labs:  Results from last 7 days   Lab Units 12/10/23  0019 23  1914 23  0750 23  1715 23  1004   WBC Thousand/uL  --   --  14.78*  --  21.09*   HEMOGLOBIN g/dL 7.5*   < > 7.3*   < > 8.1*   HEMATOCRIT % 22.2*   < > 21.4*   < > 24.1*   PLATELETS Thousands/uL  --   --  146*  --  202   BANDS PCT %  --   --   --   --  2   LYMPHO PCT %  --   --   --   --  3*   MONO PCT %  --   --   --   --  1*   EOS PCT %  --   --   --   --  0    < > = values in this interval not displayed.     Results from last 7 days   Lab Units 12/10/23  0728 23  0514 23  0411   SODIUM mmol/L 131*   < > 135   POTASSIUM mmol/L 3.5   < > 4.1   CHLORIDE mmol/L 103   < > 106   CO2 mmol/L 22   < > 21   BUN mg/dL 50*   < > 72*   CREATININE mg/dL 2.12*   < > 2.91*   ANION GAP mmol/L 6   < > 8   CALCIUM mg/dL 7.4*   < > 7.5*   ALBUMIN g/dL  --   --  2.5*   TOTAL BILIRUBIN mg/dL  --   --  0.63   ALK PHOS U/L  --   --  154*   ALT U/L  --   --   44   AST U/L  --   --  48*   GLUCOSE RANDOM mg/dL 95   < > 102    < > = values in this interval not displayed.     Results from last 7 days   Lab Units 12/07/23  1004   INR  1.25*             Results from last 7 days   Lab Units 12/08/23  0410 12/07/23  1312 12/07/23  1004   LACTIC ACID mmol/L  --   --  1.6   PROCALCITONIN ng/ml 3.35* 4.47*  --        Lines/Drains:  Invasive Devices       Peripheral Intravenous Line  Duration             Peripheral IV 12/07/23 Right Antecubital 2 days              Drain  Duration             Urethral Catheter Non-latex;Double-lumen 16 Fr. 2 days                  Urinary Catheter:  Goal for removal: N/A- Discharging with Carrera           Recent Cultures (last 7 days):   Results from last 7 days   Lab Units 12/07/23  1329 12/07/23  1312 12/07/23  1311   BLOOD CULTURE  No Growth at 48 hrs. No Growth at 48 hrs.  --    URINE CULTURE   --   --  >100,000 cfu/ml Pseudomonas aeruginosa*  <10,000 cfu/ml Gram Negative Paul Enteric Like*       Last 24 Hours Medication List:   Current Facility-Administered Medications   Medication Dose Route Frequency Provider Last Rate    acetaminophen  650 mg Oral Q6H PRN PUJA Sagastume      aluminum-magnesium hydroxide-simethicone  30 mL Oral Q6H PRN PUJA Sagastume      calcium carbonate  1 tablet Oral Daily With Breakfast PUJA Sagastume      cefTRIAXone  2,000 mg Intravenous Q24H PUJA Sagastume 2,000 mg (12/09/23 1412)    docusate sodium  100 mg Oral BID PUJA Sagastume      levothyroxine  75 mcg Oral Daily PUJA Sagastume      ondansetron  4 mg Intravenous Q6H PRN PUJA Sagastume      pantoprazole  40 mg Oral BID AC PUJA Sagastume      pravastatin  40 mg Oral Daily With Dinner PUJA Sagastume      psyllium  1 packet Oral Daily PUJA Sagastume          Today, Patient Was Seen By: PUJA Sagastume    **Please Note: This note may have been constructed using a voice recognition  system.**

## 2023-12-11 LAB
ANION GAP SERPL CALCULATED.3IONS-SCNC: 8 MMOL/L
BUN SERPL-MCNC: 39 MG/DL (ref 5–25)
CALCIUM SERPL-MCNC: 7.6 MG/DL (ref 8.4–10.2)
CHLORIDE SERPL-SCNC: 102 MMOL/L (ref 96–108)
CO2 SERPL-SCNC: 24 MMOL/L (ref 21–32)
CREAT SERPL-MCNC: 1.95 MG/DL (ref 0.6–1.3)
GFR SERPL CREATININE-BSD FRML MDRD: 23 ML/MIN/1.73SQ M
GLUCOSE SERPL-MCNC: 101 MG/DL (ref 65–140)
HCT VFR BLD AUTO: 23 % (ref 34.8–46.1)
HCT VFR BLD AUTO: 25.3 % (ref 34.8–46.1)
HGB BLD-MCNC: 7.6 G/DL (ref 11.5–15.4)
HGB BLD-MCNC: 8.3 G/DL (ref 11.5–15.4)
POTASSIUM SERPL-SCNC: 3.7 MMOL/L (ref 3.5–5.3)
SODIUM SERPL-SCNC: 134 MMOL/L (ref 135–147)

## 2023-12-11 PROCEDURE — 99233 SBSQ HOSP IP/OBS HIGH 50: CPT | Performed by: INTERNAL MEDICINE

## 2023-12-11 PROCEDURE — 88305 TISSUE EXAM BY PATHOLOGIST: CPT | Performed by: PATHOLOGY

## 2023-12-11 PROCEDURE — 85014 HEMATOCRIT: CPT | Performed by: INTERNAL MEDICINE

## 2023-12-11 PROCEDURE — 80048 BASIC METABOLIC PNL TOTAL CA: CPT | Performed by: NURSE PRACTITIONER

## 2023-12-11 PROCEDURE — 99232 SBSQ HOSP IP/OBS MODERATE 35: CPT | Performed by: UROLOGY

## 2023-12-11 PROCEDURE — 85018 HEMOGLOBIN: CPT | Performed by: INTERNAL MEDICINE

## 2023-12-11 RX ADMIN — PANTOPRAZOLE SODIUM 40 MG: 40 TABLET, DELAYED RELEASE ORAL at 16:06

## 2023-12-11 RX ADMIN — CEFEPIME HYDROCHLORIDE 1000 MG: 1 INJECTION, SOLUTION INTRAVENOUS at 12:22

## 2023-12-11 RX ADMIN — CALCIUM 1 TABLET: 500 TABLET ORAL at 07:18

## 2023-12-11 RX ADMIN — LEVOTHYROXINE SODIUM 75 MCG: 25 TABLET ORAL at 09:19

## 2023-12-11 RX ADMIN — PRAVASTATIN SODIUM 40 MG: 40 TABLET ORAL at 16:06

## 2023-12-11 RX ADMIN — CEFEPIME HYDROCHLORIDE 1000 MG: 1 INJECTION, SOLUTION INTRAVENOUS at 00:42

## 2023-12-11 RX ADMIN — PANTOPRAZOLE SODIUM 40 MG: 40 TABLET, DELAYED RELEASE ORAL at 07:18

## 2023-12-11 RX ADMIN — DOCUSATE SODIUM 100 MG: 100 CAPSULE, LIQUID FILLED ORAL at 09:19

## 2023-12-11 RX ADMIN — PSYLLIUM HUSK 1 PACKET: 3.4 POWDER ORAL at 09:19

## 2023-12-11 RX ADMIN — CEFEPIME HYDROCHLORIDE 1000 MG: 1 INJECTION, SOLUTION INTRAVENOUS at 23:31

## 2023-12-11 NOTE — ASSESSMENT & PLAN NOTE
Malnutrition Findings:   Adult Malnutrition type: Chronic illness  Adult Degree of Malnutrition: Other severe protein calorie malnutrition  Malnutrition Characteristics: Inadequate energy, Weight loss                  360 Statement: Related to medical condition as evidenced by 8.7% weight loss over the past 3 months and <75% energy intake needs met >1 month treated with pending diet advancement    BMI Findings:           Body mass index is 28.59 kg/m².     Nutrition care

## 2023-12-11 NOTE — ASSESSMENT & PLAN NOTE
UA grossly positive  urine culture-Pseudomonas aeruginosa  Continue cefepime -this was a started on 12/10, plan for at least 3-5 days of therapy.  The patient does have several allergies including to quinolones which makes transition to p.o. alternative difficult

## 2023-12-11 NOTE — ASSESSMENT & PLAN NOTE
Patient has had ongoing retention issues since hip revision in the beginning of October, has had outpatient cystoscopy, and intermittent Herrera catheterizations  CT revealed hydroureteronephrosis with MARCELO on CKD  Herrera catheter placed in the ED  Urology consulted  Maintain herrera catheter  No plan for urgent Urologic surgical intervention at this time  OP follow up

## 2023-12-11 NOTE — ASSESSMENT & PLAN NOTE
Patient reports dark tarry stool and dark emesis  Has been maintained on aspirin for extended period of time without any history of GI bleed  Patient's baseline anemic likely in setting of chronic disease and she seems to be within her baseline  Continue PPI  GI consulted  EGD-3 cm hiatal hernia  Grade C esophagitis in the lower third of the esophagus  10 large ulcers in the stomach and duodenum with clean base  Follow up H-pylori  Hb stable, monitor closely

## 2023-12-11 NOTE — RESULT ENCOUNTER NOTE
The results were negative (unremarkable). My medical assistant will call her to let her know the results.

## 2023-12-11 NOTE — CASE MANAGEMENT
Case Management Discharge Planning Note    Patient name Samira Allred  [facility] /-01 MRN [id number]  : 1940 Date 2023       Current Admission Date: 2023  Current Admission Diagnosis:Sepsis ([facility])   Patient Active Problem List    Diagnosis Date Noted    Peptic ulcer 2023    Aortic valve stenosis, moderate 2023    Severe protein-calorie malnutrition (HCC) 2023    GI bleed 2023    Hydronephrosis 2023    Urinary retention 2023    Sepsis ([facility]) 2023    Acute cystitis with hematuria 2023    Acute kidney injury superimposed on chronic kidney disease  2023    Transaminitis 2023    Protein-calorie malnutrition, unspecified severity ([facility]) 2023    Chronic renal disease, stage IV ([facility]) 2023    Chronic pain of both knees 08/10/2022    Chronic kidney disease-mineral and bone disorder 2022    Stage 3b chronic kidney disease (HCC) 2021    History of DVT (deep vein thrombosis) 2021    Varicose veins of left lower extremity with pain 2020    Lower leg DVT (deep venous thromboembolism), chronic, left ([facility]) 2020    Intermediate stage nonexudative 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 eyes 2019    History of peptic ulcer 10/11/2018    Fat necrosis (segmental) of breast 2018    Personal history of breast cancer 2018    Abnormal finding on diagnostic imaging of kidney 2018    Localized edema 2018    Hypothyroidism 2018    Hyperuricemia 2018    Hypertension 2018    Rheumatoid arthritis (HCC) 2018    Diastolic dysfunction 2017    Vitamin D deficiency 2014    Lumbar radiculopathy 2013    Hyperlipidemia 2013    Osteopenia 2013    Other chronic pain 2013    Adenocarcinoma of breast (HCC) 2013      LOS (days): 4  Geometric Mean LOS (GMLOS) (days): 2.10  Days to GMLOS:-1.9     OBJECTIVE:  Risk of Unplanned Readmission  Score: 22.91         Current admission status: Inpatient   Preferred Pharmacy:   [id number] - [place] - [address]  [address]  [place] [address]  Phone: [phone] Fax: [phone]    [facility] - [place] - [address]  [address]  [place] [address]  Phone: [phone] Fax: [phone]    Primary Care Provider: Layne Bacon MD    Primary Insurance: MEDICARE  Secondary Insurance: BLUE CROSS    DISCHARGE DETAILS:    Discharge planning discussed with:: patient  Freedom of Choice: Yes  Comments - Freedom of Choice: IMM reviewed with pt and signed by her.  Copy to pt and copy to MR for scanning  CM contacted family/caregiver?: No- see comments (pt will update her nephew Kali who called during the assess ment)  Were Treatment Team discharge recommendations reviewed with patient/caregiver?: Yes  Did patient/caregiver verbalize understanding of patient care needs?: Yes  Were patient/caregiver advised of the risks associated with not following Treatment Team discharge recommendations?: Yes    Contacts  Patient Contacts: Kali  Relationship to Patient:: Family    Requested Home Health Care         Is the patient interested in HHC at discharge?: Yes  Home Health Discipline requested:: Nursing, Physical Therapy  Home Health Agency Name:: [facility]idential  HHA External Referral Reason (only applicable if external HHA name selected): Patient has established relationship with provider  Home Health Follow-Up Provider:: PCP  Home Health Services Needed:: Evaluate Functional Status and Safety, Gait/ADL Training, Strengthening/Theraputic Exercises to Improve Function  Homebound Criteria Met:: Requires the Assistance of Another Person for Safe Ambulation or to Leave the Home, Uses an Assist Device (i.e. cane, walker, etc)  Supporting Clincal Findings:: Limited Endurance, Fatigues Easliy in Short Distances    DME Referral  Provided  Referral made for DME?: No    Other Referral/Resources/Interventions Provided:  Interventions: HHC  Referral Comments: [facility]idential pended.  Pt is a tentative d/c in 48 hrs per SLIM during rounds.  Pt remains on IV abx and hgb is still low.  CM will continue to follow    Would you like to participate in our [facility] service program?  : No - Declined    Treatment Team Recommendation: Home with Home Health Care  Discharge Destination Plan:: Home with Home Health Care  Transport at Discharge : Family                             IMM Given (Date):: 12/11/23  IMM Given to:: Patient

## 2023-12-11 NOTE — PROGRESS NOTES
[facility]  Progress Note  Name: Samira Allred I  MRN: [id number]  Unit/Bed#: -01 I Date of Admission: 12/7/2023   Date of Service: 12/11/2023 I Hospital Day: 4    Assessment/Plan   * Sepsis (HCC)  Assessment & Plan  As evidenced by tachycardia, leukocytosis, MARCELO on CKD  In the setting of UTI  Appropriate fluid resuscitation given  Was initially on ceftriaxone but is growing Pseudomonas - switched to cefepime 12/10  Procalcitonin 4.47/3.35    Severe protein-calorie malnutrition (HCC)  Assessment & Plan  Malnutrition Findings:   Adult Malnutrition type: Chronic illness  Adult Degree of Malnutrition: Other severe protein calorie malnutrition  Malnutrition Characteristics: Inadequate energy, Weight loss                  360 Statement: Related to medical condition as evidenced by 8.7% weight loss over the past 3 months and <75% energy intake needs met >1 month treated with pending diet advancement    BMI Findings:           Body mass index is 28.59 kg/m².     Nutrition care    Peptic ulcer  Assessment & Plan    EGD revealed up to 10 ulcers  Continue PPI  Monitor hemoglobin    Urinary retention  Assessment & Plan  Patient has had ongoing retention issues since hip revision in the beginning of October, has had outpatient cystoscopy, and intermittent Herrera catheterizations  CT revealed hydroureteronephrosis with MARCELO on CKD  Herrera catheter placed in the ED  Urology consulted  Maintain herrera catheter  No plan for urgent Urologic surgical intervention at this time  OP follow up    Hydronephrosis  Assessment & Plan  Related to urinary retention  Herrera in place  OP fu with urology    GI bleed  Assessment & Plan  Patient reports dark tarry stool and dark emesis  Has been maintained on aspirin for extended period of time without any history of GI bleed  Patient's baseline anemic likely in setting of chronic disease and she seems to be within her baseline  Continue PPI  GI consulted  EGD-3 cm hiatal  hernia  Grade C esophagitis in the lower third of the esophagus  10 large ulcers in the stomach and duodenum with clean base  Follow up H-pylori  Hb stable, monitor closely      Acute cystitis with hematuria  Assessment & Plan  UA grossly positive  urine culture-Pseudomonas aeruginosa  Continue cefepime -this was a started on 12/10, plan for at least 3-5 days of therapy.  The patient does have several allergies including to quinolones which makes transition to p.o. alternative difficult    Acute kidney injury superimposed on chronic kidney disease   Assessment & Plan  Lab Results   Component Value Date    EGFR 23 12/11/2023    EGFR 21 12/10/2023    EGFR 18 12/09/2023    CREATININE 1.95 (H) 12/11/2023    CREATININE 2.12 (H) 12/10/2023    CREATININE 2.41 (H) 12/09/2023   Baseline creatinine appears to be about 1.4  Peaked at 3.69 on admission  Likely in the setting of UTI and urinary retention  Carrera catheter in place   Continue to monitor and avoid nephrotox           VTE Pharmacologic Prophylaxis:   Pharmacologic: Pharmacologic VTE Prophylaxis contraindicated due to anemia  Mechanical VTE Prophylaxis in Place: Yes    Patient Centered Rounds: I have performed bedside rounds with nursing staff today.    Discussions with Specialists or Other Care Team Provider: Discussed with care management team    Education and Discussions with Family / Patient:   Patient, I have attempted to call relative BOSTON PEREZ listed on chart on 12/11 @ 3:36Pm - did not , voicemail not set up so did not leave message due to privacy issues    Time Spent for Care: 45 minutes.  More than 50% of total time spent on counseling and coordination of care as described above.    Current Length of Stay: 4 day(s)    Current Patient Status: Inpatient   Certification Statement: The patient will continue to require additional inpatient hospital stay due to need for IV antibiotics    Discharge Plan: 48h    Code Status: Level 1 - Full  Code      Subjective:     Patient already this morning.  Denies any chest pain nausea vomiting.  Feels overall tired and fatigued  Hemoglobin acceptable, she denies any bleeding.  Debi seems to be working well.    Objective:     Vitals:   Temp (24hrs), Av °F (37.2 °C), Min:98.8 °F (37.1 °C), Max:99.1 °F (37.3 °C)    Temp:  [98.8 °F (37.1 °C)-99.1 °F (37.3 °C)] 99.1 °F (37.3 °C)  HR:  [74-83] 83  Resp:  [17] 17  BP: (124-132)/(60-63) 127/60  SpO2:  [98 %-100 %] 100 %  Body mass index is 28.59 kg/m².     Input and Output Summary (last 24 hours):       Intake/Output Summary (Last 24 hours) at 2023 1543  Last data filed at 2023 0900  Gross per 24 hour   Intake 180 ml   Output 2600 ml   Net -2420 ml       Physical Exam:     Physical Exam  Vitals and nursing note reviewed.   Constitutional:       Appearance: Normal appearance. She is normal weight.      Comments: Female in bed, awake   HENT:      Head: Normocephalic and atraumatic.      Right Ear: External ear normal.      Left Ear: External ear normal.      Nose: Nose normal. No congestion.      Mouth/Throat:      Mouth: Mucous membranes are moist.      Pharynx: Oropharynx is clear. No oropharyngeal exudate or posterior oropharyngeal erythema.   Eyes:      General: No scleral icterus.        Right eye: No discharge.         Left eye: No discharge.      Extraocular Movements: Extraocular movements intact.      Conjunctiva/sclera: Conjunctivae normal.      Pupils: Pupils are equal, round, and reactive to light.   Cardiovascular:      Rate and Rhythm: Normal rate and regular rhythm.      Pulses: Normal pulses.      Heart sounds: Normal heart sounds. No murmur heard.     No friction rub. No gallop.   Pulmonary:      Effort: Pulmonary effort is normal. No respiratory distress.      Breath sounds: Normal breath sounds. No stridor. No wheezing, rhonchi or rales.   Chest:      Chest wall: No tenderness.   Abdominal:      General: Abdomen is flat. Bowel sounds  are normal. There is no distension.      Palpations: Abdomen is soft. There is no mass.      Tenderness: There is no abdominal tenderness. There is no guarding or rebound.   Genitourinary:     Comments: Carrera catheter in place  Musculoskeletal:         General: No swelling, tenderness, deformity or signs of injury. Normal range of motion.      Cervical back: Normal range of motion and neck supple. No rigidity. No muscular tenderness.   Skin:     General: Skin is warm and dry.      Capillary Refill: Capillary refill takes less than 2 seconds.      Coloration: Skin is not jaundiced or pale.      Findings: No bruising, erythema, lesion or rash.   Neurological:      General: No focal deficit present.      Mental Status: She is alert and oriented to person, place, and time. Mental status is at baseline.      Cranial Nerves: No cranial nerve deficit.      Sensory: No sensory deficit.      Motor: No weakness.      Coordination: Coordination normal.   Psychiatric:         Mood and Affect: Mood normal.         Behavior: Behavior normal.         Thought Content: Thought content normal.         Judgment: Judgment normal.         Additional Data:     Labs:    Results from last 7 days   Lab Units 12/11/23  1439 12/08/23  1914 12/08/23  0750 12/07/23  1715 12/07/23  1004   WBC Thousand/uL  --   --  14.78*  --  21.09*   HEMOGLOBIN g/dL 8.3*   < > 7.3*   < > 8.1*   HEMATOCRIT % 25.3*   < > 21.4*   < > 24.1*   PLATELETS Thousands/uL  --   --  146*  --  202   BANDS PCT %  --   --   --   --  2   LYMPHO PCT %  --   --   --   --  3*   MONO PCT %  --   --   --   --  1*   EOS PCT %  --   --   --   --  0    < > = values in this interval not displayed.     Results from last 7 days   Lab Units 12/11/23  0524 12/09/23  0514 12/08/23  0411   SODIUM mmol/L 134*   < > 135   POTASSIUM mmol/L 3.7   < > 4.1   CHLORIDE mmol/L 102   < > 106   CO2 mmol/L 24   < > 21   BUN mg/dL 39*   < > 72*   CREATININE mg/dL 1.95*   < > 2.91*   ANION GAP mmol/L 8    < > 8   CALCIUM mg/dL 7.6*   < > 7.5*   ALBUMIN g/dL  --   --  2.5*   TOTAL BILIRUBIN mg/dL  --   --  0.63   ALK PHOS U/L  --   --  154*   ALT U/L  --   --  44   AST U/L  --   --  48*   GLUCOSE RANDOM mg/dL 101   < > 102    < > = values in this interval not displayed.     Results from last 7 days   Lab Units 12/07/23  1004   INR  1.25*             Results from last 7 days   Lab Units 12/08/23  0410 12/07/23  1312 12/07/23  1004   LACTIC ACID mmol/L  --   --  1.6   PROCALCITONIN ng/ml 3.35* 4.47*  --            * I Have Reviewed All Lab Data Listed Above.  * Additional Pertinent Lab Tests Reviewed: All Labs For Current Hospital Admission Reviewed      Recent Cultures (last 7 days):     Results from last 7 days   Lab Units 12/07/23  1329 12/07/23  1312 12/07/23  1311   BLOOD CULTURE  No Growth at 72 hrs. No Growth at 72 hrs.  --    URINE CULTURE   --   --  >100,000 cfu/ml Pseudomonas aeruginosa*  <10,000 cfu/ml Gram Negative Paul Enteric Like*       Last 24 Hours Medication List:   Current Facility-Administered Medications   Medication Dose Route Frequency Provider Last Rate    acetaminophen  650 mg Oral Q6H PRN PUJA Sagastume      aluminum-magnesium hydroxide-simethicone  30 mL Oral Q6H PRN PUJA Sagastume      calcium carbonate  1 tablet Oral Daily With Breakfast PUJA Sagastume      cefepime  1,000 mg Intravenous Q12H PUJA Sagastume 1,000 mg (12/11/23 1222)    docusate sodium  100 mg Oral BID PUJA Sagastume      levothyroxine  75 mcg Oral Daily PUJA Sagastume      ondansetron  4 mg Intravenous Q6H PRN PUJA Sagastume      pantoprazole  40 mg Oral BID AC PUJA Sagastume      pravastatin  40 mg Oral Daily With Dinner PUJA Sagastume      psyllium  1 packet Oral Daily PUJA Sagastume          Today, Patient Was Seen By: Kevin Valenzuela MD    ** Please Note: Dictation voice to text software may have been used in the creation of this  document. **

## 2023-12-11 NOTE — TELEPHONE ENCOUNTER
Patient seen at [facility] for urinary retention, UTI, bilateral hydro, MARCELO. Herrera placed. Please schedule void trial after 2 weeks. US ordered in 2 weeks to ensure resolution of hydro. If fails, AP follow up to discuss long term herrera catheter.

## 2023-12-11 NOTE — PROGRESS NOTES
"Progress Note - Urology  Samira Allred 1940, 83 y.o. female MRN: [id number]    Unit/Bed#: -[facility] Encounter: [id number]      Assessment & Plan:  Urinary retention  Bilateral hydronephrosis  MARCELO  - 83-year-old known to our practice history of urinary retention since October s/p hip revision. Hx of hematuria s/p negative cystoscopy.  - MARCELO improving 1.95, on admission 3.69. Baseline 1.4  - Urine cx pseudomonas aeruginosa  - US as outpatient to f/u on hydronephrosis s/p herrera placement  - Patient has now had herrera replaced 2 times. Recommend maintaining herrera for bladder rest. Patient reluctant to have herrera long term. Can attempt one more TOV after 2 weeks bladder rest. If fails recommend long term herrera catheter. Outpatient follow up.   - Urology will sign off. Please do not hesitate to reach out with any questions or concerns.     Subjective:   HPI: No complaints with herrera. Reports black BM this AM    Review of Systems   Constitutional:  Negative for chills and fever.   Respiratory:  Negative for cough and shortness of breath.    Cardiovascular:  Negative for chest pain and palpitations.   Gastrointestinal:  Negative for abdominal pain and vomiting.   Genitourinary:  Negative for dysuria and hematuria.   Musculoskeletal:  Negative for arthralgias and back pain.   Skin:  Negative for color change and rash.   Neurological:  Negative for seizures and syncope.   All other systems reviewed and are negative.      Objective:    Vitals: Blood pressure 132/61, pulse 74, temperature 98.8 °F (37.1 °C), resp. rate 17, height 5' 2\" (1.575 m), weight 70.9 kg (156 lb 4.9 oz), SpO2 99 %, not currently breastfeeding.,Body mass index is 28.59 kg/m².    Physical Exam  Vitals reviewed.   Constitutional:       General: She is not in acute distress.     Appearance: Normal appearance. She is normal weight. She is not ill-appearing, toxic-appearing or diaphoretic.   HENT:      Head: Normocephalic and atraumatic.      Right Ear: " "External ear normal.      Left Ear: External ear normal.      Nose: Nose normal.      Mouth/Throat:      Mouth: Mucous membranes are moist.   Eyes:      General: No scleral icterus.     Conjunctiva/sclera: Conjunctivae normal.   Cardiovascular:      Rate and Rhythm: Normal rate.      Pulses: Normal pulses.   Pulmonary:      Effort: Pulmonary effort is normal.   Abdominal:      General: Abdomen is flat. There is no distension.      Palpations: Abdomen is soft.      Tenderness: There is no abdominal tenderness. There is no right CVA tenderness, left CVA tenderness or guarding.      Hernia: No hernia is present.   Genitourinary:     Comments: Carrera draining yellow urine with some cloudy sediment.   Musculoskeletal:         General: Normal range of motion.      Cervical back: Normal range of motion.   Skin:     General: Skin is warm.      Findings: No rash.   Neurological:      General: No focal deficit present.      Mental Status: She is alert and oriented to person, place, and time. Mental status is at baseline.   Psychiatric:         Mood and Affect: Mood normal.         Behavior: Behavior normal.         Thought Content: Thought content normal.         Judgment: Judgment normal.         Labs:  No results for input(s): \"WBC\" in the last 72 hours.    Recent Labs     12/08/23 1914 12/09/23  0008 12/09/23  0514 12/09/23  1615 12/10/23  0019 12/11/23  0524   HGB 8.4* 7.0* 6.7* 8.4* 7.5* 7.6*     Recent Labs     12/08/23  1914 12/09/23  0008 12/09/23  0514 12/09/23  1615 12/10/23  0019 12/11/23  0524   HCT 25.2* 20.5* 19.0* 25.1* 22.2* 23.0*     Recent Labs     12/09/23  0514 12/10/23  0728 12/11/23  0524   CREATININE 2.41* 2.12* 1.95*         History:    Past Medical History:   Diagnosis Date    Allergic angioedema     [id number] resolved    Angioedema     [id number] resolved    Arthritis     Breast cancer (HCC) 2012    right     Chronic kidney disease     Disease of thyroid gland     Hemorrhage of anus and rectum     " [id number] resolved    Hyperlipidemia     Hypertension     Hypocalcemia     [id number] resolved    Neuritis     thoracic and lumbosacral    Peptic ulcer     Rheumatoid arthritis (HCC)     Stage 3b chronic kidney disease (HCC) 5/7/2021    Vitamin D deficiency      Social History     Socioeconomic History    Marital status:      Spouse name: None    Number of children: None    Years of education: None    Highest education level: None   Occupational History    None   Tobacco Use    Smoking status: Never     Passive exposure: Past    Smokeless tobacco: Never   Vaping Use    Vaping Use: Never used   Substance and Sexual Activity    Alcohol use: Not Currently    Drug use: No    Sexual activity: Not Currently     Partners: Male   Other Topics Concern    None   Social History Narrative    Active: caffeine use     Social Determinants of Health     Financial Resource Strain: Low Risk  (11/21/2022)    Overall Financial Resource Strain (CARDIA)     Difficulty of Paying Living Expenses: Not very hard   Food Insecurity: No Food Insecurity (11/6/2023)    Hunger Vital Sign     Worried About Running Out of Food in the Last Year: Never true     Ran Out of Food in the Last Year: Never true   Transportation Needs: No Transportation Needs (11/6/2023)    PRAPARE - Transportation     Lack of Transportation (Medical): No     Lack of Transportation (Non-Medical): No   Physical Activity: Not on file   Stress: Not on file   Social Connections: Not on file   Intimate Partner Violence: Not on file   Housing Stability: Low Risk  (11/6/2023)    Housing Stability Vital Sign     Unable to Pay for Housing in the Last Year: No     Number of Places Lived in the Last Year: 1     Unstable Housing in the Last Year: No     Past Surgical History:   Procedure Laterality Date    ABDOMINAL SURGERY      BREAST BIOPSY Right 2012    BREAST LUMPECTOMY Right 2012    BREAST SURGERY      CHOLECYSTECTOMY      ELBOW SURGERY      GALLBLADDER SURGERY  1981    HIP  SURGERY Right 09/18/2022    10/2023    ORTHOPEDIC SURGERY      for toes    DE CYSTO W/IRRIG & EVAC MULTPLE OBSTRUCTING CLOTS N/A 11/5/2023    Procedure: CYSTOSCOPY EVACUATION OF CLOTS & fulguration;  Surgeon: Uriah Schwartz MD;  Location: [facility];  Service: Urology    TRANSURETHRAL RESECTION OF BLADDER TUMOR N/A 11/5/2023    Procedure: TRANSURETHRAL RESECTION OF BLADDER TUMOR (TURBT);  Surgeon: Uriah Schwartz MD;  Location: [facility];  Service: Urology    US GUIDED BREAST BIOPSY RIGHT COMPLETE Right 08/15/2022     Family History   Problem Relation Age of Onset    Breast cancer Mother     Kidney failure Father     Other Father         uremia    Lung cancer Brother     Breast cancer Maternal Aunt     Breast cancer Maternal Aunt     Breast cancer Maternal Aunt     Breast cancer Maternal Aunt     Breast cancer Maternal Aunt     Stomach cancer Maternal Uncle        Luiza Fountain PA-C  Date: 12/11/2023 Time: 9:36 AM

## 2023-12-11 NOTE — ASSESSMENT & PLAN NOTE
Lab Results   Component Value Date    EGFR 23 12/11/2023    EGFR 21 12/10/2023    EGFR 18 12/09/2023    CREATININE 1.95 (H) 12/11/2023    CREATININE 2.12 (H) 12/10/2023    CREATININE 2.41 (H) 12/09/2023   Baseline creatinine appears to be about 1.4  Peaked at 3.69 on admission  Likely in the setting of UTI and urinary retention  Carrera catheter in place   Continue to monitor and avoid nephrotox

## 2023-12-11 NOTE — CASE MANAGEMENT
Case Management Assessment & Discharge Planning Note    Patient name Samira Allred  Location /-01 MRN [id number]  : 1940 Date 2023       Current Admission Date: 2023  Current Admission Diagnosis:Sepsis ([facility])   Patient Active Problem List    Diagnosis Date Noted    Peptic ulcer 2023    Aortic valve stenosis, moderate 2023    Severe protein-calorie malnutrition (HCC) 2023    GI bleed 2023    Hydronephrosis 2023    Urinary retention 2023    Sepsis (HCC) 2023    Acute cystitis with hematuria 2023    Acute kidney injury superimposed on chronic kidney disease  2023    Transaminitis 2023    Protein-calorie malnutrition, unspecified severity (HCC) 2023    Chronic renal disease, stage IV ([facility]) 2023    Chronic pain of both knees 08/10/2022    Chronic kidney disease-mineral and bone disorder 2022    Stage 3b chronic kidney disease (HCC) 2021    History of DVT (deep vein thrombosis) 2021    Varicose veins of left lower extremity with pain 2020    Lower leg DVT (deep venous thromboembolism), chronic, left ([facility]) 2020    Intermediate stage nonexudative 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 eyes 2019    History of peptic ulcer 10/11/2018    Fat necrosis (segmental) of breast 2018    Personal history of breast cancer 2018    Abnormal finding on diagnostic imaging of kidney 2018    Localized edema 2018    Hypothyroidism 2018    Hyperuricemia 2018    Hypertension 2018    Rheumatoid arthritis (HCC) 2018    Diastolic dysfunction 2017    Vitamin D deficiency 2014    Lumbar radiculopathy 2013    Hyperlipidemia 2013    Osteopenia 2013    Other chronic pain 2013    Adenocarcinoma of breast (HCC) 2013      LOS (days): 4  Geometric Mean LOS (GMLOS) (days): 2.10  Days to GMLOS:-1.9     OBJECTIVE:  PATIENT  READMITTED TO HOSPITAL  Risk of Unplanned Readmission Score: 22.91         Current admission status: Inpatient       Preferred Pharmacy:   [facility] #[id number] - [place] - [address]  [address]  [place] [address]  Phone: [phone] Fax: [phone]    [facility] - [place] - [address]  [address]  [place] [address]  Phone: [phone] Fax: [phone]    Primary Care Provider: Layne Bacon MD    Primary Insurance: MEDICARE  Secondary Insurance: BLUE CROSS    ASSESSMENT:  Active Health Care Proxies    There are no active Health Care Proxies on file.       Advance Directives  Does patient have a Health Care POA?: Yes  Does patient have Advance Directives?: Yes  Advance Directives: Living will, Power of  for health care  Primary Contact: eliu Bass         Readmission Root Cause  30 Day Readmission: Yes  Who directed you to return to the hospital?: Self  Did you understand whom to contact if you had questions or problems?: Yes  Did you get your prescriptions before you left the hospital?: No  Reason:: Preference for own pharmacy  Were you able to get your prescriptions filled when you left the hospital?: Yes  Did you take your medications as prescribed?: Yes  Were you able to get to your follow-up appointments?: Yes  During previous admission, was a post-acute recommendation made?: Yes  What post-acute resources were offered?: STR  Patient was readmitted due to: DARK TARRY STOOLS AND DARK EMESIS  Action Plan: GI CONSULT    Patient Information  Admitted from:: Home  Mental Status: Alert  During Assessment patient was accompanied by: Not accompanied during assessment  Assessment information provided by:: Patient  Primary Caregiver: Self  Support Systems: Family members, Other (Comment) (eliu Bass and roommate Pat)  County of Residence: [place]  What city do you live in?: [place]  Home entry access  options. Select all that apply.: Ramp  Type of Current Residence: Trailer Home  Living Arrangements: Other (Comment) (lives with roommate Pat)  Is patient a ?: No    Activities of Daily Living Prior to Admission  Functional Status: Independent  Completes ADLs independently?: Yes  Ambulates independently?: Yes  Does patient use assisted devices?: Yes  Assisted Devices (DME) used: Walker  Does patient currently own DME?: Yes  What DME does the patient currently own?: Walker, Bedside Commode  Does patient have a history of Outpatient Therapy (PT/OT)?: No  Does the patient have a history of Short-Term Rehab?: Yes ([facility])  Does patient have a history of HHC?: Yes  Does patient currently have HHC?: Yes    Current Home Health Care  Type of Current Home Care Services: Home PT, Nurse visit  Current Home Health Agency:: Other (please enter name in comment) ([facility])  Current Home Health Follow-Up Provider:: PCP    Patient Information Continued  Income Source: Unemployed  Does patient have prescription coverage?: Yes  Does patient receive dialysis treatments?: No  Does patient have a history of substance abuse?: No  Does patient have a history of Mental Health Diagnosis?: No    PHQ 2/9 Screening   Reviewed PHQ 2/9 Depression Screening Score?: No    Means of Transportation  Means of Transport to Appts:: Drives Self      Housing Stability: Low Risk  (11/6/2023)    Housing Stability Vital Sign     Unable to Pay for Housing in the Last Year: No     Number of Places Lived in the Last Year: 1     Unstable Housing in the Last Year: No   Food Insecurity: No Food Insecurity (11/6/2023)    Hunger Vital Sign     Worried About Running Out of Food in the Last Year: Never true     Ran Out of Food in the Last Year: Never true   Transportation Needs: No Transportation Needs (11/6/2023)    PRAPARE - Transportation     Lack of Transportation (Medical): No     Lack of Transportation (Non-Medical): No   Utilities: Not on file        DISCHARGE DETAILS:    Discharge planning discussed with:: patient  Freedom of Choice: Yes  Comments - Freedom of Choice: CM discussed d/c needs and pt informs CM that she is IPTA and still drives.  She states that she has been walking around the room without issue and wants to be discharged home and resume services for PT/Nursing through [facility].  She lives with a male roommate Pat.  CM contacted family/caregiver?: No- see comments (pt will update her nephew Kali who called during the assess ment)  Were Treatment Team discharge recommendations reviewed with patient/caregiver?: Yes  Did patient/caregiver verbalize understanding of patient care needs?: Yes  Were patient/caregiver advised of the risks associated with not following Treatment Team discharge recommendations?: Yes    Contacts  Patient Contacts: Kali  Relationship to Patient:: Family    Requested Home Health Care         Is the patient interested in HHC at discharge?: Yes  Home Health Discipline requested:: Nursing, Physical Therapy  Home Health Agency Name:: [facility]  HHA External Referral Reason (only applicable if external HHA name selected): Patient has established relationship with provider  Home Health Follow-Up Provider:: PCP  Home Health Services Needed:: Evaluate Functional Status and Safety, Gait/ADL Training, Strengthening/Theraputic Exercises to Improve Function  Homebound Criteria Met:: Requires the Assistance of Another Person for Safe Ambulation or to Leave the Home, Uses an Assist Device (i.e. cane, walker, etc)  Supporting Clincal Findings:: Limited Endurance, Fatigues Easliy in Short Distances    DME Referral Provided  Referral made for DME?: No    Other Referral/Resources/Interventions Provided:  Interventions: HHC  Referral Comments: [facility] pended.  Pt is a tentative d/c in 48 hrs per SLIM during rounds.  Pt remains on IV abx and hgb is still low.  CM will continue to follow    Would you like to participate in our  [facility] service program?  : No - Declined    Treatment Team Recommendation: Home with Home Health Care  Discharge Destination Plan:: Home with Home Health Care  Transport at Discharge : Family

## 2023-12-11 NOTE — ASSESSMENT & PLAN NOTE
As evidenced by tachycardia, leukocytosis, MARCELO on CKD  In the setting of UTI  Appropriate fluid resuscitation given  Was initially on ceftriaxone but is growing Pseudomonas - switched to cefepime 12/10  Procalcitonin 4.47/3.35

## 2023-12-11 NOTE — PLAN OF CARE
Problem: PAIN - ADULT  Goal: Verbalizes/displays adequate comfort level or baseline comfort level  Description: Interventions:  - Encourage patient to monitor pain and request assistance  - Assess pain using appropriate pain scale  - Administer analgesics based on type and severity of pain and evaluate response  - Implement non-pharmacological measures as appropriate and evaluate response  - Consider cultural and social influences on pain and pain management  - Notify physician/advanced practitioner if interventions unsuccessful or patient reports new pain  Outcome: Progressing     Problem: Nutrition/Hydration-ADULT  Goal: Nutrient/Hydration intake appropriate for improving, restoring or maintaining nutritional needs  Description: Monitor and assess patient's nutrition/hydration status for malnutrition. Collaborate with interdisciplinary team and initiate plan and interventions as ordered.  Monitor patient's weight and dietary intake as ordered or per policy. Utilize nutrition screening tool and intervene as necessary. Determine patient's food preferences and provide high-protein, high-caloric foods as appropriate.     INTERVENTIONS:  - Monitor oral intake, urinary output, labs, and treatment plans  - Assess nutrition and hydration status and recommend course of action  - Evaluate amount of meals eaten  - Assist patient with eating if necessary   - Allow adequate time for meals  - Recommend/ encourage appropriate diets, oral nutritional supplements, and vitamin/mineral supplements  - Order, calculate, and assess calorie counts as needed  - Recommend, monitor, and adjust tube feedings and TPN/PPN based on assessed needs  - Assess need for intravenous fluids  - Provide specific nutrition/hydration education as appropriate  - Include patient/family/caregiver in decisions related to nutrition  Outcome: Progressing

## 2023-12-12 LAB
ANION GAP SERPL CALCULATED.3IONS-SCNC: 8 MMOL/L
BACTERIA BLD CULT: NORMAL
BACTERIA BLD CULT: NORMAL
BASOPHILS # BLD AUTO: 0.03 THOUSANDS/ÂΜL (ref 0–0.1)
BASOPHILS NFR BLD AUTO: 0 % (ref 0–1)
BUN SERPL-MCNC: 35 MG/DL (ref 5–25)
CALCIUM SERPL-MCNC: 7.5 MG/DL (ref 8.4–10.2)
CHLORIDE SERPL-SCNC: 104 MMOL/L (ref 96–108)
CO2 SERPL-SCNC: 23 MMOL/L (ref 21–32)
CREAT SERPL-MCNC: 1.89 MG/DL (ref 0.6–1.3)
EOSINOPHIL # BLD AUTO: 0.33 THOUSAND/ÂΜL (ref 0–0.61)
EOSINOPHIL NFR BLD AUTO: 3 % (ref 0–6)
ERYTHROCYTE [DISTWIDTH] IN BLOOD BY AUTOMATED COUNT: 20 % (ref 11.6–15.1)
GFR SERPL CREATININE-BSD FRML MDRD: 24 ML/MIN/1.73SQ M
GLUCOSE SERPL-MCNC: 92 MG/DL (ref 65–140)
HCT VFR BLD AUTO: 22.3 % (ref 34.8–46.1)
HGB BLD-MCNC: 7.3 G/DL (ref 11.5–15.4)
IMM GRANULOCYTES # BLD AUTO: 0.11 THOUSAND/UL (ref 0–0.2)
IMM GRANULOCYTES NFR BLD AUTO: 1 % (ref 0–2)
LYMPHOCYTES # BLD AUTO: 1.05 THOUSANDS/ÂΜL (ref 0.6–4.47)
LYMPHOCYTES NFR BLD AUTO: 11 % (ref 14–44)
MCH RBC QN AUTO: 30 PG (ref 26.8–34.3)
MCHC RBC AUTO-ENTMCNC: 32.7 G/DL (ref 31.4–37.4)
MCV RBC AUTO: 92 FL (ref 82–98)
MONOCYTES # BLD AUTO: 0.85 THOUSAND/ÂΜL (ref 0.17–1.22)
MONOCYTES NFR BLD AUTO: 9 % (ref 4–12)
NEUTROPHILS # BLD AUTO: 7.24 THOUSANDS/ÂΜL (ref 1.85–7.62)
NEUTS SEG NFR BLD AUTO: 76 % (ref 43–75)
NRBC BLD AUTO-RTO: 0 /100 WBCS
PLATELET # BLD AUTO: 252 THOUSANDS/UL (ref 149–390)
PMV BLD AUTO: 10.4 FL (ref 8.9–12.7)
POTASSIUM SERPL-SCNC: 3.6 MMOL/L (ref 3.5–5.3)
RBC # BLD AUTO: 2.43 MILLION/UL (ref 3.81–5.12)
SODIUM SERPL-SCNC: 135 MMOL/L (ref 135–147)
WBC # BLD AUTO: 9.61 THOUSAND/UL (ref 4.31–10.16)

## 2023-12-12 PROCEDURE — 99232 SBSQ HOSP IP/OBS MODERATE 35: CPT | Performed by: INTERNAL MEDICINE

## 2023-12-12 PROCEDURE — 80048 BASIC METABOLIC PNL TOTAL CA: CPT | Performed by: INTERNAL MEDICINE

## 2023-12-12 PROCEDURE — 85025 COMPLETE CBC W/AUTO DIFF WBC: CPT | Performed by: INTERNAL MEDICINE

## 2023-12-12 RX ORDER — SODIUM CHLORIDE 9 MG/ML
75 INJECTION, SOLUTION INTRAVENOUS CONTINUOUS
Status: DISCONTINUED | OUTPATIENT
Start: 2023-12-12 | End: 2023-12-15

## 2023-12-12 RX ADMIN — PANTOPRAZOLE SODIUM 40 MG: 40 TABLET, DELAYED RELEASE ORAL at 15:56

## 2023-12-12 RX ADMIN — LEVOTHYROXINE SODIUM 75 MCG: 25 TABLET ORAL at 09:46

## 2023-12-12 RX ADMIN — SODIUM CHLORIDE 75 ML/HR: 0.9 INJECTION, SOLUTION INTRAVENOUS at 11:18

## 2023-12-12 RX ADMIN — PRAVASTATIN SODIUM 40 MG: 40 TABLET ORAL at 15:56

## 2023-12-12 RX ADMIN — DOCUSATE SODIUM 100 MG: 100 CAPSULE, LIQUID FILLED ORAL at 16:00

## 2023-12-12 RX ADMIN — CALCIUM 1 TABLET: 500 TABLET ORAL at 09:47

## 2023-12-12 RX ADMIN — CEFEPIME HYDROCHLORIDE 1000 MG: 1 INJECTION, SOLUTION INTRAVENOUS at 11:18

## 2023-12-12 RX ADMIN — PANTOPRAZOLE SODIUM 40 MG: 40 TABLET, DELAYED RELEASE ORAL at 09:47

## 2023-12-12 RX ADMIN — CEFEPIME HYDROCHLORIDE 1000 MG: 1 INJECTION, SOLUTION INTRAVENOUS at 23:17

## 2023-12-12 RX ADMIN — SODIUM CHLORIDE 75 ML/HR: 0.9 INJECTION, SOLUTION INTRAVENOUS at 23:17

## 2023-12-12 NOTE — PROGRESS NOTES
[facility]  Progress Note  Name: Samira Allred I  MRN: [id number]  Unit/Bed#: -01 I Date of Admission: 12/7/2023   Date of Service: 12/12/2023 I Hospital Day: 5    Assessment/Plan   * Sepsis (HCC)  Assessment & Plan    Presented initially with leukocytosis and tachycardia in the setting of UTI  Clinically improving on IV antibiotics  Currently on IV cefepime Pseudomonas UTI continue for now  Blood cultures remain negative, leukocytosis and since resolved.  Currently afebrile  Monitor WBC count    Severe protein-calorie malnutrition (HCC)  Assessment & Plan  Malnutrition Findings:   Adult Malnutrition type: Chronic illness  Adult Degree of Malnutrition: Other severe protein calorie malnutrition  Malnutrition Characteristics: Inadequate energy, Weight loss                  360 Statement: Related to medical condition as evidenced by 8.7% weight loss over the past 3 months and <75% energy intake needs met >1 month treated with pending diet advancement    BMI Findings:           Body mass index is 28.59 kg/m².         Hydronephrosis  Assessment & Plan  Secondary to urinary retention  Carrera catheter was placed will need Carrera catheter at discharge and outpatient follow-up with urology    GI bleed  Assessment & Plan    Patient initially presented with dark-tarry stool and hematemesis  GI consulted EGD was performed on 12/8 which revealed esophagitis and 10 large clean-based ulcers in the stomach and duodenum  Continue Protonix 40 mg twice daily  Patient did report black stool yesterday night but no further black stool this morning.  Hemoglobin currently at 7.3  Will monitor for now if recurs will consider reevaluation by GI    Acute cystitis with hematuria  Assessment & Plan  Urinalysis revealed leukocytes and bacteria  Urine culture revealed Pseudomonas  Continue IV cefepime currently on day 2.  Patient noted to have allergies as well to multiple antibiotics which will make it difficult to  transition to oral antibiotic therapy  Anticipate 3 to 5 days of antibiotic therapy  Continue IV cefepime for now       VTE Pharmacologic Prophylaxis:   Pharmacologic: Pharmacologic VTE Prophylaxis contraindicated due to gi bleed  Mechanical VTE Prophylaxis in Place: Yes    Patient Centered Rounds: I have performed bedside rounds with nursing staff today.    Discussions with Specialists or Other Care Team Provider: cm, nursing    Education and Discussions with Family / Patient: pt, declined family update    Time Spent for Care: 30 minutes.  More than 50% of total time spent on counseling and coordination of care as described above.    Current Length of Stay: 5 day(s)    Current Patient Status: Inpatient   Certification Statement: The patient will continue to require additional inpatient hospital stay due to see below    Discharge Plan: pending further monitoring of hgb black stool and still requriing iv abx. Anticipate dc in approx 48 hrs    Code Status: Level 1 - Full Code      Subjective:   Denies cp, sob, cough, fevers    Objective:     Vitals:   Temp (24hrs), Av.7 °F (37.1 °C), Min:98.3 °F (36.8 °C), Max:99.1 °F (37.3 °C)    Temp:  [98.3 °F (36.8 °C)-99.1 °F (37.3 °C)] 98.3 °F (36.8 °C)  HR:  [72-90] 72  Resp:  [17-19] 19  BP: (114-127)/(57-62) 124/57  SpO2:  [97 %-100 %] 100 %  Body mass index is 28.59 kg/m².     Input and Output Summary (last 24 hours):       Intake/Output Summary (Last 24 hours) at 2023 1030  Last data filed at 2023 0930  Gross per 24 hour   Intake 620 ml   Output 2350 ml   Net -1730 ml       Physical Exam:     Physical Exam  Constitutional:       General: She is not in acute distress.     Appearance: She is well-developed. She is not diaphoretic.   HENT:      Head: Normocephalic and atraumatic.      Nose: Nose normal.      Mouth/Throat:      Pharynx: No oropharyngeal exudate.   Eyes:      General: No scleral icterus.        Right eye: No discharge.         Left eye: No  discharge.      Conjunctiva/sclera: Conjunctivae normal.   Neck:      Thyroid: No thyromegaly.      Vascular: No JVD.   Cardiovascular:      Rate and Rhythm: Normal rate and regular rhythm.      Heart sounds: Normal heart sounds. No murmur heard.     No friction rub. No gallop.   Pulmonary:      Effort: Pulmonary effort is normal. No respiratory distress.      Breath sounds: Normal breath sounds. No wheezing or rales.   Chest:      Chest wall: No tenderness.   Abdominal:      General: Bowel sounds are normal. There is no distension.      Palpations: Abdomen is soft.      Tenderness: There is no abdominal tenderness. There is no guarding or rebound.   Musculoskeletal:         General: No tenderness or deformity. Normal range of motion.      Cervical back: Normal range of motion and neck supple.   Skin:     General: Skin is warm and dry.      Findings: No erythema or rash.   Neurological:      Mental Status: She is alert. Mental status is at baseline.      Cranial Nerves: No cranial nerve deficit.      Sensory: No sensory deficit.      Motor: No abnormal muscle tone.      Coordination: Coordination normal.         Additional Data:     Labs:    Results from last 7 days   Lab Units 12/12/23  0930 12/07/23  1715 12/07/23  1004   WBC Thousand/uL 9.61   < > 21.09*   HEMOGLOBIN g/dL 7.3*   < > 8.1*   HEMATOCRIT % 22.3*   < > 24.1*   PLATELETS Thousands/uL 252   < > 202   BANDS PCT %  --   --  2   NEUTROS PCT % 76*  --   --    LYMPHS PCT % 11*  --   --    LYMPHO PCT %  --   --  3*   MONOS PCT % 9  --   --    MONO PCT %  --   --  1*   EOS PCT % 3  --  0    < > = values in this interval not displayed.     Results from last 7 days   Lab Units 12/12/23  0930 12/09/23  0514 12/08/23  0411   SODIUM mmol/L 135   < > 135   POTASSIUM mmol/L 3.6   < > 4.1   CHLORIDE mmol/L 104   < > 106   CO2 mmol/L 23   < > 21   BUN mg/dL 35*   < > 72*   CREATININE mg/dL 1.89*   < > 2.91*   ANION GAP mmol/L 8   < > 8   CALCIUM mg/dL 7.5*   < > 7.5*    ALBUMIN g/dL  --   --  2.5*   TOTAL BILIRUBIN mg/dL  --   --  0.63   ALK PHOS U/L  --   --  154*   ALT U/L  --   --  44   AST U/L  --   --  48*   GLUCOSE RANDOM mg/dL 92   < > 102    < > = values in this interval not displayed.     Results from last 7 days   Lab Units 12/07/23  1004   INR  1.25*             Results from last 7 days   Lab Units 12/08/23  0410 12/07/23  1312 12/07/23  1004   LACTIC ACID mmol/L  --   --  1.6   PROCALCITONIN ng/ml 3.35* 4.47*  --            * I Have Reviewed All Lab Data Listed Above.  * Additional Pertinent Lab Tests Reviewed: All Labs Within Last 24 Hours Reviewed    Imaging:    Imaging Reports Reviewed Today Include: na  Imaging Personally Reviewed by Myself Includes:  na    Recent Cultures (last 7 days):     Results from last 7 days   Lab Units 12/07/23  1329 12/07/23  1312 12/07/23  1311   BLOOD CULTURE  No Growth After 4 Days. No Growth After 4 Days.  --    URINE CULTURE   --   --  >100,000 cfu/ml Pseudomonas aeruginosa*  <10,000 cfu/ml Gram Negative Paul Enteric Like*       Last 24 Hours Medication List:   Current Facility-Administered Medications   Medication Dose Route Frequency Provider Last Rate    acetaminophen  650 mg Oral Q6H PRN PUJA Sagastume      aluminum-magnesium hydroxide-simethicone  30 mL Oral Q6H PRN PUJA Sagastume      calcium carbonate  1 tablet Oral Daily With Breakfast PUJA Sagastume      cefepime  1,000 mg Intravenous Q12H PUJA Sagastume 1,000 mg (12/11/23 2331)    docusate sodium  100 mg Oral BID PUJA Sagastume      levothyroxine  75 mcg Oral Daily PUJA Sagastume      ondansetron  4 mg Intravenous Q6H PRN PUJA Sagastume      pantoprazole  40 mg Oral BID AC PUJA Sagastume      pravastatin  40 mg Oral Daily With Dinner PUJA Sagastume      psyllium  1 packet Oral Daily PUJA Sagastume      sodium chloride  75 mL/hr Intravenous Continuous Emanuel Corley MD          Today, Patient Was  Seen By: Emanuel Corley MD    ** Please Note: Dictation voice to text software may have been used in the creation of this document. **

## 2023-12-12 NOTE — ASSESSMENT & PLAN NOTE
Malnutrition Findings:   Adult Malnutrition type: Chronic illness  Adult Degree of Malnutrition: Other severe protein calorie malnutrition  Malnutrition Characteristics: Inadequate energy, Weight loss                  360 Statement: Related to medical condition as evidenced by 8.7% weight loss over the past 3 months and <75% energy intake needs met >1 month treated with pending diet advancement    BMI Findings:           Body mass index is 28.59 kg/m².

## 2023-12-12 NOTE — ASSESSMENT & PLAN NOTE
Secondary to urinary retention  Carrera catheter was placed will need Carrera catheter at discharge and outpatient follow-up with urology

## 2023-12-12 NOTE — ASSESSMENT & PLAN NOTE
Presented initially with leukocytosis and tachycardia in the setting of UTI  Clinically improving on IV antibiotics  Currently on IV cefepime Pseudomonas UTI continue for now  Blood cultures remain negative, leukocytosis and since resolved.  Currently afebrile  Monitor WBC count

## 2023-12-12 NOTE — PLAN OF CARE
Problem: Potential for Falls  Goal: Patient will remain free of falls  Description: INTERVENTIONS:  - Educate patient/family on patient safety including physical limitations  - Instruct patient to call for assistance with activity   - Consult OT/PT to assist with strengthening/mobility   - Keep Call bell within reach  - Keep bed low and locked with side rails adjusted as appropriate  - Keep care items and personal belongings within reach  - Initiate and maintain comfort rounds  - Make Fall Risk Sign visible to staff  - Offer Toileting every 2 Hours, in advance of need  - Initiate/Maintain bed alarm  - Obtain necessary fall risk management equipment  - Apply yellow socks and bracelet for high fall risk patients  - Consider moving patient to room near nurses station  Outcome: Progressing

## 2023-12-12 NOTE — ASSESSMENT & PLAN NOTE
Urinalysis revealed leukocytes and bacteria  Urine culture revealed Pseudomonas  Continue IV cefepime currently on day 2.  Patient noted to have allergies as well to multiple antibiotics which will make it difficult to transition to oral antibiotic therapy  Anticipate 3 to 5 days of antibiotic therapy  Continue IV cefepime for now

## 2023-12-12 NOTE — ASSESSMENT & PLAN NOTE
Lab Results   Component Value Date    EGFR 24 12/12/2023    EGFR 23 12/11/2023    EGFR 21 12/10/2023    CREATININE 1.89 (H) 12/12/2023    CREATININE 1.95 (H) 12/11/2023    CREATININE 2.12 (H) 12/10/2023   Baseline creatinine approximately 1.4  Presented with creatinine of 3.7  Secondary to urinary retention  Since significantly improved with catheter placement now 1.9  Will place on IV fluids as possible component of prerenal etiology/volume depletion  Continue to monitor

## 2023-12-12 NOTE — PLAN OF CARE
Problem: Potential for Falls  Goal: Patient will remain free of falls  Description: INTERVENTIONS:  - Educate patient/family on patient safety including physical limitations  - Instruct patient to call for assistance with activity   - Consult OT/PT to assist with strengthening/mobility   - Keep Call bell within reach  - Keep bed low and locked with side rails adjusted as appropriate  - Keep care items and personal belongings within reach  - Initiate and maintain comfort rounds  - Make Fall Risk Sign visible to staff  - Offer Toileting every  Hours, in advance of need  - Initiate/Maintain alarm  - Obtain necessary fall risk management equipment:   - Apply yellow socks and bracelet for high fall risk patients  - Consider moving patient to room near nurses station  Outcome: Progressing     Problem: PAIN - ADULT  Goal: Verbalizes/displays adequate comfort level or baseline comfort level  Description: Interventions:  - Encourage patient to monitor pain and request assistance  - Assess pain using appropriate pain scale  - Administer analgesics based on type and severity of pain and evaluate response  - Implement non-pharmacological measures as appropriate and evaluate response  - Consider cultural and social influences on pain and pain management  - Notify physician/advanced practitioner if interventions unsuccessful or patient reports new pain  Outcome: Progressing     Problem: SAFETY ADULT  Goal: Patient will remain free of falls  Description: INTERVENTIONS:  - Educate patient/family on patient safety including physical limitations  - Instruct patient to call for assistance with activity   - Consult OT/PT to assist with strengthening/mobility   - Keep Call bell within reach  - Keep bed low and locked with side rails adjusted as appropriate  - Keep care items and personal belongings within reach  - Initiate and maintain comfort rounds  - Make Fall Risk Sign visible to staff  - Offer Toileting every  Hours, in advance  of need  - Initiate/Maintain alarm  - Obtain necessary fall risk management equipment:   - Apply yellow socks and bracelet for high fall risk patients  - Consider moving patient to room near nurses station  Outcome: Progressing  Goal: Maintain or return to baseline ADL function  Description: INTERVENTIONS:  -  Assess patient's ability to carry out ADLs; assess patient's baseline for ADL function and identify physical deficits which impact ability to perform ADLs (bathing, care of mouth/teeth, toileting, grooming, dressing, etc.)  - Assess/evaluate cause of self-care deficits   - Assess range of motion  - Assess patient's mobility; develop plan if impaired  - Assess patient's need for assistive devices and provide as appropriate  - Encourage maximum independence but intervene and supervise when necessary  - Involve family in performance of ADLs  - Assess for home care needs following discharge   - Consider OT consult to assist with ADL evaluation and planning for discharge  - Provide patient education as appropriate  Outcome: Progressing  Goal: Maintains/Returns to pre admission functional level  Description: INTERVENTIONS:  - Perform AM-PAC 6 Click Basic Mobility/ Daily Activity assessment daily.  - Set and communicate daily mobility goal to care team and patient/family/caregiver.   - Collaborate with rehabilitation services on mobility goals if consulted  - Perform Range of Motion  times a day.  - Reposition patient every  hours.  - Dangle patient  times a day  - Stand patient  times a day  - Ambulate patient  times a day  - Out of bed to chair  times a day   - Out of bed for meals  times a day  - Out of bed for toileting  - Record patient progress and toleration of activity level   Outcome: Progressing     Problem: Nutrition/Hydration-ADULT  Goal: Nutrient/Hydration intake appropriate for improving, restoring or maintaining nutritional needs  Description: Monitor and assess patient's nutrition/hydration status for  malnutrition. Collaborate with interdisciplinary team and initiate plan and interventions as ordered.  Monitor patient's weight and dietary intake as ordered or per policy. Utilize nutrition screening tool and intervene as necessary. Determine patient's food preferences and provide high-protein, high-caloric foods as appropriate.     INTERVENTIONS:  - Monitor oral intake, urinary output, labs, and treatment plans  - Assess nutrition and hydration status and recommend course of action  - Evaluate amount of meals eaten  - Assist patient with eating if necessary   - Allow adequate time for meals  - Recommend/ encourage appropriate diets, oral nutritional supplements, and vitamin/mineral supplements  - Order, calculate, and assess calorie counts as needed  - Recommend, monitor, and adjust tube feedings and TPN/PPN based on assessed needs  - Assess need for intravenous fluids  - Provide specific nutrition/hydration education as appropriate  - Include patient/family/caregiver in decisions related to nutrition  Outcome: Progressing

## 2023-12-12 NOTE — ASSESSMENT & PLAN NOTE
Patient initially presented with dark-tarry stool and hematemesis  GI consulted EGD was performed on 12/8 which revealed esophagitis and 10 large clean-based ulcers in the stomach and duodenum  Continue Protonix 40 mg twice daily  Patient did report black stool yesterday night but no further black stool this morning.  Hemoglobin currently at 7.3  Will monitor for now if recurs will consider reevaluation by GI

## 2023-12-12 NOTE — CASE MANAGEMENT
Case Management Discharge Planning Note    Patient name Samira Allred  [facility] /-01 MRN [id number]  : 1940 Date 2023       Current Admission Date: 2023  Current Admission Diagnosis:Sepsis ([facility])   Patient Active Problem List    Diagnosis Date Noted    Peptic ulcer 2023    Aortic valve stenosis, moderate 2023    Severe protein-calorie malnutrition (HCC) 2023    GI bleed 2023    Hydronephrosis 2023    Urinary retention 2023    Sepsis ([facility]) 2023    Acute cystitis with hematuria 2023    Acute kidney injury superimposed on chronic kidney disease  2023    Transaminitis 2023    Protein-calorie malnutrition, unspecified severity ([facility]) 2023    Chronic renal disease, stage IV ([facility]) 2023    Chronic pain of both knees 08/10/2022    Chronic kidney disease-mineral and bone disorder 2022    Stage 3b chronic kidney disease (HCC) 2021    History of DVT (deep vein thrombosis) 2021    Varicose veins of left lower extremity with pain 2020    Lower leg DVT (deep venous thromboembolism), chronic, left ([facility]) 2020    Intermediate stage nonexudative 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 eyes 2019    History of peptic ulcer 10/11/2018    Fat necrosis (segmental) of breast 2018    Personal history of breast cancer 2018    Abnormal finding on diagnostic imaging of kidney 2018    Localized edema 2018    Hypothyroidism 2018    Hyperuricemia 2018    Hypertension 2018    Rheumatoid arthritis (HCC) 2018    Diastolic dysfunction 2017    Vitamin D deficiency 2014    Lumbar radiculopathy 2013    Hyperlipidemia 2013    Osteopenia 2013    Other chronic pain 2013    Adenocarcinoma of breast (HCC) 2013      LOS (days): 5  Geometric Mean LOS (GMLOS) (days): 5.10  Days to GMLOS:0.2     OBJECTIVE:  Risk of Unplanned Readmission  Score: 23.17         Current admission status: Inpatient   Preferred Pharmacy:   [facility]/pharmacy #[id number] - [place] - [address]  [address]  [place] [address]  Phone: [phone] Fax: [phone]    [facility] - [place] - [address] [facility] [facility]  [address]  [place] [address]  Phone: [phone] Fax: [phone]    Primary Care Provider: Layne Bacon MD    Primary Insurance: MEDICARE  Secondary Insurance: BLUE CROSS    DISCHARGE DETAILS:        IMM Given (Date):: 12/12/23  IMM Given to:: Patient (CM reviewed IMM with patient at bedside. Patient reported understanding their rights and denied any questions or concerns Patient was given a copy and signed copy was placed in medical records.)

## 2023-12-13 LAB
ANION GAP SERPL CALCULATED.3IONS-SCNC: 6 MMOL/L
BASOPHILS # BLD AUTO: 0.02 THOUSANDS/ÂΜL (ref 0–0.1)
BASOPHILS NFR BLD AUTO: 0 % (ref 0–1)
BUN SERPL-MCNC: 34 MG/DL (ref 5–25)
CALCIUM SERPL-MCNC: 7.4 MG/DL (ref 8.4–10.2)
CHLORIDE SERPL-SCNC: 107 MMOL/L (ref 96–108)
CO2 SERPL-SCNC: 24 MMOL/L (ref 21–32)
CREAT SERPL-MCNC: 1.74 MG/DL (ref 0.6–1.3)
EOSINOPHIL # BLD AUTO: 0.23 THOUSAND/ÂΜL (ref 0–0.61)
EOSINOPHIL NFR BLD AUTO: 3 % (ref 0–6)
ERYTHROCYTE [DISTWIDTH] IN BLOOD BY AUTOMATED COUNT: 20 % (ref 11.6–15.1)
GFR SERPL CREATININE-BSD FRML MDRD: 26 ML/MIN/1.73SQ M
GLUCOSE SERPL-MCNC: 99 MG/DL (ref 65–140)
HCT VFR BLD AUTO: 22.6 % (ref 34.8–46.1)
HGB BLD-MCNC: 7.2 G/DL (ref 11.5–15.4)
IMM GRANULOCYTES # BLD AUTO: 0.06 THOUSAND/UL (ref 0–0.2)
IMM GRANULOCYTES NFR BLD AUTO: 1 % (ref 0–2)
LYMPHOCYTES # BLD AUTO: 1.03 THOUSANDS/ÂΜL (ref 0.6–4.47)
LYMPHOCYTES NFR BLD AUTO: 13 % (ref 14–44)
MCH RBC QN AUTO: 29.6 PG (ref 26.8–34.3)
MCHC RBC AUTO-ENTMCNC: 31.9 G/DL (ref 31.4–37.4)
MCV RBC AUTO: 93 FL (ref 82–98)
MONOCYTES # BLD AUTO: 0.78 THOUSAND/ÂΜL (ref 0.17–1.22)
MONOCYTES NFR BLD AUTO: 10 % (ref 4–12)
NEUTROPHILS # BLD AUTO: 5.86 THOUSANDS/ÂΜL (ref 1.85–7.62)
NEUTS SEG NFR BLD AUTO: 73 % (ref 43–75)
NRBC BLD AUTO-RTO: 0 /100 WBCS
PLATELET # BLD AUTO: 268 THOUSANDS/UL (ref 149–390)
PMV BLD AUTO: 9.4 FL (ref 8.9–12.7)
POTASSIUM SERPL-SCNC: 3.9 MMOL/L (ref 3.5–5.3)
RBC # BLD AUTO: 2.43 MILLION/UL (ref 3.81–5.12)
SODIUM SERPL-SCNC: 137 MMOL/L (ref 135–147)
WBC # BLD AUTO: 7.98 THOUSAND/UL (ref 4.31–10.16)

## 2023-12-13 PROCEDURE — 85025 COMPLETE CBC W/AUTO DIFF WBC: CPT | Performed by: INTERNAL MEDICINE

## 2023-12-13 PROCEDURE — 80048 BASIC METABOLIC PNL TOTAL CA: CPT | Performed by: INTERNAL MEDICINE

## 2023-12-13 PROCEDURE — 99222 1ST HOSP IP/OBS MODERATE 55: CPT | Performed by: INTERNAL MEDICINE

## 2023-12-13 RX ADMIN — DOCUSATE SODIUM 100 MG: 100 CAPSULE, LIQUID FILLED ORAL at 18:33

## 2023-12-13 RX ADMIN — PSYLLIUM HUSK 1 PACKET: 3.4 POWDER ORAL at 10:06

## 2023-12-13 RX ADMIN — CALCIUM 1 TABLET: 500 TABLET ORAL at 10:06

## 2023-12-13 RX ADMIN — CEFEPIME HYDROCHLORIDE 1000 MG: 1 INJECTION, SOLUTION INTRAVENOUS at 10:07

## 2023-12-13 RX ADMIN — LEVOTHYROXINE SODIUM 75 MCG: 25 TABLET ORAL at 10:06

## 2023-12-13 RX ADMIN — DOCUSATE SODIUM 100 MG: 100 CAPSULE, LIQUID FILLED ORAL at 10:06

## 2023-12-13 RX ADMIN — PANTOPRAZOLE SODIUM 40 MG: 40 TABLET, DELAYED RELEASE ORAL at 18:33

## 2023-12-13 RX ADMIN — PANTOPRAZOLE SODIUM 40 MG: 40 TABLET, DELAYED RELEASE ORAL at 06:33

## 2023-12-13 RX ADMIN — PRAVASTATIN SODIUM 40 MG: 40 TABLET ORAL at 18:33

## 2023-12-13 NOTE — PLAN OF CARE
Problem: PAIN - ADULT  Goal: Verbalizes/displays adequate comfort level or baseline comfort level  Description: Interventions:  - Encourage patient to monitor pain and request assistance  - Assess pain using appropriate pain scale  - Administer analgesics based on type and severity of pain and evaluate response  - Implement non-pharmacological measures as appropriate and evaluate response  - Consider cultural and social influences on pain and pain management  - Notify physician/advanced practitioner if interventions unsuccessful or patient reports new pain  Outcome: Progressing     Problem: SAFETY ADULT  Goal: Maintain or return to baseline ADL function  Description: INTERVENTIONS:  -  Assess patient's ability to carry out ADLs; assess patient's baseline for ADL function and identify physical deficits which impact ability to perform ADLs (bathing, care of mouth/teeth, toileting, grooming, dressing, etc.)  - Assess/evaluate cause of self-care deficits   - Assess range of motion  - Assess patient's mobility; develop plan if impaired  - Assess patient's need for assistive devices and provide as appropriate  - Encourage maximum independence but intervene and supervise when necessary  - Involve family in performance of ADLs  - Assess for home care needs following discharge   - Consider OT consult to assist with ADL evaluation and planning for discharge  - Provide patient education as appropriate  Outcome: Progressing     Problem: Nutrition/Hydration-ADULT  Goal: Nutrient/Hydration intake appropriate for improving, restoring or maintaining nutritional needs  Description: Monitor and assess patient's nutrition/hydration status for malnutrition. Collaborate with interdisciplinary team and initiate plan and interventions as ordered.  Monitor patient's weight and dietary intake as ordered or per policy. Utilize nutrition screening tool and intervene as necessary. Determine patient's food preferences and provide  high-protein, high-caloric foods as appropriate.     INTERVENTIONS:  - Monitor oral intake, urinary output, labs, and treatment plans  - Assess nutrition and hydration status and recommend course of action  - Evaluate amount of meals eaten  - Assist patient with eating if necessary   - Allow adequate time for meals  - Recommend/ encourage appropriate diets, oral nutritional supplements, and vitamin/mineral supplements  - Order, calculate, and assess calorie counts as needed  - Recommend, monitor, and adjust tube feedings and TPN/PPN based on assessed needs  - Assess need for intravenous fluids  - Provide specific nutrition/hydration education as appropriate  - Include patient/family/caregiver in decisions related to nutrition  Outcome: Progressing     Problem: Prexisting or High Potential for Compromised Skin Integrity  Goal: Skin integrity is maintained or improved  Description: INTERVENTIONS:  - Identify patients at risk for skin breakdown  - Assess and monitor skin integrity  - Assess and monitor nutrition and hydration status  - Monitor labs   - Assess for incontinence   - Turn and reposition patient  - Assist with mobility/ambulation  - Relieve pressure over bony prominences  - Avoid friction and shearing  - Provide appropriate hygiene as needed including keeping skin clean and dry  - Evaluate need for skin moisturizer/barrier cream  - Collaborate with interdisciplinary team   - Patient/family teaching  - Consider wound care consult   Outcome: Progressing

## 2023-12-13 NOTE — ASSESSMENT & PLAN NOTE
Lab Results   Component Value Date    EGFR 26 12/13/2023    EGFR 24 12/12/2023    EGFR 23 12/11/2023    CREATININE 1.74 (H) 12/13/2023    CREATININE 1.89 (H) 12/12/2023    CREATININE 1.95 (H) 12/11/2023   Baseline creatinine approximately 1.4  Presented with creatinine of 3.7  Secondary to urinary retention  Has since significant improved with urinary catheter placement  Continue IV fluids  Continue monitor kidney function

## 2023-12-13 NOTE — H&P
[facility]  H&P  Name: Samira Allred 83 y.o. female I MRN: [id number]  Unit/Bed#: -01 I Date of Admission: 12/7/2023   Date of Service: 12/13/2023 I Hospital Day: 6      Assessment/Plan   * Sepsis (HCC)  Assessment & Plan    Presented initially with leukocytosis and tachycardia in the setting of UTI  Clinically improving on IV antibiotics  Currently on IV cefepime for pseudomonal UTI  Limited oral antibiotic options given allergies  Clinically improved, WBC count is normalized  Blood cultures remain negative    Severe protein-calorie malnutrition (HCC)  Assessment & Plan  Malnutrition Findings:   Adult Malnutrition type: Chronic illness  Adult Degree of Malnutrition: Other severe protein calorie malnutrition  Malnutrition Characteristics: Weight loss, Inadequate energy                  360 Statement: Related to medical condition as evidenced by 8.7% Weight loss of 18 lbs (10.3%) in 5 months, significant and < 75% energy intake needs met > 1 month. Treated with diet.    BMI Findings:           Body mass index is 28.59 kg/m².         Hydronephrosis  Assessment & Plan  Secondary to urinary retention  Carrera catheter in place  Will need Carrera catheter on discharge    GI bleed  Assessment & Plan    Patient initially presented with dark-tarry stool and hematemesis  GI consulted EGD was performed on 12/8 which revealed esophagitis and 10 large clean-based ulcers in the stomach and duodenum  Continue Protonix twice daily  Hemoglobin approximately 7  No further bleeding noted  Continue monitor hemoglobin    Acute cystitis with hematuria  Assessment & Plan  Urinalysis revealed leukocytes and bacteria  Urine culture revealed Pseudomonas  Currently on day 3 of IV cefepime we will continue for 24-48 more hours  Limited oral antibiotic options given allergies      Acute kidney injury superimposed on chronic kidney disease   Assessment & Plan  Lab Results   Component Value Date    EGFR 26 12/13/2023     EGFR 24 2023    EGFR 23 2023    CREATININE 1.74 (H) 2023    CREATININE 1.89 (H) 2023    CREATININE 1.95 (H) 2023   Baseline creatinine approximately 1.4  Presented with creatinine of 3.7  Secondary to urinary retention  Has since significant improved with urinary catheter placement  Continue IV fluids  Continue monitor kidney function           VTE Pharmacologic Prophylaxis:   Pharmacologic: Pharmacologic VTE Prophylaxis contraindicated due to gi bleeding  Mechanical VTE Prophylaxis in Place: Yes    Patient Centered Rounds: I have performed bedside rounds with nursing staff today.    Discussions with Specialists or Other Care Team Provider: cm, nursing    Education and Discussions with Family / Patient: pt, declined family update    Time Spent for Care: 30 minutes.  More than 50% of total time spent on counseling and coordination of care as described above.    Current Length of Stay: 6 day(s)    Current Patient Status: Inpatient   Certification Statement: The patient will continue to require additional inpatient hospital stay due to see below    Discharge Plan: Anticipate discharge in 24 to 48 hours still requiring IV antibiotics    Code Status: Level 1 - Full Code      Subjective:   Currently denies any acute complaints.  Denies chills, fever, cough, shortness of breath or any other complaints    Objective:     Vitals:   Temp (24hrs), Av.5 °F (36.9 °C), Min:98.4 °F (36.9 °C), Max:98.6 °F (37 °C)    Temp:  [98.4 °F (36.9 °C)-98.6 °F (37 °C)] 98.4 °F (36.9 °C)  HR:  [73-91] 73  Resp:  [16-20] 16  BP: (104-126)/(39-58) 123/56  SpO2:  [80 %-99 %] 99 %  Body mass index is 28.59 kg/m².     Input and Output Summary (last 24 hours):       Intake/Output Summary (Last 24 hours) at 2023 1142  Last data filed at 2023 0628  Gross per 24 hour   Intake 2094.75 ml   Output 3000 ml   Net -905.25 ml       Physical Exam:     Physical Exam  Constitutional:       General: She is not in  acute distress.     Appearance: She is well-developed. She is not diaphoretic.   HENT:      Head: Normocephalic and atraumatic.      Nose: Nose normal.      Mouth/Throat:      Pharynx: No oropharyngeal exudate.   Eyes:      General: No scleral icterus.        Right eye: No discharge.         Left eye: No discharge.      Conjunctiva/sclera: Conjunctivae normal.   Neck:      Thyroid: No thyromegaly.      Vascular: No JVD.   Cardiovascular:      Rate and Rhythm: Normal rate and regular rhythm.      Heart sounds: Normal heart sounds. No murmur heard.     No friction rub. No gallop.   Pulmonary:      Effort: Pulmonary effort is normal. No respiratory distress.      Breath sounds: Normal breath sounds. No wheezing or rales.   Chest:      Chest wall: No tenderness.   Abdominal:      General: Bowel sounds are normal. There is no distension.      Palpations: Abdomen is soft.      Tenderness: There is no abdominal tenderness. There is no guarding or rebound.   Musculoskeletal:         General: No tenderness or deformity. Normal range of motion.      Cervical back: Normal range of motion and neck supple.   Skin:     General: Skin is warm and dry.      Findings: No erythema or rash.   Neurological:      Mental Status: She is alert. Mental status is at baseline.      Cranial Nerves: No cranial nerve deficit.      Sensory: No sensory deficit.      Motor: No abnormal muscle tone.      Coordination: Coordination normal.       (    Additional Data:     Labs:    Results from last 7 days   Lab Units 12/13/23  0635 12/07/23  1715 12/07/23  1004   WBC Thousand/uL 7.98   < > 21.09*   HEMOGLOBIN g/dL 7.2*   < > 8.1*   HEMATOCRIT % 22.6*   < > 24.1*   PLATELETS Thousands/uL 268   < > 202   BANDS PCT %  --   --  2   NEUTROS PCT % 73   < >  --    LYMPHS PCT % 13*   < >  --    LYMPHO PCT %  --   --  3*   MONOS PCT % 10   < >  --    MONO PCT %  --   --  1*   EOS PCT % 3   < > 0    < > = values in this interval not displayed.     Results from  last 7 days   Lab Units 12/13/23  0635 12/09/23  0514 12/08/23  0411   SODIUM mmol/L 137   < > 135   POTASSIUM mmol/L 3.9   < > 4.1   CHLORIDE mmol/L 107   < > 106   CO2 mmol/L 24   < > 21   BUN mg/dL 34*   < > 72*   CREATININE mg/dL 1.74*   < > 2.91*   ANION GAP mmol/L 6   < > 8   CALCIUM mg/dL 7.4*   < > 7.5*   ALBUMIN g/dL  --   --  2.5*   TOTAL BILIRUBIN mg/dL  --   --  0.63   ALK PHOS U/L  --   --  154*   ALT U/L  --   --  44   AST U/L  --   --  48*   GLUCOSE RANDOM mg/dL 99   < > 102    < > = values in this interval not displayed.     Results from last 7 days   Lab Units 12/07/23  1004   INR  1.25*             Results from last 7 days   Lab Units 12/08/23  0410 12/07/23  1312 12/07/23  1004   LACTIC ACID mmol/L  --   --  1.6   PROCALCITONIN ng/ml 3.35* 4.47*  --            * I Have Reviewed All Lab Data Listed Above.  * Additional Pertinent Lab Tests Reviewed: All Labs Within Last 24 Hours Reviewed    Imaging:    Imaging Reports Reviewed Today Include: na  Imaging Personally Reviewed by Myself Includes:  na    Recent Cultures (last 7 days):     Results from last 7 days   Lab Units 12/07/23  1329 12/07/23  1312 12/07/23  1311   BLOOD CULTURE  No Growth After 5 Days. No Growth After 5 Days.  --    URINE CULTURE   --   --  >100,000 cfu/ml Pseudomonas aeruginosa*  <10,000 cfu/ml Gram Negative Paul Enteric Like*       Last 24 Hours Medication List:   Current Facility-Administered Medications   Medication Dose Route Frequency Provider Last Rate    acetaminophen  650 mg Oral Q6H PRN PUJA Sagastume      aluminum-magnesium hydroxide-simethicone  30 mL Oral Q6H PRN PUJA Sagastume      calcium carbonate  1 tablet Oral Daily With Breakfast PUJA Sagastume      cefepime  1,000 mg Intravenous Q12H PUJA Sagastume 1,000 mg (12/13/23 1007)    docusate sodium  100 mg Oral BID PUJA Sagastume      levothyroxine  75 mcg Oral Daily PUJA Sagastume      ondansetron  4 mg Intravenous Q6H  PRN PUJA Sagastume      pantoprazole  40 mg Oral BID AC PUJA Sagastume      pravastatin  40 mg Oral Daily With Dinner PUJA Sagastume      psyllium  1 packet Oral Daily PUJA Sagastume      sodium chloride  75 mL/hr Intravenous Continuous Emanuel Corley MD 75 mL/hr (12/12/23 4564)        Today, Patient Was Seen By: Emanuel Corley MD    ** Please Note: Dictation voice to text software may have been used in the creation of this document. **

## 2023-12-13 NOTE — ASSESSMENT & PLAN NOTE
Patient initially presented with dark-tarry stool and hematemesis  GI consulted EGD was performed on 12/8 which revealed esophagitis and 10 large clean-based ulcers in the stomach and duodenum  Continue Protonix twice daily  Hemoglobin approximately 7  No further bleeding noted  Continue monitor hemoglobin

## 2023-12-13 NOTE — ASSESSMENT & PLAN NOTE
Secondary to urinary retention  Carrera catheter in place  Will need Carrera catheter on discharge

## 2023-12-13 NOTE — ASSESSMENT & PLAN NOTE
Urinalysis revealed leukocytes and bacteria  Urine culture revealed Pseudomonas  Currently on day 3 of IV cefepime we will continue for 24-48 more hours  Limited oral antibiotic options given allergies

## 2023-12-13 NOTE — PLAN OF CARE
Problem: Potential for Falls  Goal: Patient will remain free of falls  Description: INTERVENTIONS:  - Educate patient/family on patient safety including physical limitations  - Instruct patient to call for assistance with activity   - Consult OT/PT to assist with strengthening/mobility   - Keep Call bell within reach  - Keep bed low and locked with side rails adjusted as appropriate  - Keep care items and personal belongings within reach  - Initiate and maintain comfort rounds  - Make Fall Risk Sign visible to staff  - Offer Toileting every 2 Hours, in advance of need  - Initiate/Maintain alarm  - Obtain necessary fall risk management equipment  - Apply yellow socks and bracelet for high fall risk patients  - Consider moving patient to room near nurses station  Outcome: Progressing     Problem: PAIN - ADULT  Goal: Verbalizes/displays adequate comfort level or baseline comfort level  Description: Interventions:  - Encourage patient to monitor pain and request assistance  - Assess pain using appropriate pain scale  - Administer analgesics based on type and severity of pain and evaluate response  - Implement non-pharmacological measures as appropriate and evaluate response  - Consider cultural and social influences on pain and pain management  - Notify physician/advanced practitioner if interventions unsuccessful or patient reports new pain  Outcome: Progressing     Problem: SAFETY ADULT  Goal: Patient will remain free of falls  Description: INTERVENTIONS:  - Educate patient/family on patient safety including physical limitations  - Instruct patient to call for assistance with activity   - Consult OT/PT to assist with strengthening/mobility   - Keep Call bell within reach  - Keep bed low and locked with side rails adjusted as appropriate  - Keep care items and personal belongings within reach  - Initiate and maintain comfort rounds  - Make Fall Risk Sign visible to staff  - Offer Toileting every 2 Hours, in advance  of need  - Initiate/Maintain alarm  - Obtain necessary fall risk management equipment  - Apply yellow socks and bracelet for high fall risk patients  - Consider moving patient to room near nurses station  Outcome: Progressing

## 2023-12-13 NOTE — ASSESSMENT & PLAN NOTE
Malnutrition Findings:   Adult Malnutrition type: Chronic illness  Adult Degree of Malnutrition: Other severe protein calorie malnutrition  Malnutrition Characteristics: Weight loss, Inadequate energy                  360 Statement: Related to medical condition as evidenced by 8.7% Weight loss of 18 lbs (10.3%) in 5 months, significant and < 75% energy intake needs met > 1 month. Treated with diet.    BMI Findings:           Body mass index is 28.59 kg/m².

## 2023-12-13 NOTE — ASSESSMENT & PLAN NOTE
Presented initially with leukocytosis and tachycardia in the setting of UTI  Clinically improving on IV antibiotics  Currently on IV cefepime for pseudomonal UTI  Limited oral antibiotic options given allergies  Clinically improved, WBC count is normalized  Blood cultures remain negative

## 2023-12-14 LAB
ABO GROUP BLD: NORMAL
ANION GAP SERPL CALCULATED.3IONS-SCNC: 3 MMOL/L
BASOPHILS # BLD AUTO: 0.04 THOUSANDS/ÂΜL (ref 0–0.1)
BASOPHILS NFR BLD AUTO: 1 % (ref 0–1)
BLD GP AB SCN SERPL QL: NEGATIVE
BUN SERPL-MCNC: 38 MG/DL (ref 5–25)
CALCIUM SERPL-MCNC: 7.2 MG/DL (ref 8.4–10.2)
CHLORIDE SERPL-SCNC: 111 MMOL/L (ref 96–108)
CO2 SERPL-SCNC: 23 MMOL/L (ref 21–32)
CREAT SERPL-MCNC: 1.7 MG/DL (ref 0.6–1.3)
EOSINOPHIL # BLD AUTO: 0.22 THOUSAND/ÂΜL (ref 0–0.61)
EOSINOPHIL NFR BLD AUTO: 3 % (ref 0–6)
ERYTHROCYTE [DISTWIDTH] IN BLOOD BY AUTOMATED COUNT: 20 % (ref 11.6–15.1)
GFR SERPL CREATININE-BSD FRML MDRD: 27 ML/MIN/1.73SQ M
GLUCOSE SERPL-MCNC: 100 MG/DL (ref 65–140)
HCT VFR BLD AUTO: 19.6 % (ref 34.8–46.1)
HCT VFR BLD AUTO: 26.1 % (ref 34.8–46.1)
HGB BLD-MCNC: 6.2 G/DL (ref 11.5–15.4)
HGB BLD-MCNC: 8.5 G/DL (ref 11.5–15.4)
IMM GRANULOCYTES # BLD AUTO: 0.05 THOUSAND/UL (ref 0–0.2)
IMM GRANULOCYTES NFR BLD AUTO: 1 % (ref 0–2)
LYMPHOCYTES # BLD AUTO: 1.02 THOUSANDS/ÂΜL (ref 0.6–4.47)
LYMPHOCYTES NFR BLD AUTO: 16 % (ref 14–44)
MCH RBC QN AUTO: 29.8 PG (ref 26.8–34.3)
MCHC RBC AUTO-ENTMCNC: 31.6 G/DL (ref 31.4–37.4)
MCV RBC AUTO: 94 FL (ref 82–98)
MONOCYTES # BLD AUTO: 0.83 THOUSAND/ÂΜL (ref 0.17–1.22)
MONOCYTES NFR BLD AUTO: 13 % (ref 4–12)
NEUTROPHILS # BLD AUTO: 4.26 THOUSANDS/ÂΜL (ref 1.85–7.62)
NEUTS SEG NFR BLD AUTO: 66 % (ref 43–75)
NRBC BLD AUTO-RTO: 0 /100 WBCS
PLATELET # BLD AUTO: 272 THOUSANDS/UL (ref 149–390)
PMV BLD AUTO: 10.1 FL (ref 8.9–12.7)
POTASSIUM SERPL-SCNC: 4.2 MMOL/L (ref 3.5–5.3)
RBC # BLD AUTO: 2.08 MILLION/UL (ref 3.81–5.12)
RH BLD: POSITIVE
SODIUM SERPL-SCNC: 137 MMOL/L (ref 135–147)
SPECIMEN EXPIRATION DATE: NORMAL
WBC # BLD AUTO: 6.42 THOUSAND/UL (ref 4.31–10.16)

## 2023-12-14 PROCEDURE — 99232 SBSQ HOSP IP/OBS MODERATE 35: CPT | Performed by: INTERNAL MEDICINE

## 2023-12-14 PROCEDURE — C9113 INJ PANTOPRAZOLE SODIUM, VIA: HCPCS | Performed by: PHYSICIAN ASSISTANT

## 2023-12-14 PROCEDURE — 86923 COMPATIBILITY TEST ELECTRIC: CPT

## 2023-12-14 PROCEDURE — 85014 HEMATOCRIT: CPT | Performed by: PHYSICIAN ASSISTANT

## 2023-12-14 PROCEDURE — 85018 HEMOGLOBIN: CPT | Performed by: PHYSICIAN ASSISTANT

## 2023-12-14 PROCEDURE — 99233 SBSQ HOSP IP/OBS HIGH 50: CPT | Performed by: INTERNAL MEDICINE

## 2023-12-14 PROCEDURE — 80048 BASIC METABOLIC PNL TOTAL CA: CPT | Performed by: INTERNAL MEDICINE

## 2023-12-14 PROCEDURE — 85025 COMPLETE CBC W/AUTO DIFF WBC: CPT | Performed by: INTERNAL MEDICINE

## 2023-12-14 PROCEDURE — 86850 RBC ANTIBODY SCREEN: CPT | Performed by: INTERNAL MEDICINE

## 2023-12-14 PROCEDURE — 86900 BLOOD TYPING SEROLOGIC ABO: CPT | Performed by: INTERNAL MEDICINE

## 2023-12-14 PROCEDURE — P9016 RBC LEUKOCYTES REDUCED: HCPCS

## 2023-12-14 PROCEDURE — 82941 ASSAY OF GASTRIN: CPT | Performed by: PHYSICIAN ASSISTANT

## 2023-12-14 PROCEDURE — 86901 BLOOD TYPING SEROLOGIC RH(D): CPT | Performed by: INTERNAL MEDICINE

## 2023-12-14 PROCEDURE — 30233N1 TRANSFUSION OF NONAUTOLOGOUS RED BLOOD CELLS INTO PERIPHERAL VEIN, PERCUTANEOUS APPROACH: ICD-10-PCS | Performed by: INTERNAL MEDICINE

## 2023-12-14 PROCEDURE — NC001 PR NO CHARGE: Performed by: INTERNAL MEDICINE

## 2023-12-14 RX ORDER — PANTOPRAZOLE SODIUM 40 MG/10ML
40 INJECTION, POWDER, LYOPHILIZED, FOR SOLUTION INTRAVENOUS EVERY 12 HOURS
Status: DISCONTINUED | OUTPATIENT
Start: 2023-12-14 | End: 2023-12-18 | Stop reason: HOSPADM

## 2023-12-14 RX ADMIN — CEFEPIME HYDROCHLORIDE 1000 MG: 1 INJECTION, SOLUTION INTRAVENOUS at 17:33

## 2023-12-14 RX ADMIN — PANTOPRAZOLE SODIUM 40 MG: 40 INJECTION, POWDER, FOR SOLUTION INTRAVENOUS at 21:53

## 2023-12-14 RX ADMIN — CALCIUM 1 TABLET: 500 TABLET ORAL at 08:21

## 2023-12-14 RX ADMIN — PSYLLIUM HUSK 1 PACKET: 3.4 POWDER ORAL at 08:21

## 2023-12-14 RX ADMIN — DOCUSATE SODIUM 100 MG: 100 CAPSULE, LIQUID FILLED ORAL at 08:21

## 2023-12-14 RX ADMIN — PRAVASTATIN SODIUM 40 MG: 40 TABLET ORAL at 17:33

## 2023-12-14 RX ADMIN — SODIUM CHLORIDE 75 ML/HR: 0.9 INJECTION, SOLUTION INTRAVENOUS at 03:55

## 2023-12-14 RX ADMIN — CEFEPIME HYDROCHLORIDE 1000 MG: 1 INJECTION, SOLUTION INTRAVENOUS at 00:21

## 2023-12-14 RX ADMIN — LEVOTHYROXINE SODIUM 75 MCG: 25 TABLET ORAL at 08:21

## 2023-12-14 RX ADMIN — DOCUSATE SODIUM 100 MG: 100 CAPSULE, LIQUID FILLED ORAL at 17:33

## 2023-12-14 RX ADMIN — PANTOPRAZOLE SODIUM 40 MG: 40 TABLET, DELAYED RELEASE ORAL at 08:23

## 2023-12-14 NOTE — ASSESSMENT & PLAN NOTE
Urinalysis revealed leukocytes and bacteria  Urine culture revealed Pseudomonas  Currently on day 4 of antibiotics at day 5 with cefepime

## 2023-12-14 NOTE — PLAN OF CARE
Problem: Potential for Falls  Goal: Patient will remain free of falls  Description: INTERVENTIONS:  - Educate patient/family on patient safety including physical limitations  - Instruct patient to call for assistance with activity   - Consult OT/PT to assist with strengthening/mobility   - Keep Call bell within reach  - Keep bed low and locked with side rails adjusted as appropriate  - Keep care items and personal belongings within reach  - Initiate and maintain comfort rounds  - Make Fall Risk Sign visible to staff  - Apply yellow socks and bracelet for high fall risk patients  - Consider moving patient to room near nurses station  Outcome: Progressing     Problem: PAIN - ADULT  Goal: Verbalizes/displays adequate comfort level or baseline comfort level  Description: Interventions:  - Encourage patient to monitor pain and request assistance  - Assess pain using appropriate pain scale  - Administer analgesics based on type and severity of pain and evaluate response  - Implement non-pharmacological measures as appropriate and evaluate response  - Consider cultural and social influences on pain and pain management  - Notify physician/advanced practitioner if interventions unsuccessful or patient reports new pain  Outcome: Progressing     Problem: SAFETY ADULT  Goal: Patient will remain free of falls  Description: INTERVENTIONS:  - Educate patient/family on patient safety including physical limitations  - Instruct patient to call for assistance with activity   - Consult OT/PT to assist with strengthening/mobility   - Keep Call bell within reach  - Keep bed low and locked with side rails adjusted as appropriate  - Keep care items and personal belongings within reach  - Initiate and maintain comfort rounds  - Make Fall Risk Sign visible to staff  - Apply yellow socks and bracelet for high fall risk patients  - Consider moving patient to room near nurses station  Outcome: Progressing  Goal: Maintain or return to baseline  ADL function  Description: INTERVENTIONS:  -  Assess patient's ability to carry out ADLs; assess patient's baseline for ADL function and identify physical deficits which impact ability to perform ADLs (bathing, care of mouth/teeth, toileting, grooming, dressing, etc.)  - Assess/evaluate cause of self-care deficits   - Assess range of motion  - Assess patient's mobility; develop plan if impaired  - Assess patient's need for assistive devices and provide as appropriate  - Encourage maximum independence but intervene and supervise when necessary  - Involve family in performance of ADLs  - Assess for home care needs following discharge   - Consider OT consult to assist with ADL evaluation and planning for discharge  - Provide patient education as appropriate  Outcome: Progressing  Goal: Maintains/Returns to pre admission functional level  Description: INTERVENTIONS:  - Perform AM-PAC 6 Click Basic Mobility/ Daily Activity assessment daily.  - Set and communicate daily mobility goal to care team and patient/family/caregiver.   - Collaborate with rehabilitation services on mobility goals if consulted  - Out of bed for toileting  - Record patient progress and toleration of activity level   Outcome: Progressing     Problem: Nutrition/Hydration-ADULT  Goal: Nutrient/Hydration intake appropriate for improving, restoring or maintaining nutritional needs  Description: Monitor and assess patient's nutrition/hydration status for malnutrition. Collaborate with interdisciplinary team and initiate plan and interventions as ordered.  Monitor patient's weight and dietary intake as ordered or per policy. Utilize nutrition screening tool and intervene as necessary. Determine patient's food preferences and provide high-protein, high-caloric foods as appropriate.     INTERVENTIONS:  - Monitor oral intake, urinary output, labs, and treatment plans  - Assess nutrition and hydration status and recommend course of action  - Evaluate amount of  meals eaten  - Assist patient with eating if necessary   - Allow adequate time for meals  - Recommend/ encourage appropriate diets, oral nutritional supplements, and vitamin/mineral supplements  - Order, calculate, and assess calorie counts as needed  - Recommend, monitor, and adjust tube feedings and TPN/PPN based on assessed needs  - Assess need for intravenous fluids  - Provide specific nutrition/hydration education as appropriate  - Include patient/family/caregiver in decisions related to nutrition  Outcome: Progressing     Problem: Prexisting or High Potential for Compromised Skin Integrity  Goal: Skin integrity is maintained or improved  Description: INTERVENTIONS:  - Identify patients at risk for skin breakdown  - Assess and monitor skin integrity  - Assess and monitor nutrition and hydration status  - Monitor labs   - Assess for incontinence   - Turn and reposition patient  - Assist with mobility/ambulation  - Relieve pressure over bony prominences  - Avoid friction and shearing  - Provide appropriate hygiene as needed including keeping skin clean and dry  - Evaluate need for skin moisturizer/barrier cream  - Collaborate with interdisciplinary team   - Patient/family teaching  - Consider wound care consult   Outcome: Progressing

## 2023-12-14 NOTE — ASSESSMENT & PLAN NOTE
Presented initially with leukocytosis and tachycardia in the setting of UTI  Clinically improving on IV antibiotics  Currently on IV cefepime for pseudomonal UTI  Will continue antibiotics for 1 more day to complete 5-day course

## 2023-12-14 NOTE — ASSESSMENT & PLAN NOTE
Malnutrition Findings:   Adult Malnutrition type: Chronic illness  Adult Degree of Malnutrition: Other severe protein calorie malnutrition  Malnutrition Characteristics: Weight loss, Inadequate energy                  360 Statement: Related to medical condition as evidenced by 8.7% Weight loss of 18 lbs (10.3%) in 5 months, significant and < 75% energy intake needs met > 1 month. Treated with diet.    BMI Findings:           Body mass index is 28.55 kg/m².

## 2023-12-14 NOTE — QUICK NOTE
Received critical value hgb 6.2.   1 unit PRBC order  Patient needs new T&S ordered  Continue supportive therapy

## 2023-12-14 NOTE — PROGRESS NOTES
"Progress Note - Samira Allred 83 y.o. female MRN: [id number]    Unit/Bed#: -[facility] Encounter: [id number]        Subjective:     GI was called to see the patient today for concern regarding recent GI bleed from gastric ulcers. Patient reports seeing a darker stool color yesterday, but denies signs of symptomatic anemia. Patient reports otherwise has no new symptoms today.  I witnessed her bowel movement this morning and it was brown in color.  Also spoke with bedside nursing.  She is receiving 1 unit of packed red blood cells now.    ROS: As noted in the HPI, otherwise all others negative.    Objective:     Vitals: Blood pressure (!) 102/43, pulse 75, temperature 98.1 °F (36.7 °C), resp. rate 17, height 5' 2\" (1.575 m), weight 70.8 kg (156 lb 1.4 oz), SpO2 98%, not currently breastfeeding.,Body mass index is 28.55 kg/m².      Intake/Output Summary (Last 24 hours) at 12/14/2023 1250  Last data filed at 12/14/2023 0955  Gross per 24 hour   Intake 2447.5 ml   Output 3225 ml   Net -777.5 ml       Physical Exam:     General Appearance: Alert and oriented x 3. In no respiratory distress  Lungs: Clear to auscultation bilaterally, no rales or rhonchi  Heart: Regular rate and rhythm, S1, S2 normal, no murmur, click, rub or gallop  Abdomen: Soft, non-tender, non-distended; bowel sounds normal; no masses or no organomegaly  Extremities: No cyanosis, edema    Invasive Devices       Peripheral Intravenous Line  Duration             Long-Dwell Peripheral IV (Midline) 12/11/23 Left Basilic 2 days              Drain  Duration             Urethral Catheter Non-latex;Double-lumen 16 Fr. 6 days                    Lab Results:  Results from last 7 days   Lab Units 12/14/23  0443   WBC Thousand/uL 6.42   HEMOGLOBIN g/dL 6.2*   HEMATOCRIT % 19.6*   PLATELETS Thousands/uL 272   NEUTROS PCT % 66   LYMPHS PCT % 16   MONOS PCT % 13*   EOS PCT % 3     Results from last 7 days   Lab Units 12/14/23  0443 12/09/23  0514 12/08/23  0411   POTASSIUM " mmol/L 4.2   < > 4.1   CHLORIDE mmol/L 111*   < > 106   CO2 mmol/L 23   < > 21   BUN mg/dL 38*   < > 72*   CREATININE mg/dL 1.70*   < > 2.91*   CALCIUM mg/dL 7.2*   < > 7.5*   ALK PHOS U/L  --   --  154*   ALT U/L  --   --  44   AST U/L  --   --  48*    < > = values in this interval not displayed.               Imaging Studies: I have personally reviewed pertinent imaging studies.    EGD    Result Date: 12/8/2023  Impression: 3 cm hiatal hernia Grade C esophagitis in the lower third of the esophagus 10 large ulcers in the stomach and duodenum with clean base (Bandar III) Performed forceps biopsies in the stomach to rule out H. pylori RECOMMENDATION: Protonix 40 mg by mouth twice a day Avoid NSAIDs Await pathology results and treat for Helicobacter pylori infection if found Repeat upper endoscopy in 3 months to document healing of the ulcers Return to floor and resume diet If no bleeding overnight, can discharge to home tomorrow from a GI standpoint   Navi Mario MD     CT chest abdomen pelvis wo contrast    Result Date: 12/7/2023  Impression: Bilateral hydroureteronephrosis, moderate to severe on the left, to the level of the urinary bladder, likely related to significant bladder distention. No obstructing ureteral calculi identified. Bilateral nephrolithiasis. Mild diffuse urinary bladder wall thickening which may be due to cystitis and/or some degree of underlying bladder wall trabeculation; correlate with urinalysis. Evaluation for intraluminal hemorrhage is markedly limited due to streak artifact from adjacent right hip hardware. Uncomplicated colonic diverticulosis. Small hiatal hernia. Stable chronic compression fracture of the T12 vertebral body with mild retropulsion. Few calcified uterine fibroids. 1.3 x 1.1 cm right lower lobe nodule and 0.7 cm groundglass right lower lobe nodule, new from 2019, indeterminate. PET/CT correlation should be considered. The study was marked in EPIC for significant  "notification. Workstation performed: [id number]         Assessment and Plan:     1) Acute blood loss anemia secondary to numerous gastric ulcers - Patient was admitted 7 days ago for coffee-ground emesis and melena.  Endoscopy was performed by Dr. Mario on December 8 revealing a 3 cm hiatal hernia, grade C esophagitis, and 10 large ulcers in the stomach and duodenum that appeared clean-based.  GI was called to see the patient as her hemoglobin went down to 6.2 this morning.  2 lab draws yesterday in the low 7s.  Also mention of potential melena yesterday, which appears to have resolved this morning.    - Obtain fasting gastrin level tomorrow morning  - Monitor posttransfusion response to determine whether or not her hemoglobin level this morning was a true value  - Depending on above, will consider endoscopy tomorrow  - Please notify GI if patient has recurrence of hematemesis or melena  - Continue to monitor hemoglobin closely and transfuse as necessary with a goal hemoglobin above 8  - Continue Protonix twice daily for 3 months  - She will need an endoscopy in 3 months to document healing  - Patient to be seen by Dr. Mayes  - Discussed with Dr. Corley      Portions of the record may have been created with voice recognition software.  Occasional wrong word or \"sound a like\" substitutions may have occurred due to the inherent limitations of voice recognition software.  Read the chart carefully and recognize, using context, where substitutions have occurred.    "

## 2023-12-14 NOTE — PROGRESS NOTES
[facility]  Progress Note  Name: Samira Allred I  MRN: [id number]  Unit/Bed#: -01 I Date of Admission: 12/7/2023   Date of Service: 12/14/2023  Hospital Day: 7    Assessment/Plan   * Sepsis (HCC)  Assessment & Plan    Presented initially with leukocytosis and tachycardia in the setting of UTI  Clinically improving on IV antibiotics  Currently on IV cefepime for pseudomonal UTI  Will continue antibiotics for 1 more day to complete 5-day course    Severe protein-calorie malnutrition (HCC)  Assessment & Plan  Malnutrition Findings:   Adult Malnutrition type: Chronic illness  Adult Degree of Malnutrition: Other severe protein calorie malnutrition  Malnutrition Characteristics: Weight loss, Inadequate energy                  360 Statement: Related to medical condition as evidenced by 8.7% Weight loss of 18 lbs (10.3%) in 5 months, significant and < 75% energy intake needs met > 1 month. Treated with diet.    BMI Findings:           Body mass index is 28.55 kg/m².         Hydronephrosis  Assessment & Plan  Carrera catheter in place secondary to urinary retention    GI bleed  Assessment & Plan    Patient initially presented with dark-tarry stool and hematemesis  GI consulted EGD was performed on 12/8 which revealed esophagitis and 10 large clean-based ulcers in the stomach and duodenum  Continue Protonix twice daily  Still reports black stool  Hemoglobin this morning noted to be 6.2  Will transfuse 1 unit packed red blood cell  Will consult GI for possible need for repeat EGD and further evaluation  Continue to monitor hemoglobin    Acute cystitis with hematuria  Assessment & Plan  Urinalysis revealed leukocytes and bacteria  Urine culture revealed Pseudomonas  Currently on day 4 of antibiotics at day 5 with cefepime    Acute kidney injury superimposed on chronic kidney disease   Assessment & Plan  Lab Results   Component Value Date    EGFR 27 12/14/2023    EGFR 26 12/13/2023    EGFR 24  2023    CREATININE 1.70 (H) 2023    CREATININE 1.74 (H) 2023    CREATININE 1.89 (H) 2023   Baseline creatinine approximately 1.4  Presented with creatinine of 3.7  Secondary to urinary retention  Kidney function has since significantly improved now near baseline  Continue to monitor         VTE Pharmacologic Prophylaxis:   Pharmacologic: Pharmacologic VTE Prophylaxis contraindicated due to gi bleed    Mechanical VTE Prophylaxis in Place: Yes    Patient Centered Rounds: I have performed bedside rounds with nursing staff today.    Discussions with Specialists or Other Care Team Provider: cm, nursing    Education and Discussions with Family / Patient: pt, declined family update    Time Spent for Care: 30 minutes.  More than 50% of total time spent on counseling and coordination of care as described above.    Current Length of Stay: 7 day(s)    Current Patient Status: Inpatient   Certification Statement: The patient will continue to require additional inpatient hospital stay due to see below    Discharge Plan: Requiring further monitoring of acute anemia still chronic IV antibiotics.   Anticipate discharge in 24 to 48 hours     Code Status: Level 1 - Full Code      Subjective:   Reports black stool yesterday night.  Denies any other acute complaints.  Denies fevers, chills, abdominal pain, nausea, vomiting    Objective:     Vitals:   Temp (24hrs), Av.8 °F (37.1 °C), Min:98.1 °F (36.7 °C), Max:99.6 °F (37.6 °C)    Temp:  [98.1 °F (36.7 °C)-99.6 °F (37.6 °C)] 98.1 °F (36.7 °C)  HR:  [82-93] 82  Resp:  [19] 19  BP: (105-127)/(46-53) 127/48  SpO2:  [98 %-100 %] 98 %  Body mass index is 28.55 kg/m².     Input and Output Summary (last 24 hours):       Intake/Output Summary (Last 24 hours) at 2023 1050  Last data filed at 2023 0955  Gross per 24 hour   Intake 2447.5 ml   Output 3225 ml   Net -777.5 ml       Physical Exam:     Physical Exam  Constitutional:       General: She is not in  acute distress.     Appearance: She is well-developed. She is not diaphoretic.   HENT:      Head: Normocephalic and atraumatic.      Nose: Nose normal.      Mouth/Throat:      Pharynx: No oropharyngeal exudate.   Eyes:      General: No scleral icterus.        Right eye: No discharge.         Left eye: No discharge.      Conjunctiva/sclera: Conjunctivae normal.   Neck:      Thyroid: No thyromegaly.      Vascular: No JVD.   Cardiovascular:      Rate and Rhythm: Normal rate and regular rhythm.      Heart sounds: Normal heart sounds. No murmur heard.     No friction rub. No gallop.   Pulmonary:      Effort: Pulmonary effort is normal. No respiratory distress.      Breath sounds: Normal breath sounds. No wheezing or rales.   Chest:      Chest wall: No tenderness.   Abdominal:      General: Bowel sounds are normal. There is no distension.      Palpations: Abdomen is soft.      Tenderness: There is no abdominal tenderness. There is no guarding or rebound.   Musculoskeletal:         General: No tenderness or deformity. Normal range of motion.      Cervical back: Normal range of motion and neck supple.   Skin:     General: Skin is warm and dry.      Findings: No erythema or rash.   Neurological:      Mental Status: She is alert. Mental status is at baseline.      Cranial Nerves: No cranial nerve deficit.      Sensory: No sensory deficit.      Motor: No abnormal muscle tone.      Coordination: Coordination normal.       (  Additional Data:     Labs:    Results from last 7 days   Lab Units 12/14/23  0443   WBC Thousand/uL 6.42   HEMOGLOBIN g/dL 6.2*   HEMATOCRIT % 19.6*   PLATELETS Thousands/uL 272   NEUTROS PCT % 66   LYMPHS PCT % 16   MONOS PCT % 13*   EOS PCT % 3     Results from last 7 days   Lab Units 12/14/23  0443 12/09/23  0514 12/08/23  0411   SODIUM mmol/L 137   < > 135   POTASSIUM mmol/L 4.2   < > 4.1   CHLORIDE mmol/L 111*   < > 106   CO2 mmol/L 23   < > 21   BUN mg/dL 38*   < > 72*   CREATININE mg/dL 1.70*   < >  2.91*   ANION GAP mmol/L 3   < > 8   CALCIUM mg/dL 7.2*   < > 7.5*   ALBUMIN g/dL  --   --  2.5*   TOTAL BILIRUBIN mg/dL  --   --  0.63   ALK PHOS U/L  --   --  154*   ALT U/L  --   --  44   AST U/L  --   --  48*   GLUCOSE RANDOM mg/dL 100   < > 102    < > = values in this interval not displayed.                 Results from last 7 days   Lab Units 12/08/23  0410 12/07/23  1312   PROCALCITONIN ng/ml 3.35* 4.47*           * I Have Reviewed All Lab Data Listed Above.  * Additional Pertinent Lab Tests Reviewed: All Labs Within Last 24 Hours Reviewed    Imaging:    Imaging Reports Reviewed Today Include: na  Imaging Personally Reviewed by Myself Includes:  na    Recent Cultures (last 7 days):     Results from last 7 days   Lab Units 12/07/23  1329 12/07/23  1312 12/07/23  1311   BLOOD CULTURE  No Growth After 5 Days. No Growth After 5 Days.  --    URINE CULTURE   --   --  >100,000 cfu/ml Pseudomonas aeruginosa*  <10,000 cfu/ml Gram Negative Paul Enteric Like*       Last 24 Hours Medication List:   Current Facility-Administered Medications   Medication Dose Route Frequency Provider Last Rate    acetaminophen  650 mg Oral Q6H PRN Navi Mario MD      aluminum-magnesium hydroxide-simethicone  30 mL Oral Q6H PRN Navi Mario MD      calcium carbonate  1 tablet Oral Daily With Breakfast Navi Mario MD      cefepime  1,000 mg Intravenous Q12H PUJA Sagastume 1,000 mg (12/14/23 0021)    docusate sodium  100 mg Oral BID Navi Mario MD      levothyroxine  75 mcg Oral Daily Navi Mario MD      ondansetron  4 mg Intravenous Q6H PRN Navi Mario MD      pantoprazole  40 mg Intravenous Q12H Annamaria Berman PA-C      pravastatin  40 mg Oral Daily With Dinner Navi Mario MD      psyllium  1 packet Oral Daily Navi Mario MD      sodium chloride  75 mL/hr Intravenous Continuous Emanuel Corley MD 75 mL/hr (12/14/23 4648)        Today, Patient Was Seen By: Emanuel Corley MD    ** Please Note: Dictation voice to text  software may have been used in the creation of this document. **

## 2023-12-14 NOTE — ASSESSMENT & PLAN NOTE
Lab Results   Component Value Date    EGFR 27 12/14/2023    EGFR 26 12/13/2023    EGFR 24 12/12/2023    CREATININE 1.70 (H) 12/14/2023    CREATININE 1.74 (H) 12/13/2023    CREATININE 1.89 (H) 12/12/2023   Baseline creatinine approximately 1.4  Presented with creatinine of 3.7  Secondary to urinary retention  Kidney function has since significantly improved now near baseline  Continue to monitor

## 2023-12-14 NOTE — ASSESSMENT & PLAN NOTE
Patient initially presented with dark-tarry stool and hematemesis  GI consulted EGD was performed on 12/8 which revealed esophagitis and 10 large clean-based ulcers in the stomach and duodenum  Continue Protonix twice daily  Still reports black stool  Hemoglobin this morning noted to be 6.2  Will transfuse 1 unit packed red blood cell  Will consult GI for possible need for repeat EGD and further evaluation  Continue to monitor hemoglobin

## 2023-12-14 NOTE — CASE MANAGEMENT
Case Management Discharge Planning Note    Patient name Samira Allred  [facility] /-01 MRN [id number]  : 1940 Date 2023       Current Admission Date: 2023  Current Admission Diagnosis:Sepsis ([facility])   Patient Active Problem List    Diagnosis Date Noted    Peptic ulcer 2023    Aortic valve stenosis, moderate 2023    Severe protein-calorie malnutrition (HCC) 2023    GI bleed 2023    Hydronephrosis 2023    Urinary retention 2023    Sepsis ([facility]) 2023    Acute cystitis with hematuria 2023    Acute kidney injury superimposed on chronic kidney disease  2023    Transaminitis 2023    Protein-calorie malnutrition, unspecified severity ([facility]) 2023    Chronic renal disease, stage IV ([facility]) 2023    Chronic pain of both knees 08/10/2022    Chronic kidney disease-mineral and bone disorder 2022    Stage 3b chronic kidney disease (HCC) 2021    History of DVT (deep vein thrombosis) 2021    Varicose veins of left lower extremity with pain 2020    Lower leg DVT (deep venous thromboembolism), chronic, left ([facility]) 2020    Intermediate stage nonexudative 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 eyes 2019    History of peptic ulcer 10/11/2018    Fat necrosis (segmental) of breast 2018    Personal history of breast cancer 2018    Abnormal finding on diagnostic imaging of kidney 2018    Localized edema 2018    Hypothyroidism 2018    Hyperuricemia 2018    Hypertension 2018    Rheumatoid arthritis (HCC) 2018    Diastolic dysfunction 2017    Vitamin D deficiency 2014    Lumbar radiculopathy 2013    Hyperlipidemia 2013    Osteopenia 2013    Other chronic pain 2013    Adenocarcinoma of breast (HCC) 2013      LOS (days): 7  Geometric Mean LOS (GMLOS) (days): 5.1  Days to GMLOS:-1.8     OBJECTIVE:  Risk of Unplanned Readmission  Score: 24.03         Current admission status: Inpatient   Preferred Pharmacy:   [facility] #[id number] - [place] - [address]  [address]  [place] [address]  Phone: [phone] Fax: [phone]    [facility] - [place] - [facility]  [address] [facility] [address]  [place] [address]  Phone: [phone] Fax: [phone]    Primary Care Provider: Layne Bacon MD    Primary Insurance: MEDICARE  Secondary Insurance: BLUE CROSS    DISCHARGE DETAILS:     IMM Given (Date):: 12/14/23  IMM Given to:: Patient (CM reviewed IMM with patient at bedside. Patient reported understanding their rights and denied any questions or concerns Patient was given a copy and signed copy was placed in medical records.)

## 2023-12-14 NOTE — PLAN OF CARE
Problem: Nutrition/Hydration-ADULT  Goal: Nutrient/Hydration intake appropriate for improving, restoring or maintaining nutritional needs  Description: Monitor and assess patient's nutrition/hydration status for malnutrition. Collaborate with interdisciplinary team and initiate plan and interventions as ordered.  Monitor patient's weight and dietary intake as ordered or per policy. Utilize nutrition screening tool and intervene as necessary. Determine patient's food preferences and provide high-protein, high-caloric foods as appropriate.     INTERVENTIONS:  - Monitor oral intake, urinary output, labs, and treatment plans  - Assess nutrition and hydration status and recommend course of action  - Evaluate amount of meals eaten  - Assist patient with eating if necessary   - Allow adequate time for meals  - Recommend/ encourage appropriate diets, oral nutritional supplements, and vitamin/mineral supplements  - Order, calculate, and assess calorie counts as needed  - Recommend, monitor, and adjust tube feedings and TPN/PPN based on assessed needs  - Assess need for intravenous fluids  - Provide specific nutrition/hydration education as appropriate  - Include patient/family/caregiver in decisions related to nutrition  Outcome: Progressing

## 2023-12-15 ENCOUNTER — ANESTHESIA EVENT (INPATIENT)
Dept: GASTROENTEROLOGY | Facility: HOSPITAL | Age: 83
DRG: 871 | End: 2023-12-15
Payer: MEDICARE

## 2023-12-15 ENCOUNTER — APPOINTMENT (INPATIENT)
Dept: GASTROENTEROLOGY | Facility: HOSPITAL | Age: 83
DRG: 871 | End: 2023-12-15
Payer: MEDICARE

## 2023-12-15 ENCOUNTER — ANESTHESIA (INPATIENT)
Dept: GASTROENTEROLOGY | Facility: HOSPITAL | Age: 83
DRG: 871 | End: 2023-12-15
Payer: MEDICARE

## 2023-12-15 LAB
ABO GROUP BLD BPU: NORMAL
ANION GAP SERPL CALCULATED.3IONS-SCNC: 4 MMOL/L
BASOPHILS # BLD AUTO: 0.05 THOUSANDS/ÂΜL (ref 0–0.1)
BASOPHILS NFR BLD AUTO: 1 % (ref 0–1)
BPU ID: NORMAL
BUN SERPL-MCNC: 51 MG/DL (ref 5–25)
CALCIUM SERPL-MCNC: 7.3 MG/DL (ref 8.4–10.2)
CHLORIDE SERPL-SCNC: 113 MMOL/L (ref 96–108)
CO2 SERPL-SCNC: 23 MMOL/L (ref 21–32)
CREAT SERPL-MCNC: 1.56 MG/DL (ref 0.6–1.3)
CROSSMATCH: NORMAL
EOSINOPHIL # BLD AUTO: 0.18 THOUSAND/ÂΜL (ref 0–0.61)
EOSINOPHIL NFR BLD AUTO: 2 % (ref 0–6)
ERYTHROCYTE [DISTWIDTH] IN BLOOD BY AUTOMATED COUNT: 19.1 % (ref 11.6–15.1)
GFR SERPL CREATININE-BSD FRML MDRD: 30 ML/MIN/1.73SQ M
GLUCOSE SERPL-MCNC: 100 MG/DL (ref 65–140)
HCT VFR BLD AUTO: 19.3 % (ref 34.8–46.1)
HCT VFR BLD AUTO: 30.6 % (ref 34.8–46.1)
HGB BLD-MCNC: 10.4 G/DL (ref 11.5–15.4)
HGB BLD-MCNC: 6.3 G/DL (ref 11.5–15.4)
IMM GRANULOCYTES # BLD AUTO: 0.04 THOUSAND/UL (ref 0–0.2)
IMM GRANULOCYTES NFR BLD AUTO: 1 % (ref 0–2)
LYMPHOCYTES # BLD AUTO: 1.04 THOUSANDS/ÂΜL (ref 0.6–4.47)
LYMPHOCYTES NFR BLD AUTO: 14 % (ref 14–44)
MCH RBC QN AUTO: 30.9 PG (ref 26.8–34.3)
MCHC RBC AUTO-ENTMCNC: 32.6 G/DL (ref 31.4–37.4)
MCV RBC AUTO: 95 FL (ref 82–98)
MONOCYTES # BLD AUTO: 0.87 THOUSAND/ÂΜL (ref 0.17–1.22)
MONOCYTES NFR BLD AUTO: 12 % (ref 4–12)
NEUTROPHILS # BLD AUTO: 5.37 THOUSANDS/ÂΜL (ref 1.85–7.62)
NEUTS SEG NFR BLD AUTO: 70 % (ref 43–75)
NRBC BLD AUTO-RTO: 0 /100 WBCS
PLATELET # BLD AUTO: 251 THOUSANDS/UL (ref 149–390)
PMV BLD AUTO: 10 FL (ref 8.9–12.7)
POTASSIUM SERPL-SCNC: 4.2 MMOL/L (ref 3.5–5.3)
RBC # BLD AUTO: 2.04 MILLION/UL (ref 3.81–5.12)
SODIUM SERPL-SCNC: 140 MMOL/L (ref 135–147)
UNIT DISPENSE STATUS: NORMAL
UNIT PRODUCT CODE: NORMAL
UNIT PRODUCT VOLUME: 350 ML
UNIT RH: NORMAL
WBC # BLD AUTO: 7.55 THOUSAND/UL (ref 4.31–10.16)

## 2023-12-15 PROCEDURE — 99232 SBSQ HOSP IP/OBS MODERATE 35: CPT | Performed by: INTERNAL MEDICINE

## 2023-12-15 PROCEDURE — C9113 INJ PANTOPRAZOLE SODIUM, VIA: HCPCS | Performed by: PHYSICIAN ASSISTANT

## 2023-12-15 PROCEDURE — 0W3P8ZZ CONTROL BLEEDING IN GASTROINTESTINAL TRACT, VIA NATURAL OR ARTIFICIAL OPENING ENDOSCOPIC: ICD-10-PCS | Performed by: INTERNAL MEDICINE

## 2023-12-15 PROCEDURE — 85025 COMPLETE CBC W/AUTO DIFF WBC: CPT | Performed by: INTERNAL MEDICINE

## 2023-12-15 PROCEDURE — 85014 HEMATOCRIT: CPT | Performed by: PHYSICIAN ASSISTANT

## 2023-12-15 PROCEDURE — 85018 HEMOGLOBIN: CPT | Performed by: PHYSICIAN ASSISTANT

## 2023-12-15 PROCEDURE — 43255 EGD CONTROL BLEEDING ANY: CPT | Performed by: INTERNAL MEDICINE

## 2023-12-15 PROCEDURE — P9016 RBC LEUKOCYTES REDUCED: HCPCS

## 2023-12-15 PROCEDURE — 80048 BASIC METABOLIC PNL TOTAL CA: CPT | Performed by: INTERNAL MEDICINE

## 2023-12-15 RX ORDER — LIDOCAINE HYDROCHLORIDE 10 MG/ML
INJECTION, SOLUTION EPIDURAL; INFILTRATION; INTRACAUDAL; PERINEURAL AS NEEDED
Status: DISCONTINUED | OUTPATIENT
Start: 2023-12-15 | End: 2023-12-15

## 2023-12-15 RX ORDER — PHENYLEPHRINE HCL IN 0.9% NACL 1 MG/10 ML
SYRINGE (ML) INTRAVENOUS AS NEEDED
Status: DISCONTINUED | OUTPATIENT
Start: 2023-12-15 | End: 2023-12-15

## 2023-12-15 RX ORDER — PROPOFOL 10 MG/ML
INJECTION, EMULSION INTRAVENOUS AS NEEDED
Status: DISCONTINUED | OUTPATIENT
Start: 2023-12-15 | End: 2023-12-15

## 2023-12-15 RX ORDER — ALBUMIN, HUMAN INJ 5% 5 %
SOLUTION INTRAVENOUS CONTINUOUS PRN
Status: DISCONTINUED | OUTPATIENT
Start: 2023-12-15 | End: 2023-12-15

## 2023-12-15 RX ORDER — SODIUM CHLORIDE, SODIUM LACTATE, POTASSIUM CHLORIDE, CALCIUM CHLORIDE 600; 310; 30; 20 MG/100ML; MG/100ML; MG/100ML; MG/100ML
INJECTION, SOLUTION INTRAVENOUS CONTINUOUS PRN
Status: DISCONTINUED | OUTPATIENT
Start: 2023-12-15 | End: 2023-12-15

## 2023-12-15 RX ORDER — CEFEPIME HYDROCHLORIDE 1 G/50ML
1000 INJECTION, SOLUTION INTRAVENOUS EVERY 12 HOURS
Status: DISCONTINUED | OUTPATIENT
Start: 2023-12-15 | End: 2023-12-18 | Stop reason: HOSPADM

## 2023-12-15 RX ORDER — CALCIUM CHLORIDE 100 MG/ML
INJECTION INTRAVENOUS; INTRAVENTRICULAR AS NEEDED
Status: DISCONTINUED | OUTPATIENT
Start: 2023-12-15 | End: 2023-12-15

## 2023-12-15 RX ORDER — EPINEPHRINE 1 MG/ML
INJECTION, SOLUTION, CONCENTRATE INTRAVENOUS AS NEEDED
Status: COMPLETED | OUTPATIENT
Start: 2023-12-15 | End: 2023-12-15

## 2023-12-15 RX ADMIN — DOCUSATE SODIUM 100 MG: 100 CAPSULE, LIQUID FILLED ORAL at 09:30

## 2023-12-15 RX ADMIN — CALCIUM 1 TABLET: 500 TABLET ORAL at 09:30

## 2023-12-15 RX ADMIN — PROPOFOL 20 MG: 10 INJECTION, EMULSION INTRAVENOUS at 16:07

## 2023-12-15 RX ADMIN — SODIUM CHLORIDE 75 ML/HR: 0.9 INJECTION, SOLUTION INTRAVENOUS at 00:03

## 2023-12-15 RX ADMIN — PROPOFOL 20 MG: 10 INJECTION, EMULSION INTRAVENOUS at 16:37

## 2023-12-15 RX ADMIN — EPINEPHRINE 1 MG: 1 INJECTION, SOLUTION INTRAMUSCULAR; SUBCUTANEOUS at 16:43

## 2023-12-15 RX ADMIN — Medication 40 MG: at 16:09

## 2023-12-15 RX ADMIN — PROPOFOL 20 MG: 10 INJECTION, EMULSION INTRAVENOUS at 16:22

## 2023-12-15 RX ADMIN — PROPOFOL 20 MG: 10 INJECTION, EMULSION INTRAVENOUS at 16:48

## 2023-12-15 RX ADMIN — PHENYLEPHRINE HYDROCHLORIDE 60 MCG/MIN: 10 INJECTION INTRAVENOUS at 16:23

## 2023-12-15 RX ADMIN — PROPOFOL 20 MG: 10 INJECTION, EMULSION INTRAVENOUS at 16:10

## 2023-12-15 RX ADMIN — CALCIUM CHLORIDE 0.2 G: 100 INJECTION INTRAVENOUS; INTRAVENTRICULAR at 16:48

## 2023-12-15 RX ADMIN — LIDOCAINE HYDROCHLORIDE 50 MG: 10 INJECTION, SOLUTION EPIDURAL; INFILTRATION; INTRACAUDAL; PERINEURAL at 16:06

## 2023-12-15 RX ADMIN — CEFEPIME HYDROCHLORIDE 1000 MG: 1 INJECTION, SOLUTION INTRAVENOUS at 18:24

## 2023-12-15 RX ADMIN — EPINEPHRINE 0.3 MG: 1 INJECTION, SOLUTION INTRAMUSCULAR; SUBCUTANEOUS at 16:53

## 2023-12-15 RX ADMIN — PSYLLIUM HUSK 1 PACKET: 3.4 POWDER ORAL at 09:31

## 2023-12-15 RX ADMIN — Medication 100 MCG: at 16:18

## 2023-12-15 RX ADMIN — PANTOPRAZOLE SODIUM 40 MG: 40 INJECTION, POWDER, FOR SOLUTION INTRAVENOUS at 13:37

## 2023-12-15 RX ADMIN — Medication 100 MCG: at 16:08

## 2023-12-15 RX ADMIN — PROPOFOL 20 MG: 10 INJECTION, EMULSION INTRAVENOUS at 16:34

## 2023-12-15 RX ADMIN — PROPOFOL 20 MG: 10 INJECTION, EMULSION INTRAVENOUS at 16:41

## 2023-12-15 RX ADMIN — PRAVASTATIN SODIUM 40 MG: 40 TABLET ORAL at 18:24

## 2023-12-15 RX ADMIN — PANTOPRAZOLE SODIUM 40 MG: 40 INJECTION, POWDER, FOR SOLUTION INTRAVENOUS at 23:08

## 2023-12-15 RX ADMIN — PROPOFOL 20 MG: 10 INJECTION, EMULSION INTRAVENOUS at 16:17

## 2023-12-15 RX ADMIN — PROPOFOL 20 MG: 10 INJECTION, EMULSION INTRAVENOUS at 16:29

## 2023-12-15 RX ADMIN — Medication 200 MCG: at 16:13

## 2023-12-15 RX ADMIN — PROPOFOL 50 MG: 10 INJECTION, EMULSION INTRAVENOUS at 16:06

## 2023-12-15 RX ADMIN — SODIUM CHLORIDE, SODIUM LACTATE, POTASSIUM CHLORIDE, AND CALCIUM CHLORIDE: .6; .31; .03; .02 INJECTION, SOLUTION INTRAVENOUS at 15:54

## 2023-12-15 RX ADMIN — CEFEPIME HYDROCHLORIDE 1000 MG: 1 INJECTION, SOLUTION INTRAVENOUS at 05:23

## 2023-12-15 RX ADMIN — PROPOFOL 20 MG: 10 INJECTION, EMULSION INTRAVENOUS at 16:14

## 2023-12-15 RX ADMIN — PROPOFOL 20 MG: 10 INJECTION, EMULSION INTRAVENOUS at 16:45

## 2023-12-15 RX ADMIN — Medication 200 MCG: at 16:06

## 2023-12-15 RX ADMIN — LEVOTHYROXINE SODIUM 75 MCG: 25 TABLET ORAL at 09:31

## 2023-12-15 RX ADMIN — Medication 200 MCG: at 16:15

## 2023-12-15 RX ADMIN — Medication 200 MCG: at 16:11

## 2023-12-15 RX ADMIN — ALBUMIN (HUMAN): 12.5 INJECTION, SOLUTION INTRAVENOUS at 16:24

## 2023-12-15 NOTE — PLAN OF CARE
Problem: Potential for Falls  Goal: Patient will remain free of falls  Description: INTERVENTIONS:  - Educate patient/family on patient safety including physical limitations  - Instruct patient to call for assistance with activity   - Consult OT/PT to assist with strengthening/mobility   - Keep Call bell within reach  - Keep bed low and locked with side rails adjusted as appropriate  - Keep care items and personal belongings within reach  - Initiate and maintain comfort rounds  - Make Fall Risk Sign visible to staff  - Apply yellow socks and bracelet for high fall risk patients  - Consider moving patient to room near nurses station  Outcome: Progressing     Problem: PAIN - ADULT  Goal: Verbalizes/displays adequate comfort level or baseline comfort level  Description: Interventions:  - Encourage patient to monitor pain and request assistance  - Assess pain using appropriate pain scale  - Administer analgesics based on type and severity of pain and evaluate response  - Implement non-pharmacological measures as appropriate and evaluate response  - Consider cultural and social influences on pain and pain management  - Notify physician/advanced practitioner if interventions unsuccessful or patient reports new pain  Outcome: Progressing     Problem: SAFETY ADULT  Goal: Patient will remain free of falls  Description: INTERVENTIONS:  - Educate patient/family on patient safety including physical limitations  - Instruct patient to call for assistance with activity   - Consult OT/PT to assist with strengthening/mobility   - Keep Call bell within reach  - Keep bed low and locked with side rails adjusted as appropriate  - Keep care items and personal belongings within reach  - Initiate and maintain comfort rounds  - Make Fall Risk Sign visible to staff  - Apply yellow socks and bracelet for high fall risk patients  - Consider moving patient to room near nurses station  Outcome: Progressing  Goal: Maintain or return to baseline  ADL function  Description: INTERVENTIONS:  -  Assess patient's ability to carry out ADLs; assess patient's baseline for ADL function and identify physical deficits which impact ability to perform ADLs (bathing, care of mouth/teeth, toileting, grooming, dressing, etc.)  - Assess/evaluate cause of self-care deficits   - Assess range of motion  - Assess patient's mobility; develop plan if impaired  - Assess patient's need for assistive devices and provide as appropriate  - Encourage maximum independence but intervene and supervise when necessary  - Involve family in performance of ADLs  - Assess for home care needs following discharge   - Consider OT consult to assist with ADL evaluation and planning for discharge  - Provide patient education as appropriate  Outcome: Progressing  Goal: Maintains/Returns to pre admission functional level  Description: INTERVENTIONS:  - Perform AM-PAC 6 Click Basic Mobility/ Daily Activity assessment daily.  - Set and communicate daily mobility goal to care team and patient/family/caregiver.   - Collaborate with rehabilitation services on mobility goals if consulted  - Record patient progress and toleration of activity level   Outcome: Progressing     Problem: Nutrition/Hydration-ADULT  Goal: Nutrient/Hydration intake appropriate for improving, restoring or maintaining nutritional needs  Description: Monitor and assess patient's nutrition/hydration status for malnutrition. Collaborate with interdisciplinary team and initiate plan and interventions as ordered.  Monitor patient's weight and dietary intake as ordered or per policy. Utilize nutrition screening tool and intervene as necessary. Determine patient's food preferences and provide high-protein, high-caloric foods as appropriate.     INTERVENTIONS:  - Monitor oral intake, urinary output, labs, and treatment plans  - Assess nutrition and hydration status and recommend course of action  - Evaluate amount of meals eaten  - Assist  patient with eating if necessary   - Allow adequate time for meals  - Recommend/ encourage appropriate diets, oral nutritional supplements, and vitamin/mineral supplements  - Order, calculate, and assess calorie counts as needed  - Recommend, monitor, and adjust tube feedings and TPN/PPN based on assessed needs  - Assess need for intravenous fluids  - Provide specific nutrition/hydration education as appropriate  - Include patient/family/caregiver in decisions related to nutrition  Outcome: Progressing     Problem: Prexisting or High Potential for Compromised Skin Integrity  Goal: Skin integrity is maintained or improved  Description: INTERVENTIONS:  - Identify patients at risk for skin breakdown  - Assess and monitor skin integrity  - Assess and monitor nutrition and hydration status  - Monitor labs   - Assess for incontinence   - Turn and reposition patient  - Assist with mobility/ambulation  - Relieve pressure over bony prominences  - Avoid friction and shearing  - Provide appropriate hygiene as needed including keeping skin clean and dry  - Evaluate need for skin moisturizer/barrier cream  - Collaborate with interdisciplinary team   - Patient/family teaching  - Consider wound care consult   Outcome: Progressing

## 2023-12-15 NOTE — PROGRESS NOTES
[facility]  Progress Note  Name: Samira Allred I  MRN: [id number]  Unit/Bed#: -01 I Date of Admission: 12/7/2023   Date of Service: 12/15/2023 I Hospital Day: 8    Assessment/Plan   * Sepsis (HCC)  Assessment & Plan    Presented initially with leukocytosis and tachycardia in the setting of UTI  Clinically improving on IV antibiotics  Continue with IV cefepime  Anticipate completion of IV antibiotics tomorrow    Severe protein-calorie malnutrition (HCC)  Assessment & Plan  Malnutrition Findings:   Adult Malnutrition type: Chronic illness  Adult Degree of Malnutrition: Other severe protein calorie malnutrition  Malnutrition Characteristics: Weight loss, Inadequate energy                  360 Statement: Related to medical condition as evidenced by 8.7% Weight loss of 18 lbs (10.3%) in 5 months, significant and < 75% energy intake needs met > 1 month. Treated with diet.    BMI Findings:           Body mass index is 28.55 kg/m².         Hydronephrosis  Assessment & Plan  Carrera catheter in place secondary to urinary retention    GI bleed  Assessment & Plan    Patient initially presented with dark-tarry stool and hematemesis  GI consulted EGD was performed on 12/8 which revealed esophagitis and 10 large clean-based ulcers in the stomach and duodenum  Still with intermittent melena  Received 1 unit packed red blood cells yesterday for hemoglobin of 6, noted to have hemoglobin 6.2 this morning received 1 more unit of packed red blood cells  Plan for EGD later this afternoon  N.p.o. currently  Appreciate GI recommendations  Follow-up further evaluation  Monitor hemoglobin and any further signs of melena    Acute cystitis with hematuria  Assessment & Plan  Urinalysis revealed leukocytes and bacteria  Urine culture revealed Pseudomonas  Currently on day 5 of antibiotics will complete IV antibiotic therapy with cefepime tomorrow    Acute kidney injury superimposed on chronic kidney disease    Assessment & Plan  Lab Results   Component Value Date    EGFR 30 12/15/2023    EGFR 27 2023    EGFR 26 2023    CREATININE 1.56 (H) 12/15/2023    CREATININE 1.70 (H) 2023    CREATININE 1.74 (H) 2023   Baseline creatinine approximately 1.4   Noted to have  a creatinine of 3.7  Secondary to obstruction  Status post Carrera catheter placement with significant improvement  Now kidney function near baseline  Continue to monitor           VTE Pharmacologic Prophylaxis:   Pharmacologic: Pharmacologic VTE Prophylaxis contraindicated due to gi bleed  Mechanical VTE Prophylaxis in Place: Yes    Patient Centered Rounds: I have performed bedside rounds with nursing staff today.    Discussions with Specialists or Other Care Team Provider: cm, nursing    Education and Discussions with Family / Patient: patient    Time Spent for Care: 30 minutes.  More than 50% of total time spent on counseling and coordination of care as described above.    Current Length of Stay: 8 day(s)    Current Patient Status: Inpatient   Certification Statement: The patient will continue to require additional inpatient hospital stay due to see below    Discharge Plan: pending stabily of hgb, anticipate approx 24-48 hrs    Code Status: Level 1 - Full Code      Subjective:   Denies cp, cough, fevers, abd pain    Objective:     Vitals:   Temp (24hrs), Av.6 °F (37 °C), Min:98.3 °F (36.8 °C), Max:98.9 °F (37.2 °C)    Temp:  [98.3 °F (36.8 °C)-98.9 °F (37.2 °C)] 98.8 °F (37.1 °C)  HR:  [49-94] 50  Resp:  [16-24] 18  BP: (107-128)/(51-59) 117/55  SpO2:  [94 %-100 %] 94 %  Body mass index is 28.55 kg/m².     Input and Output Summary (last 24 hours):       Intake/Output Summary (Last 24 hours) at 12/15/2023 1134  Last data filed at 12/15/2023 0006  Gross per 24 hour   Intake 572.5 ml   Output 1525 ml   Net -952.5 ml       Physical Exam:     Physical Exam  Constitutional:       General: She is not in acute distress.     Appearance: She is  well-developed. She is not diaphoretic.   HENT:      Head: Normocephalic and atraumatic.      Nose: Nose normal.      Mouth/Throat:      Pharynx: No oropharyngeal exudate.   Eyes:      General: No scleral icterus.        Right eye: No discharge.         Left eye: No discharge.      Conjunctiva/sclera: Conjunctivae normal.   Neck:      Thyroid: No thyromegaly.      Vascular: No JVD.   Cardiovascular:      Rate and Rhythm: Normal rate and regular rhythm.      Heart sounds: Normal heart sounds. No murmur heard.     No friction rub. No gallop.   Pulmonary:      Effort: Pulmonary effort is normal. No respiratory distress.      Breath sounds: Normal breath sounds. No wheezing or rales.   Chest:      Chest wall: No tenderness.   Abdominal:      General: Bowel sounds are normal. There is no distension.      Palpations: Abdomen is soft.      Tenderness: There is no abdominal tenderness. There is no guarding or rebound.   Musculoskeletal:         General: No tenderness or deformity. Normal range of motion.      Cervical back: Normal range of motion and neck supple.   Skin:     General: Skin is warm and dry.      Findings: No erythema or rash.   Neurological:      Mental Status: She is alert. Mental status is at baseline.      Cranial Nerves: No cranial nerve deficit.      Sensory: No sensory deficit.      Motor: No abnormal muscle tone.      Coordination: Coordination normal.           Additional Data:     Labs:    Results from last 7 days   Lab Units 12/15/23  0515   WBC Thousand/uL 7.55   HEMOGLOBIN g/dL 6.3*   HEMATOCRIT % 19.3*   PLATELETS Thousands/uL 251   NEUTROS PCT % 70   LYMPHS PCT % 14   MONOS PCT % 12   EOS PCT % 2     Results from last 7 days   Lab Units 12/15/23  0515   SODIUM mmol/L 140   POTASSIUM mmol/L 4.2   CHLORIDE mmol/L 113*   CO2 mmol/L 23   BUN mg/dL 51*   CREATININE mg/dL 1.56*   ANION GAP mmol/L 4   CALCIUM mg/dL 7.3*   GLUCOSE RANDOM mg/dL 100                           * I Have Reviewed All Lab Data  Listed Above.  * Additional Pertinent Lab Tests Reviewed: All Labs For Current Hospital Admission Reviewed    Imaging:    Imaging Reports Reviewed Today Include: na  Imaging Personally Reviewed by Myself Includes:  na    Recent Cultures (last 7 days):           Last 24 Hours Medication List:   Current Facility-Administered Medications   Medication Dose Route Frequency Provider Last Rate    acetaminophen  650 mg Oral Q6H PRN Navi Mario MD      aluminum-magnesium hydroxide-simethicone  30 mL Oral Q6H PRN Navi Mario MD      calcium carbonate  1 tablet Oral Daily With Breakfast Navi Mario MD      cefepime  1,000 mg Intravenous Q12H PUJA Sagastume 1,000 mg (12/15/23 0523)    docusate sodium  100 mg Oral BID Navi Mario MD      levothyroxine  75 mcg Oral Daily Navi Mario MD      ondansetron  4 mg Intravenous Q6H PRN Navi Mario MD      pantoprazole  40 mg Intravenous Q12H Annamaria Berman PA-C      pravastatin  40 mg Oral Daily With Dinner Navi Mario MD      psyllium  1 packet Oral Daily Navi Mario MD          Today, Patient Was Seen By: Emanuel Corley MD    ** Please Note: Dictation voice to text software may have been used in the creation of this document. **

## 2023-12-15 NOTE — QUICK NOTE
Patient's hemoglobin this morning again decreased from 8.5 yesterday to 6.2.    Will order to prepare and transfuse 1 unit of packed red blood cells this morning and continue to monitor H/H

## 2023-12-15 NOTE — ASSESSMENT & PLAN NOTE
Patient initially presented with dark-tarry stool and hematemesis  GI consulted EGD was performed on 12/8 which revealed esophagitis and 10 large clean-based ulcers in the stomach and duodenum  Still with intermittent melena  Received 1 unit packed red blood cells yesterday for hemoglobin of 6, noted to have hemoglobin 6.2 this morning received 1 more unit of packed red blood cells  Plan for EGD later this afternoon  N.p.o. currently  Appreciate GI recommendations  Follow-up further evaluation  Monitor hemoglobin and any further signs of melena

## 2023-12-15 NOTE — ASSESSMENT & PLAN NOTE
Lab Results   Component Value Date    EGFR 30 12/15/2023    EGFR 27 12/14/2023    EGFR 26 12/13/2023    CREATININE 1.56 (H) 12/15/2023    CREATININE 1.70 (H) 12/14/2023    CREATININE 1.74 (H) 12/13/2023   Baseline creatinine approximately 1.4   Noted to have  a creatinine of 3.7  Secondary to obstruction  Status post Carrera catheter placement with significant improvement  Now kidney function near baseline  Continue to monitor

## 2023-12-15 NOTE — QUICK NOTE
Patient with drop in hemoglobin again, but no signs of overt GI bleeding. This is second drop. Therefore, will plan for EGD to investigate.

## 2023-12-15 NOTE — ANESTHESIA PREPROCEDURE EVALUATION
Procedure:  EGD    Relevant Problems   CARDIO   (+) Aortic valve stenosis, moderate   (+) Hyperlipidemia   (+) Hypertension      ENDO   (+) Hypothyroidism      GI/HEPATIC   (+) GI bleed   (+) PUD (peptic ulcer disease)      /RENAL   (+) Acute kidney injury superimposed on chronic kidney disease    (+) Chronic kidney disease-mineral and bone disorder   (+) Chronic renal disease, stage IV (HCC)   (+) Hydronephrosis   (+) Stage 3b chronic kidney disease (HCC)      GYN   (+) Adenocarcinoma of breast (HCC)      MUSCULOSKELETAL   (+) Rheumatoid arthritis (HCC)      NEURO/PSYCH   (+) Other chronic pain      Pt reports a near death experience following their first surgery and states that it was because they were so slow to come out of anesthesia. Pt states they were out for 9 hours for a gall bladder surgery and nearly . ECHO 10/07/2023: EF 60-65%, moderate aortic stenosis, mild mitral regurg    Physical Exam    Airway    Mallampati score: II  TM Distance: <3 FB  Neck ROM: full     Dental    upper dentures and lower dentures    Cardiovascular      Pulmonary      Other Findings  post-pubertal.      Anesthesia Plan  ASA Score- 3     Anesthesia Type- IV sedation with anesthesia with ASA Monitors. Additional Monitors:     Airway Plan:     Comment: Given pts past experiences, emphasized sedation would minimize exposure to anesthetic drugs, but risks include the possibility of awareness or discomfort during the procedure. .       Plan Factors-    Chart reviewed. Imaging results reviewed. Existing labs reviewed. Patient summary reviewed. Induction- intravenous. Postoperative Plan-     Informed Consent- Anesthetic plan and risks discussed with patient. I personally reviewed this patient with the CRNA. Discussed and agreed on the Anesthesia Plan with the CRNA. Yenny Earl

## 2023-12-15 NOTE — ANESTHESIA POSTPROCEDURE EVALUATION
Post-Op Assessment Note    CV Status:  Stable  Pain Score: 0    Pain management: adequate       Mental Status:  Alert and awake   Hydration Status:  Stable   PONV Controlled:  None   Airway Patency:  Patent and adequate     Post Op Vitals Reviewed: Yes      Staff: Anesthesiologist, CRNA               /67 (12/15/23 1711)    Temp 97.5 °F (36.4 °C) (12/15/23 1711)    Pulse 95 (12/15/23 1711)   Resp (!) 24 (12/15/23 1711)    SpO2 100 % (12/15/23 1711)

## 2023-12-15 NOTE — ASSESSMENT & PLAN NOTE
Presented initially with leukocytosis and tachycardia in the setting of UTI  Clinically improving on IV antibiotics  Continue with IV cefepime  Anticipate completion of IV antibiotics tomorrow

## 2023-12-15 NOTE — ASSESSMENT & PLAN NOTE
Urinalysis revealed leukocytes and bacteria  Urine culture revealed Pseudomonas  Currently on day 5 of antibiotics will complete IV antibiotic therapy with cefepime tomorrow

## 2023-12-15 NOTE — PLAN OF CARE
Problem: Potential for Falls  Goal: Patient will remain free of falls  Description: INTERVENTIONS:  - Educate patient/family on patient safety including physical limitations  - Instruct patient to call for assistance with activity   - Consult OT/PT to assist with strengthening/mobility   - Keep Call bell within reach  - Keep bed low and locked with side rails adjusted as appropriate  - Keep care items and personal belongings within reach  - Initiate and maintain comfort rounds  - Make Fall Risk Sign visible to staff  - Offer Toileting every 2 Hours, in advance of need  - Initiate/Maintain bed alarm  - Obtain necessary fall risk management equipment.  - Apply yellow socks and bracelet for high fall risk patients  - Consider moving patient to room near nurses station  Outcome: Progressing     Problem: PAIN - ADULT  Goal: Verbalizes/displays adequate comfort level or baseline comfort level  Description: Interventions:  - Encourage patient to monitor pain and request assistance  - Assess pain using appropriate pain scale  - Administer analgesics based on type and severity of pain and evaluate response  - Implement non-pharmacological measures as appropriate and evaluate response  - Consider cultural and social influences on pain and pain management  - Notify physician/advanced practitioner if interventions unsuccessful or patient reports new pain  Outcome: Progressing     Problem: SAFETY ADULT  Goal: Patient will remain free of falls  Description: INTERVENTIONS:  - Educate patient/family on patient safety including physical limitations  - Instruct patient to call for assistance with activity   - Consult OT/PT to assist with strengthening/mobility   - Keep Call bell within reach  - Keep bed low and locked with side rails adjusted as appropriate  - Keep care items and personal belongings within reach  - Initiate and maintain comfort rounds  - Make Fall Risk Sign visible to staff  - Offer Toileting every 2 Hours, in  advance of need  - Initiate/Maintain bed alarm  - Obtain necessary fall risk management equipment:.  - Apply yellow socks and bracelet for high fall risk patients  - Consider moving patient to room near nurses station  Outcome: Progressing  Goal: Maintain or return to baseline ADL function  Description: INTERVENTIONS:  -  Assess patient's ability to carry out ADLs; assess patient's baseline for ADL function and identify physical deficits which impact ability to perform ADLs (bathing, care of mouth/teeth, toileting, grooming, dressing, etc.)  - Assess/evaluate cause of self-care deficits   - Assess range of motion  - Assess patient's mobility; develop plan if impaired  - Assess patient's need for assistive devices and provide as appropriate  - Encourage maximum independence but intervene and supervise when necessary  - Involve family in performance of ADLs  - Assess for home care needs following discharge   - Consider OT consult to assist with ADL evaluation and planning for discharge  - Provide patient education as appropriate  Outcome: Progressing  Goal: Maintains/Returns to pre admission functional level  Description: INTERVENTIONS:  - Perform AM-PAC 6 Click Basic Mobility/ Daily Activity assessment daily.  - Set and communicate daily mobility goal to care team and patient/family/caregiver.   - Collaborate with rehabilitation services on mobility goals if consulted  - Perform Range of Motion 2 times a day.  - Reposition patient every 2 hours.  - Dangle patient 2 times a day  - Stand patient 2 times a day  - Ambulate patient 2 times a day  - Out of bed to chair 2 times a day   - Out of bed for meals 2 times a day  - Out of bed for toileting  - Record patient progress and toleration of activity level   Outcome: Progressing     Problem: Nutrition/Hydration-ADULT  Goal: Nutrient/Hydration intake appropriate for improving, restoring or maintaining nutritional needs  Description: Monitor and assess patient's  nutrition/hydration status for malnutrition. Collaborate with interdisciplinary team and initiate plan and interventions as ordered.  Monitor patient's weight and dietary intake as ordered or per policy. Utilize nutrition screening tool and intervene as necessary. Determine patient's food preferences and provide high-protein, high-caloric foods as appropriate.     INTERVENTIONS:  - Monitor oral intake, urinary output, labs, and treatment plans  - Assess nutrition and hydration status and recommend course of action  - Evaluate amount of meals eaten  - Assist patient with eating if necessary   - Allow adequate time for meals  - Recommend/ encourage appropriate diets, oral nutritional supplements, and vitamin/mineral supplements  - Order, calculate, and assess calorie counts as needed  - Recommend, monitor, and adjust tube feedings and TPN/PPN based on assessed needs  - Assess need for intravenous fluids  - Provide specific nutrition/hydration education as appropriate  - Include patient/family/caregiver in decisions related to nutrition  Outcome: Progressing     Problem: Prexisting or High Potential for Compromised Skin Integrity  Goal: Skin integrity is maintained or improved  Description: INTERVENTIONS:  - Identify patients at risk for skin breakdown  - Assess and monitor skin integrity  - Assess and monitor nutrition and hydration status  - Monitor labs   - Assess for incontinence   - Turn and reposition patient  - Assist with mobility/ambulation  - Relieve pressure over bony prominences  - Avoid friction and shearing  - Provide appropriate hygiene as needed including keeping skin clean and dry  - Evaluate need for skin moisturizer/barrier cream  - Collaborate with interdisciplinary team   - Patient/family teaching  - Consider wound care consult   Outcome: Progressing

## 2023-12-16 LAB
ABO GROUP BLD BPU: NORMAL
ANION GAP SERPL CALCULATED.3IONS-SCNC: 10 MMOL/L
BASOPHILS # BLD AUTO: 0.1 THOUSANDS/ÂΜL (ref 0–0.1)
BASOPHILS NFR BLD AUTO: 1 % (ref 0–1)
BPU ID: NORMAL
BUN SERPL-MCNC: 62 MG/DL (ref 5–25)
CALCIUM SERPL-MCNC: 8.1 MG/DL (ref 8.4–10.2)
CHLORIDE SERPL-SCNC: 113 MMOL/L (ref 96–108)
CO2 SERPL-SCNC: 19 MMOL/L (ref 21–32)
CREAT SERPL-MCNC: 1.69 MG/DL (ref 0.6–1.3)
CROSSMATCH: NORMAL
EOSINOPHIL # BLD AUTO: 0.23 THOUSAND/ÂΜL (ref 0–0.61)
EOSINOPHIL NFR BLD AUTO: 3 % (ref 0–6)
ERYTHROCYTE [DISTWIDTH] IN BLOOD BY AUTOMATED COUNT: 18.3 % (ref 11.6–15.1)
GFR SERPL CREATININE-BSD FRML MDRD: 27 ML/MIN/1.73SQ M
GLUCOSE SERPL-MCNC: 106 MG/DL (ref 65–140)
HCT VFR BLD AUTO: 30.7 % (ref 34.8–46.1)
HGB BLD-MCNC: 10.1 G/DL (ref 11.5–15.4)
IMM GRANULOCYTES # BLD AUTO: 0.04 THOUSAND/UL (ref 0–0.2)
IMM GRANULOCYTES NFR BLD AUTO: 1 % (ref 0–2)
LYMPHOCYTES # BLD AUTO: 0.99 THOUSANDS/ÂΜL (ref 0.6–4.47)
LYMPHOCYTES NFR BLD AUTO: 12 % (ref 14–44)
MCH RBC QN AUTO: 30 PG (ref 26.8–34.3)
MCHC RBC AUTO-ENTMCNC: 32.9 G/DL (ref 31.4–37.4)
MCV RBC AUTO: 91 FL (ref 82–98)
MONOCYTES # BLD AUTO: 0.85 THOUSAND/ÂΜL (ref 0.17–1.22)
MONOCYTES NFR BLD AUTO: 11 % (ref 4–12)
NEUTROPHILS # BLD AUTO: 5.79 THOUSANDS/ÂΜL (ref 1.85–7.62)
NEUTS SEG NFR BLD AUTO: 72 % (ref 43–75)
NRBC BLD AUTO-RTO: 0 /100 WBCS
PLATELET # BLD AUTO: 207 THOUSANDS/UL (ref 149–390)
PMV BLD AUTO: 9.8 FL (ref 8.9–12.7)
POTASSIUM SERPL-SCNC: 3.6 MMOL/L (ref 3.5–5.3)
RBC # BLD AUTO: 3.37 MILLION/UL (ref 3.81–5.12)
SODIUM SERPL-SCNC: 142 MMOL/L (ref 135–147)
UNIT DISPENSE STATUS: NORMAL
UNIT PRODUCT CODE: NORMAL
UNIT PRODUCT VOLUME: 300 ML
UNIT PRODUCT VOLUME: 350 ML
UNIT PRODUCT VOLUME: 350 ML
UNIT RH: NORMAL
WBC # BLD AUTO: 8 THOUSAND/UL (ref 4.31–10.16)

## 2023-12-16 PROCEDURE — 99233 SBSQ HOSP IP/OBS HIGH 50: CPT | Performed by: INTERNAL MEDICINE

## 2023-12-16 PROCEDURE — C9113 INJ PANTOPRAZOLE SODIUM, VIA: HCPCS | Performed by: PHYSICIAN ASSISTANT

## 2023-12-16 PROCEDURE — 85025 COMPLETE CBC W/AUTO DIFF WBC: CPT | Performed by: INTERNAL MEDICINE

## 2023-12-16 PROCEDURE — 80048 BASIC METABOLIC PNL TOTAL CA: CPT | Performed by: INTERNAL MEDICINE

## 2023-12-16 PROCEDURE — 99232 SBSQ HOSP IP/OBS MODERATE 35: CPT | Performed by: INTERNAL MEDICINE

## 2023-12-16 RX ADMIN — CALCIUM 1 TABLET: 500 TABLET ORAL at 08:52

## 2023-12-16 RX ADMIN — PANTOPRAZOLE SODIUM 40 MG: 40 INJECTION, POWDER, FOR SOLUTION INTRAVENOUS at 09:18

## 2023-12-16 RX ADMIN — PRAVASTATIN SODIUM 40 MG: 40 TABLET ORAL at 17:10

## 2023-12-16 RX ADMIN — LEVOTHYROXINE SODIUM 75 MCG: 25 TABLET ORAL at 08:51

## 2023-12-16 RX ADMIN — PSYLLIUM HUSK 1 PACKET: 3.4 POWDER ORAL at 08:51

## 2023-12-16 RX ADMIN — CEFEPIME HYDROCHLORIDE 1000 MG: 1 INJECTION, SOLUTION INTRAVENOUS at 17:10

## 2023-12-16 RX ADMIN — PANTOPRAZOLE SODIUM 40 MG: 40 INJECTION, POWDER, FOR SOLUTION INTRAVENOUS at 20:11

## 2023-12-16 RX ADMIN — CEFEPIME HYDROCHLORIDE 1000 MG: 1 INJECTION, SOLUTION INTRAVENOUS at 06:21

## 2023-12-16 NOTE — PROGRESS NOTES
[facility]  Progress Note  Name: Samira Allred I  MRN: [id number]  Unit/Bed#: -01 I Date of Admission: 12/7/2023   Date of Service: 12/16/2023 I Hospital Day: 9    Assessment/Plan   * Sepsis (HCC)  Assessment & Plan    Presented initially with leukocytosis and tachycardia in the setting of UTI  Clinically improved with IV cefepime  Will complete IV antibiotic therapy today with IV cefepime for Pseudomonas UTI    Hydronephrosis  Assessment & Plan  Carrera catheter in place secondary urinary retention    GI bleed  Assessment & Plan    Patient initially presented with dark-tarry stool and hematemesis  GI consulted EGD was performed on 12/8 which revealed esophagitis and 10 large clean-based ulcers in the stomach and duodenum  Reported ongoing melena after initial EGD has since required 4 units packed red blood cells now hemoglobin has since stabilized at approximately 10  No further overt bleeding  Underwent EGD on 12/15 which revealed multiple small ulcers and Dieulafoy's lesion    We will continue with IV PPI  Anticipate discharge tomorrow if no further bleeding and hemoglobin remained stable  Appreciate GI recommendations    Acute cystitis with hematuria  Assessment & Plan  Urinalysis revealed leukocytes and bacteria  Urine culture revealed Pseudomonas  Will complete IV antibiotics with cefepime today      Acute kidney injury superimposed on chronic kidney disease   Assessment & Plan  Lab Results   Component Value Date    EGFR 27 12/16/2023    EGFR 30 12/15/2023    EGFR 27 12/14/2023    CREATININE 1.69 (H) 12/16/2023    CREATININE 1.56 (H) 12/15/2023    CREATININE 1.70 (H) 12/14/2023   Baseline creatinine noted to be 1.4  Creatinine peaked at 3.7  Secondary to urinary obstruction  Status post Carrera catheter placement  Kidney function has not significantly improved and near baseline  Continue to monitor           VTE Pharmacologic Prophylaxis:   Pharmacologic: Pharmacologic VTE Prophylaxis  contraindicated due to GI bleeding  Mechanical VTE Prophylaxis in Place: Yes    Patient Centered Rounds: I have performed bedside rounds with nursing staff today.    Discussions with Specialists or Other Care Team Provider: CM, nursing    Education and Discussions with Family / Patient: Patient, declined family update    Time Spent for Care: 30 minutes.  More than 50% of total time spent on counseling and coordination of care as described above.    Current Length of Stay: 9 day(s)    Current Patient Status: Inpatient   Certification Statement:     The patient will continue to require additional inpatient hospital stay due to   see below    Discharge Plan: Anticipate discharge in approximate 24 hours if no further GI bleeding and hemoglobin remained stable    Code Status: Level 1 - Full Code      Subjective:   Denies chest pain, shortness of breath, melena, hematochezia, fevers or any other complaints at this time    Objective:     Vitals:   Temp (24hrs), Av.3 °F (36.8 °C), Min:97.1 °F (36.2 °C), Max:99.2 °F (37.3 °C)    Temp:  [97.1 °F (36.2 °C)-99.2 °F (37.3 °C)] 98.7 °F (37.1 °C)  HR:  [76-98] 76  Resp:  [16-24] 17  BP: (106-154)/(53-84) 125/55  SpO2:  [95 %-100 %] 100 %  Body mass index is 28.55 kg/m².     Input and Output Summary (last 24 hours):       Intake/Output Summary (Last 24 hours) at 2023 1224  Last data filed at 2023 0900  Gross per 24 hour   Intake 1727.5 ml   Output 3500 ml   Net -1772.5 ml       Physical Exam:     Physical Exam  Constitutional:       General: She is not in acute distress.     Appearance: She is well-developed. She is not diaphoretic.   HENT:      Head: Normocephalic and atraumatic.      Nose: Nose normal.      Mouth/Throat:      Pharynx: No oropharyngeal exudate.   Eyes:      General: No scleral icterus.        Right eye: No discharge.         Left eye: No discharge.      Conjunctiva/sclera: Conjunctivae normal.   Neck:      Thyroid: No thyromegaly.      Vascular: No  JVD.   Cardiovascular:      Rate and Rhythm: Normal rate and regular rhythm.      Heart sounds: Normal heart sounds. No murmur heard.     No friction rub. No gallop.   Pulmonary:      Effort: Pulmonary effort is normal. No respiratory distress.      Breath sounds: Normal breath sounds. No wheezing or rales.   Chest:      Chest wall: No tenderness.   Abdominal:      General: Bowel sounds are normal. There is no distension.      Palpations: Abdomen is soft.      Tenderness: There is no abdominal tenderness. There is no guarding or rebound.   Musculoskeletal:         General: No tenderness or deformity. Normal range of motion.      Cervical back: Normal range of motion and neck supple.   Skin:     General: Skin is warm and dry.      Findings: No erythema or rash.   Neurological:      Mental Status: She is alert. Mental status is at baseline.      Cranial Nerves: No cranial nerve deficit.      Sensory: No sensory deficit.      Motor: No abnormal muscle tone.      Coordination: Coordination normal.             Additional Data:     Labs:    Results from last 7 days   Lab Units 12/16/23  1007   WBC Thousand/uL 8.00   HEMOGLOBIN g/dL 10.1*   HEMATOCRIT % 30.7*   PLATELETS Thousands/uL 207   NEUTROS PCT % 72   LYMPHS PCT % 12*   MONOS PCT % 11   EOS PCT % 3     Results from last 7 days   Lab Units 12/16/23  1007   SODIUM mmol/L 142   POTASSIUM mmol/L 3.6   CHLORIDE mmol/L 113*   CO2 mmol/L 19*   BUN mg/dL 62*   CREATININE mg/dL 1.69*   ANION GAP mmol/L 10   CALCIUM mg/dL 8.1*   GLUCOSE RANDOM mg/dL 106                           * I Have Reviewed All Lab Data Listed Above.  * Additional Pertinent Lab Tests Reviewed:  *[facility] Labs Within Last 24 Hours Reviewed    Imaging:    Imaging Reports Reviewed Today Include: na  Imaging Personally Reviewed by Myself Includes:  na    Recent Cultures (last 7 days):           Last 24 Hours Medication List:   Current Facility-Administered Medications   Medication Dose Route Frequency  Provider Last Rate    acetaminophen  650 mg Oral Q6H PRN Navi Mario MD      aluminum-magnesium hydroxide-simethicone  30 mL Oral Q6H PRN Navi Mario MD      calcium carbonate  1 tablet Oral Daily With Breakfast Navi Mario MD      cefepime  1,000 mg Intravenous Q12H PUJA Sagastume 1,000 mg (12/16/23 0621)    docusate sodium  100 mg Oral BID Navi Mario MD      levothyroxine  75 mcg Oral Daily Navi Mario MD      ondansetron  4 mg Intravenous Q6H PRN Navi Mario MD      pantoprazole  40 mg Intravenous Q12H Annamaria Berman PA-C      pravastatin  40 mg Oral Daily With Dinner Navi Mario MD      psyllium  1 packet Oral Daily Navi Mario MD          Today, Patient Was Seen By: Emanuel Corley MD    ** Please Note: Dictation voice to text software may have been used in the creation of this document. **

## 2023-12-16 NOTE — ASSESSMENT & PLAN NOTE
Lab Results   Component Value Date    EGFR 27 12/16/2023    EGFR 30 12/15/2023    EGFR 27 12/14/2023    CREATININE 1.69 (H) 12/16/2023    CREATININE 1.56 (H) 12/15/2023    CREATININE 1.70 (H) 12/14/2023   Baseline creatinine noted to be 1.4  Creatinine peaked at 3.7  Secondary to urinary obstruction  Status post Carrera catheter placement  Kidney function has not significantly improved and near baseline  Continue to monitor

## 2023-12-16 NOTE — ASSESSMENT & PLAN NOTE
Patient initially presented with dark-tarry stool and hematemesis  GI consulted EGD was performed on 12/8 which revealed esophagitis and 10 large clean-based ulcers in the stomach and duodenum  Reported ongoing melena after initial EGD has since required 4 units packed red blood cells now hemoglobin has since stabilized at approximately 10  No further overt bleeding  Underwent EGD on 12/15 which revealed multiple small ulcers and Dieulafoy's lesion    We will continue with IV PPI  Anticipate discharge tomorrow if no further bleeding and hemoglobin remained stable  Appreciate GI recommendations

## 2023-12-16 NOTE — ASSESSMENT & PLAN NOTE
Urinalysis revealed leukocytes and bacteria  Urine culture revealed Pseudomonas  Will complete IV antibiotics with cefepime today

## 2023-12-16 NOTE — ASSESSMENT & PLAN NOTE
Presented initially with leukocytosis and tachycardia in the setting of UTI  Clinically improved with IV cefepime  Will complete IV antibiotic therapy today with IV cefepime for Pseudomonas UTI

## 2023-12-16 NOTE — ANESTHESIA PROCEDURE NOTES
Anesthesia Notable Event  No anethesia notable event occurred.     Date/Time: 12/15/2023 4:30 PM    Performed by: Alexus Douglass MD  Authorized by: Alexus Douglass MD

## 2023-12-16 NOTE — PROGRESS NOTES
Progress Note  Samira Allred 83 y.o. female MRN: [id number]  Unit/Bed#: -01 Encounter: [id number]    Subjective:  Patient reports she had brown stool.  No vomiting.  No abdominal pain.    Objective:     Vitals:   Vitals:    12/16/23 0731   BP: 125/55   Pulse: 76   Resp: 17   Temp: 98.7 °F (37.1 °C)   SpO2: 100%   ,Body mass index is 28.55 kg/m².      Intake/Output Summary (Last 24 hours) at 12/16/2023 0950  Last data filed at 12/15/2023 1707  Gross per 24 hour   Intake 1727.5 ml   Output 1600 ml   Net 127.5 ml       Physical Exam:     General Appearance: Alert, appears stated age and cooperative  Lungs: Clear to auscultation bilaterally, no rales or rhonchi, no labored breathing/accessory muscle use  Heart: Regular rate and rhythm, S1, S2 normal,no click, rub or gallop  Abdomen: Soft, non-tender, non-distended; bowel sounds normal; no masses or no organomegaly  Extremities: No cyanosis    Invasive Devices       Peripheral Intravenous Line  Duration             Long-Dwell Peripheral IV (Midline) 12/11/23 Left Basilic 4 days              Drain  Duration             Urethral Catheter Non-latex;Double-lumen 16 Fr. 8 days                    Lab Results:  No results displayed because visit has over 200 results.          Imaging Studies:   I have personally reviewed pertinent imaging studies.    EGD    Result Date: 12/15/2023  Narrative: Table formatting from the original result was not included.  [facility] [address] [facility] [id number] [phone] DATE OF SERVICE: 12/15/23 PHYSICIAN(S): Attending: Hugo Mayes MD Fellow: No Staff Documented INDICATION: PUD (peptic ulcer disease), GI bleed POST-OP DIAGNOSIS: See the impression below. PREPROCEDURE: Informed consent was obtained for the procedure, including sedation.  Risks of perforation, hemorrhage, adverse drug reaction and aspiration were discussed. The patient was placed in the left lateral decubitus position. Patient was  explained about the risks and benefits of the procedure. Risks including but not limited to bleeding, infection, and perforation were explained in detail. Also explained about less than 100% sensitivity with the exam and other alternatives. PROCEDURE: EGD DETAILS OF PROCEDURE: Patient was taken to the procedure room where a time out was performed to confirm correct patient and correct procedure. The patient underwent monitored anesthesia care, which was administered by an anesthesia professional. The patient's blood pressure, heart rate, level of consciousness, respirations and oxygen were monitored throughout the procedure. The scope was advanced to the second part of the duodenum. Retroflexion was performed in the fundus. The patient experienced no blood loss. The procedure was not difficult. The patient tolerated the procedure well. There were no apparent adverse events. ANESTHESIA INFORMATION: ASA: III Anesthesia Type: IV Sedation with Anesthesia MEDICATIONS: simethicone (MYLICON) 40 mg in sterile water 1,000 mL 40 mg EPINEPHrine PF (ADRENALIN) 1 mg/mL injection 1.3 mg (Totals for administrations occurring from 1600 to 1703 on 12/15/23) FINDINGS: The esophagus appeared normal. Large sliding hiatal hernia (type I hiatal hernia) Multiple small ulcers in the duodenum Significant amount of fresh blood and blood clotting in the 1st part of the duodenum. Clot removed with basket. Single small Dieulafoy's lesion in the 1st part of the duodenum; there was spurting blood; induced coagulation and hemostasis achieved with 3 applications of with bipolar cautery; injected 3 mL of epinephrine to address bleeding; hemostasis achieved The 2nd part of the duodenum appeared normal. Regular Z-line 35 cm from the incisors SPECIMENS: * No specimens in log *     Impression: The esophagus appeared normal. Large type I hiatal hernia Multiple small ulcers in the duodenum Significant amount of fresh blood and blood clotting in the 1st  part of the duodenum Single small Dieulafoy's lesion in the 1st part of the duodenum; there was spurting blood; induced coagulation and hemostasis achieved with bipolar cautery; injected epinephrine to address bleeding; hemostasis achieved The 2nd part of the duodenum appeared normal. RECOMMENDATION:  There is no recommended follow-up for this procedure. Repeat EGD in 2 months. Continue PPI Close monitoring   Hugo Mayes MD     EGD    Result Date: 12/8/2023  Narrative: Table formatting from the original result was not included.  [facility] [address] [facility] [address] [phone] DATE OF SERVICE: 12/08/23 PHYSICIAN(S): Attending: Navi Mario MD Fellow: No Staff Documented INDICATION: GI bleed POST-OP DIAGNOSIS: See the impression below. PREPROCEDURE: Informed consent was obtained for the procedure, including sedation.  Risks of perforation, hemorrhage, adverse drug reaction and aspiration were discussed. The patient was placed in the left lateral decubitus position. Patient was explained about the risks and benefits of the procedure. Risks including but not limited to bleeding, infection, and perforation were explained in detail. Also explained about less than 100% sensitivity with the exam and other alternatives. PROCEDURE: EGD DETAILS OF PROCEDURE: Patient was taken to the procedure room where a time out was performed to confirm correct patient and correct procedure. The patient underwent monitored anesthesia care, which was administered by an anesthesia professional. The patient's blood pressure, heart rate, level of consciousness, respirations and oxygen were monitored throughout the procedure. The scope was advanced to the second part of the duodenum. Retroflexion was performed in the fundus. The patient experienced no blood loss. The procedure was not difficult. The patient tolerated the procedure well. There were no apparent adverse events. ANESTHESIA INFORMATION: ASA:  IV Anesthesia Type: Anesthesia type not filed in the log. MEDICATIONS: No administrations occurring from 1402 to 1423 on 12/08/23 FINDINGS: 3 cm hiatal hernia - GE junction 36 cm from the incisors, diaphragmatic impression 39 cm from the incisors:  Hill classification: Grade II Grade C esophagitis with mucosal breaks measuring 5 mm or more, continuous between folds, covering less than 75% of the circumference in the lower third of the esophagus 10 large ulcers in the stomach and duodenum with clean base (Bandar III) Performed multiple forceps biopsies in the stomach to rule out H. pylori SPECIMENS: ID Type Source Tests Collected by Time Destination 1 :  Tissue Stomach TISSUE EXAM Navi Mario MD 12/8/2023  2:19 PM      Impression: 3 cm hiatal hernia Grade C esophagitis in the lower third of the esophagus 10 large ulcers in the stomach and duodenum with clean base (Bandar III) Performed forceps biopsies in the stomach to rule out H. pylori RECOMMENDATION: Protonix 40 mg by mouth twice a day Avoid NSAIDs Await pathology results and treat for Helicobacter pylori infection if found Repeat upper endoscopy in 3 months to document healing of the ulcers Return to floor and resume diet If no bleeding overnight, can discharge to home tomorrow from a GI standpoint   Navi Mario MD     CT chest abdomen pelvis wo contrast    Result Date: 12/7/2023  Narrative: CT CHEST, ABDOMEN AND PELVIS WITHOUT IV CONTRAST INDICATION:   hematochezia and hematemesis. COMPARISON: CT abdomen/pelvis dated 11/4/2023, CT chest dated 3/28/2019. TECHNIQUE: CT examination of the chest, abdomen and pelvis was performed without intravenous contrast. Multiplanar 2D reformatted images were created from the source data. This examination, like all CT scans performed in the [facility], was performed utilizing techniques to minimize radiation dose exposure, including the use of iterative reconstruction and automated exposure control.  Radiation dose length product (DLP) for this visit:  636 mGy-cm Enteric contrast was not administered. FINDINGS: CHEST LUNGS: 1.3 x 1.1 cm right lower lobe nodule (image 159, series 3), new from 2019. 0.7 x 0.6 cm right lower lobe groundglass nodule (image 159, series 3), also new from 2019. Other tiny scattered micronodules are not significantly changed from 2019. Several small scattered thin-walled pulmonary cysts. There is no tracheal or endobronchial lesion. PLEURA:  Unremarkable. HEART/GREAT VESSELS: Heavy atherosclerotic coronary artery calcification is noted.  No thoracic aortic aneurysm. MEDIASTINUM AND TK:  Unremarkable. CHEST WALL AND LOWER NECK:  Unremarkable. ABDOMEN LIVER/BILIARY TREE:  Unremarkable. GALLBLADDER: Gallbladder is surgically absent. SPLEEN:  Unremarkable. PANCREAS:  Unremarkable. ADRENAL GLANDS:  Unremarkable. KIDNEYS/URETERS: Moderate to severe left-sided hydroureteronephrosis and mild right-sided hydroureteronephrosis to the level of the urinary bladder, likely related to significant bladder distention. No obstructing ureteral calculi are identified. 6 mm nonobstructing right intrarenal calculus. Several nonobstructing left intrarenal calculi measuring up to 6 mm. STOMACH AND BOWEL: No evidence for bowel obstruction. Colonic diverticulosis without evidence for acute diverticulitis. Small hiatal hernia. APPENDIX:  No findings to suggest appendicitis. ABDOMINOPELVIC CAVITY:  No ascites.  No pneumoperitoneum.  No lymphadenopathy. VESSELS: Atherosclerotic disease. No abdominal aortic aneurysm. PELVIS REPRODUCTIVE ORGANS: Few calcified uterine fibroids. URINARY BLADDER: Markedly distended urinary bladder with mild diffuse urinary bladder wall thickening which may be due to cystitis and/or some degree of underlying bladder wall trabeculation. Evaluation for intraluminal hemorrhage is markedly limited due  to streak artifact from adjacent right hip hardware. ABDOMINAL WALL/INGUINAL REGIONS:   Unremarkable. OSSEOUS STRUCTURES: Right hip arthroplasty, similar to prior. Stable chronic compression fracture of the T12 vertebral body with mild retropulsion. Advanced multilevel degenerative disc disease of the lumbar spine.     Impression: Bilateral hydroureteronephrosis, moderate to severe on the left, to the level of the urinary bladder, likely related to significant bladder distention. No obstructing ureteral calculi identified. Bilateral nephrolithiasis. Mild diffuse urinary bladder wall thickening which may be due to cystitis and/or some degree of underlying bladder wall trabeculation; correlate with urinalysis. Evaluation for intraluminal hemorrhage is markedly limited due to streak artifact from adjacent right hip hardware. Uncomplicated colonic diverticulosis. Small hiatal hernia. Stable chronic compression fracture of the T12 vertebral body with mild retropulsion. Few calcified uterine fibroids. 1.3 x 1.1 cm right lower lobe nodule and 0.7 cm groundglass right lower lobe nodule, new from 2019, indeterminate. PET/CT correlation should be considered. The study was marked in EPIC for significant notification. Workstation performed: [id number]         Assessment & Plan    UGIB 2/2 multiple gastric and duodenal ulcers and Dieulafoy lesion  Acute blood loss anemia    - Patient was admitted over a week ago for coffee-ground emesis and melena.  - EGD 12/8 showed a 3cm HH, grade C esophagitis, and 10 ulcers in the stomach and duodenum. Biopsies were negative for h pylori.  - HGB dropped to 6.2 12/14 necessitating transfusion and GI was reconsulted.  - EGD performed yesterday 12/15 showed a large HH, multiple ulcers in the duodenum, fresh blood in the duodenum and a small Dieulafoy lesion in the duodenum with spurting of blood with hemostasis achieved with bipolar cautery.  - Pantoprazole 40mg IV BID.  - Monitor H&H and patient for any obvious bleeding. HGB yesterday evening was 10.4. Follow up HGB this am.  -  Supportive care.  - Follow up pending fasting gastrin level.  - Repeat EGD in 8 weeks as an outpatient to assess ulcer healing.    The patient will be seen by Dr. Rojas.    Annamaria Zamora PA-C  12/16/2023,9:50 AM

## 2023-12-16 NOTE — PLAN OF CARE
Problem: Potential for Falls  Goal: Patient will remain free of falls  Description: INTERVENTIONS:  - Educate patient/family on patient safety including physical limitations  - Instruct patient to call for assistance with activity   - Consult OT/PT to assist with strengthening/mobility   - Keep Call bell within reach  - Keep bed low and locked with side rails adjusted as appropriate  - Keep care items and personal belongings within reach  - Initiate and maintain comfort rounds  - Make Fall Risk Sign visible to staff  - Offer Toileting every 2 Hours, in advance of need  - Initiate/Maintain bed alarm  - Obtain necessary fall risk management equipment.  - Apply yellow socks and bracelet for high fall risk patients  - Consider moving patient to room near nurses station  Outcome: Progressing     Problem: PAIN - ADULT  Goal: Verbalizes/displays adequate comfort level or baseline comfort level  Description: Interventions:  - Encourage patient to monitor pain and request assistance  - Assess pain using appropriate pain scale  - Administer analgesics based on type and severity of pain and evaluate response  - Implement non-pharmacological measures as appropriate and evaluate response  - Consider cultural and social influences on pain and pain management  - Notify physician/advanced practitioner if interventions unsuccessful or patient reports new pain  Outcome: Progressing     Problem: SAFETY ADULT  Goal: Patient will remain free of falls  Description: INTERVENTIONS:  - Educate patient/family on patient safety including physical limitations  - Instruct patient to call for assistance with activity   - Consult OT/PT to assist with strengthening/mobility   - Keep Call bell within reach  - Keep bed low and locked with side rails adjusted as appropriate  - Keep care items and personal belongings within reach  - Initiate and maintain comfort rounds  - Make Fall Risk Sign visible to staff  - Offer Toileting every 2 Hours, in  advance of need  - Initiate/Maintain bed alarm  - Obtain necessary fall risk management equipment.  - Apply yellow socks and bracelet for high fall risk patients  - Consider moving patient to room near nurses station  Outcome: Progressing  Goal: Maintain or return to baseline ADL function  Description: INTERVENTIONS:  -  Assess patient's ability to carry out ADLs; assess patient's baseline for ADL function and identify physical deficits which impact ability to perform ADLs (bathing, care of mouth/teeth, toileting, grooming, dressing, etc.)  - Assess/evaluate cause of self-care deficits   - Assess range of motion  - Assess patient's mobility; develop plan if impaired  - Assess patient's need for assistive devices and provide as appropriate  - Encourage maximum independence but intervene and supervise when necessary  - Involve family in performance of ADLs  - Assess for home care needs following discharge   - Consider OT consult to assist with ADL evaluation and planning for discharge  - Provide patient education as appropriate  Outcome: Progressing  Goal: Maintains/Returns to pre admission functional level  Description: INTERVENTIONS:  - Perform AM-PAC 6 Click Basic Mobility/ Daily Activity assessment daily.  - Set and communicate daily mobility goal to care team and patient/family/caregiver.   - Collaborate with rehabilitation services on mobility goals if consulted  - Perform Range of Motion 2 times a day.  - Reposition patient every 20 hours.  - Dangle patient 2 times a day  - Stand patient 2 times a day  - Ambulate patient 2 times a day  - Out of bed to chair 2 times a day   - Out of bed for meals 3 times a day  - Out of bed for toileting  - Record patient progress and toleration of activity level   Outcome: Progressing     Problem: Nutrition/Hydration-ADULT  Goal: Nutrient/Hydration intake appropriate for improving, restoring or maintaining nutritional needs  Description: Monitor and assess patient's  nutrition/hydration status for malnutrition. Collaborate with interdisciplinary team and initiate plan and interventions as ordered.  Monitor patient's weight and dietary intake as ordered or per policy. Utilize nutrition screening tool and intervene as necessary. Determine patient's food preferences and provide high-protein, high-caloric foods as appropriate.     INTERVENTIONS:  - Monitor oral intake, urinary output, labs, and treatment plans  - Assess nutrition and hydration status and recommend course of action  - Evaluate amount of meals eaten  - Assist patient with eating if necessary   - Allow adequate time for meals  - Recommend/ encourage appropriate diets, oral nutritional supplements, and vitamin/mineral supplements  - Order, calculate, and assess calorie counts as needed  - Recommend, monitor, and adjust tube feedings and TPN/PPN based on assessed needs  - Assess need for intravenous fluids  - Provide specific nutrition/hydration education as appropriate  - Include patient/family/caregiver in decisions related to nutrition  Outcome: Progressing     Problem: Prexisting or High Potential for Compromised Skin Integrity  Goal: Skin integrity is maintained or improved  Description: INTERVENTIONS:  - Identify patients at risk for skin breakdown  - Assess and monitor skin integrity  - Assess and monitor nutrition and hydration status  - Monitor labs   - Assess for incontinence   - Turn and reposition patient  - Assist with mobility/ambulation  - Relieve pressure over bony prominences  - Avoid friction and shearing  - Provide appropriate hygiene as needed including keeping skin clean and dry  - Evaluate need for skin moisturizer/barrier cream  - Collaborate with interdisciplinary team   - Patient/family teaching  - Consider wound care consult   Outcome: Progressing

## 2023-12-17 ENCOUNTER — TELEPHONE (OUTPATIENT)
Age: 83
End: 2023-12-17

## 2023-12-17 LAB
ANION GAP SERPL CALCULATED.3IONS-SCNC: 7 MMOL/L
BASOPHILS # BLD AUTO: 0.14 THOUSANDS/ÂΜL (ref 0–0.1)
BASOPHILS NFR BLD AUTO: 2 % (ref 0–1)
BUN SERPL-MCNC: 50 MG/DL (ref 5–25)
CALCIUM SERPL-MCNC: 7.9 MG/DL (ref 8.4–10.2)
CHLORIDE SERPL-SCNC: 114 MMOL/L (ref 96–108)
CO2 SERPL-SCNC: 19 MMOL/L (ref 21–32)
CREAT SERPL-MCNC: 1.68 MG/DL (ref 0.6–1.3)
EOSINOPHIL # BLD AUTO: 0.28 THOUSAND/ÂΜL (ref 0–0.61)
EOSINOPHIL NFR BLD AUTO: 4 % (ref 0–6)
ERYTHROCYTE [DISTWIDTH] IN BLOOD BY AUTOMATED COUNT: 18.1 % (ref 11.6–15.1)
GASTRIN SERPL-MCNC: 119 PG/ML (ref 0–115)
GFR SERPL CREATININE-BSD FRML MDRD: 28 ML/MIN/1.73SQ M
GLUCOSE SERPL-MCNC: 96 MG/DL (ref 65–140)
HCT VFR BLD AUTO: 25.9 % (ref 34.8–46.1)
HGB BLD-MCNC: 8.6 G/DL (ref 11.5–15.4)
IMM GRANULOCYTES # BLD AUTO: 0.03 THOUSAND/UL (ref 0–0.2)
IMM GRANULOCYTES NFR BLD AUTO: 0 % (ref 0–2)
LYMPHOCYTES # BLD AUTO: 1.22 THOUSANDS/ÂΜL (ref 0.6–4.47)
LYMPHOCYTES NFR BLD AUTO: 18 % (ref 14–44)
MCH RBC QN AUTO: 30.2 PG (ref 26.8–34.3)
MCHC RBC AUTO-ENTMCNC: 33.2 G/DL (ref 31.4–37.4)
MCV RBC AUTO: 91 FL (ref 82–98)
MONOCYTES # BLD AUTO: 0.99 THOUSAND/ÂΜL (ref 0.17–1.22)
MONOCYTES NFR BLD AUTO: 15 % (ref 4–12)
NEUTROPHILS # BLD AUTO: 4.14 THOUSANDS/ÂΜL (ref 1.85–7.62)
NEUTS SEG NFR BLD AUTO: 61 % (ref 43–75)
NRBC BLD AUTO-RTO: 0 /100 WBCS
PLATELET # BLD AUTO: 187 THOUSANDS/UL (ref 149–390)
PMV BLD AUTO: 9.6 FL (ref 8.9–12.7)
POTASSIUM SERPL-SCNC: 3.7 MMOL/L (ref 3.5–5.3)
RBC # BLD AUTO: 2.85 MILLION/UL (ref 3.81–5.12)
SODIUM SERPL-SCNC: 140 MMOL/L (ref 135–147)
WBC # BLD AUTO: 6.8 THOUSAND/UL (ref 4.31–10.16)

## 2023-12-17 PROCEDURE — C9113 INJ PANTOPRAZOLE SODIUM, VIA: HCPCS | Performed by: PHYSICIAN ASSISTANT

## 2023-12-17 PROCEDURE — 99232 SBSQ HOSP IP/OBS MODERATE 35: CPT | Performed by: INTERNAL MEDICINE

## 2023-12-17 PROCEDURE — 80048 BASIC METABOLIC PNL TOTAL CA: CPT | Performed by: INTERNAL MEDICINE

## 2023-12-17 PROCEDURE — 85025 COMPLETE CBC W/AUTO DIFF WBC: CPT | Performed by: INTERNAL MEDICINE

## 2023-12-17 RX ADMIN — PANTOPRAZOLE SODIUM 40 MG: 40 INJECTION, POWDER, FOR SOLUTION INTRAVENOUS at 20:13

## 2023-12-17 RX ADMIN — PRAVASTATIN SODIUM 40 MG: 40 TABLET ORAL at 17:53

## 2023-12-17 RX ADMIN — DOCUSATE SODIUM 100 MG: 100 CAPSULE, LIQUID FILLED ORAL at 17:53

## 2023-12-17 RX ADMIN — PANTOPRAZOLE SODIUM 40 MG: 40 INJECTION, POWDER, FOR SOLUTION INTRAVENOUS at 08:07

## 2023-12-17 RX ADMIN — CEFEPIME HYDROCHLORIDE 1000 MG: 1 INJECTION, SOLUTION INTRAVENOUS at 17:53

## 2023-12-17 RX ADMIN — LEVOTHYROXINE SODIUM 75 MCG: 25 TABLET ORAL at 08:06

## 2023-12-17 RX ADMIN — DOCUSATE SODIUM 100 MG: 100 CAPSULE, LIQUID FILLED ORAL at 08:06

## 2023-12-17 RX ADMIN — CALCIUM 1 TABLET: 500 TABLET ORAL at 08:06

## 2023-12-17 RX ADMIN — PSYLLIUM HUSK 1 PACKET: 3.4 POWDER ORAL at 08:06

## 2023-12-17 RX ADMIN — CEFEPIME HYDROCHLORIDE 1000 MG: 1 INJECTION, SOLUTION INTRAVENOUS at 05:32

## 2023-12-17 NOTE — PLAN OF CARE
Problem: Potential for Falls  Goal: Patient will remain free of falls  Description: INTERVENTIONS:  - Educate patient/family on patient safety including physical limitations  - Instruct patient to call for assistance with activity   - Consult OT/PT to assist with strengthening/mobility   - Keep Call bell within reach  - Keep bed low and locked with side rails adjusted as appropriate  - Keep care items and personal belongings within reach  - Initiate and maintain comfort rounds  - Make Fall Risk Sign visible to staff  - Apply yellow socks and bracelet for high fall risk patients  - Consider moving patient to room near nurses station  Outcome: Progressing     Problem: PAIN - ADULT  Goal: Verbalizes/displays adequate comfort level or baseline comfort level  Description: Interventions:  - Encourage patient to monitor pain and request assistance  - Assess pain using appropriate pain scale  - Administer analgesics based on type and severity of pain and evaluate response  - Implement non-pharmacological measures as appropriate and evaluate response  - Consider cultural and social influences on pain and pain management  - Notify physician/advanced practitioner if interventions unsuccessful or patient reports new pain  Outcome: Progressing     Problem: SAFETY ADULT  Goal: Patient will remain free of falls  Description: INTERVENTIONS:  - Educate patient/family on patient safety including physical limitations  - Instruct patient to call for assistance with activity   - Consult OT/PT to assist with strengthening/mobility   - Keep Call bell within reach  - Keep bed low and locked with side rails adjusted as appropriate  - Keep care items and personal belongings within reach  - Initiate and maintain comfort rounds  - Apply yellow socks and bracelet for high fall risk patients  - Consider moving patient to room near nurses station  Outcome: Progressing  Goal: Maintain or return to baseline ADL function  Description:  INTERVENTIONS:  -  Assess patient's ability to carry out ADLs; assess patient's baseline for ADL function and identify physical deficits which impact ability to perform ADLs (bathing, care of mouth/teeth, toileting, grooming, dressing, etc.)  - Assess/evaluate cause of self-care deficits   - Assess range of motion  - Assess patient's mobility; develop plan if impaired  - Assess patient's need for assistive devices and provide as appropriate  - Encourage maximum independence but intervene and supervise when necessary  - Involve family in performance of ADLs  - Assess for home care needs following discharge   - Consider OT consult to assist with ADL evaluation and planning for discharge  - Provide patient education as appropriate  Outcome: Progressing  Goal: Maintains/Returns to pre admission functional level  Description: INTERVENTIONS:  - Perform AM-PAC 6 Click Basic Mobility/ Daily Activity assessment daily.  - Set and communicate daily mobility goal to care team and patient/family/caregiver.   - Collaborate with rehabilitation services on mobility goals if consulted  - Out of bed for toileting  - Record patient progress and toleration of activity level   Outcome: Progressing     Problem: Nutrition/Hydration-ADULT  Goal: Nutrient/Hydration intake appropriate for improving, restoring or maintaining nutritional needs  Description: Monitor and assess patient's nutrition/hydration status for malnutrition. Collaborate with interdisciplinary team and initiate plan and interventions as ordered.  Monitor patient's weight and dietary intake as ordered or per policy. Utilize nutrition screening tool and intervene as necessary. Determine patient's food preferences and provide high-protein, high-caloric foods as appropriate.     INTERVENTIONS:  - Monitor oral intake, urinary output, labs, and treatment plans  - Assess nutrition and hydration status and recommend course of action  - Evaluate amount of meals eaten  - Assist  patient with eating if necessary   - Allow adequate time for meals  - Recommend/ encourage appropriate diets, oral nutritional supplements, and vitamin/mineral supplements  - Order, calculate, and assess calorie counts as needed  - Recommend, monitor, and adjust tube feedings and TPN/PPN based on assessed needs  - Assess need for intravenous fluids  - Provide specific nutrition/hydration education as appropriate  - Include patient/family/caregiver in decisions related to nutrition  Outcome: Progressing     Problem: Prexisting or High Potential for Compromised Skin Integrity  Goal: Skin integrity is maintained or improved  Description: INTERVENTIONS:  - Identify patients at risk for skin breakdown  - Assess and monitor skin integrity  - Assess and monitor nutrition and hydration status  - Monitor labs   - Assess for incontinence   - Turn and reposition patient  - Assist with mobility/ambulation  - Relieve pressure over bony prominences  - Avoid friction and shearing  - Provide appropriate hygiene as needed including keeping skin clean and dry  - Evaluate need for skin moisturizer/barrier cream  - Collaborate with interdisciplinary team   - Patient/family teaching  - Consider wound care consult   Outcome: Progressing

## 2023-12-17 NOTE — ASSESSMENT & PLAN NOTE
Urinalysis revealed leukocytes and bacteria  Urine culture revealed Pseudomonas  Clinically improved on IV cefepime  Will complete course of antibiotics today

## 2023-12-17 NOTE — ASSESSMENT & PLAN NOTE
Presented initially with leukocytosis and tachycardia in the setting of UTI  Clinically improved with IV cefepime  WBC count has since normalized  Will complete 7-day course of IV antibiotics today   Xelguruz Pregnancy And Lactation Text: This medication is Pregnancy Category D and is not considered safe during pregnancy.  The risk during breast feeding is also uncertain.

## 2023-12-17 NOTE — ASSESSMENT & PLAN NOTE
"  Patient initially presented with dark-tarry stool and hematemesis  GI consulted EGD was performed on 12/8 which revealed esophagitis and 10 large clean-based ulcers in the stomach and duodenum  After procedure had intermittent melena  Has since received 4 units packed red blood cells with stability of hemoglobin without any further overt bleeding  Underwent repeat EGD on 12/15 that revealed overt bleeding and \"Single small Dieulafoy's lesion in the 1st part of the duodenum; there was spurting blood\" that was cauterized without any further bleeding  Hemoglobin currently stable at approximately 8    "

## 2023-12-17 NOTE — PROGRESS NOTES
"[facility]  Progress Note  Name: Samira Allred I  MRN: [id number]  Unit/Bed#: -01 I Date of Admission: 12/7/2023   Date of Service: 12/17/2023 I Hospital Day: 10    Assessment/Plan   * Sepsis (HCC)  Assessment & Plan    Presented initially with leukocytosis and tachycardia in the setting of UTI  Clinically improved with IV cefepime  WBC count has since normalized  Will complete 7-day course of IV antibiotics today    Severe protein-calorie malnutrition (HCC)  Assessment & Plan  Malnutrition Findings:   Adult Malnutrition type: Chronic illness  Adult Degree of Malnutrition: Other severe protein calorie malnutrition  Malnutrition Characteristics: Weight loss, Inadequate energy                  360 Statement: Related to medical condition as evidenced by 8.7% Weight loss of 18 lbs (10.3%) in 5 months, significant and < 75% energy intake needs met > 1 month. Treated with diet.    BMI Findings:           Body mass index is 28.55 kg/m².         GI bleed  Assessment & Plan    Patient initially presented with dark-tarry stool and hematemesis  GI consulted EGD was performed on 12/8 which revealed esophagitis and 10 large clean-based ulcers in the stomach and duodenum  After procedure had intermittent melena  Has since received 4 units packed red blood cells with stability of hemoglobin without any further overt bleeding  Underwent repeat EGD on 12/15 that revealed overt bleeding and \"Single small Dieulafoy's lesion in the 1st part of the duodenum; there was spurting blood\" that was cauterized without any further bleeding  Hemoglobin currently stable at approximately 8      Acute cystitis with hematuria  Assessment & Plan  Urinalysis revealed leukocytes and bacteria  Urine culture revealed Pseudomonas  Clinically improved on IV cefepime  Will complete course of antibiotics today      Acute kidney injury superimposed on chronic kidney disease   Assessment & Plan  Lab Results   Component Value Date "    EGFR 28 2023    EGFR 27 2023    EGFR 30 12/15/2023    CREATININE 1.68 (H) 2023    CREATININE 1.69 (H) 2023    CREATININE 1.56 (H) 12/15/2023   Baseline creatinine noted to be 1.4  Creatinine peaked at 3.7  Secondary to obstruction  Status post Carrera catheter placement with significant improvement of kidney function  Creatinine near baseline  Continue to monitor         VTE Pharmacologic Prophylaxis:   Pharmacologic: Pharmacologic VTE Prophylaxis contraindicated due to gi bleeding  Mechanical VTE Prophylaxis in Place: Yes    Patient Centered Rounds: I have performed bedside rounds with nursing staff today.    Discussions with Specialists or Other Care Team Provider: see below    Education and Discussions with Family / Patient: patient,  declined family update    Time Spent for Care: 30 minutes.  More than 50% of total time spent on counseling and coordination of care as described above.    Current Length of Stay: 10 day(s)    Current Patient Status: Inpatient   Certification Statement: The patient will continue to require additional inpatient hospital stay due to see below    Discharge Plan: Pending stability of hemoglobin.  Anticipate discharge in the next 24 hours    Code Status: Level 1 - Full Code      Subjective:   Denies chest pain, shortness of breath, cough, fevers, chills    Objective:     Vitals:   Temp (24hrs), Av.5 °F (36.9 °C), Min:98.3 °F (36.8 °C), Max:98.8 °F (37.1 °C)    Temp:  [98.3 °F (36.8 °C)-98.8 °F (37.1 °C)] 98.4 °F (36.9 °C)  HR:  [62-79] 74  Resp:  [16-20] 17  BP: (127-132)/(56-62) 132/62  SpO2:  [99 %-100 %] 99 %  Body mass index is 28.55 kg/m².     Input and Output Summary (last 24 hours):       Intake/Output Summary (Last 24 hours) at 2023 1142  Last data filed at 2023 0900  Gross per 24 hour   Intake 350 ml   Output 2650 ml   Net -2300 ml       Physical Exam:     Physical Exam  Constitutional:       General: She is not in acute distress.      Appearance: She is well-developed. She is not diaphoretic.   HENT:      Head: Normocephalic and atraumatic.      Nose: Nose normal.      Mouth/Throat:      Pharynx: No oropharyngeal exudate.   Eyes:      General: No scleral icterus.        Right eye: No discharge.         Left eye: No discharge.      Conjunctiva/sclera: Conjunctivae normal.   Neck:      Thyroid: No thyromegaly.      Vascular: No JVD.   Cardiovascular:      Rate and Rhythm: Normal rate and regular rhythm.      Heart sounds: Normal heart sounds. No murmur heard.     No friction rub. No gallop.   Pulmonary:      Effort: Pulmonary effort is normal. No respiratory distress.      Breath sounds: Normal breath sounds. No wheezing or rales.   Chest:      Chest wall: No tenderness.   Abdominal:      General: Bowel sounds are normal. There is no distension.      Palpations: Abdomen is soft.      Tenderness: There is no abdominal tenderness. There is no guarding or rebound.   Musculoskeletal:         General: No tenderness or deformity. Normal range of motion.      Cervical back: Normal range of motion and neck supple.   Skin:     General: Skin is warm and dry.      Findings: No erythema or rash.   Neurological:      Mental Status: She is alert. Mental status is at baseline.      Cranial Nerves: No cranial nerve deficit.      Sensory: No sensory deficit.      Motor: No abnormal muscle tone.      Coordination: Coordination normal.       (     Additional Data:     Labs:    Results from last 7 days   Lab Units 12/17/23  0537   WBC Thousand/uL 6.80   HEMOGLOBIN g/dL 8.6*   HEMATOCRIT % 25.9*   PLATELETS Thousands/uL 187   NEUTROS PCT % 61   LYMPHS PCT % 18   MONOS PCT % 15*   EOS PCT % 4     Results from last 7 days   Lab Units 12/17/23  0537   SODIUM mmol/L 140   POTASSIUM mmol/L 3.7   CHLORIDE mmol/L 114*   CO2 mmol/L 19*   BUN mg/dL 50*   CREATININE mg/dL 1.68*   ANION GAP mmol/L 7   CALCIUM mg/dL 7.9*   GLUCOSE RANDOM mg/dL 96                           * I Have  Reviewed All Lab Data Listed Above.  * Additional Pertinent Lab Tests Reviewed: All Labs Within Last 24 Hours Reviewed    Imaging:    Imaging Reports Reviewed Today Include: na  Imaging Personally Reviewed by Myself Includes:  na    Recent Cultures (last 7 days):           Last 24 Hours Medication List:   Current Facility-Administered Medications   Medication Dose Route Frequency Provider Last Rate    acetaminophen  650 mg Oral Q6H PRN Navi Mario MD      aluminum-magnesium hydroxide-simethicone  30 mL Oral Q6H PRN Navi Mario MD      calcium carbonate  1 tablet Oral Daily With Breakfast Navi Mario MD      cefepime  1,000 mg Intravenous Q12H PUJA Sagastume 1,000 mg (12/17/23 0532)    docusate sodium  100 mg Oral BID Navi Mario MD      levothyroxine  75 mcg Oral Daily Navi Mario MD      ondansetron  4 mg Intravenous Q6H PRN Navi Mario MD      pantoprazole  40 mg Intravenous Q12H Annamaria Berman PA-C      pravastatin  40 mg Oral Daily With Dinner Navi Mario MD      psyllium  1 packet Oral Daily Navi Mario MD          Today, Patient Was Seen By: Emanuel Corley MD    ** Please Note: Dictation voice to text software may have been used in the creation of this document. **

## 2023-12-17 NOTE — ASSESSMENT & PLAN NOTE
Lab Results   Component Value Date    EGFR 28 12/17/2023    EGFR 27 12/16/2023    EGFR 30 12/15/2023    CREATININE 1.68 (H) 12/17/2023    CREATININE 1.69 (H) 12/16/2023    CREATININE 1.56 (H) 12/15/2023   Baseline creatinine noted to be 1.4  Creatinine peaked at 3.7  Secondary to obstruction  Status post Carrera catheter placement with significant improvement of kidney function  Creatinine near baseline  Continue to monitor

## 2023-12-18 ENCOUNTER — TELEPHONE (OUTPATIENT)
Dept: GASTROENTEROLOGY | Facility: CLINIC | Age: 83
End: 2023-12-18

## 2023-12-18 ENCOUNTER — TRANSITIONAL CARE MANAGEMENT (OUTPATIENT)
Dept: INTERNAL MEDICINE CLINIC | Facility: CLINIC | Age: 83
End: 2023-12-18

## 2023-12-18 VITALS
HEART RATE: 72 BPM | OXYGEN SATURATION: 99 % | WEIGHT: 156.09 LBS | BODY MASS INDEX: 28.72 KG/M2 | DIASTOLIC BLOOD PRESSURE: 60 MMHG | SYSTOLIC BLOOD PRESSURE: 123 MMHG | TEMPERATURE: 98.7 F | HEIGHT: 62 IN | RESPIRATION RATE: 18 BRPM

## 2023-12-18 LAB
ANION GAP SERPL CALCULATED.3IONS-SCNC: 5 MMOL/L
BASOPHILS # BLD AUTO: 0.09 THOUSANDS/ÂΜL (ref 0–0.1)
BASOPHILS NFR BLD AUTO: 2 % (ref 0–1)
BUN SERPL-MCNC: 37 MG/DL (ref 5–25)
CALCIUM SERPL-MCNC: 7.5 MG/DL (ref 8.4–10.2)
CHLORIDE SERPL-SCNC: 114 MMOL/L (ref 96–108)
CO2 SERPL-SCNC: 22 MMOL/L (ref 21–32)
CREAT SERPL-MCNC: 1.56 MG/DL (ref 0.6–1.3)
EOSINOPHIL # BLD AUTO: 0.29 THOUSAND/ÂΜL (ref 0–0.61)
EOSINOPHIL NFR BLD AUTO: 5 % (ref 0–6)
ERYTHROCYTE [DISTWIDTH] IN BLOOD BY AUTOMATED COUNT: 18.2 % (ref 11.6–15.1)
GFR SERPL CREATININE-BSD FRML MDRD: 30 ML/MIN/1.73SQ M
GLUCOSE SERPL-MCNC: 92 MG/DL (ref 65–140)
HCT VFR BLD AUTO: 26.2 % (ref 34.8–46.1)
HGB BLD-MCNC: 8.5 G/DL (ref 11.5–15.4)
IMM GRANULOCYTES # BLD AUTO: 0.03 THOUSAND/UL (ref 0–0.2)
IMM GRANULOCYTES NFR BLD AUTO: 1 % (ref 0–2)
LYMPHOCYTES # BLD AUTO: 1.02 THOUSANDS/ÂΜL (ref 0.6–4.47)
LYMPHOCYTES NFR BLD AUTO: 17 % (ref 14–44)
MCH RBC QN AUTO: 30.1 PG (ref 26.8–34.3)
MCHC RBC AUTO-ENTMCNC: 32.4 G/DL (ref 31.4–37.4)
MCV RBC AUTO: 93 FL (ref 82–98)
MONOCYTES # BLD AUTO: 1.12 THOUSAND/ÂΜL (ref 0.17–1.22)
MONOCYTES NFR BLD AUTO: 19 % (ref 4–12)
NEUTROPHILS # BLD AUTO: 3.39 THOUSANDS/ÂΜL (ref 1.85–7.62)
NEUTS SEG NFR BLD AUTO: 56 % (ref 43–75)
NRBC BLD AUTO-RTO: 0 /100 WBCS
PLATELET # BLD AUTO: 188 THOUSANDS/UL (ref 149–390)
PMV BLD AUTO: 9.6 FL (ref 8.9–12.7)
POTASSIUM SERPL-SCNC: 3.6 MMOL/L (ref 3.5–5.3)
RBC # BLD AUTO: 2.82 MILLION/UL (ref 3.81–5.12)
SODIUM SERPL-SCNC: 141 MMOL/L (ref 135–147)
WBC # BLD AUTO: 5.94 THOUSAND/UL (ref 4.31–10.16)

## 2023-12-18 PROCEDURE — 80048 BASIC METABOLIC PNL TOTAL CA: CPT | Performed by: INTERNAL MEDICINE

## 2023-12-18 PROCEDURE — C9113 INJ PANTOPRAZOLE SODIUM, VIA: HCPCS | Performed by: PHYSICIAN ASSISTANT

## 2023-12-18 PROCEDURE — 99232 SBSQ HOSP IP/OBS MODERATE 35: CPT | Performed by: INTERNAL MEDICINE

## 2023-12-18 PROCEDURE — 85025 COMPLETE CBC W/AUTO DIFF WBC: CPT | Performed by: INTERNAL MEDICINE

## 2023-12-18 RX ORDER — PANTOPRAZOLE SODIUM 40 MG/1
40 TABLET, DELAYED RELEASE ORAL 2 TIMES DAILY
Qty: 60 TABLET | Refills: 0 | Status: SHIPPED | OUTPATIENT
Start: 2023-12-18

## 2023-12-18 RX ADMIN — LEVOTHYROXINE SODIUM 75 MCG: 25 TABLET ORAL at 09:06

## 2023-12-18 RX ADMIN — CALCIUM 1 TABLET: 500 TABLET ORAL at 09:08

## 2023-12-18 RX ADMIN — DOCUSATE SODIUM 100 MG: 100 CAPSULE, LIQUID FILLED ORAL at 09:05

## 2023-12-18 RX ADMIN — CEFEPIME HYDROCHLORIDE 1000 MG: 1 INJECTION, SOLUTION INTRAVENOUS at 05:45

## 2023-12-18 RX ADMIN — PANTOPRAZOLE SODIUM 40 MG: 40 INJECTION, POWDER, FOR SOLUTION INTRAVENOUS at 09:10

## 2023-12-18 NOTE — PLAN OF CARE
Problem: Potential for Falls  Goal: Patient will remain free of falls  Description: INTERVENTIONS:  - Educate patient/family on patient safety including physical limitations  - Instruct patient to call for assistance with activity   - Consult OT/PT to assist with strengthening/mobility   - Keep Call bell within reach  - Keep bed low and locked with side rails adjusted as appropriate  - Keep care items and personal belongings within reach  - Initiate and maintain comfort rounds  - Make Fall Risk Sign visible to staff  - Offer Toileting every 2 Hours, in advance of need  - Initiate/Maintain alarm  - Obtain necessary fall risk management equipment  - Apply yellow socks and bracelet for high fall risk patients  - Consider moving patient to room near nurses station  Outcome: Progressing     Problem: PAIN - ADULT  Goal: Verbalizes/displays adequate comfort level or baseline comfort level  Description: Interventions:  - Encourage patient to monitor pain and request assistance  - Assess pain using appropriate pain scale  - Administer analgesics based on type and severity of pain and evaluate response  - Implement non-pharmacological measures as appropriate and evaluate response  - Consider cultural and social influences on pain and pain management  - Notify physician/advanced practitioner if interventions unsuccessful or patient reports new pain  Outcome: Progressing     Problem: SAFETY ADULT  Goal: Patient will remain free of falls  Description: INTERVENTIONS:  - Educate patient/family on patient safety including physical limitations  - Instruct patient to call for assistance with activity   - Consult OT/PT to assist with strengthening/mobility   - Keep Call bell within reach  - Keep bed low and locked with side rails adjusted as appropriate  - Keep care items and personal belongings within reach  - Initiate and maintain comfort rounds  - Make Fall Risk Sign visible to staff  - Offer Toileting every 2 Hours, in advance  of need  - Initiate/Maintain alarm  - Obtain necessary fall risk management equipment  - Apply yellow socks and bracelet for high fall risk patients  - Consider moving patient to room near nurses station  Outcome: Progressing     Problem: GASTROINTESTINAL - ADULT  Goal: Maintains or returns to baseline bowel function  Description: INTERVENTIONS:  - Assess bowel function  - Encourage oral fluids to ensure adequate hydration  - Administer IV fluids if ordered to ensure adequate hydration  - Administer ordered medications as needed  - Encourage mobilization and activity  - Consider nutritional services referral to assist patient with adequate nutrition and appropriate food choices  Outcome: Progressing

## 2023-12-18 NOTE — ASSESSMENT & PLAN NOTE
Presented initially with leukocytosis and tachycardia in the setting of UTI  Clinically improved with IV cefepime  Completed 7-day course of IV antibiotics with IV cefepime  WBC count has since normalized

## 2023-12-18 NOTE — ASSESSMENT & PLAN NOTE
Baseline creatinine 1.4  Creatinine peaked at 3.7  Secondary to urinary obstruction  Status post Carrera catheter placement  Kidney function now normalized and back to baseline

## 2023-12-18 NOTE — PROGRESS NOTES
"[facility]  Progress Note  Name: Samira Allred I  MRN: [id number]  Unit/Bed#: -01 I Date of Admission: 12/7/2023   Date of Service: 12/18/2023 I Hospital Day: 11    Assessment/Plan   * Sepsis (HCC)  Assessment & Plan    Presented initially with leukocytosis and tachycardia in the setting of UTI  Clinically improved with IV cefepime  Completed 7-day course of IV antibiotics with IV cefepime  WBC count has since normalized    Severe protein-calorie malnutrition (HCC)  Assessment & Plan  Malnutrition Findings:   Adult Malnutrition type: Chronic illness  Adult Degree of Malnutrition: Other severe protein calorie malnutrition  Malnutrition Characteristics: Weight loss, Inadequate energy                  360 Statement: Related to medical condition as evidenced by 8.7% Weight loss of 18 lbs (10.3%) in 5 months, significant and < 75% energy intake needs met > 1 month. Treated with diet.    BMI Findings:           Body mass index is 28.55 kg/m².         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; there was spurting blood\" that was cauterized without any further bleeding  Hemoglobin stable at approximately 8  Will be discharged on oral PPI and follow-up outpatient with primary care doctor      Acute cystitis with hematuria  Assessment & Plan  Urinalysis revealed leukocytes and bacteria  Urine culture revealed Pseudomonas  Completed 7-day course " of IV cefepime      Acute kidney injury superimposed on chronic kidney disease   Assessment & Plan  Baseline creatinine 1.4  Creatinine peaked at 3.7  Secondary to urinary obstruction  Status post Carrera catheter placement  Kidney function now normalized and back to baseline             Discharging Physician / Practitioner: Emanuel Corley MD  PCP: Layne Bacon MD  Admission Date:   Admission Orders (From admission, onward)       Ordered        12/07/23 1346  Inpatient Admission  Once                          Discharge Date: 12/18/23    Medical Problems       Resolved Problems  Date Reviewed: 12/18/2023   None         Consultations During Hospital Stay:  Urology, GI    Procedures Performed:   EGD x 2    Significant Findings / Test Results:   EGD    Result Date: 12/8/2023  Impression: 3 cm hiatal hernia Grade C esophagitis in the lower third of the esophagus 10 large ulcers in the stomach and duodenum with clean base (Bandar III) Performed forceps biopsies in the stomach to rule out H. pylori RECOMMENDATION: Protonix 40 mg by mouth twice a day Avoid NSAIDs Await pathology results and treat for Helicobacter pylori infection if found Repeat upper endoscopy in 3 months to document healing of the ulcers Return to floor and resume diet If no bleeding overnight, can discharge to home tomorrow from a GI standpoint   Navi Mario MD     CT chest abdomen pelvis wo contrast    Result Date: 12/7/2023  Impression: Bilateral hydroureteronephrosis, moderate to severe on the left, to the level of the urinary bladder, likely related to significant bladder distention. No obstructing ureteral calculi identified. Bilateral nephrolithiasis. Mild diffuse urinary bladder wall thickening which may be due to cystitis and/or some degree of underlying bladder wall trabeculation; correlate with urinalysis. Evaluation for intraluminal hemorrhage is markedly limited due to streak artifact from adjacent right hip hardware. Uncomplicated  "colonic diverticulosis. Small hiatal hernia. Stable chronic compression fracture of the T12 vertebral body with mild retropulsion. Few calcified uterine fibroids. 1.3 x 1.1 cm right lower lobe nodule and 0.7 cm groundglass right lower lobe nodule, new from 2019, indeterminate. PET/CT correlation should be considered. The study was marked in EPIC for significant notification. Workstation performed: [id number]      Incidental Findings:   none     Test Results Pending at Discharge (will require follow up):   none     Outpatient Tests Requested:  none    Complications:  none    Reason for Admission: Sepsis secondary to UTI    Hospital Course:     Samira Allred is a 83 y.o. female patient who originally presented to the hospital on 12/7/2023 due to sepsis secondary to UTI was treated with IV antibiotics with cefepime.  Completed 7-day course of IV antibiotic therapy.  Was noted to have acute GI bleeding requiring 2 EGDs.  Noted to have large clean-based ulcers ultimately bleeding subsided and resolved bleeding now hemoglobin at baseline.  Required 4 units packed red blood cells throughout hospitalization.  Ultimately discharged on oral PPI.      Please see above list of diagnoses and related plan for additional information.     Condition at Discharge: stable     Discharge Day Visit / Exam:     Subjective: Denies melena, hematochezia, hematemesis, fevers, chills, abdominal pain or any other complaints at this time  Vitals: Blood Pressure: 123/60 (12/18/23 0746)  Pulse: 72 (12/18/23 0746)  Temperature: 98.7 °F (37.1 °C) (12/18/23 0746)  Temp Source: Temporal (12/15/23 1711)  Respirations: 18 (12/18/23 0746)  Height: 5' 2\" (157.5 cm) (12/07/23 1500)  Weight - Scale: 70.8 kg (156 lb 1.4 oz) (12/14/23 0657)  SpO2: 99 % (12/18/23 0746)  Exam:   Physical Exam  Constitutional:       General: She is not in acute distress.     Appearance: She is well-developed. She is not diaphoretic.   HENT:      Head: Normocephalic and atraumatic. "      Nose: Nose normal.      Mouth/Throat:      Pharynx: No oropharyngeal exudate.   Eyes:      General: No scleral icterus.        Right eye: No discharge.         Left eye: No discharge.      Conjunctiva/sclera: Conjunctivae normal.   Neck:      Thyroid: No thyromegaly.      Vascular: No JVD.   Cardiovascular:      Rate and Rhythm: Normal rate and regular rhythm.      Heart sounds: Normal heart sounds. No murmur heard.     No friction rub. No gallop.   Pulmonary:      Effort: Pulmonary effort is normal. No respiratory distress.      Breath sounds: Normal breath sounds. No wheezing or rales.   Chest:      Chest wall: No tenderness.   Abdominal:      General: Bowel sounds are normal. There is no distension.      Palpations: Abdomen is soft.      Tenderness: There is no abdominal tenderness. There is no guarding or rebound.   Musculoskeletal:         General: No tenderness or deformity. Normal range of motion.      Cervical back: Normal range of motion and neck supple.   Skin:     General: Skin is warm and dry.      Findings: No erythema or rash.   Neurological:      Mental Status: She is alert. Mental status is at baseline.      Cranial Nerves: No cranial nerve deficit.      Sensory: No sensory deficit.      Motor: No abnormal muscle tone.      Coordination: Coordination normal.       (  Discussion with Family: patient, declined family update    Discharge instructions/Information to patient and family:   See after visit summary for information provided to patient and family.      Provisions for Follow-Up Care:  See after visit summary for information related to follow-up care and any pertinent home health orders.      Disposition:     Home    For Discharges to [facility] SNF:   Not Applicable to this Patient - Not Applicable to this Patient    Planned Readmission: none     Discharge Statement:  I spent 60 minutes discharging the patient. This time was spent on the day of discharge. I had direct contact  with the patient on the day of discharge. Greater than 50% of the total time was spent examining patient, answering all patient questions, arranging and discussing plan of care with patient as well as directly providing post-discharge instructions.  Additional time then spent on discharge activities.    Discharge Medications:  See after visit summary for reconciled discharge medications provided to patient and family.      ** Please Note: This note has been constructed using a voice recognition system **

## 2023-12-18 NOTE — ASSESSMENT & PLAN NOTE
"  Patient initially presented with dark-tarry stool and hematemesis  GI consulted EGD was performed on 12/8 which revealed esophagitis and 10 large clean-based ulcers in the stomach and duodenum  Noted to have recurrent melena  Received a total of 4 units packed red blood cells during hospitalization hemoglobin remained stable  Underwent repeat EGD on 12/15 that revealed overt bleeding and \"Single small Dieulafoy's lesion in the 1st part of the duodenum; there was spurting blood\" that was cauterized without any further bleeding  Hemoglobin stable at approximately 8  Will be discharged on oral PPI and follow-up outpatient with primary care doctor    "

## 2023-12-18 NOTE — ASSESSMENT & PLAN NOTE
Urinalysis revealed leukocytes and bacteria  Urine culture revealed Pseudomonas  Completed 7-day course of IV cefepime

## 2023-12-18 NOTE — TELEPHONE ENCOUNTER
----- Message from Cassi Tomlin PA-C sent at 12/18/2023  9:16 AM EST -----  Patient needs an EGD in 8 weeks pls!

## 2023-12-19 ENCOUNTER — PREP FOR PROCEDURE (OUTPATIENT)
Dept: GASTROENTEROLOGY | Facility: CLINIC | Age: 83
End: 2023-12-19

## 2023-12-19 ENCOUNTER — TELEPHONE (OUTPATIENT)
Dept: GASTROENTEROLOGY | Facility: CLINIC | Age: 83
End: 2023-12-19

## 2023-12-19 DIAGNOSIS — K27.9 PUD (PEPTIC ULCER DISEASE): ICD-10-CM

## 2023-12-19 DIAGNOSIS — K92.2 GASTROINTESTINAL HEMORRHAGE, UNSPECIFIED GASTROINTESTINAL HEMORRHAGE TYPE: Primary | ICD-10-CM

## 2023-12-19 NOTE — TELEPHONE ENCOUNTER
Called patient  scheduled an EGD with Dr Arcos  1/26/24   reviewed prep         --- Message from Cassi Tomlin PA-C sent at 12/18/2023  9:16 AM EST -----  Patient needs an EGD in 8 weeks pls!

## 2023-12-20 NOTE — DISCHARGE SUMMARY
[facility]  Discharge- Samira Allred 1940, 83 y.o. female MRN: [id number]  Unit/Bed#: [facility] Encounter: [id number]  Primary Care Provider: Layne Bacon MD   Date and time admitted to hospital: 12/7/2023  9:37 AM    Discharging Physician / Practitioner: Emanuel Corley MD  PCP: Layne Bacon MD  Admission Date:   Admission Orders (From admission, onward)       Ordered        12/07/23 1346  Inpatient Admission  Once                          Discharge Date: 12/18/23    Medical Problems       Resolved Problems  Date Reviewed: 12/18/2023   None         Consultations During Hospital Stay:  GI, urology    Procedures Performed:   EGD x 2    Significant Findings / Test Results:   EGD    Result Date: 12/8/2023  Impression: 3 cm hiatal hernia Grade C esophagitis in the lower third of the esophagus 10 large ulcers in the stomach and duodenum with clean base (Bandar III) Performed forceps biopsies in the stomach to rule out H. pylori RECOMMENDATION: Protonix 40 mg by mouth twice a day Avoid NSAIDs Await pathology results and treat for Helicobacter pylori infection if found Repeat upper endoscopy in 3 months to document healing of the ulcers Return to floor and resume diet If no bleeding overnight, can discharge to home tomorrow from a GI standpoint   Navi Mario MD     CT chest abdomen pelvis wo contrast    Result Date: 12/7/2023  Impression: Bilateral hydroureteronephrosis, moderate to severe on the left, to the level of the urinary bladder, likely related to significant bladder distention. No obstructing ureteral calculi identified. Bilateral nephrolithiasis. Mild diffuse urinary bladder wall thickening which may be due to cystitis and/or some degree of underlying bladder wall trabeculation; correlate with urinalysis. Evaluation for intraluminal hemorrhage is markedly limited due to streak artifact from adjacent right hip hardware. Uncomplicated colonic diverticulosis. Small hiatal  hernia. Stable chronic compression fracture of the T12 vertebral body with mild retropulsion. Few calcified uterine fibroids. 1.3 x 1.1 cm right lower lobe nodule and 0.7 cm groundglass right lower lobe nodule, new from 2019, indeterminate. PET/CT correlation should be considered. The study was marked in EPIC for significant notification. Workstation performed: [id number]      Incidental Findings:   none     Test Results Pending at Discharge (will require follow up):   none     Outpatient Tests Requested:  none    Complications:  none    Reason for Admission: Sepsis secondary to UTI    Hospital Course:     Samira Allred is a 83 y.o. female patient who originally presented to the hospital on 12/7/2023 initially presented with sepsis secondary to UTI was noted to have Pseudomonas growing in urine culture was treated with IV cefepime completed course of antibiotics.  Was noted to have significant urinary retention/hydronephrosis Carrera catheter was placed we will follow-up outpatient with urology regarding possible removal.  Also noted to have significant GI bleed.  Underwent EGD on 12/8 that revealed 10 clean-based ulcers and then underwent EGD on 12/15 given recurrent melena which revealed Dieuloafoy lesion status post cauterization.  Received 4 units packed red blood cells throughout hospitalization.  Bleeding resolved no further melena.  Hemoglobin stable  Will follow-up with primary care doctor approximately 1 week    Please see above list of diagnoses and related plan for additional information.     Condition at Discharge: stable     Discharge Day Visit / Exam:     * Please refer to separate progress note for these details *    Discussion with Family: patient, declined family update    Discharge instructions/Information to patient and family:   See after visit summary for information provided to patient and family.      Provisions for Follow-Up Care:  See after visit summary for information related to follow-up care  and any pertinent home health orders.      Disposition:     Home    For Discharges to [facility] SNF:   Not Applicable to this Patient - Not Applicable to this Patient    Planned Readmission: none     Discharge Statement:  I spent 30 minutes discharging the patient. This time was spent on the day of discharge. I had direct contact with the patient on the day of discharge. Greater than 50% of the total time was spent examining patient, answering all patient questions, arranging and discussing plan of care with patient as well as directly providing post-discharge instructions.  Additional time then spent on discharge activities.    Discharge Medications:  See after visit summary for reconciled discharge medications provided to patient and family.      ** Please Note: This note has been constructed using a voice recognition system **

## 2023-12-21 ENCOUNTER — OFFICE VISIT (OUTPATIENT)
Dept: INTERNAL MEDICINE CLINIC | Facility: CLINIC | Age: 83
End: 2023-12-21
Payer: MEDICARE

## 2023-12-21 VITALS
RESPIRATION RATE: 18 BRPM | BODY MASS INDEX: 29.81 KG/M2 | WEIGHT: 162 LBS | DIASTOLIC BLOOD PRESSURE: 64 MMHG | SYSTOLIC BLOOD PRESSURE: 118 MMHG | TEMPERATURE: 96.7 F | HEIGHT: 62 IN

## 2023-12-21 DIAGNOSIS — R91.1 RIGHT LOWER LOBE PULMONARY NODULE: ICD-10-CM

## 2023-12-21 DIAGNOSIS — M06.9 RHEUMATOID ARTHRITIS OF LEFT ANKLE, UNSPECIFIED WHETHER RHEUMATOID FACTOR PRESENT (HCC): Chronic | ICD-10-CM

## 2023-12-21 DIAGNOSIS — N18.4 CHRONIC RENAL DISEASE, STAGE IV (HCC): ICD-10-CM

## 2023-12-21 DIAGNOSIS — A41.52 SEPSIS DUE TO PSEUDOMONAS SPECIES WITHOUT ACUTE ORGAN DYSFUNCTION (HCC): ICD-10-CM

## 2023-12-21 DIAGNOSIS — Z87.11 HISTORY OF PEPTIC ULCER: ICD-10-CM

## 2023-12-21 DIAGNOSIS — K92.2 GASTROINTESTINAL HEMORRHAGE, UNSPECIFIED GASTROINTESTINAL HEMORRHAGE TYPE: Primary | ICD-10-CM

## 2023-12-21 DIAGNOSIS — K27.9 PUD (PEPTIC ULCER DISEASE): ICD-10-CM

## 2023-12-21 DIAGNOSIS — R33.9 URINARY RETENTION: ICD-10-CM

## 2023-12-21 DIAGNOSIS — N13.30 HYDRONEPHROSIS, UNSPECIFIED HYDRONEPHROSIS TYPE: ICD-10-CM

## 2023-12-21 PROBLEM — R93.429 ABNORMAL FINDING ON DIAGNOSTIC IMAGING OF KIDNEY: Status: RESOLVED | Noted: 2018-04-19 | Resolved: 2023-12-21

## 2023-12-21 PROBLEM — N17.9 ACUTE KIDNEY INJURY SUPERIMPOSED ON CHRONIC KIDNEY DISEASE: Status: RESOLVED | Noted: 2023-11-04 | Resolved: 2023-12-21

## 2023-12-21 PROBLEM — N18.9 ACUTE KIDNEY INJURY SUPERIMPOSED ON CHRONIC KIDNEY DISEASE: Status: RESOLVED | Noted: 2023-11-04 | Resolved: 2023-12-21

## 2023-12-21 PROBLEM — N30.01 ACUTE CYSTITIS WITH HEMATURIA: Status: RESOLVED | Noted: 2023-11-07 | Resolved: 2023-12-21

## 2023-12-21 PROBLEM — R74.01 TRANSAMINITIS: Status: RESOLVED | Noted: 2023-11-04 | Resolved: 2023-12-21

## 2023-12-21 PROBLEM — A41.9 SEPSIS (HCC): Status: RESOLVED | Noted: 2023-12-07 | Resolved: 2023-12-21

## 2023-12-21 PROCEDURE — 99496 TRANSJ CARE MGMT HIGH F2F 7D: CPT | Performed by: INTERNAL MEDICINE

## 2023-12-21 NOTE — PROGRESS NOTES
Assessment & Plan     1. Gastrointestinal hemorrhage, unspecified gastrointestinal hemorrhage type    2. PUD (peptic ulcer disease)  -     CBC and differential; Future  -     Comprehensive metabolic panel; Future    3. Rheumatoid arthritis of left ankle, unspecified whether rheumatoid factor present (HCC)  -     CBC and differential; Future  -     Comprehensive metabolic panel; Future    4. Chronic renal disease, stage IV (HCC)    5. Urinary retention    6. Hydronephrosis, unspecified hydronephrosis type    7. Sepsis due to Pseudomonas species without acute organ dysfunction (HCC)  -     CBC and differential; Future  -     Comprehensive metabolic panel; Future    8. History of peptic ulcer    9. Right lower lobe pulmonary nodule        Depression Screening and Follow-up Plan: Clincally patient does not have depression. No treatment is required.     Subjective     Transitional Care Management Review:   Samira Allred is a 83 y.o. female here for TCM follow up.     During the TCM phone call patient stated:  TCM Call       Date and time call was made  12/18/2023  1:36 PM    Hospital care reviewed  Records reviewed    Patient was hospitialized at  [facility]    Date of Admission  12/07/23    Date of discharge  12/18/23    Diagnosis  Sepsis    Disposition  Home    Were the patients medications reviewed and updated  Yes    Current Symptoms  None          TCM Call       Post hospital issues  None    Should patient be enrolled in anticoag monitoring?  No    Scheduled for follow up?  Yes    Did you obtain your prescribed medications  Yes    Do you need help managing your prescriptions or medications  No    Is transportation to your appointment needed  No    I have advised the patient to call PCP with any new or worsening symptoms  Shelby Xie, Practice Coordinator    Living Arrangements  Family members    Are you recieving home care services  Yes    Interperter language line needed  No    Counseling  Patient     "Counseling topics  Activities of daily living; Importance of RX compliance; Prognosis          Patient is here for TCM visit, reviewed her hospitalization, dc instructions and dc summary; she was admitted to the hospital with sepsis 2/2 UTI was noted to have Pseudomonas growing, treated with IV ABT. Had significant urinary retention/hydronephrosis and FC placed, still in place today, she will follow up with urology. Also had a significant GI bleed.  Underwent EGD on 12/8 that revealed 10 clean-based ulcers and then underwent EGD on 12/15 given recurrent melena which revealed Dieuloafoy lesion s/p cauterization.  Received 4 units packed red blood cells throughout hospitalization.      CT revealed 0.8 cm new lung nodule, one stable since 2019; for now, no PET or CT ordered.    Call rheumatology, rituximab and methotrexate stopped.        Review of Systems   Constitutional:  Positive for activity change and fatigue. Negative for chills and fever.   HENT:  Negative for ear pain and sore throat.    Eyes:  Negative for pain and visual disturbance.   Respiratory:  Negative for cough and shortness of breath.    Cardiovascular:  Negative for chest pain and palpitations.   Gastrointestinal:  Negative for abdominal pain and vomiting.   Genitourinary:  Negative for dysuria and hematuria.   Musculoskeletal:  Positive for arthralgias and gait problem. Negative for back pain.   Skin:  Negative for color change and rash.   Neurological:  Positive for weakness. Negative for seizures and syncope.   All other systems reviewed and are negative.      Objective     /64 (BP Location: Left arm, Patient Position: Sitting, Cuff Size: Standard)   Temp (!) 96.7 °F (35.9 °C)   Resp 18   Ht 5' 2\" (1.575 m)   LMP  (LMP Unknown)   BMI 28.55 kg/m²      Physical Exam  Vitals and nursing note reviewed.   Constitutional:       General: She is not in acute distress.     Appearance: Normal appearance. She is well-developed.      Comments: " Elderly appearing   HENT:      Head: Normocephalic and atraumatic.   Eyes:      Conjunctiva/sclera: Conjunctivae normal.   Cardiovascular:      Rate and Rhythm: Normal rate and regular rhythm.      Heart sounds: Normal heart sounds. No murmur heard.  Pulmonary:      Effort: Pulmonary effort is normal. No respiratory distress.      Breath sounds: Normal breath sounds.   Abdominal:      Tenderness: There is no abdominal tenderness.   Musculoskeletal:         General: Swelling and deformity present.      Cervical back: Normal range of motion and neck supple.      Right lower leg: No edema.      Left lower leg: No edema.   Skin:     General: Skin is warm and dry.      Capillary Refill: Capillary refill takes less than 2 seconds.      Coloration: Skin is pale.   Neurological:      Mental Status: She is alert and oriented to person, place, and time. Mental status is at baseline.      Gait: Gait abnormal.   Psychiatric:         Mood and Affect: Mood normal.     Medications have been reviewed by provider in current encounter    Layne Bacon MD

## 2023-12-26 ENCOUNTER — TELEPHONE (OUTPATIENT)
Age: 83
End: 2023-12-26

## 2023-12-31 ENCOUNTER — HOSPITAL ENCOUNTER (OUTPATIENT)
Dept: ULTRASOUND IMAGING | Facility: HOSPITAL | Age: 83
Discharge: HOME | End: 2023-12-31
Payer: MEDICARE

## 2023-12-31 DIAGNOSIS — N13.30 HYDRONEPHROSIS, UNSPECIFIED HYDRONEPHROSIS TYPE: ICD-10-CM

## 2023-12-31 PROCEDURE — 76775 US EXAM ABDO BACK WALL LIM: CPT

## 2024-01-04 ENCOUNTER — TELEPHONE (OUTPATIENT)
Dept: INTERNAL MEDICINE CLINIC | Facility: CLINIC | Age: 84
End: 2024-01-04

## 2024-01-04 NOTE — TELEPHONE ENCOUNTER
Patient is not feeling well and would like to know if she could take tylenol due to being on medication for her stomach for ulcers?    Please advise..    Call patient back at [phone].  Tatianna is not on Medical Communication Consent Form

## 2024-01-05 ENCOUNTER — TELEPHONE (OUTPATIENT)
Dept: INTERNAL MEDICINE CLINIC | Facility: CLINIC | Age: 84
End: 2024-01-05

## 2024-01-05 NOTE — TELEPHONE ENCOUNTER
Spoke to Samira, She did test positive today for covid, she stated her symptoms are very mild, runny nose and mild cough, she is taking tylenol. If she gets any worse she will go to urgent care or make an appointment to come in next week.

## 2024-01-05 NOTE — TELEPHONE ENCOUNTER
Pt was contacted by lyssa I believe about previous messages of not feeling well and tylenol usage.   Pt called just now to say she's positive for covid,  told me that she first had the sniffles of being sick 2 days before going into the hospital?    ( That was around 12/7 ?....  She was in the hospital for sepsis I believe.   Im not sure what needs to happen at this point ....   She just has like the runny nose thing, very minor cough.  Breathing fine    Schedule is jammed today but I don't believe her sxs of covid are overly crazy, yet she's coming off a sepsis issue/2 surgeries....     Please advise

## 2024-01-10 ENCOUNTER — TELEPHONE (OUTPATIENT)
Age: 84
End: 2024-01-10

## 2024-01-10 ENCOUNTER — APPOINTMENT (OUTPATIENT)
Dept: LAB | Facility: HOSPITAL | Age: 84
End: 2024-01-10
Payer: MEDICARE

## 2024-01-10 ENCOUNTER — PROCEDURE VISIT (OUTPATIENT)
Dept: UROLOGY | Facility: CLINIC | Age: 84
End: 2024-01-10
Payer: MEDICARE

## 2024-01-10 DIAGNOSIS — R33.9 URINARY RETENTION: Primary | ICD-10-CM

## 2024-01-10 LAB
BACTERIA UR QL AUTO: ABNORMAL /HPF
BILIRUB UR QL STRIP: NEGATIVE
CLARITY UR: ABNORMAL
COLOR UR: YELLOW
GLUCOSE UR STRIP-MCNC: NEGATIVE MG/DL
HGB UR QL STRIP.AUTO: ABNORMAL
KETONES UR STRIP-MCNC: NEGATIVE MG/DL
LEUKOCYTE ESTERASE UR QL STRIP: ABNORMAL
NITRITE UR QL STRIP: NEGATIVE
NON-SQ EPI CELLS URNS QL MICRO: ABNORMAL /HPF
PH UR STRIP.AUTO: 7 [PH]
POST-VOID RESIDUAL VOLUME, ML POC: 218 ML
PROT UR STRIP-MCNC: ABNORMAL MG/DL
RBC #/AREA URNS AUTO: ABNORMAL /HPF
SP GR UR STRIP.AUTO: 1.01 (ref 1–1.03)
UROBILINOGEN UR STRIP-ACNC: <2 MG/DL
WBC #/AREA URNS AUTO: ABNORMAL /HPF

## 2024-01-10 PROCEDURE — 87086 URINE CULTURE/COLONY COUNT: CPT

## 2024-01-10 PROCEDURE — 51798 US URINE CAPACITY MEASURE: CPT

## 2024-01-10 PROCEDURE — 81001 URINALYSIS AUTO W/SCOPE: CPT

## 2024-01-10 NOTE — TELEPHONE ENCOUNTER
Patient was in the office today. She was given a cup to fill and drop off a lab within the hour (according to the patient) She just needed to know who's name to put on the cup under provider

## 2024-01-10 NOTE — PATIENT INSTRUCTIONS
ER precautions if unable to urinate or uncomfortable  Timed voiding & double voiding  Provide urine sample if burning does not subside

## 2024-01-10 NOTE — PROGRESS NOTES
1/10/2024  Samira Allred is a 83 y.o. female  [id number]    Diagnosis:  Chief Complaint    Urinary Retention         Patient presents for follow up post void residual    Plan:  Plan to follow up as scheduled in 1-2 weeks for PVR.  Plan to follow up as scheduled for AP follow up.  ER precautions if patient unable to void.         Assessment:      There were no vitals filed for this visit.        Patient voided while in the office.  VNA removed catheter this morning. Patient able to urinate 2 times. Post void residual measured via bladder scan to be 218 mL. After discussing with the provider, will plan to bring back in 1-2 weeks for PVR and follow up with AP in 4-6 weeks. Patient only complaints is burning with urination. Patient provided with UA/UC to provide urine sample if burning does not improve. Patient agreeable to plan. ER precautions if cannot urinate.     Recent Results (from the past 6 hour(s))   POCT Measure PVR    Collection Time: 01/10/24  2:49 PM   Result Value Ref Range    POST-VOID RESIDUAL VOLUME, ML  mL         Toshia Matthews RN

## 2024-01-11 LAB — BACTERIA UR CULT: NORMAL

## 2024-01-12 ENCOUNTER — TELEPHONE (OUTPATIENT)
Dept: UROLOGY | Facility: CLINIC | Age: 84
End: 2024-01-12

## 2024-01-12 NOTE — TELEPHONE ENCOUNTER
Called and spoke to the PT with results. PT verbalized understanding and was thankful for the call.    ----- Message from Dolores Ramsey PA-C sent at 1/12/2024  9:34 AM EST -----  Urine culture negative  ----- Message -----  From: Toshia Matthews RN  Sent: 1/10/2024   2:49 PM EST  To: Dolores Ramsey PA-C

## 2024-01-17 ENCOUNTER — RA CDI HCC (OUTPATIENT)
Dept: OTHER | Facility: HOSPITAL | Age: 84
End: 2024-01-17

## 2024-01-18 ENCOUNTER — HOSPITAL ENCOUNTER (INPATIENT)
Facility: HOSPITAL | Age: 84
LOS: 5 days | Discharge: HOME HEALTH | DRG: 872 | End: 2024-01-23
Attending: EMERGENCY MEDICINE | Admitting: INTERNAL MEDICINE
Payer: MEDICARE

## 2024-01-18 ENCOUNTER — APPOINTMENT (INPATIENT)
Dept: ULTRASOUND IMAGING | Facility: HOSPITAL | Age: 84
DRG: 872 | End: 2024-01-18
Payer: MEDICARE

## 2024-01-18 DIAGNOSIS — E43 SEVERE PROTEIN-CALORIE MALNUTRITION (HCC): ICD-10-CM

## 2024-01-18 DIAGNOSIS — R33.8 ACUTE URINARY RETENTION: Primary | ICD-10-CM

## 2024-01-18 DIAGNOSIS — N39.0 URINARY TRACT INFECTION WITH HEMATURIA, SITE UNSPECIFIED: ICD-10-CM

## 2024-01-18 DIAGNOSIS — R19.7 DIARRHEA: ICD-10-CM

## 2024-01-18 DIAGNOSIS — R65.20: ICD-10-CM

## 2024-01-18 DIAGNOSIS — N39.0 URINARY TRACT INFECTION: ICD-10-CM

## 2024-01-18 DIAGNOSIS — R79.89 ELEVATED LFTS: ICD-10-CM

## 2024-01-18 DIAGNOSIS — N17.9: ICD-10-CM

## 2024-01-18 DIAGNOSIS — R31.9 URINARY TRACT INFECTION WITH HEMATURIA, SITE UNSPECIFIED: ICD-10-CM

## 2024-01-18 DIAGNOSIS — N17.9 AKI (ACUTE KIDNEY INJURY) (HCC): ICD-10-CM

## 2024-01-18 DIAGNOSIS — E87.1 HYPONATREMIA: ICD-10-CM

## 2024-01-18 DIAGNOSIS — K59.1 FUNCTIONAL DIARRHEA: ICD-10-CM

## 2024-01-18 DIAGNOSIS — A41.52: ICD-10-CM

## 2024-01-18 LAB
ANION GAP SERPL CALCULATED.3IONS-SCNC: 9 MMOL/L
BACTERIA UR QL AUTO: ABNORMAL /HPF
BASOPHILS # BLD AUTO: 0.03 THOUSANDS/ÂΜL (ref 0–0.1)
BASOPHILS NFR BLD AUTO: 0 % (ref 0–1)
BILIRUB UR QL STRIP: NEGATIVE
BNP SERPL-MCNC: 377 PG/ML (ref 0–100)
BUN SERPL-MCNC: 40 MG/DL (ref 5–25)
CALCIUM SERPL-MCNC: 8.3 MG/DL (ref 8.4–10.2)
CHLORIDE SERPL-SCNC: 103 MMOL/L (ref 96–108)
CLARITY UR: ABNORMAL
CO2 SERPL-SCNC: 19 MMOL/L (ref 21–32)
COLOR UR: ABNORMAL
CREAT SERPL-MCNC: 3 MG/DL (ref 0.6–1.3)
EOSINOPHIL # BLD AUTO: 0.07 THOUSAND/ÂΜL (ref 0–0.61)
EOSINOPHIL NFR BLD AUTO: 1 % (ref 0–6)
ERYTHROCYTE [DISTWIDTH] IN BLOOD BY AUTOMATED COUNT: 17.7 % (ref 11.6–15.1)
GFR SERPL CREATININE-BSD FRML MDRD: 13 ML/MIN/1.73SQ M
GLUCOSE SERPL-MCNC: 130 MG/DL (ref 65–140)
GLUCOSE UR STRIP-MCNC: NEGATIVE MG/DL
HCT VFR BLD AUTO: 29.6 % (ref 34.8–46.1)
HGB BLD-MCNC: 9.5 G/DL (ref 11.5–15.4)
HGB UR QL STRIP.AUTO: ABNORMAL
IMM GRANULOCYTES # BLD AUTO: 0.05 THOUSAND/UL (ref 0–0.2)
IMM GRANULOCYTES NFR BLD AUTO: 1 % (ref 0–2)
KETONES UR STRIP-MCNC: NEGATIVE MG/DL
LACTATE SERPL-SCNC: 1.5 MMOL/L (ref 0.5–2)
LACTATE SERPL-SCNC: 2.8 MMOL/L (ref 0.5–2)
LEUKOCYTE ESTERASE UR QL STRIP: ABNORMAL
LYMPHOCYTES # BLD AUTO: 0.29 THOUSANDS/ÂΜL (ref 0.6–4.47)
LYMPHOCYTES NFR BLD AUTO: 3 % (ref 14–44)
MCH RBC QN AUTO: 30 PG (ref 26.8–34.3)
MCHC RBC AUTO-ENTMCNC: 32.1 G/DL (ref 31.4–37.4)
MCV RBC AUTO: 93 FL (ref 82–98)
MONOCYTES # BLD AUTO: 0.88 THOUSAND/ÂΜL (ref 0.17–1.22)
MONOCYTES NFR BLD AUTO: 10 % (ref 4–12)
NEUTROPHILS # BLD AUTO: 7.13 THOUSANDS/ÂΜL (ref 1.85–7.62)
NEUTS SEG NFR BLD AUTO: 85 % (ref 43–75)
NITRITE UR QL STRIP: NEGATIVE
NON-SQ EPI CELLS URNS QL MICRO: ABNORMAL /HPF
NRBC BLD AUTO-RTO: 0 /100 WBCS
OSMOLALITY UR/SERPL-RTO: 293 MMOL/KG (ref 282–298)
OSMOLALITY UR: 360 MMOL/KG
PH UR STRIP.AUTO: 6.5 [PH]
PLATELET # BLD AUTO: 396 THOUSANDS/UL (ref 149–390)
PMV BLD AUTO: 9.1 FL (ref 8.9–12.7)
POTASSIUM SERPL-SCNC: 5 MMOL/L (ref 3.5–5.3)
PROT UR STRIP-MCNC: ABNORMAL MG/DL
RBC # BLD AUTO: 3.17 MILLION/UL (ref 3.81–5.12)
RBC #/AREA URNS AUTO: ABNORMAL /HPF
SODIUM 24H UR-SCNC: 84 MOL/L
SODIUM SERPL-SCNC: 131 MMOL/L (ref 135–147)
SP GR UR STRIP.AUTO: 1.01 (ref 1–1.03)
URATE SERPL-MCNC: 6.8 MG/DL (ref 2–7.5)
UROBILINOGEN UR STRIP-ACNC: <2 MG/DL
WBC # BLD AUTO: 8.45 THOUSAND/UL (ref 4.31–10.16)
WBC #/AREA URNS AUTO: ABNORMAL /HPF
WBC CLUMPS # UR AUTO: PRESENT /UL

## 2024-01-18 PROCEDURE — 99223 1ST HOSP IP/OBS HIGH 75: CPT | Performed by: INTERNAL MEDICINE

## 2024-01-18 PROCEDURE — 36415 COLL VENOUS BLD VENIPUNCTURE: CPT | Performed by: NURSE PRACTITIONER

## 2024-01-18 PROCEDURE — 87040 BLOOD CULTURE FOR BACTERIA: CPT | Performed by: NURSE PRACTITIONER

## 2024-01-18 PROCEDURE — 87077 CULTURE AEROBIC IDENTIFY: CPT | Performed by: NURSE PRACTITIONER

## 2024-01-18 PROCEDURE — 76775 US EXAM ABDO BACK WALL LIM: CPT

## 2024-01-18 PROCEDURE — 83880 ASSAY OF NATRIURETIC PEPTIDE: CPT | Performed by: INTERNAL MEDICINE

## 2024-01-18 PROCEDURE — 96365 THER/PROPH/DIAG IV INF INIT: CPT

## 2024-01-18 PROCEDURE — 84550 ASSAY OF BLOOD/URIC ACID: CPT | Performed by: INTERNAL MEDICINE

## 2024-01-18 PROCEDURE — 81001 URINALYSIS AUTO W/SCOPE: CPT | Performed by: NURSE PRACTITIONER

## 2024-01-18 PROCEDURE — 83930 ASSAY OF BLOOD OSMOLALITY: CPT | Performed by: INTERNAL MEDICINE

## 2024-01-18 PROCEDURE — 84300 ASSAY OF URINE SODIUM: CPT | Performed by: INTERNAL MEDICINE

## 2024-01-18 PROCEDURE — 83605 ASSAY OF LACTIC ACID: CPT | Performed by: NURSE PRACTITIONER

## 2024-01-18 PROCEDURE — 87086 URINE CULTURE/COLONY COUNT: CPT | Performed by: NURSE PRACTITIONER

## 2024-01-18 PROCEDURE — 85025 COMPLETE CBC W/AUTO DIFF WBC: CPT | Performed by: NURSE PRACTITIONER

## 2024-01-18 PROCEDURE — 99283 EMERGENCY DEPT VISIT LOW MDM: CPT

## 2024-01-18 PROCEDURE — 87186 SC STD MICRODIL/AGAR DIL: CPT | Performed by: NURSE PRACTITIONER

## 2024-01-18 PROCEDURE — 96368 THER/DIAG CONCURRENT INF: CPT

## 2024-01-18 PROCEDURE — 80048 BASIC METABOLIC PNL TOTAL CA: CPT | Performed by: NURSE PRACTITIONER

## 2024-01-18 PROCEDURE — 83935 ASSAY OF URINE OSMOLALITY: CPT | Performed by: INTERNAL MEDICINE

## 2024-01-18 PROCEDURE — 99285 EMERGENCY DEPT VISIT HI MDM: CPT | Performed by: NURSE PRACTITIONER

## 2024-01-18 RX ORDER — PANTOPRAZOLE SODIUM 40 MG/1
40 TABLET, DELAYED RELEASE ORAL 2 TIMES DAILY
Status: DISCONTINUED | OUTPATIENT
Start: 2024-01-18 | End: 2024-01-23 | Stop reason: HOSPADM

## 2024-01-18 RX ORDER — PRAVASTATIN SODIUM 40 MG
40 TABLET ORAL
Status: DISCONTINUED | OUTPATIENT
Start: 2024-01-18 | End: 2024-01-23 | Stop reason: HOSPADM

## 2024-01-18 RX ORDER — CEFEPIME HYDROCHLORIDE 2 G/50ML
2000 INJECTION, SOLUTION INTRAVENOUS EVERY 24 HOURS
Status: DISCONTINUED | OUTPATIENT
Start: 2024-01-18 | End: 2024-01-21

## 2024-01-18 RX ORDER — LEVOTHYROXINE SODIUM 0.07 MG/1
75 TABLET ORAL DAILY
Status: DISCONTINUED | OUTPATIENT
Start: 2024-01-18 | End: 2024-01-23

## 2024-01-18 RX ORDER — CEFTRIAXONE 1 G/50ML
1000 INJECTION, SOLUTION INTRAVENOUS ONCE
Status: COMPLETED | OUTPATIENT
Start: 2024-01-18 | End: 2024-01-18

## 2024-01-18 RX ORDER — ACETAMINOPHEN 325 MG/1
975 TABLET ORAL ONCE
Status: DISCONTINUED | OUTPATIENT
Start: 2024-01-18 | End: 2024-01-19

## 2024-01-18 RX ORDER — POLYETHYLENE GLYCOL 3350 17 G/17G
17 POWDER, FOR SOLUTION ORAL DAILY PRN
Status: DISCONTINUED | OUTPATIENT
Start: 2024-01-18 | End: 2024-01-23 | Stop reason: HOSPADM

## 2024-01-18 RX ORDER — LANOLIN ALCOHOL/MO/W.PET/CERES
1 CREAM (GRAM) TOPICAL 2 TIMES DAILY WITH MEALS
Status: DISCONTINUED | OUTPATIENT
Start: 2024-01-18 | End: 2024-01-23 | Stop reason: HOSPADM

## 2024-01-18 RX ORDER — SODIUM CHLORIDE, SODIUM GLUCONATE, SODIUM ACETATE, POTASSIUM CHLORIDE, MAGNESIUM CHLORIDE, SODIUM PHOSPHATE, DIBASIC, AND POTASSIUM PHOSPHATE .53; .5; .37; .037; .03; .012; .00082 G/100ML; G/100ML; G/100ML; G/100ML; G/100ML; G/100ML; G/100ML
1000 INJECTION, SOLUTION INTRAVENOUS ONCE
Status: COMPLETED | OUTPATIENT
Start: 2024-01-18 | End: 2024-01-18

## 2024-01-18 RX ORDER — ACETAMINOPHEN 325 MG/1
975 TABLET ORAL EVERY 6 HOURS PRN
Status: DISCONTINUED | OUTPATIENT
Start: 2024-01-18 | End: 2024-01-19

## 2024-01-18 RX ORDER — LANOLIN ALCOHOL/MO/W.PET/CERES
400 CREAM (GRAM) TOPICAL 2 TIMES DAILY
Status: DISCONTINUED | OUTPATIENT
Start: 2024-01-18 | End: 2024-01-23 | Stop reason: HOSPADM

## 2024-01-18 RX ORDER — SIMETHICONE 80 MG
80 TABLET,CHEWABLE ORAL 4 TIMES DAILY PRN
Status: DISCONTINUED | OUTPATIENT
Start: 2024-01-18 | End: 2024-01-23 | Stop reason: HOSPADM

## 2024-01-18 RX ORDER — HEPARIN SODIUM 5000 [USP'U]/ML
5000 INJECTION, SOLUTION INTRAVENOUS; SUBCUTANEOUS EVERY 8 HOURS SCHEDULED
Status: DISCONTINUED | OUTPATIENT
Start: 2024-01-18 | End: 2024-01-23 | Stop reason: HOSPADM

## 2024-01-18 RX ORDER — FOLIC ACID 1 MG/1
1 TABLET ORAL DAILY
Status: DISCONTINUED | OUTPATIENT
Start: 2024-01-19 | End: 2024-01-23 | Stop reason: HOSPADM

## 2024-01-18 RX ORDER — DOCUSATE SODIUM 100 MG/1
100 CAPSULE, LIQUID FILLED ORAL 2 TIMES DAILY
Status: DISCONTINUED | OUTPATIENT
Start: 2024-01-18 | End: 2024-01-23

## 2024-01-18 RX ORDER — ONDANSETRON 2 MG/ML
4 INJECTION INTRAMUSCULAR; INTRAVENOUS EVERY 6 HOURS PRN
Status: DISCONTINUED | OUTPATIENT
Start: 2024-01-18 | End: 2024-01-23 | Stop reason: HOSPADM

## 2024-01-18 RX ORDER — ASCORBIC ACID 500 MG
500 TABLET ORAL DAILY
Status: DISCONTINUED | OUTPATIENT
Start: 2024-01-19 | End: 2024-01-23 | Stop reason: HOSPADM

## 2024-01-18 RX ADMIN — Medication 1 TABLET: at 19:30

## 2024-01-18 RX ADMIN — CEFTRIAXONE 1000 MG: 1 INJECTION, SOLUTION INTRAVENOUS at 09:42

## 2024-01-18 RX ADMIN — LEVOTHYROXINE SODIUM 75 MCG: 0.07 TABLET ORAL at 19:30

## 2024-01-18 RX ADMIN — SODIUM CHLORIDE, SODIUM GLUCONATE, SODIUM ACETATE, POTASSIUM CHLORIDE, MAGNESIUM CHLORIDE, SODIUM PHOSPHATE, DIBASIC, AND POTASSIUM PHOSPHATE 1000 ML: .53; .5; .37; .037; .03; .012; .00082 INJECTION, SOLUTION INTRAVENOUS at 09:38

## 2024-01-18 RX ADMIN — Medication 400 MG: at 19:30

## 2024-01-18 RX ADMIN — CEFEPIME HYDROCHLORIDE 2000 MG: 2 INJECTION, SOLUTION INTRAVENOUS at 19:30

## 2024-01-18 RX ADMIN — HEPARIN SODIUM 5000 UNITS: 5000 INJECTION INTRAVENOUS; SUBCUTANEOUS at 23:06

## 2024-01-18 RX ADMIN — DOCUSATE SODIUM 100 MG: 100 CAPSULE, LIQUID FILLED ORAL at 19:30

## 2024-01-18 RX ADMIN — PRAVASTATIN SODIUM 40 MG: 40 TABLET ORAL at 19:30

## 2024-01-18 RX ADMIN — PANTOPRAZOLE SODIUM 40 MG: 40 TABLET, DELAYED RELEASE ORAL at 19:30

## 2024-01-18 NOTE — ASSESSMENT & PLAN NOTE
Recent Labs     01/18/24  0937   HGB 9.5*     Chronically anemic   Likely d.t CKD    Plan  Continue to trend CBC  Consider transfusion if Hgb <7

## 2024-01-18 NOTE — PLAN OF CARE
Problem: PAIN - ADULT  Goal: Verbalizes/displays adequate comfort level or baseline comfort level  Description: Interventions:  - Encourage patient to monitor pain and request assistance  - Assess pain using appropriate pain scale  - Administer analgesics based on type and severity of pain and evaluate response  - Implement non-pharmacological measures as appropriate and evaluate response  - Consider cultural and social influences on pain and pain management  - Notify physician/advanced practitioner if interventions unsuccessful or patient reports new pain  Outcome: Progressing     Problem: INFECTION - ADULT  Goal: Absence or prevention of progression during hospitalization  Description: INTERVENTIONS:  - Assess and monitor for signs and symptoms of infection  - Monitor lab/diagnostic results  - Monitor all insertion sites, i.e. indwelling lines, tubes, and drains  - Monitor endotracheal if appropriate and nasal secretions for changes in amount and color  - Camp appropriate cooling/warming therapies per order  - Administer medications as ordered  - Instruct and encourage patient and family to use good hand hygiene technique  - Identify and instruct in appropriate isolation precautions for identified infection/condition  Outcome: Progressing  Goal: Absence of fever/infection during neutropenic period  Description: INTERVENTIONS:  - Monitor WBC    Outcome: Progressing     Problem: SAFETY ADULT  Goal: Patient will remain free of falls  Description: INTERVENTIONS:  - Educate patient/family on patient safety including physical limitations  - Instruct patient to call for assistance with activity   - Consult OT/PT to assist with strengthening/mobility   - Keep Call bell within reach  - Keep bed low and locked with side rails adjusted as appropriate  - Keep care items and personal belongings within reach  - Initiate and maintain comfort rounds  - Make Fall Risk Sign visible to staff  - Offer Toileting every  Hours,  in advance of need  - Initiate/Maintain alarm  - Obtain necessary fall risk management equipment:   - Apply yellow socks and bracelet for high fall risk patients  - Consider moving patient to room near nurses station  Outcome: Progressing  Goal: Maintain or return to baseline ADL function  Description: INTERVENTIONS:  -  Assess patient's ability to carry out ADLs; assess patient's baseline for ADL function and identify physical deficits which impact ability to perform ADLs (bathing, care of mouth/teeth, toileting, grooming, dressing, etc.)  - Assess/evaluate cause of self-care deficits   - Assess range of motion  - Assess patient's mobility; develop plan if impaired  - Assess patient's need for assistive devices and provide as appropriate  - Encourage maximum independence but intervene and supervise when necessary  - Involve family in performance of ADLs  - Assess for home care needs following discharge   - Consider OT consult to assist with ADL evaluation and planning for discharge  - Provide patient education as appropriate  Outcome: Progressing  Goal: Maintains/Returns to pre admission functional level  Description: INTERVENTIONS:  - Perform AM-PAC 6 Click Basic Mobility/ Daily Activity assessment daily.  - Set and communicate daily mobility goal to care team and patient/family/caregiver.   - Collaborate with rehabilitation services on mobility goals if consulted  - Perform Range of Motion  times a day.  - Reposition patient every  hours.  - Dangle patient  times a day  - Stand patient  times a day  - Ambulate patient  times a day  - Out of bed to chair  times a day   - Out of bed for meals  times a day  - Out of bed for toileting  - Record patient progress and toleration of activity level   Outcome: Progressing     Problem: DISCHARGE PLANNING  Goal: Discharge to home or other facility with appropriate resources  Description: INTERVENTIONS:  - Identify barriers to discharge w/patient and caregiver  - Arrange for  needed discharge resources and transportation as appropriate  - Identify discharge learning needs (meds, wound care, etc.)  - Arrange for interpretive services to assist at discharge as needed  - Refer to Case Management Department for coordinating discharge planning if the patient needs post-hospital services based on physician/advanced practitioner order or complex needs related to functional status, cognitive ability, or social support system  Outcome: Progressing     Problem: Knowledge Deficit  Goal: Patient/family/caregiver demonstrates understanding of disease process, treatment plan, medications, and discharge instructions  Description: Complete learning assessment and assess knowledge base.  Interventions:  - Provide teaching at level of understanding  - Provide teaching via preferred learning methods  Outcome: Progressing     Problem: GASTROINTESTINAL - ADULT  Goal: Minimal or absence of nausea and/or vomiting  Description: INTERVENTIONS:  - Administer IV fluids if ordered to ensure adequate hydration  - Maintain NPO status until nausea and vomiting are resolved  - Nasogastric tube if ordered  - Administer ordered antiemetic medications as needed  - Provide nonpharmacologic comfort measures as appropriate  - Advance diet as tolerated, if ordered  - Consider nutrition services referral to assist patient with adequate nutrition and appropriate food choices  Outcome: Progressing  Goal: Maintains or returns to baseline bowel function  Description: INTERVENTIONS:  - Assess bowel function  - Encourage oral fluids to ensure adequate hydration  - Administer IV fluids if ordered to ensure adequate hydration  - Administer ordered medications as needed  - Encourage mobilization and activity  - Consider nutritional services referral to assist patient with adequate nutrition and appropriate food choices  Outcome: Progressing  Goal: Maintains adequate nutritional intake  Description: INTERVENTIONS:  - Monitor percentage  of each meal consumed  - Identify factors contributing to decreased intake, treat as appropriate  - Assist with meals as needed  - Monitor I&O, weight, and lab values if indicated  - Obtain nutrition services referral as needed  Outcome: Progressing  Goal: Oral mucous membranes remain intact  Description: INTERVENTIONS  - Assess oral mucosa and hygiene practices  - Implement preventative oral hygiene regimen  - Implement oral medicated treatments as ordered  - Initiate Nutrition services referral as needed  Outcome: Progressing     Problem: METABOLIC, FLUID AND ELECTROLYTES - ADULT  Goal: Electrolytes maintained within normal limits  Description: INTERVENTIONS:  - Monitor labs and assess patient for signs and symptoms of electrolyte imbalances  - Administer electrolyte replacement as ordered  - Monitor response to electrolyte replacements, including repeat lab results as appropriate  - Instruct patient on fluid and nutrition as appropriate  Outcome: Progressing  Goal: Fluid balance maintained  Description: INTERVENTIONS:  - Monitor labs   - Monitor I/O and WT  - Instruct patient on fluid and nutrition as appropriate  - Assess for signs & symptoms of volume excess or deficit  Outcome: Progressing     Problem: SKIN/TISSUE INTEGRITY - ADULT  Goal: Skin Integrity remains intact(Skin Breakdown Prevention)  Description: Assess:  -Perform German assessment every   -Clean and moisturize skin every   -Inspect skin when repositioning, toileting, and assisting with ADLS  -Assess under medical devices such as  every   -Assess extremities for adequate circulation and sensation     Bed Management:  -Have minimal linens on bed & keep smooth, unwrinkled  -Change linens as needed when moist or perspiring  -Avoid sitting or lying in one position for more than  hours while in bed  -Keep HOB at degrees     Toileting:  -Offer bedside commode  -Assess for incontinence every   -Use incontinent care products after each incontinent episode  such as     Activity:  -Mobilize patient  times a day  -Encourage activity and walks on unit  -Encourage or provide ROM exercises   -Turn and reposition patient every  Hours  -Use appropriate equipment to lift or move patient in bed  -Instruct/ Assist with weight shifting every  when out of bed in chair  -Consider limitation of chair time  hour intervals    Skin Care:  -Avoid use of baby powder, tape, friction and shearing, hot water or constrictive clothing  -Relieve pressure over bony prominences using  -Do not massage red bony areas    Next Steps:  -Teach patient strategies to minimize risks such as    -Consider consults to  interdisciplinary teams such as   Outcome: Progressing     Problem: MUSCULOSKELETAL - ADULT  Goal: Maintain or return mobility to safest level of function  Description: INTERVENTIONS:  - Assess patient's ability to carry out ADLs; assess patient's baseline for ADL function and identify physical deficits which impact ability to perform ADLs (bathing, care of mouth/teeth, toileting, grooming, dressing, etc.)  - Assess/evaluate cause of self-care deficits   - Assess range of motion  - Assess patient's mobility  - Assess patient's need for assistive devices and provide as appropriate  - Encourage maximum independence but intervene and supervise when necessary  - Involve family in performance of ADLs  - Assess for home care needs following discharge   - Consider OT consult to assist with ADL evaluation and planning for discharge  - Provide patient education as appropriate  Outcome: Progressing

## 2024-01-18 NOTE — CASE MANAGEMENT
Case Management Assessment & Discharge Planning Note    Patient name Samira Allred  Location ED 22/ED 22 MRN [id number]  : 1940 Date 2024       Current Admission Date: 2024  Current Admission Diagnosis:MARCELO (acute kidney injury) ([facility])   Patient Active Problem List    Diagnosis Date Noted    UTI (urinary tract infection) 2024    PUD (peptic ulcer disease) 2023    Aortic valve stenosis, moderate 2023    Severe protein-calorie malnutrition ([facility]) 2023    GI bleed 2023    Hydronephrosis 2023    Urinary retention 2023    MARCELO (acute kidney injury) ([facility]) 2023    Protein-calorie malnutrition, unspecified severity ([facility]) 2023    Chronic renal disease, stage IV ([facility]) 2023    Chronic pain of both knees 08/10/2022    Chronic kidney disease-mineral and bone disorder 2022    Anemia 2021    Stage 3b chronic kidney disease (HCC) 2021    History of DVT (deep vein thrombosis) 2021    Varicose veins of left lower extremity with pain 2020    Lower leg DVT (deep venous thromboembolism), chronic, left ([facility]) 2020    Intermediate stage nonexudative 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 eyes 2019    Diarrhea 2019    History of peptic ulcer 10/11/2018    Fat necrosis (segmental) of breast 2018    Personal history of breast cancer 2018    Localized edema 2018    Hypothyroidism 2018    Hyperuricemia 2018    Hypertension 2018    Rheumatoid arthritis ([facility]) 2018    Diastolic dysfunction 2017    Vitamin D deficiency 2014    Lumbar radiculopathy 2013    Hyperlipidemia 2013    Osteopenia 2013    Other chronic pain 2013    Adenocarcinoma of breast (HCC) 2013      LOS (days): 0  Geometric Mean LOS (GMLOS) (days):   Days to GMLOS:     OBJECTIVE:    Risk of Unplanned Readmission Score: 25.86         Current admission status: Inpatient        Preferred Pharmacy:   [id number] - [place] - [address]  [address]  [facility] [address]  Phone: [phone] Fax: [phone]    [facility] [place] - [address]  [address]  [place] [address]  Phone: [phone] Fax: [phone]    Primary Care Provider: Layne Bacon MD    Primary Insurance: MEDICARE  Secondary Insurance: BLUE CROSS    ASSESSMENT:  Active Health Care Proxies    There are no active Health Care Proxies on file.       Advance Directives  Does patient have a Health Care POA?: Yes  Does patient have Advance Directives?: Yes  Advance Directives: Living will, Power of  for health care, Power of  for finance  Primary Contact: Kali OLMEDO   [phone]  First Alternate Health Care Agent         Readmission Root Cause  30 Day Readmission: No    Patient Information  Admitted from:: Home  Mental Status: Alert  During Assessment patient was accompanied by: Not accompanied during assessment  Assessment information provided by:: Patient  Primary Caregiver: Friend  Caregiver's Name:: Stanley- her 78 year old roomate  Support Systems: Other (Comment) (Roomate Stanley and nephew and niece visit on weekends)  County of Residence: [place]  What [facility] do you live in?: [place]  Home entry access options. Select all that apply.: Ramp  Type of Current Residence: [facility]  Living Arrangements: Lives w/ Friend (Stanley)    Activities of Daily Living Prior to Admission  Functional Status: Assistance  Completes ADLs independently?: Yes  Ambulates independently?: Yes  Does patient use assisted devices?: Yes  Assisted Devices (DME) used: Walker, Bedside Commode  Does patient currently own DME?: Yes  What DME does the patient currently own?: Walker, Bedside Commode  Does patient have a history of Outpatient Therapy (PT/OT)?: No  Does the patient have a history of Short-Term Rehab?: Yes  ([facility])  Does patient have a history of HHC?: Yes  Does patient currently have HHC?: Yes    Current Home Health Care  Type of Current Home Care Services: Home PT, Home OT, Nurse visit  Current Home Health Agency::  ([facility])    Patient Information Continued  Income Source: Pension/[facility]  Does patient have prescription coverage?: Yes  Does patient receive dialysis treatments?: No  Does patient have a history of substance abuse?: No  Does patient have a history of Mental Health Diagnosis?: No         Means of Transportation  Means of Transport to Appts:: Family transport (Roomate Stanley drives and  nephew and niece also assist with transport to appts.)      Housing Stability: Low Risk  (1/18/2024)    Housing Stability Vital Sign     Unable to Pay for Housing in the Last Year: No     Number of Places Lived in the Last Year: 1     Unstable Housing in the Last Year: No   Food Insecurity: No Food Insecurity (11/6/2023)    Hunger Vital Sign     Worried About Running Out of Food in the Last Year: Never true     Ran Out of Food in the Last Year: Never true   Transportation Needs: No Transportation Needs (11/6/2023)    PRAPARE - Transportation     Lack of Transportation (Medical): No     Lack of Transportation (Non-Medical): No   Utilities: Not on file       DISCHARGE DETAILS:    Discharge planning discussed with:: Patient  Freedom of Choice: Yes  Comments - Freedom of Choice: CM met with the patient to introduce self , explain role and offer Freedom of Choice in regards to d/c planning support options..  Patient is A&O x4 and states she is familar with the role of the CM Dept.  The patient shared that she is pleased with the services of [facility] and would like a BEAU upon hospital d/c, especially since the herrera was reinserted today.  She does not want a STR stay at this time.  She states  and rooomate Stanley are doing well despite the extreme cold and snow.  Their neighbors are supportive with  clearing paths and they have heat and food.  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 Stanley ,her roomate is in good health and also drives but she wont ask him to  Depends.  CM reviwed [facility] support options [facility] [place]- she declined stating they are doing well and she just wants HHC.  CM contacted family/caregiver?: No- see comments (Patient will notify niece and nephew)  Were Treatment Team discharge recommendations reviewed with patient/caregiver?: Yes          Contacts  Patient Contacts: Kali Bangura  Relative  [phone]  Relationship to Patient:: Family  Reason/Outcome: Emergency Contact    Requested Home Health Care         Is the patient interested in HHC at discharge?: Yes  Home Health Discipline requested:: Nursing, Occupational Therapy, Physical Therapy  [facility] Agency [facility]  HHA External Referral Reason (only applicable if external HHA name selected): Patient has established relationship with provider  Home Health Follow-Up Provider:: PCP  Home Health Services Needed:: Evaluate Functional Status and Safety, Strengthening/Theraputic Exercises to Improve Function  Homebound Criteria Met:: Uses an Assist Device (i.e. cane, walker, etc)  Supporting Clincal Findings:: Fatigues Easliy in Short Distances, Limited Endurance    DME Referral Provided  Referral made for DME?: No    Other Referral/Resources/Interventions Provided:  Interventions: HHC  Referral Comments: Patient only receptive to [facility] at this time.    Would you like to participate in our [facility] service program?  : No - Declined       Discharge Destination Plan:: Home with Home Health Care  Transport at Discharge : Family

## 2024-01-18 NOTE — ED PROVIDER NOTES
History  Chief Complaint   Patient presents with    Urinary Retention     Pt came in by EMS c/o urinary retention after having her herrera taken out a few weeks ago.      83-year-old female presenting here with a chief complaint of urinary discomfort.  She reports that her urine was a little dark but then this morning she could not really void effectively.  She denies any fever or chills.  She denies any flank pain.  No nausea no vomiting.  She is reporting diarrhea since her previous admission.  But denies any focal abdominal pain.          Prior to Admission Medications   Prescriptions Last Dose Informant Patient Reported? Taking?   Calcium Citrate-Vitamin D (CITRACAL + D PO)  Self Yes No   Sig: Take by mouth 2 (two) times a day   Cholecalciferol (VITAMIN D-3 PO)  Self Yes No   Sig: Take 2,000 Units by mouth daily.   Multiple Vitamin (MULTIVITAMIN) tablet  Self Yes No   Sig: Take 1 tablet by mouth daily.   Psyllium (Metamucil) WAFR  Self No No   Sig: Take once daily   ascorbic acid (VITAMIN C) 500 mg tablet  Self Yes No   Sig: Take 500 mg by mouth daily.   folic acid (FOLVITE) 1 mg tablet  Self No No   Si daily   levothyroxine (Synthroid) 75 mcg tablet  Self No No   Sig: Take 1 tablet (75 mcg total) by mouth in the morning   magnesium oxide (MAG-OX) 400 mg  Self No No   Sig: Take 1 tablet (400 mg total) by mouth 2 (two) times a day   pantoprazole (PROTONIX) 40 mg tablet   No No   Sig: Take 1 tablet (40 mg total) by mouth 2 (two) times a day   polyethylene glycol (MIRALAX) 17 g packet   No No   Sig: Take 17 g by mouth daily as needed (constipation)   riTUXimab (RITUXAN) 500 mg/50 mL  Self Yes No   Sig: Inject into a catheter in a vein every 6 (six) months   rosuvastatin (CRESTOR) 5 mg tablet  Self No No   Sig: TAKE 1 TABLET DAILY      Facility-Administered Medications: None       Past Medical History:   Diagnosis Date    Allergic angioedema     [id number] resolved    Angioedema     [id number] resolved    Arthritis      Breast cancer (HCC) 2012    right     Chronic kidney disease     Disease of thyroid gland     Hemorrhage of anus and rectum     [id number] resolved    Hyperlipidemia     Hypertension     Hypocalcemia     12oct2017 resolved    Neuritis     thoracic and lumbosacral    Peptic ulcer     Rheumatoid arthritis (HCC)     Stage 3b chronic kidney disease (HCC) 5/7/2021    Vitamin D deficiency        Past Surgical History:   Procedure Laterality Date    ABDOMINAL SURGERY      BREAST BIOPSY Right 2012    BREAST LUMPECTOMY Right 2012    BREAST SURGERY      CHOLECYSTECTOMY      ELBOW SURGERY      GALLBLADDER SURGERY  1981    HIP SURGERY Right 09/18/2022    10/2023    ORTHOPEDIC SURGERY      for toes    MN CYSTO W/IRRIG & EVAC MULTPLE OBSTRUCTING CLOTS N/A 11/5/2023    Procedure: CYSTOSCOPY EVACUATION OF CLOTS & fulguration;  Surgeon: Uriah Schwartz MD;  Location: [facility];  Service: Urology    TRANSURETHRAL RESECTION OF BLADDER TUMOR N/A 11/5/2023    Procedure: TRANSURETHRAL RESECTION OF BLADDER TUMOR (TURBT);  Surgeon: Uriah Schwartz MD;  Location: [facility];  Service: Urology    US GUIDED BREAST BIOPSY RIGHT COMPLETE Right 08/15/2022       Family History   Problem Relation Age of Onset    Breast cancer Mother     Kidney failure Father     Other Father         uremia    Lung cancer Brother     Breast cancer Maternal Aunt     Breast cancer Maternal Aunt     Breast cancer Maternal Aunt     Breast cancer Maternal Aunt     Breast cancer Maternal Aunt     Stomach cancer Maternal Uncle      I have reviewed and agree with the history as documented.    E-Cigarette/Vaping    E-Cigarette Use Never User      E-Cigarette/Vaping Substances    Nicotine No     THC No     CBD No     Flavoring No     Other No     Unknown No      Social History     Tobacco Use    Smoking status: Never     Passive exposure: Past    Smokeless tobacco: Never   Vaping Use    Vaping status: Never Used   Substance Use Topics    Alcohol use: Not  Currently    Drug use: No       Review of Systems   Constitutional:  Negative for diaphoresis, fatigue and fever.   HENT:  Negative for congestion, ear pain, nosebleeds and sore throat.    Eyes:  Negative for photophobia, pain, discharge and visual disturbance.   Respiratory:  Negative for cough, choking, chest tightness, shortness of breath and wheezing.    Cardiovascular:  Negative for chest pain and palpitations.   Gastrointestinal:  Positive for diarrhea. Negative for abdominal distention, abdominal pain and vomiting.   Genitourinary:  Positive for decreased urine volume, difficulty urinating, frequency and urgency. Negative for dysuria and flank pain.   Musculoskeletal:  Negative for back pain, gait problem and joint swelling.   Skin:  Negative for color change and rash.   Neurological:  Negative for dizziness, syncope and headaches.   Psychiatric/Behavioral:  Negative for behavioral problems and confusion. The patient is not nervous/anxious.    All other systems reviewed and are negative.      Physical Exam  Physical Exam  Vitals and nursing note reviewed.   Constitutional:       General: She is not in acute distress.     Appearance: She is well-developed. She is not ill-appearing or toxic-appearing.   HENT:      Head: Normocephalic and atraumatic.      Nose: No rhinorrhea.      Mouth/Throat:      Mouth: Mucous membranes are moist.      Dentition: Normal dentition.   Eyes:      General:         Right eye: No discharge.         Left eye: No discharge.   Cardiovascular:      Rate and Rhythm: Normal rate and regular rhythm.   Pulmonary:      Effort: Pulmonary effort is normal. No accessory muscle usage or respiratory distress.   Abdominal:      General: There is no distension.      Tenderness: There is no guarding.   Musculoskeletal:         General: Normal range of motion.      Cervical back: Normal range of motion and neck supple. No rigidity.   Skin:     General: Skin is warm and dry.   Neurological:       Mental Status: She is alert and oriented to person, place, and time.      Coordination: Coordination normal.   Psychiatric:         Behavior: Behavior is cooperative.         Vital Signs  ED Triage Vitals [01/18/24 0832]   Temperature Pulse Respirations Blood Pressure SpO2   98.9 °F (37.2 °C) (!) 106 18 162/65 99 %      Temp Source Heart Rate Source Patient Position - Orthostatic VS BP Location FiO2 (%)   Oral Monitor Lying Right arm --      Pain Score       --           Vitals:    01/18/24 1000 01/18/24 1030 01/18/24 1200 01/18/24 1230   BP: 139/63 153/70 (!) 182/80 (!) 176/121   Pulse: 86 87 95 99   Patient Position - Orthostatic VS:  Lying Lying Lying         Visual Acuity      ED Medications  Medications   acetaminophen (TYLENOL) tablet 975 mg (975 mg Oral Not Given 1/18/24 0936)   multi-electrolyte (ISOLYTE-S PH 7.4) bolus 1,000 mL (0 mL Intravenous Stopped 1/18/24 1128)   cefTRIAXone (ROCEPHIN) IVPB (premix in dextrose) 1,000 mg 50 mL (0 mg Intravenous Stopped 1/18/24 1044)       Diagnostic Studies  Results Reviewed       Procedure Component Value Units Date/Time    Sodium, urine, random [[id number]] Collected: 01/18/24 1210    Lab Status: Final result Specimen: Urine, Catheter Updated: 01/18/24 1257     Sodium, Ur 84    B-Type Natriuretic Peptide(BNP) [[id number]]  (Abnormal) Collected: 01/18/24 1206    Lab Status: Final result Specimen: Blood from Arm, Right Updated: 01/18/24 1233      pg/mL     Osmolality, Urine, random [[id number]] Collected: 01/18/24 1210    Lab Status: In process Specimen: Urine, Catheter Updated: 01/18/24 1231    Uric acid [[id number]]  (Normal) Collected: 01/18/24 0937    Lab Status: Final result Specimen: Blood from Arm, Left Updated: 01/18/24 1201     Uric Acid 6.8 mg/dL     Narrative:      N-acetyl-p-benzoquinone imine (metabolite of Acetaminophen) will generate erroneously low results in samples for patients that have taken an overdose of Acetaminophen.    Lactic acid 2 Hours  "[[id number]]  (Normal) Collected: 01/18/24 1128    Lab Status: Final result Specimen: Blood from Arm, Left Updated: 01/18/24 1155     LACTIC ACID 1.5 mmol/L     Narrative:      Result may be elevated if tourniquet was used during collection.    Osmolality-\"If this is regarding a toxic alcohol, please STOP and consult medical  for further guidance.\" [[id number]] Collected: 01/18/24 1128    Lab Status: In process Specimen: Blood from Arm, Left Updated: 01/18/24 1131    Blood culture #1 [[id number]] Collected: 01/18/24 1045    Lab Status: In process Specimen: Blood from Arm, Left Updated: 01/18/24 1055    Blood culture #2 [[id number]] Collected: 01/18/24 1035    Lab Status: In process Specimen: Blood from Arm, Left Updated: 01/18/24 1055    Lactic acid, plasma (w/reflex if result > 2.0) [[id number]]  (Abnormal) Collected: 01/18/24 0937    Lab Status: Final result Specimen: Blood from Arm, Left Updated: 01/18/24 1013     LACTIC ACID 2.8 mmol/L     Narrative:      Result may be elevated if tourniquet was used during collection.    Basic metabolic panel [[id number]]  (Abnormal) Collected: 01/18/24 0937    Lab Status: Final result Specimen: Blood from Arm, Left Updated: 01/18/24 1004     Sodium 131 mmol/L      Potassium 5.0 mmol/L      Chloride 103 mmol/L      CO2 19 mmol/L      ANION GAP 9 mmol/L      BUN 40 mg/dL      Creatinine 3.00 mg/dL      Glucose 130 mg/dL      Calcium 8.3 mg/dL      eGFR 13 ml/min/1.73sq m     Narrative:      National Kidney Disease Foundation guidelines for Chronic Kidney Disease (CKD):     Stage 1 with normal or high GFR (GFR > 90 mL/min/1.73 square meters)    Stage 2 Mild CKD (GFR = 60-89 mL/min/1.73 square meters)    Stage 3A Moderate CKD (GFR = 45-59 mL/min/1.73 square meters)    Stage 3B Moderate CKD (GFR = 30-44 mL/min/1.73 square meters)    Stage 4 Severe CKD (GFR = 15-29 mL/min/1.73 square meters)    Stage 5 End Stage CKD (GFR <15 mL/min/1.73 square meters)  Note: GFR calculation is " accurate only with a steady state creatinine    CBC and differential [[id number]]  (Abnormal) Collected: 01/18/24 0937    Lab Status: Final result Specimen: Blood from Arm, Left Updated: 01/18/24 0948     WBC 8.45 Thousand/uL      RBC 3.17 Million/uL      Hemoglobin 9.5 g/dL      Hematocrit 29.6 %      MCV 93 fL      MCH 30.0 pg      MCHC 32.1 g/dL      RDW 17.7 %      MPV 9.1 fL      Platelets 396 Thousands/uL      nRBC 0 /100 WBCs      Neutrophils Relative 85 %      Immat GRANS % 1 %      Lymphocytes Relative 3 %      Monocytes Relative 10 %      Eosinophils Relative 1 %      Basophils Relative 0 %      Neutrophils Absolute 7.13 Thousands/µL      Immature Grans Absolute 0.05 Thousand/uL      Lymphocytes Absolute 0.29 Thousands/µL      Monocytes Absolute 0.88 Thousand/µL      Eosinophils Absolute 0.07 Thousand/µL      Basophils Absolute 0.03 Thousands/µL     Stool Enteric Bacterial Panel by PCR [[id number]]     Lab Status: No result Specimen: Stool     Clostridium difficile toxin by PCR with EIA [[id number]]     Lab Status: No result Specimen: Stool from Per Rectum     Urine Microscopic [[id number]]  (Abnormal) Collected: 01/18/24 0854    Lab Status: Final result Specimen: Urine, Indwelling Carrera Catheter Updated: 01/18/24 0913     RBC, UA Innumerable /hpf      WBC, UA Innumerable /hpf      Epithelial Cells None Seen /hpf      Bacteria, UA None Seen /hpf      WBC Clumps Present    Urine culture [[id number]] Collected: 01/18/24 0854    Lab Status: In process Specimen: Urine, Indwelling Carrera Catheter Updated: 01/18/24 0913    UA w Reflex to Microscopic w Reflex to Culture [[id number]]  (Abnormal) Collected: 01/18/24 0854    Lab Status: Final result Specimen: Urine, Indwelling Carrera Catheter Updated: 01/18/24 0906     Color, UA Dark Yellow     Clarity, UA Extra Turbid     Specific Gravity, UA 1.012     pH, UA 6.5     Leukocytes, UA Large     Nitrite, UA Negative     Protein,  (2+) mg/dl      Glucose, UA Negative  mg/dl      Ketones, UA Negative mg/dl      Urobilinogen, UA <2.0 mg/dl      Bilirubin, UA Negative     Occult Blood, UA Moderate                   US kidney and bladder   Final Result by Dima Warren DO (01/18 6069)      1. Moderate left hydronephrosis which is worsened from most recent comparison ultrasound of 12/31/2023. Consider further characterization with CT abdomen pelvis to evaluate for the presence of left ureteral calculi.      2. Bilateral renal calculi.      3. Urinary bladder is decompressed around a Carrera catheter, limiting evaluation.      The study was marked in EPIC for immediate notification.      Workstation performed: [id number]                    Procedures  Procedures         ED Course                               SBIRT 20yo+      Flowsheet Row Most Recent Value   Initial Alcohol Screen: US AUDIT-C     1. How often do you have a drink containing alcohol? 0 Filed at: 01/18/2024 0835   2. How many drinks containing alcohol do you have on a typical day you are drinking?  0 Filed at: 01/18/2024 0835   3b. FEMALE Any Age, or MALE 65+: How often do you have 4 or more drinks on one occassion? 0 Filed at: 01/18/2024 0835   Audit-C Score 0 Filed at: 01/18/2024 0835   CARLI: How many times in the past year have you...    Used an illegal drug or used a prescription medication for non-medical reasons? Never Filed at: 01/18/2024 0835                      Medical Decision Making  Purulent urinary drainage noted once the catheter was inserted.  Continued hydro likely the result of retention.  Will bring him for acute kidney injury.  Case was discussed briefly with urology no need for transfer at this point.    Amount and/or Complexity of Data Reviewed  Labs: ordered.  Radiology: ordered.    Risk  OTC drugs.  Prescription drug management.  Decision regarding hospitalization.             Disposition  Final diagnoses:   Acute urinary retention   Urinary tract infection   MARCELO (acute kidney injury) (HCC)      Time reflects when diagnosis was documented in both MDM as applicable and the Disposition within this note       Time User Action Codes Description Comment    1/18/2024 10:43 AM Maximo Mason [R33.8] Acute urinary retention     1/18/2024 10:43 AM Maximo Mason [N39.0] Urinary tract infection     1/18/2024 10:43 AM Maximo Mason [N17.9] MARCELO (acute kidney injury) (HCC)           ED Disposition       ED Disposition   Admit    Condition   Stable    Date/Time   u Jan 18, 2024 1044    Comment   Case was discussed with Avel and the patient's admission status was agreed to be Admission Status: inpatient status to the service of Dr. Vallecillo .               Follow-up Information    None         Patient's Medications   Discharge Prescriptions    No medications on file       No discharge procedures on file.    PDMP Review         Value Time User    PDMP Reviewed  Yes 1/18/2024 11:09 AM Avel Vega DO            ED Provider  Electronically Signed by             PUJA Irwin  01/18/24 5621

## 2024-01-18 NOTE — ASSESSMENT & PLAN NOTE
Patient endorsed diarrhea for the past month  She has been mostly continent of brown liquid stool that occurs typically once a day  Denies melena  Does not wake patient up at night  Recent hospitalization with antibiotic administration for urosepsis with pseudomonas    Plan  Awaiting stool antigen for C. Difficile, enteric stool pcr panel  Place patient on contact precautions  Continue IV hydration      

## 2024-01-18 NOTE — ASSESSMENT & PLAN NOTE
Urinalysis showing innumerable WBC  Awaiting culture  Past pseudomonal UTI 12/23    Plan  Empiric treatment with Cefepime  Narrow per culture results  Monitor for signs of systemic infection

## 2024-01-18 NOTE — ASSESSMENT & PLAN NOTE
Herrera removed around 2 weeks ago.  Was in place for retention  Had been urinating fine for the past 2 weeks but experienced suprapubic fullness/pain with inability to void this morning which prompted ED visit  Presents with UTI and MARCELO both likely 2/2 urinary retention    Plan  Keep herrera in place  Monitor for patency of herrera catheter  Review results of kidney and bladder US

## 2024-01-18 NOTE — ASSESSMENT & PLAN NOTE
Lab Results   Component Value Date    EGFR 13 01/18/2024    EGFR 30 12/18/2023    EGFR 28 12/17/2023    CREATININE 3.00 (H) 01/18/2024    CREATININE 1.56 (H) 12/18/2023    CREATININE 1.68 (H) 12/17/2023     Baseline creatinine around 1.5  Currently presenting with MARCELO on CKD in the setting of urinary retention

## 2024-01-18 NOTE — ASSESSMENT & PLAN NOTE
Recent Labs     01/18/24  0937   CREATININE 3.00*   EGFR 13     Estimated Creatinine Clearance: 13.6 mL/min (A) (by C-G formula based on SCr of 3 mg/dL (H)).    Baseline CKD IV with creatinine of 1.5  MARCELO likely postrenal 2/2 urinary retention    Plan  Continue IV fluid hydration  Carrera catheter in place  Continue to trend BMP

## 2024-01-19 ENCOUNTER — TELEPHONE (OUTPATIENT)
Dept: OTHER | Facility: HOSPITAL | Age: 84
End: 2024-01-19

## 2024-01-19 PROBLEM — E87.1 HYPONATREMIA: Status: ACTIVE | Noted: 2024-01-19

## 2024-01-19 PROBLEM — K59.1 FUNCTIONAL DIARRHEA: Status: ACTIVE | Noted: 2019-02-22

## 2024-01-19 LAB
ALBUMIN SERPL BCP-MCNC: 2.9 G/DL (ref 3.5–5)
ALP SERPL-CCNC: 155 U/L (ref 34–104)
ALT SERPL W P-5'-P-CCNC: 19 U/L (ref 7–52)
ANION GAP SERPL CALCULATED.3IONS-SCNC: 8 MMOL/L
AST SERPL W P-5'-P-CCNC: 26 U/L (ref 13–39)
BASOPHILS # BLD AUTO: 0.07 THOUSANDS/ÂΜL (ref 0–0.1)
BASOPHILS NFR BLD AUTO: 0 % (ref 0–1)
BILIRUB SERPL-MCNC: 0.56 MG/DL (ref 0.2–1)
BUN SERPL-MCNC: 35 MG/DL (ref 5–25)
CALCIUM ALBUM COR SERPL-MCNC: 8.9 MG/DL (ref 8.3–10.1)
CALCIUM SERPL-MCNC: 8 MG/DL (ref 8.4–10.2)
CHLORIDE SERPL-SCNC: 102 MMOL/L (ref 96–108)
CO2 SERPL-SCNC: 22 MMOL/L (ref 21–32)
CORTIS AM PEAK SERPL-MCNC: 32.7 UG/DL (ref 6.7–22.6)
CREAT SERPL-MCNC: 2.37 MG/DL (ref 0.6–1.3)
EOSINOPHIL # BLD AUTO: 0.1 THOUSAND/ÂΜL (ref 0–0.61)
EOSINOPHIL NFR BLD AUTO: 0 % (ref 0–6)
ERYTHROCYTE [DISTWIDTH] IN BLOOD BY AUTOMATED COUNT: 17.7 % (ref 11.6–15.1)
GFR SERPL CREATININE-BSD FRML MDRD: 18 ML/MIN/1.73SQ M
GLUCOSE SERPL-MCNC: 97 MG/DL (ref 65–140)
HCT VFR BLD AUTO: 29.1 % (ref 34.8–46.1)
HGB BLD-MCNC: 9.6 G/DL (ref 11.5–15.4)
IMM GRANULOCYTES # BLD AUTO: 0.15 THOUSAND/UL (ref 0–0.2)
IMM GRANULOCYTES NFR BLD AUTO: 1 % (ref 0–2)
LACTATE SERPL-SCNC: 2.3 MMOL/L (ref 0.5–2)
LACTATE SERPL-SCNC: 4.2 MMOL/L (ref 0.5–2)
LYMPHOCYTES # BLD AUTO: 1.02 THOUSANDS/ÂΜL (ref 0.6–4.47)
LYMPHOCYTES NFR BLD AUTO: 4 % (ref 14–44)
MAGNESIUM SERPL-MCNC: 1.8 MG/DL (ref 1.9–2.7)
MCH RBC QN AUTO: 30.2 PG (ref 26.8–34.3)
MCHC RBC AUTO-ENTMCNC: 33 G/DL (ref 31.4–37.4)
MCV RBC AUTO: 92 FL (ref 82–98)
MONOCYTES # BLD AUTO: 1.42 THOUSAND/ÂΜL (ref 0.17–1.22)
MONOCYTES NFR BLD AUTO: 6 % (ref 4–12)
NEUTROPHILS # BLD AUTO: 21.06 THOUSANDS/ÂΜL (ref 1.85–7.62)
NEUTS SEG NFR BLD AUTO: 89 % (ref 43–75)
NRBC BLD AUTO-RTO: 0 /100 WBCS
PLATELET # BLD AUTO: 400 THOUSANDS/UL (ref 149–390)
PMV BLD AUTO: 8.8 FL (ref 8.9–12.7)
POTASSIUM SERPL-SCNC: 4.5 MMOL/L (ref 3.5–5.3)
PROT SERPL-MCNC: 5.3 G/DL (ref 6.4–8.4)
RBC # BLD AUTO: 3.18 MILLION/UL (ref 3.81–5.12)
SODIUM SERPL-SCNC: 132 MMOL/L (ref 135–147)
WBC # BLD AUTO: 23.82 THOUSAND/UL (ref 4.31–10.16)

## 2024-01-19 PROCEDURE — 80053 COMPREHEN METABOLIC PANEL: CPT | Performed by: INTERNAL MEDICINE

## 2024-01-19 PROCEDURE — 87505 NFCT AGENT DETECTION GI: CPT | Performed by: INTERNAL MEDICINE

## 2024-01-19 PROCEDURE — 99223 1ST HOSP IP/OBS HIGH 75: CPT | Performed by: UROLOGY

## 2024-01-19 PROCEDURE — 99233 SBSQ HOSP IP/OBS HIGH 50: CPT | Performed by: INTERNAL MEDICINE

## 2024-01-19 PROCEDURE — 85025 COMPLETE CBC W/AUTO DIFF WBC: CPT | Performed by: INTERNAL MEDICINE

## 2024-01-19 PROCEDURE — 97166 OT EVAL MOD COMPLEX 45 MIN: CPT

## 2024-01-19 PROCEDURE — 82533 TOTAL CORTISOL: CPT | Performed by: INTERNAL MEDICINE

## 2024-01-19 PROCEDURE — 83605 ASSAY OF LACTIC ACID: CPT | Performed by: INTERNAL MEDICINE

## 2024-01-19 PROCEDURE — 83735 ASSAY OF MAGNESIUM: CPT | Performed by: INTERNAL MEDICINE

## 2024-01-19 PROCEDURE — 87493 C DIFF AMPLIFIED PROBE: CPT | Performed by: INTERNAL MEDICINE

## 2024-01-19 RX ORDER — SODIUM CHLORIDE, SODIUM GLUCONATE, SODIUM ACETATE, POTASSIUM CHLORIDE, MAGNESIUM CHLORIDE, SODIUM PHOSPHATE, DIBASIC, AND POTASSIUM PHOSPHATE .53; .5; .37; .037; .03; .012; .00082 G/100ML; G/100ML; G/100ML; G/100ML; G/100ML; G/100ML; G/100ML
1000 INJECTION, SOLUTION INTRAVENOUS ONCE
Status: COMPLETED | OUTPATIENT
Start: 2024-01-19 | End: 2024-01-20

## 2024-01-19 RX ORDER — ACETAMINOPHEN 10 MG/ML
1000 INJECTION, SOLUTION INTRAVENOUS EVERY 6 HOURS PRN
Status: DISCONTINUED | OUTPATIENT
Start: 2024-01-19 | End: 2024-01-23 | Stop reason: HOSPADM

## 2024-01-19 RX ORDER — SODIUM CHLORIDE, SODIUM GLUCONATE, SODIUM ACETATE, POTASSIUM CHLORIDE, MAGNESIUM CHLORIDE, SODIUM PHOSPHATE, DIBASIC, AND POTASSIUM PHOSPHATE .53; .5; .37; .037; .03; .012; .00082 G/100ML; G/100ML; G/100ML; G/100ML; G/100ML; G/100ML; G/100ML
500 INJECTION, SOLUTION INTRAVENOUS ONCE
Status: DISCONTINUED | OUTPATIENT
Start: 2024-01-19 | End: 2024-01-19

## 2024-01-19 RX ADMIN — DOCUSATE SODIUM 100 MG: 100 CAPSULE, LIQUID FILLED ORAL at 09:36

## 2024-01-19 RX ADMIN — PANTOPRAZOLE SODIUM 40 MG: 40 TABLET, DELAYED RELEASE ORAL at 09:36

## 2024-01-19 RX ADMIN — Medication 1 TABLET: at 17:58

## 2024-01-19 RX ADMIN — Medication 400 MG: at 17:58

## 2024-01-19 RX ADMIN — HEPARIN SODIUM 5000 UNITS: 5000 INJECTION INTRAVENOUS; SUBCUTANEOUS at 06:10

## 2024-01-19 RX ADMIN — FOLIC ACID 1 MG: 1 TABLET ORAL at 09:36

## 2024-01-19 RX ADMIN — Medication 400 MG: at 09:36

## 2024-01-19 RX ADMIN — PANTOPRAZOLE SODIUM 40 MG: 40 TABLET, DELAYED RELEASE ORAL at 17:58

## 2024-01-19 RX ADMIN — OXYCODONE HYDROCHLORIDE AND ACETAMINOPHEN 500 MG: 500 TABLET ORAL at 09:35

## 2024-01-19 RX ADMIN — CEFEPIME HYDROCHLORIDE 2000 MG: 2 INJECTION, SOLUTION INTRAVENOUS at 19:04

## 2024-01-19 RX ADMIN — HEPARIN SODIUM 5000 UNITS: 5000 INJECTION INTRAVENOUS; SUBCUTANEOUS at 22:20

## 2024-01-19 RX ADMIN — SODIUM CHLORIDE, SODIUM GLUCONATE, SODIUM ACETATE, POTASSIUM CHLORIDE, MAGNESIUM CHLORIDE, SODIUM PHOSPHATE, DIBASIC, AND POTASSIUM PHOSPHATE 1000 ML: .53; .5; .37; .037; .03; .012; .00082 INJECTION, SOLUTION INTRAVENOUS at 17:10

## 2024-01-19 RX ADMIN — HEPARIN SODIUM 5000 UNITS: 5000 INJECTION INTRAVENOUS; SUBCUTANEOUS at 14:51

## 2024-01-19 RX ADMIN — Medication 1 TABLET: at 09:36

## 2024-01-19 RX ADMIN — ACETAMINOPHEN 975 MG: 325 TABLET, FILM COATED ORAL at 14:50

## 2024-01-19 RX ADMIN — PRAVASTATIN SODIUM 40 MG: 40 TABLET ORAL at 17:58

## 2024-01-19 RX ADMIN — LEVOTHYROXINE SODIUM 75 MCG: 0.07 TABLET ORAL at 09:36

## 2024-01-19 RX ADMIN — B-COMPLEX W/ C & FOLIC ACID TAB 1 TABLET: TAB at 09:36

## 2024-01-19 NOTE — CASE MANAGEMENT
Case Management Progress Note    Patient name Samira Allred  Location /-01 MRN [id number]  : 1940 Date 2024       LOS (days): 1  Geometric Mean LOS (GMLOS) (days): 3.1  Days to GMLOS:1.8        OBJECTIVE:        Current admission status: Inpatient  Preferred Pharmacy:   [id number] [place] - [address]  [address]  [facility] [address]  Phone: [phone] Fax: [phone]    [facility] - [place], [place] - [address]  [address] [facility] [address]  Phone: [phone] Fax: [phone]    Primary Care Provider: Layne Bacon MD    Primary Insurance: MEDICARE  Secondary Insurance: BLUE CROSS    PROGRESS NOTE:    CM reviewed the chart and discussed in [facility] interdisciplinary rounds.  Plan- Patient wants to return home upon hospital discharge with the support of roommate Stanley and [facility] (reserved in [facility]).

## 2024-01-19 NOTE — DISCHARGE INSTR - AVS FIRST PAGE
PER UROLOGY  Cath Care instructions---Maintain catheter to straight drainage. May use leg bag and shower. May flush daily prn using Michael syringe and 120 ml NSS. May use more saline ad cali to prevent/treat cath obstruction. Urinary Catheter can remain in place for up to 4-6 weeks at a time. Change thereafter using same catheter size and type. Catheter placed at [facility] on  1/18/2024

## 2024-01-19 NOTE — CONSULTS
Consult - Urology   Samira Allred 1940, 83 y.o. female MRN: [id number]    Unit/Bed#: -[facility] Encounter: [id number]        Assessment & Plan:  Urinary retention  MARCELO  L hydro  - 83-year-old known to our practice for urinary retention, gross hematuria.  Underwent trial of void 12/19/2023, passed, ultrasound as outpatient prior to Herrera removal showing mild left hydronephrosis.  - Presenting with urinary retention, MARCELO, s/p herrera cathter placement  - US showing Moderate left hydronephrosis which is worsened from most recent comparison ultrasound of 12/31/2023. Expect to improve given herrera placed yesterday prior to US. Previous CT imaging showing L hydro worse than R.   - Cr 2.37, improving from 3.00. Baseline 1.6-1.7  - WBC 23  - Urine culture oxidase positive gram negative claudia. On cefepime  - Recommend maintain herrera for due to recurrence of urinary retention, UTI, MARCELO.  - Not candidate for straight cath   - Routine exchanges q 4-6 weeks  - Medical management. If no improvement recommend CT for evaluation of L hydro.   - Urology will limited coverage over the weekend, will sign off. Available as needed for patient.     Subjective:   Samira Allred is an 83-year-old known to urology for urinary retention, gross hematuria.  She underwent TURBT 11/5/2023 for bladder lesion with pathology showing acute cystitis.  She has intermittent urinary retention.  Last seen in urologic consult 12/8/2023 for urinary retention.  She had a trial of void 12/19/2023 which she passed. She has incomplete bladder emptying with elevated bladder scan, last 218 mL in office. Patient has now had catheter replaced three times. Prior to EMS responding patient reporting minimal urine output and abdominal distention. In the ED a herrera was placed for 650 mL of return. She underwent a US showing moderate L hydro. She denies any flank pain. She was hemodynamically stable and admitted to medicine for further management.     Review of Systems  "  Constitutional:  Negative for chills and fever.   Respiratory:  Negative for cough and shortness of breath.    Cardiovascular:  Negative for chest pain and palpitations.   Gastrointestinal:  Positive for abdominal pain. Negative for vomiting.   Genitourinary:  Negative for dysuria and hematuria.   Musculoskeletal:  Negative for arthralgias and back pain.   Skin:  Negative for color change and rash.   Neurological:  Negative for seizures and syncope.   All other systems reviewed and are negative.      Objective:  Vitals: Blood pressure 138/58, pulse 94, temperature 99 °F (37.2 °C), resp. rate 18, height 5' 2\" (1.575 m), weight 73 kg (160 lb 15 oz), SpO2 97%, not currently breastfeeding.,Body mass index is 29.44 kg/m².    Physical Exam  Vitals reviewed.   Constitutional:       General: She is not in acute distress.     Appearance: Normal appearance. She is normal weight. She is ill-appearing. She is not toxic-appearing or diaphoretic.   HENT:      Head: Normocephalic and atraumatic.      Right Ear: External ear normal.      Left Ear: External ear normal.      Nose: Nose normal.      Mouth/Throat:      Mouth: Mucous membranes are moist.   Eyes:      General: No scleral icterus.     Conjunctiva/sclera: Conjunctivae normal.   Cardiovascular:      Rate and Rhythm: Normal rate and regular rhythm.      Pulses: Normal pulses.      Heart sounds: Normal heart sounds.   Pulmonary:      Effort: Pulmonary effort is normal.      Breath sounds: Normal breath sounds.   Abdominal:      General: Abdomen is flat. There is no distension.      Palpations: Abdomen is soft.      Tenderness: There is no guarding.      Comments: Suprapubic tenderness   Genitourinary:     Comments: Carrera draining clear yellow urine  Musculoskeletal:         General: Normal range of motion.      Cervical back: Normal range of motion.   Skin:     General: Skin is warm.      Findings: No rash.   Neurological:      General: No focal deficit present.      Mental " Status: She is alert and oriented to person, place, and time. Mental status is at baseline.   Psychiatric:         Mood and Affect: Mood normal.         Behavior: Behavior normal.         Thought Content: Thought content normal.         Judgment: Judgment normal.       Labs:  Recent Labs     01/18/24  0937 01/19/24  0654   WBC 8.45 23.82*     Recent Labs     01/18/24  0937 01/19/24  0654   HGB 9.5* 9.6*       Recent Labs     01/18/24  0937 01/19/24  0654   CREATININE 3.00* 2.37*         History:  Social History     Socioeconomic History    Marital status:      Spouse name: Not on file    Number of children: Not on file    Years of education: Not on file    Highest education level: Not on file   Occupational History    Not on file   Tobacco Use    Smoking status: Never     Passive exposure: Past    Smokeless tobacco: Never   Vaping Use    Vaping status: Never Used   Substance and Sexual Activity    Alcohol use: Not Currently    Drug use: No    Sexual activity: Not Currently     Partners: Male   Other Topics Concern    Not on file   Social History Narrative    Active: caffeine use     Social Determinants of Health     Financial Resource Strain: Low Risk  (10/4/2023)    Received from [facility]    Overall Financial Resource Strain (CARDIA)     Difficulty of Paying Living Expenses: Not hard at all   Food Insecurity: No Food Insecurity (11/6/2023)    Hunger Vital Sign     Worried About Running Out of Food in the Last Year: Never true     Ran Out of Food in the Last Year: Never true   Transportation Needs: No Transportation Needs (11/6/2023)    PRAPARE - Transportation     Lack of Transportation (Medical): No     Lack of Transportation (Non-Medical): No   Physical Activity: Not on file   Stress: Not on file   Social Connections: Not on file   Intimate Partner Violence: Not At Risk (10/4/2023)    Received from [facility]    Humiliation, Afraid, Rape, and Kick questionnaire      Fear of Current or Ex-Partner: No     Emotionally Abused: No     Physically Abused: No     Sexually Abused: No   Housing Stability: Low Risk  (1/18/2024)    Housing Stability Vital Sign     Unable to Pay for Housing in the Last Year: No     Number of Places Lived in the Last Year: 1     Unstable Housing in the Last Year: No       Past Medical History:   Diagnosis Date    Allergic angioedema     [id number] resolved    Angioedema     [id number] resolved    Arthritis     Breast cancer (HCC) 2012    right     Chronic kidney disease     Disease of thyroid gland     Hemorrhage of anus and rectum     [id number] resolved    Hyperlipidemia     Hypertension     Hypocalcemia     [id number] resolved    Neuritis     thoracic and lumbosacral    Peptic ulcer     Rheumatoid arthritis (HCC)     Stage 3b chronic kidney disease (HCC) 5/7/2021    Vitamin D deficiency      Past Surgical History:   Procedure Laterality Date    ABDOMINAL SURGERY      BREAST BIOPSY Right 2012    BREAST LUMPECTOMY Right 2012    BREAST SURGERY      CHOLECYSTECTOMY      ELBOW SURGERY      GALLBLADDER SURGERY  1981    HIP SURGERY Right 09/18/2022    10/2023    ORTHOPEDIC SURGERY      for toes    NC CYSTO W/IRRIG & EVAC MULTPLE OBSTRUCTING CLOTS N/A 11/5/2023    Procedure: CYSTOSCOPY EVACUATION OF CLOTS & fulguration;  Surgeon: Uriah Schwartz MD;  Location: [facility];  Service: Urology    TRANSURETHRAL RESECTION OF BLADDER TUMOR N/A 11/5/2023    Procedure: TRANSURETHRAL RESECTION OF BLADDER TUMOR (TURBT);  Surgeon: Uriah Schwartz MD;  Location: [facility];  Service: Urology    US GUIDED BREAST BIOPSY RIGHT COMPLETE Right 08/15/2022     Family History   Problem Relation Age of Onset    Breast cancer Mother     Kidney failure Father     Other Father         uremia    Lung cancer Brother     Breast cancer Maternal Aunt     Breast cancer Maternal Aunt     Breast cancer Maternal Aunt     Breast cancer Maternal Aunt     Breast cancer Maternal Aunt     Stomach  cancer Maternal Uncle        Luiza Fountain PA-C  Date: 1/19/2024 Time: 7:59 AM

## 2024-01-19 NOTE — PLAN OF CARE
Problem: PAIN - ADULT  Goal: Verbalizes/displays adequate comfort level or baseline comfort level  Description: Interventions:  - Encourage patient to monitor pain and request assistance  - Assess pain using appropriate pain scale  - Administer analgesics based on type and severity of pain and evaluate response  - Implement non-pharmacological measures as appropriate and evaluate response  - Consider cultural and social influences on pain and pain management  - Notify physician/advanced practitioner if interventions unsuccessful or patient reports new pain  Outcome: Progressing     Problem: INFECTION - ADULT  Goal: Absence or prevention of progression during hospitalization  Description: INTERVENTIONS:  - Assess and monitor for signs and symptoms of infection  - Monitor lab/diagnostic results  - Monitor all insertion sites, i.e. indwelling lines, tubes, and drains  - Monitor endotracheal if appropriate and nasal secretions for changes in amount and color  - Harrells appropriate cooling/warming therapies per order  - Administer medications as ordered  - Instruct and encourage patient and family to use good hand hygiene technique  - Identify and instruct in appropriate isolation precautions for identified infection/condition  Outcome: Progressing  Goal: Absence of fever/infection during neutropenic period  Description: INTERVENTIONS:  - Monitor WBC    Outcome: Progressing     Problem: SAFETY ADULT  Goal: Patient will remain free of falls  Description: INTERVENTIONS:  - Educate patient/family on patient safety including physical limitations  - Instruct patient to call for assistance with activity   - Consult OT/PT to assist with strengthening/mobility   - Keep Call bell within reach  - Keep bed low and locked with side rails adjusted as appropriate  - Keep care items and personal belongings within reach  - Initiate and maintain comfort rounds  - Make Fall Risk Sign visible to staff  - Offer Toileting every 2 Hours,  in advance of need  - Initiate/Maintain bedalarm  - Obtain necessary fall risk management equipment:   - Apply yellow socks and bracelet for high fall risk patients  - Consider moving patient to room near nurses station  Outcome: Progressing  Goal: Maintain or return to baseline ADL function  Description: INTERVENTIONS:  -  Assess patient's ability to carry out ADLs; assess patient's baseline for ADL function and identify physical deficits which impact ability to perform ADLs (bathing, care of mouth/teeth, toileting, grooming, dressing, etc.)  - Assess/evaluate cause of self-care deficits   - Assess range of motion  - Assess patient's mobility; develop plan if impaired  - Assess patient's need for assistive devices and provide as appropriate  - Encourage maximum independence but intervene and supervise when necessary  - Involve family in performance of ADLs  - Assess for home care needs following discharge   - Consider OT consult to assist with ADL evaluation and planning for discharge  - Provide patient education as appropriate  Outcome: Progressing  Goal: Maintains/Returns to pre admission functional level  Description: INTERVENTIONS:  - Perform AM-PAC 6 Click Basic Mobility/ Daily Activity assessment daily.  - Set and communicate daily mobility goal to care team and patient/family/caregiver.   - Collaborate with rehabilitation services on mobility goals if consulted  - Perform Range of Motion 3 times a day.  - Reposition patient every 2 hours.  - Dangle patient 3 times a day  - Stand patient 3 times a day  - Ambulate patient 3 times a day  - Out of bed to chair 3 times a day   - Out of bed for meals 3 times a day  - Out of bed for toileting  - Record patient progress and toleration of activity level   Outcome: Progressing     Problem: DISCHARGE PLANNING  Goal: Discharge to home or other facility with appropriate resources  Description: INTERVENTIONS:  - Identify barriers to discharge w/patient and caregiver  -  Arrange for needed discharge resources and transportation as appropriate  - Identify discharge learning needs (meds, wound care, etc.)  - Arrange for interpretive services to assist at discharge as needed  - Refer to Case Management Department for coordinating discharge planning if the patient needs post-hospital services based on physician/advanced practitioner order or complex needs related to functional status, cognitive ability, or social support system  Outcome: Progressing     Problem: Knowledge Deficit  Goal: Patient/family/caregiver demonstrates understanding of disease process, treatment plan, medications, and discharge instructions  Description: Complete learning assessment and assess knowledge base.  Interventions:  - Provide teaching at level of understanding  - Provide teaching via preferred learning methods  Outcome: Progressing     Problem: GASTROINTESTINAL - ADULT  Goal: Minimal or absence of nausea and/or vomiting  Description: INTERVENTIONS:  - Administer IV fluids if ordered to ensure adequate hydration  - Maintain NPO status until nausea and vomiting are resolved  - Nasogastric tube if ordered  - Administer ordered antiemetic medications as needed  - Provide nonpharmacologic comfort measures as appropriate  - Advance diet as tolerated, if ordered  - Consider nutrition services referral to assist patient with adequate nutrition and appropriate food choices  Outcome: Progressing  Goal: Maintains or returns to baseline bowel function  Description: INTERVENTIONS:  - Assess bowel function  - Encourage oral fluids to ensure adequate hydration  - Administer IV fluids if ordered to ensure adequate hydration  - Administer ordered medications as needed  - Encourage mobilization and activity  - Consider nutritional services referral to assist patient with adequate nutrition and appropriate food choices  Outcome: Progressing  Goal: Maintains adequate nutritional intake  Description: INTERVENTIONS:  - Monitor  percentage of each meal consumed  - Identify factors contributing to decreased intake, treat as appropriate  - Assist with meals as needed  - Monitor I&O, weight, and lab values if indicated  - Obtain nutrition services referral as needed  Outcome: Progressing  Goal: Oral mucous membranes remain intact  Description: INTERVENTIONS  - Assess oral mucosa and hygiene practices  - Implement preventative oral hygiene regimen  - Implement oral medicated treatments as ordered  - Initiate Nutrition services referral as needed  Outcome: Progressing     Problem: METABOLIC, FLUID AND ELECTROLYTES - ADULT  Goal: Electrolytes maintained within normal limits  Description: INTERVENTIONS:  - Monitor labs and assess patient for signs and symptoms of electrolyte imbalances  - Administer electrolyte replacement as ordered  - Monitor response to electrolyte replacements, including repeat lab results as appropriate  - Instruct patient on fluid and nutrition as appropriate  Outcome: Progressing  Goal: Fluid balance maintained  Description: INTERVENTIONS:  - Monitor labs   - Monitor I/O and WT  - Instruct patient on fluid and nutrition as appropriate  - Assess for signs & symptoms of volume excess or deficit  Outcome: Progressing     Problem: SKIN/TISSUE INTEGRITY - ADULT  Goal: Skin Integrity remains intact(Skin Breakdown Prevention)  Description: Assess:  Bed Management:  -Have minimal linens on bed & keep smooth, unwrinkled  -Change linens as needed when moist or perspiring  -Avoid sitting or lying in one position for more than 2 hours while in bed  -Keep HOB at 45 degrees     Toileting:  -Offer bedside commode  -Assess for incontinence every   -Use incontinent care products after each incontinent episode such as     Activity:  -Mobilize patient 3 times a day  -Encourage activity and walks on unit  -Encourage or provide ROM exercises   -Turn and reposition patient every 2 Hours  -Use appropriate equipment to lift or move patient in  bed    Skin Care:  -Avoid use of baby powder, tape, friction and shearing, hot water or constrictive clothing  -Relieve pressure over bony prominences using   -Do not massage red bony areas    Next Steps:  -Teach patient strategies to minimize risks such as    -Consider consults to  interdisciplinary teams such as   Outcome: Progressing     Problem: MUSCULOSKELETAL - ADULT  Goal: Maintain or return mobility to safest level of function  Description: INTERVENTIONS:  - Assess patient's ability to carry out ADLs; assess patient's baseline for ADL function and identify physical deficits which impact ability to perform ADLs (bathing, care of mouth/teeth, toileting, grooming, dressing, etc.)  - Assess/evaluate cause of self-care deficits   - Assess range of motion  - Assess patient's mobility  - Assess patient's need for assistive devices and provide as appropriate  - Encourage maximum independence but intervene and supervise when necessary  - Involve family in performance of ADLs  - Assess for home care needs following discharge   - Consider OT consult to assist with ADL evaluation and planning for discharge  - Provide patient education as appropriate  Outcome: Progressing     Problem: Prexisting or High Potential for Compromised Skin Integrity  Goal: Skin integrity is maintained or improved  Description: INTERVENTIONS:  - Identify patients at risk for skin breakdown  - Assess and monitor skin integrity  - Assess and monitor nutrition and hydration status  - Monitor labs   - Assess for incontinence   - Turn and reposition patient  - Assist with mobility/ambulation  - Relieve pressure over bony prominences  - Avoid friction and shearing  - Provide appropriate hygiene as needed including keeping skin clean and dry  - Evaluate need for skin moisturizer/barrier cream  - Collaborate with interdisciplinary team   - Patient/family teaching  - Consider wound care consult   Outcome: Progressing

## 2024-01-19 NOTE — ASSESSMENT & PLAN NOTE
Carrera removed around 2 weeks ago.  Was in place for retention  Had been urinating fine for the past 2 weeks but experienced suprapubic fullness/pain with inability to void this morning which prompted ED visit  Presented with possible UTI and elevated renal function secondary to urinary retention  US showing moderate left hydro worsened from prior US  Urology saw the patient and Carrera catheter has been reinserted and is being discharged with it  Will need outpatient urology follow-up

## 2024-01-19 NOTE — TELEPHONE ENCOUNTER
Seen at [facility] due to urinary retention, MARCELO.  Carrera catheter replaced.  Recommend maintaining Carrera catheter.  Patient has VNA.  Please provide instructions for routine exchanges every 4 to 6 weeks

## 2024-01-19 NOTE — ASSESSMENT & PLAN NOTE
Recent Labs     01/20/24  0922 01/21/24  0535 01/22/24  0455   CREATININE 1.90* 1.79* 1.76*   EGFR 24 26 26     Estimated Creatinine Clearance: 23 mL/min (A) (by C-G formula based on SCr of 1.76 mg/dL (H)).    Baseline CKD IV with creatinine of 1.5  MARCELO likely postrenal 2/2 urinary retention; improving    Plan  Carrera catheter in place  Refill hydration plasmalyte 100 mL/h  Continue to trend BMP

## 2024-01-19 NOTE — ASSESSMENT & PLAN NOTE
Stable around 132 at discharge  Recommend outpatient follow-up with PCP within 1 week.  Renal functions to be obtained within 1 week  Asymptomatic from the standpoint  Secondary to poor p.o. versus SIADH

## 2024-01-19 NOTE — TELEPHONE ENCOUNTER
Routine catheter exchanges, every 4 weeks, size 16F, 10 mL balloon  Routine catheter care  May irrigate with 60 mL Normal strength saline for clog, sediment, hematuria  May change catheter PRN for clog or dislodged catheter  Call the office with any urological concerns.   Next routine catheter change due approximately 2/15/2024     Rosa Mckeon PA-C

## 2024-01-19 NOTE — ASSESSMENT & PLAN NOTE
Patient endorsed diarrhea for the past month and continues to experience  Noninfectious  Outpatient GI referral made

## 2024-01-19 NOTE — ASSESSMENT & PLAN NOTE
Baseline hemoglobin around 7-9  Continue outpatient follow-up with PCP  No bleeding endorsed  Stable to discharge around baseline   Discharge instructions given. Patient verbalizes understanding. No other noted or stated problems at this time. Patient will follow up with primary care.       Rafaela Simon RN  10/17/23 [id number]

## 2024-01-19 NOTE — ASSESSMENT & PLAN NOTE
Urine culture positive for Pseudomonas    Plan  Change Abx to levaquin 500 mg once then 250 mg qd given renal function  Add benadryl with doses given concern for rash reaction  Monitor for signs of systemic infection

## 2024-01-19 NOTE — ASSESSMENT & PLAN NOTE
Secondary to UTI, Pseudomonas in the urine  Was treated initially with cefepime which was then transition to Levaquin after sensitivities obtained  SIRS criteria is now resolved  No fever

## 2024-01-19 NOTE — OCCUPATIONAL THERAPY NOTE
"    Occupational Therapy Evaluation     Patient Name: Samira Allred  Today's Date: 1/19/2024  Problem List  Principal Problem:    MARCELO (acute kidney injury) (HCC)  Active Problems:    Functional diarrhea    Anemia    Chronic renal disease, stage IV (HCC)    Urinary retention    Sepsis (HCC)    UTI (urinary tract infection)    Hyponatremia    Past Medical History  Past Medical History:   Diagnosis Date    Allergic angioedema     [id number] resolved    Angioedema     [id number] resolved    Arthritis     Breast cancer (HCC) 2012    right     Chronic kidney disease     Disease of thyroid gland     Hemorrhage of anus and rectum     [id number] resolved    Hyperlipidemia     Hypertension     Hypocalcemia     [id number] resolved    Neuritis     thoracic and lumbosacral    Peptic ulcer     Rheumatoid arthritis (HCC)     Stage 3b chronic kidney disease (HCC) 5/7/2021    Vitamin D deficiency      Past Surgical History  Past Surgical History:   Procedure Laterality Date    ABDOMINAL SURGERY      BREAST BIOPSY Right 2012    BREAST LUMPECTOMY Right 2012    BREAST SURGERY      CHOLECYSTECTOMY      ELBOW SURGERY      GALLBLADDER SURGERY  1981    HIP SURGERY Right 09/18/2022    10/2023    ORTHOPEDIC SURGERY      for toes    NM CYSTO W/IRRIG & EVAC MULTPLE OBSTRUCTING CLOTS N/A 11/5/2023    Procedure: CYSTOSCOPY EVACUATION OF CLOTS & fulguration;  Surgeon: Uriah Schwartz MD;  Location: [facility];  Service: Urology    TRANSURETHRAL RESECTION OF BLADDER TUMOR N/A 11/5/2023    Procedure: TRANSURETHRAL RESECTION OF BLADDER TUMOR (TURBT);  Surgeon: Uriah Schwartz MD;  Location: [facility];  Service: Urology    US GUIDED BREAST BIOPSY RIGHT COMPLETE Right 08/15/2022         01/19/24 1110   OT Last Visit   OT Visit Date 01/19/24   Note Type   Note type Evaluation   Pain Assessment   Pain Assessment Tool 0-10   Pain Score 2   Pain Location/Orientation Location: Abdomen   Pain Onset/Description   (\"mild discomfort\") " "  Restrictions/Precautions   Weight Bearing Precautions Per Order No   Other Precautions Pain   Home Living   Type of Home Mobile home   Home Layout One level;Ramped entrance   Bathroom Shower/Tub Walk-in shower   Bathroom Toilet Standard   Bathroom Equipment Grab bars in shower;Shower chair   Bathroom Accessibility Accessible via walker   Home Equipment Walker;Cane   Prior Function   Level of [place] Independent with ADLs;Independent with functional mobility;Independent with IADLS   Lives With   (roommate \"Pat\")   Receives Help From Friend(s)  (Help from Pat and other friends. Pt reports Pat has increased level of assistance since pt's hip surgery last October)   IADLs Independent with driving;Independent with meal prep;Independent with medication management   Falls in the last 6 months 1 to 4  (2 falls, most recent a few days ago on Sunday)   Vocational Retired   General   Family/Caregiver Present No   Subjective   Subjective Pt agreeable to therapy eval   ADL   Where Assessed Other (Comment)  (Assist levels for some self care tasks are based on functional assessment of performance skills and deficits observed during session.)   Eating Assistance 7  Independent   Grooming Assistance 6  Modified Independent   Grooming Deficit Setup;Supervision/safety;Increased time to complete   LB Dressing Assistance 5  Supervision/Setup   LB Dressing Deficit Setup;Supervision/safety;Increased time to complete   Toileting Assistance  6  Modified independent   Toileting Deficit Increased time to complete   Additional Comments Pt reports performing ADLs with some compensatory strategies 2/2 arthritic deformities in [place] hands   Bed Mobility   Supine to Sit 6  Modified independent   Additional items HOB elevated   Transfers   Sit to Stand 6  Modified independent   Stand to Sit 6  Modified independent   Stand pivot 6  Modified independent   Additional Comments with RW   Functional Mobility   Functional Mobility 6  Modified " independent   Additional items Rolling walker   Activity Tolerance   Activity Tolerance Patient tolerated treatment well   RUE Assessment   RUE Assessment WFL  (mildly limited at shoulder, elbow extension, and in hand 2/2 arthritic changes)   LUE Assessment   LUE Assessment   (mildly limited at shoulder, elbow extension, and in hand 2/2 arthritic changes)   Hand Function   Fine Motor Coordination Impaired   Hand Function Comments pt with severe ulnar drift in [place] hands   Sensation   Light Touch No apparent deficits   Vision-Basic Assessment   Current Vision Wears glasses all the time   Psychosocial   Psychosocial (WDL) WDL   Cognition   Overall Cognitive Status WFL   Arousal/Participation Alert;Cooperative   Attention Within functional limits   Orientation Level Oriented X4   Memory Within functional limits   Following Commands Follows all commands and directions without difficulty   Assessment   Limitation Decreased high-level ADLs   Assessment Pt is a 83 y.o. female seen for OT evaluation s/p admit to [facility] on 1/18/2024 w/ MARCELO (acute kidney injury) (HCC).  Comorbidities affecting pt's functional performance at time of assessment include: obesity, previous surgery, and CKD, chronic pain, deformity of [place] hands . Personal factors affecting pt at time of IE include:limited home support, difficulty performing IADLS , and health management . Prior to admission, pt was independent with all ADLs and functional mobility with RW. Upon evaluation: Pt is modified independent during functional mobility, and modified independent to supervision during ADLs 2* the following deficits impacting occupational performance: decreased tolerance. Pt reports she is near her functional baseline, and feels she will return to independence once abdominal discomfort lets up. Pt with no further acute OT needs at this time, will complete orders. From OT standpoint, recommendation at time of d/c would be no further OT needs.   Plan   OT Frequency  Eval only   Discharge Recommendation   Rehab Resource Intensity Level, OT No post-acute rehabilitation needs   AM-PAC Daily Activity Inpatient   Lower Body Dressing 3   Bathing 3   Toileting 4   Upper Body Dressing 4   Grooming 4   Eating 4   Daily Activity Raw Score 22   Daily Activity Standardized Score (Calc for Raw Score >=11) 47.1   AM-PAC Applied Cognition Inpatient   Following a Speech/Presentation 4   Understanding Ordinary Conversation 4   Taking Medications 4   Remembering Where Things Are Placed or Put Away 4   Remembering List of 4-5 Errands 4   Taking Care of Complicated Tasks 4   Applied Cognition Raw Score 24   Applied Cognition Standardized Score 62.21         Linh Jonas, OTR/L

## 2024-01-19 NOTE — PROGRESS NOTES
[facility]   Progress Note  Name: Samira Allred I  MRN: [id number]  Unit/Bed#: -01 I Date of Admission: 1/18/2024   Date of Service: 1/19/2024 I Hospital Day: 1    * MARCELO (acute kidney injury) (HCC)  Assessment & Plan  Recent Labs     01/18/24  0937 01/19/24  0654   CREATININE 3.00* 2.37*   EGFR 13 18     Estimated Creatinine Clearance: 17.1 mL/min (A) (by C-G formula based on SCr of 2.37 mg/dL (H)).    Baseline CKD IV with creatinine of 1.5  MARCELO likely postrenal 2/2 urinary retention; imrpoving    Plan  Carrera catheter in place  Continue to trend BMP    UTI (urinary tract infection)  Assessment & Plan  Urinalysis showing innumerable WBC  Awaiting culture; thus far gram negative oxidase positive rods  Past pseudomonal UTI 12/23; may likely be pseudomonas    Plan  Empiric treatment with Cefepime  Narrow per culture results  Monitor for signs of systemic infection    Anemia  Assessment & Plan  Recent Labs     01/18/24  0937 01/19/24  0654   HGB 9.5* 9.6*     Chronically anemic   Likely 2/2 CKD    Plan  Continue to trend CBC  Consider transfusion if Hgb <7    Functional diarrhea  Assessment & Plan  Patient endorsed diarrhea for the past month  She has been mostly continent of brown liquid stool that occurs typically once a day  Denies melena  Does not wake patient up at night  Recent hospitalization with antibiotic administration for urosepsis with pseudomonas    Plan  Awaiting stool antigen for C. Difficile, enteric stool pcr panel  Place patient on contact precautions  Continue IV hydration       Chronic renal disease, stage IV (HCC)  Assessment & Plan  Lab Results   Component Value Date    EGFR 18 01/19/2024    EGFR 13 01/18/2024    EGFR 30 12/18/2023    CREATININE 2.37 (H) 01/19/2024    CREATININE 3.00 (H) 01/18/2024    CREATININE 1.56 (H) 12/18/2023     Baseline creatinine around 1.5  Currently presenting with MARCELO on CKD in the setting of urinary retention    Hyponatremia  Assessment &  Plan  Recent Labs     01/18/24  0937 01/19/24  0654   SODIUM 131* 132*     Lab Results   Component Value Date    OSMOUA 360 01/18/2024    NAUR 84 01/18/2024    OSMOLALITSER 293 01/18/2024       Hypervolemic/volume overload  Likely hypervolemic hyponatremia due to obstruction    Plan:  Goal sodium increase 6-8 in 24 hours  Avoid over-correction to prevent osmotic demyelination syndrome  Encourage adequate oral intake  Monitor BMP qd      Sepsis (HCC)  Assessment & Plan  Concern for Sepsis as evidenced by leukocytosis, tachycardia  Suspected source: UTI    Recent Labs     01/18/24  0937 01/19/24  0654   WBC 8.45 23.82*     Recent Labs     01/18/24  0937 01/18/24  1128   LACTICACID 2.8* 1.5     WBC may be reactive 2/2 herrera placement and bladder decompression    Plan:  Urine clx follow  Abx Cefepime  IV Fluid hydration per sepsis fluids      Urinary retention  Assessment & Plan    Herrera removed around 2 weeks ago.  Was in place for retention  Had been urinating fine for the past 2 weeks but experienced suprapubic fullness/pain with inability to void this morning which prompted ED visit  Presents with UTI and MARCELO both likely 2/2 urinary retention  US showing moderate left hydro worsened from prior US    Plan  Keep herrera in place; may be chronic herrera  Monitor for patency of herrera catheter          VTE Pharmacologic Prophylaxis: VTE Score: 4 Moderate Risk (Score 3-4) - Pharmacological DVT Prophylaxis Ordered: heparin.    Mobility:   Basic Mobility Inpatient Raw Score: 17  JH-HLM Goal: 5: Stand one or more mins  JH-HLM Achieved: 4: Move to chair/commode  HLM Goal NOT achieved. Continue with multidisciplinary rounding and encourage appropriate mobility to improve upon HLM goals.    Patient Centered Rounds: I performed bedside rounds with nursing staff today.  Discussions with Specialists or Other Care Team Provider:  IP CONSULT TO UROLOGY      Education and Discussions with Family / Patient: patient     Total Time Spent on  Date of Encounter in care of patient: 30+ mins. This time was spent on one or more of the following: performing physical exam; counseling and coordination of care; obtaining or reviewing history; documenting in the medical record; reviewing/ordering tests, medications or procedures; communicating with other healthcare professionals and discussing with patient's family/caregivers.    Current Length of Stay: 1 day(s)  Current Patient Status: Inpatient   Certification Statement: The patient will continue to require additional inpatient hospital stay due to plan as noted above  Discharge Plan: Anticipate discharge in 48-72 hrs to discharge location to be determined pending rehab evaluations.    Code Status: Level 1 - Full Code    Subjective:   Offers no new complaints at this time. No acute events reported overnight. Understanding of plan.  All questions answered.    Objective:     Vitals:   Temp (24hrs), Av °F (37.8 °C), Min:98.8 °F (37.1 °C), Max:102.7 °F (39.3 °C)    Temp:  [98.8 °F (37.1 °C)-102.7 °F (39.3 °C)] 102.7 °F (39.3 °C)  HR:  [] 115  Resp:  [17-18] 18  BP: (102-166)/(47-85) 166/85  SpO2:  [96 %-98 %] 98 %  Body mass index is 29.44 kg/m².     Input and Output Summary (last 24 hours):     Intake/Output Summary (Last 24 hours) at 2024 1455  Last data filed at 2024 0615  Gross per 24 hour   Intake --   Output 900 ml   Net -900 ml       Physical Exam:   Physical Exam  Vitals and nursing note reviewed.   Constitutional:       General: She is not in acute distress.     Appearance: Normal appearance. She is not ill-appearing or toxic-appearing.   HENT:      Mouth/Throat:      Mouth: Mucous membranes are moist.      Pharynx: Oropharynx is clear.   Eyes:      Extraocular Movements: Extraocular movements intact.   Cardiovascular:      Rate and Rhythm: Normal rate and regular rhythm.      Heart sounds: Murmur heard.      Comments: Systolic murmur III/VI over right second intercostal space, not a  new finding  0-1+ edema of lower extremities bilaterally  Pulmonary:      Effort: Pulmonary effort is normal. No respiratory distress.      Breath sounds: Normal breath sounds. No wheezing.   Abdominal:      General: There is no distension.      Palpations: Abdomen is soft.      Tenderness: There is abdominal tenderness.      Comments: Mild suprapubic tenderness   Genitourinary:     Comments: No CVA tenderness  Musculoskeletal:         General: No tenderness.   Skin:     General: Skin is warm and dry.      Capillary Refill: Capillary refill takes less than 2 seconds.   Neurological:      General: No focal deficit present.      Mental Status: She is alert.   Psychiatric:         Mood and Affect: Mood normal.         Behavior: Behavior normal.          Additional Data:     Labs:  Results from last 7 days   Lab Units 01/19/24  0654   WBC Thousand/uL 23.82*   HEMOGLOBIN g/dL 9.6*   HEMATOCRIT % 29.1*   PLATELETS Thousands/uL 400*   NEUTROS PCT % 89*   LYMPHS PCT % 4*   MONOS PCT % 6   EOS PCT % 0     Results from last 7 days   Lab Units 01/19/24  0654   SODIUM mmol/L 132*   POTASSIUM mmol/L 4.5   CHLORIDE mmol/L 102   CO2 mmol/L 22   BUN mg/dL 35*   CREATININE mg/dL 2.37*   ANION GAP mmol/L 8   CALCIUM mg/dL 8.0*   ALBUMIN g/dL 2.9*   TOTAL BILIRUBIN mg/dL 0.56   ALK PHOS U/L 155*   ALT U/L 19   AST U/L 26   GLUCOSE RANDOM mg/dL 97                 Results from last 7 days   Lab Units 01/19/24  1320 01/19/24  1027 01/18/24  1128 01/18/24  0937   LACTIC ACID mmol/L 2.3* 4.2* 1.5 2.8*       Lines/Drains:  Invasive Devices       Peripheral Intravenous Line  Duration             Peripheral IV 01/18/24 Left;Ventral (anterior) Forearm 1 day              Drain  Duration             Urethral Catheter Non-latex;Double-lumen 16 Fr. 43 days    Urethral Catheter Double-lumen 16 Fr. 1 day                  Urinary Catheter:  Goal for removal: N/A - Chronic Carrera               Imaging: Reviewed radiology reports from this admission  including:   US kidney and bladder    Result Date: 1/18/2024  Impression: 1. Moderate left hydronephrosis which is worsened from most recent comparison ultrasound of 12/31/2023. Consider further characterization with CT abdomen pelvis to evaluate for the presence of left ureteral calculi. 2. Bilateral renal calculi. 3. Urinary bladder is decompressed around a Carrera catheter, limiting evaluation. The study was marked in EPIC for immediate notification. Workstation performed: [id number]       No Chest XR results available for this patient.     No results found for this or any previous visit.      Recent Cultures (last 7 days):   Results from last 7 days   Lab Units 01/18/24  1045 01/18/24  1035 01/18/24  0854   BLOOD CULTURE  Received in Microbiology Lab. Culture in Progress. Received in Microbiology Lab. Culture in Progress.  --    URINE CULTURE   --   --  80,000-89,000 cfu/ml Oxidase Positive gram negative claudia*       Last 24 Hours Medication List:   Current Facility-Administered Medications   Medication Dose Route Frequency Provider Last Rate    acetaminophen  975 mg Oral Once [facility] Sharonan, DO      acetaminophen  975 mg Oral Q6H PRN [facility] Parameswaran, DO      ascorbic acid  500 mg Oral Daily [facility]      calcium carbonate-vitamin D  1 tablet Oral BID With Meals Avel Gary, DO      cefepime  2,000 mg Intravenous Q24H [facility] Sharonan, DO 2,000 mg (01/18/24 1930)    docusate sodium  100 mg Oral BID [facility]      folic acid  1 mg Oral Daily [facility]      heparin (porcine)  5,000 Units Subcutaneous Q8H TORO Avel Kennyeswaran, DO      levothyroxine  75 mcg Oral Daily [facility] Parameswaran, DO      magnesium Oxide  400 mg Oral BID [facility] Parameswaran, DO      multivitamin stress formula  1 tablet Oral Daily [facility] Rexan, DO      ondansetron  4 mg Intravenous Q6H PRN [facility] Parameswaran, DO      pantoprazole  40 mg Oral BID Avel Parameswaran, DO       polyethylene glycol  17 g Oral Daily PRN Avel Vega DO      pravastatin  40 mg Oral Daily With Dinner Avel Vega DO      psyllium  1 packet Oral Daily Avel Vega DO      simethicone  80 mg Oral 4x Daily PRN Avel Vega DO          Today, Patient Was Seen By: Avel Vega DO    **Please Note: This note may have been constructed using a voice recognition system.**

## 2024-01-19 NOTE — ASSESSMENT & PLAN NOTE
Lab Results   Component Value Date    EGFR 26 01/22/2024    EGFR 26 01/21/2024    EGFR 24 01/20/2024    CREATININE 1.76 (H) 01/22/2024    CREATININE 1.79 (H) 01/21/2024    CREATININE 1.90 (H) 01/20/2024     Baseline creatinine around 1.5-1.75  Renal functions around baseline  Continue outpatient follow-up  Levaquin renally adjusted at discharge

## 2024-01-20 LAB
ALBUMIN SERPL BCP-MCNC: 3.1 G/DL (ref 3.5–5)
ALP SERPL-CCNC: 182 U/L (ref 34–104)
ALT SERPL W P-5'-P-CCNC: 25 U/L (ref 7–52)
ANION GAP SERPL CALCULATED.3IONS-SCNC: 10 MMOL/L
AST SERPL W P-5'-P-CCNC: 45 U/L (ref 13–39)
BACTERIA UR CULT: ABNORMAL
BASOPHILS # BLD AUTO: 0.05 THOUSANDS/ÂΜL (ref 0–0.1)
BASOPHILS NFR BLD AUTO: 0 % (ref 0–1)
BILIRUB SERPL-MCNC: 0.6 MG/DL (ref 0.2–1)
BUN SERPL-MCNC: 31 MG/DL (ref 5–25)
C DIFF TOX B TCDB STL QL NAA+PROBE: NEGATIVE
CALCIUM ALBUM COR SERPL-MCNC: 8.4 MG/DL (ref 8.3–10.1)
CALCIUM SERPL-MCNC: 7.7 MG/DL (ref 8.4–10.2)
CHLORIDE SERPL-SCNC: 100 MMOL/L (ref 96–108)
CO2 SERPL-SCNC: 19 MMOL/L (ref 21–32)
CREAT SERPL-MCNC: 1.9 MG/DL (ref 0.6–1.3)
EOSINOPHIL # BLD AUTO: 0.12 THOUSAND/ÂΜL (ref 0–0.61)
EOSINOPHIL NFR BLD AUTO: 1 % (ref 0–6)
ERYTHROCYTE [DISTWIDTH] IN BLOOD BY AUTOMATED COUNT: 17.9 % (ref 11.6–15.1)
GFR SERPL CREATININE-BSD FRML MDRD: 24 ML/MIN/1.73SQ M
GLUCOSE SERPL-MCNC: 129 MG/DL (ref 65–140)
HCT VFR BLD AUTO: 31.6 % (ref 34.8–46.1)
HGB BLD-MCNC: 10.3 G/DL (ref 11.5–15.4)
IMM GRANULOCYTES # BLD AUTO: 0.2 THOUSAND/UL (ref 0–0.2)
IMM GRANULOCYTES NFR BLD AUTO: 1 % (ref 0–2)
LYMPHOCYTES # BLD AUTO: 0.89 THOUSANDS/ÂΜL (ref 0.6–4.47)
LYMPHOCYTES NFR BLD AUTO: 4 % (ref 14–44)
MAGNESIUM SERPL-MCNC: 1.7 MG/DL (ref 1.9–2.7)
MCH RBC QN AUTO: 29.9 PG (ref 26.8–34.3)
MCHC RBC AUTO-ENTMCNC: 32.6 G/DL (ref 31.4–37.4)
MCV RBC AUTO: 92 FL (ref 82–98)
MONOCYTES # BLD AUTO: 1.18 THOUSAND/ÂΜL (ref 0.17–1.22)
MONOCYTES NFR BLD AUTO: 6 % (ref 4–12)
NEUTROPHILS # BLD AUTO: 18.28 THOUSANDS/ÂΜL (ref 1.85–7.62)
NEUTS SEG NFR BLD AUTO: 88 % (ref 43–75)
NRBC BLD AUTO-RTO: 0 /100 WBCS
PLATELET # BLD AUTO: 387 THOUSANDS/UL (ref 149–390)
PMV BLD AUTO: 9.2 FL (ref 8.9–12.7)
POTASSIUM SERPL-SCNC: 4.3 MMOL/L (ref 3.5–5.3)
PROT SERPL-MCNC: 5.6 G/DL (ref 6.4–8.4)
RBC # BLD AUTO: 3.44 MILLION/UL (ref 3.81–5.12)
SODIUM SERPL-SCNC: 129 MMOL/L (ref 135–147)
WBC # BLD AUTO: 20.72 THOUSAND/UL (ref 4.31–10.16)

## 2024-01-20 PROCEDURE — 85025 COMPLETE CBC W/AUTO DIFF WBC: CPT | Performed by: INTERNAL MEDICINE

## 2024-01-20 PROCEDURE — 83735 ASSAY OF MAGNESIUM: CPT | Performed by: INTERNAL MEDICINE

## 2024-01-20 PROCEDURE — 99233 SBSQ HOSP IP/OBS HIGH 50: CPT | Performed by: INTERNAL MEDICINE

## 2024-01-20 PROCEDURE — 80053 COMPREHEN METABOLIC PANEL: CPT | Performed by: INTERNAL MEDICINE

## 2024-01-20 RX ORDER — MAGNESIUM SULFATE HEPTAHYDRATE 40 MG/ML
2 INJECTION, SOLUTION INTRAVENOUS ONCE
Status: COMPLETED | OUTPATIENT
Start: 2024-01-20 | End: 2024-01-20

## 2024-01-20 RX ORDER — SODIUM CHLORIDE, SODIUM GLUCONATE, SODIUM ACETATE, POTASSIUM CHLORIDE, MAGNESIUM CHLORIDE, SODIUM PHOSPHATE, DIBASIC, AND POTASSIUM PHOSPHATE .53; .5; .37; .037; .03; .012; .00082 G/100ML; G/100ML; G/100ML; G/100ML; G/100ML; G/100ML; G/100ML
50 INJECTION, SOLUTION INTRAVENOUS CONTINUOUS
Status: DISCONTINUED | OUTPATIENT
Start: 2024-01-20 | End: 2024-01-23 | Stop reason: HOSPADM

## 2024-01-20 RX ADMIN — OXYCODONE HYDROCHLORIDE AND ACETAMINOPHEN 500 MG: 500 TABLET ORAL at 09:17

## 2024-01-20 RX ADMIN — Medication 400 MG: at 18:06

## 2024-01-20 RX ADMIN — LEVOTHYROXINE SODIUM 75 MCG: 0.07 TABLET ORAL at 09:17

## 2024-01-20 RX ADMIN — FOLIC ACID 1 MG: 1 TABLET ORAL at 09:17

## 2024-01-20 RX ADMIN — MAGNESIUM SULFATE HEPTAHYDRATE 2 G: 40 INJECTION, SOLUTION INTRAVENOUS at 12:41

## 2024-01-20 RX ADMIN — Medication 1 TABLET: at 18:06

## 2024-01-20 RX ADMIN — PANTOPRAZOLE SODIUM 40 MG: 40 TABLET, DELAYED RELEASE ORAL at 09:17

## 2024-01-20 RX ADMIN — SODIUM CHLORIDE, SODIUM GLUCONATE, SODIUM ACETATE, POTASSIUM CHLORIDE, MAGNESIUM CHLORIDE, SODIUM PHOSPHATE, DIBASIC, AND POTASSIUM PHOSPHATE 100 ML/HR: .53; .5; .37; .037; .03; .012; .00082 INJECTION, SOLUTION INTRAVENOUS at 18:06

## 2024-01-20 RX ADMIN — HEPARIN SODIUM 5000 UNITS: 5000 INJECTION INTRAVENOUS; SUBCUTANEOUS at 13:16

## 2024-01-20 RX ADMIN — Medication 1 TABLET: at 09:17

## 2024-01-20 RX ADMIN — B-COMPLEX W/ C & FOLIC ACID TAB 1 TABLET: TAB at 09:17

## 2024-01-20 RX ADMIN — CEFEPIME HYDROCHLORIDE 2000 MG: 2 INJECTION, SOLUTION INTRAVENOUS at 18:04

## 2024-01-20 RX ADMIN — Medication 400 MG: at 09:17

## 2024-01-20 RX ADMIN — HEPARIN SODIUM 5000 UNITS: 5000 INJECTION INTRAVENOUS; SUBCUTANEOUS at 06:43

## 2024-01-20 RX ADMIN — PANTOPRAZOLE SODIUM 40 MG: 40 TABLET, DELAYED RELEASE ORAL at 18:06

## 2024-01-20 RX ADMIN — PRAVASTATIN SODIUM 40 MG: 40 TABLET ORAL at 18:06

## 2024-01-20 RX ADMIN — ACETAMINOPHEN 1000 MG: 10 INJECTION INTRAVENOUS at 15:52

## 2024-01-20 NOTE — PLAN OF CARE
Problem: MUSCULOSKELETAL - ADULT  Goal: Maintain or return mobility to safest level of function  Description: INTERVENTIONS:  - Assess patient's ability to carry out ADLs; assess patient's baseline for ADL function and identify physical deficits which impact ability to perform ADLs (bathing, care of mouth/teeth, toileting, grooming, dressing, etc.)  - Assess/evaluate cause of self-care deficits   - Assess range of motion  - Assess patient's mobility  - Assess patient's need for assistive devices and provide as appropriate  - Encourage maximum independence but intervene and supervise when necessary  - Involve family in performance of ADLs  - Assess for home care needs following discharge   - Consider OT consult to assist with ADL evaluation and planning for discharge  - Provide patient education as appropriate  Outcome: Progressing

## 2024-01-20 NOTE — PROGRESS NOTES
[facility]   Progress Note  Name: Samira Allred I  MRN: [id number]  Unit/Bed#: -01 I Date of Admission: 1/18/2024   Date of Service: 1/20/2024 I Hospital Day: 2    * MARCELO (acute kidney injury) (HCC)  Assessment & Plan  Recent Labs     01/18/24  0937 01/19/24  0654 01/20/24  0922   CREATININE 3.00* 2.37* 1.90*   EGFR 13 18 24     Estimated Creatinine Clearance: 21.4 mL/min (A) (by C-G formula based on SCr of 1.9 mg/dL (H)).    Baseline CKD IV with creatinine of 1.5  MARCELO likely postrenal 2/2 urinary retention; imrpoving    Plan  Herrera catheter in place  Continue to trend BMP    Sepsis (HCC)  Assessment & Plan  Concern for Sepsis as evidenced by leukocytosis, tachycardia  Suspected source: UTI    Recent Labs     01/18/24  0937 01/19/24  0654 01/20/24  0922   WBC 8.45 23.82* 20.72*     Recent Labs     01/18/24  1128 01/19/24  1027 01/19/24  1320   LACTICACID 1.5 4.2* 2.3*     WBC may be reactive 2/2 herrera placement and bladder decompression    Plan:  Urine clx follow  Abx Cefepime to be continued; may be able to switch to levaquin tomorrow once Cr improved  IV Fluid hydration per sepsis fluids      UTI (urinary tract infection)  Assessment & Plan  Urinalysis showing innumerable WBC  Awaiting culture; thus far gram negative oxidase positive rods  Past pseudomonal UTI 12/23; may likely be pseudomonas    Plan  Empiric treatment with Cefepime  Levaquin once renal function improved  Monitor for signs of systemic infection    Anemia  Assessment & Plan  Recent Labs     01/18/24  0937 01/19/24  0654   HGB 9.5* 9.6*     Chronically anemic   Likely 2/2 CKD    Plan  Continue to trend CBC  Consider transfusion if Hgb <7    Functional diarrhea  Assessment & Plan  Patient endorsed diarrhea for the past month  She has been mostly continent of brown liquid stool that occurs typically once a day  Denies melena  Does not wake patient up at night  Recent hospitalization with antibiotic administration for  urosepsis with pseudomonas    Plan  Awaiting stool antigen for C. Difficile, enteric stool pcr panel  Place patient on contact precautions  Continue IV hydration       Chronic renal disease, stage IV (HCC)  Assessment & Plan  Lab Results   Component Value Date    EGFR 24 01/20/2024    EGFR 18 01/19/2024    EGFR 13 01/18/2024    CREATININE 1.90 (H) 01/20/2024    CREATININE 2.37 (H) 01/19/2024    CREATININE 3.00 (H) 01/18/2024     Baseline creatinine around 1.5  Currently presenting with MARCELO on CKD in the setting of urinary retention    Hyponatremia  Assessment & Plan  Recent Labs     01/18/24  0937 01/19/24  0654 01/20/24  0922   SODIUM 131* 132* 129*     Lab Results   Component Value Date    OSMOUA 360 01/18/2024    NAUR 84 01/18/2024    OSMOLALITSER 293 01/18/2024       Hypervolemic/volume overload  Likely hypervolemic hyponatremia due to obstruction    Plan:  Goal sodium increase 6-8 in 24 hours  Avoid over-correction to prevent osmotic demyelination syndrome  Encourage adequate oral intake  Monitor BMP qd      Urinary retention  Assessment & Plan  Herrera removed around 2 weeks ago.  Was in place for retention  Had been urinating fine for the past 2 weeks but experienced suprapubic fullness/pain with inability to void this morning which prompted ED visit  Presents with UTI and MARCELO both likely 2/2 urinary retention  US showing moderate left hydro worsened from prior US    Plan  Keep herrera in place; may be chronic herrera  Monitor for patency of herrera catheter          VTE Pharmacologic Prophylaxis: VTE Score: 4 Moderate Risk (Score 3-4) - Pharmacological DVT Prophylaxis Ordered: heparin.    Mobility:   Basic Mobility Inpatient Raw Score: 20  JH-HLM Goal: 6: Walk 10 steps or more  JH-HLM Achieved: 7: Walk 25 feet or more  HLM Goal achieved. Continue to encourage appropriate mobility.    Patient Centered Rounds: I performed bedside rounds with nursing staff today.  Discussions with Specialists or Other Care Team  Provider:  IP CONSULT TO UROLOGY      Education and Discussions with Family / Patient: patient     Total Time Spent on Date of Encounter in care of patient: 30+ mins. This time was spent on one or more of the following: performing physical exam; counseling and coordination of care; obtaining or reviewing history; documenting in the medical record; reviewing/ordering tests, medications or procedures; communicating with other healthcare professionals and discussing with patient's family/caregivers.    Current Length of Stay: 2 day(s)  Current Patient Status: Inpatient   Certification Statement: The patient will continue to require additional inpatient hospital stay due to plan as noted above  Discharge Plan: Anticipate discharge in 24-48 hrs to discharge location to be determined pending rehab evaluations.    Code Status: Level 1 - Full Code    Subjective:   Continues to feel abdominal discomfort. No acute events reported overnight. Understanding of plan.  All questions answered.    Objective:     Vitals:   Temp (24hrs), Av.1 °F (37.8 °C), Min:98 °F (36.7 °C), Max:102.7 °F (39.3 °C)    Temp:  [98 °F (36.7 °C)-102.7 °F (39.3 °C)] 99.6 °F (37.6 °C)  HR:  [] 97  BP: (126-166)/(62-85) 126/64  SpO2:  [96 %-98 %] 97 %  Body mass index is 29.44 kg/m².     Input and Output Summary (last 24 hours):     Intake/Output Summary (Last 24 hours) at 2024 1215  Last data filed at 2024 0757  Gross per 24 hour   Intake 120 ml   Output 1200 ml   Net -1080 ml       Physical Exam:   Physical Exam  Vitals and nursing note reviewed.   Constitutional:       General: She is not in acute distress.     Appearance: Normal appearance. She is not ill-appearing or toxic-appearing.   HENT:      Mouth/Throat:      Mouth: Mucous membranes are moist.      Pharynx: Oropharynx is clear.   Eyes:      Extraocular Movements: Extraocular movements intact.   Cardiovascular:      Rate and Rhythm: Normal rate and regular rhythm.      Heart  sounds: Murmur heard.      Comments: Systolic murmur III/VI over right second intercostal space, not a new finding  0-1+ edema of lower extremities bilaterally  Pulmonary:      Effort: Pulmonary effort is normal. No respiratory distress.      Breath sounds: Normal breath sounds. No wheezing.   Abdominal:      General: There is no distension.      Palpations: Abdomen is soft.      Tenderness: There is abdominal tenderness.      Comments: Mild suprapubic tenderness   Genitourinary:     Comments: No CVA tenderness  Musculoskeletal:         General: No tenderness.   Skin:     General: Skin is warm and dry.      Capillary Refill: Capillary refill takes less than 2 seconds.   Neurological:      General: No focal deficit present.      Mental Status: She is alert.   Psychiatric:         Mood and Affect: Mood normal.         Behavior: Behavior normal.          Additional Data:     Labs:  Results from last 7 days   Lab Units 01/20/24  0922   WBC Thousand/uL 20.72*   HEMOGLOBIN g/dL 10.3*   HEMATOCRIT % 31.6*   PLATELETS Thousands/uL 387   NEUTROS PCT % 88*   LYMPHS PCT % 4*   MONOS PCT % 6   EOS PCT % 1     Results from last 7 days   Lab Units 01/20/24  0922   SODIUM mmol/L 129*   POTASSIUM mmol/L 4.3   CHLORIDE mmol/L 100   CO2 mmol/L 19*   BUN mg/dL 31*   CREATININE mg/dL 1.90*   ANION GAP mmol/L 10   CALCIUM mg/dL 7.7*   ALBUMIN g/dL 3.1*   TOTAL BILIRUBIN mg/dL 0.60   ALK PHOS U/L 182*   ALT U/L 25   AST U/L 45*   GLUCOSE RANDOM mg/dL 129                 Results from last 7 days   Lab Units 01/19/24  1320 01/19/24  1027 01/18/24  1128 01/18/24  0937   LACTIC ACID mmol/L 2.3* 4.2* 1.5 2.8*       Lines/Drains:  Invasive Devices       Peripheral Intravenous Line  Duration             Peripheral IV 01/18/24 Left;Ventral (anterior) Forearm 2 days    Peripheral IV 01/19/24 Dorsal (posterior);Left Forearm <1 day              Drain  Duration             Urethral Catheter Non-latex;Double-lumen 16 Fr. 43 days    Urethral Catheter  Double-lumen 16 Fr. 2 days                  Urinary Catheter:  Goal for removal: N/A- Discharging with Carrera               Imaging: Reviewed radiology reports from this admission including:   US kidney and bladder    Result Date: 1/18/2024  Impression: 1. Moderate left hydronephrosis which is worsened from most recent comparison ultrasound of 12/31/2023. Consider further characterization with CT abdomen pelvis to evaluate for the presence of left ureteral calculi. 2. Bilateral renal calculi. 3. Urinary bladder is decompressed around a Carrera catheter, limiting evaluation. The study was marked in EPIC for immediate notification. Workstation performed: [id number]       No Chest XR results available for this patient.     No results found for this or any previous visit.      Recent Cultures (last 7 days):   Results from last 7 days   Lab Units 01/18/24  1045 01/18/24  1035 01/18/24  0854   BLOOD CULTURE  No Growth at 24 hrs. No Growth at 24 hrs.  --    URINE CULTURE   --   --  80,000-89,000 cfu/ml Pseudomonas aeruginosa*       Last 24 Hours Medication List:   Current Facility-Administered Medications   Medication Dose Route Frequency Provider Last Rate    acetaminophen  1,000 mg Intravenous Q6H PRN Avel Quintanawaran, DO      ascorbic acid  500 mg Oral Daily Avel Herreraan, DO      calcium carbonate-vitamin D  1 tablet Oral BID With Meals Avel Vega, DO      cefepime  2,000 mg Intravenous Q24H Avel Herreraan, DO 2,000 mg (01/19/24 1904)    docusate sodium  100 mg Oral BID Avel Herreraan, DO      folic acid  1 mg Oral Daily Avel Herreraan, DO      heparin (porcine)  5,000 Units Subcutaneous Q8H [facility] Avel Herreraan, DO      levothyroxine  75 mcg Oral Daily Avel Quintanawaran, DO      magnesium Oxide  400 mg Oral BID Avel Herreraan, DO      multivitamin stress formula  1 tablet Oral Daily Avel Herreraan, DO      ondansetron  4 mg Intravenous Q6H PRN Avel Quintanawaran, DO       pantoprazole  40 mg Oral BID Avel Vega DO      polyethylene glycol  17 g Oral Daily PRN Avel Vega DO      pravastatin  40 mg Oral Daily With Dinner Avel Vega DO      psyllium  1 packet Oral Daily Avel Vega DO      simethicone  80 mg Oral 4x Daily PRN Avel Vega DO          Today, Patient Was Seen By: Avel Vega DO    **Please Note: This note may have been constructed using a voice recognition system.**

## 2024-01-20 NOTE — PLAN OF CARE
Problem: INFECTION - ADULT  Goal: Absence or prevention of progression during hospitalization  Description: INTERVENTIONS:  - Assess and monitor for signs and symptoms of infection  - Monitor lab/diagnostic results  - Monitor all insertion sites, i.e. indwelling lines, tubes, and drains  - Monitor endotracheal if appropriate and nasal secretions for changes in amount and color  - [place] appropriate cooling/warming therapies per order  - Administer medications as ordered  - Instruct and encourage patient and family to use good hand hygiene technique  - Identify and instruct in appropriate isolation precautions for identified infection/condition  Outcome: Progressing     Problem: SAFETY ADULT  Goal: Patient will remain free of falls  Description: INTERVENTIONS:  - Educate patient/family on patient safety including physical limitations  - Instruct patient to call for assistance with activity   - Consult OT/PT to assist with strengthening/mobility   - Keep Call bell within reach  - Keep bed low and locked with side rails adjusted as appropriate  - Keep care items and personal belongings within reach  - Initiate and maintain comfort rounds  - Make Fall Risk Sign visible to staff  - Offer Toileting every 2 Hours, in advance of need  - Initiate/Maintain bed alarm  - Obtain necessary fall risk management equipment:   - Apply yellow socks and bracelet for high fall risk patients  - Consider moving patient to room near nurses station  Outcome: Progressing     Problem: PAIN - ADULT  Goal: Verbalizes/displays adequate comfort level or baseline comfort level  Description: Interventions:  - Encourage patient to monitor pain and request assistance  - Assess pain using appropriate pain scale  - Administer analgesics based on type and severity of pain and evaluate response  - Implement non-pharmacological measures as appropriate and evaluate response  - Consider cultural and social influences on pain and pain management  -  Notify physician/advanced practitioner if interventions unsuccessful or patient reports new pain  Outcome: Progressing

## 2024-01-21 LAB
ALBUMIN SERPL BCP-MCNC: 2.5 G/DL (ref 3.5–5)
ALP SERPL-CCNC: 152 U/L (ref 34–104)
ALT SERPL W P-5'-P-CCNC: 28 U/L (ref 7–52)
ANION GAP SERPL CALCULATED.3IONS-SCNC: 7 MMOL/L
AST SERPL W P-5'-P-CCNC: 45 U/L (ref 13–39)
BASOPHILS # BLD AUTO: 0.03 THOUSANDS/ÂΜL (ref 0–0.1)
BASOPHILS NFR BLD AUTO: 0 % (ref 0–1)
BILIRUB SERPL-MCNC: 0.55 MG/DL (ref 0.2–1)
BUN SERPL-MCNC: 29 MG/DL (ref 5–25)
C COLI+JEJUNI TUF STL QL NAA+PROBE: NORMAL
CALCIUM ALBUM COR SERPL-MCNC: 8.7 MG/DL (ref 8.3–10.1)
CALCIUM SERPL-MCNC: 7.5 MG/DL (ref 8.4–10.2)
CHLORIDE SERPL-SCNC: 99 MMOL/L (ref 96–108)
CO2 SERPL-SCNC: 24 MMOL/L (ref 21–32)
CREAT SERPL-MCNC: 1.79 MG/DL (ref 0.6–1.3)
EC STX1+STX2 GENES STL QL NAA+PROBE: NORMAL
EOSINOPHIL # BLD AUTO: 0.32 THOUSAND/ÂΜL (ref 0–0.61)
EOSINOPHIL NFR BLD AUTO: 2 % (ref 0–6)
ERYTHROCYTE [DISTWIDTH] IN BLOOD BY AUTOMATED COUNT: 17.6 % (ref 11.6–15.1)
GFR SERPL CREATININE-BSD FRML MDRD: 26 ML/MIN/1.73SQ M
GLUCOSE SERPL-MCNC: 90 MG/DL (ref 65–140)
HCT VFR BLD AUTO: 25.1 % (ref 34.8–46.1)
HGB BLD-MCNC: 8.5 G/DL (ref 11.5–15.4)
IMM GRANULOCYTES # BLD AUTO: 0.23 THOUSAND/UL (ref 0–0.2)
IMM GRANULOCYTES NFR BLD AUTO: 2 % (ref 0–2)
LYMPHOCYTES # BLD AUTO: 0.73 THOUSANDS/ÂΜL (ref 0.6–4.47)
LYMPHOCYTES NFR BLD AUTO: 5 % (ref 14–44)
MAGNESIUM SERPL-MCNC: 1.8 MG/DL (ref 1.9–2.7)
MCH RBC QN AUTO: 30 PG (ref 26.8–34.3)
MCHC RBC AUTO-ENTMCNC: 33.9 G/DL (ref 31.4–37.4)
MCV RBC AUTO: 89 FL (ref 82–98)
MONOCYTES # BLD AUTO: 1.49 THOUSAND/ÂΜL (ref 0.17–1.22)
MONOCYTES NFR BLD AUTO: 10 % (ref 4–12)
NEUTROPHILS # BLD AUTO: 11.96 THOUSANDS/ÂΜL (ref 1.85–7.62)
NEUTS SEG NFR BLD AUTO: 81 % (ref 43–75)
NRBC BLD AUTO-RTO: 0 /100 WBCS
PLATELET # BLD AUTO: 312 THOUSANDS/UL (ref 149–390)
PMV BLD AUTO: 8.8 FL (ref 8.9–12.7)
POTASSIUM SERPL-SCNC: 4.3 MMOL/L (ref 3.5–5.3)
PROT SERPL-MCNC: 4.6 G/DL (ref 6.4–8.4)
RBC # BLD AUTO: 2.83 MILLION/UL (ref 3.81–5.12)
SALMONELLA SP SPAO STL QL NAA+PROBE: NORMAL
SHIGELLA SP+EIEC IPAH STL QL NAA+PROBE: NORMAL
SODIUM SERPL-SCNC: 130 MMOL/L (ref 135–147)
WBC # BLD AUTO: 14.76 THOUSAND/UL (ref 4.31–10.16)

## 2024-01-21 PROCEDURE — 99233 SBSQ HOSP IP/OBS HIGH 50: CPT | Performed by: INTERNAL MEDICINE

## 2024-01-21 PROCEDURE — 80053 COMPREHEN METABOLIC PANEL: CPT | Performed by: INTERNAL MEDICINE

## 2024-01-21 PROCEDURE — 85025 COMPLETE CBC W/AUTO DIFF WBC: CPT | Performed by: INTERNAL MEDICINE

## 2024-01-21 PROCEDURE — 83735 ASSAY OF MAGNESIUM: CPT | Performed by: INTERNAL MEDICINE

## 2024-01-21 PROCEDURE — 97161 PT EVAL LOW COMPLEX 20 MIN: CPT

## 2024-01-21 RX ORDER — DIPHENHYDRAMINE HYDROCHLORIDE 50 MG/ML
25 INJECTION INTRAMUSCULAR; INTRAVENOUS EVERY 6 HOURS PRN
Status: DISCONTINUED | OUTPATIENT
Start: 2024-01-21 | End: 2024-01-23 | Stop reason: HOSPADM

## 2024-01-21 RX ORDER — LEVOFLOXACIN 500 MG/1
500 TABLET, FILM COATED ORAL ONCE
Status: COMPLETED | OUTPATIENT
Start: 2024-01-21 | End: 2024-01-21

## 2024-01-21 RX ORDER — LEVOFLOXACIN 250 MG/1
250 TABLET, FILM COATED ORAL EVERY 24 HOURS
Status: DISCONTINUED | OUTPATIENT
Start: 2024-01-22 | End: 2024-01-23 | Stop reason: HOSPADM

## 2024-01-21 RX ORDER — MAGNESIUM SULFATE HEPTAHYDRATE 40 MG/ML
2 INJECTION, SOLUTION INTRAVENOUS ONCE
Status: COMPLETED | OUTPATIENT
Start: 2024-01-21 | End: 2024-01-21

## 2024-01-21 RX ADMIN — Medication 1 TABLET: at 18:07

## 2024-01-21 RX ADMIN — B-COMPLEX W/ C & FOLIC ACID TAB 1 TABLET: TAB at 09:33

## 2024-01-21 RX ADMIN — SODIUM CHLORIDE, SODIUM GLUCONATE, SODIUM ACETATE, POTASSIUM CHLORIDE, MAGNESIUM CHLORIDE, SODIUM PHOSPHATE, DIBASIC, AND POTASSIUM PHOSPHATE 100 ML/HR: .53; .5; .37; .037; .03; .012; .00082 INJECTION, SOLUTION INTRAVENOUS at 04:53

## 2024-01-21 RX ADMIN — PRAVASTATIN SODIUM 40 MG: 40 TABLET ORAL at 18:07

## 2024-01-21 RX ADMIN — Medication 400 MG: at 09:33

## 2024-01-21 RX ADMIN — Medication 400 MG: at 18:07

## 2024-01-21 RX ADMIN — LEVOFLOXACIN 500 MG: 500 TABLET, FILM COATED ORAL at 10:44

## 2024-01-21 RX ADMIN — MAGNESIUM SULFATE HEPTAHYDRATE 2 G: 40 INJECTION, SOLUTION INTRAVENOUS at 10:43

## 2024-01-21 RX ADMIN — Medication 1 TABLET: at 09:33

## 2024-01-21 RX ADMIN — PANTOPRAZOLE SODIUM 40 MG: 40 TABLET, DELAYED RELEASE ORAL at 18:07

## 2024-01-21 RX ADMIN — HEPARIN SODIUM 5000 UNITS: 5000 INJECTION INTRAVENOUS; SUBCUTANEOUS at 14:53

## 2024-01-21 RX ADMIN — HEPARIN SODIUM 5000 UNITS: 5000 INJECTION INTRAVENOUS; SUBCUTANEOUS at 22:39

## 2024-01-21 RX ADMIN — OXYCODONE HYDROCHLORIDE AND ACETAMINOPHEN 500 MG: 500 TABLET ORAL at 09:33

## 2024-01-21 RX ADMIN — LEVOTHYROXINE SODIUM 75 MCG: 0.07 TABLET ORAL at 09:34

## 2024-01-21 RX ADMIN — FOLIC ACID 1 MG: 1 TABLET ORAL at 09:34

## 2024-01-21 RX ADMIN — HEPARIN SODIUM 5000 UNITS: 5000 INJECTION INTRAVENOUS; SUBCUTANEOUS at 04:48

## 2024-01-21 RX ADMIN — PANTOPRAZOLE SODIUM 40 MG: 40 TABLET, DELAYED RELEASE ORAL at 09:34

## 2024-01-21 NOTE — PHYSICAL THERAPY NOTE
Physical Therapy Evaluation     Patient's Name: Samira Allred    Admitting Diagnosis  Urine retention [R33.9]  Urinary tract infection [N39.0]  MARCELO (acute kidney injury) (HCC) [N17.9]  Acute urinary retention [R33.8]    Problem List  Patient Active Problem List   Diagnosis    Hypertension    Rheumatoid arthritis (HCC)    Hypothyroidism    Hyperuricemia    Diastolic dysfunction    Hyperlipidemia    Lumbar radiculopathy    Osteopenia    Other chronic pain    Vitamin D deficiency    Adenocarcinoma of breast (HCC)    Localized edema    Fat necrosis (segmental) of breast    Personal history of breast cancer    History of peptic ulcer    Functional diarrhea    Intermediate stage nonexudative 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 eyes    Lower leg DVT (deep venous thromboembolism), chronic, left (HCC)    Varicose veins of left lower extremity with pain    Anemia    Stage 3b chronic kidney disease (HCC)    History of DVT (deep vein thrombosis)    Chronic kidney disease-mineral and bone disorder    Chronic pain of both knees    Protein-calorie malnutrition, unspecified severity (HCC)    Chronic renal disease, stage IV (HCC)    MARCELO (acute kidney injury) (HCC)    GI bleed    Hydronephrosis    Urinary retention    Sepsis (HCC)    PUD (peptic ulcer disease)    Aortic valve stenosis, moderate    Severe protein-calorie malnutrition (HCC)    UTI (urinary tract infection)    Hyponatremia     Past Medical History  Past Medical History:   Diagnosis Date    Allergic angioedema     [id number] resolved    Angioedema     [id number] resolved    Arthritis     Breast cancer (HCC) 2012    right     Chronic kidney disease     Disease of thyroid gland     Hemorrhage of anus and rectum     [id number] resolved    Hyperlipidemia     Hypertension     Hypocalcemia     [id number] resolved    Neuritis     thoracic and lumbosacral    Peptic ulcer     Rheumatoid arthritis (HCC)     Stage 3b chronic kidney disease (HCC) 5/7/2021    Vitamin D deficiency   "    Past Surgical History  Past Surgical History:   Procedure Laterality Date    ABDOMINAL SURGERY      BREAST BIOPSY Right 2012    BREAST LUMPECTOMY Right 2012    BREAST SURGERY      CHOLECYSTECTOMY      ELBOW SURGERY      GALLBLADDER SURGERY  1981    HIP SURGERY Right 09/18/2022    10/2023    ORTHOPEDIC SURGERY      for toes    DE CYSTO W/IRRIG & EVAC MULTPLE OBSTRUCTING CLOTS N/A 11/5/2023    Procedure: CYSTOSCOPY EVACUATION OF CLOTS & fulguration;  Surgeon: Uriah Schwartz MD;  Location: [facility];  Service: Urology    TRANSURETHRAL RESECTION OF BLADDER TUMOR N/A 11/5/2023    Procedure: TRANSURETHRAL RESECTION OF BLADDER TUMOR (TURBT);  Surgeon: Uriah Schwartz MD;  Location: [facility];  Service: Urology    US GUIDED BREAST BIOPSY RIGHT COMPLETE Right 08/15/2022      01/21/24 1300   PT Last Visit   PT Visit Date 01/21/24   Note Type   Note type Evaluation   Pain Assessment   Pain Assessment Tool 0-10   Pain Score   (no numeric number given; \"not to much\")   Pain Location/Orientation Location: Abdomen   Pain Onset/Description Frequency: Intermittent   Hospital Pain Intervention(s) Repositioned;Ambulation/increased activity   Restrictions/Precautions   Weight Bearing Precautions Per Order No   Other Precautions Multiple lines;Fall Risk;Pain   Home Living   Type of Home Mobile home   Home Layout One level;Able to live on main level with bedroom/bathroom;Performs ADLs on one level;Ramped entrance   Bathroom Shower/Tub Walk-in shower   Bathroom Toilet Standard   Bathroom Equipment Grab bars in shower;Shower chair   Bathroom Accessibility Accessible   Home Equipment Walker;Cane   Additional Comments Pt ambulates with a walker.   Prior Function   Level of [place] Independent with functional mobility;Independent with ADLs;Independent with IADLS   Lives With Other (Comment)  (rommate, \"Pat\")   Receives Help From Friend(s)   IADLs Independent with driving;Independent with meal prep;Independent with " "medication management   Falls in the last 6 months 1 to 4  (1 fall a week ago)   Vocational Retired   General   Family/Caregiver Present No   Cognition   Overall Cognitive Status WFL   Arousal/Participation Alert   Attention Within functional limits   Orientation Level Oriented X4   Memory Within functional limits   Following Commands Follows all commands and directions without difficulty   Comments Pt agreeable to PT.   Subjective   Subjective \"I've been walking.\"   RLE Assessment   RLE Assessment WFL   LLE Assessment   LLE Assessment WFL   Light Touch   RLE Light Touch Grossly intact   LLE Light Touch Grossly intact   Bed Mobility   Additional Comments Pt was received seated in recliner in NAD.   Transfers   Sit to Stand 6  Modified independent   Additional items Armrests   Stand to Sit 6  Modified independent   Additional items Armrests   Ambulation/Elevation   Gait pattern WNL   Gait Assistance 6  Modified independent   Assistive Device Rolling walker   Distance 250 feet   Balance   Static Sitting Good   Dynamic Sitting Fair +   Static Standing Fair +   Dynamic Standing Fair +   Ambulatory Fair +   Endurance Deficit   Endurance Deficit No   Activity Tolerance   Activity Tolerance Patient tolerated treatment well   Assessment   Prognosis Good   Assessment Pt is 83 year old female seen for PT evaluation s/p admit to [facility] on 1/18/2024 with MARCELO (acute kidney injury) (HCC). PT consulted to assess pt's functional mobility and discharge needs. Order placed for PT evaluation and treatment, with up and out of bed as tolerated order. Comorbidities affecting pt's physical performance at time of assessment include functional diarrhea, anemia, chronic renal disease, stage 4, urinary retention, sepsis, UTI, and hyponatremia. Prior to hospitalization, pt was independent with all functional mobility with a walker. Pt ambulates unrestricted distances.. Pt resides with her roommate, in a one level mobile home with a " ramped entrance. Personal factors affecting pt at time of initial evaluation include ambulating with an assistive device and positive fall history. Please find objective findings from PT assessment regarding body systems outlined above. The following objective measures were performed on initial evaluation Barthel Index: 80/100, Modified [place]: 1 (no significant disability), and AM-PAC 6-Clicks: 21/24. Pt's clinical presentation is currently stable seen in pt's presentation of need for ongoing medical management/monitoring. Pt is independent with transfers and functional mobility. From a PT standpoint, recommendation at time of discharge would be home with no post acute rehabilitation needs. Pt is at her baseline functional status. No acute PT impairments identified at this time. Plan to discontinue PT.   Barriers to Discharge None   Goals   Patient Goals to go home   Plan   PT Frequency Other (Comment)  (discontinue PT)   Discharge Recommendation   Rehab Resource Intensity Level, PT No post-acute rehabilitation needs   AM-PAC Basic Mobility Inpatient   Turning in Flat Bed Without Bedrails 4   Lying on Back to Sitting on Edge of Flat Bed Without Bedrails 4   Moving Bed to Chair 3   Standing Up From Chair Using Arms 4   Walk in Room 3   Climb 3-5 Stairs With Railing 3   Basic Mobility Inpatient Raw Score 21   Basic Mobility Standardized Score 45.55   Highest Level Of Mobility   JH-HLM Goal 6: Walk 10 steps or more   JH-HLM Achieved 8: Walk 250 feet ot more   Modified [place] Scale   Modified Ernestine Scale 1   Barthel Index   Feeding 10   Bathing 5   Grooming Score 5   Dressing Score 10   Bladder Score 0   Bowels Score 10   Toilet Use Score 10   Transfers (Bed/Chair) Score 15   Mobility (Level Surface) Score 15   Stairs Score 0   Barthel Index Score 80     PT Evaluation Time: [id number]  Annamaria Peacock, PT, DPT

## 2024-01-21 NOTE — PLAN OF CARE
Problem: PAIN - ADULT  Goal: Verbalizes/displays adequate comfort level or baseline comfort level  Description: Interventions:  - Encourage patient to monitor pain and request assistance  - Assess pain using appropriate pain scale  - Administer analgesics based on type and severity of pain and evaluate response  - Implement non-pharmacological measures as appropriate and evaluate response  - Consider cultural and social influences on pain and pain management  - Notify physician/advanced practitioner if interventions unsuccessful or patient reports new pain  Outcome: Progressing     Problem: INFECTION - ADULT  Goal: Absence or prevention of progression during hospitalization  Description: INTERVENTIONS:  - Assess and monitor for signs and symptoms of infection  - Monitor lab/diagnostic results  - Monitor all insertion sites, i.e. indwelling lines, tubes, and drains  - Monitor endotracheal if appropriate and nasal secretions for changes in amount and color  - [place] appropriate cooling/warming therapies per order  - Administer medications as ordered  - Instruct and encourage patient and family to use good hand hygiene technique  - Identify and instruct in appropriate isolation precautions for identified infection/condition  Outcome: Progressing     Problem: SAFETY ADULT  Goal: Patient will remain free of falls  Description: INTERVENTIONS:  - Educate patient/family on patient safety including physical limitations  - Instruct patient to call for assistance with activity   - Consult OT/PT to assist with strengthening/mobility   - Keep Call bell within reach  - Keep bed low and locked with side rails adjusted as appropriate  - Keep care items and personal belongings within reach  - Initiate and maintain comfort rounds  - Make Fall Risk Sign visible to staff  - Offer Toileting every 2 Hours, in advance of need  - Initiate/Maintain bed alarm  - Obtain necessary fall risk management equipment:   - Apply yellow socks and  bracelet for high fall risk patients  - Consider moving patient to room near nurses station  Outcome: Progressing

## 2024-01-21 NOTE — PLAN OF CARE
Problem: PAIN - ADULT  Goal: Verbalizes/displays adequate comfort level or baseline comfort level  Description: Interventions:  - Encourage patient to monitor pain and request assistance  - Assess pain using appropriate pain scale  - Administer analgesics based on type and severity of pain and evaluate response  - Implement non-pharmacological measures as appropriate and evaluate response  - Consider cultural and social influences on pain and pain management  - Notify physician/advanced practitioner if interventions unsuccessful or patient reports new pain  Outcome: Progressing     Problem: INFECTION - ADULT  Goal: Absence or prevention of progression during hospitalization  Description: INTERVENTIONS:  - Assess and monitor for signs and symptoms of infection  - Monitor lab/diagnostic results  - Monitor all insertion sites, i.e. indwelling lines, tubes, and drains  - Monitor endotracheal if appropriate and nasal secretions for changes in amount and color  - [place] appropriate cooling/warming therapies per order  - Administer medications as ordered  - Instruct and encourage patient and family to use good hand hygiene technique  - Identify and instruct in appropriate isolation precautions for identified infection/condition  Outcome: Progressing  Goal: Absence of fever/infection during neutropenic period  Description: INTERVENTIONS:  - Monitor WBC    Outcome: Progressing     Problem: Prexisting or High Potential for Compromised Skin Integrity  Goal: Skin integrity is maintained or improved  Description: INTERVENTIONS:  - Identify patients at risk for skin breakdown  - Assess and monitor skin integrity  - Assess and monitor nutrition and hydration status  - Monitor labs   - Assess for incontinence   - Turn and reposition patient  - Assist with mobility/ambulation  - Relieve pressure over bony prominences  - Avoid friction and shearing  - Provide appropriate hygiene as needed including keeping skin clean and dry  -  Evaluate need for skin moisturizer/barrier cream  - Collaborate with interdisciplinary team   - Patient/family teaching  - Consider wound care consult   Outcome: Progressing     Problem: MUSCULOSKELETAL - ADULT  Goal: Maintain or return mobility to safest level of function  Description: INTERVENTIONS:  - Assess patient's ability to carry out ADLs; assess patient's baseline for ADL function and identify physical deficits which impact ability to perform ADLs (bathing, care of mouth/teeth, toileting, grooming, dressing, etc.)  - Assess/evaluate cause of self-care deficits   - Assess range of motion  - Assess patient's mobility  - Assess patient's need for assistive devices and provide as appropriate  - Encourage maximum independence but intervene and supervise when necessary  - Involve family in performance of ADLs  - Assess for home care needs following discharge   - Consider OT consult to assist with ADL evaluation and planning for discharge  - Provide patient education as appropriate  Outcome: Progressing

## 2024-01-21 NOTE — PROGRESS NOTES
[facility]   Progress Note  Name: Samira Allred I  MRN: [id number]  Unit/Bed#: -01 I Date of Admission: 1/18/2024   Date of Service: 1/21/2024 I Hospital Day: 3    * MARCELO (acute kidney injury) (HCC)  Assessment & Plan  Recent Labs     01/19/24  0654 01/20/24  0922 01/21/24  0535   CREATININE 2.37* 1.90* 1.79*   EGFR 18 24 26     Estimated Creatinine Clearance: 22.6 mL/min (A) (by C-G formula based on SCr of 1.79 mg/dL (H)).    Baseline CKD IV with creatinine of 1.5  MARCELO likely postrenal 2/2 urinary retention; improving    Plan  Herrera catheter in place  Refill hydration plasmalyte 100 mL/h  Continue to trend BMP    Sepsis (HCC)  Assessment & Plan  Concern for Sepsis as evidenced by leukocytosis, tachycardia  Suspected source: UTI    Recent Labs     01/19/24  0654 01/20/24  0922 01/21/24  0535   WBC 23.82* 20.72* 14.76*     Recent Labs     01/18/24  1128 01/19/24  1027 01/19/24  1320   LACTICACID 1.5 4.2* 2.3*     WBC may be reactive 2/2 herrera placement and bladder decompression    Plan:  Urine clx follow  Abx change antibiotics from cefepime to Levaquin; fevers from 1/20 was likely secondary to cefepime  IV Fluid hydration      UTI (urinary tract infection)  Assessment & Plan  Urinalysis showing innumerable WBC  Awaiting culture; thus far gram negative oxidase positive rods  Past pseudomonal UTI 12/23; may likely be pseudomonas    Plan  Change Abx to levaquin 500 mg once then 250 mg qd given renal function  Add benadryl with doses given concern for rash reaction  Monitor for signs of systemic infection    Anemia  Assessment & Plan  Recent Labs     01/19/24  0654 01/20/24  0922 01/21/24  0535   HGB 9.6* 10.3* 8.5*     Chronically anemic   Likely 2/2 CKD    Plan  Continue to trend CBC  Consider transfusion if Hgb <7    Functional diarrhea  Assessment & Plan  Patient endorsed diarrhea for the past month  She has been mostly continent of brown liquid stool that occurs typically once a  day  Denies melena  Does not wake patient up at night  Recent hospitalization with antibiotic administration for urosepsis with pseudomonas    Plan  Awaiting stool antigen for C. Difficile, enteric stool pcr panel  Place patient on contact precautions  Continue IV hydration       Chronic renal disease, stage IV (HCC)  Assessment & Plan  Lab Results   Component Value Date    EGFR 26 01/21/2024    EGFR 24 01/20/2024    EGFR 18 01/19/2024    CREATININE 1.79 (H) 01/21/2024    CREATININE 1.90 (H) 01/20/2024    CREATININE 2.37 (H) 01/19/2024     Baseline creatinine around 1.5  Currently presenting with MARCELO on CKD in the setting of urinary retention    Hyponatremia  Assessment & Plan  Recent Labs     01/19/24  0654 01/20/24  0922 01/21/24  0535   SODIUM 132* 129* 130*     Lab Results   Component Value Date    OSMOUA 360 01/18/2024    NAUR 84 01/18/2024    OSMOLALITSER 293 01/18/2024       Hypervolemic/volume overload  Likely hypervolemic hyponatremia due to obstruction    Plan:  Goal sodium increase 6-8 in 24 hours  Avoid over-correction to prevent osmotic demyelination syndrome  Encourage adequate oral intake  Monitor BMP qd      Urinary retention  Assessment & Plan  Herrera removed around 2 weeks ago.  Was in place for retention  Had been urinating fine for the past 2 weeks but experienced suprapubic fullness/pain with inability to void this morning which prompted ED visit  Presents with UTI and MARCELO both likely 2/2 urinary retention  US showing moderate left hydro worsened from prior US    Plan  Keep herrera in place; may be chronic herrera per urology  Monitor for patency of herrera catheter          VTE Pharmacologic Prophylaxis: VTE Score: 4 Moderate Risk (Score 3-4) - Pharmacological DVT Prophylaxis Ordered: heparin.    Mobility:   Basic Mobility Inpatient Raw Score: 20  -HLM Goal: 6: Walk 10 steps or more  JH-HLM Achieved: 4: Move to chair/commode  HLM Goal NOT achieved. Continue with multidisciplinary rounding and  encourage appropriate mobility to improve upon HLM goals.    Patient Centered Rounds: I performed bedside rounds with nursing staff today.  Discussions with Specialists or Other Care Team Provider:  IP CONSULT TO UROLOGY    Education and Discussions with Family / Patient: patient    Total Time Spent on Date of Encounter in care of patient: 30+ mins. This time was spent on one or more of the following: performing physical exam; counseling and coordination of care; obtaining or reviewing history; documenting in the medical record; reviewing/ordering tests, medications or procedures; communicating with other healthcare professionals and discussing with patient's family/caregivers.    Current Length of Stay: 3 day(s)  Current Patient Status: Inpatient   Certification Statement: The patient will continue to require additional inpatient hospital stay due to plan as noted above    Discharge Plan: Anticipate discharge in 24-48 hrs to discharge location to be determined pending rehab evaluations.    Code Status: Level 1 - Full Code    Subjective:  Offers no new complaints at this time. No acute events reported overnight. Understanding of plan.  All questions answered.    Objective:     Vitals:   Temp (24hrs), Av.2 °F (37.9 °C), Min:97.8 °F (36.6 °C), Max:102.8 °F (39.3 °C)    Temp:  [97.8 °F (36.6 °C)-102.8 °F (39.3 °C)] 97.8 °F (36.6 °C)  HR:  [] 77  BP: (107-131)/(53-80) 107/53  SpO2:  [95 %-96 %] 96 %  Body mass index is 29.19 kg/m².     Input and Output Summary (last 24 hours):     Intake/Output Summary (Last 24 hours) at 2024 1224  Last data filed at 2024 0539  Gross per 24 hour   Intake 600 ml   Output 1550 ml   Net -950 ml       Physical Exam:   Physical Exam  Vitals and nursing note reviewed.   Constitutional:       General: She is not in acute distress.     Appearance: Normal appearance. She is not ill-appearing or toxic-appearing.   HENT:      Mouth/Throat:      Mouth: Mucous membranes are  moist.      Pharynx: Oropharynx is clear.   Eyes:      Extraocular Movements: Extraocular movements intact.   Cardiovascular:      Rate and Rhythm: Normal rate and regular rhythm.      Heart sounds: Murmur heard.   Pulmonary:      Effort: Pulmonary effort is normal. No respiratory distress.      Breath sounds: Normal breath sounds. No wheezing.   Abdominal:      General: There is no distension.      Palpations: Abdomen is soft.      Tenderness: There is abdominal tenderness in the suprapubic area.   Musculoskeletal:         General: No tenderness.   Skin:     General: Skin is warm and dry.      Capillary Refill: Capillary refill takes less than 2 seconds.   Neurological:      General: No focal deficit present.      Mental Status: She is alert.   Psychiatric:         Mood and Affect: Mood normal.         Behavior: Behavior normal.          Additional Data:     Labs:  Results from last 7 days   Lab Units 01/21/24  0535   WBC Thousand/uL 14.76*   HEMOGLOBIN g/dL 8.5*   HEMATOCRIT % 25.1*   PLATELETS Thousands/uL 312   NEUTROS PCT % 81*   LYMPHS PCT % 5*   MONOS PCT % 10   EOS PCT % 2     Results from last 7 days   Lab Units 01/21/24  0535   SODIUM mmol/L 130*   POTASSIUM mmol/L 4.3   CHLORIDE mmol/L 99   CO2 mmol/L 24   BUN mg/dL 29*   CREATININE mg/dL 1.79*   ANION GAP mmol/L 7   CALCIUM mg/dL 7.5*   ALBUMIN g/dL 2.5*   TOTAL BILIRUBIN mg/dL 0.55   ALK PHOS U/L 152*   ALT U/L 28   AST U/L 45*   GLUCOSE RANDOM mg/dL 90                 Results from last 7 days   Lab Units 01/19/24  1320 01/19/24  1027 01/18/24  1128 01/18/24  0937   LACTIC ACID mmol/L 2.3* 4.2* 1.5 2.8*       Lines/Drains:  Invasive Devices       Peripheral Intravenous Line  Duration             Peripheral IV 01/18/24 Left;Ventral (anterior) Forearm 3 days    Peripheral IV 01/19/24 Dorsal (posterior);Left Forearm 1 day              Drain  Duration             Urethral Catheter Non-latex;Double-lumen 16 Fr. 44 days    Urethral Catheter Double-lumen 16  Fr. 3 days                  Urinary Catheter:  Goal for removal: N/A- Discharging with Carrera               Imaging: Reviewed radiology reports from this admission including:   US kidney and bladder    Result Date: 1/18/2024  Impression: 1. Moderate left hydronephrosis which is worsened from most recent comparison ultrasound of 12/31/2023. Consider further characterization with CT abdomen pelvis to evaluate for the presence of left ureteral calculi. 2. Bilateral renal calculi. 3. Urinary bladder is decompressed around a Carrera catheter, limiting evaluation. The study was marked in EPIC for immediate notification. Workstation performed: [id number]       No Chest XR results available for this patient.     No results found for this or any previous visit.      Recent Cultures (last 7 days):   Results from last 7 days   Lab Units 01/19/24  2224 01/18/24  1045 01/18/24  1035 01/18/24  0854   BLOOD CULTURE   --  No Growth at 48 hrs. No Growth at 48 hrs.  --    URINE CULTURE   --   --   --  80,000-89,000 cfu/ml Pseudomonas aeruginosa*   C DIFF TOXIN B BY PCR  Negative  --   --   --        Last 24 Hours Medication List:   Current Facility-Administered Medications   Medication Dose Route Frequency Provider Last Rate    acetaminophen  1,000 mg Intravenous Q6H PRN Avel Herreraan, DO 1,000 mg (01/20/24 1552)    ascorbic acid  500 mg Oral Daily Avel Vega, DO      calcium carbonate-vitamin D  1 tablet Oral BID With Meals Avel Vega, DO      diphenhydrAMINE  25 mg Intravenous Q6H PRN Avel Herreraan, DO      docusate sodium  100 mg Oral BID Avel Herreraan, DO      folic acid  1 mg Oral Daily Avel Herreraan, DO      heparin (porcine)  5,000 Units Subcutaneous Q8H [facility] Avel Vega, DO      [START ON 1/22/2024] levofloxacin  250 mg Oral Q24H Avel Herreraan, DO      levothyroxine  75 mcg Oral Daily Avel Herreraan, DO      magnesium Oxide  400 mg Oral BID Avel Herreraan, DO       magnesium sulfate  2 g Intravenous Once Avel Vega DO 2 g (01/21/24 1043)    multi-electrolyte  100 mL/hr Intravenous Continuous Avel Vega  mL/hr (01/21/24 9473)    multivitamin stress formula  1 tablet Oral Daily Avel Vega,       ondansetron  4 mg Intravenous Q6H PRN Avel Vega DO      pantoprazole  40 mg Oral BID Avel Vega DO      polyethylene glycol  17 g Oral Daily PRN Avel Vega DO      pravastatin  40 mg Oral Daily With Dinner Avel Vega DO      psyllium  1 packet Oral Daily Avel Vega,       simethicone  80 mg Oral 4x Daily PRN Avel Vega DO          Today, Patient Was Seen By: Avel Vega DO    **Please Note: This note may have been constructed using a voice recognition system.**

## 2024-01-22 LAB
ANION GAP SERPL CALCULATED.3IONS-SCNC: 6 MMOL/L
BUN SERPL-MCNC: 27 MG/DL (ref 5–25)
CALCIUM SERPL-MCNC: 7.5 MG/DL (ref 8.4–10.2)
CHLORIDE SERPL-SCNC: 99 MMOL/L (ref 96–108)
CO2 SERPL-SCNC: 25 MMOL/L (ref 21–32)
CREAT SERPL-MCNC: 1.76 MG/DL (ref 0.6–1.3)
ERYTHROCYTE [DISTWIDTH] IN BLOOD BY AUTOMATED COUNT: 17.6 % (ref 11.6–15.1)
GFR SERPL CREATININE-BSD FRML MDRD: 26 ML/MIN/1.73SQ M
GLUCOSE SERPL-MCNC: 90 MG/DL (ref 65–140)
HCT VFR BLD AUTO: 22.8 % (ref 34.8–46.1)
HGB BLD-MCNC: 7.7 G/DL (ref 11.5–15.4)
MAGNESIUM SERPL-MCNC: 2 MG/DL (ref 1.9–2.7)
MCH RBC QN AUTO: 29.6 PG (ref 26.8–34.3)
MCHC RBC AUTO-ENTMCNC: 33.8 G/DL (ref 31.4–37.4)
MCV RBC AUTO: 88 FL (ref 82–98)
PLATELET # BLD AUTO: 302 THOUSANDS/UL (ref 149–390)
PMV BLD AUTO: 8.8 FL (ref 8.9–12.7)
POTASSIUM SERPL-SCNC: 4.1 MMOL/L (ref 3.5–5.3)
RBC # BLD AUTO: 2.6 MILLION/UL (ref 3.81–5.12)
SODIUM SERPL-SCNC: 130 MMOL/L (ref 135–147)
WBC # BLD AUTO: 10.96 THOUSAND/UL (ref 4.31–10.16)

## 2024-01-22 PROCEDURE — 85027 COMPLETE CBC AUTOMATED: CPT | Performed by: INTERNAL MEDICINE

## 2024-01-22 PROCEDURE — 99233 SBSQ HOSP IP/OBS HIGH 50: CPT | Performed by: INTERNAL MEDICINE

## 2024-01-22 PROCEDURE — 80048 BASIC METABOLIC PNL TOTAL CA: CPT | Performed by: INTERNAL MEDICINE

## 2024-01-22 PROCEDURE — 83735 ASSAY OF MAGNESIUM: CPT | Performed by: INTERNAL MEDICINE

## 2024-01-22 RX ADMIN — Medication 1 TABLET: at 08:20

## 2024-01-22 RX ADMIN — Medication 400 MG: at 08:19

## 2024-01-22 RX ADMIN — LEVOTHYROXINE SODIUM 75 MCG: 0.07 TABLET ORAL at 08:19

## 2024-01-22 RX ADMIN — PRAVASTATIN SODIUM 40 MG: 40 TABLET ORAL at 16:19

## 2024-01-22 RX ADMIN — OXYCODONE HYDROCHLORIDE AND ACETAMINOPHEN 500 MG: 500 TABLET ORAL at 08:19

## 2024-01-22 RX ADMIN — Medication 1 TABLET: at 16:19

## 2024-01-22 RX ADMIN — SODIUM CHLORIDE, SODIUM GLUCONATE, SODIUM ACETATE, POTASSIUM CHLORIDE, MAGNESIUM CHLORIDE, SODIUM PHOSPHATE, DIBASIC, AND POTASSIUM PHOSPHATE 50 ML/HR: .53; .5; .37; .037; .03; .012; .00082 INJECTION, SOLUTION INTRAVENOUS at 20:42

## 2024-01-22 RX ADMIN — Medication 400 MG: at 16:20

## 2024-01-22 RX ADMIN — HEPARIN SODIUM 5000 UNITS: 5000 INJECTION INTRAVENOUS; SUBCUTANEOUS at 13:33

## 2024-01-22 RX ADMIN — HEPARIN SODIUM 5000 UNITS: 5000 INJECTION INTRAVENOUS; SUBCUTANEOUS at 22:41

## 2024-01-22 RX ADMIN — LEVOFLOXACIN 250 MG: 250 TABLET, FILM COATED ORAL at 09:42

## 2024-01-22 RX ADMIN — HEPARIN SODIUM 5000 UNITS: 5000 INJECTION INTRAVENOUS; SUBCUTANEOUS at 06:12

## 2024-01-22 RX ADMIN — DOCUSATE SODIUM 100 MG: 100 CAPSULE, LIQUID FILLED ORAL at 16:21

## 2024-01-22 RX ADMIN — FOLIC ACID 1 MG: 1 TABLET ORAL at 08:20

## 2024-01-22 RX ADMIN — SODIUM CHLORIDE, SODIUM GLUCONATE, SODIUM ACETATE, POTASSIUM CHLORIDE, MAGNESIUM CHLORIDE, SODIUM PHOSPHATE, DIBASIC, AND POTASSIUM PHOSPHATE 100 ML/HR: .53; .5; .37; .037; .03; .012; .00082 INJECTION, SOLUTION INTRAVENOUS at 07:24

## 2024-01-22 RX ADMIN — PANTOPRAZOLE SODIUM 40 MG: 40 TABLET, DELAYED RELEASE ORAL at 16:20

## 2024-01-22 RX ADMIN — PANTOPRAZOLE SODIUM 40 MG: 40 TABLET, DELAYED RELEASE ORAL at 08:20

## 2024-01-22 RX ADMIN — B-COMPLEX W/ C & FOLIC ACID TAB 1 TABLET: TAB at 08:19

## 2024-01-22 NOTE — CASE MANAGEMENT
Case Management Discharge Planning Note    Patient name Samira Allred  [facility] /-01 MRN [id number]  : 1940 Date 2024       Current Admission Date: 2024  Current Admission Diagnosis:MARCELO (acute kidney injury) ([facility])   Patient Active Problem List    Diagnosis Date Noted    Hyponatremia 2024    UTI (urinary tract infection) 2024    PUD (peptic ulcer disease) 2023    Aortic valve stenosis, moderate 2023    Severe protein-calorie malnutrition ([facility]) 2023    GI bleed 2023    Hydronephrosis 2023    Urinary retention 2023    Sepsis ([facility]) 2023    MARCELO (acute kidney injury) ([facility]) 2023    Protein-calorie malnutrition, unspecified severity ([facility]) 2023    Chronic renal disease, stage IV ([facility]) 2023    Chronic pain of both knees 08/10/2022    Chronic kidney disease-mineral and bone disorder 2022    Anemia 2021    Stage 3b chronic kidney disease (HCC) 2021    History of DVT (deep vein thrombosis) 2021    Varicose veins of left lower extremity with pain 2020    Lower leg DVT (deep venous thromboembolism), chronic, left ([facility]) 2020    Intermediate stage nonexudative 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 eyes 2019    Functional diarrhea 2019    History of peptic ulcer 10/11/2018    Fat necrosis (segmental) of breast 2018    Personal history of breast cancer 2018    Localized edema 2018    Hypothyroidism 2018    Hyperuricemia 2018    Hypertension 2018    Rheumatoid arthritis (HCC) 2018    Diastolic dysfunction 2017    Vitamin D deficiency 2014    Lumbar radiculopathy 2013    Hyperlipidemia 2013    Osteopenia 2013    Other chronic pain 2013    Adenocarcinoma of breast (HCC) 2013      LOS (days): 4  Geometric Mean LOS (GMLOS) (days): 3.1  Days to GMLOS:-1.1     OBJECTIVE:  Risk of Unplanned Readmission Score:  37.89         Current admission status: Inpatient   Preferred Pharmacy:   [facility]/pharmacy #[id number] - [place] - [address]  [address]  [place] [address]  Phone: [phone] Fax: [phone]    [facility] - [place] - [address] [facility]  [address]  [place] [address]  Phone: [phone] Fax: [phone]    Primary Care Provider: Layne Bacon MD    Primary Insurance: MEDICARE  Secondary Insurance: BLUE CROSS    DISCHARGE DETAILS:     IMM Given (Date):: 01/22/24  IMM Given to:: Patient (CM reviewed IMM with patient at bedside. Patient reported understanding their rights and denied any questions or concerns Patient was given a copy and signed copy was placed in medical records.)

## 2024-01-22 NOTE — PLAN OF CARE
Problem: INFECTION - ADULT  Goal: Absence or prevention of progression during hospitalization  Description: INTERVENTIONS:  - Assess and monitor for signs and symptoms of infection  - Monitor lab/diagnostic results  - Monitor all insertion sites, i.e. indwelling lines, tubes, and drains  - Monitor endotracheal if appropriate and nasal secretions for changes in amount and color  - [place] appropriate cooling/warming therapies per order  - Administer medications as ordered  - Instruct and encourage patient and family to use good hand hygiene technique  - Identify and instruct in appropriate isolation precautions for identified infection/condition  1/22/2024 0217 by Nicolas Alonso RN  Outcome: Progressing  1/22/2024 0217 by Nicolas Alonso RN  Outcome: Progressing

## 2024-01-22 NOTE — PROGRESS NOTES
[facility]   Progress Note  Name: Samira Allred I  MRN: [id number]  Unit/Bed#: -01 I Date of Admission: 1/18/2024   Date of Service: 1/22/2024 I Hospital Day: 4    * MARCELO (acute kidney injury) (HCC)  Assessment & Plan  Recent Labs     01/20/24  0922 01/21/24  0535 01/22/24  0455   CREATININE 1.90* 1.79* 1.76*   EGFR 24 26 26     Estimated Creatinine Clearance: 23 mL/min (A) (by C-G formula based on SCr of 1.76 mg/dL (H)).    Baseline CKD IV with creatinine of 1.5  MARCELO likely postrenal 2/2 urinary retention; improving    Plan  Herrera catheter in place  Refill hydration plasmalyte 100 mL/h  Continue to trend BMP    Sepsis (HCC)  Assessment & Plan  Concern for Sepsis as evidenced by leukocytosis, tachycardia  Suspected source: UTI    Recent Labs     01/20/24  0922 01/21/24  0535 01/22/24  0455   WBC 20.72* 14.76* 10.96*     Recent Labs     01/19/24  1027 01/19/24  1320   LACTICACID 4.2* 2.3*     WBC may be reactive 2/2 herrera placement and bladder decompression  Currently down trending leukocytosis  Currently Afrebile, no complaints of fever or chills  Normotensive, not tachycardic    Plan:  Urine clx follow  Abx change antibiotics from cefepime to Levaquin; fevers from 1/20 was likely secondary to cefepime  IV Fluid hydration      UTI (urinary tract infection)  Assessment & Plan  Urine culture positive for Pseudomonas    Plan  Change Abx to levaquin 500 mg once then 250 mg qd given renal function  Add benadryl with doses given concern for rash reaction  Monitor for signs of systemic infection    Anemia  Assessment & Plan  Recent Labs     01/20/24  0922 01/21/24  0535 01/22/24  0455   HGB 10.3* 8.5* 7.7*     Chronically anemic   Likely 2/2 CKD  Down to 7.7 from 10.3 in last 2 days  No signs or symptoms of hemorrhage    Plan  Continue to trend CBC  Consider transfusion if Hgb <7    Functional diarrhea  Assessment & Plan  Patient endorsed diarrhea for the past month and continues to  experience  She has been mostly continent of brown liquid stool that occurs typically once a day  Denies melena  Does not wake patient up at night  Recent hospitalization with antibiotic administration for urosepsis with pseudomonas  C.Diff Negative    Plan  Continue IV hydration   Continue metamucil      Chronic renal disease, stage IV (HCC)  Assessment & Plan  Lab Results   Component Value Date    EGFR 26 01/22/2024    EGFR 26 01/21/2024    EGFR 24 01/20/2024    CREATININE 1.76 (H) 01/22/2024    CREATININE 1.79 (H) 01/21/2024    CREATININE 1.90 (H) 01/20/2024     Baseline creatinine around 1.5  Currently presenting with MARCELO on CKD in the setting of urinary retention    Plan  Continue to monitor BMP for worsening CKD    Hyponatremia  Assessment & Plan  Recent Labs     01/20/24  0922 01/21/24  0535 01/22/24  0455   SODIUM 129* 130* 130*     Lab Results   Component Value Date    OSMOUA 360 01/18/2024    NAUR 84 01/18/2024    OSMOLALITSER 293 01/18/2024       Hypervolemic/volume overload  Likely hypervolemic hyponatremia due to obstruction    Plan:  Goal sodium increase 6-8 in 24 hours  Avoid over-correction to prevent osmotic demyelination syndrome  Encourage adequate oral intake  Monitor BMP qd      Urinary retention  Assessment & Plan  Herrera removed around 2 weeks ago.  Was in place for retention  Had been urinating fine for the past 2 weeks but experienced suprapubic fullness/pain with inability to void this morning which prompted ED visit  Presents with UTI and MARCELO both likely 2/2 urinary retention  US showing moderate left hydro worsened from prior US    Plan  Keep herrera in place; may be chronic herrera per urology  Monitor for patency of herrera catheter          VTE Pharmacologic Prophylaxis: VTE Score: 4 Moderate Risk (Score 3-4) - Pharmacological DVT Prophylaxis Ordered: heparin.    Mobility:   Basic Mobility Inpatient Raw Score: 21  -[facility] Goal: 6: Walk 10 steps or more  -[facility] Achieved: 6: Walk 10 steps or  more  HLM Goal achieved. Continue to encourage appropriate mobility.    Patient Centered Rounds: I performed bedside rounds with nursing staff today.  Discussions with Specialists or Other Care Team Provider:  IP CONSULT TO UROLOGY      Education and Discussions with Family / Patient: patient     Total Time Spent on Date of Encounter in care of patient: 30+ mins. This time was spent on one or more of the following: performing physical exam; counseling and coordination of care; obtaining or reviewing history; documenting in the medical record; reviewing/ordering tests, medications or procedures; communicating with other healthcare professionals and discussing with patient's family/caregivers.    Current Length of Stay: 4 day(s)  Current Patient Status: Inpatient   Certification Statement: The patient will continue to require additional inpatient hospital stay due to plan as noted above  Discharge Plan: Anticipate discharge tomorrow to home with home services.    Code Status: Level 1 - Full Code    Subjective:   Continues to complain of lower abd pain that is intermittent. No acute events reported overnight. Understanding of plan.  All questions answered.    Objective:     Vitals:   Temp (24hrs), Av.9 °F (37.2 °C), Min:98.6 °F (37 °C), Max:99.2 °F (37.3 °C)    Temp:  [98.6 °F (37 °C)-99.2 °F (37.3 °C)] 98.8 °F (37.1 °C)  HR:  [] 80  Resp:  [15] 15  BP: (115-127)/(57-64) 127/57  SpO2:  [96 %-97 %] 96 %  Body mass index is 29.19 kg/m².     Input and Output Summary (last 24 hours):     Intake/Output Summary (Last 24 hours) at 2024 1217  Last data filed at 2024 0700  Gross per 24 hour   Intake --   Output 1950 ml   Net -1950 ml       Physical Exam:   Physical Exam  Vitals and nursing note reviewed.   Constitutional:       General: She is not in acute distress.     Appearance: Normal appearance. She is not ill-appearing or toxic-appearing.   HENT:      Mouth/Throat:      Mouth: Mucous membranes are  moist.      Pharynx: Oropharynx is clear.   Eyes:      Extraocular Movements: Extraocular movements intact.   Cardiovascular:      Rate and Rhythm: Normal rate and regular rhythm.      Heart sounds: Murmur heard.   Pulmonary:      Effort: Pulmonary effort is normal. No respiratory distress.      Breath sounds: Normal breath sounds. No wheezing.   Abdominal:      General: There is no distension.      Palpations: Abdomen is soft.      Tenderness: There is abdominal tenderness in the suprapubic area.   Musculoskeletal:         General: No tenderness.   Skin:     General: Skin is warm and dry.      Capillary Refill: Capillary refill takes less than 2 seconds.   Neurological:      General: No focal deficit present.      Mental Status: She is alert.   Psychiatric:         Mood and Affect: Mood normal.         Behavior: Behavior normal.        Additional Data:     Labs:  Results from last 7 days   Lab Units 01/22/24  0455 01/21/24  0535   WBC Thousand/uL 10.96* 14.76*   HEMOGLOBIN g/dL 7.7* 8.5*   HEMATOCRIT % 22.8* 25.1*   PLATELETS Thousands/uL 302 312   NEUTROS PCT %  --  81*   LYMPHS PCT %  --  5*   MONOS PCT %  --  10   EOS PCT %  --  2     Results from last 7 days   Lab Units 01/22/24  0455 01/21/24  0535   SODIUM mmol/L 130* 130*   POTASSIUM mmol/L 4.1 4.3   CHLORIDE mmol/L 99 99   CO2 mmol/L 25 24   BUN mg/dL 27* 29*   CREATININE mg/dL 1.76* 1.79*   ANION GAP mmol/L 6 7   CALCIUM mg/dL 7.5* 7.5*   ALBUMIN g/dL  --  2.5*   TOTAL BILIRUBIN mg/dL  --  0.55   ALK PHOS U/L  --  152*   ALT U/L  --  28   AST U/L  --  45*   GLUCOSE RANDOM mg/dL 90 90                 Results from last 7 days   Lab Units 01/19/24  1320 01/19/24  1027 01/18/24  1128 01/18/24  0937   LACTIC ACID mmol/L 2.3* 4.2* 1.5 2.8*       Lines/Drains:  Invasive Devices       Peripheral Intravenous Line  Duration             Peripheral IV 01/18/24 Left;Ventral (anterior) Forearm 4 days    Peripheral IV 01/19/24 Dorsal (posterior);Left Forearm 2 days               Drain  Duration             Urethral Catheter Non-latex;Double-lumen 16 Fr. 45 days                  Urinary Catheter:  Goal for removal: N/A- Discharging with Carrera               Imaging: Reviewed radiology reports from this admission including:   US kidney and bladder    Result Date: 1/18/2024  Impression: 1. Moderate left hydronephrosis which is worsened from most recent comparison ultrasound of 12/31/2023. Consider further characterization with CT abdomen pelvis to evaluate for the presence of left ureteral calculi. 2. Bilateral renal calculi. 3. Urinary bladder is decompressed around a Carrera catheter, limiting evaluation. The study was marked in EPIC for immediate notification. Workstation performed: [id number]       No Chest XR results available for this patient.     No results found for this or any previous visit.      Recent Cultures (last 7 days):   Results from last 7 days   Lab Units 01/19/24  2224 01/18/24  1045 01/18/24  1035 01/18/24  0854   BLOOD CULTURE   --  No Growth at 72 hrs. No Growth at 72 hrs.  --    URINE CULTURE   --   --   --  80,000-89,000 cfu/ml Pseudomonas aeruginosa*   C DIFF TOXIN B BY PCR  Negative  --   --   --        Last 24 Hours Medication List:   Current Facility-Administered Medications   Medication Dose Route Frequency Provider Last Rate    acetaminophen  1,000 mg Intravenous Q6H PRN Avel Herreraan, DO 1,000 mg (01/20/24 1552)    ascorbic acid  500 mg Oral Daily Avel Herreraan, DO      calcium carbonate-vitamin D  1 tablet Oral BID With Meals Avel Vega, DO      diphenhydrAMINE  25 mg Intravenous Q6H PRN Avel Herreraan, DO      docusate sodium  100 mg Oral BID Avel Herreraan, DO      folic acid  1 mg Oral Daily Aevl Herreraan, DO      heparin (porcine)  5,000 Units Subcutaneous Q8H Pending sale to [facility] Avel Herreraan, DO      levofloxacin  250 mg Oral Q24H Avel Herreraan, DO      levothyroxine  75 mcg Oral Daily Avel Quintanawaran, DO       magnesium Oxide  400 mg Oral BID Avel Gary,       multi-electrolyte  100 mL/hr Intravenous Continuous Avel Vega  mL/hr (01/22/24 0724)    multivitamin stress formula  1 tablet Oral Daily Avel Gary, DO      ondansetron  4 mg Intravenous Q6H PRN Avel Gary, DO      pantoprazole  40 mg Oral BID Avel Gary, DO      polyethylene glycol  17 g Oral Daily PRN Avel Gary, DO      pravastatin  40 mg Oral Daily With Dinner Avel Vega, DO      psyllium  1 packet Oral Daily Avel Gary, DO      simethicone  80 mg Oral 4x Daily PRN Avel Gary,           Today, Patient Was Seen By: Avel Vega DO    **Please Note: This note may have been constructed using a voice recognition system.**

## 2024-01-22 NOTE — PLAN OF CARE
Problem: INFECTION - ADULT  Goal: Absence or prevention of progression during hospitalization  Description: INTERVENTIONS:  - Assess and monitor for signs and symptoms of infection  - Monitor lab/diagnostic results  - Monitor all insertion sites, i.e. indwelling lines, tubes, and drains  - Monitor endotracheal if appropriate and nasal secretions for changes in amount and color  - [place] appropriate cooling/warming therapies per order  - Administer medications as ordered  - Instruct and encourage patient and family to use good hand hygiene technique  - Identify and instruct in appropriate isolation precautions for identified infection/condition  Outcome: Progressing

## 2024-01-23 VITALS
HEIGHT: 62 IN | TEMPERATURE: 98.6 F | WEIGHT: 156.53 LBS | BODY MASS INDEX: 28.8 KG/M2 | SYSTOLIC BLOOD PRESSURE: 124 MMHG | DIASTOLIC BLOOD PRESSURE: 66 MMHG | RESPIRATION RATE: 15 BRPM | HEART RATE: 90 BPM | OXYGEN SATURATION: 97 %

## 2024-01-23 LAB
ALBUMIN SERPL BCP-MCNC: 2.3 G/DL (ref 3.5–5)
ALP SERPL-CCNC: 162 U/L (ref 34–104)
ALT SERPL W P-5'-P-CCNC: 57 U/L (ref 7–52)
ANION GAP SERPL CALCULATED.3IONS-SCNC: 7 MMOL/L
AST SERPL W P-5'-P-CCNC: 106 U/L (ref 13–39)
BACTERIA BLD CULT: NORMAL
BACTERIA BLD CULT: NORMAL
BASOPHILS # BLD AUTO: 0.03 THOUSANDS/ÂΜL (ref 0–0.1)
BASOPHILS NFR BLD AUTO: 0 % (ref 0–1)
BILIRUB SERPL-MCNC: 0.48 MG/DL (ref 0.2–1)
BUN SERPL-MCNC: 26 MG/DL (ref 5–25)
CALCIUM ALBUM COR SERPL-MCNC: 8.9 MG/DL (ref 8.3–10.1)
CALCIUM SERPL-MCNC: 7.5 MG/DL (ref 8.4–10.2)
CHLORIDE SERPL-SCNC: 101 MMOL/L (ref 96–108)
CO2 SERPL-SCNC: 24 MMOL/L (ref 21–32)
CREAT SERPL-MCNC: 1.84 MG/DL (ref 0.6–1.3)
EOSINOPHIL # BLD AUTO: 0.34 THOUSAND/ÂΜL (ref 0–0.61)
EOSINOPHIL NFR BLD AUTO: 3 % (ref 0–6)
ERYTHROCYTE [DISTWIDTH] IN BLOOD BY AUTOMATED COUNT: 17.7 % (ref 11.6–15.1)
GFR SERPL CREATININE-BSD FRML MDRD: 25 ML/MIN/1.73SQ M
GLUCOSE SERPL-MCNC: 87 MG/DL (ref 65–140)
HCT VFR BLD AUTO: 23.7 % (ref 34.8–46.1)
HGB BLD-MCNC: 7.9 G/DL (ref 11.5–15.4)
IMM GRANULOCYTES # BLD AUTO: 0.23 THOUSAND/UL (ref 0–0.2)
IMM GRANULOCYTES NFR BLD AUTO: 2 % (ref 0–2)
LYMPHOCYTES # BLD AUTO: 1.09 THOUSANDS/ÂΜL (ref 0.6–4.47)
LYMPHOCYTES NFR BLD AUTO: 11 % (ref 14–44)
MAGNESIUM SERPL-MCNC: 1.8 MG/DL (ref 1.9–2.7)
MCH RBC QN AUTO: 29.7 PG (ref 26.8–34.3)
MCHC RBC AUTO-ENTMCNC: 33.3 G/DL (ref 31.4–37.4)
MCV RBC AUTO: 89 FL (ref 82–98)
MONOCYTES # BLD AUTO: 1.17 THOUSAND/ÂΜL (ref 0.17–1.22)
MONOCYTES NFR BLD AUTO: 11 % (ref 4–12)
NEUTROPHILS # BLD AUTO: 7.38 THOUSANDS/ÂΜL (ref 1.85–7.62)
NEUTS SEG NFR BLD AUTO: 73 % (ref 43–75)
NRBC BLD AUTO-RTO: 0 /100 WBCS
PLATELET # BLD AUTO: 331 THOUSANDS/UL (ref 149–390)
PMV BLD AUTO: 9.1 FL (ref 8.9–12.7)
POTASSIUM SERPL-SCNC: 4 MMOL/L (ref 3.5–5.3)
PROT SERPL-MCNC: 4.2 G/DL (ref 6.4–8.4)
RBC # BLD AUTO: 2.66 MILLION/UL (ref 3.81–5.12)
SODIUM SERPL-SCNC: 132 MMOL/L (ref 135–147)
WBC # BLD AUTO: 10.24 THOUSAND/UL (ref 4.31–10.16)

## 2024-01-23 PROCEDURE — 80053 COMPREHEN METABOLIC PANEL: CPT | Performed by: INTERNAL MEDICINE

## 2024-01-23 PROCEDURE — 99239 HOSP IP/OBS DSCHRG MGMT >30: CPT | Performed by: FAMILY MEDICINE

## 2024-01-23 PROCEDURE — 85025 COMPLETE CBC W/AUTO DIFF WBC: CPT | Performed by: INTERNAL MEDICINE

## 2024-01-23 PROCEDURE — 83735 ASSAY OF MAGNESIUM: CPT | Performed by: INTERNAL MEDICINE

## 2024-01-23 RX ORDER — SIMETHICONE 80 MG
80 TABLET,CHEWABLE ORAL 4 TIMES DAILY PRN
Qty: 30 TABLET | Refills: 0 | Status: SHIPPED | OUTPATIENT
Start: 2024-01-23

## 2024-01-23 RX ORDER — LEVOTHYROXINE SODIUM 0.07 MG/1
75 TABLET ORAL
Status: DISCONTINUED | OUTPATIENT
Start: 2024-01-24 | End: 2024-01-23 | Stop reason: HOSPADM

## 2024-01-23 RX ORDER — MAGNESIUM SULFATE HEPTAHYDRATE 40 MG/ML
2 INJECTION, SOLUTION INTRAVENOUS ONCE
Status: COMPLETED | OUTPATIENT
Start: 2024-01-23 | End: 2024-01-23

## 2024-01-23 RX ORDER — LEVOFLOXACIN 250 MG/1
250 TABLET, FILM COATED ORAL EVERY 24 HOURS
Qty: 2 TABLET | Refills: 0 | Status: SHIPPED | OUTPATIENT
Start: 2024-01-24 | End: 2024-01-26

## 2024-01-23 RX ADMIN — MAGNESIUM SULFATE HEPTAHYDRATE 2 G: 40 INJECTION, SOLUTION INTRAVENOUS at 13:20

## 2024-01-23 RX ADMIN — LEVOTHYROXINE SODIUM 75 MCG: 0.07 TABLET ORAL at 09:23

## 2024-01-23 RX ADMIN — FOLIC ACID 1 MG: 1 TABLET ORAL at 09:23

## 2024-01-23 RX ADMIN — Medication 400 MG: at 09:23

## 2024-01-23 RX ADMIN — PANTOPRAZOLE SODIUM 40 MG: 40 TABLET, DELAYED RELEASE ORAL at 09:23

## 2024-01-23 RX ADMIN — Medication 1 TABLET: at 09:23

## 2024-01-23 RX ADMIN — B-COMPLEX W/ C & FOLIC ACID TAB 1 TABLET: TAB at 09:23

## 2024-01-23 RX ADMIN — HEPARIN SODIUM 5000 UNITS: 5000 INJECTION INTRAVENOUS; SUBCUTANEOUS at 05:19

## 2024-01-23 RX ADMIN — OXYCODONE HYDROCHLORIDE AND ACETAMINOPHEN 500 MG: 500 TABLET ORAL at 09:41

## 2024-01-23 RX ADMIN — LEVOFLOXACIN 250 MG: 250 TABLET, FILM COATED ORAL at 09:23

## 2024-01-23 RX ADMIN — HEPARIN SODIUM 5000 UNITS: 5000 INJECTION INTRAVENOUS; SUBCUTANEOUS at 13:20

## 2024-01-23 NOTE — PLAN OF CARE
Problem: PAIN - ADULT  Goal: Verbalizes/displays adequate comfort level or baseline comfort level  Description: Interventions:  - Encourage patient to monitor pain and request assistance  - Assess pain using appropriate pain scale  - Administer analgesics based on type and severity of pain and evaluate response  - Implement non-pharmacological measures as appropriate and evaluate response  - Consider cultural and social influences on pain and pain management  - Notify physician/advanced practitioner if interventions unsuccessful or patient reports new pain  Outcome: Progressing     Problem: INFECTION - ADULT  Goal: Absence or prevention of progression during hospitalization  Description: INTERVENTIONS:  - Assess and monitor for signs and symptoms of infection  - Monitor lab/diagnostic results  - Monitor all insertion sites, i.e. indwelling lines, tubes, and drains  - Monitor endotracheal if appropriate and nasal secretions for changes in amount and color  - [place] appropriate cooling/warming therapies per order  - Administer medications as ordered  - Instruct and encourage patient and family to use good hand hygiene technique  - Identify and instruct in appropriate isolation precautions for identified infection/condition  Outcome: Progressing  Goal: Absence of fever/infection during neutropenic period  Description: INTERVENTIONS:  - Monitor WBC    Outcome: Progressing     Problem: SAFETY ADULT  Goal: Patient will remain free of falls  Description: INTERVENTIONS:  - Educate patient/family on patient safety including physical limitations  - Instruct patient to call for assistance with activity   - Consult OT/PT to assist with strengthening/mobility   - Keep Call bell within reach  - Keep bed low and locked with side rails adjusted as appropriate  - Keep care items and personal belongings within reach  - Initiate and maintain comfort rounds  - Make Fall Risk Sign visible to staff  - Offer Toileting every 2 Hours,  in advance of need  - Initiate/Maintain bed chair alarm  - Obtain necessary fall risk management equipment:   - Apply yellow socks and bracelet for high fall risk patients  - Consider moving patient to room near nurses station  Outcome: Progressing  Goal: Maintain or return to baseline ADL function  Description: INTERVENTIONS:  -  Assess patient's ability to carry out ADLs; assess patient's baseline for ADL function and identify physical deficits which impact ability to perform ADLs (bathing, care of mouth/teeth, toileting, grooming, dressing, etc.)  - Assess/evaluate cause of self-care deficits   - Assess range of motion  - Assess patient's mobility; develop plan if impaired  - Assess patient's need for assistive devices and provide as appropriate  - Encourage maximum independence but intervene and supervise when necessary  - Involve family in performance of ADLs  - Assess for home care needs following discharge   - Consider OT consult to assist with ADL evaluation and planning for discharge  - Provide patient education as appropriate  Outcome: Progressing  Goal: Maintains/Returns to pre admission functional level  Description: INTERVENTIONS:  - Perform AM-PAC 6 Click Basic Mobility/ Daily Activity assessment daily.  - Set and communicate daily mobility goal to care team and patient/family/caregiver.   - Collaborate with rehabilitation services on mobility goals if consulted  - Perform Range of Motion 2 times a day.  - Reposition patient every 2 hours.  - Dangle patient 2 times a day  - Stand patient 2 times a day  - Ambulate patient 2 times a day  - Out of bed to chair 2 times a day   - Out of bed for meals 2 times a day  - Out of bed for toileting  - Record patient progress and toleration of activity level   Outcome: Progressing     Problem: DISCHARGE PLANNING  Goal: Discharge to home or other facility with appropriate resources  Description: INTERVENTIONS:  - Identify barriers to discharge w/patient and  caregiver  - Arrange for needed discharge resources and transportation as appropriate  - Identify discharge learning needs (meds, wound care, etc.)  - Arrange for interpretive services to assist at discharge as needed  - Refer to Case Management Department for coordinating discharge planning if the patient needs post-hospital services based on physician/advanced practitioner order or complex needs related to functional status, cognitive ability, or social support system  Outcome: Progressing     Problem: Knowledge Deficit  Goal: Patient/family/caregiver demonstrates understanding of disease process, treatment plan, medications, and discharge instructions  Description: Complete learning assessment and assess knowledge base.  Interventions:  - Provide teaching at level of understanding  - Provide teaching via preferred learning methods  Outcome: Progressing     Problem: GASTROINTESTINAL - ADULT  Goal: Minimal or absence of nausea and/or vomiting  Description: INTERVENTIONS:  - Administer IV fluids if ordered to ensure adequate hydration  - Maintain NPO status until nausea and vomiting are resolved  - Nasogastric tube if ordered  - Administer ordered antiemetic medications as needed  - Provide nonpharmacologic comfort measures as appropriate  - Advance diet as tolerated, if ordered  - Consider nutrition services referral to assist patient with adequate nutrition and appropriate food choices  Outcome: Progressing  Goal: Maintains or returns to baseline bowel function  Description: INTERVENTIONS:  - Assess bowel function  - Encourage oral fluids to ensure adequate hydration  - Administer IV fluids if ordered to ensure adequate hydration  - Administer ordered medications as needed  - Encourage mobilization and activity  - Consider nutritional services referral to assist patient with adequate nutrition and appropriate food choices  Outcome: Progressing  Goal: Maintains adequate nutritional intake  Description:  INTERVENTIONS:  - Monitor percentage of each meal consumed  - Identify factors contributing to decreased intake, treat as appropriate  - Assist with meals as needed  - Monitor I&O, weight, and lab values if indicated  - Obtain nutrition services referral as needed  Outcome: Progressing  Goal: Oral mucous membranes remain intact  Description: INTERVENTIONS  - Assess oral mucosa and hygiene practices  - Implement preventative oral hygiene regimen  - Implement oral medicated treatments as ordered  - Initiate Nutrition services referral as needed  Outcome: Progressing     Problem: METABOLIC, FLUID AND ELECTROLYTES - ADULT  Goal: Electrolytes maintained within normal limits  Description: INTERVENTIONS:  - Monitor labs and assess patient for signs and symptoms of electrolyte imbalances  - Administer electrolyte replacement as ordered  - Monitor response to electrolyte replacements, including repeat lab results as appropriate  - Instruct patient on fluid and nutrition as appropriate  Outcome: Progressing  Goal: Fluid balance maintained  Description: INTERVENTIONS:  - Monitor labs   - Monitor I/O and WT  - Instruct patient on fluid and nutrition as appropriate  - Assess for signs & symptoms of volume excess or deficit  Outcome: Progressing     Problem: SKIN/TISSUE INTEGRITY - ADULT  Goal: Skin Integrity remains intact(Skin Breakdown Prevention)  Description: Assess:  -Perform [place] assessment every shift   -Clean and moisturize skin every shift/prn   -Inspect skin when repositioning, toileting, and assisting with ADLS  -Assess extremities for adequate circulation and sensation     Bed Management:  -Have minimal linens on bed & keep smooth, unwrinkled  -Change linens as needed when moist or perspiring  -Avoid sitting or lying in one position for more than 2 hours while in bed  -Keep HOB at 45degrees     Toileting:  -Offer bedside commode  -Assess for incontinence every 2 hrs   -Use incontinent care products after each  incontinent episode such as moisture barrier cream    Activity:  -Mobilize patient 2 times a day  -Encourage activity and walks on unit  -Encourage or provide ROM exercises   -Turn and reposition patient every 2 Hours  -Use appropriate equipment to lift or move patient in bed  -Instruct/ Assist with weight shifting every 2 hrs  when out of bed in chair  -Consider limitation of chair time 2 hour intervals    Skin Care:  -Avoid use of baby powder, tape, friction and shearing, hot water or constrictive clothing  -Relieve pressure over bony prominences using ehob waffle cushion when oob   -Do not massage red bony areas       Problem: MUSCULOSKELETAL - ADULT  Goal: Maintain or return mobility to safest level of function  Description: INTERVENTIONS:  - Assess patient's ability to carry out ADLs; assess patient's baseline for ADL function and identify physical deficits which impact ability to perform ADLs (bathing, care of mouth/teeth, toileting, grooming, dressing, etc.)  - Assess/evaluate cause of self-care deficits   - Assess range of motion  - Assess patient's mobility  - Assess patient's need for assistive devices and provide as appropriate  - Encourage maximum independence but intervene and supervise when necessary  - Involve family in performance of ADLs  - Assess for home care needs following discharge   - Consider OT consult to assist with ADL evaluation and planning for discharge  - Provide patient education as appropriate  Outcome: Progressing     Problem: Prexisting or High Potential for Compromised Skin Integrity  Goal: Skin integrity is maintained or improved  Description: INTERVENTIONS:  - Identify patients at risk for skin breakdown  - Assess and monitor skin integrity  - Assess and monitor nutrition and hydration status  - Monitor labs   - Assess for incontinence   - Turn and reposition patient  - Assist with mobility/ambulation  - Relieve pressure over bony prominences  - Avoid friction and shearing  -  Provide appropriate hygiene as needed including keeping skin clean and dry  - Evaluate need for skin moisturizer/barrier cream  - Collaborate with interdisciplinary team   - Patient/family teaching  - Consider wound care consult   Outcome: Progressing

## 2024-01-23 NOTE — PLAN OF CARE
Problem: PAIN - ADULT  Goal: Verbalizes/displays adequate comfort level or baseline comfort level  Description: Interventions:  - Encourage patient to monitor pain and request assistance  - Assess pain using appropriate pain scale  - Administer analgesics based on type and severity of pain and evaluate response  - Implement non-pharmacological measures as appropriate and evaluate response  - Consider cultural and social influences on pain and pain management  - Notify physician/advanced practitioner if interventions unsuccessful or patient reports new pain  Outcome: Progressing     Problem: SAFETY ADULT  Goal: Patient will remain free of falls  Description: INTERVENTIONS:  - Educate patient/family on patient safety including physical limitations  - Instruct patient to call for assistance with activity   - Consult OT/PT to assist with strengthening/mobility   - Keep Call bell within reach  - Keep bed low and locked with side rails adjusted as appropriate  - Keep care items and personal belongings within reach  - Initiate and maintain comfort rounds  - Make Fall Risk Sign visible to staff  - Offer Toileting every 2 Hours, in advance of need  - Initiate/Maintain alarm  - Obtain necessary fall risk management equipment  - Apply yellow socks and bracelet for high fall risk patients  - Consider moving patient to room near nurses station  Outcome: Progressing     Problem: DISCHARGE PLANNING  Goal: Discharge to home or other facility with appropriate resources  Description: INTERVENTIONS:  - Identify barriers to discharge w/patient and caregiver  - Arrange for needed discharge resources and transportation as appropriate  - Identify discharge learning needs (meds, wound care, etc.)  - Arrange for interpretive services to assist at discharge as needed  - Refer to Case Management Department for coordinating discharge planning if the patient needs post-hospital services based on physician/advanced practitioner order or  complex needs related to functional status, cognitive ability, or social support system  Outcome: Progressing

## 2024-01-23 NOTE — DISCHARGE SUMMARY
[facility]  Discharge- Samira Allred 1940, 83 y.o. female MRN: [id number]  Unit/Bed#: -[facility] Encounter: [id number]  Primary Care Provider: Layne Bacon MD   Date and time admitted to hospital: 1/18/2024  8:28 AM    * Sepsis (HCC)  Assessment & Plan  Secondary to UTI, Pseudomonas in the urine  Was treated initially with cefepime which was then transition to Levaquin after sensitivities obtained  SIRS criteria is now resolved  No fever    UTI (urinary tract infection)  Assessment & Plan  Urine culture positive for Pseudomonas    Plan  Change Abx to levaquin 500 mg once then 250 mg qd given renal function  Add benadryl with doses given concern for rash reaction  Monitor for signs of systemic infection    Hyponatremia  Assessment & Plan  Stable around 132 at discharge  Recommend outpatient follow-up with PCP within 1 week.  Renal functions to be obtained within 1 week  Asymptomatic from the standpoint  Secondary to poor p.o. versus SIADH      Urinary retention  Assessment & Plan  Carrera removed around 2 weeks ago.  Was in place for retention  Had been urinating fine for the past 2 weeks but experienced suprapubic fullness/pain with inability to void this morning which prompted ED visit  Presented with possible UTI and elevated renal function secondary to urinary retention  US showing moderate left hydro worsened from prior US  Urology saw the patient and Carrera catheter has been reinserted and is being discharged with it  Will need outpatient urology follow-up    Chronic renal disease, stage IV (HCC)  Assessment & Plan  Lab Results   Component Value Date    EGFR 26 01/22/2024    EGFR 26 01/21/2024    EGFR 24 01/20/2024    CREATININE 1.76 (H) 01/22/2024    CREATININE 1.79 (H) 01/21/2024    CREATININE 1.90 (H) 01/20/2024     Baseline creatinine around 1.5-1.75  Renal functions around baseline  Continue outpatient follow-up  Levaquin renally adjusted at discharge    Anemia  Assessment &  "Plan  Baseline hemoglobin around 7-9  Continue outpatient follow-up with PCP  No bleeding endorsed  Stable to discharge around baseline    Functional diarrhea  Assessment & Plan  Patient endorsed diarrhea for the past month and continues to experience  Noninfectious  Outpatient GI referral made        Medical Problems       Resolved Problems  Date Reviewed: 1/23/2024   None       Discharging Physician / Practitioner: Glen Carpio MD  PCP: Layne Bacon MD  Admission Date:   Admission Orders (From admission, onward)       Ordered        01/18/24 1044  INPATIENT ADMISSION  Once                          Discharge Date: 01/23/24    Consultations During Hospital Stay:  IP CONSULT TO UROLOGY      Procedures Performed:   Herrera catheter insertion    Significant Findings / Test Results:   US renal and bladder: . Moderate left hydronephrosis which is worsened from most recent comparison ultrasound of 12/31/2023. Consider further characterization with CT abdomen pelvis to evaluate for the presence of left ureteral calculi. Bilateral renal calculi.     Urine culture: 80-89,000 colonies of Pseudomonas aeruginosa      Incidental Findings:   None    Test Results Pending at Discharge (will require follow up):   None     Outpatient Tests Requested:  CBC CMP in 1 week-results to PCP    Complications: None    Reason for Admission: \"Samira Allred is a 83 y.o. female with pmh of HTN, RA, diastolic dysfunction, HLN, breast cancer, stage IV CKD who presents with abdominal pain and acute urinary retention. Pt was seen in 12/23 for pseudomonal urosepsis 2/2 to UTI as well as upper GI bleed and was treated for Dieulafoy lesion by GI endoscopy. Pt has history of urinary retention and was discharged w/ herrera. Herrera was removed by urology 2 weeks ago and has been urinating normally until about 2 days ago. Yesterday she started having increased, sharp, diffuse abdominal pain and trouble initiating micturition, which prompted her to call EMS. " "She states she had a large volume void right before EMS came, which helped relieve her symptoms. She also has been having consistent watery diarrhea since being discharged from the hospital a month ago. She denies any blood in her stool or melena and describes the stool as brown liquid. She denies any fever, CP, SOB, back pain, flank pain, nausea, or vomiting.  \"    Hospital Course:   Samira Allred is a 83 y.o. female patient who originally presented to the hospital on 1/18/2024 due to abdominal pain and acute urinary retention.  Previously was treated for pseudomonal UTI.  This time again her urine is growing Pseudomonas, concern for colonization but as patient had urinary retention and abdominal pain, decision was initially made to treat the patient.  Initially was on cefepime and then once sensitivities were available, patient was switched to Levaquin.  For urinary retention urology saw the patient and now the patient has a Carrera catheter and is being discharged on it with outpatient follow-up.  Patient has diarrhea which is also chronic and C. difficile has been ruled out.  Stool enteric bacterial panel by PCR also was normal.  Patient had hyponatremia initially which has started to improve and now with send 132, could be secondary to fluid loss in the stool versus SIADH.  Will refer to outpatient nephrology.  Magnesium has been repleted.  Patient's SIRS criteria has resolved and is on the right antibiotics as per urine sensitivities and is cleared for discharge with home health    Please see above list of diagnoses and related plan for additional information.     Condition at Discharge: stable    Discharge Day Visit / Exam:   Subjective:  \" I feel better but I have some abdominal discomfort but it has been there for months now\"  Vitals: Blood Pressure: 123/65 (01/23/24 0626)  Pulse: 80 (01/23/24 0626)  Temperature: 97.7 °F (36.5 °C) (01/23/24 0626)  Temp Source: Oral (01/18/24 1820)  Respirations: 15 (01/22/24 " "2136)  Height: 5' 2\" (157.5 cm) (01/18/24 1820)  Weight - Scale: 71 kg (156 lb 8.4 oz) (01/23/24 0600)  SpO2: 98 % (01/23/24 0626)  Exam:   Physical Exam S1-S2 audible, regular, lungs clear to auscultation bilaterally, bilateral lower quadrants abdominal discomfort where the patient is feeling like she has to urinate, no guarding or rigidity, no CVA angle tenderness, urine is clear in the back, alert and oriented & person, musculoskeletal system shows finger abnormalities likely from arthritis, no new focal neurological deficits    Discussion with Family: Patient declined call to .     Discharge instructions/Information to patient and family:   See after visit summary for information provided to patient and family.      Provisions for Follow-Up Care:  See after visit summary for information related to follow-up care and any pertinent home health orders.      Mobility at time of Discharge:   Basic Mobility Inpatient Raw Score: 21  JH-HLM Goal: 6: Walk 10 steps or more  JH-HLM Achieved: 6: Walk 10 steps or more  HLM Goal achieved. Continue to encourage appropriate mobility.     Disposition:   Home with VNA Services (Reminder: Complete face to face encounter)    Planned Readmission: no     Discharge Statement:  I spent 55 minutes discharging the patient. This time was spent on the day of discharge. I had direct contact with the patient on the day of discharge. Greater than 50% of the total time was spent examining patient, answering all patient questions, arranging and discussing plan of care with patient as well as directly providing post-discharge instructions.  Additional time then spent on discharge activities.    Discharge Medications:  See after visit summary for reconciled discharge medications provided to patient and/or family.      **Please Note: This note may have been constructed using a voice recognition system**      "

## 2024-01-24 ENCOUNTER — TRANSITIONAL CARE MANAGEMENT (OUTPATIENT)
Dept: INTERNAL MEDICINE CLINIC | Facility: CLINIC | Age: 84
End: 2024-01-24

## 2024-01-25 ENCOUNTER — TELEPHONE (OUTPATIENT)
Dept: INTERNAL MEDICINE CLINIC | Facility: CLINIC | Age: 84
End: 2024-01-25

## 2024-01-26 ENCOUNTER — OFFICE VISIT (OUTPATIENT)
Dept: INTERNAL MEDICINE CLINIC | Facility: CLINIC | Age: 84
End: 2024-01-26
Payer: MEDICARE

## 2024-01-26 ENCOUNTER — TELEPHONE (OUTPATIENT)
Dept: NEPHROLOGY | Facility: CLINIC | Age: 84
End: 2024-01-26

## 2024-01-26 VITALS
TEMPERATURE: 98 F | DIASTOLIC BLOOD PRESSURE: 80 MMHG | RESPIRATION RATE: 18 BRPM | OXYGEN SATURATION: 98 % | WEIGHT: 168 LBS | HEART RATE: 85 BPM | HEIGHT: 62 IN | BODY MASS INDEX: 30.91 KG/M2 | SYSTOLIC BLOOD PRESSURE: 124 MMHG

## 2024-01-26 DIAGNOSIS — K92.2 GASTROINTESTINAL HEMORRHAGE, UNSPECIFIED GASTROINTESTINAL HEMORRHAGE TYPE: ICD-10-CM

## 2024-01-26 DIAGNOSIS — C50.919 ADENOCARCINOMA OF BREAST, UNSPECIFIED LATERALITY (HCC): ICD-10-CM

## 2024-01-26 DIAGNOSIS — N39.0 URINARY TRACT INFECTION WITH HEMATURIA, SITE UNSPECIFIED: ICD-10-CM

## 2024-01-26 DIAGNOSIS — E43 SEVERE PROTEIN-CALORIE MALNUTRITION (HCC): ICD-10-CM

## 2024-01-26 DIAGNOSIS — R60.0 FLUID RETENTION IN LEGS: ICD-10-CM

## 2024-01-26 DIAGNOSIS — R33.9 URINARY RETENTION: ICD-10-CM

## 2024-01-26 DIAGNOSIS — M06.9 RHEUMATOID ARTHRITIS OF LEFT ANKLE, UNSPECIFIED WHETHER RHEUMATOID FACTOR PRESENT (HCC): ICD-10-CM

## 2024-01-26 DIAGNOSIS — I82.5Z2 LOWER LEG DVT (DEEP VENOUS THROMBOEMBOLISM), CHRONIC, LEFT (HCC): ICD-10-CM

## 2024-01-26 DIAGNOSIS — I10 HYPERTENSION, UNSPECIFIED TYPE: ICD-10-CM

## 2024-01-26 DIAGNOSIS — N13.30 HYDRONEPHROSIS, UNSPECIFIED HYDRONEPHROSIS TYPE: ICD-10-CM

## 2024-01-26 DIAGNOSIS — R31.9 URINARY TRACT INFECTION WITH HEMATURIA, SITE UNSPECIFIED: ICD-10-CM

## 2024-01-26 DIAGNOSIS — K27.9 PUD (PEPTIC ULCER DISEASE): ICD-10-CM

## 2024-01-26 DIAGNOSIS — K59.1 FUNCTIONAL DIARRHEA: Primary | ICD-10-CM

## 2024-01-26 PROCEDURE — 99496 TRANSJ CARE MGMT HIGH F2F 7D: CPT | Performed by: INTERNAL MEDICINE

## 2024-01-26 RX ORDER — HYDROCHLOROTHIAZIDE 12.5 MG/1
12.5 TABLET ORAL DAILY
Qty: 90 TABLET | Refills: 3 | Status: SHIPPED | OUTPATIENT
Start: 2024-01-26 | End: 2025-01-20

## 2024-01-26 NOTE — TELEPHONE ENCOUNTER
I called and left message on patients answering machine confirming appt for Monday 01/29/2024 with Dr Haque

## 2024-01-26 NOTE — TELEPHONE ENCOUNTER
"As per Dr. Bacon, \"  Hold for now  Will see her tomorrow     Noted. Patient has an appointment for today, 1/26, at 2:20P. This will be reviewed and discussed at that time.  "
Digna is reporting that while Samira was in the hospital they took her off the hydrochlorothiazide. She got there to day and her right leg is 3 plus edema and weeping, her left leg is maybe a trace. She is going to put a gel fiber wrap on it but does think she needs to be back on her water pill.    Please advise..    Call back #[phone]  Digna/[facility]  
home

## 2024-01-26 NOTE — PROGRESS NOTES
Assessment & Plan     1. Functional diarrhea  -     CBC and differential; Future  -     Comprehensive metabolic panel; Future    2. Rheumatoid arthritis of left ankle, unspecified whether rheumatoid factor present (HCC)    3. Severe protein-calorie malnutrition (HCC)    4. Lower leg DVT (deep venous thromboembolism), chronic, left (HCC)    5. Adenocarcinoma of breast, unspecified laterality (HCC)    6. Urinary retention    7. Urinary tract infection with hematuria, site unspecified    8. Hydronephrosis, unspecified hydronephrosis type    9. Hypertension, unspecified type  -     hydroCHLOROthiazide 12.5 mg tablet; Take 1 tablet (12.5 mg total) by mouth daily  -     CBC and differential; Future  -     Comprehensive metabolic panel; Future    10. Fluid retention in legs  -     hydroCHLOROthiazide 12.5 mg tablet; Take 1 tablet (12.5 mg total) by mouth daily  -     CBC and differential; Future  -     Comprehensive metabolic panel; Future    11. Gastrointestinal hemorrhage, unspecified gastrointestinal hemorrhage type  -     CBC and differential; Future  -     Comprehensive metabolic panel; Future    12. PUD (peptic ulcer disease)        Depression Screening and Follow-up Plan: Clincally patient does not have depression. No treatment is required.       Subjective     Transitional Care Management Review:   Samira Allred is a 83 y.o. female here for TCM follow up.     During the TCM phone call patient stated:  TCM Call       Date and time call was made  1/24/2024  8:28 AM    Hospital care reviewed  Records reviewed    Patient was hospitialized at  [facility]    Date of Admission  01/18/24    Date of discharge  01/19/24    Diagnosis  Sepsis    Disposition  Home    Were the patients medications reviewed and updated  Yes    Current Symptoms  None          TCM Call       Post hospital issues  None    Should patient be enrolled in anticoag monitoring?  No    Scheduled for follow up?  Yes    Did you obtain your prescribed  medications  Yes    Do you need help managing your prescriptions or medications  No    Is transportation to your appointment needed  No    I have advised the patient to call PCP with any new or worsening symptoms  Shelby Xie, Practice Coordinator    Living Arrangements  Family members    Are you recieving home care services  Yes    Interperter language line needed  No    Counseling  Patient    Counseling topics  Activities of daily living; Importance of RX compliance; Prognosis          Here for TCM visit; we reviewed  recent hospitalization, dc summary and dc instructions. Patient was admitted to the hospital on 1/18 with abdominal pain and acute urinary retention in s/o recently being treated for pseudomonal UTI. Started on IV ABT and then switched to Levaquin.      For urinary retention: urology saw the patient and now the patient has a Carrera catheter and is being discharged on it with outpatient follow-up.      Patient has diarrhea which is also chronic and C. difficile has been ruled out.  Stool enteric bacterial panel by PCR also was normal.      For her hyponatremia 2/2 to fluid loss in the stool versus SIADH.  Will refer to outpatient nephrology.  Magnesium has been repleted. Patient is adamant about restarting HCTZ 12.5 mg; she is having 3 + pitting edema b/l LE. Last Na 132, recheck BMP in 1 week; seeing nephrology next week.        Review of Systems   Constitutional:  Negative for chills and fever.   HENT:  Negative for ear pain and sore throat.    Eyes:  Negative for pain and visual disturbance.   Respiratory:  Negative for cough and shortness of breath.    Cardiovascular:  Positive for leg swelling. Negative for chest pain and palpitations.   Gastrointestinal:  Negative for abdominal pain and vomiting.        Fecal urgency   Genitourinary:  Positive for urgency. Negative for dysuria and hematuria.   Musculoskeletal:  Positive for gait problem (using walker). Negative for arthralgias and back  "pain.   Skin:  Negative for color change and rash.   Neurological:  Negative for seizures and syncope.   All other systems reviewed and are negative.      Objective     /80 (BP Location: Right arm, Patient Position: Sitting, Cuff Size: Standard)   Pulse 85   Temp 98 °F (36.7 °C) (Tympanic)   Resp 18   Ht 5' 2\" (1.575 m)   Wt 76.2 kg (168 lb)   LMP  (LMP Unknown)   SpO2 98%   BMI 30.73 kg/m²      Physical Exam  Vitals and nursing note reviewed.   Constitutional:       General: She is not in acute distress.     Appearance: Normal appearance. She is well-developed.   HENT:      Head: Normocephalic and atraumatic.   Eyes:      Conjunctiva/sclera: Conjunctivae normal.   Cardiovascular:      Rate and Rhythm: Normal rate and regular rhythm.      Heart sounds: No murmur heard.  Pulmonary:      Effort: Pulmonary effort is normal. No respiratory distress.      Breath sounds: Normal breath sounds.   Abdominal:      Palpations: Abdomen is soft.      Tenderness: There is no abdominal tenderness.   Musculoskeletal:         General: No swelling. Normal range of motion.      Cervical back: Neck supple.      Right lower leg: Edema present.      Left lower leg: Edema present.   Skin:     General: Skin is warm and dry.      Capillary Refill: Capillary refill takes less than 2 seconds.      Coloration: Skin is pale.   Neurological:      Mental Status: She is alert and oriented to person, place, and time.      Gait: Gait abnormal.   Psychiatric:         Mood and Affect: Mood normal.       Medications have been reviewed by provider in current encounter    Layne Bacon MD         "

## 2024-01-29 ENCOUNTER — OFFICE VISIT (OUTPATIENT)
Dept: NEPHROLOGY | Facility: CLINIC | Age: 84
End: 2024-01-29
Payer: MEDICARE

## 2024-01-29 ENCOUNTER — LAB REQUISITION (OUTPATIENT)
Dept: LAB | Facility: HOSPITAL | Age: 84
End: 2024-01-29
Payer: MEDICARE

## 2024-01-29 VITALS
WEIGHT: 155 LBS | HEIGHT: 62 IN | DIASTOLIC BLOOD PRESSURE: 80 MMHG | SYSTOLIC BLOOD PRESSURE: 130 MMHG | RESPIRATION RATE: 16 BRPM | OXYGEN SATURATION: 98 % | HEART RATE: 88 BPM | BODY MASS INDEX: 28.52 KG/M2 | TEMPERATURE: 96.9 F

## 2024-01-29 DIAGNOSIS — N13.30 HYDRONEPHROSIS, UNSPECIFIED HYDRONEPHROSIS TYPE: ICD-10-CM

## 2024-01-29 DIAGNOSIS — D64.89 OTHER SPECIFIED ANEMIAS: ICD-10-CM

## 2024-01-29 DIAGNOSIS — E87.5 HYPERKALEMIA: Primary | ICD-10-CM

## 2024-01-29 DIAGNOSIS — N39.0 URINARY TRACT INFECTION, SITE NOT SPECIFIED: ICD-10-CM

## 2024-01-29 DIAGNOSIS — N18.4 CHRONIC RENAL DISEASE, STAGE IV (HCC): ICD-10-CM

## 2024-01-29 DIAGNOSIS — E87.1 HYPONATREMIA: ICD-10-CM

## 2024-01-29 DIAGNOSIS — L13.0 DERMATITIS HERPETIFORMIS: ICD-10-CM

## 2024-01-29 DIAGNOSIS — N18.4 CHRONIC KIDNEY DISEASE, STAGE 4 (SEVERE) (HCC): ICD-10-CM

## 2024-01-29 DIAGNOSIS — M89.9 CHRONIC KIDNEY DISEASE-MINERAL AND BONE DISORDER: ICD-10-CM

## 2024-01-29 DIAGNOSIS — E83.9 CHRONIC KIDNEY DISEASE-MINERAL AND BONE DISORDER: ICD-10-CM

## 2024-01-29 DIAGNOSIS — N18.9 CHRONIC KIDNEY DISEASE-MINERAL AND BONE DISORDER: ICD-10-CM

## 2024-01-29 DIAGNOSIS — N18.32 STAGE 3B CHRONIC KIDNEY DISEASE (HCC): Primary | ICD-10-CM

## 2024-01-29 LAB
ALBUMIN SERPL BCP-MCNC: 3.2 G/DL (ref 3.5–5)
ALP SERPL-CCNC: 149 U/L (ref 34–104)
ALT SERPL W P-5'-P-CCNC: 77 U/L (ref 7–52)
ANION GAP SERPL CALCULATED.3IONS-SCNC: 8 MMOL/L
AST SERPL W P-5'-P-CCNC: 92 U/L (ref 13–39)
BILIRUB SERPL-MCNC: 0.56 MG/DL (ref 0.2–1)
BUN SERPL-MCNC: 26 MG/DL (ref 5–25)
CALCIUM ALBUM COR SERPL-MCNC: 9.7 MG/DL (ref 8.3–10.1)
CALCIUM SERPL-MCNC: 9.1 MG/DL (ref 8.4–10.2)
CHLORIDE SERPL-SCNC: 103 MMOL/L (ref 96–108)
CO2 SERPL-SCNC: 26 MMOL/L (ref 21–32)
CREAT SERPL-MCNC: 1.54 MG/DL (ref 0.6–1.3)
GFR SERPL CREATININE-BSD FRML MDRD: 31 ML/MIN/1.73SQ M
GLUCOSE SERPL-MCNC: 97 MG/DL (ref 65–140)
POTASSIUM SERPL-SCNC: 5.7 MMOL/L (ref 3.5–5.3)
PROT SERPL-MCNC: 5.4 G/DL (ref 6.4–8.4)
SODIUM SERPL-SCNC: 137 MMOL/L (ref 135–147)

## 2024-01-29 PROCEDURE — 80053 COMPREHEN METABOLIC PANEL: CPT | Performed by: INTERNAL MEDICINE

## 2024-01-29 PROCEDURE — 99214 OFFICE O/P EST MOD 30 MIN: CPT | Performed by: INTERNAL MEDICINE

## 2024-01-29 NOTE — ASSESSMENT & PLAN NOTE
Lab Results   Component Value Date    EGFR 25 01/23/2024    EGFR 26 01/22/2024    EGFR 26 01/21/2024    CREATININE 1.84 (H) 01/23/2024    CREATININE 1.76 (H) 01/22/2024    CREATININE 1.79 (H) 01/21/2024   Patient does a slowly deteriorating kidney function.  This may be natural progression with frequent UTI and ureteric obstruction.    Discussed at length with the patient.  Will continue to monitor closely by repeating blood work in 3 months

## 2024-01-29 NOTE — PROGRESS NOTES
NEPHROLOGY OFFICE FOLLOW UP  Samira Allred 83 y.o. female MRN: [id number]    Encounter: [id number] 1/29/2024    REASON FOR VISIT: Samira Allred is a 83 y.o. female who is here on 1/29/2024 for Chronic Kidney Disease (Stage 3) and Follow-up  .    HPI:    Samira came in today for follow-up of CKD.  Patient had been hospitalized frequently with GI bleed due to current UTI    Recently patient also found to have hydronephrosis and at present has a Carrera catheter    Overall doing well    No acute complaint    No chest pain no palpitation    Does have diffuse joint pain because of arthritis but does not take any nonsteroidal painkiller        REVIEW OF SYSTEMS:    Review of Systems   Constitutional:  Negative for fatigue.   HENT:  Negative for congestion.    Eyes:  Negative for photophobia and pain.   Respiratory:  Negative for chest tightness and shortness of breath.    Cardiovascular:  Negative for chest pain and palpitations.   Gastrointestinal:  Negative for abdominal distention, abdominal pain and blood in stool.   Endocrine: Negative for polydipsia.   Genitourinary:  Negative for difficulty urinating, dysuria, flank pain, hematuria and urgency.   Musculoskeletal:  Negative for arthralgias and back pain.   Skin:  Negative for rash.   Neurological:  Negative for dizziness, light-headedness and headaches.   Hematological:  Does not bruise/bleed easily.   Psychiatric/Behavioral:  Negative for behavioral problems. The patient is not nervous/anxious.          PAST MEDICAL HISTORY:  Past Medical History:   Diagnosis Date    Allergic angioedema     [id number] resolved    Angioedema     [id number] resolved    Arthritis     Breast cancer (HCC) 2012    right     Chronic kidney disease     Disease of thyroid gland     Hemorrhage of anus and rectum     [id number] resolved    Hyperlipidemia     Hypertension     Hypocalcemia     [id number] resolved    Neuritis     thoracic and lumbosacral    Peptic ulcer     Rheumatoid arthritis (HCC)      Stage 3b chronic kidney disease (HCC) 5/7/2021    Vitamin D deficiency        PAST SURGICAL HISTORY:  Past Surgical History:   Procedure Laterality Date    ABDOMINAL SURGERY      BREAST BIOPSY Right 2012    BREAST LUMPECTOMY Right 2012    BREAST SURGERY      CHOLECYSTECTOMY      ELBOW SURGERY      GALLBLADDER SURGERY  1981    HIP SURGERY Right 09/18/2022    10/2023    ORTHOPEDIC SURGERY      for toes    AZ CYSTO W/IRRIG & EVAC MULTPLE OBSTRUCTING CLOTS N/A 11/5/2023    Procedure: CYSTOSCOPY EVACUATION OF CLOTS & fulguration;  Surgeon: Uriah Schwartz MD;  Location: [facility];  Service: Urology    TRANSURETHRAL RESECTION OF BLADDER TUMOR N/A 11/5/2023    Procedure: TRANSURETHRAL RESECTION OF BLADDER TUMOR (TURBT);  Surgeon: Uriah Schwartz MD;  Location: [facility];  Service: Urology    US GUIDED BREAST BIOPSY RIGHT COMPLETE Right 08/15/2022       SOCIAL HISTORY:  Social History     Substance and Sexual Activity   Alcohol Use Not Currently     Social History     Substance and Sexual Activity   Drug Use No     Social History     Tobacco Use   Smoking Status Never    Passive exposure: Past   Smokeless Tobacco Never       FAMILY HISTORY:  Family History   Problem Relation Age of Onset    Breast cancer Mother     Kidney failure Father     Other Father         uremia    Lung cancer Brother     Breast cancer Maternal Aunt     Breast cancer Maternal Aunt     Breast cancer Maternal Aunt     Breast cancer Maternal Aunt     Breast cancer Maternal Aunt     Stomach cancer Maternal Uncle        MEDICATIONS:    Current Outpatient Medications:     ascorbic acid (VITAMIN C) 500 mg tablet, Take 500 mg by mouth daily., Disp: , Rfl:     Calcium Citrate-Vitamin D (CITRACAL + D PO), Take by mouth 2 (two) times a day, Disp: , Rfl:     Cholecalciferol (VITAMIN D-3 PO), Take 2,000 Units by mouth daily., Disp: , Rfl:     folic acid (FOLVITE) 1 mg tablet, 1 daily, Disp: 90 tablet, Rfl: 2    hydroCHLOROthiazide 12.5 mg tablet, Take  "1 tablet (12.5 mg total) by mouth daily, Disp: 90 tablet, Rfl: 3    levothyroxine (Synthroid) 75 mcg tablet, Take 1 tablet (75 mcg total) by mouth in the morning, Disp: 90 tablet, Rfl: 3    magnesium oxide (MAG-OX) 400 mg, Take 1 tablet (400 mg total) by mouth 2 (two) times a day, Disp: 90 tablet, Rfl: 0    Multiple Vitamin (MULTIVITAMIN) tablet, Take 1 tablet by mouth daily., Disp: , Rfl:     rosuvastatin (CRESTOR) 5 mg tablet, TAKE 1 TABLET DAILY, Disp: 90 tablet, Rfl: 2    simethicone (MYLICON) 80 mg chewable tablet, Chew 1 tablet (80 mg total) 4 (four) times a day as needed for flatulence, Disp: 30 tablet, Rfl: 0    pantoprazole (PROTONIX) 40 mg tablet, Take 1 tablet (40 mg total) by mouth 2 (two) times a day (Patient not taking: Reported on 1/29/2024), Disp: 60 tablet, Rfl: 0    polyethylene glycol (MIRALAX) 17 g packet, Take 17 g by mouth daily as needed (constipation) (Patient not taking: Reported on 1/29/2024), Disp: 5 each, Rfl: 0    riTUXimab (RITUXAN) 500 mg/50 mL, Inject into a catheter in a vein every 6 (six) months (Patient not taking: Reported on 1/29/2024), Disp: , Rfl:     PHYSICAL EXAM:  Vitals:    01/29/24 1258   BP: 130/80   BP Location: Right arm   Patient Position: Sitting   Pulse: 88   Resp: 16   Temp: (!) 96.9 °F (36.1 °C)   TempSrc: Temporal   SpO2: 98%   Weight: 70.3 kg (155 lb)   Height: 5' 2\" (1.575 m)     Body mass index is 28.35 kg/m².    Physical Exam  Constitutional:       General: She is not in acute distress.     Appearance: She is well-developed.   HENT:      Head: Normocephalic.      Mouth/Throat:      Mouth: Mucous membranes are moist.   Eyes:      General: No scleral icterus.     Conjunctiva/sclera: Conjunctivae normal.   Neck:      Vascular: No JVD.   Cardiovascular:      Rate and Rhythm: Normal rate.      Heart sounds: Normal heart sounds.   Pulmonary:      Effort: Pulmonary effort is normal.      Breath sounds: No wheezing.   Abdominal:      Palpations: Abdomen is soft.      " Tenderness: There is no abdominal tenderness.   Musculoskeletal:         General: Normal range of motion.      Cervical back: Neck supple.   Skin:     General: Skin is warm.      Findings: No rash.   Neurological:      Mental Status: She is alert and oriented to person, place, and time.   Psychiatric:         Behavior: Behavior normal.         LAB RESULTS:  Results for orders placed or performed during the hospital encounter of 01/18/24   Urine culture    Specimen: Urine, Indwelling Carrera Catheter   Result Value Ref Range    Urine Culture 80,000-89,000 cfu/ml Pseudomonas aeruginosa (A)        Susceptibility    Pseudomonas aeruginosa - USHA     ZID Performed Yes       Aztreonam ($$$)  <=4 Susceptible ug/ml     Cefepime ($) <=2.00 Susceptible ug/ml     Ceftazidime ($$) <=1 Susceptible ug/ml     Ciprofloxacin ($)  <=0.25 Susceptible ug/ml     Imipenem <=1 Susceptible ug/ml     Levofloxacin ($) <=0.50 Susceptible ug/ml     Meropenem ($$) <=1.00 Susceptible ug/ml     Piperacillin + Tazobactam ($$$) <=8 Susceptible ug/ml     Tobramycin ($) <=2 Susceptible ug/ml   Stool Enteric Bacterial Panel by PCR    Specimen: Per Rectum; Stool   Result Value Ref Range    Salmonella sp PCR None Detected None Detected    Shigella sp/Enteroinvasive E. coli (EIEC) PCR None Detected None Detected    Campylobacter sp (jejuni and coli) PCR None Detected None Detected    Shiga toxin 1/Shiga toxin 2 genes PCR None Detected None Detected   Clostridium difficile toxin by PCR with EIA    Specimen: Per Rectum; Stool   Result Value Ref Range     C.difficile toxin by PCR  Negative Negative   Blood culture #1    Specimen: Arm, Left; Blood   Result Value Ref Range    Blood Culture No Growth After 5 Days.    Blood culture #2    Specimen: Arm, Left; Blood   Result Value Ref Range    Blood Culture No Growth After 5 Days.    UA w Reflex to Microscopic w Reflex to Culture    Specimen: Urine, Indwelling Carrera Catheter   Result Value Ref Range    Color, UA  Dark Yellow     Clarity, UA Extra Turbid     Specific Gravity, UA 1.012 1.003 - 1.030    pH, UA 6.5 4.5, 5.0, 5.5, 6.0, 6.5, 7.0, 7.5, 8.0    Leukocytes, UA Large (A) Negative    Nitrite, UA Negative Negative    Protein,  (2+) (A) Negative mg/dl    Glucose, UA Negative Negative mg/dl    Ketones, UA Negative Negative mg/dl    Urobilinogen, UA <2.0 <2.0 mg/dl mg/dl    Bilirubin, UA Negative Negative    Occult Blood, UA Moderate (A) Negative   Urine Microscopic   Result Value Ref Range    RBC, UA Innumerable (A) None Seen, 1-2 /hpf    WBC, UA Innumerable (A) None Seen, 1-2 /hpf    Epithelial Cells None Seen None Seen, Occasional /hpf    Bacteria, UA None Seen None Seen, Occasional /hpf    WBC Clumps Present    CBC and differential   Result Value Ref Range    WBC 8.45 4.31 - 10.16 Thousand/uL    RBC 3.17 (L) 3.81 - 5.12 Million/uL    Hemoglobin 9.5 (L) 11.5 - 15.4 g/dL    Hematocrit 29.6 (L) 34.8 - 46.1 %    MCV 93 82 - 98 fL    MCH 30.0 26.8 - 34.3 pg    MCHC 32.1 31.4 - 37.4 g/dL    RDW 17.7 (H) 11.6 - 15.1 %    MPV 9.1 8.9 - 12.7 fL    Platelets 396 (H) 149 - 390 Thousands/uL    nRBC 0 /100 WBCs    Neutrophils Relative 85 (H) 43 - 75 %    Immat GRANS % 1 0 - 2 %    Lymphocytes Relative 3 (L) 14 - 44 %    Monocytes Relative 10 4 - 12 %    Eosinophils Relative 1 0 - 6 %    Basophils Relative 0 0 - 1 %    Neutrophils Absolute 7.13 1.85 - 7.62 Thousands/µL    Immature Grans Absolute 0.05 0.00 - 0.20 Thousand/uL    Lymphocytes Absolute 0.29 (L) 0.60 - 4.47 Thousands/µL    Monocytes Absolute 0.88 0.17 - 1.22 Thousand/µL    Eosinophils Absolute 0.07 0.00 - 0.61 Thousand/µL    Basophils Absolute 0.03 0.00 - 0.10 Thousands/µL   Basic metabolic panel   Result Value Ref Range    Sodium 131 (L) 135 - 147 mmol/L    Potassium 5.0 3.5 - 5.3 mmol/L    Chloride 103 96 - 108 mmol/L    CO2 19 (L) 21 - 32 mmol/L    ANION GAP 9 mmol/L    BUN 40 (H) 5 - 25 mg/dL    Creatinine 3.00 (H) 0.60 - 1.30 mg/dL    Glucose 130 65 - 140 mg/dL  "   Calcium 8.3 (L) 8.4 - 10.2 mg/dL    eGFR 13 ml/min/1.73sq m   Lactic acid, plasma (w/reflex if result > 2.0)   Result Value Ref Range    LACTIC ACID 2.8 (HH) 0.5 - 2.0 mmol/L   Lactic acid 2 Hours   Result Value Ref Range    LACTIC ACID 1.5 0.5 - 2.0 mmol/L   B-Type Natriuretic Peptide(BNP)   Result Value Ref Range     (H) 0 - 100 pg/mL   Sodium, urine, random   Result Value Ref Range    Sodium, Ur 84 Reference range not established.   Osmolality, Urine, random   Result Value Ref Range    Osmolality, Ur 360 250 - 900 mmol/KG   Uric acid   Result Value Ref Range    Uric Acid 6.8 2.0 - 7.5 mg/dL   Osmolality-\"If this is regarding a toxic alcohol, please STOP and consult medical  for further guidance.\"   Result Value Ref Range    Osmolality Serum 293 282 - 298 mmol/KG   AM Magnesium   Result Value Ref Range    Magnesium 1.8 (L) 1.9 - 2.7 mg/dL   AM CBC and Diff   Result Value Ref Range    WBC 23.82 (H) 4.31 - 10.16 Thousand/uL    RBC 3.18 (L) 3.81 - 5.12 Million/uL    Hemoglobin 9.6 (L) 11.5 - 15.4 g/dL    Hematocrit 29.1 (L) 34.8 - 46.1 %    MCV 92 82 - 98 fL    MCH 30.2 26.8 - 34.3 pg    MCHC 33.0 31.4 - 37.4 g/dL    RDW 17.7 (H) 11.6 - 15.1 %    MPV 8.8 (L) 8.9 - 12.7 fL    Platelets 400 (H) 149 - 390 Thousands/uL    nRBC 0 /100 WBCs    Neutrophils Relative 89 (H) 43 - 75 %    Immat GRANS % 1 0 - 2 %    Lymphocytes Relative 4 (L) 14 - 44 %    Monocytes Relative 6 4 - 12 %    Eosinophils Relative 0 0 - 6 %    Basophils Relative 0 0 - 1 %    Neutrophils Absolute 21.06 (H) 1.85 - 7.62 Thousands/µL    Immature Grans Absolute 0.15 0.00 - 0.20 Thousand/uL    Lymphocytes Absolute 1.02 0.60 - 4.47 Thousands/µL    Monocytes Absolute 1.42 (H) 0.17 - 1.22 Thousand/µL    Eosinophils Absolute 0.10 0.00 - 0.61 Thousand/µL    Basophils Absolute 0.07 0.00 - 0.10 Thousands/µL   AM CMP   Result Value Ref Range    Sodium 132 (L) 135 - 147 mmol/L    Potassium 4.5 3.5 - 5.3 mmol/L    Chloride 102 96 - 108 mmol/L    " CO2 22 21 - 32 mmol/L    ANION GAP 8 mmol/L    BUN 35 (H) 5 - 25 mg/dL    Creatinine 2.37 (H) 0.60 - 1.30 mg/dL    Glucose 97 65 - 140 mg/dL    Calcium 8.0 (L) 8.4 - 10.2 mg/dL    Corrected Calcium 8.9 8.3 - 10.1 mg/dL    AST 26 13 - 39 U/L    ALT 19 7 - 52 U/L    Alkaline Phosphatase 155 (H) 34 - 104 U/L    Total Protein 5.3 (L) 6.4 - 8.4 g/dL    Albumin 2.9 (L) 3.5 - 5.0 g/dL    Total Bilirubin 0.56 0.20 - 1.00 mg/dL    eGFR 18 ml/min/1.73sq m   Cortisol Level, AM Specimen   Result Value Ref Range    Cortisol - AM 32.7 (H) 6.7 - 22.6 ug/dL   Lactic acid, plasma (w/reflex if result > 2.0)   Result Value Ref Range    LACTIC ACID 4.2 (HH) 0.5 - 2.0 mmol/L   Lactic acid 2 Hours   Result Value Ref Range    LACTIC ACID 2.3 (HH) 0.5 - 2.0 mmol/L   Magnesium   Result Value Ref Range    Magnesium 1.7 (L) 1.9 - 2.7 mg/dL   Comprehensive metabolic panel   Result Value Ref Range    Sodium 129 (L) 135 - 147 mmol/L    Potassium 4.3 3.5 - 5.3 mmol/L    Chloride 100 96 - 108 mmol/L    CO2 19 (L) 21 - 32 mmol/L    ANION GAP 10 mmol/L    BUN 31 (H) 5 - 25 mg/dL    Creatinine 1.90 (H) 0.60 - 1.30 mg/dL    Glucose 129 65 - 140 mg/dL    Calcium 7.7 (L) 8.4 - 10.2 mg/dL    Corrected Calcium 8.4 8.3 - 10.1 mg/dL    AST 45 (H) 13 - 39 U/L    ALT 25 7 - 52 U/L    Alkaline Phosphatase 182 (H) 34 - 104 U/L    Total Protein 5.6 (L) 6.4 - 8.4 g/dL    Albumin 3.1 (L) 3.5 - 5.0 g/dL    Total Bilirubin 0.60 0.20 - 1.00 mg/dL    eGFR 24 ml/min/1.73sq m   CBC and differential   Result Value Ref Range    WBC 20.72 (H) 4.31 - 10.16 Thousand/uL    RBC 3.44 (L) 3.81 - 5.12 Million/uL    Hemoglobin 10.3 (L) 11.5 - 15.4 g/dL    Hematocrit 31.6 (L) 34.8 - 46.1 %    MCV 92 82 - 98 fL    MCH 29.9 26.8 - 34.3 pg    MCHC 32.6 31.4 - 37.4 g/dL    RDW 17.9 (H) 11.6 - 15.1 %    MPV 9.2 8.9 - 12.7 fL    Platelets 387 149 - 390 Thousands/uL    nRBC 0 /100 WBCs    Neutrophils Relative 88 (H) 43 - 75 %    Immat GRANS % 1 0 - 2 %    Lymphocytes Relative 4 (L) 14 -  44 %    Monocytes Relative 6 4 - 12 %    Eosinophils Relative 1 0 - 6 %    Basophils Relative 0 0 - 1 %    Neutrophils Absolute 18.28 (H) 1.85 - 7.62 Thousands/µL    Immature Grans Absolute 0.20 0.00 - 0.20 Thousand/uL    Lymphocytes Absolute 0.89 0.60 - 4.47 Thousands/µL    Monocytes Absolute 1.18 0.17 - 1.22 Thousand/µL    Eosinophils Absolute 0.12 0.00 - 0.61 Thousand/µL    Basophils Absolute 0.05 0.00 - 0.10 Thousands/µL   CBC and differential   Result Value Ref Range    WBC 14.76 (H) 4.31 - 10.16 Thousand/uL    RBC 2.83 (L) 3.81 - 5.12 Million/uL    Hemoglobin 8.5 (L) 11.5 - 15.4 g/dL    Hematocrit 25.1 (L) 34.8 - 46.1 %    MCV 89 82 - 98 fL    MCH 30.0 26.8 - 34.3 pg    MCHC 33.9 31.4 - 37.4 g/dL    RDW 17.6 (H) 11.6 - 15.1 %    MPV 8.8 (L) 8.9 - 12.7 fL    Platelets 312 149 - 390 Thousands/uL    nRBC 0 /100 WBCs    Neutrophils Relative 81 (H) 43 - 75 %    Immat GRANS % 2 0 - 2 %    Lymphocytes Relative 5 (L) 14 - 44 %    Monocytes Relative 10 4 - 12 %    Eosinophils Relative 2 0 - 6 %    Basophils Relative 0 0 - 1 %    Neutrophils Absolute 11.96 (H) 1.85 - 7.62 Thousands/µL    Immature Grans Absolute 0.23 (H) 0.00 - 0.20 Thousand/uL    Lymphocytes Absolute 0.73 0.60 - 4.47 Thousands/µL    Monocytes Absolute 1.49 (H) 0.17 - 1.22 Thousand/µL    Eosinophils Absolute 0.32 0.00 - 0.61 Thousand/µL    Basophils Absolute 0.03 0.00 - 0.10 Thousands/µL   Comprehensive metabolic panel   Result Value Ref Range    Sodium 130 (L) 135 - 147 mmol/L    Potassium 4.3 3.5 - 5.3 mmol/L    Chloride 99 96 - 108 mmol/L    CO2 24 21 - 32 mmol/L    ANION GAP 7 mmol/L    BUN 29 (H) 5 - 25 mg/dL    Creatinine 1.79 (H) 0.60 - 1.30 mg/dL    Glucose 90 65 - 140 mg/dL    Calcium 7.5 (L) 8.4 - 10.2 mg/dL    Corrected Calcium 8.7 8.3 - 10.1 mg/dL    AST 45 (H) 13 - 39 U/L    ALT 28 7 - 52 U/L    Alkaline Phosphatase 152 (H) 34 - 104 U/L    Total Protein 4.6 (L) 6.4 - 8.4 g/dL    Albumin 2.5 (L) 3.5 - 5.0 g/dL    Total Bilirubin 0.55 0.20 -  1.00 mg/dL    eGFR 26 ml/min/1.73sq m   Magnesium   Result Value Ref Range    Magnesium 1.8 (L) 1.9 - 2.7 mg/dL   CBC   Result Value Ref Range    WBC 10.96 (H) 4.31 - 10.16 Thousand/uL    RBC 2.60 (L) 3.81 - 5.12 Million/uL    Hemoglobin 7.7 (L) 11.5 - 15.4 g/dL    Hematocrit 22.8 (L) 34.8 - 46.1 %    MCV 88 82 - 98 fL    MCH 29.6 26.8 - 34.3 pg    MCHC 33.8 31.4 - 37.4 g/dL    RDW 17.6 (H) 11.6 - 15.1 %    Platelets 302 149 - 390 Thousands/uL    MPV 8.8 (L) 8.9 - 12.7 fL   Basic metabolic panel   Result Value Ref Range    Sodium 130 (L) 135 - 147 mmol/L    Potassium 4.1 3.5 - 5.3 mmol/L    Chloride 99 96 - 108 mmol/L    CO2 25 21 - 32 mmol/L    ANION GAP 6 mmol/L    BUN 27 (H) 5 - 25 mg/dL    Creatinine 1.76 (H) 0.60 - 1.30 mg/dL    Glucose 90 65 - 140 mg/dL    Calcium 7.5 (L) 8.4 - 10.2 mg/dL    eGFR 26 ml/min/1.73sq m   Magnesium   Result Value Ref Range    Magnesium 2.0 1.9 - 2.7 mg/dL   CBC and differential   Result Value Ref Range    WBC 10.24 (H) 4.31 - 10.16 Thousand/uL    RBC 2.66 (L) 3.81 - 5.12 Million/uL    Hemoglobin 7.9 (L) 11.5 - 15.4 g/dL    Hematocrit 23.7 (L) 34.8 - 46.1 %    MCV 89 82 - 98 fL    MCH 29.7 26.8 - 34.3 pg    MCHC 33.3 31.4 - 37.4 g/dL    RDW 17.7 (H) 11.6 - 15.1 %    MPV 9.1 8.9 - 12.7 fL    Platelets 331 149 - 390 Thousands/uL    nRBC 0 /100 WBCs    Neutrophils Relative 73 43 - 75 %    Immat GRANS % 2 0 - 2 %    Lymphocytes Relative 11 (L) 14 - 44 %    Monocytes Relative 11 4 - 12 %    Eosinophils Relative 3 0 - 6 %    Basophils Relative 0 0 - 1 %    Neutrophils Absolute 7.38 1.85 - 7.62 Thousands/µL    Immature Grans Absolute 0.23 (H) 0.00 - 0.20 Thousand/uL    Lymphocytes Absolute 1.09 0.60 - 4.47 Thousands/µL    Monocytes Absolute 1.17 0.17 - 1.22 Thousand/µL    Eosinophils Absolute 0.34 0.00 - 0.61 Thousand/µL    Basophils Absolute 0.03 0.00 - 0.10 Thousands/µL   Comprehensive metabolic panel   Result Value Ref Range    Sodium 132 (L) 135 - 147 mmol/L    Potassium 4.0 3.5 - 5.3  "mmol/L    Chloride 101 96 - 108 mmol/L    CO2 24 21 - 32 mmol/L    ANION GAP 7 mmol/L    BUN 26 (H) 5 - 25 mg/dL    Creatinine 1.84 (H) 0.60 - 1.30 mg/dL    Glucose 87 65 - 140 mg/dL    Calcium 7.5 (L) 8.4 - 10.2 mg/dL    Corrected Calcium 8.9 8.3 - 10.1 mg/dL     (H) 13 - 39 U/L    ALT 57 (H) 7 - 52 U/L    Alkaline Phosphatase 162 (H) 34 - 104 U/L    Total Protein 4.2 (L) 6.4 - 8.4 g/dL    Albumin 2.3 (L) 3.5 - 5.0 g/dL    Total Bilirubin 0.48 0.20 - 1.00 mg/dL    eGFR 25 ml/min/1.73sq m   Magnesium   Result Value Ref Range    Magnesium 1.8 (L) 1.9 - 2.7 mg/dL       ASSESSMENT and PLAN:      Chronic renal disease, stage IV ([facility])  Lab Results   Component Value Date    EGFR 25 01/23/2024    EGFR 26 01/22/2024    EGFR 26 01/21/2024    CREATININE 1.84 (H) 01/23/2024    CREATININE 1.76 (H) 01/22/2024    CREATININE 1.79 (H) 01/21/2024   Patient does a slowly deteriorating kidney function.  This may be natural progression with frequent UTI and ureteric obstruction.    Discussed at length with the patient.  Will continue to monitor closely by repeating blood work in 3 months    Chronic kidney disease-mineral and bone disorder  Lab Results   Component Value Date    EGFR 25 01/23/2024    EGFR 26 01/22/2024    EGFR 26 01/21/2024    CREATININE 1.84 (H) 01/23/2024    CREATININE 1.76 (H) 01/22/2024    CREATININE 1.79 (H) 01/21/2024   PTH and phosphorus along with vitamin D are within acceptable range and will continue to monitor    Hydronephrosis  Does a Carrera catheter.  Being monitored by urologist    Hyponatremia  Stable for now        Everything discussed with the patient.  I will see her back in 6 months.  To get blood and urine test in 3-month and 6 months      Portions of the record may have been created with voice recognition software. Occasional wrong word or \"sound a like\" substitutions may have occurred due to the inherent limitations of voice recognition software. Read the chart carefully and recognize, using " context, where substitutions have occurred.If you have any questions, please contact the dictating provider.

## 2024-01-29 NOTE — ASSESSMENT & PLAN NOTE
Lab Results   Component Value Date    EGFR 25 01/23/2024    EGFR 26 01/22/2024    EGFR 26 01/21/2024    CREATININE 1.84 (H) 01/23/2024    CREATININE 1.76 (H) 01/22/2024    CREATININE 1.79 (H) 01/21/2024   PTH and phosphorus along with vitamin D are within acceptable range and will continue to monitor

## 2024-01-30 ENCOUNTER — TELEPHONE (OUTPATIENT)
Dept: INTERNAL MEDICINE CLINIC | Facility: CLINIC | Age: 84
End: 2024-01-30

## 2024-01-30 DIAGNOSIS — E87.5 HIGH POTASSIUM: Primary | ICD-10-CM

## 2024-01-30 NOTE — TELEPHONE ENCOUNTER
Patient was notified, she stated she was not drinking water for a couple days cause she was retaining fluid in her legs and had a wound which was opened, but she will begin drinking water again and recheck her labs in a couple of days.

## 2024-01-30 NOTE — TELEPHONE ENCOUNTER
----- Message from Layne Bacon MD sent at 1/29/2024  3:38 PM EST -----  Potassium is high  Recheck it in couple days  Drink 2 glasses water

## 2024-02-01 ENCOUNTER — TELEPHONE (OUTPATIENT)
Dept: INTERNAL MEDICINE CLINIC | Facility: CLINIC | Age: 84
End: 2024-02-01

## 2024-02-01 NOTE — TELEPHONE ENCOUNTER
----- Message from Layne Bacon MD sent at 2/1/2024  2:20 PM EST -----  Labs improved  Continue current regimen

## 2024-02-02 ENCOUNTER — OFFICE VISIT (OUTPATIENT)
Dept: GASTROENTEROLOGY | Facility: CLINIC | Age: 84
End: 2024-02-02
Payer: MEDICARE

## 2024-02-02 VITALS
SYSTOLIC BLOOD PRESSURE: 119 MMHG | WEIGHT: 152.8 LBS | HEART RATE: 80 BPM | DIASTOLIC BLOOD PRESSURE: 78 MMHG | OXYGEN SATURATION: 98 % | BODY MASS INDEX: 28.12 KG/M2 | HEIGHT: 62 IN

## 2024-02-02 DIAGNOSIS — K26.9 DUODENAL ULCER: Primary | ICD-10-CM

## 2024-02-02 DIAGNOSIS — K92.2 GI BLEED: ICD-10-CM

## 2024-02-02 PROCEDURE — 99213 OFFICE O/P EST LOW 20 MIN: CPT | Performed by: PHYSICIAN ASSISTANT

## 2024-02-02 RX ORDER — PANTOPRAZOLE SODIUM 40 MG/1
40 TABLET, DELAYED RELEASE ORAL 2 TIMES DAILY
Qty: 60 TABLET | Refills: 11 | Status: SHIPPED | OUTPATIENT
Start: 2024-02-02

## 2024-02-02 NOTE — PROGRESS NOTES
[facility] - Outpatient Follow-up Note  Samira Allred 83 y.o. female MRN: [id number]  Encounter: [id number]          ASSESSMENT AND PLAN:      1. GI bleed  2. Duodenal ulcer  Patient's EGD is scheduled to be repeated on February 26, 2024.    Patient will continue to monitor stool color and output.    Most recent hemoglobin was 8.6 up from 7.9.    Pantoprazole 40 mg twice a day sent to the pharmacy.    High-fiber diet recommended.  ______________________________________________________________________    SUBJECTIVE:    83-year-old female with a past medical history significant for duodenal ulceration, CKD, thyroid disease, arthritis, history of breast CA, rheumatoid arthritis who presents to the GI clinic today for hospital follow-up.  Patient was evaluated by the GI team back in December when she presented with a GI bleed.  Patient did undergo an upper endoscopy on 12/15/2023 showing a large sliding handle hernia, multiple small ulcers in the duodenum, a small Dieulafoy lesion in the first portion of the duodenum that was actively bleeding hemostasis was achieved with 3 applications of bipolar cautery and 3 mL of epinephrine.  Overall patient is doing quite well.  She reports that she is getting her strength back.  She denies any melena or rectal bleeding.  She denies any hematemesis.  She reports that she was on pantoprazole 40 mg twice a day but she did run out of this medication.  She is due for repeat upper endoscopy on February 26, 2024.  Patient's most recent blood count was 8.6 up from 7.9.  Patient reports that when she did get out of the hospital she was having soft frequent stools.  She reports that she is now only going to the bathroom 2-3 times a day.  She reports that the stools seem to be forming.  She reports that her diet is good.  She reports that she has an appetite.  Her weight is stable.    REVIEW OF SYSTEMS IS OTHERWISE NEGATIVE.      Historical Information   Past  Medical History:   Diagnosis Date    Allergic angioedema     [id number] resolved    Angioedema     [id number] resolved    Arthritis     Breast cancer (HCC) 2012    right     Chronic kidney disease     Disease of thyroid gland     Hemorrhage of anus and rectum     [id number] resolved    Hyperlipidemia     Hypertension     Hypocalcemia     [id number] resolved    Neuritis     thoracic and lumbosacral    Peptic ulcer     Rheumatoid arthritis (HCC)     Stage 3b chronic kidney disease (HCC) 5/7/2021    Vitamin D deficiency      Past Surgical History:   Procedure Laterality Date    ABDOMINAL SURGERY      BREAST BIOPSY Right 2012    BREAST LUMPECTOMY Right 2012    BREAST SURGERY      CHOLECYSTECTOMY      ELBOW SURGERY      GALLBLADDER SURGERY  1981    HIP SURGERY Right 09/18/2022    10/2023    ORTHOPEDIC SURGERY      for toes    ME CYSTO W/IRRIG & EVAC MULTPLE OBSTRUCTING CLOTS N/A 11/5/2023    Procedure: CYSTOSCOPY EVACUATION OF CLOTS & fulguration;  Surgeon: Uriah Schwartz MD;  [facility];  Service: Urology    TRANSURETHRAL RESECTION OF BLADDER TUMOR N/A 11/5/2023    Procedure: TRANSURETHRAL RESECTION OF BLADDER TUMOR (TURBT);  Surgeon: Uriah Schwartz MD;  Location: [facility];  Service: Urology    US GUIDED BREAST BIOPSY RIGHT COMPLETE Right 08/15/2022     Social History   Social History     Substance and Sexual Activity   Alcohol Use Not Currently     Social History     Substance and Sexual Activity   Drug Use No     Social History     Tobacco Use   Smoking Status Never    Passive exposure: Past   Smokeless Tobacco Never     Family History   Problem Relation Age of Onset    Breast cancer Mother     Kidney failure Father     Other Father         uremia    Lung cancer Brother     Breast cancer Maternal Aunt     Breast cancer Maternal Aunt     Breast cancer Maternal Aunt     Breast cancer Maternal Aunt     Breast cancer Maternal Aunt     Stomach cancer Maternal Uncle        Meds/Allergies       Current  "Outpatient Medications:     ascorbic acid (VITAMIN C) 500 mg tablet    Calcium Citrate-Vitamin D (CITRACAL + D PO)    Cholecalciferol (VITAMIN D-3 PO)    folic acid (FOLVITE) 1 mg tablet    hydroCHLOROthiazide 12.5 mg tablet    levothyroxine (Synthroid) 75 mcg tablet    magnesium oxide (MAG-OX) 400 mg    Multiple Vitamin (MULTIVITAMIN) tablet    pantoprazole (PROTONIX) 40 mg tablet    rosuvastatin (CRESTOR) 5 mg tablet    simethicone (MYLICON) 80 mg chewable tablet    polyethylene glycol (MIRALAX) 17 g packet    riTUXimab (RITUXAN) 500 mg/50 mL    Allergies   Allergen Reactions    Lisinopril Anaphylaxis    Codeine Other (See Comments)     Nausea/vomiting      Other      Annotation - [id number]: tounge swelling    Oxycodone      Annotation - [id number]: NOT TOLERATE    Penicillins Hives    Sulfamethoxazole-Trimethoprim     Ciprofloxacin Rash           Objective     Blood pressure 119/78, pulse 80, height 5' 2\" (1.575 m), weight 69.3 kg (152 lb 12.8 oz), SpO2 98%, not currently breastfeeding. Body mass index is 27.95 kg/m².      PHYSICAL EXAM:      General Appearance:   Alert, cooperative, no distress   HEENT:   Normocephalic, atraumatic, anicteric.     Neck:  Supple, symmetrical, trachea midline   Lungs:   Clear to auscultation bilaterally; no rales, rhonchi or wheezing; respirations unlabored    Heart::   Regular rate and rhythm; no murmur, rub, or gallop.   Abdomen:   Soft, non-tender, non-distended; normal bowel sounds; no masses, no organomegaly    Genitalia:   Deferred    Rectal:   Deferred    Extremities:  No cyanosis, clubbing or edema    Pulses:  2+ and symmetric    Skin:  No jaundice, rashes, or lesions    Lymph nodes:  No palpable cervical lymphadenopathy        Lab Results:   No visits with results within 1 Day(s) from this visit.   Latest known visit with results is:   Lab Requisition on 01/29/2024   Component Date Value    Sodium 01/29/2024 137     Potassium 01/29/2024 5.7 (H)     Chloride 01/29/2024 103  "    CO2 01/29/2024 26     ANION GAP 01/29/2024 8     BUN 01/29/2024 26 (H)     Creatinine 01/29/2024 1.54 (H)     Glucose 01/29/2024 97     Calcium 01/29/2024 9.1     Corrected Calcium 01/29/2024 9.7     AST 01/29/2024 92 (H)     ALT 01/29/2024 77 (H)     Alkaline Phosphatase 01/29/2024 149 (H)     Total Protein 01/29/2024 5.4 (L)     Albumin 01/29/2024 3.2 (L)     Total Bilirubin 01/29/2024 0.56     eGFR 01/29/2024 31          Radiology Results:   US kidney and bladder    Result Date: 1/18/2024  Narrative: RENAL ULTRASOUND INDICATION: Hydronephrosis. COMPARISON: Renal ultrasound 12/31/2023. CT chest abdomen pelvis 12/7/2023. TECHNIQUE: Ultrasound of the retroperitoneum was performed with a curvilinear transducer utilizing volumetric sweeps and still imaging techniques. FINDINGS: KIDNEYS: Symmetric and normal size. Right kidney: 9.1 x 5.5 x 4.5 cm. Left kidney: 11.0 x 7.4 x 7.0 cm. Right kidney Normal echogenicity and contour. No mass is identified. No hydronephrosis. Shadowing calculus within the lower pole measures 0.6 cm. No perinephric fluid collections. Left kidney Normal echogenicity and contour. No mass is identified. Mild to moderate hydronephrosis which appears worsened from ultrasound of 12/31/2023. Intrarenal resistive indices are within normal limits ranging from 0.56-0.71. Echogenic focus measuring 6 mm within the lower pole with twinkle artifact is compatible with a nonobstructing calculus. No perinephric fluid collections. URETERS: Nonvisualized. BLADDER: A herrera catheter is in place, decompressing the bladder and limiting its evaluation. Bilateral ureteral jets are not detected.     Impression: 1. Moderate left hydronephrosis which is worsened from most recent comparison ultrasound of 12/31/2023. Consider further characterization with CT abdomen pelvis to evaluate for the presence of left ureteral calculi. 2. Bilateral renal calculi. 3. Urinary bladder is decompressed around a Herrera catheter, limiting  evaluation. The study was marked in EPIC for immediate notification. Workstation performed: [id number]

## 2024-02-08 ENCOUNTER — OFFICE VISIT (OUTPATIENT)
Dept: HEMATOLOGY ONCOLOGY | Facility: CLINIC | Age: 84
End: 2024-02-08
Payer: MEDICARE

## 2024-02-08 ENCOUNTER — LAB (OUTPATIENT)
Dept: LAB | Facility: HOSPITAL | Age: 84
End: 2024-02-08
Payer: MEDICARE

## 2024-02-08 VITALS
DIASTOLIC BLOOD PRESSURE: 72 MMHG | RESPIRATION RATE: 16 BRPM | BODY MASS INDEX: 28.52 KG/M2 | OXYGEN SATURATION: 99 % | HEART RATE: 63 BPM | HEIGHT: 62 IN | SYSTOLIC BLOOD PRESSURE: 122 MMHG | WEIGHT: 155 LBS | TEMPERATURE: 97.2 F

## 2024-02-08 DIAGNOSIS — R74.8 ELEVATED ALKALINE PHOSPHATASE LEVEL: ICD-10-CM

## 2024-02-08 DIAGNOSIS — D50.0 NORMOCYTIC ANEMIA DUE TO BLOOD LOSS: ICD-10-CM

## 2024-02-08 DIAGNOSIS — R91.1 PULMONARY NODULE 1 CM OR GREATER IN DIAMETER: Primary | ICD-10-CM

## 2024-02-08 DIAGNOSIS — R91.1 PULMONARY NODULE 1 CM OR GREATER IN DIAMETER: ICD-10-CM

## 2024-02-08 DIAGNOSIS — K90.89 OTHER INTESTINAL MALABSORPTION: ICD-10-CM

## 2024-02-08 LAB
ANISOCYTOSIS BLD QL SMEAR: PRESENT
BASOPHILS NFR BLD MANUAL: 1 % (ref 0–1)
ERYTHROCYTE [DISTWIDTH] IN BLOOD BY AUTOMATED COUNT: 18.7 % (ref 11.6–15.1)
FERRITIN SERPL-MCNC: 592 NG/ML (ref 11–307)
FOLATE SERPL-MCNC: >22.3 NG/ML
HCT VFR BLD AUTO: 31.1 % (ref 34.8–46.1)
HGB BLD-MCNC: 9.9 G/DL (ref 11.5–15.4)
HYPERCHROMIA BLD QL SMEAR: PRESENT
IMM EOSINOPHIL NFR BLD MANUAL: 6 % (ref 0–6)
LYMPHOCYTES NFR BLD: 18 % (ref 14–44)
MCH RBC QN AUTO: 30.1 PG (ref 26.8–34.3)
MCHC RBC AUTO-ENTMCNC: 31.8 G/DL (ref 31.4–37.4)
MCV RBC AUTO: 95 FL (ref 82–98)
MONOCYTES NFR BLD AUTO: 6 % (ref 4–12)
NEUTS SEG NFR BLD AUTO: 69 % (ref 45–77)
NRBC BLD AUTO-RTO: 0 /100 WBCS
PLATELET # BLD AUTO: 443 THOUSANDS/UL (ref 149–390)
PLATELET BLD QL SMEAR: ABNORMAL
PMV BLD AUTO: 9.5 FL (ref 8.9–12.7)
RBC # BLD AUTO: 3.29 MILLION/UL (ref 3.81–5.12)
RBC MORPH BLD: PRESENT
RETICS # AUTO: NORMAL 10*3/UL (ref 14097–95744)
RETICS # CALC: 1.86 % (ref 0.37–1.87)
TOTAL CELLS COUNTED SPEC: 100
VIT B12 SERPL-MCNC: 765 PG/ML (ref 180–914)
WBC # BLD AUTO: 7.25 THOUSAND/UL (ref 4.31–10.16)

## 2024-02-08 PROCEDURE — 82977 ASSAY OF GGT: CPT

## 2024-02-08 PROCEDURE — 83520 IMMUNOASSAY QUANT NOS NONAB: CPT

## 2024-02-08 PROCEDURE — 82746 ASSAY OF FOLIC ACID SERUM: CPT

## 2024-02-08 PROCEDURE — 83918 ORGANIC ACIDS TOTAL QUANT: CPT

## 2024-02-08 PROCEDURE — 83550 IRON BINDING TEST: CPT

## 2024-02-08 PROCEDURE — 36415 COLL VENOUS BLD VENIPUNCTURE: CPT

## 2024-02-08 PROCEDURE — 85007 BL SMEAR W/DIFF WBC COUNT: CPT

## 2024-02-08 PROCEDURE — 83540 ASSAY OF IRON: CPT

## 2024-02-08 PROCEDURE — 82728 ASSAY OF FERRITIN: CPT

## 2024-02-08 PROCEDURE — 85045 AUTOMATED RETICULOCYTE COUNT: CPT

## 2024-02-08 PROCEDURE — 82607 VITAMIN B-12: CPT

## 2024-02-08 PROCEDURE — 99215 OFFICE O/P EST HI 40 MIN: CPT | Performed by: PHYSICIAN ASSISTANT

## 2024-02-08 NOTE — PROGRESS NOTES
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      1.Normocytic anemia due to blood loss, anemia of chronic disease   2. Other intestinal malabsorption  Patient had had recent hospital visit secondary to upper GI bleed.  Endoscopy at this time showed large hiatal hernia, duodenal ulcers, Dieulafoy lesion s/p cauterization.  Received multiple transfusions during hospitalization.  Most recent labs show persistent, normocytic anemia. --> most recent labs WBC 10.3, hemoglobin 8.6, MCV 92, platelets 418,000. She does have history of macrocytic anemia which was attributed to anemia of chronic disease in setting of RA, MTX, and rituximab use. She has now been off of MTX and rituximab since GI bleed in December. Also has CKD which likely is contributing to anemia   - check CBC-D, iron panel, retic count given recent bleed   - check soluble transferrin receptor in setting of anemia of chronic disease   - check substrate levels, r/o other nutritional deficiencies   - could consider alpha EPO in the future if patient develops worsening anemia. Would need to discuss risk vs benefit as she has history of DVT.   - consider bone marrow biopsy if other cytopenias develop     3.  Stage I invasive ductal carcinoma of right breast ER/NH positive, HER2+  Diagnosed in 2012  positive in 2012 s/p lumpectomy, RT, Herceptin and tamoxifen from [id number]. Stopped due to development of DVT. Last mammogram in August 2022.  Found to have enlarging hypoechoic lesion in the upper region of the right breast at 10 o'clock position.  Ultrasound-guided biopsy completed, pathology revealed dense  sclerotic tissue with fibrosis and chronic inflammation. No evidence of malignancy.   - Due for mammogram, has appointment coming up.     4. Pulmonary nodule 1 cm or greater in diameter  Patient reports history of pulmonary nodules. Recent CT chest 12/2023 showed .3 x 1.1 cm right lower lobe nodule and 0.7cm groundglass right lower lobe nodule.   - Given history of breast cancer, would recommend biopsy r/o metastatic disease. Referred to pulmonology to determine whether site is amendable to biopsy   - Ambulatory Referral to Pulmonology; Future    5. History of macrocytic anemia   Thought to be secondary to RA/methotrexate.   - continue folic acid daily.     6. Deep vein thrombosis (DVT) of proximal vein of left lower extremity   - 10/2019 : 1st clot in life triggered by recent poison ivy infection , on eliquis 5 mg bid x 6m   - 4/2020 : doppler showed DVT resolved  - currently off ac     7. Elevated alkaline phosphatase level  - Gamma GT; Future    The patient is scheduled for follow-up in approximately 2 months   Patient voiced agreement and understanding to the above.   Patient advised to call the Hematology/Oncology office with any questions and concerns regarding the above.    Barrier(s) to care: None  The patient is able to self care.    Kasie Cope PA-C   Medical Oncology/Hematology  [facility]    Subjective   No chief complaint on file.      History of present illness: 82yo female with PMHx HTN, RA on MTX, diastolic dysfunction, HLN, breast cancer, stage IV CKD  who presents as follow up for LEOBARDO.     12/2023: Hospitalized for GI bleed received multiple transfusions.  Endoscopy showed large hiatal hernia, multiple small ulcers in the duodenum, bleeding  Dieulafoy lesion s/p cauterization.  CT performed during admission showed bilateral hydronephrosis due to bladder distention, cystitis, stable chronic compression fracture T12, few calcified uterine fibroids, 1.3 x 1.1 cm right  lower lobe nodule and 0.7cm groundglass right lower lobe nodule new from 2019.     Rheumatologist had taken patient off after bleed. Had been on for about 20 years.     01/31/24: WBC 10.3, hemoglobin 8.6, MCV 92, platelets 418,000.  AST 70, ALT 64 alk phos 151, albumin 3.3.  GFR 34.    Interval history:   Hospitalized 12/2023 for GIB and UTI.  Presenting labs revealed WBC 21,000, hemoglobin 8.1, RDW 18.6, platelets 202,000. Had acute drop in hgb <7 multiple times requiring multiple transfusions. Upper endoscopy on 12/15 showed large sliding handle hernia, multiple small ulcers in the duodenum, a small Dieulafoy lesion in the first portion of the duodenum that was actively bleeding hemostasis was achieved with 3 applications of bipolar cautery and epinephrine.      Hospitalized again in 01/2024 for UTI.    Interval history: patient overall reports she is feeling well. Denies hematochezia, melena, hematuria, vaginal bleeding or constitutional symptoms. Has fatigue at times. Stopped treatment for RA due to history of bleeding. States she has not had acute excaerbation of her RA. Still has herrera.     Review of Systems   Constitutional:  Positive for fatigue. Negative for diaphoresis, fever and unexpected weight change.   Respiratory:  Negative for shortness of breath.    Cardiovascular:  Negative for chest pain.   Gastrointestinal:  Negative for abdominal pain, blood in stool and nausea.   Genitourinary:  Negative for hematuria.   Skin:  Negative for rash.   Hematological:  Negative for adenopathy. Does not bruise/bleed easily.       Patient Active Problem List   Diagnosis    Hypertension    Rheumatoid arthritis (HCC)    Hypothyroidism    Hyperuricemia    Diastolic dysfunction    Hyperlipidemia    Lumbar radiculopathy    Osteopenia    Other chronic pain    Vitamin D deficiency    Adenocarcinoma of breast (HCC)    Localized edema    Fat necrosis (segmental) of breast    Personal history of breast cancer    History of  peptic ulcer    Functional diarrhea    Intermediate stage nonexudative 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 eyes    Lower leg DVT (deep venous thromboembolism), chronic, left (HCC)    Varicose veins of left lower extremity with pain    Anemia    Stage 3b chronic kidney disease (HCC)    History of DVT (deep vein thrombosis)    Chronic kidney disease-mineral and bone disorder    Chronic pain of both knees    Protein-calorie malnutrition, unspecified severity (HCC)    Chronic renal disease, stage IV (HCC)    GI bleed    Hydronephrosis    Urinary retention    Sepsis (HCC)    PUD (peptic ulcer disease)    Aortic valve stenosis, moderate    Severe protein-calorie malnutrition (HCC)    UTI (urinary tract infection)    Hyponatremia     Past Medical History:   Diagnosis Date    Allergic angioedema     [id number] resolved    Angioedema     [id number] resolved    Arthritis     Breast cancer (HCC) 2012    right     Chronic kidney disease     Disease of thyroid gland     Hemorrhage of anus and rectum     02mar2016 resolved    Hyperlipidemia     Hypertension     Hypocalcemia     12oct2017 resolved    Neuritis     thoracic and lumbosacral    Peptic ulcer     Rheumatoid arthritis (HCC)     Stage 3b chronic kidney disease (HCC) 5/7/2021    Vitamin D deficiency      Past Surgical History:   Procedure Laterality Date    ABDOMINAL SURGERY      BREAST BIOPSY Right 2012    BREAST LUMPECTOMY Right 2012    BREAST SURGERY      CHOLECYSTECTOMY      ELBOW SURGERY      GALLBLADDER SURGERY  1981    HIP SURGERY Right 09/18/2022    10/2023    ORTHOPEDIC SURGERY      for toes    KS CYSTO W/IRRIG & EVAC MULTPLE OBSTRUCTING CLOTS N/A 11/5/2023    Procedure: CYSTOSCOPY EVACUATION OF CLOTS & fulguration;  Surgeon: Uriah Schwartz MD;  [facility];  Service: Urology    TRANSURETHRAL RESECTION OF BLADDER TUMOR N/A 11/5/2023    Procedure: TRANSURETHRAL RESECTION OF BLADDER TUMOR (TURBT);  Surgeon: Uriah Schwartz MD;  [facility];  Service: Urology    US GUIDED BREAST BIOPSY RIGHT COMPLETE Right 08/15/2022     Family History   Problem Relation Age of Onset    Breast cancer Mother     Kidney failure Father     Other Father         uremia    Lung cancer Brother     Breast cancer Maternal Aunt     Breast cancer Maternal Aunt     Breast cancer Maternal Aunt     Breast cancer Maternal Aunt     Breast cancer Maternal Aunt     Stomach cancer Maternal Uncle      Social History     Socioeconomic History    Marital status:      Spouse name: Not on file    Number of children: Not on file    Years of education: Not on file    Highest education level: Not on file   Occupational History    Not on file   Tobacco Use    Smoking status: Never     Passive exposure: Past    Smokeless tobacco: Never   Vaping Use    Vaping status: Never Used   Substance and Sexual Activity    Alcohol use: Not Currently    Drug use: No    Sexual activity: Not Currently     Partners: Male   Other Topics Concern    Not on file   Social History Narrative    Active: caffeine use     Social Determinants of Health     Financial Resource Strain: Low Risk  (10/4/2023)    Received from [facility]    Overall Financial Resource Strain (CARDIA)     Difficulty of Paying Living Expenses: Not hard at all   Food Insecurity: No Food Insecurity (1/23/2024)    Hunger Vital Sign     Worried About Running Out of Food in the Last Year: Never true     Ran Out of Food in the Last Year: Never true   Transportation Needs: No Transportation Needs (1/23/2024)    PRAPARE - Transportation     Lack of Transportation (Medical): No     Lack of Transportation (Non-Medical): No   Physical Activity: Not on file   Stress: Not on file   Social Connections: Not on file   Intimate Partner Violence: Not At Risk (10/4/2023)    Received from [facility]    Humiliation, Afraid, Rape, and Kick questionnaire     Fear of Current or Ex-Partner: No     Emotionally Abused: No      Physically Abused: No     Sexually Abused: No   Housing Stability: Low Risk  (1/23/2024)    Housing Stability Vital Sign     Unable to Pay for Housing in the Last Year: No     Number of Places Lived in the Last Year: 1     Unstable Housing in the Last Year: No       Current Outpatient Medications:     ascorbic acid (VITAMIN C) 500 mg tablet, Take 500 mg by mouth daily., Disp: , Rfl:     Calcium Citrate-Vitamin D (CITRACAL + D PO), Take by mouth 2 (two) times a day, Disp: , Rfl:     Cholecalciferol (VITAMIN D-3 PO), Take 2,000 Units by mouth daily., Disp: , Rfl:     folic acid (FOLVITE) 1 mg tablet, 1 daily, Disp: 90 tablet, Rfl: 2    hydroCHLOROthiazide 12.5 mg tablet, Take 1 tablet (12.5 mg total) by mouth daily (Patient taking differently: Take 12.5 mg by mouth daily Taking 1/2 pill in the morning.), Disp: 90 tablet, Rfl: 3    levothyroxine (Synthroid) 75 mcg tablet, Take 1 tablet (75 mcg total) by mouth in the morning, Disp: 90 tablet, Rfl: 3    magnesium oxide (MAG-OX) 400 mg, Take 1 tablet (400 mg total) by mouth 2 (two) times a day, Disp: 90 tablet, Rfl: 0    Multiple Vitamin (MULTIVITAMIN) tablet, Take 1 tablet by mouth daily., Disp: , Rfl:     pantoprazole (PROTONIX) 40 mg tablet, Take 1 tablet (40 mg total) by mouth 2 (two) times a day, Disp: 60 tablet, Rfl: 11    polyethylene glycol (MIRALAX) 17 g packet, Take 17 g by mouth daily as needed (constipation) (Patient not taking: Reported on 1/29/2024), Disp: 5 each, Rfl: 0    riTUXimab (RITUXAN) 500 mg/50 mL, Inject into a catheter in a vein every 6 (six) months (Patient not taking: Reported on 1/29/2024), Disp: , Rfl:     rosuvastatin (CRESTOR) 5 mg tablet, TAKE 1 TABLET DAILY, Disp: 90 tablet, Rfl: 2    simethicone (MYLICON) 80 mg chewable tablet, Chew 1 tablet (80 mg total) 4 (four) times a day as needed for flatulence, Disp: 30 tablet, Rfl: 0  Allergies   Allergen Reactions    Lisinopril Anaphylaxis    Codeine Other (See Comments)     Nausea/vomiting       Other      Annotation - [id number]: tounge swelling    Oxycodone      Annotation - [id number]: NOT TOLERATE    Penicillins Hives    Sulfamethoxazole-Trimethoprim     Ciprofloxacin Rash       Objective   LMP  (LMP Unknown)    Physical Exam  Vitals reviewed.   HENT:      Head: Normocephalic.   Cardiovascular:      Rate and Rhythm: Normal rate and regular rhythm.      Heart sounds: Normal heart sounds.   Pulmonary:      Effort: Pulmonary effort is normal.      Breath sounds: Normal breath sounds.   Abdominal:      Palpations: Abdomen is soft.      Tenderness: There is no abdominal tenderness.   Musculoskeletal:      Cervical back: Neck supple.   Lymphadenopathy:      Cervical: No cervical adenopathy.   Skin:     Findings: No rash.   Neurological:      Mental Status: She is alert.         Result Review  Labs:  Lab Requisition on 01/29/2024   Component Date Value Ref Range Status    Sodium 01/29/2024 137  135 - 147 mmol/L Final    Potassium 01/29/2024 5.7 (H)  3.5 - 5.3 mmol/L Final    Slightly Hemolyzed:Results may be affected.    Chloride 01/29/2024 103  96 - 108 mmol/L Final    CO2 01/29/2024 26  21 - 32 mmol/L Final    ANION GAP 01/29/2024 8  mmol/L Final    BUN 01/29/2024 26 (H)  5 - 25 mg/dL Final    Creatinine 01/29/2024 1.54 (H)  0.60 - 1.30 mg/dL Final    Standardized to IDMS reference method    Glucose 01/29/2024 97  65 - 140 mg/dL Final    If the patient is fasting, the ADA then defines impaired fasting glucose as > 100 mg/dL and diabetes as > or equal to 123 mg/dL.    Calcium 01/29/2024 9.1  8.4 - 10.2 mg/dL Final    Corrected Calcium 01/29/2024 9.7  8.3 - 10.1 mg/dL Final    AST 01/29/2024 92 (H)  13 - 39 U/L Final    Slightly Hemolyzed:Results may be affected.    ALT 01/29/2024 77 (H)  7 - 52 U/L Final    Specimen collection should occur prior to Sulfasalazine administration due to the potential for falsely depressed results.     Alkaline Phosphatase 01/29/2024 149 (H)  34 - 104 U/L Final    Total Protein  01/29/2024 5.4 (L)  6.4 - 8.4 g/dL Final    Albumin 01/29/2024 3.2 (L)  3.5 - 5.0 g/dL Final    Total Bilirubin 01/29/2024 0.56  0.20 - 1.00 mg/dL Final    Use of this assay is not recommended for patients undergoing treatment with eltrombopag due to the potential for falsely elevated results.  N-acetyl-p-benzoquinone imine (metabolite of Acetaminophen) will generate erroneously low results in samples for patients that have taken an overdose of Acetaminophen.    eGFR 01/29/2024 31  ml/min/1.73sq m Final   No results displayed because visit has over 200 results.      Procedure visit on 01/10/2024   Component Date Value Ref Range Status    POST-VOID RESIDUAL VOLUME, ML POC 01/10/2024 218  mL Final    Color, UA 01/10/2024 Yellow   Final    Clarity, UA 01/10/2024 Extra Turbid   Final    Specific Gravity, UA 01/10/2024 1.010  1.003 - 1.030 Final    pH, UA 01/10/2024 7.0  4.5, 5.0, 5.5, 6.0, 6.5, 7.0, 7.5, 8.0 Final    Leukocytes, UA 01/10/2024 Large (A)  Negative Final    Nitrite, UA 01/10/2024 Negative  Negative Final    Protein, UA 01/10/2024 50 (1+) (A)  Negative mg/dl Final    Glucose, UA 01/10/2024 Negative  Negative mg/dl Final    Ketones, UA 01/10/2024 Negative  Negative mg/dl Final    Urobilinogen, UA 01/10/2024 <2.0  <2.0 mg/dl mg/dl Final    Bilirubin, UA 01/10/2024 Negative  Negative Final    Occult Blood, UA 01/10/2024 Moderate (A)  Negative Final    RBC, UA 01/10/2024 Innumerable (A)  None Seen, 1-2 /hpf Final    WBC, UA 01/10/2024 Innumerable (A)  None Seen, 1-2 /hpf Final    Epithelial Cells 01/10/2024 None Seen  None Seen, Occasional /hpf Final    Bacteria, UA 01/10/2024 None Seen  None Seen, Occasional /hpf Final    Urine Culture 01/10/2024 >100,000 cfu/ml   Final    Mixed Contaminants X5       Imaging:   I reviewed relevant imaging    Please note:  This report has been generated by a voice recognition software system. Therefore there may be syntax, spelling, and/or grammatical errors. Please call if you  have any questions.

## 2024-02-09 ENCOUNTER — TELEPHONE (OUTPATIENT)
Dept: HEMATOLOGY ONCOLOGY | Facility: CLINIC | Age: 84
End: 2024-02-09

## 2024-02-09 ENCOUNTER — NURSE TRIAGE (OUTPATIENT)
Age: 84
End: 2024-02-09

## 2024-02-09 DIAGNOSIS — R74.8 ELEVATED SERUM GGT LEVEL: ICD-10-CM

## 2024-02-09 DIAGNOSIS — C50.919 ADENOCARCINOMA OF BREAST, UNSPECIFIED LATERALITY (HCC): ICD-10-CM

## 2024-02-09 DIAGNOSIS — R74.8 ELEVATED ALKALINE PHOSPHATASE LEVEL: Primary | ICD-10-CM

## 2024-02-09 LAB
GGT SERPL-CCNC: 112 U/L (ref 9–64)
IRON SATN MFR SERPL: 20 % (ref 15–50)
IRON SERPL-MCNC: 58 UG/DL (ref 50–212)
TIBC SERPL-MCNC: 294 UG/DL (ref 250–450)
UIBC SERPL-MCNC: 236 UG/DL (ref 155–355)

## 2024-02-09 NOTE — TELEPHONE ENCOUNTER
"Answer Assessment - Initial Assessment Questions  1. REASON FOR CALL or QUESTION: \"What is your reason for calling today?\" or \"How can I best help you?\" or \"What question do you have that I can help answer?\"      Digna from [facility] called in stating pt had a catheter placed at hospital when she went in on 1/18/24. Would like to know if they are to take catheter out and do a PVR or if she should come to us. If they do it, they will need an order faxed to [phone].  # [phone]    Protocols used: Information Only Call - No Triage-ADULT-[place]    "

## 2024-02-09 NOTE — TELEPHONE ENCOUNTER
Call out to patient to discuss result notes from Kasie, patient agreeable to have US completed  Will send to clerical to assist patient with getting scheduled.        Kasie Cope PA-C  2/9/2024  2:07 PM EST Back to Top      If patient is agreeable to US, please forward to clerical team to schedule. Thank you!    Kasie Cope PA-C  2/9/2024  2:04 PM EST       Please let patient know her liver enzymes are elevated.  Recommend right upper quadrant ultrasound.  Order placed.  The rest of her labs are stable.  No new findings.

## 2024-02-09 NOTE — TELEPHONE ENCOUNTER
"Spoke with Digna from [facility]. Per last urology note, \"Seen at [facility] due to urinary retention, MARCELO.  Carrera catheter replaced.  Recommend maintaining Carrera catheter.  Patient has VNA.  Please provide instructions for routine exchanges every 4 to 6 weeks\"    Advised I did fax orders over on 1/19/24 regarding catheter changes, but I will refax them to [phone]. Digna verbalized understanding.   "

## 2024-02-09 NOTE — RESULT ENCOUNTER NOTE
Call out to patient to discuss result notes from [facility], patient agreeable to have US completed  Will send to clerical to assist patient with getting scheduled.

## 2024-02-10 ENCOUNTER — HOSPITAL ENCOUNTER (EMERGENCY)
Facility: HOSPITAL | Age: 84
Discharge: HOME | End: 2024-02-10
Attending: EMERGENCY MEDICINE
Payer: MEDICARE

## 2024-02-10 VITALS
TEMPERATURE: 98.7 F | OXYGEN SATURATION: 99 % | SYSTOLIC BLOOD PRESSURE: 172 MMHG | DIASTOLIC BLOOD PRESSURE: 114 MMHG | HEART RATE: 98 BPM | RESPIRATION RATE: 18 BRPM

## 2024-02-10 DIAGNOSIS — T83.9XXA PROBLEM WITH FOLEY CATHETER, INITIAL ENCOUNTER (HCC): Primary | ICD-10-CM

## 2024-02-10 PROCEDURE — 99283 EMERGENCY DEPT VISIT LOW MDM: CPT | Performed by: EMERGENCY MEDICINE

## 2024-02-10 PROCEDURE — 99283 EMERGENCY DEPT VISIT LOW MDM: CPT

## 2024-02-10 NOTE — ED NOTES
"Entered room to examine Pt. And appreciated that the \"cap\" cover on her new catheter bag was not removed prior to her attaching the catheter outlet, therefore blocking urine flow into the collecting bag. RN clamped catheter, removed connection, removed cap, replaced all connections and removed clamp. Urine was immediately appreciated flowing into catheter bag.     Genaro Mckeon RN  02/10/24 [id number]    "

## 2024-02-11 LAB — STFR SERPL-SCNC: 29.8 NMOL/L (ref 12.2–27.3)

## 2024-02-12 NOTE — ED PROVIDER NOTES
"History  Chief Complaint   Patient presents with    Urinary Catheter Problem     Pt reports changing the bag on her urinary catheter 1 hr ago and hasn't had any output since, \"it's the first time I've done it\" pt c/o low abd pressure     83-year-old female with history of breast cancer and high blood pressure presenting with Carrera catheter issue.  Patient reports she recently changed her bag and has not had urinary output since.  Reports some mild lower abdominal pressure.  Denies any nausea vomiting.  Denies any pain with urination.  Prior to provider evaluation, nursing found that cap still on the bag and after removal, urine draining normally.  Patient currently asymptomatic.  Denies any chest pain shortness of breath.  Denies any other complaints.  Chart reviewed.    Past Medical History:  No date: Allergic angioedema      Comment:  [id number] resolved  No date: Angioedema      Comment:  [id number] resolved  No date: Arthritis  2012: Breast cancer (HCC)      Comment:  right   No date: Chronic kidney disease  No date: Disease of thyroid gland  No date: Hemorrhage of anus and rectum      Comment:  [id number] resolved  No date: Hyperlipidemia  No date: Hypertension  No date: Hypocalcemia      Comment:  [id number] resolved  No date: Neuritis      Comment:  thoracic and lumbosacral  No date: Peptic ulcer  No date: Rheumatoid arthritis (HCC)  5/7/2021: Stage 3b chronic kidney disease (HCC)  No date: Vitamin D deficiency  Family History: non-contributory  Social History          Prior to Admission Medications   Prescriptions Last Dose Informant Patient Reported? Taking?   Calcium Citrate-Vitamin D (CITRACAL + D PO)  Self Yes No   Sig: Take by mouth 2 (two) times a day   Cholecalciferol (VITAMIN D-3 PO)  Self Yes No   Sig: Take 2,000 Units by mouth daily.   Multiple Vitamin (MULTIVITAMIN) tablet  Self Yes No   Sig: Take 1 tablet by mouth daily.   ascorbic acid (VITAMIN C) 500 mg tablet  Self Yes No   Sig: Take 500 mg by " mouth daily.   folic acid (FOLVITE) 1 mg tablet  Self No No   Si daily   hydroCHLOROthiazide 12.5 mg tablet  Self No No   Sig: Take 1 tablet (12.5 mg total) by mouth daily   Patient taking differently: Take 12.5 mg by mouth daily Taking 1/2 pill in the morning.   levothyroxine (Synthroid) 75 mcg tablet  Self No No   Sig: Take 1 tablet (75 mcg total) by mouth in the morning   magnesium oxide (MAG-OX) 400 mg  Self No No   Sig: Take 1 tablet (400 mg total) by mouth 2 (two) times a day   pantoprazole (PROTONIX) 40 mg tablet  Self No No   Sig: Take 1 tablet (40 mg total) by mouth 2 (two) times a day   polyethylene glycol (MIRALAX) 17 g packet  Self No No   Sig: Take 17 g by mouth daily as needed (constipation)   Patient not taking: Reported on 2024   riTUXimab (RITUXAN) 500 mg/50 mL  Self Yes No   Sig: Inject into a catheter in a vein every 6 (six) months   Patient not taking: Reported on 2024   rosuvastatin (CRESTOR) 5 mg tablet  Self No No   Sig: TAKE 1 TABLET DAILY   simethicone (MYLICON) 80 mg chewable tablet  Self No No   Sig: Chew 1 tablet (80 mg total) 4 (four) times a day as needed for flatulence      Facility-Administered Medications: None       Past Medical History:   Diagnosis Date    Allergic angioedema     [id number] resolved    Angioedema     [id number] resolved    Arthritis     Breast cancer (HCC) 2012    right     Chronic kidney disease     Disease of thyroid gland     Hemorrhage of anus and rectum     2016 resolved    Hyperlipidemia     Hypertension     Hypocalcemia     2017 resolved    Neuritis     thoracic and lumbosacral    Peptic ulcer     Rheumatoid arthritis (HCC)     Stage 3b chronic kidney disease (HCC) 2021    Vitamin D deficiency        Past Surgical History:   Procedure Laterality Date    ABDOMINAL SURGERY      BREAST BIOPSY Right 2012    BREAST LUMPECTOMY Right 2012    BREAST SURGERY      CHOLECYSTECTOMY      ELBOW SURGERY      GALLBLADDER SURGERY  1981    HIP SURGERY  Right 09/18/2022    10/2023    ORTHOPEDIC SURGERY      for toes    DC CYSTO W/IRRIG & EVAC MULTPLE OBSTRUCTING CLOTS N/A 11/5/2023    Procedure: CYSTOSCOPY EVACUATION OF CLOTS & fulguration;  Surgeon: Uriah Schwartz MD;  Location: [facility];  Service: Urology    TRANSURETHRAL RESECTION OF BLADDER TUMOR N/A 11/5/2023    Procedure: TRANSURETHRAL RESECTION OF BLADDER TUMOR (TURBT);  Surgeon: Uriah Schwartz MD;  Location: [facility];  Service: Urology    US GUIDED BREAST BIOPSY RIGHT COMPLETE Right 08/15/2022       Family History   Problem Relation Age of Onset    Breast cancer Mother     Kidney failure Father     Other Father         uremia    Lung cancer Brother     Breast cancer Maternal Aunt     Breast cancer Maternal Aunt     Breast cancer Maternal Aunt     Breast cancer Maternal Aunt     Breast cancer Maternal Aunt     Stomach cancer Maternal Uncle      I have reviewed and agree with the history as documented.    E-Cigarette/Vaping    E-Cigarette Use Never User      E-Cigarette/Vaping Substances    Nicotine No     THC No     CBD No     Flavoring No     Other No     Unknown No      Social History     Tobacco Use    Smoking status: Never     Passive exposure: Past    Smokeless tobacco: Never   Vaping Use    Vaping status: Never Used   Substance Use Topics    Alcohol use: Not Currently    Drug use: No       Review of Systems   Constitutional:  Negative for appetite change, chills, diaphoresis, fever and unexpected weight change.   HENT:  Negative for congestion and rhinorrhea.    Eyes:  Negative for photophobia and visual disturbance.   Respiratory:  Negative for cough, chest tightness and shortness of breath.    Cardiovascular:  Negative for chest pain, palpitations and leg swelling.   Gastrointestinal:  Negative for abdominal distention, abdominal pain, blood in stool, constipation, diarrhea, nausea and vomiting.   Genitourinary:  Positive for difficulty urinating. Negative for dysuria and hematuria.    Musculoskeletal:  Negative for back pain, joint swelling, neck pain and neck stiffness.   Skin:  Negative for color change, pallor, rash and wound.   Neurological:  Negative for dizziness, syncope, weakness, light-headedness and headaches.   Psychiatric/Behavioral:  Negative for agitation.    All other systems reviewed and are negative.      Physical Exam  Physical Exam  Vitals and nursing note reviewed.   Constitutional:       General: She is not in acute distress.     Appearance: Normal appearance. She is well-developed. She is not ill-appearing, toxic-appearing or diaphoretic.   HENT:      Head: Normocephalic and atraumatic.      Nose: Nose normal. No congestion or rhinorrhea.      Mouth/Throat:      Mouth: Mucous membranes are moist.      Pharynx: Oropharynx is clear. No oropharyngeal exudate or posterior oropharyngeal erythema.   Eyes:      General: No scleral icterus.        Right eye: No discharge.         Left eye: No discharge.      Extraocular Movements: Extraocular movements intact.      Conjunctiva/sclera: Conjunctivae normal.      Pupils: Pupils are equal, round, and reactive to light.   Neck:      Vascular: No JVD.      Trachea: No tracheal deviation.      Comments: Supple. Normal range of motion.   Cardiovascular:      Rate and Rhythm: Normal rate and regular rhythm.      Heart sounds: Normal heart sounds. No murmur heard.     No friction rub. No gallop.      Comments: Normal rate and regular rhythm  Pulmonary:      Effort: Pulmonary effort is normal. No respiratory distress.      Breath sounds: Normal breath sounds. No stridor. No wheezing or rales.      Comments: Clear to auscultation bilaterally  Chest:      Chest wall: No tenderness.   Abdominal:      General: Bowel sounds are normal. There is no distension.      Palpations: Abdomen is soft.      Tenderness: There is no abdominal tenderness. There is no right CVA tenderness, left CVA tenderness, guarding or rebound.      Comments: Soft,  nontender, nondistended.  Normal bowel sounds throughout   Musculoskeletal:         General: No swelling, tenderness, deformity or signs of injury. Normal range of motion.      Cervical back: Normal range of motion and neck supple. No rigidity. No muscular tenderness.      Right lower leg: No edema.      Left lower leg: No edema.   Lymphadenopathy:      Cervical: No cervical adenopathy.   Skin:     General: Skin is warm and dry.      Coloration: Skin is not pale.      Findings: No erythema or rash.   Neurological:      General: No focal deficit present.      Mental Status: She is alert. Mental status is at baseline.      Sensory: No sensory deficit.      Motor: No weakness or abnormal muscle tone.      Coordination: Coordination normal.      Gait: Gait normal.      Comments: Alert.  Strength and sensation grossly intact.  Ambulatory without difficulty at baseline.    Psychiatric:         Behavior: Behavior normal.         Thought Content: Thought content normal.         Vital Signs  ED Triage Vitals [02/10/24 1834]   Temperature Pulse Respirations Blood Pressure SpO2   98.7 °F (37.1 °C) 98 18 (!) 172/114 99 %      Temp Source Heart Rate Source Patient Position - Orthostatic VS BP Location FiO2 (%)   Temporal Monitor Sitting Left arm --      Pain Score       --           Vitals:    02/10/24 1834   BP: (!) 172/114   Pulse: 98   Patient Position - Orthostatic VS: Sitting         Visual Acuity      ED Medications  Medications - No data to display    Diagnostic Studies  Results Reviewed       None                   No orders to display              Procedures  Procedures         ED Course                                             Medical Decision Making  83-year-old female with history of breast cancer and high blood pressure presenting with Carrera catheter issue.  Currently urinating normally after removal of cap from bag.  No evidence of obstruction.  Carrera catheter instructions provided.  Has appointment for exchange  on Monday.  Discussed results and recommendations. Advised follow up PCP. Medication recommendations. Given instructions and return precautions. Patient/family at bedside acknowledged understanding of all written and verbal instructions and return precautions. Discharged.              Disposition  Final diagnoses:   Problem with Carrera catheter, initial encounter (HCC)     Time reflects when diagnosis was documented in both MDM as applicable and the Disposition within this note       Time User Action Codes Description Comment    2/10/2024  7:16 PM Tylor Hernandez Add [T83.9XXA] Problem with Carrera catheter, initial encounter (HCC)           ED Disposition       ED Disposition   Discharge    Condition   Stable    Date/Time   Sat Feb 10, 2024  7:16 PM    Comment   Samira Allred discharge to home/self care.                   Follow-up Information       Follow up With Specialties Details Why Contact Info    Layne Bacon MD Internal Medicine Schedule an appointment as soon as possible for a visit in 1 week  [address]  [address] [address]  [phone]              Discharge Medication List as of 2/10/2024  7:16 PM        CONTINUE these medications which have NOT CHANGED    Details   ascorbic acid (VITAMIN C) 500 mg tablet Take 500 mg by mouth daily., Historical Med      Calcium Citrate-Vitamin D (CITRACAL + D PO) Take by mouth 2 (two) times a day, Historical Med      Cholecalciferol (VITAMIN D-3 PO) Take 2,000 Units by mouth daily., Historical Med      folic acid (FOLVITE) 1 mg tablet 1 daily, Print      hydroCHLOROthiazide 12.5 mg tablet Take 1 tablet (12.5 mg total) by mouth daily, Starting Fri 1/26/2024, Until Mon 1/20/2025, Normal      levothyroxine (Synthroid) 75 mcg tablet Take 1 tablet (75 mcg total) by mouth in the morning, Starting Mon 5/1/2023, Normal      magnesium oxide (MAG-OX) 400 mg Take 1 tablet (400 mg total) by mouth 2 (two) times a day, Starting Mon 11/5/2018, No Print      Multiple  Vitamin (MULTIVITAMIN) tablet Take 1 tablet by mouth daily., Historical Med      pantoprazole (PROTONIX) 40 mg tablet Take 1 tablet (40 mg total) by mouth 2 (two) times a day, Starting Fri 2/2/2024, Normal      polyethylene glycol (MIRALAX) 17 g packet Take 17 g by mouth daily as needed (constipation), Starting Wed 11/8/2023, Normal      riTUXimab (RITUXAN) 500 mg/50 mL Inject into a catheter in a vein every 6 (six) months, Starting Thu 8/20/2015, Historical Med      rosuvastatin (CRESTOR) 5 mg tablet TAKE 1 TABLET DAILY, Starting Wed 8/23/2023, Normal      simethicone (MYLICON) 80 mg chewable tablet Chew 1 tablet (80 mg total) 4 (four) times a day as needed for flatulence, Starting Tue 1/23/2024, Normal             No discharge procedures on file.    PDMP Review         Value Time User    PDMP Reviewed  Yes 1/18/2024 11:09 AM Avel Vega DO            ED Provider  Electronically Signed by             Tylor Hernandez MD  02/12/24 [id number]

## 2024-02-14 ENCOUNTER — TELEPHONE (OUTPATIENT)
Age: 84
End: 2024-02-14

## 2024-02-14 DIAGNOSIS — M06.9: ICD-10-CM

## 2024-02-14 LAB — METHYLMALONATE SERPL-SCNC: 501 NMOL/L (ref 0–378)

## 2024-02-14 RX ORDER — FOLIC ACID 1 MG/1
TABLET ORAL
Qty: 90 TABLET | Refills: 2 | Status: SHIPPED | OUTPATIENT
Start: 2024-02-14

## 2024-02-14 NOTE — TELEPHONE ENCOUNTER
Patients GI provider:  Dr. Arcos    Number to return call: [phone]    Reason for call: Pt calling about a missed call. Advised do not see anything on pt's chart. Pt clarified it was in regards to a text sent. Advised pt that was most likely a text asking for her to confirm proced. If needed, please call pt back.    Scheduled procedure/appointment date if applicable: Procedure 02/26/2024

## 2024-02-16 ENCOUNTER — OFFICE VISIT (OUTPATIENT)
Age: 84
End: 2024-02-16
Payer: MEDICARE

## 2024-02-16 ENCOUNTER — TELEPHONE (OUTPATIENT)
Dept: INTERNAL MEDICINE CLINIC | Facility: CLINIC | Age: 84
End: 2024-02-16

## 2024-02-16 VITALS
OXYGEN SATURATION: 96 % | WEIGHT: 151 LBS | SYSTOLIC BLOOD PRESSURE: 114 MMHG | DIASTOLIC BLOOD PRESSURE: 80 MMHG | HEIGHT: 62 IN | HEART RATE: 77 BPM | BODY MASS INDEX: 27.79 KG/M2 | TEMPERATURE: 97.7 F

## 2024-02-16 DIAGNOSIS — R91.1 PULMONARY NODULE 1 CM OR GREATER IN DIAMETER: Primary | ICD-10-CM

## 2024-02-16 DIAGNOSIS — D50.0 NORMOCYTIC ANEMIA DUE TO BLOOD LOSS: ICD-10-CM

## 2024-02-16 PROCEDURE — 99204 OFFICE O/P NEW MOD 45 MIN: CPT | Performed by: INTERNAL MEDICINE

## 2024-02-16 NOTE — PROGRESS NOTES
Consultation - Pulmonary Medicine   Samira Allred 83 y.o. female MRN: [id number]    Physician Requesting Consult: Kasie Cope  Reason for Consult: pulmonary nodule    Samira Allred is a 83 y.o. female hx RA, CKD4, urinary retention, PUD, former breast Ca (2022 sp surgery, RT, hormone therapy)lung nodule who presents for evaluation of lung nodule.    # Pulmonary Nodules  # RA (previously on rituxan)  Never smoker. Pt had scattered lung nodules <6mm in 2017. In 2022 had 6mm posterior right nodule stable in 11/2023 with new central RLL nodule 12/2023 that was not seen on CT A/P a month prior 9may have been lower cuts) or CT in 2019.   Ddx includes rheumatoid nodule vs malignancy. Has recently been taken off all RA meds due to PUD (around Oct)  Since last imaging 2.5 months ago would repeat imaging with PET CT    - PET CT urgent   --- if not decreasing in size and PET avid, may be a candidate for Iftikhar bronch which would require dedicated CT chest with iftikhar bronch protocol.   - Follow up in 1 month  - PFTs in case requires surgery or other procedure in the future      Vaccines    Immunization History   Administered Date(s) Administered    COVID-19 MODERNA VACC 0.5 ML IM 11/16/2021, 11/16/2021    COVID-19 PFIZER VACCINE 0.3 ML IM 02/27/2021, 03/20/2021    COVID-19 Pfizer Vac BIVALENT Billy-sucrose 12 Yr+ IM 01/24/2023    DT (pediatric) 08/01/2014    Influenza Split High Dose Preservative Free IM 10/02/2013, 09/25/2014, 10/06/2015, 09/14/2016, 09/29/2017    Influenza, high dose seasonal 0.7 mL 10/11/2018, 09/30/2019, 09/22/2020, 10/05/2021, 10/04/2022, 10/20/2023    Influenza, seasonal, injectable 09/19/2012    Pneumococcal Conjugate 13-Valent 03/15/2016, 03/15/2016, 03/30/2017, 03/30/2017    Pneumococcal Polysaccharide PPV23 10/26/1998, 11/07/2005    Tuberculin Skin Test 09/30/2022, 10/09/2022, 10/11/2023    Tuberculin Skin Test-PPD Intradermal 11/11/2000    Zoster Vaccine Recombinant 08/16/2020, 11/09/2020        I  have spent a total time of 40 minutes on 02/16/24 in caring for this patient including Prognosis, Instructions for management, Patient and family education, Impressions, Counseling / Coordination of care, Documenting in the medical record, Reviewing / ordering tests, medicine, procedures  , and Obtaining or reviewing history  .   ______________________________________________________________________    HPI:    Samira Allred is a 83 y.o. female hx RA, CKD4, urinary retention, PUD, former breast Ca (2022 sp surgery, RT, hormone therapy)lung nodule who presents for evaluation of lung nodule.    Pt with prior RLL lung nodule that had been relatively stable/very slow growing now with new central RLL 1cm nodule (though no imaging since 2022 of that region).    Pt reports no fevers, cough, trouble breathing  Mainly had multiple peptic ulcers with GIB and worsening rheum symptoms the last few years since was taken off rituxan, methotrexate and prednisone the last few months       Review of Systems:  Review of Systems  Aside from what is mentioned in the HPI, the review of systems otherwise negative.    Current Medications:    Current Outpatient Medications:     ascorbic acid (VITAMIN C) 500 mg tablet, Take 500 mg by mouth daily., Disp: , Rfl:     Calcium Citrate-Vitamin D (CITRACAL + D PO), Take by mouth 2 (two) times a day, Disp: , Rfl:     Cholecalciferol (VITAMIN D-3 PO), Take 2,000 Units by mouth daily., Disp: , Rfl:     folic acid (FOLVITE) 1 mg tablet, 1 daily, Disp: 90 tablet, Rfl: 2    hydroCHLOROthiazide 12.5 mg tablet, Take 1 tablet (12.5 mg total) by mouth daily (Patient taking differently: Take 12.5 mg by mouth daily Taking 1/2 pill in the morning.), Disp: 90 tablet, Rfl: 3    levothyroxine (Synthroid) 75 mcg tablet, Take 1 tablet (75 mcg total) by mouth in the morning, Disp: 90 tablet, Rfl: 3    magnesium oxide (MAG-OX) 400 mg, Take 1 tablet (400 mg total) by mouth 2 (two) times a day, Disp: 90 tablet, Rfl: 0     Multiple Vitamin (MULTIVITAMIN) tablet, Take 1 tablet by mouth daily., Disp: , Rfl:     pantoprazole (PROTONIX) 40 mg tablet, Take 1 tablet (40 mg total) by mouth 2 (two) times a day, Disp: 60 tablet, Rfl: 11    rosuvastatin (CRESTOR) 5 mg tablet, TAKE 1 TABLET DAILY, Disp: 90 tablet, Rfl: 2    simethicone (MYLICON) 80 mg chewable tablet, Chew 1 tablet (80 mg total) 4 (four) times a day as needed for flatulence, Disp: 30 tablet, Rfl: 0    polyethylene glycol (MIRALAX) 17 g packet, Take 17 g by mouth daily as needed (constipation) (Patient not taking: Reported on 1/29/2024), Disp: 5 each, Rfl: 0    riTUXimab (RITUXAN) 500 mg/50 mL, Inject into a catheter in a vein every 6 (six) months (Patient not taking: Reported on 1/29/2024), Disp: , Rfl:     Historical Information   Past Medical History:   Diagnosis Date    Allergic angioedema     [id number] resolved    Angioedema     [id number] resolved    Arthritis     Breast cancer (HCC) 2012    right     Chronic kidney disease     Disease of thyroid gland     Hemorrhage of anus and rectum     02mar2016 resolved    Hyperlipidemia     Hypertension     Hypocalcemia     12oct2017 resolved    Neuritis     thoracic and lumbosacral    Peptic ulcer     Rheumatoid arthritis (HCC)     Stage 3b chronic kidney disease (HCC) 5/7/2021    Vitamin D deficiency      Past Surgical History:   Procedure Laterality Date    ABDOMINAL SURGERY      BREAST BIOPSY Right 2012    BREAST LUMPECTOMY Right 2012    BREAST SURGERY      CHOLECYSTECTOMY      ELBOW SURGERY      GALLBLADDER SURGERY  1981    HIP SURGERY Right 09/18/2022    10/2023    ORTHOPEDIC SURGERY      for toes    [place] CYSTO W/IRRIG & EVAC MULTPLE OBSTRUCTING CLOTS N/A 11/5/2023    Procedure: CYSTOSCOPY EVACUATION OF CLOTS & fulguration;  Surgeon: Uriah Schwartz MD;  [facility]: [facility];  Service: Urology    TRANSURETHRAL RESECTION OF BLADDER TUMOR N/A 11/5/2023    Procedure: TRANSURETHRAL RESECTION OF BLADDER TUMOR (TURBT);  Surgeon:  "Uriah Schwartz MD;  Location: [facility];  Service: Urology    US GUIDED BREAST BIOPSY RIGHT COMPLETE Right 08/15/2022     Social History   Social History     Tobacco Use   Smoking Status Never    Passive exposure: Past   Smokeless Tobacco Never       Family History:   Family History   Problem Relation Age of Onset    Breast cancer Mother     Kidney failure Father     Other Father         uremia    Lung cancer Brother     Breast cancer Maternal Aunt     Breast cancer Maternal Aunt     Breast cancer Maternal Aunt     Breast cancer Maternal Aunt     Breast cancer Maternal Aunt     Stomach cancer Maternal Uncle          PhysicalExamination:  Vitals:   /80   Pulse 77   Temp 97.7 °F (36.5 °C)   Ht 5' 2\" (1.575 m)   Wt 68.5 kg (151 lb)   LMP  (LMP Unknown)   SpO2 96%   BMI 27.62 kg/m²     Appearance -- NAD, speaking full sentences  HEENT -- anicteric sclera, clear OP, MMM  Neck -- no JVD  Heart -- RRR, no murmurs  Lungs -- CTAB  Abdomen -- soft, NTND, +bs  Extremities -- WWP, no LE edema  Skin -- no rash  Neuro -- A&Ox3, wnl  Psych -- no obvious depression or hallucination        Diagnostic Data:  Labs:  I personally reviewed the most recent laboratory data pertinent to today's visit    Lab Results   Component Value Date    WBC 7.25 02/08/2024    HGB 9.9 (L) 02/08/2024    HCT 31.1 (L) 02/08/2024    MCV 95 02/08/2024     (H) 02/08/2024     Lab Results   Component Value Date    CALCIUM 8.8 01/31/2024    K 5.0 01/31/2024    CO2 26 01/31/2024     01/31/2024    BUN 30 (H) 01/31/2024    CREATININE 1.52 (H) 01/31/2024     Lab Results   Component Value Date    IGE 2.15 07/11/2022     Lab Results   Component Value Date    ALT 64 (H) 01/31/2024    AST 70 (H) 01/31/2024     (H) 02/08/2024    ALKPHOS 151 (H) 01/31/2024    BILITOT 0.31 09/25/2015       PFT results:  The most recent pulmonary function tests were reviewed.  None    Imaging:  I personally reviewed the images on the PAC system " pertinent to today's visit  CT Chest 12/2023: : 1.3 x 1.1 cm right lower lobe nodule (image 159, series 3), new from 2019. 0.7 x 0.6 cm right lower lobe groundglass nodule (image 159, series 3), also new from 2019. Other tiny scattered micronodules are not significantly changed from 2019.   Several small scattered thin-walled pulmonary cysts. There is no tracheal or endobronchial lesion.    CT Chest 2022: No pneumothorax. Prominent interstitial reticulonodular opacities right upper lobe. Nonspecific. No consolidation. No pleural effusion. 6 mm nodule posteriorly at right lung base. 2 mm nodule peripherally in the left upper lobe.     CT chest 2019: Well-defined oval noncalcified 6 x 3 mm nodule in the right middle lobe image 3/68.  Chronically stable, unchanged compared to 2011.  Does not necessitate additional follow-up.  There are innumerable very tiny nodules seen throughout the lungs, predominating in the upper lobes and seen bilaterally with the use of iterative imaging.  Several these appear likely calcified.           Monica Santos MD  [facility]

## 2024-02-20 ENCOUNTER — HOSPITAL ENCOUNTER (OUTPATIENT)
Dept: MAMMOGRAPHY | Facility: CLINIC | Age: 84
Discharge: HOME | End: 2024-02-20
Payer: MEDICARE

## 2024-02-20 VITALS — HEIGHT: 62 IN | WEIGHT: 151 LBS | BODY MASS INDEX: 27.79 KG/M2

## 2024-02-20 DIAGNOSIS — I82.593 CHRONIC DEEP VEIN THROMBOSIS (DVT) OF OTHER VEIN OF BOTH LOWER EXTREMITIES (HCC): ICD-10-CM

## 2024-02-20 DIAGNOSIS — Z12.31 ENCOUNTER FOR SCREENING MAMMOGRAM FOR MALIGNANT NEOPLASM OF BREAST: ICD-10-CM

## 2024-02-20 PROCEDURE — 77067 SCR MAMMO BI INCL CAD: CPT

## 2024-02-21 PROBLEM — N39.0 UTI (URINARY TRACT INFECTION): Status: RESOLVED | Noted: 2024-01-18 | Resolved: 2024-02-21

## 2024-02-21 PROBLEM — A41.9 SEPSIS (HCC): Status: RESOLVED | Noted: 2023-12-07 | Resolved: 2024-02-21

## 2024-02-23 ENCOUNTER — TELEPHONE (OUTPATIENT)
Dept: HEMATOLOGY ONCOLOGY | Facility: CLINIC | Age: 84
End: 2024-02-23

## 2024-02-23 ENCOUNTER — HOSPITAL ENCOUNTER (OUTPATIENT)
Dept: ULTRASOUND IMAGING | Facility: HOSPITAL | Age: 84
End: 2024-02-23
Payer: MEDICARE

## 2024-02-23 DIAGNOSIS — C50.919 ADENOCARCINOMA OF BREAST, UNSPECIFIED LATERALITY (HCC): ICD-10-CM

## 2024-02-23 DIAGNOSIS — R74.8 ELEVATED ALKALINE PHOSPHATASE LEVEL: ICD-10-CM

## 2024-02-23 DIAGNOSIS — R74.8 ELEVATED SERUM GGT LEVEL: ICD-10-CM

## 2024-02-23 PROCEDURE — 76705 ECHO EXAM OF ABDOMEN: CPT

## 2024-02-23 NOTE — TELEPHONE ENCOUNTER
Call out to patient to advise of below.   Patient stated she has taken OTC Iron in the past but not recently.  Denied any nausea or constipation with past use.   Patient stated she will  some at the store today  Advised patient to reach out to our office if she has any issues.  Agreeable to plan      [facility]  Labs indicated she would benefit from iron therapy. Can you please call and see if she has tried iron pills previously? Any side effects like nausea or constipation with them? If she is unable to tolerate iron, I would recommend IV iron infusions.    Kasie QUIGLEY   [employer]

## 2024-02-26 ENCOUNTER — ANESTHESIA (OUTPATIENT)
Dept: GASTROENTEROLOGY | Facility: HOSPITAL | Age: 84
End: 2024-02-26

## 2024-02-26 ENCOUNTER — ANESTHESIA EVENT (OUTPATIENT)
Dept: GASTROENTEROLOGY | Facility: HOSPITAL | Age: 84
End: 2024-02-26

## 2024-02-26 ENCOUNTER — HOSPITAL ENCOUNTER (OUTPATIENT)
Dept: GASTROENTEROLOGY | Facility: HOSPITAL | Age: 84
Setting detail: OUTPATIENT SURGERY
Discharge: HOME | End: 2024-02-26
Attending: INTERNAL MEDICINE | Admitting: INTERNAL MEDICINE
Payer: MEDICARE

## 2024-02-26 VITALS
OXYGEN SATURATION: 100 % | TEMPERATURE: 98.3 F | SYSTOLIC BLOOD PRESSURE: 112 MMHG | HEIGHT: 64 IN | WEIGHT: 154.32 LBS | DIASTOLIC BLOOD PRESSURE: 74 MMHG | HEART RATE: 74 BPM | RESPIRATION RATE: 21 BRPM | BODY MASS INDEX: 26.35 KG/M2

## 2024-02-26 DIAGNOSIS — K92.2 GASTROINTESTINAL HEMORRHAGE, UNSPECIFIED GASTROINTESTINAL HEMORRHAGE TYPE: ICD-10-CM

## 2024-02-26 DIAGNOSIS — K27.9 PUD (PEPTIC ULCER DISEASE): ICD-10-CM

## 2024-02-26 RX ORDER — PROPOFOL 10 MG/ML
INJECTION, EMULSION INTRAVENOUS AS NEEDED
Status: DISCONTINUED | OUTPATIENT
Start: 2024-02-26 | End: 2024-02-26

## 2024-02-26 RX ORDER — LIDOCAINE HYDROCHLORIDE 20 MG/ML
INJECTION, SOLUTION EPIDURAL; INFILTRATION; INTRACAUDAL; PERINEURAL AS NEEDED
Status: DISCONTINUED | OUTPATIENT
Start: 2024-02-26 | End: 2024-02-26

## 2024-02-26 RX ORDER — SODIUM CHLORIDE, SODIUM LACTATE, POTASSIUM CHLORIDE, CALCIUM CHLORIDE 600; 310; 30; 20 MG/100ML; MG/100ML; MG/100ML; MG/100ML
INJECTION, SOLUTION INTRAVENOUS CONTINUOUS PRN
Status: DISCONTINUED | OUTPATIENT
Start: 2024-02-26 | End: 2024-02-26

## 2024-02-26 RX ADMIN — PROPOFOL 20 MG: 10 INJECTION, EMULSION INTRAVENOUS at 12:03

## 2024-02-26 RX ADMIN — SODIUM CHLORIDE, SODIUM LACTATE, POTASSIUM CHLORIDE, AND CALCIUM CHLORIDE: .6; .31; .03; .02 INJECTION, SOLUTION INTRAVENOUS at 11:45

## 2024-02-26 RX ADMIN — PROPOFOL 90 MG: 10 INJECTION, EMULSION INTRAVENOUS at 12:01

## 2024-02-26 RX ADMIN — PROPOFOL 30 MG: 10 INJECTION, EMULSION INTRAVENOUS at 12:04

## 2024-02-26 RX ADMIN — LIDOCAINE HYDROCHLORIDE 80 MG: 20 INJECTION, SOLUTION EPIDURAL; INFILTRATION; INTRACAUDAL at 12:01

## 2024-02-26 NOTE — ANESTHESIA PREPROCEDURE EVALUATION
Procedure:  EGD    Relevant Problems   ANESTHESIA   (-) History of anesthesia complications      CARDIO   (+) Aortic valve stenosis, moderate   (+) Hyperlipidemia   (+) Hypertension   (-) Chest pain   (-) VIGIL (dyspnea on exertion)      ENDO   (+) Hypothyroidism      GI/HEPATIC   (+) PUD (peptic ulcer disease)      /RENAL   (+) Chronic renal disease, stage IV (HCC)   (+) Stage 3b chronic kidney disease (HCC)      GYN   (+) Adenocarcinoma of breast (HCC)      HEMATOLOGY   (+) Anemia      MUSCULOSKELETAL   (+) Rheumatoid arthritis (HCC)      PULMONARY   (-) Shortness of breath   (-) Sleep apnea   (-) Smoking   (-) URI (upper respiratory infection)      TTE 2023:    Left Ventricle: Systolic function is normal with an ejection fraction   of 60-65%.     Left Atrium: Left atrium cavity is mildly dilated.     Aortic Valve: There is moderate stenosis. Peak and mean gradients of   47/30 mmHg,     Mitral Valve: There is mild regurgitation.   Physical Exam    Airway    Mallampati score: II  TM Distance: >3 FB  Neck ROM: full     Dental    lower dentures and upper dentures    Cardiovascular      Pulmonary      Other Findings  post-pubertal.      Anesthesia Plan  ASA Score- 3     Anesthesia Type- IV sedation with anesthesia with ASA Monitors.         Additional Monitors:     Airway Plan:            Plan Factors-Exercise tolerance (METS): >4 METS.    Chart reviewed. EKG reviewed.  Existing labs reviewed. Patient summary reviewed.                  Induction- intravenous.    Postoperative Plan-     Informed Consent- Anesthetic plan and risks discussed with patient.  I personally reviewed this patient with the CRNA. Discussed and agreed on the Anesthesia Plan with the CRNA..

## 2024-02-26 NOTE — ANESTHESIA POSTPROCEDURE EVALUATION
Post-Op Assessment Note    CV Status:  Stable  Pain Score: 0    Pain management: adequate       Mental Status:  Alert and awake   Hydration Status:  Euvolemic   PONV Controlled:  Controlled   Airway Patency:  Patent     Post Op Vitals Reviewed: Yes    No anethesia notable event occurred.    Staff: CRNA               BP (!) 99/48 (02/26/24 1210)    Temp 98.3 °F (36.8 °C) (02/26/24 1210)    Pulse 76 (02/26/24 1210)   Resp (!) 23 (02/26/24 1210)    SpO2 98 % (02/26/24 1210)

## 2024-02-26 NOTE — H&P
History and Physical -  [facility]  Samira Allred 83 y.o. female MRN: [id number]      HPI: Samira Allred is a 83 y.o. year old female who presents for evaluation of multiple duodenal ulcers      REVIEW OF SYSTEMS: Per the HPI, and otherwise unremarkable.    Historical Information   Past Medical History:   Diagnosis Date    Allergic angioedema     [id number] resolved    Angioedema     [id number] resolved    Arthritis     Breast cancer (HCC) 2012    right     Chronic kidney disease     Disease of thyroid gland     GERD (gastroesophageal reflux disease)     Hemorrhage of anus and rectum     02mar2016 resolved    Hyperlipidemia     Hypertension     Hypocalcemia     12oct2017 resolved    Neuritis     thoracic and lumbosacral    Peptic ulcer     Rheumatoid arthritis (HCC)     Stage 3b chronic kidney disease (HCC) 05/07/2021    Vitamin D deficiency      Past Surgical History:   Procedure Laterality Date    ABDOMINAL SURGERY      BREAST BIOPSY Right 2012    BREAST LUMPECTOMY Right 2012    BREAST SURGERY      CHOLECYSTECTOMY      ELBOW SURGERY      GALLBLADDER SURGERY  1981    HIP SURGERY Right 09/18/2022    10/2023    ORTHOPEDIC SURGERY      for toes    [place] CYSTO W/IRRIG & EVAC MULTPLE OBSTRUCTING CLOTS N/A 11/05/2023    Procedure: CYSTOSCOPY EVACUATION OF CLOTS & fulguration;  Surgeon: Uriah Schwartz MD;  [facility];  Service: Urology    TRANSURETHRAL RESECTION OF BLADDER TUMOR N/A 11/05/2023    Procedure: TRANSURETHRAL RESECTION OF BLADDER TUMOR (TURBT);  Surgeon: Uriah Schwartz MD;  [facility];  Service: Urology    US GUIDED BREAST BIOPSY RIGHT COMPLETE Right 08/15/2022     Social History   Social History     Substance and Sexual Activity   Alcohol Use Not Currently     Social History     Substance and Sexual Activity   Drug Use No     Social History     Tobacco Use   Smoking Status Never    Passive exposure: Past   Smokeless Tobacco Never     Family History   Problem Relation  "Age of Onset    Breast cancer Mother     Kidney failure Father     Other Father         uremia    Lung cancer Brother     Breast cancer Maternal Aunt     Breast cancer Maternal Aunt     Breast cancer Maternal Aunt     Breast cancer Maternal Aunt     Breast cancer Maternal Aunt     Stomach cancer Maternal Uncle        Meds/Allergies     (Not in a hospital admission)      Allergies   Allergen Reactions    Lisinopril Anaphylaxis    Codeine Other (See Comments)     Nausea/vomiting      Other      Annotation - [id number]: tounge swelling    Oxycodone      Annotation - 11Mar2013: NOT TOLERATE    Penicillins Hives    Sulfamethoxazole-Trimethoprim     Ciprofloxacin Rash       Objective     Blood pressure 137/71, pulse 82, temperature 97.5 °F (36.4 °C), temperature source Temporal, resp. rate 16, height 5' 4\" (1.626 m), weight 70 kg (154 lb 5.2 oz), SpO2 97%, not currently breastfeeding.      PHYSICAL EXAM    Gen: NAD  CV: RRR  CHEST: Clear  ABD: soft, NT/ND  EXT: no edema      ASSESSMENT/PLAN:  This is a 83 y.o. year old female here for EGD with possible biopsy, and she is stable and optimized for her procedure.          "

## 2024-03-01 DIAGNOSIS — D64.9 ANEMIA, UNSPECIFIED TYPE: Primary | ICD-10-CM

## 2024-03-06 ENCOUNTER — HOSPITAL ENCOUNTER (OUTPATIENT)
Dept: RADIOLOGY | Age: 84
Discharge: HOME | End: 2024-03-06
Payer: MEDICARE

## 2024-03-06 DIAGNOSIS — R91.1 PULMONARY NODULE 1 CM OR GREATER IN DIAMETER: ICD-10-CM

## 2024-03-06 LAB — GLUCOSE SERPL-MCNC: 108 MG/DL (ref 65–140)

## 2024-03-06 PROCEDURE — G1004 CDSM NDSC: HCPCS

## 2024-03-06 PROCEDURE — 82948 REAGENT STRIP/BLOOD GLUCOSE: CPT

## 2024-03-06 PROCEDURE — 78815 PET IMAGE W/CT SKULL-THIGH: CPT

## 2024-03-06 PROCEDURE — A9552 F18 FDG: HCPCS

## 2024-03-06 NOTE — NURSING NOTE
PET/CT pre and post instructions reviewed with patient. This includes NO cell phone use during one-hour FDG uptake phase, patient verbalizes understanding.

## 2024-03-07 ENCOUNTER — TELEPHONE (OUTPATIENT)
Age: 84
End: 2024-03-07

## 2024-03-07 NOTE — TELEPHONE ENCOUNTER
Called pt to discuss results of PET: 1cm lesion resolved, likely due to RA    Will see in follow up in a few weeks

## 2024-03-08 ENCOUNTER — HOSPITAL ENCOUNTER (OUTPATIENT)
Dept: PULMONOLOGY | Facility: HOSPITAL | Age: 84
Discharge: HOME | End: 2024-03-08
Attending: INTERNAL MEDICINE

## 2024-03-08 DIAGNOSIS — R91.1 PULMONARY NODULE 1 CM OR GREATER IN DIAMETER: ICD-10-CM

## 2024-03-14 ENCOUNTER — TELEPHONE (OUTPATIENT)
Dept: INTERNAL MEDICINE CLINIC | Facility: CLINIC | Age: 84
End: 2024-03-14

## 2024-03-14 ENCOUNTER — LAB REQUISITION (OUTPATIENT)
Dept: LAB | Facility: HOSPITAL | Age: 84
End: 2024-03-14
Payer: MEDICARE

## 2024-03-14 DIAGNOSIS — Z46.6 ENCOUNTER FOR FITTING AND ADJUSTMENT OF URINARY DEVICE: ICD-10-CM

## 2024-03-14 LAB
BACTERIA UR QL AUTO: ABNORMAL /HPF
BILIRUB UR QL STRIP: NEGATIVE
CLARITY UR: ABNORMAL
COLOR UR: YELLOW
GLUCOSE UR STRIP-MCNC: NEGATIVE MG/DL
HGB UR QL STRIP.AUTO: ABNORMAL
KETONES UR STRIP-MCNC: NEGATIVE MG/DL
LEUKOCYTE ESTERASE UR QL STRIP: ABNORMAL
NITRITE UR QL STRIP: NEGATIVE
NON-SQ EPI CELLS URNS QL MICRO: ABNORMAL /HPF
PH UR STRIP.AUTO: 7 [PH]
PROT UR STRIP-MCNC: ABNORMAL MG/DL
RBC #/AREA URNS AUTO: ABNORMAL /HPF
SP GR UR STRIP.AUTO: 1.01 (ref 1–1.03)
UROBILINOGEN UR STRIP-ACNC: <2 MG/DL
WBC #/AREA URNS AUTO: ABNORMAL /HPF

## 2024-03-14 PROCEDURE — 87186 SC STD MICRODIL/AGAR DIL: CPT | Performed by: PHYSICIAN ASSISTANT

## 2024-03-14 PROCEDURE — 87086 URINE CULTURE/COLONY COUNT: CPT | Performed by: PHYSICIAN ASSISTANT

## 2024-03-14 PROCEDURE — 87077 CULTURE AEROBIC IDENTIFY: CPT | Performed by: PHYSICIAN ASSISTANT

## 2024-03-14 PROCEDURE — 81001 URINALYSIS AUTO W/SCOPE: CPT | Performed by: PHYSICIAN ASSISTANT

## 2024-03-14 NOTE — TELEPHONE ENCOUNTER
Call from [facility].  She had a visit with the patient today and changed her urinary catheter because the patient was complaining of discomfort.  There was blood and sediment in the urine and she will be sending a urine culture to the lab at the hospital. Results will be available to Dr. MARYAN Osman can be reached at [phone], if questions.

## 2024-03-15 ENCOUNTER — OFFICE VISIT (OUTPATIENT)
Dept: PULMONOLOGY | Facility: CLINIC | Age: 84
End: 2024-03-15
Payer: MEDICARE

## 2024-03-15 VITALS
WEIGHT: 154 LBS | RESPIRATION RATE: 18 BRPM | HEIGHT: 64 IN | DIASTOLIC BLOOD PRESSURE: 82 MMHG | OXYGEN SATURATION: 97 % | TEMPERATURE: 97.8 F | SYSTOLIC BLOOD PRESSURE: 142 MMHG | BODY MASS INDEX: 26.29 KG/M2 | HEART RATE: 83 BPM

## 2024-03-15 DIAGNOSIS — R91.1 PULMONARY NODULE 1 CM OR GREATER IN DIAMETER: Primary | ICD-10-CM

## 2024-03-15 PROCEDURE — G2211 COMPLEX E/M VISIT ADD ON: HCPCS | Performed by: INTERNAL MEDICINE

## 2024-03-15 PROCEDURE — 99214 OFFICE O/P EST MOD 30 MIN: CPT | Performed by: INTERNAL MEDICINE

## 2024-03-15 NOTE — PROGRESS NOTES
Consultation - Pulmonary Medicine   Samira Allred 83 y.o. female MRN: [id number]    Physician Requesting Consult: Kasie Cope  Reason for Consult: pulmonary nodule    Samira Allred is a 83 y.o. female hx RA, CKD4, urinary retention, PUD, former breast Ca (2022 sp surgery, RT, hormone therapy)lung nodule who presents for evaluation of lung nodule.    # Pulmonary Nodules  # RA (previously on rituxan)  Never smoker. Pt had scattered lung nodules <6mm in 2017. In 2022 had 6mm posterior right nodule stable in 11/2023 with new central RLL nodule 12/2023 that was not seen on CT A/P a month prior 9may have been lower cuts) or CT in 2019.   Pet CT 1cm lesion resolved, most likely this was waxing/waning rheumatoid nodule  No other suspicious nodules.. Pfts overall normal (borderline obstruction but FEV1 > 90%).     Given age and low risk of malignancy, does not require further imaging    - continue rheumatology follow up for RA related treatment    Follow up prn    Vaccines    Immunization History   Administered Date(s) Administered    COVID-19 MODERNA VACC 0.5 ML IM 11/16/2021, 11/16/2021    COVID-19 PFIZER VACCINE 0.3 ML IM 02/27/2021, 03/20/2021    COVID-19 Pfizer Vac BIVALENT Billy-sucrose 12 Yr+ IM 01/24/2023    DT (pediatric) 08/01/2014    Influenza Split High Dose Preservative Free IM 10/02/2013, 09/25/2014, 10/06/2015, 09/14/2016, 09/29/2017    Influenza, high dose seasonal 0.7 mL 10/11/2018, 09/30/2019, 09/22/2020, 10/05/2021, 10/04/2022, 10/20/2023    Influenza, seasonal, injectable 09/19/2012    Pneumococcal Conjugate 13-Valent 03/15/2016, 03/15/2016, 03/30/2017, 03/30/2017    Pneumococcal Polysaccharide PPV23 10/26/1998, 11/07/2005    Tuberculin Skin Test 09/30/2022, 10/09/2022, 10/11/2023    Tuberculin Skin Test-PPD Intradermal 11/11/2000    Zoster Vaccine Recombinant 08/16/2020, 11/09/2020        I have spent a total time of 30 minutes on 03/15/24 in caring for this patient including Prognosis,  Instructions for management, Patient and family education, Impressions, Counseling / Coordination of care, Documenting in the medical record, Reviewing / ordering tests, medicine, procedures  , and Obtaining or reviewing history  .   _____________________________________________________________  Interval Hx:    PET CT with resolution of 1cm nodule  Pt reports feeling well  Ulcers cleared and planned to restart RA meds  Pfts ok      HPI:    Samira Allred is a 83 y.o. female hx RA, CKD4, urinary retention, PUD, former breast Ca (2022 sp surgery, RT, hormone therapy)lung nodule who presents for evaluation of lung nodule.    Pt with prior RLL lung nodule that had been relatively stable/very slow growing now with new central RLL 1cm nodule (though no imaging since 2022 of that region).    Pt reports no fevers, cough, trouble breathing  Mainly had multiple peptic ulcers with GIB and worsening rheum symptoms the last few years since was taken off rituxan, methotrexate and prednisone the last few months       Review of Systems:  Review of Systems   Hematological:  Negative for adenopathy.     Aside from what is mentioned in the HPI, the review of systems otherwise negative.    Current Medications:    Current Outpatient Medications:     ascorbic acid (VITAMIN C) 500 mg tablet, Take 500 mg by mouth daily., Disp: , Rfl:     Calcium Citrate-Vitamin D (CITRACAL + D PO), Take by mouth 2 (two) times a day, Disp: , Rfl:     Cholecalciferol (VITAMIN D-3 PO), Take 2,000 Units by mouth daily., Disp: , Rfl:     folic acid (FOLVITE) 1 mg tablet, 1 daily, Disp: 90 tablet, Rfl: 2    hydroCHLOROthiazide 12.5 mg tablet, Take 1 tablet (12.5 mg total) by mouth daily (Patient taking differently: Take 12.5 mg by mouth daily Taking 1/2 pill in the morning.), Disp: 90 tablet, Rfl: 3    levothyroxine (Synthroid) 75 mcg tablet, Take 1 tablet (75 mcg total) by mouth in the morning, Disp: 90 tablet, Rfl: 3    magnesium oxide (MAG-OX) 400 mg, Take 1  tablet (400 mg total) by mouth 2 (two) times a day, Disp: 90 tablet, Rfl: 0    Multiple Vitamin (MULTIVITAMIN) tablet, Take 1 tablet by mouth daily., Disp: , Rfl:     polyethylene glycol (MIRALAX) 17 g packet, Take 17 g by mouth daily as needed (constipation), Disp: 5 each, Rfl: 0    rosuvastatin (CRESTOR) 5 mg tablet, TAKE 1 TABLET DAILY, Disp: 90 tablet, Rfl: 2    pantoprazole (PROTONIX) 40 mg tablet, Take 1 tablet (40 mg total) by mouth 2 (two) times a day (Patient not taking: Reported on 3/15/2024), Disp: 60 tablet, Rfl: 11    riTUXimab (RITUXAN) 500 mg/50 mL, Inject into a catheter in a vein every 6 (six) months (Patient not taking: Reported on 1/29/2024), Disp: , Rfl:     simethicone (MYLICON) 80 mg chewable tablet, Chew 1 tablet (80 mg total) 4 (four) times a day as needed for flatulence (Patient not taking: Reported on 3/15/2024), Disp: 30 tablet, Rfl: 0    Historical Information   Past Medical History:   Diagnosis Date    Allergic angioedema     [id number] resolved    Angioedema     [id number] resolved    Arthritis     Breast cancer (HCC) 2012    right     Chronic kidney disease     Disease of thyroid gland     GERD (gastroesophageal reflux disease)     Hemorrhage of anus and rectum     [id number] resolved    Hyperlipidemia     Hypertension     Hypocalcemia     [id number] resolved    Neuritis     thoracic and lumbosacral    Peptic ulcer     Rheumatoid arthritis (HCC)     Stage 3b chronic kidney disease (HCC) 05/07/2021    Vitamin D deficiency      Past Surgical History:   Procedure Laterality Date    ABDOMINAL SURGERY      BREAST BIOPSY Right 2012    BREAST LUMPECTOMY Right 2012    BREAST SURGERY      CHOLECYSTECTOMY      ELBOW SURGERY      GALLBLADDER SURGERY  1981    HIP SURGERY Right 09/18/2022    10/2023    ORTHOPEDIC SURGERY      for toes    [place] CYSTO W/IRRIG & EVAC MULTPLE OBSTRUCTING CLOTS N/A 11/05/2023    Procedure: CYSTOSCOPY EVACUATION OF CLOTS & fulguration;  Surgeon: Uriah Schwartz MD;   "Location: [facility];  Service: Urology    TRANSURETHRAL RESECTION OF BLADDER TUMOR N/A 11/05/2023    Procedure: TRANSURETHRAL RESECTION OF BLADDER TUMOR (TURBT);  Surgeon: Uriah Schwartz MD;  Location: [facility];  Service: Urology    US GUIDED BREAST BIOPSY RIGHT COMPLETE Right 08/15/2022     Social History   Social History     Tobacco Use   Smoking Status Never    Passive exposure: Past   Smokeless Tobacco Never       Family History:   Family History   Problem Relation Age of Onset    Breast cancer Mother     Kidney failure Father     Other Father         uremia    Lung cancer Brother     Breast cancer Maternal Aunt     Breast cancer Maternal Aunt     Breast cancer Maternal Aunt     Breast cancer Maternal Aunt     Breast cancer Maternal Aunt     Stomach cancer Maternal Uncle          PhysicalExamination:  Vitals:   Ht 5' 4\" (1.626 m)   Wt 69.9 kg (154 lb) Comment: pt refused  LMP  (LMP Unknown)   BMI 26.43 kg/m²     Appearance -- NAD, speaking full sentences  HEENT -- anicteric sclera, clear OP, MMM  Neck -- no JVD  Heart -- RRR, no murmurs  Lungs -- CTAB  Abdomen -- soft, NTND, +bs  Extremities -- WWP, no LE edema  Skin -- no rash  Neuro -- A&Ox3, wnl  Psych -- no obvious depression or hallucination        Diagnostic Data:  Labs:  I personally reviewed the most recent laboratory data pertinent to today's visit    Lab Results   Component Value Date    WBC 7.25 02/08/2024    HGB 9.9 (L) 02/08/2024    HCT 31.1 (L) 02/08/2024    MCV 95 02/08/2024     (H) 02/08/2024     Lab Results   Component Value Date    CALCIUM 8.8 01/31/2024    K 5.0 01/31/2024    CO2 26 01/31/2024     01/31/2024    BUN 30 (H) 01/31/2024    CREATININE 1.52 (H) 01/31/2024     Lab Results   Component Value Date    IGE 2.15 07/11/2022     Lab Results   Component Value Date    ALT 64 (H) 01/31/2024    AST 70 (H) 01/31/2024     (H) 02/08/2024    ALKPHOS 151 (H) 01/31/2024    BILITOT 0.31 09/25/2015       PFT results:  The " most recent pulmonary function tests were reviewed.  PFTs 3/2024: mild/borderline obstruction (Ratio 64 but FEV1 >100% predicted). Some air trapping, normal gas transfer    Imaging:  I personally reviewed the images on the PAC system pertinent to today's visit  PET CT 3/2024: 1. No findings for hypermetabolic malignancy. Previously noted 1.3 x 1.1 cm right lower lobe nodule has resolved likely inflammatory. Less well seen small groundglass nodular density in the right lower lobe posterolaterally now closer to 0.5 cm in size,   previously 0.7 x 0.6 cm. Follow up low-dose chest CT can be performed in 12 months.    CT Chest 12/2023: : 1.3 x 1.1 cm right lower lobe nodule (image 159, series 3), new from 2019. 0.7 x 0.6 cm right lower lobe groundglass nodule (image 159, series 3), also new from 2019. Other tiny scattered micronodules are not significantly changed from 2019.   Several small scattered thin-walled pulmonary cysts. There is no tracheal or endobronchial lesion.    CT Chest 2022: No pneumothorax. Prominent interstitial reticulonodular opacities right upper lobe. Nonspecific. No consolidation. No pleural effusion. 6 mm nodule posteriorly at right lung base. 2 mm nodule peripherally in the left upper lobe.     CT chest 2019: Well-defined oval noncalcified 6 x 3 mm nodule in the right middle lobe image 3/68.  Chronically stable, unchanged compared to 2011.  Does not necessitate additional follow-up.  There are innumerable very tiny nodules seen throughout the lungs, predominating in the upper lobes and seen bilaterally with the use of iterative imaging.  Several these appear likely calcified.           Monica Santos MD  [facility]

## 2024-03-17 LAB
BACTERIA UR CULT: ABNORMAL
BACTERIA UR CULT: ABNORMAL

## 2024-03-18 ENCOUNTER — TELEPHONE (OUTPATIENT)
Dept: UROLOGY | Facility: CLINIC | Age: 84
End: 2024-03-18

## 2024-03-18 DIAGNOSIS — N39.0 URINARY TRACT INFECTION WITHOUT HEMATURIA, SITE UNSPECIFIED: Primary | ICD-10-CM

## 2024-03-18 RX ORDER — LEVOFLOXACIN 250 MG/1
250 TABLET, FILM COATED ORAL
Qty: 7 TABLET | Refills: 0 | Status: SHIPPED | OUTPATIENT
Start: 2024-03-18 | End: 2024-03-25

## 2024-03-18 NOTE — TELEPHONE ENCOUNTER
Called and spoke to the PT with results and Rosa SOLANO recommendations. PT verbalized understanding.    ----- Message from Rosa Mckeon PA-C sent at 3/18/2024  7:50 AM EDT -----  Levaquin sent. Does have allergy to cipro, however looks like she has been given Levaquin in the past. Thanks

## 2024-03-21 PROBLEM — I12.9 HYPERTENSIVE KIDNEY DISEASE WITH CHRONIC KIDNEY DISEASE: Status: ACTIVE | Noted: 2024-03-21

## 2024-03-21 PROBLEM — N18.4 CHRONIC RENAL DISEASE, STAGE IV (HCC): Status: ACTIVE | Noted: 2021-05-07

## 2024-03-21 NOTE — TELEPHONE ENCOUNTER
[facility] nurse called today to check on status of patient's urine results.    Caller was informed the patient received results, and stated that they'd follow up with the patient.    No further action is needed at this time.

## 2024-03-22 ENCOUNTER — OFFICE VISIT (OUTPATIENT)
Dept: UROLOGY | Facility: CLINIC | Age: 84
End: 2024-03-22
Payer: MEDICARE

## 2024-03-22 VITALS
OXYGEN SATURATION: 86 % | HEIGHT: 64 IN | WEIGHT: 154 LBS | RESPIRATION RATE: 16 BRPM | DIASTOLIC BLOOD PRESSURE: 80 MMHG | BODY MASS INDEX: 26.29 KG/M2 | HEART RATE: 73 BPM | TEMPERATURE: 97.6 F | SYSTOLIC BLOOD PRESSURE: 149 MMHG

## 2024-03-22 DIAGNOSIS — R33.9 URINARY RETENTION: Primary | ICD-10-CM

## 2024-03-22 PROCEDURE — 99213 OFFICE O/P EST LOW 20 MIN: CPT | Performed by: PHYSICIAN ASSISTANT

## 2024-03-22 PROCEDURE — 51702 INSERT TEMP BLADDER CATH: CPT | Performed by: PHYSICIAN ASSISTANT

## 2024-03-22 NOTE — PROGRESS NOTES
3/22/2024      Chief Complaint   Patient presents with    Follow-up         Assessment and Plan    83 y.o. female     1. Chronic urinary retention  - Herrera catheter exchanged today  - Continue VNA changes every 3 weeks. Balloon that was deflated today only had 3 cc, discussed with patient increased balloon size to 10 cc to minimize herrera catheter becoming malpositioned  - Yearly cystoscopy due in the beginning of 2025  - Will consider SPT placement and call if wanted  - Call with any questions or concerns in the meantime  - All questions answered; patient understands and agrees with plan       History of Present Illness  Samira Allred is a 83 y.o. female patient with history of chronic urinary retention here for follow up.     Patient has chronic urinary retention. Patient had gross hematuria and bladder mass s/p TURBT with negative pathology. Has failed multiple voiding trials. Presents today to discuss chronic catheterization.     Universal Protocol:  Procedure performed by: (RUSS Bernal)  Consent: Verbal consent obtained.  Consent given by: patient  Patient understanding: patient states understanding of the procedure being performed  Patient consent: the patient's understanding of the procedure matches consent given  Procedure consent: procedure consent matches procedure scheduled  Patient identity confirmed: verbally with patient    Bladder catheterization    Date/Time: 3/22/2024 12:40 PM    Performed by: Rosa Mckeon PA-C  Authorized by: Rosa Mckeon PA-C    Consent:     Consent given by:  Patient  Universal protocol:     Procedure explained and questions answered to patient or proxy's satisfaction: yes      Patient identity confirmed:  Verbally with patient  Pre-procedure details:     Procedure purpose:  Therapeutic  Anesthesia (see MAR for exact dosages):     Anesthesia method:  None  Procedure details:     Bladder irrigation: yes      Catheter insertion:  Indwelling    Approach: natural orifice       "Catheter type:  Carrera    Catheter size:  16 Fr    Number of attempts:  1    Successful placement: yes      Urine characteristics:  Clear and yellow  Post-procedure details:     Patient tolerance of procedure:  Tolerated well, no immediate complications  Comments:      Severe vaginal atrophy. Catheter removed after deflation of balloon that only had 3 cc instilled. New catheter placed in sterile fashion after use of hibiclens. Placed without complication. 10 cc sterile water instilled.         Review of Systems   Constitutional:  Negative for activity change, appetite change, chills and fever.   HENT:  Negative for congestion and trouble swallowing.    Respiratory:  Negative for cough and shortness of breath.    Cardiovascular:  Negative for chest pain, palpitations and leg swelling.   Gastrointestinal:  Negative for abdominal pain, constipation, diarrhea, nausea and vomiting.   Genitourinary:  Negative for difficulty urinating, dysuria, flank pain, frequency, hematuria and urgency.   Musculoskeletal:  Negative for back pain and gait problem.   Skin:  Negative for wound.   Allergic/Immunologic: Negative for immunocompromised state.   Neurological:  Negative for dizziness and syncope.   Hematological:  Does not bruise/bleed easily.   Psychiatric/Behavioral:  Negative for confusion.    All other systems reviewed and are negative.      Vitals  Vitals:    03/22/24 1222   BP: 149/80   Pulse: 73   Resp: 16   Temp: 97.6 °F (36.4 °C)   SpO2: (!) 86%   Weight: 69.9 kg (154 lb)   Height: 5' 4\" (1.626 m)       Physical Exam  Constitutional:       General: She is not in acute distress.     Appearance: Normal appearance. She is not ill-appearing, toxic-appearing or diaphoretic.   HENT:      Head: Normocephalic.      Nose: No congestion.   Eyes:      General: No scleral icterus.        Right eye: No discharge.         Left eye: No discharge.      Conjunctiva/sclera: Conjunctivae normal.      Pupils: Pupils are equal, round, and " reactive to light.   Pulmonary:      Effort: Pulmonary effort is normal.   Genitourinary:     Comments: Carrera catheter  Musculoskeletal:      Cervical back: Normal range of motion.   Skin:     General: Skin is warm and dry.      Coloration: Skin is not jaundiced or pale.      Findings: No bruising, erythema, lesion or rash.   Neurological:      General: No focal deficit present.      Mental Status: She is alert and oriented to person, place, and time. Mental status is at baseline.      Gait: Gait normal.   Psychiatric:         Mood and Affect: Mood normal.         Behavior: Behavior normal.         Thought Content: Thought content normal.         Judgment: Judgment normal.           Past History  Past Medical History:   Diagnosis Date    Allergic angioedema     [id number] resolved    Angioedema     [id number] resolved    Arthritis     Breast cancer ([facility]) 2012    right     Chronic kidney disease     Disease of thyroid gland     GERD (gastroesophageal reflux disease)     Hemorrhage of anus and rectum     [id number] resolved    Hyperlipidemia     Hypertension     Hypocalcemia     12oct2017 resolved    Neuritis     thoracic and lumbosacral    Peptic ulcer     Rheumatoid arthritis ([facility])     Stage 3b chronic kidney disease ([facility]) 05/07/2021    Vitamin D deficiency      Social History     Socioeconomic History    Marital status:      Spouse name: None    Number of children: None    Years of education: None    Highest education level: None   Occupational History    None   Tobacco Use    Smoking status: Never     Passive exposure: Past    Smokeless tobacco: Never   Vaping Use    Vaping status: Never Used   Substance and Sexual Activity    Alcohol use: Not Currently    Drug use: No    Sexual activity: Not Currently     Partners: Male   Other Topics Concern    None   Social History Narrative    Active: caffeine use     Social Determinants of Health     Financial Resource Strain: Low Risk  (10/4/2023)    Received from [place]  [facility] [address]    Overall Financial Resource Strain (CARDIA)     Difficulty of Paying Living Expenses: Not hard at all   Food Insecurity: No Food Insecurity (1/23/2024)    Hunger Vital Sign     Worried About Running Out of Food in the Last Year: Never true     Ran Out of Food in the Last Year: Never true   Transportation Needs: No Transportation Needs (1/23/2024)    PRAPARE - Transportation     Lack of Transportation (Medical): No     Lack of Transportation (Non-Medical): No   Physical Activity: Not on file   Stress: Not on file   Social Connections: Not on file   Intimate Partner Violence: Not At Risk (10/4/2023)    Received from [facility]    Humiliation, Afraid, Rape, and Kick questionnaire     Fear of Current or Ex-Partner: No     Emotionally Abused: No     Physically Abused: No     Sexually Abused: No   Housing Stability: Low Risk  (1/23/2024)    Housing Stability Vital Sign     Unable to Pay for Housing in the Last Year: No     Number of Places Lived in the Last Year: 1     Unstable Housing in the Last Year: No     Social History     Tobacco Use   Smoking Status Never    Passive exposure: Past   Smokeless Tobacco Never     Family History   Problem Relation Age of Onset    Breast cancer Mother     Kidney failure Father     Other Father         uremia    Lung cancer Brother     Breast cancer Maternal Aunt     Breast cancer Maternal Aunt     Breast cancer Maternal Aunt     Breast cancer Maternal Aunt     Breast cancer Maternal Aunt     Stomach cancer Maternal Uncle        The following portions of the patient's history were reviewed and updated as appropriate: allergies, current medications, past medical history, past social history, past surgical history and problem list.    Results  No results found for this or any previous visit (from the past 1 hour(s)).]  No results found for: \"PSA\"  Lab Results   Component Value Date    CALCIUM 8.8 01/31/2024    K 5.0 01/31/2024    CO2 26 01/31/2024 "     01/31/2024    BUN 30 (H) 01/31/2024    CREATININE 1.52 (H) 01/31/2024     Lab Results   Component Value Date    WBC 7.25 02/08/2024    HGB 9.9 (L) 02/08/2024    HCT 31.1 (L) 02/08/2024    MCV 95 02/08/2024     (H) 02/08/2024       Roas Mckeon PA-C

## 2024-04-02 ENCOUNTER — TELEPHONE (OUTPATIENT)
Dept: HEMATOLOGY ONCOLOGY | Facility: CLINIC | Age: 84
End: 2024-04-02

## 2024-04-04 ENCOUNTER — APPOINTMENT (OUTPATIENT)
Dept: LAB | Facility: HOSPITAL | Age: 84
End: 2024-04-04
Payer: MEDICARE

## 2024-04-04 DIAGNOSIS — D64.9 ANEMIA, UNSPECIFIED TYPE: ICD-10-CM

## 2024-04-04 DIAGNOSIS — M89.9 CHRONIC KIDNEY DISEASE-MINERAL AND BONE DISORDER: ICD-10-CM

## 2024-04-04 DIAGNOSIS — N18.9 CHRONIC KIDNEY DISEASE-MINERAL AND BONE DISORDER: ICD-10-CM

## 2024-04-04 DIAGNOSIS — N13.30 HYDRONEPHROSIS, UNSPECIFIED HYDRONEPHROSIS TYPE: ICD-10-CM

## 2024-04-04 DIAGNOSIS — N18.32 STAGE 3B CHRONIC KIDNEY DISEASE (HCC): ICD-10-CM

## 2024-04-04 DIAGNOSIS — E83.9 CHRONIC KIDNEY DISEASE-MINERAL AND BONE DISORDER: ICD-10-CM

## 2024-04-04 LAB
25(OH)D3 SERPL-MCNC: 54.6 NG/ML (ref 30–100)
ANION GAP SERPL CALCULATED.3IONS-SCNC: 7 MMOL/L (ref 4–13)
BASOPHILS # BLD AUTO: 0.09 THOUSANDS/ÂΜL (ref 0–0.1)
BASOPHILS NFR BLD AUTO: 1 % (ref 0–1)
BUN SERPL-MCNC: 28 MG/DL (ref 5–25)
CALCIUM SERPL-MCNC: 9.2 MG/DL (ref 8.4–10.2)
CHLORIDE SERPL-SCNC: 108 MMOL/L (ref 96–108)
CO2 SERPL-SCNC: 27 MMOL/L (ref 21–32)
CREAT SERPL-MCNC: 1.67 MG/DL (ref 0.6–1.3)
EOSINOPHIL # BLD AUTO: 0.39 THOUSAND/ÂΜL (ref 0–0.61)
EOSINOPHIL NFR BLD AUTO: 5 % (ref 0–6)
ERYTHROCYTE [DISTWIDTH] IN BLOOD BY AUTOMATED COUNT: 14.6 % (ref 11.6–15.1)
GFR SERPL CREATININE-BSD FRML MDRD: 28 ML/MIN/1.73SQ M
GLUCOSE P FAST SERPL-MCNC: 93 MG/DL (ref 65–99)
HCT VFR BLD AUTO: 36.1 % (ref 34.8–46.1)
HGB BLD-MCNC: 11.5 G/DL (ref 11.5–15.4)
IMM GRANULOCYTES # BLD AUTO: 0.02 THOUSAND/UL (ref 0–0.2)
IMM GRANULOCYTES NFR BLD AUTO: 0 % (ref 0–2)
LYMPHOCYTES # BLD AUTO: 2.1 THOUSANDS/ÂΜL (ref 0.6–4.47)
LYMPHOCYTES NFR BLD AUTO: 29 % (ref 14–44)
MCH RBC QN AUTO: 31.9 PG (ref 26.8–34.3)
MCHC RBC AUTO-ENTMCNC: 31.9 G/DL (ref 31.4–37.4)
MCV RBC AUTO: 100 FL (ref 82–98)
MONOCYTES # BLD AUTO: 0.93 THOUSAND/ÂΜL (ref 0.17–1.22)
MONOCYTES NFR BLD AUTO: 13 % (ref 4–12)
NEUTROPHILS # BLD AUTO: 3.81 THOUSANDS/ÂΜL (ref 1.85–7.62)
NEUTS SEG NFR BLD AUTO: 52 % (ref 43–75)
NRBC BLD AUTO-RTO: 0 /100 WBCS
PHOSPHATE SERPL-MCNC: 3.9 MG/DL (ref 2.3–4.1)
PLATELET # BLD AUTO: 256 THOUSANDS/UL (ref 149–390)
PMV BLD AUTO: 9.4 FL (ref 8.9–12.7)
POTASSIUM SERPL-SCNC: 4.2 MMOL/L (ref 3.5–5.3)
PTH-INTACT SERPL-MCNC: 60.3 PG/ML (ref 12–88)
RBC # BLD AUTO: 3.6 MILLION/UL (ref 3.81–5.12)
SODIUM SERPL-SCNC: 142 MMOL/L (ref 135–147)
WBC # BLD AUTO: 7.34 THOUSAND/UL (ref 4.31–10.16)

## 2024-04-04 PROCEDURE — 82306 VITAMIN D 25 HYDROXY: CPT

## 2024-04-04 PROCEDURE — 36415 COLL VENOUS BLD VENIPUNCTURE: CPT

## 2024-04-04 PROCEDURE — 83970 ASSAY OF PARATHORMONE: CPT

## 2024-04-04 PROCEDURE — 80048 BASIC METABOLIC PNL TOTAL CA: CPT

## 2024-04-04 PROCEDURE — 84100 ASSAY OF PHOSPHORUS: CPT

## 2024-04-04 PROCEDURE — 85025 COMPLETE CBC W/AUTO DIFF WBC: CPT

## 2024-04-09 ENCOUNTER — OFFICE VISIT (OUTPATIENT)
Dept: HEMATOLOGY ONCOLOGY | Facility: CLINIC | Age: 84
End: 2024-04-09
Payer: MEDICARE

## 2024-04-09 VITALS
HEIGHT: 64 IN | OXYGEN SATURATION: 98 % | WEIGHT: 158 LBS | SYSTOLIC BLOOD PRESSURE: 148 MMHG | TEMPERATURE: 97.8 F | DIASTOLIC BLOOD PRESSURE: 86 MMHG | BODY MASS INDEX: 26.98 KG/M2 | HEART RATE: 92 BPM | RESPIRATION RATE: 16 BRPM

## 2024-04-09 DIAGNOSIS — C50.911 DUCTAL CARCINOMA OF RIGHT BREAST, STAGE 1, ESTROGEN RECEPTOR POSITIVE (HCC): ICD-10-CM

## 2024-04-09 DIAGNOSIS — Z17.0 DUCTAL CARCINOMA OF RIGHT BREAST, STAGE 1, ESTROGEN RECEPTOR POSITIVE (HCC): ICD-10-CM

## 2024-04-09 DIAGNOSIS — Z86.2 HISTORY OF MACROCYTOSIS: ICD-10-CM

## 2024-04-09 DIAGNOSIS — D50.0 NORMOCYTIC ANEMIA DUE TO BLOOD LOSS: Primary | ICD-10-CM

## 2024-04-09 PROCEDURE — 99213 OFFICE O/P EST LOW 20 MIN: CPT | Performed by: PHYSICIAN ASSISTANT

## 2024-04-09 PROCEDURE — G2211 COMPLEX E/M VISIT ADD ON: HCPCS | Performed by: PHYSICIAN ASSISTANT

## 2024-04-09 NOTE — PROGRESS NOTES
[facility]ATOLOG[facility] [place]  [address] [address]  Oncology Progress Note  Samira Allred, 1940, [id number]  4/9/2024        Assessment/Plan:   1. Normocytic anemia due to blood loss  - CBC and differential; Standing  - Iron Panel (Includes Ferritin, Iron Sat%, Iron, and TIBC); Future  - Soluble Transferrin Receptor; Future    2. Ductal carcinoma of right breast, stage 1, estrogen receptor positive (HCC)   3. History of macrocytosis    This is 83-year-old female with history of stage I invasive ductal carcinoma of right breast ER/MO+ HER2+ (treatment as below) and anemia who presents for follow-up.       1.Normocytic anemia due to blood loss, anemia of chronic disease   2. Other intestinal malabsorption  Patient had had recent hospitalization in 01/2024 for upper GI bleed.  Endoscopy at this time showed large hiatal hernia, duodenal ulcers, Dieulafoy lesion s/p cauterization.  Received multiple transfusions during hospitalization.  Prior to this she had macrocytic anemia which was attributed to anemia of chronic disease in setting of RA/CKD MTX, and rituximab use. She has now been off of MTX and rituximab since GI bleed in December. Iron panel 02/2024 showed elevated ferritin and soluble transferrin receptor. Suspect chronic inflammation causing elevated ferritin level. She was started on oral iron supplement daily. Most recent labs show improved hgb now 11.5 g/dL , plat 256k. WBC and diff is normal.     Discussion/Plan   - anemia is resolved. This likely was from LEOBARDO/blood loss/RA meds.   - she likely is going back on RA meds ,monitor CBCD monthly  - could consider alpha EPO in the future if patient develops worsening anemia. Would need to discuss risk vs benefit as she has history of DVT.   - consider bone marrow biopsy if other cytopenias develop   - RTC 3m     3.  Stage I invasive ductal carcinoma of right breast ER/MO positive,  HER2+  Diagnosed in 2012  positive in 2012 s/p lumpectomy, RT, Herceptin and tamoxifen from [id number]. Stopped due to development of DVT. Last mammogram in August 2022.  Found to have enlarging hypoechoic lesion in the upper region of the right breast at 10 o'clock position.  Ultrasound-guided biopsy completed, pathology revealed dense sclerotic tissue with fibrosis and chronic inflammation. No evidence of malignancy.   - Last mammogram normal 02/2024.      4. Pulmonary nodule 1 cm or greater in diameter  Patient reports history of pulmonary nodules. Recent CT chest 12/2023 showed .3 x 1.1 cm right lower lobe nodule and 0.7cm groundglass right lower lobe nodule.   - Given history of breast cancer, would recommend biopsy r/o metastatic disease. Referred to pulmonology to determine whether site is amendable to biopsy   - PET scan showed improvement, likely rheumatoid nodule.   - Has been seen by pulmonology, no further monitoring warranted.      5. History of macrocytic anemia   Thought to be secondary to RA/methotrexate.   - continue folic acid daily.      6. Deep vein thrombosis (DVT) of proximal vein of left lower extremity   - 10/2019 : 1st clot in life triggered by recent poison ivy infection , on eliquis 5 mg bid x 6m   - 4/2020 : doppler showed DVT resolved  - currently off ac     Goals and Barriers:    Current Goal: Prolong Survival from Cancer.     Barriers: None.      Patient's Capacity to Self Care:  Patient is able to self care.  ______________________________________________________________________________________________________________    Subjective   No chief complaint on file.      History of present illness/Cancer History: 82yo female with PMHx HTN, RA on MTX, diastolic dysfunction, HLN, breast cancer, stage IV CKD  who presents as follow up for LEOBARDO.      12/2023: Hospitalized for GI bleed received multiple transfusions.  Endoscopy showed large hiatal hernia, multiple small ulcers in the duodenum,  bleeding  Dieulafoy lesion s/p cauterization.  CT performed during admission showed bilateral hydronephrosis due to bladder distention, cystitis, stable chronic compression fracture T12, few calcified uterine fibroids, 1.3 x 1.1 cm right lower lobe nodule and 0.7cm groundglass right lower lobe nodule new from 2019.      Rheumatologist had taken patient off after bleed. Had been on for about 20 years.      24: WBC 10.3, hemoglobin 8.6, MCV 92, platelets 418,000.  AST 70, ALT 64 alk phos 151, albumin 3.3.  GFR 34. Hospitalized 2023 for GIB and UTI.  Presenting labs revealed WBC 21,000, hemoglobin 8.1, RDW 18.6, platelets 202,000. Had acute drop in hgb <7 multiple times requiring multiple transfusions. Upper endoscopy on 12/15 showed large sliding handle hernia, multiple small ulcers in the duodenum, a small Dieulafoy lesion in the first portion of the duodenum that was actively bleeding hemostasis was achieved with 3 applications of bipolar cautery and epinephrine.       Interval history: Patient resolves doing well.  She denies hematochezia, melena or hematuria.  Has Carrera in currently.  She may be getting a suprapubic catheter for chronic UTIs    Oncology History    No history exists.        Lab Results   Component Value Date    WBC 7.34 2024    HGB 11.5 2024    HCT 36.1 2024     (H) 2024     2024     Lab Results   Component Value Date    SODIUM 142 2024    K 4.2 2024     2024    CO2 27 2024    AGAP 7 2024    BUN 28 (H) 2024    CREATININE 1.67 (H) 2024    GLUC 94 2024    GLUF 93 2024    CALCIUM 9.2 2024    AST 70 (H) 2024    ALT 64 (H) 2024    ALKPHOS 151 (H) 2024    PROT 6.1 (L) 2015    TP 5.3 (L) 2024    BILITOT 0.31 2015    TBILI 0.4 2024    EGFR 28 2024        ECO - Asymptomatic    Review of Systems    Current Outpatient Medications:      ascorbic acid (VITAMIN C) 500 mg tablet, Take 500 mg by mouth daily., Disp: , Rfl:     Calcium Citrate-Vitamin D (CITRACAL + D PO), Take by mouth 2 (two) times a day, Disp: , Rfl:     Cholecalciferol (VITAMIN D-3 PO), Take 2,000 Units by mouth daily., Disp: , Rfl:     folic acid (FOLVITE) 1 mg tablet, 1 daily, Disp: 90 tablet, Rfl: 2    hydroCHLOROthiazide 12.5 mg tablet, Take 1 tablet (12.5 mg total) by mouth daily, Disp: 90 tablet, Rfl: 3    levothyroxine (Synthroid) 75 mcg tablet, Take 1 tablet (75 mcg total) by mouth in the morning, Disp: 90 tablet, Rfl: 3    magnesium oxide (MAG-OX) 400 mg, Take 1 tablet (400 mg total) by mouth 2 (two) times a day, Disp: 90 tablet, Rfl: 0    Multiple Vitamin (MULTIVITAMIN) tablet, Take 1 tablet by mouth daily., Disp: , Rfl:     pantoprazole (PROTONIX) 40 mg tablet, Take 1 tablet (40 mg total) by mouth 2 (two) times a day (Patient not taking: Reported on 3/15/2024), Disp: 60 tablet, Rfl: 11    polyethylene glycol (MIRALAX) 17 g packet, Take 17 g by mouth daily as needed (constipation), Disp: 5 each, Rfl: 0    riTUXimab (RITUXAN) 500 mg/50 mL, Inject into a catheter in a vein every 6 (six) months (Patient not taking: Reported on 1/29/2024), Disp: , Rfl:     rosuvastatin (CRESTOR) 5 mg tablet, TAKE 1 TABLET DAILY, Disp: 90 tablet, Rfl: 2    simethicone (MYLICON) 80 mg chewable tablet, Chew 1 tablet (80 mg total) 4 (four) times a day as needed for flatulence (Patient not taking: Reported on 3/15/2024), Disp: 30 tablet, Rfl: 0  Allergies   Allergen Reactions    Lisinopril Anaphylaxis    Codeine Other (See Comments)     Nausea/vomiting      Other      Annotation - [id number]: tounge swelling    Oxycodone      Annotation - 11Mar2013: NOT TOLERATE    Penicillins Hives    Sulfamethoxazole-Trimethoprim     Ciprofloxacin Rash       Advance Directive and Living Will:            Objective   LMP  (LMP Unknown)   Wt Readings from Last 6 Encounters:   03/22/24 69.9 kg (154 lb)   03/15/24 69.9  kg (154 lb)   02/26/24 70 kg (154 lb 5.2 oz)   02/20/24 68.5 kg (151 lb)   02/20/24 68.5 kg (151 lb)   02/16/24 68.5 kg (151 lb)       Physical Exam  Vitals reviewed.   HENT:      Head: Normocephalic.   Cardiovascular:      Rate and Rhythm: Normal rate and regular rhythm.   Pulmonary:      Effort: Pulmonary effort is normal.      Breath sounds: Normal breath sounds.   Abdominal:      Palpations: Abdomen is soft.      Tenderness: There is no abdominal tenderness.      Comments: +herrera     Musculoskeletal:      Cervical back: Neck supple.   Skin:     Findings: No rash.   Neurological:      Mental Status: She is alert.         Pertinent Laboratory Results and Imaging Review:      The following historical data was reviewed:  Past Medical History:   Diagnosis Date    Allergic angioedema     2mar2016 resolved    Angioedema     20aug2015 resolved    Arthritis     Breast cancer (HCC) 2012    right     Chronic kidney disease     Disease of thyroid gland     GERD (gastroesophageal reflux disease)     Hemorrhage of anus and rectum     02mar2016 resolved    Hyperlipidemia     Hypertension     Hypocalcemia     12oct2017 resolved    Neuritis     thoracic and lumbosacral    Peptic ulcer     Rheumatoid arthritis (HCC)     Stage 3b chronic kidney disease (HCC) 05/07/2021    Vitamin D deficiency      Past Surgical History:   Procedure Laterality Date    ABDOMINAL SURGERY      BREAST BIOPSY Right 2012    BREAST LUMPECTOMY Right 2012    BREAST SURGERY      CHOLECYSTECTOMY      ELBOW SURGERY      GALLBLADDER SURGERY  1981    HIP SURGERY Right 09/18/2022    10/2023    ORTHOPEDIC SURGERY      for toes    AL CYSTO W/IRRIG & EVAC MULTPLE OBSTRUCTING CLOTS N/A 11/05/2023    Procedure: CYSTOSCOPY EVACUATION OF CLOTS & fulguration;  Surgeon: Uriah Schwartz MD;  Location: [facility];  Service: Urology    TRANSURETHRAL RESECTION OF BLADDER TUMOR N/A 11/05/2023    Procedure: TRANSURETHRAL RESECTION OF BLADDER TUMOR (TURBT);  Surgeon:  Uriah Schwartz MD;  Location: [facility];  Service: Urology    US GUIDED BREAST BIOPSY RIGHT COMPLETE Right 08/15/2022     Social History     Socioeconomic History    Marital status:      Spouse name: Not on file    Number of children: Not on file    Years of education: Not on file    Highest education level: Not on file   Occupational History    Not on file   Tobacco Use    Smoking status: Never     Passive exposure: Past    Smokeless tobacco: Never   Vaping Use    Vaping status: Never Used   Substance and Sexual Activity    Alcohol use: Not Currently    Drug use: No    Sexual activity: Not Currently     Partners: Male   Other Topics Concern    Not on file   Social History Narrative    Active: caffeine use     Social Determinants of Health     Financial Resource Strain: Low Risk  (10/4/2023)    Received from [facility]    Overall Financial Resource Strain (CARDIA)     Difficulty of Paying Living Expenses: Not hard at all   Food Insecurity: No Food Insecurity (1/23/2024)    Hunger Vital Sign     Worried About Running Out of Food in the Last Year: Never true     Ran Out of Food in the Last Year: Never true   Transportation Needs: No Transportation Needs (1/23/2024)    PRAPARE - Transportation     Lack of Transportation (Medical): No     Lack of Transportation (Non-Medical): No   Physical Activity: Not on file   Stress: Not on file   Social Connections: Not on file   Intimate Partner Violence: Not At Risk (10/4/2023)    Received from [facility]    Humiliation, Afraid, Rape, and Kick questionnaire     Fear of Current or Ex-Partner: No     Emotionally Abused: No     Physically Abused: No     Sexually Abused: No   Housing Stability: Low Risk  (1/23/2024)    Housing Stability Vital Sign     Unable to Pay for Housing in the Last Year: No     Number of Places Lived in the Last Year: 1     Unstable Housing in the Last Year: No     Family History   Problem Relation Age of Onset     Breast cancer Mother     Kidney failure Father     Other Father         uremia    Lung cancer Brother     Breast cancer Maternal Aunt     Breast cancer Maternal Aunt     Breast cancer Maternal Aunt     Breast cancer Maternal Aunt     Breast cancer Maternal Aunt     Stomach cancer Maternal Uncle        Please note:  This report has been generated by a voice recognition software system. Therefore there may be syntax, spelling, and/or grammatical errors. Please call if you have any questions.

## 2024-04-17 DIAGNOSIS — I10 HYPERTENSION, UNSPECIFIED TYPE: Primary | ICD-10-CM

## 2024-04-17 RX ORDER — POTASSIUM CHLORIDE 750 MG/1
10 TABLET, EXTENDED RELEASE ORAL 2 TIMES DAILY
Qty: 180 TABLET | Refills: 1 | Status: SHIPPED | OUTPATIENT
Start: 2024-04-17

## 2024-04-17 NOTE — TELEPHONE ENCOUNTER
Klor-con 10 meq tablets-take 1 tablet 2 times a day    [facility]    I couldn't find this medication in patient's chart but she said that she has been taking it for a long time?

## 2024-04-23 ENCOUNTER — APPOINTMENT (OUTPATIENT)
Dept: LAB | Facility: HOSPITAL | Age: 84
End: 2024-04-23
Payer: MEDICARE

## 2024-04-23 DIAGNOSIS — K92.2 GASTROINTESTINAL HEMORRHAGE, UNSPECIFIED GASTROINTESTINAL HEMORRHAGE TYPE: ICD-10-CM

## 2024-04-23 DIAGNOSIS — K59.1 FUNCTIONAL DIARRHEA: ICD-10-CM

## 2024-04-23 DIAGNOSIS — R60.0 FLUID RETENTION IN LEGS: ICD-10-CM

## 2024-04-23 DIAGNOSIS — D50.0 NORMOCYTIC ANEMIA DUE TO BLOOD LOSS: ICD-10-CM

## 2024-04-23 DIAGNOSIS — I10 HYPERTENSION, UNSPECIFIED TYPE: ICD-10-CM

## 2024-04-23 LAB
ALBUMIN SERPL BCP-MCNC: 4 G/DL (ref 3.5–5)
ALP SERPL-CCNC: 83 U/L (ref 34–104)
ALT SERPL W P-5'-P-CCNC: 11 U/L (ref 7–52)
ANION GAP SERPL CALCULATED.3IONS-SCNC: 6 MMOL/L (ref 4–13)
AST SERPL W P-5'-P-CCNC: 20 U/L (ref 13–39)
BASOPHILS # BLD AUTO: 0.06 THOUSANDS/ÂΜL (ref 0–0.1)
BASOPHILS NFR BLD AUTO: 1 % (ref 0–1)
BILIRUB SERPL-MCNC: 0.52 MG/DL (ref 0.2–1)
BUN SERPL-MCNC: 34 MG/DL (ref 5–25)
CALCIUM SERPL-MCNC: 9.2 MG/DL (ref 8.4–10.2)
CHLORIDE SERPL-SCNC: 105 MMOL/L (ref 96–108)
CO2 SERPL-SCNC: 29 MMOL/L (ref 21–32)
CREAT SERPL-MCNC: 1.79 MG/DL (ref 0.6–1.3)
EOSINOPHIL # BLD AUTO: 0.27 THOUSAND/ÂΜL (ref 0–0.61)
EOSINOPHIL NFR BLD AUTO: 5 % (ref 0–6)
ERYTHROCYTE [DISTWIDTH] IN BLOOD BY AUTOMATED COUNT: 13.7 % (ref 11.6–15.1)
GFR SERPL CREATININE-BSD FRML MDRD: 26 ML/MIN/1.73SQ M
GLUCOSE P FAST SERPL-MCNC: 95 MG/DL (ref 65–99)
HCT VFR BLD AUTO: 36.9 % (ref 34.8–46.1)
HGB BLD-MCNC: 11.8 G/DL (ref 11.5–15.4)
IMM GRANULOCYTES # BLD AUTO: 0.02 THOUSAND/UL (ref 0–0.2)
IMM GRANULOCYTES NFR BLD AUTO: 0 % (ref 0–2)
LYMPHOCYTES # BLD AUTO: 1.55 THOUSANDS/ÂΜL (ref 0.6–4.47)
LYMPHOCYTES NFR BLD AUTO: 26 % (ref 14–44)
MCH RBC QN AUTO: 31.7 PG (ref 26.8–34.3)
MCHC RBC AUTO-ENTMCNC: 32 G/DL (ref 31.4–37.4)
MCV RBC AUTO: 99 FL (ref 82–98)
MONOCYTES # BLD AUTO: 0.4 THOUSAND/ÂΜL (ref 0.17–1.22)
MONOCYTES NFR BLD AUTO: 7 % (ref 4–12)
NEUTROPHILS # BLD AUTO: 3.68 THOUSANDS/ÂΜL (ref 1.85–7.62)
NEUTS SEG NFR BLD AUTO: 61 % (ref 43–75)
NRBC BLD AUTO-RTO: 0 /100 WBCS
PLATELET # BLD AUTO: 244 THOUSANDS/UL (ref 149–390)
PMV BLD AUTO: 9.6 FL (ref 8.9–12.7)
POTASSIUM SERPL-SCNC: 4.7 MMOL/L (ref 3.5–5.3)
PROT SERPL-MCNC: 6.5 G/DL (ref 6.4–8.4)
RBC # BLD AUTO: 3.72 MILLION/UL (ref 3.81–5.12)
SODIUM SERPL-SCNC: 140 MMOL/L (ref 135–147)
WBC # BLD AUTO: 5.98 THOUSAND/UL (ref 4.31–10.16)

## 2024-04-23 PROCEDURE — 85025 COMPLETE CBC W/AUTO DIFF WBC: CPT

## 2024-04-23 PROCEDURE — 80053 COMPREHEN METABOLIC PANEL: CPT

## 2024-04-23 PROCEDURE — 36415 COLL VENOUS BLD VENIPUNCTURE: CPT

## 2024-05-01 ENCOUNTER — OFFICE VISIT (OUTPATIENT)
Dept: INTERNAL MEDICINE CLINIC | Facility: CLINIC | Age: 84
End: 2024-05-01
Payer: MEDICARE

## 2024-05-01 VITALS
BODY MASS INDEX: 27.01 KG/M2 | DIASTOLIC BLOOD PRESSURE: 70 MMHG | HEART RATE: 82 BPM | SYSTOLIC BLOOD PRESSURE: 142 MMHG | OXYGEN SATURATION: 96 % | WEIGHT: 158.2 LBS | HEIGHT: 64 IN | TEMPERATURE: 99.2 F

## 2024-05-01 DIAGNOSIS — D22.9 CHANGING NEVUS: ICD-10-CM

## 2024-05-01 DIAGNOSIS — E78.5 HYPERLIPIDEMIA, UNSPECIFIED HYPERLIPIDEMIA TYPE: ICD-10-CM

## 2024-05-01 DIAGNOSIS — I10 PRIMARY HYPERTENSION: Chronic | ICD-10-CM

## 2024-05-01 DIAGNOSIS — E46 PROTEIN-CALORIE MALNUTRITION, UNSPECIFIED SEVERITY (HCC): ICD-10-CM

## 2024-05-01 DIAGNOSIS — E03.9 HYPOTHYROIDISM, UNSPECIFIED TYPE: Chronic | ICD-10-CM

## 2024-05-01 DIAGNOSIS — M06.9 RHEUMATOID ARTHRITIS OF LEFT ANKLE, UNSPECIFIED WHETHER RHEUMATOID FACTOR PRESENT (HCC): ICD-10-CM

## 2024-05-01 DIAGNOSIS — Z00.00 MEDICARE ANNUAL WELLNESS VISIT, SUBSEQUENT: Primary | ICD-10-CM

## 2024-05-01 DIAGNOSIS — C50.919 ADENOCARCINOMA OF BREAST, UNSPECIFIED LATERALITY (HCC): ICD-10-CM

## 2024-05-01 DIAGNOSIS — I12.9 HYPERTENSIVE RENAL DISEASE, STAGE 1 THROUGH STAGE 4 OR UNSPECIFIED CHRONIC KIDNEY DISEASE: ICD-10-CM

## 2024-05-01 DIAGNOSIS — N18.4 CHRONIC RENAL DISEASE, STAGE IV (HCC): ICD-10-CM

## 2024-05-01 DIAGNOSIS — Z85.3 PERSONAL HISTORY OF BREAST CANCER: ICD-10-CM

## 2024-05-01 PROBLEM — K59.00 CONSTIPATION, UNSPECIFIED: Status: ACTIVE | Noted: 2022-09-30

## 2024-05-01 PROBLEM — K92.2 GI BLEED: Status: RESOLVED | Noted: 2023-12-07 | Resolved: 2024-05-01

## 2024-05-01 PROBLEM — D64.9 ANEMIA: Status: RESOLVED | Noted: 2021-05-07 | Resolved: 2024-05-01

## 2024-05-01 PROBLEM — R60.0 LOCALIZED EDEMA: Status: RESOLVED | Noted: 2018-02-27 | Resolved: 2024-05-01

## 2024-05-01 PROBLEM — E87.1 HYPONATREMIA: Status: RESOLVED | Noted: 2024-01-19 | Resolved: 2024-05-01

## 2024-05-01 PROCEDURE — G0439 PPPS, SUBSEQ VISIT: HCPCS | Performed by: INTERNAL MEDICINE

## 2024-05-01 PROCEDURE — 99214 OFFICE O/P EST MOD 30 MIN: CPT | Performed by: INTERNAL MEDICINE

## 2024-05-01 RX ORDER — METHOTREXATE 2.5 MG/1
TABLET ORAL
COMMUNITY
Start: 2024-04-11

## 2024-05-01 NOTE — PATIENT INSTRUCTIONS
Medicare Preventive Visit Patient Instructions  Thank you for completing your Welcome to Medicare Visit or Medicare Annual Wellness Visit today. Your next wellness visit will be due in one year (5/2/2025).  The screening/preventive services that you may require over the next 5-10 years are detailed below. Some tests may not apply to you based off risk factors and/or age. Screening tests ordered at today's visit but not completed yet may show as past due. Also, please note that scanned in results may not display below.  Preventive Screenings:  Service Recommendations Previous Testing/Comments   Colorectal Cancer Screening  * Colonoscopy    * Fecal Occult Blood Test (FOBT)/Fecal Immunochemical Test (FIT)  * Fecal DNA/Cologuard Test  * Flexible Sigmoidoscopy Age: 45-75 years old   Colonoscopy: every 10 years (may be performed more frequently if at higher risk)  OR  FOBT/FIT: every 1 year  OR  Cologuard: every 3 years  OR  Sigmoidoscopy: every 5 years  Screening may be recommended earlier than age 45 if at higher risk for colorectal cancer. Also, an individualized decision between you and your healthcare provider will decide whether screening between the ages of 76-85 would be appropriate. Colonoscopy: Not on file  FOBT/FIT: Not on file  Cologuard: Not on file  Sigmoidoscopy: Not on file          Breast Cancer Screening Age: 40+ years old  Frequency: every 1-2 years  Not required if history of left and right mastectomy Mammogram: 02/20/2024    History Breast Cancer   Cervical Cancer Screening Between the ages of 21-29, pap smear recommended once every 3 years.   Between the ages of 30-65, can perform pap smear with HPV co-testing every 5 years.   Recommendations may differ for women with a history of total hysterectomy, cervical cancer, or abnormal pap smears in past. Pap Smear: Not on file    Screening Not Indicated   Hepatitis C Screening Once for adults born between 1945 and 1965  More frequently in patients at high  risk for Hepatitis C Hep C Antibody: Not on file        Diabetes Screening 1-2 times per year if you're at risk for diabetes or have pre-diabetes Fasting glucose: 95 mg/dL (4/23/2024)  A1C: 5.6 % (9/6/2023)  Screening Current   Cholesterol Screening Once every 5 years if you don't have a lipid disorder. May order more often based on risk factors. Lipid panel: 09/06/2023    Screening Not Indicated  History Lipid Disorder     Other Preventive Screenings Covered by Medicare:  Abdominal Aortic Aneurysm (AAA) Screening: covered once if your at risk. You're considered to be at risk if you have a family history of AAA.  Lung Cancer Screening: covers low dose CT scan once per year if you meet all of the following conditions: (1) Age 55-77; (2) No signs or symptoms of lung cancer; (3) Current smoker or have quit smoking within the last 15 years; (4) You have a tobacco smoking history of at least 20 pack years (packs per day multiplied by number of years you smoked); (5) You get a written order from a healthcare provider.  Glaucoma Screening: covered annually if you're considered high risk: (1) You have diabetes OR (2) Family history of glaucoma OR (3)  aged 50 and older OR (4)  American aged 65 and older  Osteoporosis Screening: covered every 2 years if you meet one of the following conditions: (1) You're estrogen deficient and at risk for osteoporosis based off medical history and other findings; (2) Have a vertebral abnormality; (3) On glucocorticoid therapy for more than 3 months; (4) Have primary hyperparathyroidism; (5) On osteoporosis medications and need to assess response to drug therapy.   Last bone density test (DXA Scan): 09/17/2021.  HIV Screening: covered annually if you're between the age of 15-65. Also covered annually if you are younger than 15 and older than 65 with risk factors for HIV infection. For pregnant patients, it is covered up to 3 times per  pregnancy.    Immunizations:  Immunization Recommendations   Influenza Vaccine Annual influenza vaccination during flu season is recommended for all persons aged >= 6 months who do not have contraindications   Pneumococcal Vaccine   * Pneumococcal conjugate vaccine = PCV13 (Prevnar 13), PCV15 (Vaxneuvance), PCV20 (Prevnar 20)  * Pneumococcal polysaccharide vaccine = PPSV23 (Pneumovax) Adults 19-65 yo with certain risk factors or if 65+ yo  If never received any pneumonia vaccine: recommend Prevnar 20 (PCV20)  Give PCV20 if previously received 1 dose of PCV13 or PPSV23   Hepatitis B Vaccine 3 dose series if at intermediate or high risk (ex: diabetes, end stage renal disease, liver disease)   Respiratory syncytial virus (RSV) Vaccine - COVERED BY MEDICARE PART D  * RSVPreF3 (Arexvy) CDC recommends that adults 60 years of age and older may receive a single dose of RSV vaccine using shared clinical decision-making (SCDM)   Tetanus (Td) Vaccine - COST NOT COVERED BY MEDICARE PART B Following completion of primary series, a booster dose should be given every 10 years to maintain immunity against tetanus. Td may also be given as tetanus wound prophylaxis.   Tdap Vaccine - COST NOT COVERED BY MEDICARE PART B Recommended at least once for all adults. For pregnant patients, recommended with each pregnancy.   Shingles Vaccine (Shingrix) - COST NOT COVERED BY MEDICARE PART B  2 shot series recommended in those 19 years and older who have or will have weakened immune systems or those 50 years and older     Health Maintenance Due:      Topic Date Due   • DXA SCAN  09/17/2023     Immunizations Due:      Topic Date Due   • Pneumococcal Vaccine: 65+ Years (3 of 3 - PPSV23 or PCV20) 03/30/2022   • COVID-19 Vaccine (7 - 2023-24 season) 09/01/2023     Advance Directives   What are advance directives?  Advance directives are legal documents that state your wishes and plans for medical care. These plans are made ahead of time in case you  lose your ability to make decisions for yourself. Advance directives can apply to any medical decision, such as the treatments you want, and if you want to donate organs.   What are the types of advance directives?  There are many types of advance directives, and each state has rules about how to use them. You may choose a combination of any of the following:  Living will:  This is a written record of the treatment you want. You can also choose which treatments you do not want, which to limit, and which to stop at a certain time. This includes surgery, medicine, IV fluid, and tube feedings.   Durable power of  for healthcare (DPAHC):  This is a written record that states who you want to make healthcare choices for you when you are unable to make them for yourself. This person, called a proxy, is usually a family member or a friend. You may choose more than 1 proxy.  Do not resuscitate (DNR) order:  A DNR order is used in case your heart stops beating or you stop breathing. It is a request not to have certain forms of treatment, such as CPR. A DNR order may be included in other types of advance directives.  Medical directive:  This covers the care that you want if you are in a coma, near death, or unable to make decisions for yourself. You can list the treatments you want for each condition. Treatment may include pain medicine, surgery, blood transfusions, dialysis, IV or tube feedings, and a ventilator (breathing machine).  Values history:  This document has questions about your views, beliefs, and how you feel and think about life. This information can help others choose the care that you would choose.  Why are advance directives important?  An advance directive helps you control your care. Although spoken wishes may be used, it is better to have your wishes written down. Spoken wishes can be misunderstood, or not followed. Treatments may be given even if you do not want them. An advance directive may make  it easier for your family to make difficult choices about your care.   Urinary Incontinence   Urinary incontinence (UI)  is when you lose control of your bladder. UI develops because your bladder cannot store or empty urine properly. The 3 most common types of UI are stress incontinence, urge incontinence, or both.  Medicines:   May be given to help strengthen your bladder control. Report any side effects of medication to your healthcare provider.  Do pelvic muscle exercises often:  Your pelvic muscles help you stop urinating. Squeeze these muscles tight for 5 seconds, then relax for 5 seconds. Gradually work up to squeezing for 10 seconds. Do 3 sets of 15 repetitions a day, or as directed. This will help strengthen your pelvic muscles and improve bladder control.  Train your bladder:  Go to the bathroom at set times, such as every 2 hours, even if you do not feel the urge to go. You can also try to hold your urine when you feel the urge to go. For example, hold your urine for 5 minutes when you feel the urge to go. As that becomes easier, hold your urine for 10 minutes.   Self-care:   Keep a UI record.  Write down how often you leak urine and how much you leak. Make a note of what you were doing when you leaked urine.  Drink liquids as directed. You may need to limit the amount of liquid you drink to help control your urine leakage. Do not drink any liquid right before you go to bed. Limit or do not have drinks that contain caffeine or alcohol.   Prevent constipation.  Eat a variety of high-fiber foods. Good examples are high-fiber cereals, beans, vegetables, and whole-grain breads. Walking is the best way to trigger your intestines to have a bowel movement.  Exercise regularly and maintain a healthy weight.  Weight loss and exercise will decrease pressure on your bladder and help you control your leakage.   Use a catheter as directed  to help empty your bladder. A catheter is a tiny, plastic tube that is put into  your bladder to drain your urine.   Go to behavior therapy as directed.  Behavior therapy may be used to help you learn to control your urge to urinate.    Weight Management   Why it is important to manage your weight:  Being overweight increases your risk of health conditions such as heart disease, high blood pressure, type 2 diabetes, and certain types of cancer. It can also increase your risk for osteoarthritis, sleep apnea, and other respiratory problems. Aim for a slow, steady weight loss. Even a small amount of weight loss can lower your risk of health problems.  How to lose weight safely:  A safe and healthy way to lose weight is to eat fewer calories and get regular exercise. You can lose up about 1 pound a week by decreasing the number of calories you eat by 500 calories each day.   Healthy meal plan for weight management:  A healthy meal plan includes a variety of foods, contains fewer calories, and helps you stay healthy. A healthy meal plan includes the following:  Eat whole-grain foods more often.  A healthy meal plan should contain fiber. Fiber is the part of grains, fruits, and vegetables that is not broken down by your body. Whole-grain foods are healthy and provide extra fiber in your diet. Some examples of whole-grain foods are whole-wheat breads and pastas, oatmeal, brown rice, and bulgur.  Eat a variety of vegetables every day.  Include dark, leafy greens such as spinach, kale, boo greens, and mustard greens. Eat yellow and orange vegetables such as carrots, sweet potatoes, and winter squash.   Eat a variety of fruits every day.  Choose fresh or canned fruit (canned in its own juice or light syrup) instead of juice. Fruit juice has very little or no fiber.  Eat low-fat dairy foods.  Drink fat-free (skim) milk or 1% milk. Eat fat-free yogurt and low-fat cottage cheese. Try low-fat cheeses such as mozzarella and other reduced-fat cheeses.  Choose meat and other protein foods that are low in  fat.  Choose beans or other legumes such as split peas or lentils. Choose fish, skinless poultry (chicken or turkey), or lean cuts of red meat (beef or pork). Before you cook meat or poultry, cut off any visible fat.   Use less fat and oil.  Try baking foods instead of frying them. Add less fat, such as margarine, sour cream, regular salad dressing and mayonnaise to foods. Eat fewer high-fat foods. Some examples of high-fat foods include french fries, doughnuts, ice cream, and cakes.  Eat fewer sweets.  Limit foods and drinks that are high in sugar. This includes candy, cookies, regular soda, and sweetened drinks.  Exercise:  Exercise at least 30 minutes per day on most days of the week. Some examples of exercise include walking, biking, dancing, and swimming. You can also fit in more physical activity by taking the stairs instead of the elevator or parking farther away from stores. Ask your healthcare provider about the best exercise plan for you.      © Copyright [employer] 2018 Information is for End User's use only and may not be sold, redistributed or otherwise used for commercial purposes. All illustrations and images included in CareNotes® are the copyrighted property of [employer] or [employer]

## 2024-05-01 NOTE — PROGRESS NOTES
Assessment and Plan:     Problem List Items Addressed This Visit       Hypertension (Chronic)    Rheumatoid arthritis (HCC) (Chronic)    Hypothyroidism (Chronic)    Relevant Orders    TSH, 3rd generation with Free T4 reflex    Hyperlipidemia    Adenocarcinoma of breast (HCC)    Relevant Medications    methotrexate 2.5 MG tablet    Personal history of breast cancer    Protein-calorie malnutrition, unspecified severity (HCC)    Chronic renal disease, stage IV (HCC)    Hypertensive kidney disease with chronic kidney disease     Other Visit Diagnoses       Medicare annual wellness visit, subsequent    -  Primary            Depression Screening and Follow-up Plan: Patient was screened for depression during today's encounter. They screened negative with a PHQ-2 score of 0.    Urinary Incontinence Plan of Care: counseling topics discussed: practice Kegel (pelvic floor strengthening) exercises, limit alcohol, caffeine, spicy foods, and acidic foods and limiting fluid intake 3-4 hours before bed.     Preventive health issues were discussed with patient, and age appropriate screening tests were ordered as noted in patient's After Visit Summary.  Personalized health advice and appropriate referrals for health education or preventive services given if needed, as noted in patient's After Visit Summary.     History of Present Illness:     Patient presents for a Medicare Wellness Visit    Patient is here for routine follow up, reviewed chronic medical problems, ordered labs for next visit including CBC CMP TSH A1C LIPID. Reviewed labs for this visit.    She is following with rheumatology for RA and recently restarted her rituxan and methotrexate; deformities noted to b/l hands;  She is ambulating with a walker; may need PT but she would like to first complete at least two TX's of rituxan.      Following with nephrology for CKD;     Follows with hem/onc for h/o LEOBARDO, anemia resolved, may need a bone marrow biopsy in the future if  any cytopenias develop.     HTN well controlled.    Patient with h/o gross hematuria and bladder mass s/p TURBT with negative pathology. Has failed multiple voiding trials. H/o chronic urinary retention, every 4 weeks changing FC, consider SP catheter. Has visiting nurse coming to the house.     Patient Care Team:  Layne Bacon MD as PCP - General (Internal Medicine)  MD Zack Prince MD (Nephrology)     Review of Systems:     Review of Systems   Constitutional:  Negative for chills and fever.   HENT:  Negative for ear pain and sore throat.    Eyes:  Negative for pain and visual disturbance.   Respiratory:  Negative for cough and shortness of breath.    Cardiovascular:  Negative for chest pain and palpitations.   Gastrointestinal:  Negative for abdominal pain and vomiting.   Genitourinary:  Negative for dysuria and hematuria.   Musculoskeletal:  Positive for arthralgias, back pain, gait problem and joint swelling.   Skin:  Negative for color change and rash.   Neurological:  Negative for seizures and syncope.   All other systems reviewed and are negative.       Problem List:     Patient Active Problem List   Diagnosis   • Hypertension   • Rheumatoid arthritis (HCC)   • Hypothyroidism   • Hyperuricemia   • Diastolic dysfunction   • Hyperlipidemia   • Lumbar radiculopathy   • Osteopenia   • Other chronic pain   • Vitamin D deficiency   • Adenocarcinoma of breast (HCC)   • Fat necrosis (segmental) of breast   • Personal history of breast cancer   • History of peptic ulcer   • Functional diarrhea   • Intermediate stage nonexudative 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 eyes   • Lower leg DVT (deep venous thromboembolism), chronic, left ([facility])   • Varicose veins of left lower extremity with pain   • History of DVT (deep vein thrombosis)   • Chronic kidney disease-mineral and bone disorder   • Chronic pain of both knees   • Protein-calorie malnutrition, unspecified severity ([facility])   • Chronic renal  disease, stage IV (HCC)   • Hydronephrosis   • Urinary retention   • PUD (peptic ulcer disease)   • Aortic valve stenosis, moderate   • Severe protein-calorie malnutrition (HCC)   • Hypertensive kidney disease with chronic kidney disease   • Constipation, unspecified      Past Medical and Surgical History:     Past Medical History:   Diagnosis Date   • Allergic angioedema     2mar2016 resolved   • Angioedema     [id number] resolved   • Arthritis    • Breast cancer (HCC) 2012    right    • Chronic kidney disease    • Disease of thyroid gland    • GERD (gastroesophageal reflux disease)    • Hemorrhage of anus and rectum     02mar2016 resolved   • Hyperlipidemia    • Hypertension    • Hypocalcemia     12oct2017 resolved   • Neuritis     thoracic and lumbosacral   • Peptic ulcer    • Rheumatoid arthritis (HCC)    • Stage 3b chronic kidney disease (HCC) 05/07/2021   • Vitamin D deficiency      Past Surgical History:   Procedure Laterality Date   • ABDOMINAL SURGERY     • BREAST BIOPSY Right 2012   • BREAST LUMPECTOMY Right 2012   • BREAST SURGERY     • CHOLECYSTECTOMY     • ELBOW SURGERY     • GALLBLADDER SURGERY  1981   • HIP SURGERY Right 09/18/2022    10/2023   • ORTHOPEDIC SURGERY      for toes   • MD CYSTO W/IRRIG & EVAC MULTPLE OBSTRUCTING CLOTS N/A 11/05/2023    Procedure: CYSTOSCOPY EVACUATION OF CLOTS & fulguration;  Surgeon: Uriah Schwartz MD;  Location: [facility];  Service: Urology   • TRANSURETHRAL RESECTION OF BLADDER TUMOR N/A 11/05/2023    Procedure: TRANSURETHRAL RESECTION OF BLADDER TUMOR (TURBT);  Surgeon: Uriah Schwartz MD;  Location: [facility];  Service: Urology   • US GUIDED BREAST BIOPSY RIGHT COMPLETE Right 08/15/2022      Family History:     Family History   Problem Relation Age of Onset   • Breast cancer Mother    • Kidney failure Father    • Other Father         uremia   • Lung cancer Brother    • Breast cancer Maternal Aunt    • Breast cancer Maternal Aunt    • Breast cancer  Maternal Aunt    • Breast cancer Maternal Aunt    • Breast cancer Maternal Aunt    • Stomach cancer Maternal Uncle       Social History:     Social History     Socioeconomic History   • Marital status:      Spouse name: None   • Number of children: None   • Years of education: None   • Highest education level: None   Occupational History   • None   Tobacco Use   • Smoking status: Never     Passive exposure: Past   • Smokeless tobacco: Never   Vaping Use   • Vaping status: Never Used   Substance and Sexual Activity   • Alcohol use: Not Currently   • Drug use: No   • Sexual activity: Not Currently     Partners: Male   Other Topics Concern   • None   Social History Narrative    Active: caffeine use     Social Determinants of Health     Financial Resource Strain: Low Risk  (10/4/2023)    Received from [facility]    Overall Financial Resource Strain (CARDIA)    • Difficulty of Paying Living Expenses: Not hard at all   Food Insecurity: No Food Insecurity (5/1/2024)    Hunger Vital Sign    • Worried About Running Out of Food in the Last Year: Never true    • Ran Out of Food in the Last Year: Never true   Transportation Needs: No Transportation Needs (5/1/2024)    PRAPARE - Transportation    • Lack of Transportation (Medical): No    • Lack of Transportation (Non-Medical): No   Physical Activity: Not on file   Stress: Not on file   Social Connections: Not on file   Intimate Partner Violence: Not At Risk (10/4/2023)    Received from [facility]    Humiliation, Afraid, Rape, and Kick questionnaire    • Fear of Current or Ex-Partner: No    • Emotionally Abused: No    • Physically Abused: No    • Sexually Abused: No   Housing Stability: Low Risk  (5/1/2024)    Housing Stability Vital Sign    • Unable to Pay for Housing in the Last Year: No    • Number of Places Lived in the Last Year: 1    • Unstable Housing in the Last Year: No      Medications and Allergies:     Current Outpatient  Medications   Medication Sig Dispense Refill   • ascorbic acid (VITAMIN C) 500 mg tablet Take 500 mg by mouth daily.     • Calcium Citrate-Vitamin D (CITRACAL + D PO) Take by mouth 2 (two) times a day     • Cholecalciferol (VITAMIN D-3 PO) Take 2,000 Units by mouth daily.     • folic acid (FOLVITE) 1 mg tablet 1 daily 90 tablet 2   • hydroCHLOROthiazide 12.5 mg tablet Take 1 tablet (12.5 mg total) by mouth daily 90 tablet 3   • levothyroxine (Synthroid) 75 mcg tablet Take 1 tablet (75 mcg total) by mouth in the morning 90 tablet 3   • magnesium oxide (MAG-OX) 400 mg Take 1 tablet (400 mg total) by mouth 2 (two) times a day 90 tablet 0   • methotrexate 2.5 MG tablet      • Multiple Vitamin (MULTIVITAMIN) tablet Take 1 tablet by mouth daily.     • polyethylene glycol (MIRALAX) 17 g packet Take 17 g by mouth daily as needed (constipation) 5 each 0   • potassium chloride (Klor-Con M10) 10 mEq tablet Take 1 tablet (10 mEq total) by mouth 2 (two) times a day 180 tablet 1   • rosuvastatin (CRESTOR) 5 mg tablet TAKE 1 TABLET DAILY 90 tablet 2     No current facility-administered medications for this visit.     Allergies   Allergen Reactions   • Lisinopril Anaphylaxis   • Codeine Other (See Comments)     Nausea/vomiting     • Other      Annotation - [id number]: tounge swelling   • Oxycodone      Annotation - 11Mar2013: NOT TOLERATE   • Penicillins Hives   • Sulfamethoxazole-Trimethoprim    • Ciprofloxacin Rash      Immunizations:     Immunization History   Administered Date(s) Administered   • COVID-19 MODERNA VACC 0.5 ML IM 11/16/2021, 11/16/2021   • COVID-19 PFIZER VACCINE 0.3 ML IM 02/27/2021, 03/20/2021, 11/16/2021   • COVID-19 Pfizer Vac BIVALENT Billy-sucrose 12 Yr+ IM 01/24/2023   • DT (pediatric) 08/01/2014   • Influenza Split High Dose Preservative Free IM 10/02/2013, 09/25/2014, 10/06/2015, 09/14/2016, 09/29/2017   • Influenza, high dose seasonal 0.7 mL 10/11/2018, 09/30/2019, 09/22/2020, 10/05/2021, 10/04/2022,  10/20/2023   • Influenza, seasonal, injectable 09/19/2012   • Pneumococcal Conjugate 13-Valent 03/15/2016, 03/15/2016, 03/30/2017, 03/30/2017   • Pneumococcal Polysaccharide PPV23 10/26/1998, 11/07/2005   • Tuberculin Skin Test 09/30/2022, 10/09/2022, 10/11/2023   • Tuberculin Skin Test-PPD Intradermal 11/11/2000   • Zoster Vaccine Recombinant 08/16/2020, 11/09/2020      Health Maintenance:         Topic Date Due   • DXA SCAN  09/17/2023         Topic Date Due   • Pneumococcal Vaccine: 65+ Years (3 of 3 - PPSV23 or PCV20) 03/30/2022   • COVID-19 Vaccine (7 - 2023-24 season) 09/01/2023      Medicare Screening Tests and Risk Assessments:     Samira is here for her Subsequent Wellness visit. Last Medicare Wellness visit information reviewed, patient interviewed and updates made to the record today.      Health Risk Assessment:   Patient rates overall health as fair. Patient feels that their physical health rating is slightly worse. Patient is very satisfied with their life. Eyesight was rated as slightly worse. Hearing was rated as same. Patient feels that their emotional and mental health rating is same. Patients states they are never, rarely angry. Patient states they are never, rarely unusually tired/fatigued. Pain experienced in the last 7 days has been some. Patient's pain rating has been 4/10. Patient states that she has experienced weight loss or gain in last 6 months.     Depression Screening:   PHQ-2 Score: 0      Fall Risk Screening:   In the past year, patient has experienced: no history of falling in past year      Urinary Incontinence Screening:   Patient has leaked urine accidently in the last six months.     Home Safety:  Patient does not have trouble with stairs inside or outside of their home. Patient has working smoke alarms and has working carbon monoxide detector. Home safety hazards include: none.     Nutrition:   Current diet is Regular.     Medications:   Patient is not currently taking any  over-the-counter supplements. Patient is able to manage medications.     Activities of Daily Living (ADLs)/Instrumental Activities of Daily Living (IADLs):   Walk and transfer into and out of bed and chair?: Yes  Dress and groom yourself?: Yes    Bathe or shower yourself?: Yes    Feed yourself? Yes  Do your laundry/housekeeping?: Yes  Manage your money, pay your bills and track your expenses?: Yes  Make your own meals?: Yes    Do your own shopping?: Yes    Previous Hospitalizations:   Any hospitalizations or ED visits within the last 12 months?: Yes    How many hospitalizations have you had in the last year?: 3-4    Cognitive Screening:   Provider or family/friend/caregiver concerned regarding cognition?: No    PREVENTIVE SCREENINGS      Cardiovascular Screening:    General: History Lipid Disorder and Screening Current      Diabetes Screening:     General: Screening Current      Colorectal Cancer Screening:     General: Screening Not Indicated      Breast Cancer Screening:     General: History Breast Cancer and Screening Current      Cervical Cancer Screening:    General: Screening Not Indicated      Osteoporosis Screening:    General: Risks and Benefits Discussed    Due for: DXA Axial      Abdominal Aortic Aneurysm (AAA) Screening:        General: Screening Not Indicated      Lung Cancer Screening:     General: Screening Not Indicated      Hepatitis C Screening:    General: Screening Not Indicated    Screening, Brief Intervention, and Referral to Treatment (SBIRT)    Screening  Typical number of drinks in a day: 0  Typical number of drinks in a week: 0  Interpretation: Low risk drinking behavior.    Single Item Drug Screening:  How often have you used an illegal drug (including marijuana) or a prescription medication for non-medical reasons in the past year? never    Single Item Drug Screen Score: 0  Interpretation: Negative screen for possible drug use disorder    No results found.     Physical Exam:     /70  "(BP Location: Left arm, Patient Position: Sitting)   Pulse 82   Temp 99.2 °F (37.3 °C)   Ht 5' 4\" (1.626 m)   Wt 71.8 kg (158 lb 3.2 oz)   LMP  (LMP Unknown)   SpO2 96%   BMI 27.15 kg/m²     Physical Exam  Vitals and nursing note reviewed.   Constitutional:       General: She is not in acute distress.     Appearance: She is well-developed.   HENT:      Head: Normocephalic and atraumatic.   Eyes:      Conjunctiva/sclera: Conjunctivae normal.   Cardiovascular:      Rate and Rhythm: Normal rate and regular rhythm.      Heart sounds: No murmur heard.  Pulmonary:      Effort: Pulmonary effort is normal. No respiratory distress.      Breath sounds: Normal breath sounds.   Abdominal:      Palpations: Abdomen is soft.      Tenderness: There is no abdominal tenderness.   Musculoskeletal:         General: Swelling and deformity present.      Cervical back: Neck supple.   Skin:     General: Skin is warm and dry.      Capillary Refill: Capillary refill takes less than 2 seconds.   Neurological:      Mental Status: She is alert and oriented to person, place, and time.      Gait: Gait abnormal.   Psychiatric:         Mood and Affect: Mood normal.        Layne Bacon MD  "

## 2024-05-01 NOTE — PROGRESS NOTES
Assessment and Plan:     Problem List Items Addressed This Visit       Hypertension (Chronic)    Rheumatoid arthritis (HCC) (Chronic)    Hypothyroidism (Chronic)    Relevant Orders    TSH, 3rd generation with Free T4 reflex    Hyperlipidemia    Adenocarcinoma of breast (HCC)    Relevant Medications    methotrexate 2.5 MG tablet    Personal history of breast cancer    Protein-calorie malnutrition, unspecified severity (HCC)    Chronic renal disease, stage IV (HCC)    Hypertensive kidney disease with chronic kidney disease     Other Visit Diagnoses       Medicare annual wellness visit, subsequent    -  Primary    Changing nevus        Relevant Orders    Ambulatory Referral to Dermatology            Depression Screening and Follow-up Plan: Patient was screened for depression during today's encounter. They screened negative with a PHQ-2 score of 0.    Urinary Incontinence Plan of Care: counseling topics discussed: practice Kegel (pelvic floor strengthening) exercises, limit alcohol, caffeine, spicy foods, and acidic foods and limiting fluid intake 3-4 hours before bed.     Preventive health issues were discussed with patient, and age appropriate screening tests were ordered as noted in patient's After Visit Summary.  Personalized health advice and appropriate referrals for health education or preventive services given if needed, as noted in patient's After Visit Summary.     History of Present Illness:     Patient presents for a Medicare Wellness Visit    Patient is here for routine follow up, reviewed chronic medical problems, ordered labs for next visit including CBC CMP TSH A1C LIPID. Reviewed labs for this visit.    She is following with rheumatology for RA and recently restarted her rituxan and methotrexate; deformities noted to b/l hands;  She is ambulating with a walker;      Following with nephrology for CKD;     Follows with hem/onc for h/o LEOBARDO, anemia resolved, may need a bone marrow biopsy in the future if  any cytopenias develop.     HTN well controlled.    Patient with h/o gross hematuria and bladder mass s/p TURBT with negative pathology. Has failed multiple voiding trials. H/o chronic urinary retention, every 4 weeks changing FC, consider SP catheter. Has visiting nurse coming to the house.     Patient Care Team:  Layne Bacon MD as PCP - General (Internal Medicine)  MD Zack Prince MD (Nephrology)     Review of Systems:     Review of Systems   Constitutional:  Negative for chills and fever.   HENT:  Negative for ear pain and sore throat.    Eyes:  Negative for pain and visual disturbance.   Respiratory:  Negative for cough and shortness of breath.    Cardiovascular:  Negative for chest pain and palpitations.   Gastrointestinal:  Negative for abdominal pain and vomiting.   Genitourinary:  Negative for dysuria and hematuria.   Musculoskeletal:  Positive for arthralgias, back pain, gait problem and joint swelling.   Skin:  Negative for color change and rash.   Neurological:  Negative for seizures and syncope.   All other systems reviewed and are negative.       Problem List:     Patient Active Problem List   Diagnosis   • Hypertension   • Rheumatoid arthritis (HCC)   • Hypothyroidism   • Hyperuricemia   • Diastolic dysfunction   • Hyperlipidemia   • Lumbar radiculopathy   • Osteopenia   • Other chronic pain   • Vitamin D deficiency   • Adenocarcinoma of breast (HCC)   • Fat necrosis (segmental) of breast   • Personal history of breast cancer   • History of peptic ulcer   • Functional diarrhea   • Intermediate stage nonexudative 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 eyes   • Lower leg DVT (deep venous thromboembolism), chronic, left ([facility])   • Varicose veins of left lower extremity with pain   • History of DVT (deep vein thrombosis)   • Chronic kidney disease-mineral and bone disorder   • Chronic pain of both knees   • Protein-calorie malnutrition, unspecified severity ([facility])   • Chronic renal  disease, stage IV (HCC)   • Hydronephrosis   • Urinary retention   • PUD (peptic ulcer disease)   • Aortic valve stenosis, moderate   • Severe protein-calorie malnutrition (HCC)   • Hypertensive kidney disease with chronic kidney disease   • Constipation, unspecified      Past Medical and Surgical History:     Past Medical History:   Diagnosis Date   • Allergic angioedema     2mar2016 resolved   • Angioedema     [id number] resolved   • Arthritis    • Breast cancer (HCC) 2012    right    • Chronic kidney disease    • Disease of thyroid gland    • GERD (gastroesophageal reflux disease)    • Hemorrhage of anus and rectum     02mar2016 resolved   • Hyperlipidemia    • Hypertension    • Hypocalcemia     12oct2017 resolved   • Neuritis     thoracic and lumbosacral   • Peptic ulcer    • Rheumatoid arthritis (HCC)    • Stage 3b chronic kidney disease (HCC) 05/07/2021   • Vitamin D deficiency      Past Surgical History:   Procedure Laterality Date   • ABDOMINAL SURGERY     • BREAST BIOPSY Right 2012   • BREAST LUMPECTOMY Right 2012   • BREAST SURGERY     • CHOLECYSTECTOMY     • ELBOW SURGERY     • GALLBLADDER SURGERY  1981   • HIP SURGERY Right 09/18/2022    10/2023   • ORTHOPEDIC SURGERY      for toes   • IL CYSTO W/IRRIG & EVAC MULTPLE OBSTRUCTING CLOTS N/A 11/05/2023    Procedure: CYSTOSCOPY EVACUATION OF CLOTS & fulguration;  Surgeon: Uriah Schwartz MD;  Location: [facility];  Service: Urology   • TRANSURETHRAL RESECTION OF BLADDER TUMOR N/A 11/05/2023    Procedure: TRANSURETHRAL RESECTION OF BLADDER TUMOR (TURBT);  Surgeon: Uriah Schwartz MD;  Location: [facility];  Service: Urology   • US GUIDED BREAST BIOPSY RIGHT COMPLETE Right 08/15/2022      Family History:     Family History   Problem Relation Age of Onset   • Breast cancer Mother    • Kidney failure Father    • Other Father         uremia   • Lung cancer Brother    • Breast cancer Maternal Aunt    • Breast cancer Maternal Aunt    • Breast cancer  Maternal Aunt    • Breast cancer Maternal Aunt    • Breast cancer Maternal Aunt    • Stomach cancer Maternal Uncle       Social History:     Social History     Socioeconomic History   • Marital status:      Spouse name: None   • Number of children: None   • Years of education: None   • Highest education level: None   Occupational History   • None   Tobacco Use   • Smoking status: Never     Passive exposure: Past   • Smokeless tobacco: Never   Vaping Use   • Vaping status: Never Used   Substance and Sexual Activity   • Alcohol use: Not Currently   • Drug use: No   • Sexual activity: Not Currently     Partners: Male   Other Topics Concern   • None   Social History Narrative    Active: caffeine use     Social Determinants of Health     Financial Resource Strain: Low Risk  (10/4/2023)    Received from [facility]    Overall Financial Resource Strain (CARDIA)    • Difficulty of Paying Living Expenses: Not hard at all   Food Insecurity: No Food Insecurity (5/1/2024)    Hunger Vital Sign    • Worried About Running Out of Food in the Last Year: Never true    • Ran Out of Food in the Last Year: Never true   Transportation Needs: No Transportation Needs (5/1/2024)    PRAPARE - Transportation    • Lack of Transportation (Medical): No    • Lack of Transportation (Non-Medical): No   Physical Activity: Not on file   Stress: Not on file   Social Connections: Not on file   Intimate Partner Violence: Not At Risk (10/4/2023)    Received from [facility]    Humiliation, Afraid, Rape, and Kick questionnaire    • Fear of Current or Ex-Partner: No    • Emotionally Abused: No    • Physically Abused: No    • Sexually Abused: No   Housing Stability: Low Risk  (5/1/2024)    Housing Stability Vital Sign    • Unable to Pay for Housing in the Last Year: No    • Number of Places Lived in the Last Year: 1    • Unstable Housing in the Last Year: No      Medications and Allergies:     Current Outpatient  Medications   Medication Sig Dispense Refill   • ascorbic acid (VITAMIN C) 500 mg tablet Take 500 mg by mouth daily.     • Calcium Citrate-Vitamin D (CITRACAL + D PO) Take by mouth 2 (two) times a day     • Cholecalciferol (VITAMIN D-3 PO) Take 2,000 Units by mouth daily.     • folic acid (FOLVITE) 1 mg tablet 1 daily 90 tablet 2   • hydroCHLOROthiazide 12.5 mg tablet Take 1 tablet (12.5 mg total) by mouth daily 90 tablet 3   • levothyroxine (Synthroid) 75 mcg tablet Take 1 tablet (75 mcg total) by mouth in the morning 90 tablet 3   • magnesium oxide (MAG-OX) 400 mg Take 1 tablet (400 mg total) by mouth 2 (two) times a day 90 tablet 0   • methotrexate 2.5 MG tablet      • Multiple Vitamin (MULTIVITAMIN) tablet Take 1 tablet by mouth daily.     • polyethylene glycol (MIRALAX) 17 g packet Take 17 g by mouth daily as needed (constipation) 5 each 0   • potassium chloride (Klor-Con M10) 10 mEq tablet Take 1 tablet (10 mEq total) by mouth 2 (two) times a day 180 tablet 1   • rosuvastatin (CRESTOR) 5 mg tablet TAKE 1 TABLET DAILY 90 tablet 2     No current facility-administered medications for this visit.     Allergies   Allergen Reactions   • Lisinopril Anaphylaxis   • Codeine Other (See Comments)     Nausea/vomiting     • Other      Annotation - [id number]: tounge swelling   • Oxycodone      Annotation - 11Mar2013: NOT TOLERATE   • Penicillins Hives   • Sulfamethoxazole-Trimethoprim    • Ciprofloxacin Rash      Immunizations:     Immunization History   Administered Date(s) Administered   • COVID-19 MODERNA VACC 0.5 ML IM 11/16/2021, 11/16/2021   • COVID-19 PFIZER VACCINE 0.3 ML IM 02/27/2021, 03/20/2021, 11/16/2021   • COVID-19 Pfizer Vac BIVALENT Billy-sucrose 12 Yr+ IM 01/24/2023   • DT (pediatric) 08/01/2014   • Influenza Split High Dose Preservative Free IM 10/02/2013, 09/25/2014, 10/06/2015, 09/14/2016, 09/29/2017   • Influenza, high dose seasonal 0.7 mL 10/11/2018, 09/30/2019, 09/22/2020, 10/05/2021, 10/04/2022,  10/20/2023   • Influenza, seasonal, injectable 09/19/2012   • Pneumococcal Conjugate 13-Valent 03/15/2016, 03/15/2016, 03/30/2017, 03/30/2017   • Pneumococcal Polysaccharide PPV23 10/26/1998, 11/07/2005   • Tuberculin Skin Test 09/30/2022, 10/09/2022, 10/11/2023   • Tuberculin Skin Test-PPD Intradermal 11/11/2000   • Zoster Vaccine Recombinant 08/16/2020, 11/09/2020      Health Maintenance:         Topic Date Due   • DXA SCAN  09/17/2023         Topic Date Due   • Pneumococcal Vaccine: 65+ Years (3 of 3 - PPSV23 or PCV20) 03/30/2022   • COVID-19 Vaccine (7 - 2023-24 season) 09/01/2023      Medicare Screening Tests and Risk Assessments:     Samira is here for her Subsequent Wellness visit. Last Medicare Wellness visit information reviewed, patient interviewed and updates made to the record today.      Health Risk Assessment:   Patient rates overall health as fair. Patient feels that their physical health rating is slightly worse. Patient is very satisfied with their life. Eyesight was rated as slightly worse. Hearing was rated as same. Patient feels that their emotional and mental health rating is same. Patients states they are never, rarely angry. Patient states they are never, rarely unusually tired/fatigued. Pain experienced in the last 7 days has been some. Patient's pain rating has been 4/10. Patient states that she has experienced weight loss or gain in last 6 months.     Depression Screening:   PHQ-2 Score: 0      Fall Risk Screening:   In the past year, patient has experienced: no history of falling in past year      Urinary Incontinence Screening:   Patient has leaked urine accidently in the last six months.     Home Safety:  Patient does not have trouble with stairs inside or outside of their home. Patient has working smoke alarms and has working carbon monoxide detector. Home safety hazards include: none.     Nutrition:   Current diet is Regular.     Medications:   Patient is not currently taking any  over-the-counter supplements. Patient is able to manage medications.     Activities of Daily Living (ADLs)/Instrumental Activities of Daily Living (IADLs):   Walk and transfer into and out of bed and chair?: Yes  Dress and groom yourself?: Yes    Bathe or shower yourself?: Yes    Feed yourself? Yes  Do your laundry/housekeeping?: Yes  Manage your money, pay your bills and track your expenses?: Yes  Make your own meals?: Yes    Do your own shopping?: Yes    Previous Hospitalizations:   Any hospitalizations or ED visits within the last 12 months?: Yes    How many hospitalizations have you had in the last year?: 3-4    Cognitive Screening:   Provider or family/friend/caregiver concerned regarding cognition?: No    PREVENTIVE SCREENINGS      Cardiovascular Screening:    General: History Lipid Disorder and Screening Current      Diabetes Screening:     General: Screening Current      Colorectal Cancer Screening:     General: Screening Not Indicated      Breast Cancer Screening:     General: History Breast Cancer and Screening Current      Cervical Cancer Screening:    General: Screening Not Indicated      Osteoporosis Screening:    General: Risks and Benefits Discussed    Due for: DXA Axial      Abdominal Aortic Aneurysm (AAA) Screening:        General: Screening Not Indicated      Lung Cancer Screening:     General: Screening Not Indicated      Hepatitis C Screening:    General: Screening Not Indicated    Screening, Brief Intervention, and Referral to Treatment (SBIRT)    Screening  Typical number of drinks in a day: 0  Typical number of drinks in a week: 0  Interpretation: Low risk drinking behavior.    Single Item Drug Screening:  How often have you used an illegal drug (including marijuana) or a prescription medication for non-medical reasons in the past year? never    Single Item Drug Screen Score: 0  Interpretation: Negative screen for possible drug use disorder    No results found.     Physical Exam:     /70  "(BP Location: Left arm, Patient Position: Sitting)   Pulse 82   Temp 99.2 °F (37.3 °C)   Ht 5' 4\" (1.626 m)   Wt 71.8 kg (158 lb 3.2 oz)   LMP  (LMP Unknown)   SpO2 96%   BMI 27.15 kg/m²     Physical Exam  Vitals and nursing note reviewed.   Constitutional:       General: She is not in acute distress.     Appearance: She is well-developed.   HENT:      Head: Normocephalic and atraumatic.   Eyes:      Conjunctiva/sclera: Conjunctivae normal.   Cardiovascular:      Rate and Rhythm: Normal rate and regular rhythm.      Heart sounds: No murmur heard.  Pulmonary:      Effort: Pulmonary effort is normal. No respiratory distress.      Breath sounds: Normal breath sounds.   Abdominal:      Palpations: Abdomen is soft.      Tenderness: There is no abdominal tenderness.   Musculoskeletal:         General: Swelling and deformity present.      Cervical back: Neck supple.   Skin:     General: Skin is warm and dry.      Capillary Refill: Capillary refill takes less than 2 seconds.   Neurological:      Mental Status: She is alert and oriented to person, place, and time.      Gait: Gait abnormal.   Psychiatric:         Mood and Affect: Mood normal.        Layne Bacon MD  "

## 2024-05-06 ENCOUNTER — TELEPHONE (OUTPATIENT)
Age: 84
End: 2024-05-06

## 2024-05-06 NOTE — TELEPHONE ENCOUNTER
Pt calling with referral for changing nevus    Pt has no hx but pcp recommended she see derm for an examination    Pt describes as dry/rough/flaky    Tfrd to clinical for triage, Ana Maria took the call

## 2024-05-24 NOTE — ED ATTENDING ATTESTATION
"Subjective   Patient ID: Shona Everett is a 88 y.o. female who presents for Follow-up (Patient presented today for a 3 month follow up./).    HPI   Seemingly more manageable caregiver stress perhaps, with son now sharing responsibility with his sister.  The patient does not mind, and even perhaps enjoys traveling from 1 household to another.  She did not have any complaints at this time.    Denies headache, chest pain, with no apparent shortness of breath.  Appetite preserved, no abdominal distress.  No urinary symptoms.  Compliant with medications as they are given to her.  No gum or nose bleeding.  No easy bruisability.    Noted by son to have perhaps some modest increase in bilateral edema.  Otherwise, no particular cough, no particular sputum production.  No particular shortness of breath.        Review of Systems  Review of systems as in history of present illness, and otherwise, reviewed separately as well, and was unremarkable/negative/noncontributory.        Objective   /71 (BP Location: Left arm, Patient Position: Sitting, BP Cuff Size: Adult)   Pulse 100   Ht 1.562 m (5' 1.5\")   Wt 78 kg (172 lb)   BMI 31.97 kg/m²     Physical Exam  Seemingly in good spirits.  Not in distress or diaphoresis.  Receptive, oriented to person and place.  Following conversation, and able to answer questions.  Seemingly wanting to continue to stay healthy and comfortable, and does not seemingly want to hurt self or others.    HEAD pink palpebral conjunctivae, anicteric sclerae.  NECK supple, no apparent jugular venous distention.  CARDIOVASCULAR not in distress or diaphoresis.  +1 edema, modestly worse right ankle compared to left.  No apparent calf tenderness bilaterally.  LUNGS not in distress or diaphoresis.  Not using accessory muscles.  Clear to auscultation bilaterally, with no crackles noted.  ABDOMEN soft, nontender.  BACK no costovertebral angle tenderness.  EXTREMITIES no clubbing, no cyanosis.  NEURO no " 10/24/2019  Sae Tam DO, saw and evaluated the patient  I have discussed the patient with the resident/non-physician practitioner and agree with the resident's/non-physician practitioner's findings, Plan of Care, and MDM as documented in the resident's/non-physician practitioner's note, except where noted  All available labs and Radiology studies were reviewed  I was present for key portions of any procedure(s) performed by the resident/non-physician practitioner and I was immediately available to provide assistance  At this point I agree with the current assessment done in the Emergency Department  I have conducted an independent evaluation of this patient a history and physical is as follows:    ED Course     Patient presents from her rheumatologist's office for finding of a lower extremity DVT  She reported some increased lower extremity edema and hyperemia after prolonged treatment of poison ivy with steroids  She has no prior h/o DVT or PE  She is not having any chest pain or shortness of breath  She is unaware of any direct trauma or other hypercoagulable states  ROS: No SOB, CP, fever or cough  12 system ROS o/w negative  No recent travel or sick contacts  PE: NAD, appears comfortable, alert; PERRL, EOMI; MMM, no posterior oropharyngeal exudate, edema or erythema; HRR, no murmur; lungs CTA w/o w/r/r, POx 96% on RA (nl); abdomen s/nt/nd, nl BS in all 4 quadrants; (-) right LE edema, left LE moderate edema and TTP, FROM extremities x4; skin p/w/d  DDx: DVT - no clinical evidence of PE  A/P: Will check renal function, anticoagulate, recommend f/u w/PCP            Critical Care Time  Procedures facial asymmetry.  No apparent cranial nerve deficits.  Comfortable seated.  No apparent focal weakness.  No tremors.  PSYCH receptive, appropriate, and eager to maintain and improve quality of life.        LABORATORY results from FEBRUARY reviewed with patient and son/POA.        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 (Multi)  -     Follow Up In Primary Care - Established  -     Comprehensive Metabolic Panel; Future  -     TSH with reflex to Free T4 if abnormal; Future  -     Vitamin B12; Future  -     Folate; Future  -     Follow Up In Primary Care - Established; Future  Hypothyroidism due to acquired atrophy of thyroid  -     TSH with reflex to Free T4 if abnormal; Future  -     Follow Up In Primary Care - Established; Future  Vitamin D deficiency  -     Comprehensive Metabolic Panel; Future  -     Vitamin D 25-Hydroxy,Total (for eval of Vitamin D levels); Future  -     Follow Up In Primary Care - Established; Future  Iron deficiency anemia, unspecified iron deficiency anemia type  -     CBC and Auto Differential; Future  -     Urinalysis with Reflex Culture and Microscopic; Future  -     Vitamin B12; Future  -     Folate; Future  -     Ferritin; Future  -     Iron and TIBC; Future  -     Follow Up In Primary Care - Established; Future  Interstitial myositis of multiple sites  -     Comprehensive Metabolic Panel; Future  -     Urinalysis with Reflex Culture and Microscopic; Future  -     Creatine Kinase; Future  -     Follow Up In Primary Care - Established; Future  Essential hypertension  -     CBC and Auto Differential; Future  -     Comprehensive Metabolic Panel; Future  -     TSH with reflex to Free T4 if abnormal; Future  -     Magnesium; Future  -     Urinalysis with Reflex Culture and Microscopic; Future  -     Follow Up In Primary Care - Established; Future  Hyperglycemia  -     Comprehensive Metabolic  Panel; Future  -     Urinalysis with Reflex Culture and Microscopic; Future  -     Hemoglobin A1C; Future  -     Follow Up In Primary Care - Established; Future  Mixed hyperlipidemia  -     Comprehensive Metabolic Panel; Future  -     Lipid Panel; Future  -     Follow Up In Primary Care - Established; Future  Bilateral edema of lower extremity  -     TSH with reflex to Free T4 if abnormal; Future  -     Follow Up In Primary Care - Established; Future  Daytime sleepiness  -     TSH with reflex to Free T4 if abnormal; Future  -     Follow Up In Primary Care - Established; Future  Chronic kidney disease, stage 3a (Multi)  -     CBC and Auto Differential; Future  -     Comprehensive Metabolic Panel; Future  -     Uric Acid; Future  -     Albumin , Urine Random; Future  -     Follow Up In Primary Care - Established; Future       Thank you much for coming.  I am very happy to see you!    I am happy to hear that you are having fun driving around with your son!  You even enjoyed the convertible, although I think the blue car with a hard top looks nicer!    I do understand that your shoes seem to fit a little bit more snugly because of some swelling.  Please drink lots of fluids throughout the day, very helpful.  Please elevate your legs whenever you are seated for prolonged periods of time.    I am glad that you saw your HEART SPECIALIST, and that you have been doing well.  You will be due for repeat FASTING laboratory examinations in about 6 weeks, then see me soon after for your yearly PHYSICAL examination.    Until then, please continue to take care of yourself and each other, and please continue to pray for our recovery from this pandemic.  I hope you have a good summer and a good drive!  Take care and God bless.    Please get yourself vaccinated for SHINGLES.  Shingrix comes in a series of 2 injections at least 1 month apart.  Highly recommended by Medicare, and will be paid for by your insurance!  Please get this done  at any pharmacy of choice!            0  Return in 6 weeks.  40 minutes please.  FASTING laboratory examinations via [facility], then see me for yearly'S call examination.  Coordinate with cardiology, neurology.  Rule out self-neglect, caregiver stress.  Review preventive strategies, cardiovascular risk.            0

## 2024-06-03 NOTE — PLAN OF CARE
Problem: Potential for Falls  Goal: Patient will remain free of falls  Description: INTERVENTIONS:  - Educate patient/family on patient safety including physical limitations  - Instruct patient to call for assistance with activity   - Consult OT/PT to assist with strengthening/mobility   - Keep Call bell within reach  - Keep bed low and locked with side rails adjusted as appropriate  - Keep care items and personal belongings within reach  - Initiate and maintain comfort rounds  - Make Fall Risk Sign visible to staff  - Apply yellow socks and bracelet for high fall risk patients  - Consider moving patient to room near nurses station  Outcome: Adequate for Discharge     Problem: PAIN - ADULT  Goal: Verbalizes/displays adequate comfort level or baseline comfort level  Description: Interventions:  - Encourage patient to monitor pain and request assistance  - Assess pain using appropriate pain scale  - Administer analgesics based on type and severity of pain and evaluate response  - Implement non-pharmacological measures as appropriate and evaluate response  - Consider cultural and social influences on pain and pain management  - Notify physician/advanced practitioner if interventions unsuccessful or patient reports new pain  Outcome: Adequate for Discharge     Problem: SAFETY ADULT  Goal: Patient will remain free of falls  Description: INTERVENTIONS:  - Educate patient/family on patient safety including physical limitations  - Instruct patient to call for assistance with activity   - Consult OT/PT to assist with strengthening/mobility   - Keep Call bell within reach  - Keep bed low and locked with side rails adjusted as appropriate  - Keep care items and personal belongings within reach  - Initiate and maintain comfort rounds  - Make Fall Risk Sign visible to staff  - Initiate/Maintain bed/ chair alarm  - Obtain necessary fall risk management equipment: Walker   - Apply yellow socks and bracelet for high fall risk  Detail Level: Zone patients  - Consider moving patient to room near nurses station  Outcome: Adequate for Discharge  Goal: Maintain or return to baseline ADL function  Description: INTERVENTIONS:  -  Assess patient's ability to carry out ADLs; assess patient's baseline for ADL function and identify physical deficits which impact ability to perform ADLs (bathing, care of mouth/teeth, toileting, grooming, dressing, etc.)  - Assess/evaluate cause of self-care deficits   - Assess range of motion  - Assess patient's mobility; develop plan if impaired  - Assess patient's need for assistive devices and provide as appropriate  - Encourage maximum independence but intervene and supervise when necessary  - Involve family in performance of ADLs  - Assess for home care needs following discharge   - Consider OT consult to assist with ADL evaluation and planning for discharge  - Provide patient education as appropriate  Outcome: Adequate for Discharge  Goal: Maintains/Returns to pre admission functional level  Description: INTERVENTIONS:  - Perform AM-PAC 6 Click Basic Mobility/ Daily Activity assessment daily.  - Set and communicate daily mobility goal to care team and patient/family/caregiver.   - Collaborate with rehabilitation services on mobility goals if consulted  - Record patient progress and toleration of activity level   Outcome: Adequate for Discharge     Problem: Nutrition/Hydration-ADULT  Goal: Nutrient/Hydration intake appropriate for improving, restoring or maintaining nutritional needs  Description: Monitor and assess patient's nutrition/hydration status for malnutrition. Collaborate with interdisciplinary team and initiate plan and interventions as ordered.  Monitor patient's weight and dietary intake as ordered or per policy. Utilize nutrition screening tool and intervene as necessary. Determine patient's food preferences and provide high-protein, high-caloric foods as appropriate.     INTERVENTIONS:  - Monitor oral intake,  urinary output, labs, and treatment plans  - Assess nutrition and hydration status and recommend course of action  - Evaluate amount of meals eaten  - Assist patient with eating if necessary   - Allow adequate time for meals  - Recommend/ encourage appropriate diets, oral nutritional supplements, and vitamin/mineral supplements  - Order, calculate, and assess calorie counts as needed  - Recommend, monitor, and adjust tube feedings and TPN/PPN based on assessed needs  - Assess need for intravenous fluids  - Provide specific nutrition/hydration education as appropriate  - Include patient/family/caregiver in decisions related to nutrition  Outcome: Adequate for Discharge     Problem: Prexisting or High Potential for Compromised Skin Integrity  Goal: Skin integrity is maintained or improved  Description: INTERVENTIONS:  - Identify patients at risk for skin breakdown  - Assess and monitor skin integrity  - Assess and monitor nutrition and hydration status  - Monitor labs   - Assess for incontinence   - Turn and reposition patient  - Assist with mobility/ambulation  - Relieve pressure over bony prominences  - Avoid friction and shearing  - Provide appropriate hygiene as needed including keeping skin clean and dry  - Evaluate need for skin moisturizer/barrier cream  - Collaborate with interdisciplinary team   - Patient/family teaching  - Consider wound care consult   Outcome: Adequate for Discharge     Problem: GASTROINTESTINAL - ADULT  Goal: Maintains or returns to baseline bowel function  Description: INTERVENTIONS:  - Assess bowel function  - Encourage oral fluids to ensure adequate hydration  - Administer IV fluids if ordered to ensure adequate hydration  - Administer ordered medications as needed  - Encourage mobilization and activity  - Consider nutritional services referral to assist patient with adequate nutrition and appropriate food choices  Outcome: Adequate for Discharge      Detail Level: Detailed Detail Level: Simple

## 2024-06-07 ENCOUNTER — TELEPHONE (OUTPATIENT)
Dept: HEMATOLOGY ONCOLOGY | Facility: CLINIC | Age: 84
End: 2024-06-07

## 2024-06-07 NOTE — TELEPHONE ENCOUNTER
Call out to patient for appointment reschedule as Kasie will not be available.  Patient rescheduled to July 8th at 940 and is aware to have labs completed prior to follow up

## 2024-06-10 ENCOUNTER — RA CDI HCC (OUTPATIENT)
Dept: OTHER | Facility: HOSPITAL | Age: 84
End: 2024-06-10

## 2024-06-12 ENCOUNTER — APPOINTMENT (OUTPATIENT)
Dept: LAB | Facility: HOSPITAL | Age: 84
End: 2024-06-12
Payer: MEDICARE

## 2024-06-12 DIAGNOSIS — E03.9 HYPOTHYROIDISM, UNSPECIFIED TYPE: Chronic | ICD-10-CM

## 2024-06-12 DIAGNOSIS — D50.0 NORMOCYTIC ANEMIA DUE TO BLOOD LOSS: ICD-10-CM

## 2024-06-12 LAB
BASOPHILS # BLD AUTO: 0.07 THOUSANDS/ÂΜL (ref 0–0.1)
BASOPHILS NFR BLD AUTO: 1 % (ref 0–1)
EOSINOPHIL # BLD AUTO: 0.15 THOUSAND/ÂΜL (ref 0–0.61)
EOSINOPHIL NFR BLD AUTO: 2 % (ref 0–6)
ERYTHROCYTE [DISTWIDTH] IN BLOOD BY AUTOMATED COUNT: 15.1 % (ref 11.6–15.1)
FERRITIN SERPL-MCNC: 243 NG/ML (ref 11–307)
HCT VFR BLD AUTO: 33.7 % (ref 34.8–46.1)
HGB BLD-MCNC: 10.8 G/DL (ref 11.5–15.4)
IMM GRANULOCYTES # BLD AUTO: 0.03 THOUSAND/UL (ref 0–0.2)
IMM GRANULOCYTES NFR BLD AUTO: 0 % (ref 0–2)
IRON SATN MFR SERPL: 31 % (ref 15–50)
IRON SERPL-MCNC: 84 UG/DL (ref 50–212)
LYMPHOCYTES # BLD AUTO: 1.21 THOUSANDS/ÂΜL (ref 0.6–4.47)
LYMPHOCYTES NFR BLD AUTO: 16 % (ref 14–44)
MCH RBC QN AUTO: 32.1 PG (ref 26.8–34.3)
MCHC RBC AUTO-ENTMCNC: 32 G/DL (ref 31.4–37.4)
MCV RBC AUTO: 100 FL (ref 82–98)
MONOCYTES # BLD AUTO: 0.32 THOUSAND/ÂΜL (ref 0.17–1.22)
MONOCYTES NFR BLD AUTO: 4 % (ref 4–12)
NEUTROPHILS # BLD AUTO: 6.01 THOUSANDS/ÂΜL (ref 1.85–7.62)
NEUTS SEG NFR BLD AUTO: 77 % (ref 43–75)
NRBC BLD AUTO-RTO: 0 /100 WBCS
PLATELET # BLD AUTO: 224 THOUSANDS/UL (ref 149–390)
PMV BLD AUTO: 9.8 FL (ref 8.9–12.7)
RBC # BLD AUTO: 3.36 MILLION/UL (ref 3.81–5.12)
TIBC SERPL-MCNC: 273 UG/DL (ref 250–450)
TSH SERPL DL<=0.05 MIU/L-ACNC: 1.57 UIU/ML (ref 0.45–4.5)
UIBC SERPL-MCNC: 189 UG/DL (ref 155–355)
WBC # BLD AUTO: 7.79 THOUSAND/UL (ref 4.31–10.16)

## 2024-06-12 PROCEDURE — 85025 COMPLETE CBC W/AUTO DIFF WBC: CPT

## 2024-06-12 PROCEDURE — 83520 IMMUNOASSAY QUANT NOS NONAB: CPT

## 2024-06-12 PROCEDURE — 83550 IRON BINDING TEST: CPT

## 2024-06-12 PROCEDURE — 36415 COLL VENOUS BLD VENIPUNCTURE: CPT

## 2024-06-12 PROCEDURE — 83540 ASSAY OF IRON: CPT

## 2024-06-12 PROCEDURE — 84443 ASSAY THYROID STIM HORMONE: CPT

## 2024-06-12 PROCEDURE — 82728 ASSAY OF FERRITIN: CPT

## 2024-06-14 ENCOUNTER — TELEPHONE (OUTPATIENT)
Age: 84
End: 2024-06-14

## 2024-06-14 LAB — STFR SERPL-SCNC: 15 NMOL/L (ref 12.2–27.3)

## 2024-06-14 NOTE — TELEPHONE ENCOUNTER
[facility] obtained a U/A from patient on 6/5/24 and received results on 6/7/24 showing + growth.  Asking if there is any plan of care to prescribe patient any antibiotics?  Said pt is no longer experiencing any symptoms and the urine in the bag is clear. Pt also scheduled for an appointment in office on Monday, 6/17/24.    Please advise

## 2024-06-14 NOTE — TELEPHONE ENCOUNTER
LACHO  [phone] to return call to the office. Office number provided. If/when patient returns call, please relay provider message that no abx is needed. Thanks

## 2024-06-17 ENCOUNTER — OFFICE VISIT (OUTPATIENT)
Dept: INTERNAL MEDICINE CLINIC | Facility: CLINIC | Age: 84
End: 2024-06-17
Payer: MEDICARE

## 2024-06-17 VITALS
HEART RATE: 71 BPM | OXYGEN SATURATION: 97 % | HEIGHT: 64 IN | WEIGHT: 156 LBS | BODY MASS INDEX: 26.63 KG/M2 | DIASTOLIC BLOOD PRESSURE: 68 MMHG | SYSTOLIC BLOOD PRESSURE: 130 MMHG

## 2024-06-17 DIAGNOSIS — I12.9 HYPERTENSIVE RENAL DISEASE, STAGE 1 THROUGH STAGE 4 OR UNSPECIFIED CHRONIC KIDNEY DISEASE: ICD-10-CM

## 2024-06-17 DIAGNOSIS — M05.79 RHEUMATOID ARTHRITIS INVOLVING MULTIPLE SITES WITH POSITIVE RHEUMATOID FACTOR (HCC): ICD-10-CM

## 2024-06-17 DIAGNOSIS — R33.9 URINARY RETENTION: ICD-10-CM

## 2024-06-17 DIAGNOSIS — E03.8 OTHER SPECIFIED HYPOTHYROIDISM: Primary | ICD-10-CM

## 2024-06-17 DIAGNOSIS — R73.01 IMPAIRED FASTING BLOOD SUGAR: ICD-10-CM

## 2024-06-17 DIAGNOSIS — E78.5 HYPERLIPIDEMIA, UNSPECIFIED HYPERLIPIDEMIA TYPE: ICD-10-CM

## 2024-06-17 PROCEDURE — 99214 OFFICE O/P EST MOD 30 MIN: CPT | Performed by: INTERNAL MEDICINE

## 2024-06-17 PROCEDURE — G2211 COMPLEX E/M VISIT ADD ON: HCPCS | Performed by: INTERNAL MEDICINE

## 2024-06-17 NOTE — ASSESSMENT & PLAN NOTE
Continue follow-up with nephrology.  She has an appointment coming up in July.  Maintain good blood pressure.    Lab Results   Component Value Date    EGFR 26 04/23/2024    EGFR 28 04/04/2024    EGFR 34 (L) 01/31/2024    CREATININE 1.79 (H) 04/23/2024    CREATININE 1.67 (H) 04/04/2024    CREATININE 1.52 (H) 01/31/2024

## 2024-06-17 NOTE — PROGRESS NOTES
Ambulatory Visit  Name: Samira Allred      : 1940      MRN: [id number]  Encounter Provider: Layne Bacon MD  Encounter Date: 2024   Encounter department: [facility] [place]    Assessment & Plan     1. Other specified hypothyroidism  Assessment & Plan:  Continue current levothyroxine dose.    2. Rheumatoid arthritis involving multiple sites with positive rheumatoid factor (HCC)  Assessment & Plan:  Continue methotrexate. She stopped rituxan as that was causing urinary infections. She will be seeing rheumatology soon.  3. Hyperlipidemia, unspecified hyperlipidemia type  4. Impaired fasting blood sugar  -     Hemoglobin A1C; Future  5. Urinary retention  Assessment & Plan:  H/o bladder mass s/p TURBT with negative pathology. Has failed multiple voiding trials. H/o chronic urinary retention, every 4 weeks changing FC, consider SP catheter. Has visiting nurse coming to the house.   6. Hypertensive renal disease, stage 1 through stage 4 or unspecified chronic kidney disease  Assessment & Plan:  Continue follow-up with nephrology.  She has an appointment coming up in July.  Maintain good blood pressure.    Lab Results   Component Value Date    EGFR 26 2024    EGFR 28 2024    EGFR 34 (L) 2024    CREATININE 1.79 (H) 2024    CREATININE 1.67 (H) 2024    CREATININE 1.52 (H) 2024          History of Present Illness     Patient is here for routine follow up, reviewed chronic medical problems, ordered labs for next visit including CBC CMP TSH A1C LIPID. Reviewed labs for this visit.        Review of Systems   Constitutional:  Negative for chills and fever.   HENT:  Negative for ear pain and sore throat.    Eyes:  Negative for pain and visual disturbance.   Respiratory:  Negative for cough and shortness of breath.    Cardiovascular:  Negative for chest pain and palpitations.   Gastrointestinal:  Negative for abdominal pain and vomiting.   Genitourinary:   Negative for dysuria and hematuria.   Musculoskeletal:  Positive for arthralgias, back pain, gait problem and joint swelling.   Skin:  Negative for color change and rash.   Neurological:  Negative for seizures and syncope.   All other systems reviewed and are negative.      Past Medical History   Past Medical History:   Diagnosis Date    Allergic angioedema     2mar2016 resolved    Angioedema     [id number] resolved    Arthritis     Breast cancer (HCC) 2012    right     Chronic kidney disease     Disease of thyroid gland     GERD (gastroesophageal reflux disease)     Hemorrhage of anus and rectum     02mar2016 resolved    Hyperlipidemia     Hypertension     Hypocalcemia     [id number] resolved    Neuritis     thoracic and lumbosacral    Peptic ulcer     Rheumatoid arthritis (HCC)     Stage 3b chronic kidney disease (HCC) 05/07/2021    Vitamin D deficiency      Past Surgical History:   Procedure Laterality Date    ABDOMINAL SURGERY      BREAST BIOPSY Right 2012    BREAST LUMPECTOMY Right 2012    BREAST SURGERY      CHOLECYSTECTOMY      ELBOW SURGERY      GALLBLADDER SURGERY  1981    HIP SURGERY Right 09/18/2022    10/2023    ORTHOPEDIC SURGERY      for toes    CA CYSTO W/IRRIG & EVAC MULTPLE OBSTRUCTING CLOTS N/A 11/05/2023    Procedure: CYSTOSCOPY EVACUATION OF CLOTS & fulguration;  Surgeon: Uriah Schwartz MD;  Location: [facility];  Service: Urology    TRANSURETHRAL RESECTION OF BLADDER TUMOR N/A 11/05/2023    Procedure: TRANSURETHRAL RESECTION OF BLADDER TUMOR (TURBT);  Surgeon: Uriah Schwartz MD;  Location: [facility];  Service: Urology    US GUIDED BREAST BIOPSY RIGHT COMPLETE Right 08/15/2022     Family History   Problem Relation Age of Onset    Breast cancer Mother     Kidney failure Father     Other Father         uremia    Lung cancer Brother     Breast cancer Maternal Aunt     Breast cancer Maternal Aunt     Breast cancer Maternal Aunt     Breast cancer Maternal Aunt     Breast cancer Maternal  "Aunt     Stomach cancer Maternal Uncle      Current Outpatient Medications on File Prior to Visit   Medication Sig Dispense Refill    ascorbic acid (VITAMIN C) 500 mg tablet Take 500 mg by mouth daily.      Calcium Citrate-Vitamin D (CITRACAL + D PO) Take by mouth 2 (two) times a day      Cholecalciferol (VITAMIN D-3 PO) Take 2,000 Units by mouth daily.      folic acid (FOLVITE) 1 mg tablet 1 daily 90 tablet 2    hydroCHLOROthiazide 12.5 mg tablet Take 1 tablet (12.5 mg total) by mouth daily 90 tablet 3    IRON, FERROUS SULFATE, PO Take by mouth      levothyroxine (Synthroid) 75 mcg tablet Take 1 tablet (75 mcg total) by mouth in the morning 90 tablet 3    magnesium oxide (MAG-OX) 400 mg Take 1 tablet (400 mg total) by mouth 2 (two) times a day 90 tablet 0    methotrexate 2.5 MG tablet       Multiple Vitamin (MULTIVITAMIN) tablet Take 1 tablet by mouth daily.      polyethylene glycol (MIRALAX) 17 g packet Take 17 g by mouth daily as needed (constipation) 5 each 0    potassium chloride (Klor-Con M10) 10 mEq tablet Take 1 tablet (10 mEq total) by mouth 2 (two) times a day 180 tablet 1    rosuvastatin (CRESTOR) 5 mg tablet TAKE 1 TABLET DAILY 90 tablet 2     No current facility-administered medications on file prior to visit.     Allergies   Allergen Reactions    Lisinopril Anaphylaxis    Codeine Other (See Comments)     Nausea/vomiting      Other      Annotation - [id number]: tounge swelling    Oxycodone      Annotation - [id number]: NOT TOLERATE    Penicillins Hives    Sulfamethoxazole-Trimethoprim     Ciprofloxacin Rash      Objective     /68 (BP Location: Left arm, Patient Position: Sitting, Cuff Size: Standard)   Pulse 71   Ht 5' 4\" (1.626 m)   Wt 70.8 kg (156 lb)   LMP  (LMP Unknown)   SpO2 97%   BMI 26.78 kg/m²     Physical Exam  Vitals and nursing note reviewed.   Constitutional:       General: She is not in acute distress.     Appearance: She is well-developed.   HENT:      Head: Normocephalic and " atraumatic.   Eyes:      Conjunctiva/sclera: Conjunctivae normal.   Cardiovascular:      Rate and Rhythm: Normal rate and regular rhythm.      Heart sounds: Murmur heard.   Pulmonary:      Effort: Pulmonary effort is normal. No respiratory distress.      Breath sounds: Normal breath sounds.   Abdominal:      Palpations: Abdomen is soft.      Tenderness: There is no abdominal tenderness.   Musculoskeletal:         General: Deformity present. No swelling.      Cervical back: Neck supple.   Skin:     General: Skin is warm and dry.      Capillary Refill: Capillary refill takes less than 2 seconds.   Neurological:      Mental Status: She is alert.      Gait: Gait abnormal.   Psychiatric:         Mood and Affect: Mood normal.       Administrative Statements   I have spent a total time of 15 minutes on 06/17/24 In caring for this patient including Risks and benefits of tx options, Patient and family education, Risk factor reductions, Impressions, Documenting in the medical record, and Obtaining or reviewing history  .

## 2024-06-17 NOTE — ASSESSMENT & PLAN NOTE
Continue methotrexate. She stopped rituxan as that was causing urinary infections. She will be seeing rheumatology soon.

## 2024-06-17 NOTE — ASSESSMENT & PLAN NOTE
H/o bladder mass s/p TURBT with negative pathology. Has failed multiple voiding trials. H/o chronic urinary retention, every 4 weeks changing FC, consider SP catheter. Has visiting nurse coming to the house.

## 2024-06-18 ENCOUNTER — TELEPHONE (OUTPATIENT)
Dept: HEMATOLOGY ONCOLOGY | Facility: CLINIC | Age: 84
End: 2024-06-18

## 2024-06-18 NOTE — TELEPHONE ENCOUNTER
Call out to patient to review below  Patient agreeable to plan        ----- Message from Kasie Cope PA-C sent at 6/17/2024  4:16 PM EDT -----  Continue monthly CBC

## 2024-07-02 ENCOUNTER — APPOINTMENT (OUTPATIENT)
Dept: LAB | Facility: HOSPITAL | Age: 84
End: 2024-07-02
Payer: MEDICARE

## 2024-07-02 DIAGNOSIS — D50.0 NORMOCYTIC ANEMIA DUE TO BLOOD LOSS: ICD-10-CM

## 2024-07-02 DIAGNOSIS — M05.79 SEROPOSITIVE RHEUMATOID ARTHRITIS OF MULTIPLE SITES (HCC): ICD-10-CM

## 2024-07-02 LAB
ALBUMIN SERPL BCG-MCNC: 4 G/DL (ref 3.5–5)
ALP SERPL-CCNC: 65 U/L (ref 34–104)
ALT SERPL W P-5'-P-CCNC: 13 U/L (ref 7–52)
AST SERPL W P-5'-P-CCNC: 20 U/L (ref 13–39)
BASOPHILS # BLD AUTO: 0.05 THOUSANDS/ÂΜL (ref 0–0.1)
BASOPHILS NFR BLD AUTO: 1 % (ref 0–1)
BILIRUB DIRECT SERPL-MCNC: 0.18 MG/DL (ref 0–0.2)
BILIRUB SERPL-MCNC: 0.72 MG/DL (ref 0.2–1)
CREAT SERPL-MCNC: 1.61 MG/DL (ref 0.6–1.3)
CRP SERPL QL: 5 MG/L
EOSINOPHIL # BLD AUTO: 0.2 THOUSAND/ÂΜL (ref 0–0.61)
EOSINOPHIL NFR BLD AUTO: 3 % (ref 0–6)
ERYTHROCYTE [DISTWIDTH] IN BLOOD BY AUTOMATED COUNT: 16.9 % (ref 11.6–15.1)
ERYTHROCYTE [SEDIMENTATION RATE] IN BLOOD: 34 MM/HOUR (ref 0–29)
GFR SERPL CREATININE-BSD FRML MDRD: 29 ML/MIN/1.73SQ M
HCT VFR BLD AUTO: 31.1 % (ref 34.8–46.1)
HGB BLD-MCNC: 10 G/DL (ref 11.5–15.4)
IMM GRANULOCYTES # BLD AUTO: 0.02 THOUSAND/UL (ref 0–0.2)
IMM GRANULOCYTES NFR BLD AUTO: 0 % (ref 0–2)
LYMPHOCYTES # BLD AUTO: 0.98 THOUSANDS/ÂΜL (ref 0.6–4.47)
LYMPHOCYTES NFR BLD AUTO: 16 % (ref 14–44)
MCH RBC QN AUTO: 32.6 PG (ref 26.8–34.3)
MCHC RBC AUTO-ENTMCNC: 32.2 G/DL (ref 31.4–37.4)
MCV RBC AUTO: 101 FL (ref 82–98)
MONOCYTES # BLD AUTO: 0.31 THOUSAND/ÂΜL (ref 0.17–1.22)
MONOCYTES NFR BLD AUTO: 5 % (ref 4–12)
NEUTROPHILS # BLD AUTO: 4.48 THOUSANDS/ÂΜL (ref 1.85–7.62)
NEUTS SEG NFR BLD AUTO: 75 % (ref 43–75)
NRBC BLD AUTO-RTO: 0 /100 WBCS
PLATELET # BLD AUTO: 237 THOUSANDS/UL (ref 149–390)
PMV BLD AUTO: 9.6 FL (ref 8.9–12.7)
PROT SERPL-MCNC: 6.4 G/DL (ref 6.4–8.4)
RBC # BLD AUTO: 3.07 MILLION/UL (ref 3.81–5.12)
WBC # BLD AUTO: 6.04 THOUSAND/UL (ref 4.31–10.16)

## 2024-07-02 PROCEDURE — 85652 RBC SED RATE AUTOMATED: CPT

## 2024-07-02 PROCEDURE — 86140 C-REACTIVE PROTEIN: CPT

## 2024-07-02 PROCEDURE — 36415 COLL VENOUS BLD VENIPUNCTURE: CPT

## 2024-07-02 PROCEDURE — 80076 HEPATIC FUNCTION PANEL: CPT

## 2024-07-02 PROCEDURE — 85025 COMPLETE CBC W/AUTO DIFF WBC: CPT

## 2024-07-02 PROCEDURE — 82565 ASSAY OF CREATININE: CPT

## 2024-07-08 ENCOUNTER — OFFICE VISIT (OUTPATIENT)
Dept: HEMATOLOGY ONCOLOGY | Facility: CLINIC | Age: 84
End: 2024-07-08
Payer: MEDICARE

## 2024-07-08 VITALS
DIASTOLIC BLOOD PRESSURE: 80 MMHG | RESPIRATION RATE: 16 BRPM | TEMPERATURE: 97.3 F | WEIGHT: 160 LBS | HEART RATE: 68 BPM | OXYGEN SATURATION: 99 % | BODY MASS INDEX: 27.31 KG/M2 | HEIGHT: 64 IN | SYSTOLIC BLOOD PRESSURE: 122 MMHG

## 2024-07-08 DIAGNOSIS — D53.9 MACROCYTIC ANEMIA: Primary | ICD-10-CM

## 2024-07-08 PROCEDURE — 99213 OFFICE O/P EST LOW 20 MIN: CPT | Performed by: PHYSICIAN ASSISTANT

## 2024-07-08 NOTE — PROGRESS NOTES
[facility]ATOLOG[facility] ONCOLOGY SPECIALISTS [place]  [address] [address]  Hematology Ambulatory Follow-Up  Samira Allred, 1940, [id number]  7/8/2024      Assessment and Plan   1. 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       Normocytic anemia due to blood loss, anemia of chronic disease   Patient had had recent hospitalization in 01/2024 for upper GI bleed.  Endoscopy at this time showed large hiatal hernia, duodenal ulcers, Dieulafoy lesion s/p cauterization.  s/p multiple transfusions.  Prior to this she had macrocytic anemia which was attributed to anemia of chronic disease in setting of RA/CKD MTX, and rituximab use. She has now been off of MTX and rituximab since GI bleed in December. Iron panel 02/2024 showed elevated ferritin and soluble transferrin receptor. Suspect chronic inflammation causing elevated ferritin level. She was started on oral iron supplement daily.     Most recent labs show hgb 10 g/dL , plat 237k. WBC and diff is normal. She is now back on MTX however remains off rituxan. Having RA flare.  - anemia due to RA/RA meds   - could consider alpha EPO in the future if patient develops worsening anemia. Would need to discuss risk vs benefit as she has history of DVT.   - consider bone marrow biopsy if other cytopenias develop   - Check CBC q8 weeks      3.  Stage I invasive ductal carcinoma of right breast ER/OH positive, HER2+  Diagnosed in 2012  positive in 2012 s/p lumpectomy, RT, Herceptin and tamoxifen from [id number]. Stopped due to development of DVT. Last mammogram in August 2022.  Found to have enlarging hypoechoic lesion in the upper region of  the right breast at 10 o'clock position.  Ultrasound-guided biopsy completed, pathology revealed dense sclerotic tissue with fibrosis and chronic inflammation. No evidence of malignancy.   - Last mammogram normal 02/2024.      4. Pulmonary nodule 1 cm or greater in diameter  Patient reports history of pulmonary nodules. Recent CT chest 12/2023 showed .3 x 1.1 cm right lower lobe nodule and 0.7cm groundglass right lower lobe nodule.   - Given history of breast cancer, would recommend biopsy r/o metastatic disease. Referred to pulmonology  - PET scan showed improvement, likely rheumatoid nodule.   - Has been seen by pulmonology, no further monitoring warranted.      5. History of macrocytic anemia   Thought to be secondary to RA/methotrexate.   - continue folic acid daily.      6. Deep vein thrombosis (DVT) of proximal vein of left lower extremity   - 10/2019 : 1st clot in life triggered by recent poison ivy infection , on eliquis 5 mg bid x 6m   - 4/2020 : doppler showed DVT resolved  - currently off ac     - RTC 4m    Patient voiced agreement and understanding to the above.   Patient advised to call the Hematology/Oncology office with any questions and concerns regarding the above.    Barrier(s) to care: None  The patient is able to self care.    Kasie Cope PA-C   Medical Oncology/Hematology  [facility]    Subjective   No chief complaint on file.      History of present illness:  82yo female with PMHx HTN, RA on MTX, diastolic dysfunction, HLN, breast cancer, stage IV CKD  who presents as follow up for LEOBARDO.      12/2023: Hospitalized for GI bleed received multiple transfusions.  Endoscopy showed large hiatal hernia, multiple small ulcers in the duodenum, bleeding  Dieulafoy lesion s/p cauterization.  CT performed during admission showed bilateral hydronephrosis due to bladder distention, cystitis, stable chronic compression fracture T12, few calcified uterine fibroids, 1.3 x 1.1 cm  right lower lobe nodule and 0.7cm groundglass right lower lobe nodule new from 2019.      Rheumatologist had taken patient off after bleed. Had been on for about 20 years.      01/31/24: WBC 10.3, hemoglobin 8.6, MCV 92, platelets 418,000.  AST 70, ALT 64 alk phos 151, albumin 3.3.  GFR 34. Hospitalized 12/2023 for GIB and UTI.  Presenting labs revealed WBC 21,000, hemoglobin 8.1, RDW 18.6, platelets 202,000. Had acute drop in hgb <7 multiple times requiring multiple transfusions. Upper endoscopy on 12/15 showed large sliding handle hernia, multiple small ulcers in the duodenum, a small Dieulafoy lesion in the first portion of the duodenum that was actively bleeding hemostasis was achieved with 3 applications of bipolar cautery and epinephrine.       04/2024: WBC 7.3, hemoglobin 11.5, ,000, .  Creatinine 1.67, EGFR 28.  06/2024:   07/08/24 :  Lab Results   Component Value Date    IRON 84 06/12/2024    TIBC 273 06/12/2024    FERRITIN 243 06/12/2024     Lab Results   Component Value Date    WBC 6.04 07/02/2024    HGB 10.0 (L) 07/02/2024    HCT 31.1 (L) 07/02/2024     (H) 07/02/2024     07/02/2024       Interval history: She is not planning to go back on rituxan for RA. She has been having RA flare up since stopping medications. No shortness of breath, bleeding.     Review of Systems   Respiratory:  Negative for cough.    Cardiovascular:  Negative for chest pain.   Gastrointestinal:  Negative for blood in stool.   Genitourinary:  Negative for hematuria.   Musculoskeletal:  Positive for arthralgias and joint swelling.   All other systems reviewed and are negative.      Patient Active Problem List   Diagnosis    Hypertension    Rheumatoid arthritis involving multiple sites with positive rheumatoid factor (HCC)    Other specified hypothyroidism    Hyperuricemia    Diastolic dysfunction    Hyperlipidemia    Lumbar radiculopathy    Osteopenia    Other chronic pain    Vitamin D deficiency     Adenocarcinoma of breast (HCC)    Fat necrosis (segmental) of breast    Personal history of breast cancer    History of peptic ulcer    Functional diarrhea    Intermediate stage nonexudative 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 eyes    Lower leg DVT (deep venous thromboembolism), chronic, left (HCC)    Varicose veins of left lower extremity with pain    History of DVT (deep vein thrombosis)    Chronic kidney disease-mineral and bone disorder    Chronic pain of both knees    Protein-calorie malnutrition, unspecified severity (HCC)    Chronic renal disease, stage IV (HCC)    Hydronephrosis    Urinary retention    PUD (peptic ulcer disease)    Aortic valve stenosis, moderate    Severe protein-calorie malnutrition (HCC)    Hypertensive kidney disease with chronic kidney disease    Constipation, unspecified     Past Medical History:   Diagnosis Date    Allergic angioedema     [id number] resolved    Angioedema     [id number] resolved    Arthritis     Breast cancer (HCC) 2012    right     Chronic kidney disease     Disease of thyroid gland     GERD (gastroesophageal reflux disease)     Hemorrhage of anus and rectum     02mar2016 resolved    Hyperlipidemia     Hypertension     Hypocalcemia     12oct2017 resolved    Neuritis     thoracic and lumbosacral    Peptic ulcer     Rheumatoid arthritis (HCC)     Stage 3b chronic kidney disease (HCC) 05/07/2021    Vitamin D deficiency      Past Surgical History:   Procedure Laterality Date    ABDOMINAL SURGERY      BREAST BIOPSY Right 2012    BREAST LUMPECTOMY Right 2012    BREAST SURGERY      CHOLECYSTECTOMY      ELBOW SURGERY      GALLBLADDER SURGERY  1981    HIP SURGERY Right 09/18/2022    10/2023    ORTHOPEDIC SURGERY      for toes    SD CYSTO W/IRRIG & EVAC MULTPLE OBSTRUCTING CLOTS N/A 11/05/2023    Procedure: CYSTOSCOPY EVACUATION OF CLOTS & fulguration;  Surgeon: Uriah Schwartz MD;  Location: [facility];  Service: Urology    TRANSURETHRAL RESECTION OF BLADDER TUMOR N/A  11/05/2023    Procedure: TRANSURETHRAL RESECTION OF BLADDER TUMOR (TURBT);  Surgeon: Uriah Schwartz MD;  Location: [facility];  Service: Urology    US GUIDED BREAST BIOPSY RIGHT COMPLETE Right 08/15/2022     Family History   Problem Relation Age of Onset    Breast cancer Mother     Kidney failure Father     Other Father         uremia    Lung cancer Brother     Breast cancer Maternal Aunt     Breast cancer Maternal Aunt     Breast cancer Maternal Aunt     Breast cancer Maternal Aunt     Breast cancer Maternal Aunt     Stomach cancer Maternal Uncle      Social History     Socioeconomic History    Marital status:      Spouse name: None    Number of children: None    Years of education: None    Highest education level: None   Occupational History    None   Tobacco Use    Smoking status: Never     Passive exposure: Past    Smokeless tobacco: Never   Vaping Use    Vaping status: Never Used   Substance and Sexual Activity    Alcohol use: Not Currently    Drug use: No    Sexual activity: Not Currently     Partners: Male   Other Topics Concern    None   Social History Narrative    Active: caffeine use     Social Determinants of Health     Financial Resource Strain: Low Risk  (10/4/2023)    [facility]    Overall Financial Resource Strain (CARDIA)     Difficulty of Paying Living Expenses: Not hard at all   Food Insecurity: No Food Insecurity (5/1/2024)    Hunger Vital Sign     Worried About Running Out of Food in the Last Year: Never true     Ran Out of Food in the Last Year: Never true   Transportation Needs: No Transportation Needs (5/1/2024)    PRAPARE - Transportation     Lack of Transportation (Medical): No     Lack of Transportation (Non-Medical): No   Physical Activity: Not on file   Stress: Not on file   Social Connections: Unknown (6/18/2024)    Received from [facility]    Social Connections     How often do you feel lonely or isolated from  those around you? (Adult - for ages 18 years and over): Not on file   Intimate Partner Violence: Not At Risk (10/4/2023)    [facility]    Humiliation, Afraid, Rape, and Kick questionnaire     Fear of Current or Ex-Partner: No     Emotionally Abused: No     Physically Abused: No     Sexually Abused: No   Housing Stability: Low Risk  (5/1/2024)    Housing Stability Vital Sign     Unable to Pay for Housing in the Last Year: No     Number of Times Moved in the Last Year: 1     Homeless in the Last Year: No       Current Outpatient Medications:     ascorbic acid (VITAMIN C) 500 mg tablet, Take 500 mg by mouth daily., Disp: , Rfl:     Calcium Citrate-Vitamin D (CITRACAL + D PO), Take by mouth 2 (two) times a day, Disp: , Rfl:     Cholecalciferol (VITAMIN D-3 PO), Take 2,000 Units by mouth daily., Disp: , Rfl:     folic acid (FOLVITE) 1 mg tablet, 1 daily, Disp: 90 tablet, Rfl: 2    hydroCHLOROthiazide 12.5 mg tablet, Take 1 tablet (12.5 mg total) by mouth daily, Disp: 90 tablet, Rfl: 3    IRON, FERROUS SULFATE, PO, Take by mouth, Disp: , Rfl:     levothyroxine (Synthroid) 75 mcg tablet, Take 1 tablet (75 mcg total) by mouth in the morning, Disp: 90 tablet, Rfl: 3    magnesium oxide (MAG-OX) 400 mg, Take 1 tablet (400 mg total) by mouth 2 (two) times a day, Disp: 90 tablet, Rfl: 0    methotrexate 2.5 MG tablet, , Disp: , Rfl:     Multiple Vitamin (MULTIVITAMIN) tablet, Take 1 tablet by mouth daily., Disp: , Rfl:     polyethylene glycol (MIRALAX) 17 g packet, Take 17 g by mouth daily as needed (constipation), Disp: 5 each, Rfl: 0    potassium chloride (Klor-Con M10) 10 mEq tablet, Take 1 tablet (10 mEq total) by mouth 2 (two) times a day, Disp: 180 tablet, Rfl: 1    rosuvastatin (CRESTOR) 5 mg tablet, TAKE 1 TABLET DAILY, Disp: 90 tablet, Rfl: 2  Allergies   Allergen Reactions    Lisinopril Anaphylaxis    Codeine Other (See Comments)     Nausea/vomiting       "Other      Annotation - [id number]: tounge swelling    Oxycodone      Annotation - [id number]: NOT TOLERATE    Penicillins Hives    Sulfamethoxazole-Trimethoprim     Ciprofloxacin Rash       Objective   /80 (BP Location: Left arm, Patient Position: Sitting, Cuff Size: Adult)   Pulse 68   Temp (!) 97.3 °F (36.3 °C) (Temporal)   Resp 16   Ht 5' 4\" (1.626 m)   Wt 72.6 kg (160 lb)   LMP  (LMP Unknown)   SpO2 99%   BMI 27.46 kg/m²    Physical Exam  Vitals reviewed.   HENT:      Head: Normocephalic.   Cardiovascular:      Rate and Rhythm: Normal rate.   Pulmonary:      Effort: Pulmonary effort is normal.   Abdominal:      Palpations: Abdomen is soft.      Tenderness: There is no abdominal tenderness.   Musculoskeletal:      Cervical back: Neck supple.   Skin:     Findings: No rash.   Neurological:      Mental Status: She is alert.         Result Review  Labs:  Appointment on 07/02/2024   Component Date Value Ref Range Status    WBC 07/02/2024 6.04  4.31 - 10.16 Thousand/uL Final    RBC 07/02/2024 3.07 (L)  3.81 - 5.12 Million/uL Final    Hemoglobin 07/02/2024 10.0 (L)  11.5 - 15.4 g/dL Final    Hematocrit 07/02/2024 31.1 (L)  34.8 - 46.1 % Final    MCV 07/02/2024 101 (H)  82 - 98 fL Final    MCH 07/02/2024 32.6  26.8 - 34.3 pg Final    MCHC 07/02/2024 32.2  31.4 - 37.4 g/dL Final    RDW 07/02/2024 16.9 (H)  11.6 - 15.1 % Final    MPV 07/02/2024 9.6  8.9 - 12.7 fL Final    Platelets 07/02/2024 237  149 - 390 Thousands/uL Final    nRBC 07/02/2024 0  /100 WBCs Final    Segmented % 07/02/2024 75  43 - 75 % Final    Immature Grans % 07/02/2024 0  0 - 2 % Final    Lymphocytes % 07/02/2024 16  14 - 44 % Final    Monocytes % 07/02/2024 5  4 - 12 % Final    Eosinophils Relative 07/02/2024 3  0 - 6 % Final    Basophils Relative 07/02/2024 1  0 - 1 % Final    Absolute Neutrophils 07/02/2024 4.48  1.85 - 7.62 Thousands/µL Final    Absolute Immature Grans 07/02/2024 0.02  0.00 - 0.20 Thousand/uL Final    Absolute " Lymphocytes 07/02/2024 0.98  0.60 - 4.47 Thousands/µL Final    Absolute Monocytes 07/02/2024 0.31  0.17 - 1.22 Thousand/µL Final    Eosinophils Absolute 07/02/2024 0.20  0.00 - 0.61 Thousand/µL Final    Basophils Absolute 07/02/2024 0.05  0.00 - 0.10 Thousands/µL Final    Sed Rate 07/02/2024 34 (H)  0 - 29 mm/hour Final    Creatinine 07/02/2024 1.61 (H)  0.60 - 1.30 mg/dL Final    Standardized to IDMS reference method    eGFR 07/02/2024 29  ml/min/1.73sq m Final    Total Bilirubin 07/02/2024 0.72  0.20 - 1.00 mg/dL Final    Use of this assay is not recommended for patients undergoing treatment with eltrombopag due to the potential for falsely elevated results.  N-acetyl-p-benzoquinone imine (metabolite of Acetaminophen) will generate erroneously low results in samples for patients that have taken an overdose of Acetaminophen.    Bilirubin, Direct 07/02/2024 0.18  0.00 - 0.20 mg/dL Final    Alkaline Phosphatase 07/02/2024 65  34 - 104 U/L Final    AST 07/02/2024 20  13 - 39 U/L Final    ALT 07/02/2024 13  7 - 52 U/L Final    Specimen collection should occur prior to Sulfasalazine administration due to the potential for falsely depressed results.     Total Protein 07/02/2024 6.4  6.4 - 8.4 g/dL Final    Albumin 07/02/2024 4.0  3.5 - 5.0 g/dL Final    CRP 07/02/2024 5.0 (H)  <3.0 mg/L Final   Appointment on 06/12/2024   Component Date Value Ref Range Status    WBC 06/12/2024 7.79  4.31 - 10.16 Thousand/uL Final    RBC 06/12/2024 3.36 (L)  3.81 - 5.12 Million/uL Final    Hemoglobin 06/12/2024 10.8 (L)  11.5 - 15.4 g/dL Final    Hematocrit 06/12/2024 33.7 (L)  34.8 - 46.1 % Final    MCV 06/12/2024 100 (H)  82 - 98 fL Final    MCH 06/12/2024 32.1  26.8 - 34.3 pg Final    MCHC 06/12/2024 32.0  31.4 - 37.4 g/dL Final    RDW 06/12/2024 15.1  11.6 - 15.1 % Final    MPV 06/12/2024 9.8  8.9 - 12.7 fL Final    Platelets 06/12/2024 224  149 - 390 Thousands/uL Final    nRBC 06/12/2024 0  /100 WBCs Final    Segmented % 06/12/2024  77 (H)  43 - 75 % Final    Immature Grans % 06/12/2024 0  0 - 2 % Final    Lymphocytes % 06/12/2024 16  14 - 44 % Final    Monocytes % 06/12/2024 4  4 - 12 % Final    Eosinophils Relative 06/12/2024 2  0 - 6 % Final    Basophils Relative 06/12/2024 1  0 - 1 % Final    Absolute Neutrophils 06/12/2024 6.01  1.85 - 7.62 Thousands/µL Final    Absolute Immature Grans 06/12/2024 0.03  0.00 - 0.20 Thousand/uL Final    Absolute Lymphocytes 06/12/2024 1.21  0.60 - 4.47 Thousands/µL Final    Absolute Monocytes 06/12/2024 0.32  0.17 - 1.22 Thousand/µL Final    Eosinophils Absolute 06/12/2024 0.15  0.00 - 0.61 Thousand/µL Final    Basophils Absolute 06/12/2024 0.07  0.00 - 0.10 Thousands/µL Final    TSH 3RD GENERATON 06/12/2024 1.568  0.450 - 4.500 uIU/mL Final    The recommended reference ranges for TSH during pregnancy are as follows:   First trimester 0.100 to 2.500 uIU/mL   Second trimester  0.200 to 3.000 uIU/mL   Third trimester 0.300 to 3.000 uIU/m    Note: Normal ranges may not apply to patients who are transgender, non-binary, or whose legal sex, sex at birth, and gender identity differ.  Adult TSH (3rd generation) reference range follows the recommended guidelines of the American Thyroid Association, January, 2020.    Soluble Transferrin Receptor 06/12/2024 15.0  12.2 - 27.3 nmol/L Final    Iron Saturation 06/12/2024 31  15 - 50 % Final    TIBC 06/12/2024 273  250 - 450 ug/dL Final    Iron 06/12/2024 84  50 - 212 ug/dL Final    Patients treated with metal-binding drugs (ie. Deferoxamine) may have depressed iron values.    UIBC 06/12/2024 189  155 - 355 ug/dL Final    Ferritin 06/12/2024 243  11 - 307 ng/mL Final       Imaging:   I reviewed relevant imaging    Please note:  This report has been generated by a voice recognition software system. Therefore there may be syntax, spelling, and/or grammatical errors. Please call if you have any questions.=

## 2024-07-13 ENCOUNTER — APPOINTMENT (OUTPATIENT)
Dept: LAB | Facility: HOSPITAL | Age: 84
End: 2024-07-13
Payer: MEDICARE

## 2024-07-13 DIAGNOSIS — M84.351S: ICD-10-CM

## 2024-07-13 DIAGNOSIS — M05.79 SEROPOSITIVE RHEUMATOID ARTHRITIS OF MULTIPLE SITES (HCC): Primary | ICD-10-CM

## 2024-07-13 LAB
ALBUMIN SERPL BCG-MCNC: 4.1 G/DL (ref 3.5–5)
ALP SERPL-CCNC: 65 U/L (ref 34–104)
ALT SERPL W P-5'-P-CCNC: 11 U/L (ref 7–52)
AST SERPL W P-5'-P-CCNC: 20 U/L (ref 13–39)
BASOPHILS # BLD AUTO: 0.05 THOUSANDS/ÂΜL (ref 0–0.1)
BASOPHILS NFR BLD AUTO: 1 % (ref 0–1)
BILIRUB DIRECT SERPL-MCNC: 0.14 MG/DL (ref 0–0.2)
BILIRUB SERPL-MCNC: 0.65 MG/DL (ref 0.2–1)
CREAT SERPL-MCNC: 1.98 MG/DL (ref 0.6–1.3)
CRP SERPL QL: 2.9 MG/L
EOSINOPHIL # BLD AUTO: 0.33 THOUSAND/ÂΜL (ref 0–0.61)
EOSINOPHIL NFR BLD AUTO: 6 % (ref 0–6)
ERYTHROCYTE [DISTWIDTH] IN BLOOD BY AUTOMATED COUNT: 17.2 % (ref 11.6–15.1)
ERYTHROCYTE [SEDIMENTATION RATE] IN BLOOD: 25 MM/HOUR (ref 0–29)
GFR SERPL CREATININE-BSD FRML MDRD: 23 ML/MIN/1.73SQ M
HCT VFR BLD AUTO: 30.8 % (ref 34.8–46.1)
HGB BLD-MCNC: 10 G/DL (ref 11.5–15.4)
IMM GRANULOCYTES # BLD AUTO: 0.01 THOUSAND/UL (ref 0–0.2)
IMM GRANULOCYTES NFR BLD AUTO: 0 % (ref 0–2)
LYMPHOCYTES # BLD AUTO: 1.14 THOUSANDS/ÂΜL (ref 0.6–4.47)
LYMPHOCYTES NFR BLD AUTO: 22 % (ref 14–44)
MCH RBC QN AUTO: 33.2 PG (ref 26.8–34.3)
MCHC RBC AUTO-ENTMCNC: 32.5 G/DL (ref 31.4–37.4)
MCV RBC AUTO: 102 FL (ref 82–98)
MONOCYTES # BLD AUTO: 0.73 THOUSAND/ÂΜL (ref 0.17–1.22)
MONOCYTES NFR BLD AUTO: 14 % (ref 4–12)
NEUTROPHILS # BLD AUTO: 2.92 THOUSANDS/ÂΜL (ref 1.85–7.62)
NEUTS SEG NFR BLD AUTO: 57 % (ref 43–75)
NRBC BLD AUTO-RTO: 0 /100 WBCS
PLATELET # BLD AUTO: 221 THOUSANDS/UL (ref 149–390)
PMV BLD AUTO: 9.6 FL (ref 8.9–12.7)
PROT SERPL-MCNC: 6.7 G/DL (ref 6.4–8.4)
RBC # BLD AUTO: 3.01 MILLION/UL (ref 3.81–5.12)
WBC # BLD AUTO: 5.18 THOUSAND/UL (ref 4.31–10.16)

## 2024-07-13 PROCEDURE — 82785 ASSAY OF IGE: CPT

## 2024-07-13 PROCEDURE — 80076 HEPATIC FUNCTION PANEL: CPT

## 2024-07-13 PROCEDURE — 86360 T CELL ABSOLUTE COUNT/RATIO: CPT

## 2024-07-13 PROCEDURE — 85025 COMPLETE CBC W/AUTO DIFF WBC: CPT

## 2024-07-13 PROCEDURE — 86140 C-REACTIVE PROTEIN: CPT

## 2024-07-13 PROCEDURE — 86430 RHEUMATOID FACTOR TEST QUAL: CPT

## 2024-07-13 PROCEDURE — 86480 TB TEST CELL IMMUN MEASURE: CPT

## 2024-07-13 PROCEDURE — 86038 ANTINUCLEAR ANTIBODIES: CPT

## 2024-07-13 PROCEDURE — 83520 IMMUNOASSAY QUANT NOS NONAB: CPT

## 2024-07-13 PROCEDURE — 86200 CCP ANTIBODY: CPT

## 2024-07-13 PROCEDURE — 36415 COLL VENOUS BLD VENIPUNCTURE: CPT

## 2024-07-13 PROCEDURE — 85652 RBC SED RATE AUTOMATED: CPT

## 2024-07-13 PROCEDURE — 82784 ASSAY IGA/IGD/IGG/IGM EACH: CPT

## 2024-07-13 PROCEDURE — 82565 ASSAY OF CREATININE: CPT

## 2024-07-13 PROCEDURE — 86431 RHEUMATOID FACTOR QUANT: CPT

## 2024-07-14 LAB
IGA SERPL-MCNC: 144 MG/DL (ref 66–433)
IGG SERPL-MCNC: 589 MG/DL (ref 635–1741)
IGM SERPL-MCNC: <20 MG/DL (ref 45–281)
TOTAL IGE SMQN RAST: 2.03 KU/L (ref 0–113)

## 2024-07-15 DIAGNOSIS — E03.9 HYPOTHYROIDISM, UNSPECIFIED TYPE: Chronic | ICD-10-CM

## 2024-07-15 DIAGNOSIS — E83.42 HYPOMAGNESEMIA: ICD-10-CM

## 2024-07-15 DIAGNOSIS — E78.5 HYPERLIPIDEMIA, UNSPECIFIED HYPERLIPIDEMIA TYPE: ICD-10-CM

## 2024-07-15 DIAGNOSIS — I10 HYPERTENSION, UNSPECIFIED TYPE: ICD-10-CM

## 2024-07-15 DIAGNOSIS — R79.9 ABNORMAL BLOOD CHEMISTRY: ICD-10-CM

## 2024-07-15 LAB — RHEUMATOID FACT SER QL LA: NEGATIVE

## 2024-07-15 RX ORDER — LEVOTHYROXINE SODIUM 0.07 MG/1
75 TABLET ORAL DAILY
Qty: 90 TABLET | Refills: 1 | Status: SHIPPED | OUTPATIENT
Start: 2024-07-15 | End: 2024-07-18 | Stop reason: SDUPTHER

## 2024-07-16 LAB
CCP AB SER IA-ACNC: 12
GAMMA INTERFERON BACKGROUND BLD IA-ACNC: 0.06 IU/ML
M TB IFN-G BLD-IMP: NEGATIVE
M TB IFN-G CD4+ BCKGRND COR BLD-ACNC: -0.01 IU/ML
M TB IFN-G CD4+ BCKGRND COR BLD-ACNC: -0.04 IU/ML
MISCELLANEOUS LAB TEST RESULT: NORMAL
MITOGEN IGNF BCKGRD COR BLD-ACNC: 9.94 IU/ML

## 2024-07-17 ENCOUNTER — TELEPHONE (OUTPATIENT)
Dept: NEPHROLOGY | Facility: CLINIC | Age: 84
End: 2024-07-17

## 2024-07-17 DIAGNOSIS — N18.4 CHRONIC RENAL DISEASE, STAGE IV (HCC): Primary | ICD-10-CM

## 2024-07-17 NOTE — TELEPHONE ENCOUNTER
Called left voice message to remind patient to get fasting labs done 1 week prior to 07/30/24 scheduled follow-up appointment with  Dr.Umesh Vinicio MD.

## 2024-07-18 ENCOUNTER — TELEPHONE (OUTPATIENT)
Age: 84
End: 2024-07-18

## 2024-07-18 DIAGNOSIS — R79.9 ABNORMAL BLOOD CHEMISTRY: ICD-10-CM

## 2024-07-18 DIAGNOSIS — R60.0 FLUID RETENTION IN LEGS: ICD-10-CM

## 2024-07-18 DIAGNOSIS — I10 HYPERTENSION, UNSPECIFIED TYPE: ICD-10-CM

## 2024-07-18 DIAGNOSIS — E83.42 HYPOMAGNESEMIA: ICD-10-CM

## 2024-07-18 DIAGNOSIS — E03.9 HYPOTHYROIDISM, UNSPECIFIED TYPE: Chronic | ICD-10-CM

## 2024-07-18 DIAGNOSIS — E78.5 HYPERLIPIDEMIA, UNSPECIFIED HYPERLIPIDEMIA TYPE: ICD-10-CM

## 2024-07-18 RX ORDER — HYDROCHLOROTHIAZIDE 12.5 MG/1
12.5 TABLET ORAL DAILY
Qty: 90 TABLET | Refills: 3 | OUTPATIENT
Start: 2024-07-18 | End: 2025-07-13

## 2024-07-18 RX ORDER — LEVOTHYROXINE SODIUM 0.07 MG/1
75 TABLET ORAL DAILY
Qty: 90 TABLET | Refills: 1 | Status: SHIPPED | OUTPATIENT
Start: 2024-07-18

## 2024-07-18 RX ORDER — HYDROCHLOROTHIAZIDE 12.5 MG/1
12.5 TABLET ORAL DAILY
Qty: 90 TABLET | Refills: 3 | Status: SHIPPED | OUTPATIENT
Start: 2024-07-18 | End: 2025-07-13

## 2024-07-18 NOTE — TELEPHONE ENCOUNTER
Patient called to request a refill on    hydroCHLOROthiazide 12.5 mg tablet     She would like it sent to:     [id number] - [place] -   [address] [phone]           Patient states she also called yesterday to request a Rx for:    levothyroxine (Synthroid) 75 mcg tablet       She wants the Synthroid sent to [facility] mail order, She states it was sent to the wrong pharmacy. She would like it to be re-ordered and sent to:    [facility] - [place] - [address]    Phone: [phone]   Fax: [phone]    Phone: [phone]

## 2024-07-18 NOTE — TELEPHONE ENCOUNTER
Patient return phone call stated  that she completed her  labs and if their is any additional ones please call her and she will get them done.

## 2024-07-18 NOTE — TELEPHONE ENCOUNTER
Patient asked that this be sent to [facility]    Reason for call:   [x] Refill   [] Prior Auth  [] Other:     Office:   [x] PCP/Provider - [facility]  / Layne Bacon MD   [] Specialty/Provider -     Medication:       Does the patient have enough for 3 days?   [] Yes   [x] No - Send as HP to POD

## 2024-07-18 NOTE — TELEPHONE ENCOUNTER
Called spoke with patient advised patient to get fasting labs done 1 week prior to 07/30/24 scheduled   follow-up appointment with Dr.Umesh Vinicio MD. patient verbalized understanding and is okay with it.

## 2024-07-21 LAB — MISCELLANEOUS LAB TEST RESULT: NORMAL

## 2024-07-22 ENCOUNTER — APPOINTMENT (OUTPATIENT)
Dept: LAB | Facility: HOSPITAL | Age: 84
End: 2024-07-22
Payer: MEDICARE

## 2024-07-22 DIAGNOSIS — N18.4 CHRONIC RENAL DISEASE, STAGE IV (HCC): ICD-10-CM

## 2024-07-22 LAB
25(OH)D3 SERPL-MCNC: 60.5 NG/ML (ref 30–100)
ANION GAP SERPL CALCULATED.3IONS-SCNC: 9 MMOL/L (ref 4–13)
BASOPHILS # BLD AUTO: 0.07 THOUSANDS/ÂΜL (ref 0–0.1)
BASOPHILS NFR BLD AUTO: 1 % (ref 0–1)
BUN SERPL-MCNC: 24 MG/DL (ref 5–25)
CALCIUM SERPL-MCNC: 9.2 MG/DL (ref 8.4–10.2)
CHLORIDE SERPL-SCNC: 104 MMOL/L (ref 96–108)
CO2 SERPL-SCNC: 27 MMOL/L (ref 21–32)
CREAT SERPL-MCNC: 1.59 MG/DL (ref 0.6–1.3)
EOSINOPHIL # BLD AUTO: 0.25 THOUSAND/ÂΜL (ref 0–0.61)
EOSINOPHIL NFR BLD AUTO: 3 % (ref 0–6)
ERYTHROCYTE [DISTWIDTH] IN BLOOD BY AUTOMATED COUNT: 17.1 % (ref 11.6–15.1)
GFR SERPL CREATININE-BSD FRML MDRD: 30 ML/MIN/1.73SQ M
GLUCOSE P FAST SERPL-MCNC: 101 MG/DL (ref 65–99)
HCT VFR BLD AUTO: 32.4 % (ref 34.8–46.1)
HGB BLD-MCNC: 10.3 G/DL (ref 11.5–15.4)
IMM GRANULOCYTES # BLD AUTO: 0.04 THOUSAND/UL (ref 0–0.2)
IMM GRANULOCYTES NFR BLD AUTO: 0 % (ref 0–2)
LYMPHOCYTES # BLD AUTO: 1.05 THOUSANDS/ÂΜL (ref 0.6–4.47)
LYMPHOCYTES NFR BLD AUTO: 12 % (ref 14–44)
MCH RBC QN AUTO: 33.2 PG (ref 26.8–34.3)
MCHC RBC AUTO-ENTMCNC: 31.8 G/DL (ref 31.4–37.4)
MCV RBC AUTO: 105 FL (ref 82–98)
MONOCYTES # BLD AUTO: 0.71 THOUSAND/ÂΜL (ref 0.17–1.22)
MONOCYTES NFR BLD AUTO: 8 % (ref 4–12)
NEUTROPHILS # BLD AUTO: 6.85 THOUSANDS/ÂΜL (ref 1.85–7.62)
NEUTS SEG NFR BLD AUTO: 76 % (ref 43–75)
NRBC BLD AUTO-RTO: 0 /100 WBCS
PHOSPHATE SERPL-MCNC: 3.6 MG/DL (ref 2.3–4.1)
PLATELET # BLD AUTO: 219 THOUSANDS/UL (ref 149–390)
PMV BLD AUTO: 9.4 FL (ref 8.9–12.7)
POTASSIUM SERPL-SCNC: 4 MMOL/L (ref 3.5–5.3)
PTH-INTACT SERPL-MCNC: 47.5 PG/ML (ref 12–88)
RBC # BLD AUTO: 3.1 MILLION/UL (ref 3.81–5.12)
SODIUM SERPL-SCNC: 140 MMOL/L (ref 135–147)
WBC # BLD AUTO: 8.97 THOUSAND/UL (ref 4.31–10.16)

## 2024-07-22 PROCEDURE — 85025 COMPLETE CBC W/AUTO DIFF WBC: CPT

## 2024-07-22 PROCEDURE — 83970 ASSAY OF PARATHORMONE: CPT

## 2024-07-22 PROCEDURE — 80048 BASIC METABOLIC PNL TOTAL CA: CPT

## 2024-07-22 PROCEDURE — 36415 COLL VENOUS BLD VENIPUNCTURE: CPT

## 2024-07-22 PROCEDURE — 84100 ASSAY OF PHOSPHORUS: CPT

## 2024-07-22 PROCEDURE — 82306 VITAMIN D 25 HYDROXY: CPT

## 2024-07-23 ENCOUNTER — TELEPHONE (OUTPATIENT)
Age: 84
End: 2024-07-23

## 2024-07-29 ENCOUNTER — APPOINTMENT (OUTPATIENT)
Dept: LAB | Facility: CLINIC | Age: 84
End: 2024-07-29
Payer: MEDICARE

## 2024-07-29 ENCOUNTER — TELEPHONE (OUTPATIENT)
Dept: NEPHROLOGY | Facility: CLINIC | Age: 84
End: 2024-07-29

## 2024-07-29 LAB

## 2024-07-29 PROCEDURE — 81001 URINALYSIS AUTO W/SCOPE: CPT

## 2024-07-29 NOTE — TELEPHONE ENCOUNTER
I called and spoke to the patient and confirmed upcoming appointment with the patient. Elodia Zelaya, .

## 2024-07-30 ENCOUNTER — OFFICE VISIT (OUTPATIENT)
Dept: NEPHROLOGY | Facility: CLINIC | Age: 84
End: 2024-07-30
Payer: MEDICARE

## 2024-07-30 VITALS
HEART RATE: 75 BPM | RESPIRATION RATE: 16 BRPM | WEIGHT: 157 LBS | SYSTOLIC BLOOD PRESSURE: 150 MMHG | TEMPERATURE: 97.8 F | HEIGHT: 64 IN | OXYGEN SATURATION: 98 % | DIASTOLIC BLOOD PRESSURE: 90 MMHG | BODY MASS INDEX: 26.8 KG/M2

## 2024-07-30 DIAGNOSIS — E83.9 CHRONIC KIDNEY DISEASE-MINERAL AND BONE DISORDER: ICD-10-CM

## 2024-07-30 DIAGNOSIS — M89.9 CHRONIC KIDNEY DISEASE-MINERAL AND BONE DISORDER: ICD-10-CM

## 2024-07-30 DIAGNOSIS — N18.4 CHRONIC RENAL DISEASE, STAGE IV (HCC): Primary | ICD-10-CM

## 2024-07-30 DIAGNOSIS — I10 PRIMARY HYPERTENSION: Chronic | ICD-10-CM

## 2024-07-30 DIAGNOSIS — N18.9 CHRONIC KIDNEY DISEASE-MINERAL AND BONE DISORDER: ICD-10-CM

## 2024-07-30 DIAGNOSIS — N13.30 HYDRONEPHROSIS, UNSPECIFIED HYDRONEPHROSIS TYPE: ICD-10-CM

## 2024-07-30 PROCEDURE — 99214 OFFICE O/P EST MOD 30 MIN: CPT | Performed by: INTERNAL MEDICINE

## 2024-07-30 NOTE — PROGRESS NOTES
NEPHROLOGY OFFICE FOLLOW UP  Samira Allred 83 y.o. female MRN: [id number]    Encounter: [id number] 7/30/2024    REASON FOR VISIT: Samira Allred is a 83 y.o. female who is here on 7/30/2024 for Chronic Kidney Disease and Follow-up  .    HPI:    Samira came interval follow-up of CKD.  Patient has a permanent Carrera catheter because of urinary retention    She also diffuse joint pain because of rheumatoid arthritis    No other acute complaint    No chest pain no palpitation or shortness of breath    No nausea no vomiting        REVIEW OF SYSTEMS:    Review of Systems   Constitutional:  Negative for fatigue.   HENT:  Negative for congestion.    Eyes:  Negative for photophobia and pain.   Respiratory:  Negative for chest tightness and shortness of breath.    Cardiovascular:  Negative for chest pain and palpitations.   Gastrointestinal:  Negative for abdominal distention, abdominal pain and blood in stool.   Endocrine: Negative for polydipsia.   Genitourinary:  Negative for difficulty urinating, dysuria, flank pain, hematuria and urgency.   Musculoskeletal:  Positive for arthralgias. Negative for back pain.   Skin:  Negative for rash.   Neurological:  Negative for dizziness, light-headedness and headaches.   Hematological:  Does not bruise/bleed easily.   Psychiatric/Behavioral:  Negative for behavioral problems. The patient is not nervous/anxious.          PAST MEDICAL HISTORY:  Past Medical History:   Diagnosis Date    Allergic angioedema     [id number] resolved    Angioedema     20aug2015 resolved    Arthritis     Breast cancer (HCC) 2012    right     Chronic kidney disease     Disease of thyroid gland     GERD (gastroesophageal reflux disease)     Hemorrhage of anus and rectum     02mar2016 resolved    Hyperlipidemia     Hypertension     Hypocalcemia     12oct2017 resolved    Neuritis     thoracic and lumbosacral    Peptic ulcer     Rheumatoid arthritis (HCC)     Stage 3b chronic kidney disease (HCC) 05/07/2021    Vitamin D  deficiency        PAST SURGICAL HISTORY:  Past Surgical History:   Procedure Laterality Date    ABDOMINAL SURGERY      BREAST BIOPSY Right 2012    BREAST LUMPECTOMY Right 2012    BREAST SURGERY      CHOLECYSTECTOMY      ELBOW SURGERY      GALLBLADDER SURGERY  1981    HIP SURGERY Right 09/18/2022    10/2023    ORTHOPEDIC SURGERY      for toes    RI CYSTO W/IRRIG & EVAC MULTPLE OBSTRUCTING CLOTS N/A 11/05/2023    Procedure: CYSTOSCOPY EVACUATION OF CLOTS & fulguration;  Surgeon: Uriah Schwartz MD;  [facility];  Service: Urology    TRANSURETHRAL RESECTION OF BLADDER TUMOR N/A 11/05/2023    Procedure: TRANSURETHRAL RESECTION OF BLADDER TUMOR (TURBT);  Surgeon: Uriah Schwartz MD;  [facility];  Service: Urology    US GUIDED BREAST BIOPSY RIGHT COMPLETE Right 08/15/2022       SOCIAL HISTORY:  Social History     Substance and Sexual Activity   Alcohol Use Not Currently     Social History     Substance and Sexual Activity   Drug Use No     Social History     Tobacco Use   Smoking Status Never    Passive exposure: Past   Smokeless Tobacco Never       FAMILY HISTORY:  Family History   Problem Relation Age of Onset    Breast cancer Mother     Kidney failure Father     Other Father         uremia    Lung cancer Brother     Breast cancer Maternal Aunt     Breast cancer Maternal Aunt     Breast cancer Maternal Aunt     Breast cancer Maternal Aunt     Breast cancer Maternal Aunt     Stomach cancer Maternal Uncle        MEDICATIONS:    Current Outpatient Medications:     ascorbic acid (VITAMIN C) 500 mg tablet, Take 500 mg by mouth daily., Disp: , Rfl:     Calcium Citrate-Vitamin D (CITRACAL + D PO), Take by mouth 2 (two) times a day, Disp: , Rfl:     Cholecalciferol (VITAMIN D-3 PO), Take 2,000 Units by mouth daily., Disp: , Rfl:     folic acid (FOLVITE) 1 mg tablet, 1 daily, Disp: 90 tablet, Rfl: 2    hydroCHLOROthiazide 12.5 mg tablet, Take 1 tablet (12.5 mg total) by mouth daily, Disp: 90 tablet,  "Rfl: 3    IRON, FERROUS SULFATE, PO, Take by mouth, Disp: , Rfl:     levothyroxine (Synthroid) 75 mcg tablet, Take 1 tablet (75 mcg total) by mouth in the morning, Disp: 90 tablet, Rfl: 1    magnesium oxide (MAG-OX) 400 mg, Take 1 tablet (400 mg total) by mouth 2 (two) times a day (Patient taking differently: Take 400 mg by mouth daily), Disp: 90 tablet, Rfl: 0    methotrexate 2.5 MG tablet, 4 pills weekly, Disp: , Rfl:     Multiple Vitamin (MULTIVITAMIN) tablet, Take 1 tablet by mouth daily., Disp: , Rfl:     polyethylene glycol (MIRALAX) 17 g packet, Take 17 g by mouth daily as needed (constipation), Disp: 5 each, Rfl: 0    potassium chloride (Klor-Con M10) 10 mEq tablet, Take 1 tablet (10 mEq total) by mouth 2 (two) times a day, Disp: 180 tablet, Rfl: 1    rosuvastatin (CRESTOR) 5 mg tablet, TAKE 1 TABLET DAILY, Disp: 90 tablet, Rfl: 2    PHYSICAL EXAM:  Vitals:    07/30/24 1557   BP: 150/90   BP Location: Right arm   Patient Position: Sitting   Pulse: 75   Resp: 16   Temp: 97.8 °F (36.6 °C)   TempSrc: Temporal   SpO2: 98%   Weight: 71.2 kg (157 lb)   Height: 5' 4\" (1.626 m)     Body mass index is 26.95 kg/m².    Physical Exam  Constitutional:       General: She is not in acute distress.     Appearance: She is well-developed.   HENT:      Head: Normocephalic.      Mouth/Throat:      Mouth: Mucous membranes are moist.   Eyes:      General: No scleral icterus.     Conjunctiva/sclera: Conjunctivae normal.   Neck:      Vascular: No JVD.   Cardiovascular:      Rate and Rhythm: Normal rate.      Heart sounds: Normal heart sounds.   Pulmonary:      Effort: Pulmonary effort is normal.      Breath sounds: No wheezing.   Abdominal:      Palpations: Abdomen is soft.      Tenderness: There is no abdominal tenderness.   Musculoskeletal:         General: Normal range of motion.      Cervical back: Neck supple.   Skin:     General: Skin is warm.      Findings: No rash.   Neurological:      Mental Status: She is alert and " oriented to person, place, and time.   Psychiatric:         Behavior: Behavior normal.         LAB RESULTS:  Results for orders placed or performed in visit on 07/22/24   Basic metabolic panel   Result Value Ref Range    Sodium 140 135 - 147 mmol/L    Potassium 4.0 3.5 - 5.3 mmol/L    Chloride 104 96 - 108 mmol/L    CO2 27 21 - 32 mmol/L    ANION GAP 9 4 - 13 mmol/L    BUN 24 5 - 25 mg/dL    Creatinine 1.59 (H) 0.60 - 1.30 mg/dL    Glucose, Fasting 101 (H) 65 - 99 mg/dL    Calcium 9.2 8.4 - 10.2 mg/dL    eGFR 30 ml/min/1.73sq m   CBC and differential   Result Value Ref Range    WBC 8.97 4.31 - 10.16 Thousand/uL    RBC 3.10 (L) 3.81 - 5.12 Million/uL    Hemoglobin 10.3 (L) 11.5 - 15.4 g/dL    Hematocrit 32.4 (L) 34.8 - 46.1 %     (H) 82 - 98 fL    MCH 33.2 26.8 - 34.3 pg    MCHC 31.8 31.4 - 37.4 g/dL    RDW 17.1 (H) 11.6 - 15.1 %    MPV 9.4 8.9 - 12.7 fL    Platelets 219 149 - 390 Thousands/uL    nRBC 0 /100 WBCs    Segmented % 76 (H) 43 - 75 %    Immature Grans % 0 0 - 2 %    Lymphocytes % 12 (L) 14 - 44 %    Monocytes % 8 4 - 12 %    Eosinophils Relative 3 0 - 6 %    Basophils Relative 1 0 - 1 %    Absolute Neutrophils 6.85 1.85 - 7.62 Thousands/µL    Absolute Immature Grans 0.04 0.00 - 0.20 Thousand/uL    Absolute Lymphocytes 1.05 0.60 - 4.47 Thousands/µL    Absolute Monocytes 0.71 0.17 - 1.22 Thousand/µL    Eosinophils Absolute 0.25 0.00 - 0.61 Thousand/µL    Basophils Absolute 0.07 0.00 - 0.10 Thousands/µL   Phosphorus   Result Value Ref Range    Phosphorus 3.6 2.3 - 4.1 mg/dL   PTH, intact   Result Value Ref Range    PTH 47.5 12.0 - 88.0 pg/mL   Urinalysis with microscopic   Result Value Ref Range    Color, UA Light Yellow     Clarity, UA Turbid     Specific Gravity, UA 1.008 1.003 - 1.030    pH, UA 6.5 4.5, 5.0, 5.5, 6.0, 6.5, 7.0, 7.5, 8.0    Leukocytes, UA Large (A) Negative    Nitrite, UA Negative Negative    Protein,  (2+) (A) Negative mg/dl    Glucose, UA Negative Negative mg/dl    Ketones,  "UA Negative Negative mg/dl    Urobilinogen, UA <2.0 <2.0 mg/dl mg/dl    Bilirubin, UA Negative Negative    Occult Blood, UA Moderate (A) Negative    RBC, UA 30-50 (A) None Seen, 1-2 /hpf    WBC, UA Innumerable (A) None Seen, 1-2 /hpf    Epithelial Cells None Seen None Seen, Occasional /hpf    Bacteria, UA Occasional None Seen, Occasional /hpf    WBC Clumps Present    Vitamin D 25 hydroxy   Result Value Ref Range    Vit D, 25-Hydroxy 60.5 30.0 - 100.0 ng/mL       ASSESSMENT and PLAN:      Chronic renal disease, stage IV ([facility])  Lab Results   Component Value Date    EGFR 30 07/22/2024    EGFR 23 07/13/2024    EGFR 29 07/02/2024    CREATININE 1.59 (H) 07/22/2024    CREATININE 1.98 (H) 07/13/2024    CREATININE 1.61 (H) 07/02/2024   Renal function is overall stable with some fluctuation.  Advise hydration and avoiding nephrotoxic medication    Hydronephrosis  Being monitored by urologist and does have permanent Carrera catheter    Chronic kidney disease-mineral and bone disorder  Lab Results   Component Value Date    EGFR 30 07/22/2024    EGFR 23 07/13/2024    EGFR 29 07/02/2024    CREATININE 1.59 (H) 07/22/2024    CREATININE 1.98 (H) 07/13/2024    CREATININE 1.61 (H) 07/02/2024   PTH and phosphorus along with vitamin D are within acceptable range    Hypertension  Very well-controlled      Everything discussed with the patient.  I will see her back in 6 months.  Will get blood work in 3 months in 6 months        Portions of the record may have been created with voice recognition software. Occasional wrong word or \"sound a like\" substitutions may have occurred due to the inherent limitations of voice recognition software. Read the chart carefully and recognize, using context, where substitutions have occurred.If you have any questions, please contact the dictating provider.   "

## 2024-07-30 NOTE — ASSESSMENT & PLAN NOTE
Lab Results   Component Value Date    EGFR 30 07/22/2024    EGFR 23 07/13/2024    EGFR 29 07/02/2024    CREATININE 1.59 (H) 07/22/2024    CREATININE 1.98 (H) 07/13/2024    CREATININE 1.61 (H) 07/02/2024   Renal function is overall stable with some fluctuation.  Advise hydration and avoiding nephrotoxic medication

## 2024-07-30 NOTE — ASSESSMENT & PLAN NOTE
Lab Results   Component Value Date    EGFR 30 07/22/2024    EGFR 23 07/13/2024    EGFR 29 07/02/2024    CREATININE 1.59 (H) 07/22/2024    CREATININE 1.98 (H) 07/13/2024    CREATININE 1.61 (H) 07/02/2024   PTH and phosphorus along with vitamin D are within acceptable range

## 2024-07-31 ENCOUNTER — TELEPHONE (OUTPATIENT)
Age: 84
End: 2024-07-31

## 2024-08-08 RX ORDER — SODIUM CHLORIDE 9 MG/ML
20 INJECTION, SOLUTION INTRAVENOUS CONTINUOUS
Status: DISCONTINUED | OUTPATIENT
Start: 2024-08-09 | End: 2024-08-10 | Stop reason: HOSPADM

## 2024-08-08 RX ORDER — DIPHENHYDRAMINE HCL 25 MG
50 TABLET ORAL ONCE
Status: COMPLETED | OUTPATIENT
Start: 2024-08-09 | End: 2024-08-09

## 2024-08-08 RX ORDER — METHYLPREDNISOLONE SODIUM SUCCINATE 125 MG/2ML
100 INJECTION, POWDER, LYOPHILIZED, FOR SOLUTION INTRAMUSCULAR; INTRAVENOUS ONCE
Status: COMPLETED | OUTPATIENT
Start: 2024-08-09 | End: 2024-08-09

## 2024-08-08 RX ORDER — ACETAMINOPHEN 325 MG/1
975 TABLET ORAL ONCE
Status: COMPLETED | OUTPATIENT
Start: 2024-08-09 | End: 2024-08-09

## 2024-08-09 ENCOUNTER — HOSPITAL ENCOUNTER (OUTPATIENT)
Dept: INFUSION CENTER | Facility: CLINIC | Age: 84
End: 2024-08-09
Payer: MEDICARE

## 2024-08-09 VITALS
HEART RATE: 76 BPM | RESPIRATION RATE: 18 BRPM | SYSTOLIC BLOOD PRESSURE: 132 MMHG | TEMPERATURE: 96.7 F | DIASTOLIC BLOOD PRESSURE: 60 MMHG

## 2024-08-09 PROCEDURE — 96415 CHEMO IV INFUSION ADDL HR: CPT

## 2024-08-09 PROCEDURE — 96375 TX/PRO/DX INJ NEW DRUG ADDON: CPT

## 2024-08-09 PROCEDURE — 96413 CHEMO IV INFUSION 1 HR: CPT

## 2024-08-09 RX ADMIN — RITUXIMAB 1000 MG: 10 INJECTION, SOLUTION INTRAVENOUS at 09:09

## 2024-08-09 RX ADMIN — SODIUM CHLORIDE 20 ML/HR: 0.9 INJECTION, SOLUTION INTRAVENOUS at 08:25

## 2024-08-09 RX ADMIN — METHYLPREDNISOLONE SODIUM SUCCINATE 100 MG: 125 INJECTION, POWDER, FOR SOLUTION INTRAMUSCULAR; INTRAVENOUS at 08:29

## 2024-08-09 RX ADMIN — DIPHENHYDRAMINE HYDROCHLORIDE 50 MG: 25 TABLET ORAL at 08:28

## 2024-08-09 RX ADMIN — ACETAMINOPHEN 975 MG: 325 TABLET, FILM COATED ORAL at 08:28

## 2024-08-09 NOTE — PROGRESS NOTES
Pt to clinic for rituxan, offers no complaints today, denies recent illness and abx use, tolerated infusion without complications, aware of next appointment on 8/23/24 at 12pm, PIV removed, avs declined.

## 2024-08-21 RX ORDER — METHYLPREDNISOLONE SODIUM SUCCINATE 125 MG/2ML
100 INJECTION, POWDER, LYOPHILIZED, FOR SOLUTION INTRAMUSCULAR; INTRAVENOUS ONCE
Status: COMPLETED | OUTPATIENT
Start: 2024-08-23 | End: 2024-08-23

## 2024-08-21 RX ORDER — DIPHENHYDRAMINE HCL 25 MG
50 TABLET ORAL ONCE
Status: COMPLETED | OUTPATIENT
Start: 2024-08-23 | End: 2024-08-23

## 2024-08-21 RX ORDER — ACETAMINOPHEN 325 MG/1
975 TABLET ORAL ONCE
Status: COMPLETED | OUTPATIENT
Start: 2024-08-23 | End: 2024-08-23

## 2024-08-21 RX ORDER — SODIUM CHLORIDE 9 MG/ML
20 INJECTION, SOLUTION INTRAVENOUS CONTINUOUS
Status: DISCONTINUED | OUTPATIENT
Start: 2024-08-23 | End: 2024-08-24 | Stop reason: HOSPADM

## 2024-08-23 ENCOUNTER — HOSPITAL ENCOUNTER (OUTPATIENT)
Dept: INFUSION CENTER | Facility: CLINIC | Age: 84
End: 2024-08-23
Payer: MEDICARE

## 2024-08-23 VITALS
RESPIRATION RATE: 18 BRPM | HEART RATE: 69 BPM | DIASTOLIC BLOOD PRESSURE: 68 MMHG | TEMPERATURE: 98.7 F | SYSTOLIC BLOOD PRESSURE: 150 MMHG

## 2024-08-23 PROCEDURE — 96375 TX/PRO/DX INJ NEW DRUG ADDON: CPT

## 2024-08-23 PROCEDURE — 96413 CHEMO IV INFUSION 1 HR: CPT

## 2024-08-23 PROCEDURE — 96415 CHEMO IV INFUSION ADDL HR: CPT

## 2024-08-23 RX ADMIN — SODIUM CHLORIDE 20 ML/HR: 9 INJECTION, SOLUTION INTRAVENOUS at 13:15

## 2024-08-23 RX ADMIN — METHYLPREDNISOLONE SODIUM SUCCINATE 100 MG: 125 INJECTION, POWDER, FOR SOLUTION INTRAMUSCULAR; INTRAVENOUS at 12:23

## 2024-08-23 RX ADMIN — RITUXIMAB 1000 MG: 10 INJECTION, SOLUTION INTRAVENOUS at 13:15

## 2024-08-23 RX ADMIN — DIPHENHYDRAMINE HYDROCHLORIDE 50 MG: 25 TABLET ORAL at 12:23

## 2024-08-23 RX ADMIN — ACETAMINOPHEN 975 MG: 325 TABLET, FILM COATED ORAL at 12:23

## 2024-08-23 NOTE — PROGRESS NOTES
Pt to clinic for Rituxan. Pt offers no complaints today. Tolerated infusion without complications. Aware this was her last appointment. AVS declined. PIV removed.

## 2024-08-28 ENCOUNTER — TELEPHONE (OUTPATIENT)
Age: 84
End: 2024-08-28

## 2024-08-28 NOTE — TELEPHONE ENCOUNTER
[facility]   Called [phone]   fax: [phone]     She is requesting for Dr Bacon to sign all of the documents that has been scanned into [employer] on 1/29/2024 order number [id number]  Date 1/26/2024 their is  1 of 7 pages signed    Can you please reprint those physician order documents and have provider sign and fax back to [facility]. They are in need of these orders ASAP.    [facility] fax -- [phone]    Thank you!

## 2024-08-29 NOTE — TELEPHONE ENCOUNTER
Salome from [facility] called and requested if Dr Bacon can correct home health orders from 2/26 and 8/24.    Salome stated a signature and date needs to be on all pages.     Order # [id number]  Order#[id number]    Salome stated she will fax back orders to office.     Please advise  Thank you     Fax: [phone]

## 2024-09-11 ENCOUNTER — RA CDI HCC (OUTPATIENT)
Dept: OTHER | Facility: HOSPITAL | Age: 84
End: 2024-09-11

## 2024-09-11 NOTE — PROGRESS NOTES
n  HCC coding opportunities       Chart reviewed, no opportunity found: CHART REVIEWED, NO OPPORTUNITY FOUND        Patients Insurance     Medicare Insurance: Medicare

## 2024-09-14 ENCOUNTER — APPOINTMENT (OUTPATIENT)
Dept: LAB | Facility: HOSPITAL | Age: 84
End: 2024-09-14
Payer: MEDICARE

## 2024-09-14 DIAGNOSIS — D53.9 MACROCYTIC ANEMIA: ICD-10-CM

## 2024-09-14 DIAGNOSIS — R73.01 IMPAIRED FASTING BLOOD SUGAR: ICD-10-CM

## 2024-09-14 LAB
BASOPHILS # BLD AUTO: 0.06 THOUSANDS/ΜL (ref 0–0.1)
BASOPHILS NFR BLD AUTO: 1 % (ref 0–1)
EOSINOPHIL # BLD AUTO: 0.33 THOUSAND/ΜL (ref 0–0.61)
EOSINOPHIL NFR BLD AUTO: 5 % (ref 0–6)
ERYTHROCYTE [DISTWIDTH] IN BLOOD BY AUTOMATED COUNT: 14.5 % (ref 11.6–15.1)
EST. AVERAGE GLUCOSE BLD GHB EST-MCNC: 108 MG/DL
HBA1C MFR BLD: 5.4 %
HCT VFR BLD AUTO: 33.9 % (ref 34.8–46.1)
HGB BLD-MCNC: 10.9 G/DL (ref 11.5–15.4)
IMM GRANULOCYTES # BLD AUTO: 0.04 THOUSAND/UL (ref 0–0.2)
IMM GRANULOCYTES NFR BLD AUTO: 1 % (ref 0–2)
LYMPHOCYTES # BLD AUTO: 1.18 THOUSANDS/ΜL (ref 0.6–4.47)
LYMPHOCYTES NFR BLD AUTO: 17 % (ref 14–44)
MCH RBC QN AUTO: 34 PG (ref 26.8–34.3)
MCHC RBC AUTO-ENTMCNC: 32.2 G/DL (ref 31.4–37.4)
MCV RBC AUTO: 106 FL (ref 82–98)
MONOCYTES # BLD AUTO: 0.97 THOUSAND/ΜL (ref 0.17–1.22)
MONOCYTES NFR BLD AUTO: 14 % (ref 4–12)
NEUTROPHILS # BLD AUTO: 4.48 THOUSANDS/ΜL (ref 1.85–7.62)
NEUTS SEG NFR BLD AUTO: 62 % (ref 43–75)
NRBC BLD AUTO-RTO: 0 /100 WBCS
PLATELET # BLD AUTO: 234 THOUSANDS/UL (ref 149–390)
PMV BLD AUTO: 9.6 FL (ref 8.9–12.7)
RBC # BLD AUTO: 3.21 MILLION/UL (ref 3.81–5.12)
WBC # BLD AUTO: 7.06 THOUSAND/UL (ref 4.31–10.16)

## 2024-09-14 PROCEDURE — 85025 COMPLETE CBC W/AUTO DIFF WBC: CPT

## 2024-09-14 PROCEDURE — 36415 COLL VENOUS BLD VENIPUNCTURE: CPT

## 2024-09-14 PROCEDURE — 83036 HEMOGLOBIN GLYCOSYLATED A1C: CPT

## 2024-09-18 ENCOUNTER — OFFICE VISIT (OUTPATIENT)
Dept: INTERNAL MEDICINE CLINIC | Facility: CLINIC | Age: 84
End: 2024-09-18
Payer: MEDICARE

## 2024-09-18 VITALS
BODY MASS INDEX: 27.66 KG/M2 | SYSTOLIC BLOOD PRESSURE: 142 MMHG | RESPIRATION RATE: 17 BRPM | DIASTOLIC BLOOD PRESSURE: 80 MMHG | HEIGHT: 64 IN | WEIGHT: 162 LBS | OXYGEN SATURATION: 98 % | HEART RATE: 60 BPM

## 2024-09-18 DIAGNOSIS — M05.79 RHEUMATOID ARTHRITIS INVOLVING MULTIPLE SITES WITH POSITIVE RHEUMATOID FACTOR (HCC): Primary | ICD-10-CM

## 2024-09-18 DIAGNOSIS — I12.9 HYPERTENSIVE RENAL DISEASE, STAGE 1 THROUGH STAGE 4 OR UNSPECIFIED CHRONIC KIDNEY DISEASE: ICD-10-CM

## 2024-09-18 DIAGNOSIS — N13.30 HYDRONEPHROSIS, UNSPECIFIED HYDRONEPHROSIS TYPE: ICD-10-CM

## 2024-09-18 DIAGNOSIS — I10 PRIMARY HYPERTENSION: Chronic | ICD-10-CM

## 2024-09-18 DIAGNOSIS — Z23 ENCOUNTER FOR IMMUNIZATION: ICD-10-CM

## 2024-09-18 PROBLEM — E43 SEVERE PROTEIN-CALORIE MALNUTRITION (HCC): Status: RESOLVED | Noted: 2023-12-08 | Resolved: 2024-09-18

## 2024-09-18 PROBLEM — E46 PROTEIN-CALORIE MALNUTRITION, UNSPECIFIED SEVERITY (HCC): Status: RESOLVED | Noted: 2023-11-01 | Resolved: 2024-09-18

## 2024-09-18 PROCEDURE — 90662 IIV NO PRSV INCREASED AG IM: CPT | Performed by: INTERNAL MEDICINE

## 2024-09-18 PROCEDURE — G0008 ADMIN INFLUENZA VIRUS VAC: HCPCS | Performed by: INTERNAL MEDICINE

## 2024-09-18 PROCEDURE — 99214 OFFICE O/P EST MOD 30 MIN: CPT | Performed by: INTERNAL MEDICINE

## 2024-09-18 NOTE — PROGRESS NOTES
Ambulatory Visit  Name: Samira Allred      : 1940      MRN: [id number]  Encounter Provider: Layne Bacon MD  Encounter Date: 2024   Encounter department: [facility] [place]    Assessment & Plan  Rheumatoid arthritis involving multiple sites with positive rheumatoid factor (HCC)    Will be seeing rheumatology next week. She is back on rituxan, on methotrexate.       Hypertensive renal disease, stage 1 through stage 4 or unspecified chronic kidney disease  Lab Results   Component Value Date    EGFR 30 2024    EGFR 23 2024    EGFR 29 2024    CREATININE 1.59 (H) 2024    CREATININE 1.98 (H) 2024    CREATININE 1.61 (H) 2024     Continue nephrology f/u.       Encounter for immunization    Orders:    influenza vaccine, high-dose, PF 0.5 mL (Fluzone High Dose)    Primary hypertension  Very well-controlled         Hydronephrosis, unspecified hydronephrosis type  Being monitored by urologist and does have permanent Carrera catheter           Depression Screening and Follow-up Plan: Clincally patient does not have depression. No treatment is required.       History of Present Illness   Patient is here for routine follow up, reviewed chronic medical problems. Reviewed labs for this visit.        History obtained from : patient  Review of Systems   Constitutional:  Negative for chills and fever.   HENT:  Negative for ear pain and sore throat.    Eyes:  Negative for pain and visual disturbance.   Respiratory:  Negative for cough and shortness of breath.    Cardiovascular:  Negative for chest pain and palpitations.   Gastrointestinal:  Negative for abdominal pain and vomiting.   Genitourinary:  Negative for dysuria and hematuria.   Musculoskeletal:  Negative for arthralgias and back pain.   Skin:  Negative for color change and rash.   Neurological:  Negative for seizures and syncope.   All other systems reviewed and are negative.    Past Medical History    Past Medical History:   Diagnosis Date    Allergic angioedema     [id number] resolved    Angioedema     [id number] resolved    Arthritis     Breast cancer (HCC) 2012    right     Chronic kidney disease     Disease of thyroid gland     GERD (gastroesophageal reflux disease)     Hemorrhage of anus and rectum     02mar2016 resolved    Hyperlipidemia     Hypertension     Hypocalcemia     12oct2017 resolved    Neuritis     thoracic and lumbosacral    Peptic ulcer     Rheumatoid arthritis (HCC)     Stage 3b chronic kidney disease (HCC) 05/07/2021    Vitamin D deficiency      Past Surgical History:   Procedure Laterality Date    ABDOMINAL SURGERY      BREAST BIOPSY Right 2012    BREAST LUMPECTOMY Right 2012    BREAST SURGERY      CHOLECYSTECTOMY      ELBOW SURGERY      GALLBLADDER SURGERY  1981    HIP SURGERY Right 09/18/2022    10/2023    ORTHOPEDIC SURGERY      for toes    [place] CYSTO W/IRRIG & EVAC MULTPLE OBSTRUCTING CLOTS N/A 11/05/2023    Procedure: CYSTOSCOPY EVACUATION OF CLOTS & fulguration;  Surgeon: Uriah Schwartz MD;  [facility];  Service: Urology    TRANSURETHRAL RESECTION OF BLADDER TUMOR N/A 11/05/2023    Procedure: TRANSURETHRAL RESECTION OF BLADDER TUMOR (TURBT);  Surgeon: Uriah Schwartz MD;  [facility];  Service: Urology    US GUIDED BREAST BIOPSY RIGHT COMPLETE Right 08/15/2022     Family History   Problem Relation Age of Onset    Breast cancer Mother     Kidney failure Father     Other Father         uremia    Lung cancer Brother     Breast cancer Maternal Aunt     Breast cancer Maternal Aunt     Breast cancer Maternal Aunt     Breast cancer Maternal Aunt     Breast cancer Maternal Aunt     Stomach cancer Maternal Uncle      Current Outpatient Medications on File Prior to Visit   Medication Sig Dispense Refill    ascorbic acid (VITAMIN C) 500 mg tablet Take 500 mg by mouth daily.      Calcium Citrate-Vitamin D (CITRACAL + D PO) Take by mouth 2 (two) times a day       "Cholecalciferol (VITAMIN D-3 PO) Take 2,000 Units by mouth daily.      folic acid (FOLVITE) 1 mg tablet 1 daily 90 tablet 2    hydroCHLOROthiazide 12.5 mg tablet Take 1 tablet (12.5 mg total) by mouth daily 90 tablet 3    IRON, FERROUS SULFATE, PO Take by mouth      levothyroxine (Synthroid) 75 mcg tablet Take 1 tablet (75 mcg total) by mouth in the morning 90 tablet 1    magnesium oxide (MAG-OX) 400 mg Take 1 tablet (400 mg total) by mouth 2 (two) times a day (Patient taking differently: Take 400 mg by mouth daily) 90 tablet 0    methotrexate 2.5 MG tablet 4 pills weekly      Multiple Vitamin (MULTIVITAMIN) tablet Take 1 tablet by mouth daily.      polyethylene glycol (MIRALAX) 17 g packet Take 17 g by mouth daily as needed (constipation) 5 each 0    potassium chloride (Klor-Con M10) 10 mEq tablet Take 1 tablet (10 mEq total) by mouth 2 (two) times a day 180 tablet 1    riTUXimab (RITUXAN IV) Inject into a catheter in a vein      rosuvastatin (CRESTOR) 5 mg tablet TAKE 1 TABLET DAILY 90 tablet 2     No current facility-administered medications on file prior to visit.     Allergies   Allergen Reactions    Lisinopril Anaphylaxis    Codeine Other (See Comments)     Nausea/vomiting      Other      Annotation - [id number]: tounge swelling    Oxycodone      Annotation - 11Mar2013: NOT TOLERATE    Penicillins Hives    Sulfamethoxazole-Trimethoprim     Ciprofloxacin Rash          Objective   /80 (BP Location: Left arm, Patient Position: Sitting, Cuff Size: Adult)   Pulse 60   Resp 17   Ht 5' 4\" (1.626 m)   Wt 73.5 kg (162 lb)   LMP  (LMP Unknown)   SpO2 98%   BMI 27.81 kg/m²     Physical Exam  Vitals and nursing note reviewed.   Constitutional:       General: She is not in acute distress.     Appearance: She is well-developed.   HENT:      Head: Normocephalic and atraumatic.   Eyes:      Conjunctiva/sclera: Conjunctivae normal.   Cardiovascular:      Rate and Rhythm: Normal rate and regular rhythm.      Heart " sounds: No murmur heard.  Pulmonary:      Effort: Pulmonary effort is normal. No respiratory distress.      Breath sounds: Normal breath sounds.   Abdominal:      Tenderness: There is no abdominal tenderness.   Musculoskeletal:         General: No swelling.      Cervical back: Neck supple.   Skin:     General: Skin is warm and dry.      Capillary Refill: Capillary refill takes less than 2 seconds.   Neurological:      Mental Status: She is alert and oriented to person, place, and time.      Gait: Gait abnormal.   Psychiatric:         Mood and Affect: Mood normal.       I have spent a total time of 15 minutes in caring for this patient on the day of the visit/encounter including Instructions for management, Importance of tx compliance, Impressions, and Obtaining or reviewing history  .

## 2024-09-18 NOTE — ASSESSMENT & PLAN NOTE
Lab Results   Component Value Date    EGFR 30 07/22/2024    EGFR 23 07/13/2024    EGFR 29 07/02/2024    CREATININE 1.59 (H) 07/22/2024    CREATININE 1.98 (H) 07/13/2024    CREATININE 1.61 (H) 07/02/2024     Continue nephrology f/u.

## 2024-10-18 ENCOUNTER — TELEPHONE (OUTPATIENT)
Age: 84
End: 2024-10-18

## 2024-10-18 ENCOUNTER — APPOINTMENT (OUTPATIENT)
Dept: LAB | Facility: CLINIC | Age: 84
End: 2024-10-18
Payer: MEDICARE

## 2024-10-18 DIAGNOSIS — N18.4 CHRONIC RENAL DISEASE, STAGE IV (HCC): ICD-10-CM

## 2024-10-18 DIAGNOSIS — E83.9 CHRONIC KIDNEY DISEASE-MINERAL AND BONE DISORDER: ICD-10-CM

## 2024-10-18 DIAGNOSIS — N13.30 HYDRONEPHROSIS, UNSPECIFIED HYDRONEPHROSIS TYPE: ICD-10-CM

## 2024-10-18 DIAGNOSIS — N18.9 CHRONIC KIDNEY DISEASE-MINERAL AND BONE DISORDER: ICD-10-CM

## 2024-10-18 DIAGNOSIS — M89.9 CHRONIC KIDNEY DISEASE-MINERAL AND BONE DISORDER: ICD-10-CM

## 2024-10-18 DIAGNOSIS — Z46.6 FITTING AND ADJUSTMENT OF URINARY DEVICE: ICD-10-CM

## 2024-10-18 LAB
BACTERIA UR QL AUTO: ABNORMAL /HPF
BILIRUB UR QL STRIP: NEGATIVE
BUDDING YEAST: PRESENT
CLARITY UR: ABNORMAL
COLOR UR: ABNORMAL
CREAT UR-MCNC: 46.1 MG/DL
GLUCOSE UR STRIP-MCNC: NEGATIVE MG/DL
HGB UR QL STRIP.AUTO: ABNORMAL
KETONES UR STRIP-MCNC: NEGATIVE MG/DL
LEUKOCYTE ESTERASE UR QL STRIP: ABNORMAL
NITRITE UR QL STRIP: NEGATIVE
NON-SQ EPI CELLS URNS QL MICRO: ABNORMAL /HPF
PH UR STRIP.AUTO: 7 [PH]
PROT UR STRIP-MCNC: ABNORMAL MG/DL
PROT UR-MCNC: 121.9 MG/DL
PROT/CREAT UR: 2.6 MG/G{CREAT} (ref 0–0.1)
RBC #/AREA URNS AUTO: ABNORMAL /HPF
SP GR UR STRIP.AUTO: 1.01 (ref 1–1.03)
UROBILINOGEN UR STRIP-ACNC: <2 MG/DL
WBC #/AREA URNS AUTO: ABNORMAL /HPF
WBC CLUMPS # UR AUTO: PRESENT /UL

## 2024-10-18 PROCEDURE — 81001 URINALYSIS AUTO W/SCOPE: CPT

## 2024-10-18 NOTE — TELEPHONE ENCOUNTER
FYI - [facility] called to advise that they will be doing a UA for patient. Urine was cloudy and patient complain of pain with removal of the herrera today.

## 2024-10-23 ENCOUNTER — TELEPHONE (OUTPATIENT)
Age: 84
End: 2024-10-23

## 2024-10-23 NOTE — TELEPHONE ENCOUNTER
Tam from [facility] called in to have PCP date the last 2 pages (3 and 4) of home health certificate plan of care. Order # [id number]. Please date to match previous date of 10/21/24. This can be faxed in to tam at [phone]. Fax and phone is the same number if the office wishes to speak with her.

## 2024-11-15 ENCOUNTER — LAB REQUISITION (OUTPATIENT)
Dept: LAB | Facility: HOSPITAL | Age: 84
End: 2024-11-15
Payer: MEDICARE

## 2024-11-15 ENCOUNTER — TELEPHONE (OUTPATIENT)
Age: 84
End: 2024-11-15

## 2024-11-15 DIAGNOSIS — Z46.6 ENCOUNTER FOR FITTING AND ADJUSTMENT OF URINARY DEVICE: ICD-10-CM

## 2024-11-15 LAB
BACTERIA UR QL AUTO: ABNORMAL /HPF
BILIRUB UR QL STRIP: NEGATIVE
BUDDING YEAST: PRESENT
CLARITY UR: ABNORMAL
COLOR UR: ABNORMAL
GLUCOSE UR STRIP-MCNC: NEGATIVE MG/DL
HGB UR QL STRIP.AUTO: ABNORMAL
KETONES UR STRIP-MCNC: NEGATIVE MG/DL
LEUKOCYTE ESTERASE UR QL STRIP: ABNORMAL
NITRITE UR QL STRIP: NEGATIVE
NON-SQ EPI CELLS URNS QL MICRO: ABNORMAL /HPF
PH UR STRIP.AUTO: 7 [PH]
PROT UR STRIP-MCNC: ABNORMAL MG/DL
RBC #/AREA URNS AUTO: ABNORMAL /HPF
SP GR UR STRIP.AUTO: 1.01 (ref 1–1.03)
UROBILINOGEN UR STRIP-ACNC: <2 MG/DL
WBC #/AREA URNS AUTO: ABNORMAL /HPF
WBC CLUMPS # UR AUTO: PRESENT /UL

## 2024-11-15 PROCEDURE — 87086 URINE CULTURE/COLONY COUNT: CPT | Performed by: INTERNAL MEDICINE

## 2024-11-15 PROCEDURE — 81001 URINALYSIS AUTO W/SCOPE: CPT | Performed by: INTERNAL MEDICINE

## 2024-11-15 NOTE — TELEPHONE ENCOUNTER
"Jacqui was called and states that patient has turbid urine and looks like protein \"chunks\" in urine. Pt has a catheter and is replaced every 4wks, has been having some discomfort so it was replaced today, pt had some discomfort with insertion but stated \"feels better\" since catheter was replaced.     Jacqui took a sample and will be sending out for UA/culture     Not sure if medication should be sent in until results are received?     "

## 2024-11-15 NOTE — TELEPHONE ENCOUNTER
Liam from [facility] called into check on UA done on 10/18. Not yet note. Please call patient when results.     Please call liam at [phone] if another UA is needed.

## 2024-11-16 LAB — BACTERIA UR CULT: NORMAL

## 2024-11-18 ENCOUNTER — TELEPHONE (OUTPATIENT)
Age: 84
End: 2024-11-18

## 2024-11-18 NOTE — TELEPHONE ENCOUNTER
Jacqui from [facility] called in to check on UA results for patient. Not yet resulted please call patient with pcp recommendation.

## 2024-11-27 DIAGNOSIS — I10 HYPERTENSION, UNSPECIFIED TYPE: ICD-10-CM

## 2024-11-27 DIAGNOSIS — E78.5 HYPERLIPIDEMIA, UNSPECIFIED HYPERLIPIDEMIA TYPE: ICD-10-CM

## 2024-11-27 RX ORDER — POTASSIUM CHLORIDE 750 MG/1
10 TABLET, EXTENDED RELEASE ORAL 2 TIMES DAILY
Qty: 180 TABLET | Refills: 1 | Status: SHIPPED | OUTPATIENT
Start: 2024-11-27 | End: 2024-12-02

## 2024-11-27 RX ORDER — ROSUVASTATIN CALCIUM 5 MG/1
5 TABLET, COATED ORAL DAILY
Qty: 90 TABLET | Refills: 2 | Status: SHIPPED | OUTPATIENT
Start: 2024-11-27 | End: 2024-12-02

## 2024-11-27 NOTE — TELEPHONE ENCOUNTER
Not a Duplicate. Change of Pharmacy    Reason for call:   [x] Refill   [] Prior Auth  [] Other:     Office:   [x] PCP/Provider -   [] Specialty/Provider -     Medication:    potassium chloride (Klor-Con M10) 10 mEq tablet     rosuvastatin (CRESTOR) 5 mg tablet    Pharmacy:   [facility] - [place] -    Does the patient have enough for 3 days?   [] Yes   [x] No - Send as HP to POD

## 2024-12-02 RX ORDER — ROSUVASTATIN CALCIUM 5 MG/1
5 TABLET, COATED ORAL DAILY
Qty: 90 TABLET | Refills: 2 | Status: SHIPPED | OUTPATIENT
Start: 2024-12-02

## 2024-12-02 RX ORDER — POTASSIUM CHLORIDE 750 MG/1
10 TABLET, EXTENDED RELEASE ORAL 2 TIMES DAILY
Qty: 180 TABLET | Refills: 1 | Status: SHIPPED | OUTPATIENT
Start: 2024-12-02 | End: 2024-12-03 | Stop reason: SDUPTHER

## 2024-12-02 NOTE — TELEPHONE ENCOUNTER
Patient spoke with [facility] in [place] and the prescriptions for potassium chloride (Klor-Con M10) 10 mEq tablet and rosuvastatin (CRESTOR) 5 mg tablet that were sent there incorrectly need to be cancelled.  On 11/27 this was sent to POD clinical to change the pharmacy to [facility].  Please advise.

## 2024-12-03 RX ORDER — POTASSIUM CHLORIDE 750 MG/1
10 TABLET, EXTENDED RELEASE ORAL 2 TIMES DAILY
Qty: 180 TABLET | Refills: 1 | Status: SHIPPED | OUTPATIENT
Start: 2024-12-03

## 2024-12-03 NOTE — TELEPHONE ENCOUNTER
Patient received a message from [facility] yesterday afternoon that her order was processing.  Then later last night she got another call from [facility] that they cannot the fill the prescription because they are under the impression that patient already had it filled at her local pharmacy.  The [facility] needs someone from the office to reach out asap to let them know that patient did not  her refill from the local pharmacy and [facility] needs to send out asap.  Patient only has 2 pills left.  Thank you!

## 2024-12-03 NOTE — TELEPHONE ENCOUNTER
"Call placed to [facility], was advised Rosuvastatin has been filled and placed out for shipment on 12/2/24.     As for the potassium they require a new script as [facility] \"[facility] \"[facility]     Please resend potassium to [facility] pending  "

## 2024-12-04 ENCOUNTER — TELEPHONE (OUTPATIENT)
Age: 84
End: 2024-12-04

## 2024-12-04 DIAGNOSIS — I10 HYPERTENSION, UNSPECIFIED TYPE: ICD-10-CM

## 2024-12-04 RX ORDER — POTASSIUM CHLORIDE 750 MG/1
10 TABLET, EXTENDED RELEASE ORAL 2 TIMES DAILY
Qty: 180 TABLET | Refills: 1 | Status: CANCELLED | OUTPATIENT
Start: 2024-12-04

## 2024-12-04 NOTE — TELEPHONE ENCOUNTER
Patient called and said medication was sent to the wrong pharmacy.  It was to be sent to:  [facility] - [place] - [address] [phone]     If you are unable to send it to [facility] please give patient a call.    Please advise, thank you.

## 2024-12-04 NOTE — TELEPHONE ENCOUNTER
Medication was sent to:   [facility] - [place] - [address] [facility]  [address] [place] [address]  Phone: [phone]  Fax: [phone]

## 2024-12-19 ENCOUNTER — OFFICE VISIT (OUTPATIENT)
Dept: INTERNAL MEDICINE CLINIC | Facility: CLINIC | Age: 84
End: 2024-12-19
Payer: MEDICARE

## 2024-12-19 VITALS
HEART RATE: 66 BPM | HEIGHT: 64 IN | DIASTOLIC BLOOD PRESSURE: 80 MMHG | SYSTOLIC BLOOD PRESSURE: 140 MMHG | WEIGHT: 169 LBS | BODY MASS INDEX: 28.85 KG/M2 | OXYGEN SATURATION: 98 %

## 2024-12-19 DIAGNOSIS — I10 PRIMARY HYPERTENSION: Primary | Chronic | ICD-10-CM

## 2024-12-19 DIAGNOSIS — D68.2 HEREDITARY DEFICIENCY OF OTHER CLOTTING FACTORS (HCC): ICD-10-CM

## 2024-12-19 DIAGNOSIS — R33.9 URINARY RETENTION: ICD-10-CM

## 2024-12-19 DIAGNOSIS — M05.79 RHEUMATOID ARTHRITIS INVOLVING MULTIPLE SITES WITH POSITIVE RHEUMATOID FACTOR (HCC): ICD-10-CM

## 2024-12-19 DIAGNOSIS — Z99.89 USES WALKER: ICD-10-CM

## 2024-12-19 DIAGNOSIS — I12.9 HYPERTENSIVE RENAL DISEASE, STAGE 1 THROUGH STAGE 4 OR UNSPECIFIED CHRONIC KIDNEY DISEASE: ICD-10-CM

## 2024-12-19 DIAGNOSIS — E03.8 OTHER SPECIFIED HYPOTHYROIDISM: ICD-10-CM

## 2024-12-19 DIAGNOSIS — N13.30 HYDRONEPHROSIS, UNSPECIFIED HYDRONEPHROSIS TYPE: ICD-10-CM

## 2024-12-19 PROCEDURE — G2211 COMPLEX E/M VISIT ADD ON: HCPCS | Performed by: INTERNAL MEDICINE

## 2024-12-19 PROCEDURE — 99214 OFFICE O/P EST MOD 30 MIN: CPT | Performed by: INTERNAL MEDICINE

## 2024-12-19 NOTE — PROGRESS NOTES
Name: Samira Allred      : 1940      MRN: [id number]  Encounter Provider: Layne Bacon MD  Encounter Date: 2024   Encounter department: [facility] INTERNAL MEDICINE [place]  :  Assessment & Plan  Primary hypertension  Very well-controlled.           Other specified hypothyroidism  Continue current levothyroxine dose.         Rheumatoid arthritis involving multiple sites with positive rheumatoid factor (HCC)  Will be seeing rheumatology next week. She is back on rituxan, on methotrexate.         Hydronephrosis, unspecified hydronephrosis type  Being monitored by urologist and does have permanent herrera catheter           Hypertensive renal disease, stage 1 through stage 4 or unspecified chronic kidney disease  Continue nephrology f/u.           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 Has failed multiple voiding trials. H/o chronic urinary retention, every 4 weeks changing FC, consider SP catheter. Has visiting nurse coming to the house.          Hereditary deficiency of other clotting factors (HCC)  Noted.       Uses walker  Noted.             Depression Screening and Follow-up Plan: Patient was screened for depression during today's encounter. They screened negative with a PHQ-2 score of 0.    History of Present Illness     Here for routine follow up.    Review of Systems   Constitutional:  Negative for chills and fever.   HENT:  Negative for ear pain and sore throat.    Eyes:  Negative for pain and visual disturbance.   Respiratory:  Negative for cough and shortness of breath.    Cardiovascular:  Negative for chest pain and palpitations.   Gastrointestinal:  Negative for abdominal pain and vomiting.   Genitourinary:  Negative for dysuria and hematuria.   Musculoskeletal:  Positive for gait problem. Negative for arthralgias and back pain.   Skin:  Negative for color change and rash.   Neurological:  Negative for seizures and syncope.   All other systems reviewed  "and are negative.      Objective   /80 (BP Location: Left arm, Patient Position: Sitting, Cuff Size: Standard)   Pulse 66   Ht 5' 4\" (1.626 m)   Wt 76.7 kg (169 lb)   LMP  (LMP Unknown)   SpO2 98%   BMI 29.01 kg/m²      Physical Exam  Vitals and nursing note reviewed.   Constitutional:       General: She is not in acute distress.     Appearance: She is well-developed.      Comments: Chronically ill appearing   HENT:      Head: Normocephalic and atraumatic.   Cardiovascular:      Rate and Rhythm: Normal rate and regular rhythm.      Heart sounds: No murmur heard.  Pulmonary:      Effort: Pulmonary effort is normal. No respiratory distress.      Breath sounds: Normal breath sounds.   Abdominal:      Tenderness: There is no abdominal tenderness.   Musculoskeletal:         General: No swelling.   Skin:     General: Skin is warm and dry.      Capillary Refill: Capillary refill takes less than 2 seconds.   Neurological:      Mental Status: She is alert and oriented to person, place, and time.      Gait: Gait abnormal.   Psychiatric:         Mood and Affect: Mood normal.       "

## 2025-01-05 ENCOUNTER — HOSPITAL ENCOUNTER (EMERGENCY)
Facility: HOSPITAL | Age: 85
Discharge: HOME | End: 2025-01-05
Attending: EMERGENCY MEDICINE | Admitting: EMERGENCY MEDICINE
Payer: MEDICARE

## 2025-01-05 ENCOUNTER — APPOINTMENT (EMERGENCY)
Dept: RADIOLOGY | Facility: HOSPITAL | Age: 85
End: 2025-01-05
Payer: MEDICARE

## 2025-01-05 VITALS
WEIGHT: 170.42 LBS | OXYGEN SATURATION: 95 % | BODY MASS INDEX: 29.25 KG/M2 | HEART RATE: 68 BPM | TEMPERATURE: 99.6 F | RESPIRATION RATE: 16 BRPM | SYSTOLIC BLOOD PRESSURE: 154 MMHG | DIASTOLIC BLOOD PRESSURE: 72 MMHG

## 2025-01-05 DIAGNOSIS — B34.9 VIRAL SYNDROME: Primary | ICD-10-CM

## 2025-01-05 LAB
ALBUMIN SERPL BCG-MCNC: 4.4 G/DL (ref 3.5–5)
ALP SERPL-CCNC: 73 U/L (ref 34–104)
ALT SERPL W P-5'-P-CCNC: 13 U/L (ref 7–52)
ANION GAP SERPL CALCULATED.3IONS-SCNC: 7 MMOL/L (ref 4–13)
ANISOCYTOSIS BLD QL SMEAR: PRESENT
APTT PPP: 31 SECONDS (ref 23–34)
AST SERPL W P-5'-P-CCNC: 22 U/L (ref 13–39)
ATRIAL RATE: 69 BPM
BASOPHILS # BLD MANUAL: 0.18 THOUSAND/UL (ref 0–0.1)
BASOPHILS NFR MAR MANUAL: 2 % (ref 0–1)
BILIRUB SERPL-MCNC: 0.77 MG/DL (ref 0.2–1)
BUN SERPL-MCNC: 27 MG/DL (ref 5–25)
CALCIUM SERPL-MCNC: 9.8 MG/DL (ref 8.4–10.2)
CHLORIDE SERPL-SCNC: 101 MMOL/L (ref 96–108)
CO2 SERPL-SCNC: 27 MMOL/L (ref 21–32)
CREAT SERPL-MCNC: 1.7 MG/DL (ref 0.6–1.3)
EOSINOPHIL # BLD MANUAL: 0 THOUSAND/UL (ref 0–0.4)
EOSINOPHIL NFR BLD MANUAL: 0 % (ref 0–6)
ERYTHROCYTE [DISTWIDTH] IN BLOOD BY AUTOMATED COUNT: 15 % (ref 11.6–15.1)
FLUAV AG UPPER RESP QL IA.RAPID: NEGATIVE
FLUBV AG UPPER RESP QL IA.RAPID: NEGATIVE
GFR SERPL CREATININE-BSD FRML MDRD: 27 ML/MIN/1.73SQ M
GLUCOSE SERPL-MCNC: 118 MG/DL (ref 65–140)
HCT VFR BLD AUTO: 32.6 % (ref 34.8–46.1)
HGB BLD-MCNC: 10.6 G/DL (ref 11.5–15.4)
INR PPP: 1.06 (ref 0.85–1.19)
LACTATE SERPL-SCNC: 0.8 MMOL/L (ref 0.5–2)
LYMPHOCYTES # BLD AUTO: 0.55 THOUSAND/UL (ref 0.6–4.47)
LYMPHOCYTES # BLD AUTO: 5 % (ref 14–44)
MCH RBC QN AUTO: 33.5 PG (ref 26.8–34.3)
MCHC RBC AUTO-ENTMCNC: 32.5 G/DL (ref 31.4–37.4)
MCV RBC AUTO: 103 FL (ref 82–98)
MONOCYTES # BLD AUTO: 0.64 THOUSAND/UL (ref 0–1.22)
MONOCYTES NFR BLD: 7 % (ref 4–12)
NEUTROPHILS # BLD MANUAL: 7.79 THOUSAND/UL (ref 1.85–7.62)
NEUTS BAND NFR BLD MANUAL: 6 % (ref 0–8)
NEUTS SEG NFR BLD AUTO: 79 % (ref 43–75)
OVALOCYTES BLD QL SMEAR: PRESENT
P AXIS: 85 DEGREES
PLATELET # BLD AUTO: 198 THOUSANDS/UL (ref 149–390)
PLATELET BLD QL SMEAR: ADEQUATE
PMV BLD AUTO: 9.3 FL (ref 8.9–12.7)
POLYCHROMASIA BLD QL SMEAR: PRESENT
POTASSIUM SERPL-SCNC: 4.3 MMOL/L (ref 3.5–5.3)
PR INTERVAL: 190 MS
PROCALCITONIN SERPL-MCNC: 0.15 NG/ML
PROT SERPL-MCNC: 7 G/DL (ref 6.4–8.4)
PROTHROMBIN TIME: 14.5 SECONDS (ref 12.3–15)
QRS AXIS: 55 DEGREES
QRSD INTERVAL: 124 MS
QT INTERVAL: 446 MS
QTC INTERVAL: 477 MS
RBC # BLD AUTO: 3.16 MILLION/UL (ref 3.81–5.12)
RBC MORPH BLD: PRESENT
SARS-COV+SARS-COV-2 AG RESP QL IA.RAPID: NEGATIVE
SODIUM SERPL-SCNC: 135 MMOL/L (ref 135–147)
T WAVE AXIS: 63 DEGREES
VARIANT LYMPHS # BLD AUTO: 1 %
VENTRICULAR RATE: 69 BPM
WBC # BLD AUTO: 9.16 THOUSAND/UL (ref 4.31–10.16)

## 2025-01-05 PROCEDURE — 87804 INFLUENZA ASSAY W/OPTIC: CPT | Performed by: EMERGENCY MEDICINE

## 2025-01-05 PROCEDURE — 85007 BL SMEAR W/DIFF WBC COUNT: CPT | Performed by: EMERGENCY MEDICINE

## 2025-01-05 PROCEDURE — 85730 THROMBOPLASTIN TIME PARTIAL: CPT | Performed by: EMERGENCY MEDICINE

## 2025-01-05 PROCEDURE — 85610 PROTHROMBIN TIME: CPT | Performed by: EMERGENCY MEDICINE

## 2025-01-05 PROCEDURE — 84145 PROCALCITONIN (PCT): CPT | Performed by: EMERGENCY MEDICINE

## 2025-01-05 PROCEDURE — 99284 EMERGENCY DEPT VISIT MOD MDM: CPT

## 2025-01-05 PROCEDURE — 87040 BLOOD CULTURE FOR BACTERIA: CPT | Performed by: EMERGENCY MEDICINE

## 2025-01-05 PROCEDURE — 87811 SARS-COV-2 COVID19 W/OPTIC: CPT | Performed by: EMERGENCY MEDICINE

## 2025-01-05 PROCEDURE — 83605 ASSAY OF LACTIC ACID: CPT | Performed by: EMERGENCY MEDICINE

## 2025-01-05 PROCEDURE — 99285 EMERGENCY DEPT VISIT HI MDM: CPT | Performed by: EMERGENCY MEDICINE

## 2025-01-05 PROCEDURE — 71046 X-RAY EXAM CHEST 2 VIEWS: CPT

## 2025-01-05 PROCEDURE — 85027 COMPLETE CBC AUTOMATED: CPT | Performed by: EMERGENCY MEDICINE

## 2025-01-05 PROCEDURE — 80053 COMPREHEN METABOLIC PANEL: CPT | Performed by: EMERGENCY MEDICINE

## 2025-01-05 PROCEDURE — 36415 COLL VENOUS BLD VENIPUNCTURE: CPT | Performed by: EMERGENCY MEDICINE

## 2025-01-05 PROCEDURE — 93005 ELECTROCARDIOGRAM TRACING: CPT

## 2025-01-05 NOTE — DISCHARGE INSTRUCTIONS
Tylenol as needed  Return for increasing fever worsening symptoms or any problems  Follow-up with your provider

## 2025-01-05 NOTE — ED PROVIDER NOTES
Weakness cough congestion  Time reflects when diagnosis was documented in both MDM as applicable and the Disposition within this note       Time User Action Codes Description Comment    1/5/2025  6:20 PM Adithya Priest Add [J32.9] Sinusitis     1/5/2025  6:37 PM Adithya Priest Remove [J32.9] Sinusitis     1/5/2025  6:37 PM Adithya Priest Add [B34.9] Viral syndrome           ED Disposition       ED Disposition   Discharge    Condition   Stable    Date/Time   Sun Jan 5, 2025  6:37 PM    Comment   Samira Allred discharge to home/self care.                   Assessment & Plan       Medical Decision Making  Amount and/or Complexity of Data Reviewed  Labs: ordered.  Radiology: ordered.    Medical decision making 84-year-old female presents emergency department with cough congestion primarily sinus and nasal congestion.  Chest x-ray showed no definite pneumonia, laboratory testing was within normal limits.  Discussed with patient offered antibiotics for sinusitis but she has multiple antibiotic allergies and was afraid of an allergic reaction.  She also feels she has a viral syndrome.  Discussed with patient negative flu negative COVID but still could have a different virus.  We discussed outpatient treatment and follow-up we discussed indications to return.         Medications - No data to display    ED Risk Strat Scores            Influenza and COVID testing was negative.    Chest x-ray: Chest x-ray showed a normal cardiac silhouette, no pneumothorax no infiltrates, No sign of pathology, interpreted by me, I was the primary .     Lactate was normal at 0.8, electrolytes within normal limits other than a BUN of 27 and creatinine 1.7 consistent with some mild dehydration, renal insufficiency  White count was normal at 9.1 no sign of inflammation hemoglobin is 10.9 consistent chronic anemia  COVID testing influenza testing was negative.        SBIRT 20yo+      Flowsheet Row Most Recent Value   Initial Alcohol Screen:  US AUDIT-C     1. How often do you have a drink containing alcohol? 0 Filed at: 01/05/2025 1616   2. How many drinks containing alcohol do you have on a typical day you are drinking?  0 Filed at: 01/05/2025 1616   3b. FEMALE Any Age, or MALE 65+: How often do you have 4 or more drinks on one occassion? 0 Filed at: 01/05/2025 1616   Audit-C Score 0 Filed at: 01/05/2025 1616   CARLI: How many times in the past year have you...    Used an illegal drug or used a prescription medication for non-medical reasons? Never Filed at: 01/05/2025 1616                            History of Present Illness       Chief Complaint   Patient presents with    Flu Symptoms     Pt c/o runny nose, chills, fevers for about a week. Pt took Tylenol around 1500 this afternoon.        Past Medical History:   Diagnosis Date    Allergic angioedema     [id number] resolved    Angioedema     [id number] resolved    Arthritis     Breast cancer (HCC) 2012    right     Chronic kidney disease     Disease of thyroid gland     GERD (gastroesophageal reflux disease)     Hemorrhage of anus and rectum     [id number] resolved    Hyperlipidemia     Hypertension     Hypocalcemia     12oct2017 resolved    Neuritis     thoracic and lumbosacral    Peptic ulcer     Rheumatoid arthritis (HCC)     Stage 3b chronic kidney disease (HCC) 05/07/2021    Vitamin D deficiency       Past Surgical History:   Procedure Laterality Date    ABDOMINAL SURGERY      BREAST BIOPSY Right 2012    BREAST LUMPECTOMY Right 2012    BREAST SURGERY      CHOLECYSTECTOMY      ELBOW SURGERY      GALLBLADDER SURGERY  1981    HIP SURGERY Right 09/18/2022    10/2023    ORTHOPEDIC SURGERY      for toes    AK CYSTO W/IRRIG & EVAC MULTPLE OBSTRUCTING CLOTS N/A 11/05/2023    Procedure: CYSTOSCOPY EVACUATION OF CLOTS & fulguration;  Surgeon: Uriah Schwartz MD;  Location: [facility];  Service: Urology    TRANSURETHRAL RESECTION OF BLADDER TUMOR N/A 11/05/2023    Procedure: TRANSURETHRAL RESECTION OF  BLADDER TUMOR (TURBT);  Surgeon: Uriah Schwartz MD;  Location: [facility];  Service: Urology    US BREAST CYST ASPIRATION RIGHT INITIAL Right 4/24/2018    US GUIDED BREAST BIOPSY RIGHT COMPLETE Right 08/15/2022      Family History   Problem Relation Age of Onset    Breast cancer Mother     Kidney failure Father     Other Father         uremia    Lung cancer Brother     Breast cancer Maternal Aunt     Breast cancer Maternal Aunt     Breast cancer Maternal Aunt     Breast cancer Maternal Aunt     Breast cancer Maternal Aunt     Stomach cancer Maternal Uncle       Social History     Tobacco Use    Smoking status: Never     Passive exposure: Past    Smokeless tobacco: Never   Vaping Use    Vaping status: Never Used   Substance Use Topics    Alcohol use: Not Currently    Drug use: No      E-Cigarette/Vaping    E-Cigarette Use Never User       E-Cigarette/Vaping Substances    Nicotine No     THC No     CBD No     Flavoring No     Other No     Unknown No       I have reviewed and agree with the history as documented.     HPI patient is a 84-year-old female history of nasal congestion that started with what patient describes as a very stuffy nose associated occasionally with some nosebleeding.  She reports now she has cough and congestion.  She reports feeling worse today and apparently took her temperature at home and was febrile.  Patient reports she seems to have improved here as she has no fever but she reports persistent sinus congestion and pain.  Denies any vomiting or diarrhea.  There is no rash.  Denies any joint swelling.  She has bad rheumatoid thrice but no specific change in her joints.  Denies any chest pain denies any urinary symptoms.  Past medical history of rheumatoid arthritis, kidney disease  Family history noncontributory    social history, non-smoker no history of drug abuse    Review of Systems   Constitutional:  Positive for fever.   HENT:  Positive for congestion, sinus pressure and sinus  pain.    Eyes:  Negative for pain and redness.   Respiratory:  Positive for cough. Negative for shortness of breath.    Cardiovascular:  Negative for chest pain.   Gastrointestinal:  Negative for abdominal pain and vomiting.           Objective       ED Triage Vitals [01/05/25 1613]   Temperature Pulse Blood Pressure Respirations SpO2 Patient Position - Orthostatic VS   99.6 °F (37.6 °C) 93 (!) 190/91 22 95 % Sitting      Temp Source Heart Rate Source BP Location FiO2 (%) Pain Score    Oral Monitor Left arm -- --      Vitals      Date and Time Temp Pulse SpO2 Resp BP Pain Score FACES Pain Rating User   01/05/25 1613 99.6 °F (37.6 °C) 93 95 % 22 190/91 -- -- MS            Physical Exam  Vitals and nursing note reviewed.   Constitutional:       Appearance: She is well-developed.   HENT:      Head: Normocephalic.      Right Ear: External ear normal.      Left Ear: External ear normal.      Nose: Nose normal.      Mouth/Throat:      Mouth: Mucous membranes are moist.      Pharynx: Oropharynx is clear.   Eyes:      General: Lids are normal.      Extraocular Movements: Extraocular movements intact.      Pupils: Pupils are equal, round, and reactive to light.   Cardiovascular:      Rate and Rhythm: Normal rate and regular rhythm.      Pulses: Normal pulses.      Heart sounds: Murmur heard.   Pulmonary:      Effort: Pulmonary effort is normal. No respiratory distress.      Breath sounds: Normal breath sounds.      Comments: Occasional congested breath sounds,  no wheezing no rales  Musculoskeletal:         General: No deformity. Normal range of motion.      Cervical back: Normal range of motion and neck supple.   Skin:     General: Skin is warm and dry.   Neurological:      Mental Status: She is alert and oriented to person, place, and time.   Psychiatric:         Mood and Affect: Mood normal.         Results Reviewed       Procedure Component Value Units Date/Time    Lactic acid [[id number]]  (Normal) Collected: 01/05/25  1803    Lab Status: Final result Specimen: Blood from Arm, Left Updated: 01/05/25 1832     LACTIC ACID 0.8 mmol/L     Narrative:      Result may be elevated if tourniquet was used during collection.    Comprehensive metabolic panel [[id number]]  (Abnormal) Collected: 01/05/25 1803    Lab Status: Final result Specimen: Blood from Arm, Left Updated: 01/05/25 1832     Sodium 135 mmol/L      Potassium 4.3 mmol/L      Chloride 101 mmol/L      CO2 27 mmol/L      ANION GAP 7 mmol/L      BUN 27 mg/dL      Creatinine 1.70 mg/dL      Glucose 118 mg/dL      Calcium 9.8 mg/dL      AST 22 U/L      ALT 13 U/L      Alkaline Phosphatase 73 U/L      Total Protein 7.0 g/dL      Albumin 4.4 g/dL      Total Bilirubin 0.77 mg/dL      eGFR 27 ml/min/1.73sq m     Narrative:      National Kidney Disease Foundation guidelines for Chronic Kidney Disease (CKD):     Stage 1 with normal or high GFR (GFR > 90 mL/min/1.73 square meters)    Stage 2 Mild CKD (GFR = 60-89 mL/min/1.73 square meters)    Stage 3A Moderate CKD (GFR = 45-59 mL/min/1.73 square meters)    Stage 3B Moderate CKD (GFR = 30-44 mL/min/1.73 square meters)    Stage 4 Severe CKD (GFR = 15-29 mL/min/1.73 square meters)    Stage 5 End Stage CKD (GFR <15 mL/min/1.73 square meters)  Note: GFR calculation is accurate only with a steady state creatinine    Protime-INR [[id number]]  (Normal) Collected: 01/05/25 1803    Lab Status: Final result Specimen: Blood from Arm, Left Updated: 01/05/25 1827     Protime 14.5 seconds      INR 1.06    Narrative:      INR Therapeutic Range    Indication                                             INR Range      Atrial Fibrillation                                               2.0-3.0  Hypercoagulable State                                    2.0.2.3  Left Ventricular Asist Device                            2.0-3.0  Mechanical Heart Valve                                  -    Aortic(with afib, MI, embolism, HF, LA enlargement,    and/or coagulopathy)                                      2.0-3.0 (2.5-3.5)     Mitral                                                             2.5-3.5  Prosthetic/Bioprosthetic Heart Valve               2.0-3.0  Venous thromboembolism (VTE: VT, PE        2.0-3.0    APTT [[id number]]  (Normal) Collected: 01/05/25 1803    Lab Status: Final result Specimen: Blood from Arm, Left Updated: 01/05/25 1827     PTT 31 seconds     CBC and differential [[id number]]  (Abnormal) Collected: 01/05/25 1803    Lab Status: Preliminary result Specimen: Blood from Arm, Left Updated: 01/05/25 1815     WBC 9.16 Thousand/uL      RBC 3.16 Million/uL      Hemoglobin 10.6 g/dL      Hematocrit 32.6 %       fL      MCH 33.5 pg      MCHC 32.5 g/dL      RDW 15.0 %      MPV 9.3 fL      Platelets 198 Thousands/uL     Procalcitonin [[id number]] Collected: 01/05/25 1803    Lab Status: In process Specimen: Blood from Arm, Left Updated: 01/05/25 1812    Blood culture #2 [[id number]] Collected: 01/05/25 1803    Lab Status: In process Specimen: Blood from Arm, Left Updated: 01/05/25 1812    Blood culture #1 [[id number]] Collected: 01/05/25 1753    Lab Status: In process Specimen: Blood from Arm, Right Updated: 01/05/25 1812    FLU/COVID Rapid Antigen (30 min. TAT) - Preferred screening test in ED [[id number]]  (Normal) Collected: 01/05/25 1617    Lab Status: Final result Specimen: Nares from Nose Updated: 01/05/25 1639     SARS COV Rapid Antigen Negative     Influenza A Rapid Antigen Negative     Influenza B Rapid Antigen Negative    Narrative:      This test has been performed using the [employer] [facility] 2 FLU+SARS Antigen test under the Emergency Use Authorization (EUA). This test has been validated by the  and verified by the performing laboratory. The Blanca uses lateral flow immunofluorescent sandwich assay to detect SARS-COV, Influenza A and Influenza B Antigen.     The [employer] [facility] 2 SARS Antigen test does not differentiate between SARS-CoV and SARS-CoV-2.      Negative results are presumptive and may be confirmed with a molecular assay, if necessary, for patient management. Negative results do not rule out SARS-CoV-2 or influenza infection and should not be used as the sole basis for treatment or patient management decisions. A negative test result may occur if the level of antigen in a sample is below the limit of detection of this test.     Positive results are indicative of the presence of viral antigens, but do not rule out bacterial infection or co-infection with other viruses.     All test results should be used as an adjunct to clinical observations and other information available to the provider.    FOR PEDIATRIC PATIENTS - copy/paste COVID Guidelines URL to browser: [email or web address]            XR chest 2 views    (Results Pending)       ECG 12 Lead Documentation Only    Date/Time: 1/5/2025 6:15 PM    Performed by: Adithya Priest MD  Authorized by: Adithya Priest MD    Indications / Diagnosis:  Cough congestion  ECG reviewed by me, the ED Provider: yes    Patient location:  ED  Previous ECG:     Previous ECG:  Compared to current    Comparison ECG info:  December 7, 2023    Similarity:  No change  Interpretation:     Interpretation: non-specific    Rate:     ECG rate:  69    ECG rate assessment: normal    Rhythm:     Rhythm: sinus rhythm    Comments:      Normal sinus rhythm, nonspecific ST-T wave changes some premature ventricular complexes no ST elevations.      ED Medication and Procedure Management   Prior to Admission Medications   Prescriptions Last Dose Informant Patient Reported? Taking?   Calcium Citrate-Vitamin D (CITRACAL + D PO)  Self Yes No   Sig: Take by mouth 2 (two) times a day   Cholecalciferol (VITAMIN D-3 PO)  Self Yes No   Sig: Take 2,000 Units by mouth daily.   IRON, FERROUS SULFATE, PO  Self Yes No   Sig: Take by mouth   Multiple Vitamin (MULTIVITAMIN) tablet  Self Yes No   Sig: Take 1  tablet by mouth daily.   ascorbic acid (VITAMIN C) 500 mg tablet  Self Yes No   Sig: Take 500 mg by mouth daily.   folic acid (FOLVITE) 1 mg tablet  Self No No   Si daily   hydroCHLOROthiazide 12.5 mg tablet  Self No No   Sig: Take 1 tablet (12.5 mg total) by mouth daily   levothyroxine (Synthroid) 75 mcg tablet  Self No No   Sig: Take 1 tablet (75 mcg total) by mouth in the morning   magnesium oxide (MAG-OX) 400 mg  Self No No   Sig: Take 1 tablet (400 mg total) by mouth 2 (two) times a day   Patient taking differently: Take 400 mg by mouth daily   methotrexate 2.5 MG tablet  Self Yes No   Si pills weekly   polyethylene glycol (MIRALAX) 17 g packet  Self No No   Sig: Take 17 g by mouth daily as needed (constipation)   potassium chloride (Klor-Con M10) 10 mEq tablet   No No   Sig: Take 1 tablet (10 mEq total) by mouth 2 (two) times a day   riTUXimab (RITUXAN IV)  Self Yes No   Sig: Inject into a catheter in a vein   rosuvastatin (CRESTOR) 5 mg tablet   No No   Sig: Take 1 tablet (5 mg total) by mouth daily      Facility-Administered Medications: None     Patient's Medications   Discharge Prescriptions    No medications on file     No discharge procedures on file.  ED SEPSIS DOCUMENTATION   Time reflects when diagnosis was documented in both MDM as applicable and the Disposition within this note       Time User Action Codes Description Comment    2025  6:20 PM Adithya Priest [J32.9] Sinusitis     2025  6:37 PM Adithya Priest Remove [J32.9] Sinusitis     2025  6:37 PM Adithya Priest [B34.9] Viral syndrome                  Adithya Priest MD  25 [id number]

## 2025-01-08 ENCOUNTER — HOSPITAL ENCOUNTER (EMERGENCY)
Facility: HOSPITAL | Age: 85
Discharge: HOME | End: 2025-01-08
Payer: MEDICARE

## 2025-01-08 VITALS
DIASTOLIC BLOOD PRESSURE: 67 MMHG | RESPIRATION RATE: 18 BRPM | TEMPERATURE: 98.7 F | SYSTOLIC BLOOD PRESSURE: 143 MMHG | OXYGEN SATURATION: 99 % | HEART RATE: 80 BPM

## 2025-01-08 DIAGNOSIS — T83.9XXA PROBLEM WITH URINARY CATHETER (HCC): ICD-10-CM

## 2025-01-08 DIAGNOSIS — I10 HYPERTENSION, UNSPECIFIED TYPE: Chronic | ICD-10-CM

## 2025-01-08 DIAGNOSIS — N39.0 UTI (URINARY TRACT INFECTION): Primary | ICD-10-CM

## 2025-01-08 LAB
BACTERIA UR QL AUTO: ABNORMAL /HPF
BILIRUB UR QL STRIP: NEGATIVE
CLARITY UR: ABNORMAL
COLOR UR: YELLOW
GLUCOSE UR STRIP-MCNC: NEGATIVE MG/DL
HGB UR QL STRIP.AUTO: ABNORMAL
KETONES UR STRIP-MCNC: NEGATIVE MG/DL
LEUKOCYTE ESTERASE UR QL STRIP: ABNORMAL
NITRITE UR QL STRIP: POSITIVE
NON-SQ EPI CELLS URNS QL MICRO: ABNORMAL /HPF
PH UR STRIP.AUTO: 6.5 [PH]
PROT UR STRIP-MCNC: ABNORMAL MG/DL
RBC #/AREA URNS AUTO: ABNORMAL /HPF
SP GR UR STRIP.AUTO: 1.01 (ref 1–1.03)
UROBILINOGEN UR STRIP-ACNC: <2 MG/DL
WBC #/AREA URNS AUTO: ABNORMAL /HPF

## 2025-01-08 PROCEDURE — 99284 EMERGENCY DEPT VISIT MOD MDM: CPT

## 2025-01-08 PROCEDURE — 87086 URINE CULTURE/COLONY COUNT: CPT

## 2025-01-08 PROCEDURE — 81001 URINALYSIS AUTO W/SCOPE: CPT

## 2025-01-08 PROCEDURE — 99283 EMERGENCY DEPT VISIT LOW MDM: CPT

## 2025-01-08 RX ORDER — CEFPODOXIME PROXETIL 200 MG/1
200 TABLET, FILM COATED ORAL DAILY
Qty: 7 TABLET | Refills: 0 | Status: SHIPPED | OUTPATIENT
Start: 2025-01-08 | End: 2025-01-15

## 2025-01-08 RX ORDER — CEFPODOXIME PROXETIL 200 MG/1
200 TABLET, FILM COATED ORAL ONCE
Status: COMPLETED | OUTPATIENT
Start: 2025-01-08 | End: 2025-01-08

## 2025-01-08 RX ADMIN — CEFPODOXIME PROXETIL 200 MG: 200 TABLET, FILM COATED ORAL at 18:09

## 2025-01-08 NOTE — ED PROVIDER NOTES
Time reflects when diagnosis was documented in both MDM as applicable and the Disposition within this note       Time User Action Codes Description Comment    1/8/2025  6:04 PM Ankit Nava Add [N39.0] UTI (urinary tract infection)     1/8/2025  6:04 PM Ankit Nava Add [T83.9XXA] Problem with urinary catheter (HCC)     1/8/2025  6:05 PM Ankit Nava Add [I10] Primary hypertension     1/8/2025  6:05 PM Ankit Nava Remove [I10] Primary hypertension     1/8/2025  6:05 PM Ankit Nava Add [I10] Hypertension, unspecified type           ED Disposition       ED Disposition   Discharge    Condition   Stable    Date/Time   Wed Jan 8, 2025  6:04 PM    Comment   Samira Allred discharge to home/self care.                   Assessment & Plan       Medical Decision Making  84-year-old female presenting for catheter problem    Differential includes catheter problem, UTI    Plan: Catheter exchange, urinalysis    Urinalysis returns suggestive of UTI.  Patient has multiple drug allergies.  Given her age, there is hesitation to place her on fluoroquinolone antibiotic.  Will trial cefpodoxime.  Patient received first dose in the emergency department.  Patient instructed to follow-up with urology, referral has been placed.  She has noted to be up and walking around without difficulty after catheter exchange.  Urine is noted to be draining appropriately into the bag. Patient instructed to f/u w/ pcp, return precautions given, pt d/c home in good condition.    Amount and/or Complexity of Data Reviewed  Labs: ordered.    Risk  Prescription drug management.        ED Course as of 01/09/25 1054   Wed Jan 08, 2025   1600 Bedside POCUS performed by me demonstrates urine in bladder with balloon in bladder as well suggestive of malfunctioning Carrera catheter.   1650 Carrera catheter replaced by nursing, patient noted to be walking without difficulty.  Urine very cloudy, will send UA       Medications   cefpodoxime  (VANTIN) tablet 200 mg (200 mg Oral Given 1/8/25 2832)       ED Risk Strat Scores                          SBIRT 20yo+      Flowsheet Row Most Recent Value   Initial Alcohol Screen: US AUDIT-C     1. How often do you have a drink containing alcohol? 0 Filed at: 01/08/2025 1628   2. How many drinks containing alcohol do you have on a typical day you are drinking?  0 Filed at: 01/08/2025 1628   3b. FEMALE Any Age, or MALE 65+: How often do you have 4 or more drinks on one occassion? 0 Filed at: 01/08/2025 1628   Audit-C Score 0 Filed at: 01/08/2025 1628   CARLI: How many times in the past year have you...    Used an illegal drug or used a prescription medication for non-medical reasons? Never Filed at: 01/08/2025 1628                            History of Present Illness       Chief Complaint   Patient presents with    Urinary Catheter Problem     Pt reports her chronic indwelling herrera appears to be leaking. Exchanged twice recently by home care nurse, and they can not return until tomorrow to check it.        Past Medical History:   Diagnosis Date    Allergic angioedema     [id number] resolved    Angioedema     [id number] resolved    Arthritis     Breast cancer (HCC) 2012    right     Chronic kidney disease     Disease of thyroid gland     GERD (gastroesophageal reflux disease)     Hemorrhage of anus and rectum     [id number] resolved    Hyperlipidemia     Hypertension     Hypocalcemia     12oct2017 resolved    Neuritis     thoracic and lumbosacral    Peptic ulcer     Rheumatoid arthritis (HCC)     Stage 3b chronic kidney disease (HCC) 05/07/2021    Vitamin D deficiency       Past Surgical History:   Procedure Laterality Date    ABDOMINAL SURGERY      BREAST BIOPSY Right 2012    BREAST LUMPECTOMY Right 2012    BREAST SURGERY      CHOLECYSTECTOMY      ELBOW SURGERY      GALLBLADDER SURGERY  1981    HIP SURGERY Right 09/18/2022    10/2023    ORTHOPEDIC SURGERY      for toes    ID CYSTO W/IRRIG & EVAC MULTPLE OBSTRUCTING  CLOTS N/A 11/05/2023    Procedure: CYSTOSCOPY EVACUATION OF CLOTS & fulguration;  Surgeon: Uriah Schwartz MD;  Location: [facility];  Service: Urology    TRANSURETHRAL RESECTION OF BLADDER TUMOR N/A 11/05/2023    Procedure: TRANSURETHRAL RESECTION OF BLADDER TUMOR (TURBT);  Surgeon: Uriah Schwartz MD;  Location: [facility];  Service: Urology    US BREAST CYST ASPIRATION RIGHT INITIAL Right 4/24/2018    US GUIDED BREAST BIOPSY RIGHT COMPLETE Right 08/15/2022      Family History   Problem Relation Age of Onset    Breast cancer Mother     Kidney failure Father     Other Father         uremia    Lung cancer Brother     Breast cancer Maternal Aunt     Breast cancer Maternal Aunt     Breast cancer Maternal Aunt     Breast cancer Maternal Aunt     Breast cancer Maternal Aunt     Stomach cancer Maternal Uncle       Social History     Tobacco Use    Smoking status: Never     Passive exposure: Past    Smokeless tobacco: Never   Vaping Use    Vaping status: Never Used   Substance Use Topics    Alcohol use: Not Currently    Drug use: No      E-Cigarette/Vaping    E-Cigarette Use Never User       E-Cigarette/Vaping Substances    Nicotine No     THC No     CBD No     Flavoring No     Other No     Unknown No       I have reviewed and agree with the history as documented.     Patient is an 84-year-old female with a past medical history significant for bladder mass status post TURBT, chronic urinary retention with longitudinal indwelling Carrera catheter presenting for evaluation of catheter problem.  She notes she has had a Carrera catheter for approximately 1 year.  She reports it is exchanged every month.  Her last exchange was 1 month ago.  She reports it was exchanged incorrectly and she has had some urine leaking from her urethra.  She reports she may be seen tomorrow by nursing, she called and requested them to visit however she is unsure if they will be coming.  She denies fevers or chills, denies abdominal pain,  back pain.  Denies any recent antibiotic use.  She notes she has a urology appointment next month.  She reports presenting today because she is unsure whether or not she will be able to be seen tomorrow and her intermittent urine leaking down her leg is a cause for concern.        Review of Systems   Constitutional:  Negative for chills and fever.   HENT:  Negative for ear pain and sore throat.    Eyes:  Negative for pain and visual disturbance.   Respiratory:  Negative for cough and shortness of breath.    Cardiovascular:  Negative for chest pain and palpitations.   Gastrointestinal:  Negative for abdominal pain and vomiting.   Genitourinary:  Negative for dysuria and hematuria.   Musculoskeletal:  Negative for arthralgias and back pain.   Skin:  Negative for color change and rash.   Neurological:  Negative for seizures and syncope.   All other systems reviewed and are negative.          Objective       ED Triage Vitals   Temperature Pulse Blood Pressure Respirations SpO2 Patient Position - Orthostatic VS   01/08/25 1342 01/08/25 1342 01/08/25 1342 01/08/25 1342 01/08/25 1342 01/08/25 1342   98.7 °F (37.1 °C) 61 123/86 19 98 % Sitting      Temp Source Heart Rate Source BP Location FiO2 (%) Pain Score    01/08/25 1342 01/08/25 1530 01/08/25 1342 -- --    Oral Monitor Left arm        Vitals      Date and Time Temp Pulse SpO2 Resp BP Pain Score FACES Pain Rating User   01/08/25 1805 -- -- -- -- 143/67 -- -- SG   01/08/25 1600 -- 80 99 % 18 203/84 -- -- AM   01/08/25 1530 -- 76 96 % 18 182/77 -- -- AM   01/08/25 1342 98.7 °F (37.1 °C) 61 98 % 19 123/86 -- -- JS            Physical Exam  Vitals and nursing note reviewed.   Constitutional:       General: She is not in acute distress.     Appearance: Normal appearance. She is not ill-appearing, toxic-appearing or diaphoretic.   HENT:      Head: Normocephalic and atraumatic.   Eyes:      General: No scleral icterus.        Right eye: No discharge.         Left eye: No  discharge.      Extraocular Movements: Extraocular movements intact.      Conjunctiva/sclera: Conjunctivae normal.   Cardiovascular:      Rate and Rhythm: Normal rate.      Pulses: Normal pulses.      Heart sounds: Normal heart sounds. No murmur heard.     No friction rub. No gallop.   Pulmonary:      Effort: Pulmonary effort is normal. No respiratory distress.      Breath sounds: Normal breath sounds. No stridor. No wheezing, rhonchi or rales.   Abdominal:      General: Abdomen is flat. Bowel sounds are normal. There is no distension.      Palpations: Abdomen is soft.      Tenderness: There is no abdominal tenderness. There is no guarding or rebound.   Musculoskeletal:         General: No swelling. Normal range of motion.      Cervical back: Normal range of motion. No rigidity.      Right lower leg: No edema.      Left lower leg: No edema.   Skin:     General: Skin is warm and dry.      Capillary Refill: Capillary refill takes less than 2 seconds.      Coloration: Skin is not jaundiced.      Findings: No bruising or lesion.   Neurological:      General: No focal deficit present.      Mental Status: She is alert and oriented to person, place, and time. Mental status is at baseline.   Psychiatric:         Mood and Affect: Mood normal.         Behavior: Behavior normal.         Thought Content: Thought content normal.         Judgment: Judgment normal.         Results Reviewed       Procedure Component Value Units Date/Time    Urine Microscopic [[id number]]  (Abnormal) Collected: 01/08/25 1645    Lab Status: Final result Specimen: Urine, Indwelling Carrera Catheter Updated: 01/08/25 1722     RBC, UA 30-50 /hpf      WBC, UA Innumerable /hpf      Epithelial Cells Occasional /hpf      Bacteria, UA Occasional /hpf     Urine culture [[id number]] Collected: 01/08/25 1645    Lab Status: In process Specimen: Urine, Indwelling Carrera Catheter Updated: 01/08/25 1722    UA w Reflex to Microscopic w Reflex to Culture [[id number]]   (Abnormal) Collected: 25 0895    Lab Status: Final result Specimen: Urine, Indwelling Carrera Catheter Updated: 25 1713     Color, UA Yellow     Clarity, UA Extra Turbid     Specific Gravity, UA 1.011     pH, UA 6.5     Leukocytes, UA Large     Nitrite, UA Positive     Protein,  (2+) mg/dl      Glucose, UA Negative mg/dl      Ketones, UA Negative mg/dl      Urobilinogen, UA <2.0 mg/dl      Bilirubin, UA Negative     Occult Blood, UA Small            No orders to display       Procedures    ED Medication and Procedure Management   Prior to Admission Medications   Prescriptions Last Dose Informant Patient Reported? Taking?   Calcium Citrate-Vitamin D (CITRACAL + D PO)  Self Yes No   Sig: Take by mouth 2 (two) times a day   Cholecalciferol (VITAMIN D-3 PO)  Self Yes No   Sig: Take 2,000 Units by mouth daily.   IRON, FERROUS SULFATE, PO  Self Yes No   Sig: Take by mouth   Multiple Vitamin (MULTIVITAMIN) tablet  Self Yes No   Sig: Take 1 tablet by mouth daily.   ascorbic acid (VITAMIN C) 500 mg tablet  Self Yes No   Sig: Take 500 mg by mouth daily.   folic acid (FOLVITE) 1 mg tablet  Self No No   Si daily   hydroCHLOROthiazide 12.5 mg tablet  Self No No   Sig: Take 1 tablet (12.5 mg total) by mouth daily   levothyroxine (Synthroid) 75 mcg tablet  Self No No   Sig: Take 1 tablet (75 mcg total) by mouth in the morning   magnesium oxide (MAG-OX) 400 mg  Self No No   Sig: Take 1 tablet (400 mg total) by mouth 2 (two) times a day   Patient taking differently: Take 400 mg by mouth daily   methotrexate 2.5 MG tablet  Self Yes No   Si pills weekly   polyethylene glycol (MIRALAX) 17 g packet  Self No No   Sig: Take 17 g by mouth daily as needed (constipation)   potassium chloride (Klor-Con M10) 10 mEq tablet   No No   Sig: Take 1 tablet (10 mEq total) by mouth 2 (two) times a day   riTUXimab (RITUXAN IV)  Self Yes No   Sig: Inject into a catheter in a vein   rosuvastatin (CRESTOR) 5 mg tablet   No No    Sig: Take 1 tablet (5 mg total) by mouth daily      Facility-Administered Medications: None     Discharge Medication List as of 1/8/2025  6:10 PM        START taking these medications    Details   cefpodoxime (VANTIN) 200 mg tablet Take 1 tablet (200 mg total) by mouth in the morning for 7 days, Starting Wed 1/8/2025, Until Wed 1/15/2025, Normal           CONTINUE these medications which have NOT CHANGED    Details   ascorbic acid (VITAMIN C) 500 mg tablet Take 500 mg by mouth daily., Historical Med      Calcium Citrate-Vitamin D (CITRACAL + D PO) Take by mouth 2 (two) times a day, Historical Med      Cholecalciferol (VITAMIN D-3 PO) Take 2,000 Units by mouth daily., Historical Med      folic acid (FOLVITE) 1 mg tablet 1 daily, Normal      hydroCHLOROthiazide 12.5 mg tablet Take 1 tablet (12.5 mg total) by mouth daily, Starting Thu 7/18/2024, Until Sun 7/13/2025, Normal      IRON, FERROUS SULFATE, PO Take by mouth, Historical Med      levothyroxine (Synthroid) 75 mcg tablet Take 1 tablet (75 mcg total) by mouth in the morning, Starting Thu 7/18/2024, Normal      magnesium oxide (MAG-OX) 400 mg Take 1 tablet (400 mg total) by mouth 2 (two) times a day, Starting Mon 11/5/2018, No Print      methotrexate 2.5 MG tablet 4 pills weekly, Historical Med      Multiple Vitamin (MULTIVITAMIN) tablet Take 1 tablet by mouth daily., Historical Med      polyethylene glycol (MIRALAX) 17 g packet Take 17 g by mouth daily as needed (constipation), Starting Wed 11/8/2023, Normal      potassium chloride (Klor-Con M10) 10 mEq tablet Take 1 tablet (10 mEq total) by mouth 2 (two) times a day, Starting Tue 12/3/2024, Normal      riTUXimab (RITUXAN IV) Inject into a catheter in a vein, Historical Med      rosuvastatin (CRESTOR) 5 mg tablet Take 1 tablet (5 mg total) by mouth daily, Starting Mon 12/2/2024, Normal             ED SEPSIS DOCUMENTATION   Time reflects when diagnosis was documented in both MDM as applicable and the Disposition  within this note       Time User Action Codes Description Comment    1/8/2025  6:04 PM Ankit Nava Add [N39.0] UTI (urinary tract infection)     1/8/2025  6:04 PM Ankit Nava Add [T83.9XXA] Problem with urinary catheter (HCC)     1/8/2025  6:05 PM Ankit Nava Add [I10] Primary hypertension     1/8/2025  6:05 PM Ankit Nava Remove [I10] Primary hypertension     1/8/2025  6:05 PM Ankit Nava Add [I10] Hypertension, unspecified type                  Ankit Nava,   01/09/25 1054

## 2025-01-08 NOTE — ED NOTES
"PT left cell phone behind. This writer was able to get in contact with juan luis Lopez. Jessica instructed to pick it up with security. Jessica stated \"someone will be in some time tomorrow to pick it up.\"      Mae Gamble RN  01/08/25 4699    "

## 2025-01-08 NOTE — DISCHARGE INSTRUCTIONS
Begin your antibiotic for urinary tract infection.  Follow-up with urology for catheter problems.  Follow-up with your family medicine doctor within 72 hours.  Return to the ER for new or worsening symptoms.

## 2025-01-09 LAB — BACTERIA UR CULT: NORMAL

## 2025-01-10 ENCOUNTER — TELEPHONE (OUTPATIENT)
Dept: UROLOGY | Facility: CLINIC | Age: 85
End: 2025-01-10

## 2025-01-11 LAB
BACTERIA BLD CULT: NORMAL
BACTERIA BLD CULT: NORMAL

## 2025-01-13 DIAGNOSIS — E83.42 HYPOMAGNESEMIA: ICD-10-CM

## 2025-01-13 DIAGNOSIS — I10 HYPERTENSION, UNSPECIFIED TYPE: ICD-10-CM

## 2025-01-13 DIAGNOSIS — R79.9 ABNORMAL BLOOD CHEMISTRY: ICD-10-CM

## 2025-01-13 DIAGNOSIS — E78.5 HYPERLIPIDEMIA, UNSPECIFIED HYPERLIPIDEMIA TYPE: ICD-10-CM

## 2025-01-13 DIAGNOSIS — E03.9 HYPOTHYROIDISM, UNSPECIFIED TYPE: Chronic | ICD-10-CM

## 2025-01-14 LAB
ATRIAL RATE: 69 BPM
P AXIS: 85 DEGREES
PR INTERVAL: 190 MS
QRS AXIS: 55 DEGREES
QRSD INTERVAL: 124 MS
QT INTERVAL: 446 MS
QTC INTERVAL: 477 MS
T WAVE AXIS: 63 DEGREES
VENTRICULAR RATE: 69 BPM

## 2025-01-14 PROCEDURE — 93010 ELECTROCARDIOGRAM REPORT: CPT | Performed by: INTERNAL MEDICINE

## 2025-01-14 RX ORDER — LEVOTHYROXINE SODIUM 75 UG/1
75 TABLET ORAL DAILY
Qty: 90 TABLET | Refills: 1 | Status: SHIPPED | OUTPATIENT
Start: 2025-01-14

## 2025-01-17 ENCOUNTER — APPOINTMENT (OUTPATIENT)
Dept: LAB | Facility: HOSPITAL | Age: 85
End: 2025-01-17
Payer: MEDICARE

## 2025-01-17 DIAGNOSIS — D53.9 MACROCYTIC ANEMIA: ICD-10-CM

## 2025-01-17 LAB
ANISOCYTOSIS BLD QL SMEAR: PRESENT
BASOPHILS # BLD MANUAL: 0.17 THOUSAND/UL (ref 0–0.1)
BASOPHILS NFR MAR MANUAL: 2 % (ref 0–1)
EOSINOPHIL # BLD MANUAL: 0.09 THOUSAND/UL (ref 0–0.4)
EOSINOPHIL NFR BLD MANUAL: 1 % (ref 0–6)
ERYTHROCYTE [DISTWIDTH] IN BLOOD BY AUTOMATED COUNT: 14.6 % (ref 11.6–15.1)
HCT VFR BLD AUTO: 31.1 % (ref 34.8–46.1)
HGB BLD-MCNC: 9.8 G/DL (ref 11.5–15.4)
IGA SERPL-MCNC: 142 MG/DL (ref 66–433)
IGG SERPL-MCNC: 602 MG/DL (ref 635–1741)
IGM SERPL-MCNC: <20 MG/DL (ref 45–281)
LYMPHOCYTES # BLD AUTO: 1.2 THOUSAND/UL (ref 0.6–4.47)
LYMPHOCYTES # BLD AUTO: 14 % (ref 14–44)
MACROCYTES BLD QL AUTO: PRESENT
MCH RBC QN AUTO: 33 PG (ref 26.8–34.3)
MCHC RBC AUTO-ENTMCNC: 31.5 G/DL (ref 31.4–37.4)
MCV RBC AUTO: 105 FL (ref 82–98)
MONOCYTES # BLD AUTO: 0.34 THOUSAND/UL (ref 0–1.22)
MONOCYTES NFR BLD: 4 % (ref 4–12)
NEUTROPHILS # BLD MANUAL: 6.79 THOUSAND/UL (ref 1.85–7.62)
NEUTS SEG NFR BLD AUTO: 79 % (ref 43–75)
PLATELET # BLD AUTO: 262 THOUSANDS/UL (ref 149–390)
PLATELET BLD QL SMEAR: ADEQUATE
PMV BLD AUTO: 9.3 FL (ref 8.9–12.7)
POIKILOCYTOSIS BLD QL SMEAR: PRESENT
RBC # BLD AUTO: 2.97 MILLION/UL (ref 3.81–5.12)
RBC MORPH BLD: PRESENT
WBC # BLD AUTO: 8.59 THOUSAND/UL (ref 4.31–10.16)

## 2025-01-17 PROCEDURE — 84165 PROTEIN E-PHORESIS SERUM: CPT

## 2025-01-17 PROCEDURE — 85007 BL SMEAR W/DIFF WBC COUNT: CPT

## 2025-01-17 PROCEDURE — 82784 ASSAY IGA/IGD/IGG/IGM EACH: CPT

## 2025-01-17 PROCEDURE — 36415 COLL VENOUS BLD VENIPUNCTURE: CPT

## 2025-01-17 PROCEDURE — 85027 COMPLETE CBC AUTOMATED: CPT

## 2025-01-17 PROCEDURE — 83521 IG LIGHT CHAINS FREE EACH: CPT

## 2025-01-17 NOTE — TELEPHONE ENCOUNTER
She is due for yearly f/u in March, can set up next available nonurgent f/u. [facility] can continue to change herrera

## 2025-01-17 NOTE — TELEPHONE ENCOUNTER
Referral placed by  ED on 1/8/25 for UTI (urinary tract infection) and Problem with urinary catheter (HCC). Please review.

## 2025-01-18 LAB
KAPPA LC FREE SER-MCNC: 43.1 MG/L (ref 3.3–19.4)
KAPPA LC FREE/LAMBDA FREE SER: 1.75 {RATIO} (ref 0.26–1.65)
LAMBDA LC FREE SERPL-MCNC: 24.6 MG/L (ref 5.7–26.3)

## 2025-01-20 LAB
ALBUMIN SERPL ELPH-MCNC: 4 G/DL (ref 3.2–5.1)
ALBUMIN SERPL ELPH-MCNC: 62.5 % (ref 48–70)
ALPHA1 GLOB SERPL ELPH-MCNC: 0.35 G/DL (ref 0.15–0.47)
ALPHA1 GLOB SERPL ELPH-MCNC: 5.4 % (ref 1.8–7)
ALPHA2 GLOB SERPL ELPH-MCNC: 0.84 G/DL (ref 0.42–1.04)
ALPHA2 GLOB SERPL ELPH-MCNC: 13.1 % (ref 5.9–14.9)
BETA GLOB ABNORMAL SERPL ELPH-MCNC: 0.38 G/DL (ref 0.31–0.57)
BETA1 GLOB SERPL ELPH-MCNC: 5.9 % (ref 4.7–7.7)
BETA2 GLOB SERPL ELPH-MCNC: 5.1 % (ref 3.1–7.9)
BETA2+GAMMA GLOB SERPL ELPH-MCNC: 0.33 G/DL (ref 0.2–0.58)
GAMMA GLOB ABNORMAL SERPL ELPH-MCNC: 0.51 G/DL (ref 0.4–1.66)
GAMMA GLOB SERPL ELPH-MCNC: 8 % (ref 6.9–22.3)
IGG/ALB SER: 1.67 {RATIO} (ref 1.1–1.8)
PROT PATTERN SERPL ELPH-IMP: NORMAL
PROT SERPL-MCNC: 6.4 G/DL (ref 6.4–8.2)

## 2025-01-20 PROCEDURE — 84165 PROTEIN E-PHORESIS SERUM: CPT | Performed by: STUDENT IN AN ORGANIZED HEALTH CARE EDUCATION/TRAINING PROGRAM

## 2025-01-22 ENCOUNTER — TELEPHONE (OUTPATIENT)
Dept: NEPHROLOGY | Facility: CLINIC | Age: 85
End: 2025-01-22

## 2025-01-22 NOTE — TELEPHONE ENCOUNTER
Called spoke with patient advised patient to get labs done 1 week prior to 02/03/25 scheduled follow-up  appointment with . advised patient as a reminder that labs / testing will need to be done in the appropriate time for scheduled appointment as this is important prior to visit. patient  verbalized understanding and is okay with it.

## 2025-01-23 ENCOUNTER — DOCUMENTATION (OUTPATIENT)
Dept: HEMATOLOGY ONCOLOGY | Facility: CLINIC | Age: 85
End: 2025-01-23

## 2025-01-23 ENCOUNTER — OFFICE VISIT (OUTPATIENT)
Dept: HEMATOLOGY ONCOLOGY | Facility: CLINIC | Age: 85
End: 2025-01-23
Payer: MEDICARE

## 2025-01-23 VITALS
HEIGHT: 64 IN | OXYGEN SATURATION: 97 % | HEART RATE: 84 BPM | BODY MASS INDEX: 29.19 KG/M2 | WEIGHT: 171 LBS | TEMPERATURE: 98.7 F | DIASTOLIC BLOOD PRESSURE: 78 MMHG | SYSTOLIC BLOOD PRESSURE: 160 MMHG

## 2025-01-23 DIAGNOSIS — Z86.718 HISTORY OF DVT (DEEP VEIN THROMBOSIS): ICD-10-CM

## 2025-01-23 DIAGNOSIS — Z85.3 PERSONAL HISTORY OF BREAST CANCER: ICD-10-CM

## 2025-01-23 DIAGNOSIS — R91.1 PULMONARY NODULE 1 CM OR GREATER IN DIAMETER: ICD-10-CM

## 2025-01-23 DIAGNOSIS — N18.4 CHRONIC RENAL DISEASE, STAGE IV (HCC): ICD-10-CM

## 2025-01-23 DIAGNOSIS — D53.9 MACROCYTIC ANEMIA: Primary | ICD-10-CM

## 2025-01-23 PROCEDURE — 99214 OFFICE O/P EST MOD 30 MIN: CPT | Performed by: PHYSICIAN ASSISTANT

## 2025-01-23 NOTE — PROGRESS NOTES
Sent lab orders over to [facility] for pt to complete per Kasie's instructions

## 2025-01-23 NOTE — ASSESSMENT & PLAN NOTE
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 Stopped due to development of DVT.   Mammogram in August 2022- enlarging hypoechoic lesion in the upper region of the right breast at 10 o'clock position.  Ultrasound-guided biopsy completed, pathology revealed dense sclerotic tissue with fibrosis and chronic inflammation. No evidence of malignancy.   - Last mammogram normal 02/2024.   - mammogram due 02/2024   Orders:    Mammo diagnostic bilateral w 3d and cad; Future

## 2025-01-23 NOTE — PROGRESS NOTES
Name: Samira Allred      : 1940      MRN: [id number]  Encounter Provider: Kasie Cope PA-C  Encounter Date: 2025   Encounter department: [facility] [place]  :  84-year-old female with multiple comorbidities including RA, DVT, CKD stage IV, personal history of early-stage breast cancer s/p left ectomy/RT and hormone therapy who presents as follow-up for chronic anemia.   Assessment & Plan  Macrocytic anemia  She has history of upper GI bleeding 2024 secondary to ulcers, duodenal Dieulafoy lesion and was treated for this.  Prior B12, folate normal, TSH, SPEP normal   She has a chronic multifactorial macrocytic anemia secondary to  chronic renal disease, anemia of chronic disease (RA), and medication use (MTX and rituximab).  There is also possibility of a low-grade MDS. Pt declines BMBx.   Her hemoglobin baseline is around 10 however her most recent labs show hemoglobin 9.8 g/dL  will monitor hemoglobin closely, if hemoglobin persistently less than 10, LAURA will be considered.  Her history of DVT, autoimmune disease may put her at risk for thrombosis.  Will need risk first benefit discussion.    CBC monthly, check iron panel   Orders:    CBC and differential; Standing    Erythropoietin; Future    Iron Panel (Includes Ferritin, Iron Sat%, Iron, and TIBC); Future    Soluble Transferrin Receptor; Future    Personal history of breast cancer  Stage I invasive ductal carcinoma of right breast ER/CT positive, HER2+  Diagnosed in   positive in 2012 s/p lumpectomy, RT, Herceptin and tamoxifen from [id number]. Stopped due to development of DVT.   Mammogram in 2022- enlarging hypoechoic lesion in the upper region of the right breast at 10 o'clock position.  Ultrasound-guided biopsy completed, pathology revealed dense sclerotic tissue with fibrosis and chronic inflammation. No evidence of malignancy.   - Last mammogram normal 2024.   - mammogram due  02/2024   Orders:    Mammo diagnostic bilateral w 3d and cad; Future    Pulmonary nodule 1 cm or greater in diameter  Patient reports history of pulmonary nodules. Recent CT chest 12/2023 showed .3 x 1.1 cm right lower lobe nodule and 0.7cm groundglass right lower lobe nodule.   - Given history of breast cancer, would recommend biopsy r/o metastatic disease. Referred to pulmonology  - 03/2024- PET scan showed resolution in RLL nodule however other RLL nodule still present. No FGD activity.   - Has been seen by pulmonology, no further monitoring warranted.        History of DVT (deep vein thrombosis)  - 10/2019 : 1st clot in life triggered by recent poison ivy infection , on eliquis 5 mg bid x 6m   - 4/2020 : doppler showed DVT resolved  - currently off ac       Chronic renal disease, stage IV (HCC)  Lab Results   Component Value Date    EGFR 27 01/05/2025    EGFR 30 07/22/2024    EGFR 23 07/13/2024    CREATININE 1.70 (H) 01/05/2025    CREATININE 1.59 (H) 07/22/2024    CREATININE 1.98 (H) 07/13/2024     Follows with nephrology        This is 84-year-old female with history of stage I invasive ductal carcinoma of right breast ER/NE+ HER2+ (treatment as below) and anemia who presents for follow-up.        Normocytic anemia due to blood loss, anemia of chronic disease   Patient had had recent hospitalization in 01/2024 for upper GI bleed.  Endoscopy at this time showed large hiatal hernia, duodenal ulcers, Dieulafoy lesion s/p cauterization.  s/p multiple transfusions.  Prior to this she had macrocytic anemia which was attributed to anemia of chronic disease in setting of RA/CKD MTX, and rituximab use. She has now been off of MTX and rituximab since GI bleed in December. Iron panel 02/2024 showed elevated ferritin and soluble transferrin receptor. Suspect chronic inflammation causing elevated ferritin level. She was started on oral iron supplement daily.      Most recent labs show hgb 10 g/dL , plat 237k. WBC and  diff is normal. She is now back on MTX however remains off rituxan. Having RA flare.  - anemia due to RA/RA meds   - could consider alpha EPO in the future if patient develops worsening anemia. Would need to discuss risk vs benefit as she has history of DVT.   - consider bone marrow biopsy if other cytopenias develop   - CBC monthly       SPEP normal       3.  Stage I invasive ductal carcinoma of right breast ER/AR positive, HER2+  Diagnosed in 2012  positive in 2012 s/p lumpectomy, RT, Herceptin and tamoxifen from [id number]. Stopped due to development of DVT.  mammogram in August 2022- enlarging hypoechoic lesion in the upper region of the right breast at 10 o'clock position.  Ultrasound-guided biopsy completed, pathology revealed dense sclerotic tissue with fibrosis and chronic inflammation. No evidence of malignancy.   - Last mammogram normal 02/2024.      4. Pulmonary nodule 1 cm or greater in diameter  Patient reports history of pulmonary nodules. Recent CT chest 12/2023 showed .3 x 1.1 cm right lower lobe nodule and 0.7cm groundglass right lower lobe nodule.   - Given history of breast cancer, would recommend biopsy r/o metastatic disease. Referred to pulmonology  - 03/2024- PET scan showed resolution in RLL nodule however other RLL nodule still present. No FGD activity.   - Has been seen by pulmonology, no further monitoring warranted.      5. History of macrocytic anemia   Thought to be secondary to RA/methotrexate.   - Prior B12, folate normal, TSH, SPEP normal   - continue folic acid daily.      6. Deep vein thrombosis (DVT) of proximal vein of left lower extremity   - 10/2019 : 1st clot in life triggered by recent poison ivy infection , on eliquis 5 mg bid x 6m   - 4/2020 : doppler showed DVT resolved  - currently off ac      - RTC 4m    History of Present Illness   Chief Complaint   Patient presents with    Follow-up     4 Month Follow Up with Labs     82yo female with PMHx HTN, RA on MTX, diastolic  dysfunction, HLN, breast cancer, stage IV CKD  who presents as follow up for LEOBARDO.      12/2023: Hospitalized for GI bleed received multiple transfusions.  Endoscopy showed large hiatal hernia, multiple small ulcers in the duodenum, bleeding  Dieulafoy lesion s/p cauterization.  CT performed during admission showed bilateral hydronephrosis due to bladder distention, cystitis, stable chronic compression fracture T12, few calcified uterine fibroids, 1.3 x 1.1 cm right lower lobe nodule and 0.7cm groundglass right lower lobe nodule new from 2019.      Rheumatologist had taken patient off after bleed. Had been on for about 20 years.      01/31/24: WBC 10.3, hemoglobin 8.6, MCV 92, platelets 418,000.  AST 70, ALT 64 alk phos 151, albumin 3.3.  GFR 34. Hospitalized 12/2023 for GIB and UTI.  Presenting labs revealed WBC 21,000, hemoglobin 8.1, RDW 18.6, platelets 202,000. Had acute drop in hgb <7 multiple times requiring multiple transfusions. Upper endoscopy on 12/15 showed large sliding handle hernia, multiple small ulcers in the duodenum, a small Dieulafoy lesion in the first portion of the duodenum that was actively bleeding hemostasis was achieved with 3 applications of bipolar cautery and epinephrine.       04/2024: WBC 7.3, hemoglobin 11.5, ,000, .  Creatinine 1.67, EGFR 28.    Lab Results   Component Value Date    WBC 8.59 01/17/2025    HGB 9.8 (L) 01/17/2025    HCT 31.1 (L) 01/17/2025     (H) 01/17/2025     01/17/2025     Lab Results   Component Value Date    SODIUM 135 01/05/2025    K 4.3 01/05/2025     01/05/2025    CO2 27 01/05/2025    AGAP 7 01/05/2025    BUN 27 (H) 01/05/2025    CREATININE 1.70 (H) 01/05/2025    GLUC 118 01/05/2025    GLUF 101 (H) 07/22/2024    CALCIUM 9.8 01/05/2025    AST 22 01/05/2025    ALT 13 01/05/2025    ALKPHOS 73 01/05/2025    PROT 6.1 (L) 09/25/2015    TP 6.4 01/17/2025    BILITOT 0.31 09/25/2015    TBILI 0.77 01/05/2025    EGFR 27 01/05/2025  "      Pertinent Medical History   01/23/25: she is back on Rituxan every 6 months, next treatment 02/2025. She is back on MTX. Continues on folic acid. Overall feels well.       Review of Systems   Constitutional:  Negative for fever and unexpected weight change.        No night sweats    Respiratory:  Negative for shortness of breath.    Cardiovascular:  Negative for chest pain.   Gastrointestinal:  Negative for blood in stool.        No melena     Genitourinary:  Negative for hematuria and vaginal bleeding.   Skin:  Negative for rash.   Neurological:  Negative for dizziness, light-headedness and headaches.   All other systems reviewed and are negative.          Objective   /78 (BP Location: Left arm, Patient Position: Sitting)   Pulse 84   Temp 98.7 °F (37.1 °C) (Temporal)   Ht 5' 4\" (1.626 m)   Wt 77.6 kg (171 lb)   LMP  (LMP Unknown)   SpO2 97%   BMI 29.35 kg/m²     Physical Exam  Vitals reviewed.   Constitutional:       Comments: Ambulates with walker      HENT:      Head: Normocephalic.   Cardiovascular:      Rate and Rhythm: Normal rate and regular rhythm.   Pulmonary:      Effort: Pulmonary effort is normal.      Breath sounds: Normal breath sounds.   Abdominal:      Palpations: Abdomen is soft.      Tenderness: There is no abdominal tenderness.   Musculoskeletal:      Cervical back: Neck supple.   Skin:     Findings: No erythema or rash.   Neurological:      Mental Status: She is alert.         Labs: I have reviewed the following labs:  Results for orders placed or performed in visit on 01/17/25   Protein electrophoresis, serum   Result Value Ref Range    A/G Ratio 1.67 1.10 - 1.80    Albumin Electrophoresis 62.5 48.0 - 70.0 %    Albumin CONC 4.00 3.20 - 5.10 g/dl    Alpha 1 5.4 1.8 - 7.0 %    ALPHA 1 CONC 0.35 0.15 - 0.47 g/dL    Alpha 2 13.1 5.9 - 14.9 %    ALPHA 2 CONC 0.84 0.42 - 1.04 g/dL    Beta-1 5.9 4.7 - 7.7 %    BETA 1 CONC 0.38 0.31 - 0.57 g/dL    Beta-2 5.1 3.1 - 7.9 %    BETA 2 " CONC 0.33 0.20 - 0.58 g/dL    Gamma Globulin 8.0 6.9 - 22.3 %    GAMMA CONC 0.51 0.40 - 1.66 g/dL    Total Protein 6.4 6.4 - 8.2 g/dL    SPEP Interpretation See Comment    Immunoglobulin free LT chains blood   Result Value Ref Range    Ig Kappa Free Light Chain 43.1 (H) 3.3 - 19.4 mg/L    Ig Lambda Free Light Chain 24.6 5.7 - 26.3 mg/L    Kappa/Lambda FluidC Ratio 1.75 (H) 0.26 - 1.65   IgG, IgA, IgM   Result Value Ref Range     66 - 433 mg/dL     (L) 635 - 1,741 mg/dL    IGM <20 (L) 45 - 281 mg/dL   CBC and differential   Result Value Ref Range    WBC 8.59 4.31 - 10.16 Thousand/uL    RBC 2.97 (L) 3.81 - 5.12 Million/uL    Hemoglobin 9.8 (L) 11.5 - 15.4 g/dL    Hematocrit 31.1 (L) 34.8 - 46.1 %     (H) 82 - 98 fL    MCH 33.0 26.8 - 34.3 pg    MCHC 31.5 31.4 - 37.4 g/dL    RDW 14.6 11.6 - 15.1 %    MPV 9.3 8.9 - 12.7 fL    Platelets 262 149 - 390 Thousands/uL   Manual Differential(PHLEBS Do Not Order)   Result Value Ref Range    Segmented % 79 (H) 43 - 75 %    Lymphocytes % 14 14 - 44 %    Monocytes % 4 4 - 12 %    Eosinophils % 1 0 - 6 %    Basophils % 2 (H) 0 - 1 %    Absolute Neutrophils 6.79 1.85 - 7.62 Thousand/uL    Absolute Lymphocytes 1.20 0.60 - 4.47 Thousand/uL    Absolute Monocytes 0.34 0.00 - 1.22 Thousand/uL    Absolute Eosinophils 0.09 0.00 - 0.40 Thousand/uL    Absolute Basophils 0.17 (H) 0.00 - 0.10 Thousand/uL    Total Counted      RBC Morphology Present     Platelet Estimate Adequate Adequate    Anisocytosis Present     Macrocytes Present     Poikilocytes Present

## 2025-01-23 NOTE — ASSESSMENT & PLAN NOTE
Lab Results   Component Value Date    EGFR 27 01/05/2025    EGFR 30 07/22/2024    EGFR 23 07/13/2024    CREATININE 1.70 (H) 01/05/2025    CREATININE 1.59 (H) 07/22/2024    CREATININE 1.98 (H) 07/13/2024     Follows with nephrology

## 2025-01-23 NOTE — ASSESSMENT & PLAN NOTE
- 10/2019 : 1st clot in life triggered by recent poison ivy infection , on eliquis 5 mg bid x 6m   - 4/2020 : doppler showed DVT resolved  - currently off ac

## 2025-01-28 ENCOUNTER — APPOINTMENT (OUTPATIENT)
Dept: LAB | Facility: HOSPITAL | Age: 85
End: 2025-01-28
Attending: INTERNAL MEDICINE
Payer: MEDICARE

## 2025-01-28 DIAGNOSIS — N13.30 HYDRONEPHROSIS, UNSPECIFIED HYDRONEPHROSIS TYPE: ICD-10-CM

## 2025-01-28 DIAGNOSIS — M05.79 SEROPOSITIVE RHEUMATOID ARTHRITIS OF MULTIPLE SITES (HCC): ICD-10-CM

## 2025-01-28 DIAGNOSIS — N18.4 CHRONIC RENAL DISEASE, STAGE IV (HCC): ICD-10-CM

## 2025-01-28 DIAGNOSIS — N18.9 CHRONIC KIDNEY DISEASE-MINERAL AND BONE DISORDER: ICD-10-CM

## 2025-01-28 DIAGNOSIS — E83.9 CHRONIC KIDNEY DISEASE-MINERAL AND BONE DISORDER: ICD-10-CM

## 2025-01-28 DIAGNOSIS — M89.9 CHRONIC KIDNEY DISEASE-MINERAL AND BONE DISORDER: ICD-10-CM

## 2025-01-28 LAB
25(OH)D3 SERPL-MCNC: 67.1 NG/ML (ref 30–100)
ALBUMIN SERPL BCG-MCNC: 4.2 G/DL (ref 3.5–5)
ALP SERPL-CCNC: 68 U/L (ref 34–104)
ALT SERPL W P-5'-P-CCNC: 14 U/L (ref 7–52)
ANION GAP SERPL CALCULATED.3IONS-SCNC: 8 MMOL/L (ref 4–13)
AST SERPL W P-5'-P-CCNC: 25 U/L (ref 13–39)
BASOPHILS # BLD MANUAL: 0.12 THOUSAND/UL (ref 0–0.1)
BASOPHILS NFR MAR MANUAL: 2 % (ref 0–1)
BILIRUB DIRECT SERPL-MCNC: 0.18 MG/DL (ref 0–0.2)
BILIRUB SERPL-MCNC: 0.8 MG/DL (ref 0.2–1)
BUN SERPL-MCNC: 30 MG/DL (ref 5–25)
CALCIUM SERPL-MCNC: 9.6 MG/DL (ref 8.4–10.2)
CHLORIDE SERPL-SCNC: 101 MMOL/L (ref 96–108)
CO2 SERPL-SCNC: 28 MMOL/L (ref 21–32)
CREAT SERPL-MCNC: 1.88 MG/DL (ref 0.6–1.3)
CRP SERPL QL: 6.1 MG/L
EOSINOPHIL # BLD MANUAL: 0.12 THOUSAND/UL (ref 0–0.4)
EOSINOPHIL NFR BLD MANUAL: 2 % (ref 0–6)
ERYTHROCYTE [DISTWIDTH] IN BLOOD BY AUTOMATED COUNT: 15.5 % (ref 11.6–15.1)
ERYTHROCYTE [SEDIMENTATION RATE] IN BLOOD: 37 MM/HOUR (ref 0–29)
GFR SERPL CREATININE-BSD FRML MDRD: 24 ML/MIN/1.73SQ M
GLUCOSE P FAST SERPL-MCNC: 100 MG/DL (ref 65–99)
HCT VFR BLD AUTO: 30 % (ref 34.8–46.1)
HGB BLD-MCNC: 9.7 G/DL (ref 11.5–15.4)
IGA SERPL-MCNC: 129 MG/DL (ref 66–433)
IGG SERPL-MCNC: 574 MG/DL (ref 635–1741)
IGM SERPL-MCNC: <20 MG/DL (ref 45–281)
LYMPHOCYTES # BLD AUTO: 0.65 THOUSAND/UL (ref 0.6–4.47)
LYMPHOCYTES # BLD AUTO: 11 % (ref 14–44)
MACROCYTES BLD QL AUTO: PRESENT
MCH RBC QN AUTO: 33.8 PG (ref 26.8–34.3)
MCHC RBC AUTO-ENTMCNC: 32.3 G/DL (ref 31.4–37.4)
MCV RBC AUTO: 105 FL (ref 82–98)
MONOCYTES # BLD AUTO: 0.53 THOUSAND/UL (ref 0–1.22)
MONOCYTES NFR BLD: 9 % (ref 4–12)
NEUTROPHILS # BLD MANUAL: 4.51 THOUSAND/UL (ref 1.85–7.62)
NEUTS BAND NFR BLD MANUAL: 2 % (ref 0–8)
NEUTS SEG NFR BLD AUTO: 74 % (ref 43–75)
PHOSPHATE SERPL-MCNC: 3.4 MG/DL (ref 2.3–4.1)
PLATELET # BLD AUTO: 183 THOUSANDS/UL (ref 149–390)
PLATELET BLD QL SMEAR: ADEQUATE
PMV BLD AUTO: 9.8 FL (ref 8.9–12.7)
POTASSIUM SERPL-SCNC: 4.3 MMOL/L (ref 3.5–5.3)
PROT SERPL-MCNC: 6.7 G/DL (ref 6.4–8.4)
PTH-INTACT SERPL-MCNC: 58.6 PG/ML (ref 12–88)
RBC # BLD AUTO: 2.87 MILLION/UL (ref 3.81–5.12)
SODIUM SERPL-SCNC: 137 MMOL/L (ref 135–147)
WBC # BLD AUTO: 5.94 THOUSAND/UL (ref 4.31–10.16)

## 2025-01-28 PROCEDURE — 84100 ASSAY OF PHOSPHORUS: CPT

## 2025-01-28 PROCEDURE — 80048 BASIC METABOLIC PNL TOTAL CA: CPT

## 2025-01-28 PROCEDURE — 85027 COMPLETE CBC AUTOMATED: CPT

## 2025-01-28 PROCEDURE — 36415 COLL VENOUS BLD VENIPUNCTURE: CPT

## 2025-01-28 PROCEDURE — 86140 C-REACTIVE PROTEIN: CPT

## 2025-01-28 PROCEDURE — 82306 VITAMIN D 25 HYDROXY: CPT

## 2025-01-28 PROCEDURE — 83970 ASSAY OF PARATHORMONE: CPT

## 2025-01-28 PROCEDURE — 82784 ASSAY IGA/IGD/IGG/IGM EACH: CPT

## 2025-01-28 PROCEDURE — 85652 RBC SED RATE AUTOMATED: CPT

## 2025-01-28 PROCEDURE — 85007 BL SMEAR W/DIFF WBC COUNT: CPT

## 2025-01-28 PROCEDURE — 80076 HEPATIC FUNCTION PANEL: CPT

## 2025-02-03 ENCOUNTER — OFFICE VISIT (OUTPATIENT)
Dept: NEPHROLOGY | Facility: CLINIC | Age: 85
End: 2025-02-03
Payer: MEDICARE

## 2025-02-03 VITALS
HEIGHT: 65 IN | SYSTOLIC BLOOD PRESSURE: 130 MMHG | TEMPERATURE: 98 F | HEART RATE: 78 BPM | RESPIRATION RATE: 16 BRPM | OXYGEN SATURATION: 99 % | WEIGHT: 167 LBS | BODY MASS INDEX: 27.82 KG/M2 | DIASTOLIC BLOOD PRESSURE: 80 MMHG

## 2025-02-03 DIAGNOSIS — E83.9 CHRONIC KIDNEY DISEASE-MINERAL AND BONE DISORDER: ICD-10-CM

## 2025-02-03 DIAGNOSIS — M89.9 CHRONIC KIDNEY DISEASE-MINERAL AND BONE DISORDER: ICD-10-CM

## 2025-02-03 DIAGNOSIS — R33.9 URINARY RETENTION: ICD-10-CM

## 2025-02-03 DIAGNOSIS — N18.9 CHRONIC KIDNEY DISEASE-MINERAL AND BONE DISORDER: ICD-10-CM

## 2025-02-03 DIAGNOSIS — N18.4 CHRONIC RENAL DISEASE, STAGE IV (HCC): Primary | ICD-10-CM

## 2025-02-03 DIAGNOSIS — I10 HYPERTENSION, UNSPECIFIED TYPE: Chronic | ICD-10-CM

## 2025-02-03 PROCEDURE — 99214 OFFICE O/P EST MOD 30 MIN: CPT | Performed by: INTERNAL MEDICINE

## 2025-02-03 NOTE — ASSESSMENT & PLAN NOTE
Lab Results   Component Value Date    EGFR 24 01/28/2025    EGFR 27 01/05/2025    EGFR 30 07/22/2024    CREATININE 1.88 (H) 01/28/2025    CREATININE 1.70 (H) 01/05/2025    CREATININE 1.59 (H) 07/22/2024   PTH and phosphorus along with vitamin D are within acceptable range and will continue to monitor

## 2025-02-03 NOTE — ASSESSMENT & PLAN NOTE
Lab Results   Component Value Date    EGFR 24 01/28/2025    EGFR 27 01/05/2025    EGFR 30 07/22/2024    CREATININE 1.88 (H) 01/28/2025    CREATININE 1.70 (H) 01/05/2025    CREATININE 1.59 (H) 07/22/2024   Does have fluctuating kidney function.  Need more hydration.  Discussed with the patient.  I will recheck the labs in 2 months in 4 months as a part of the close monitoring.  She is also advised to avoid any nephrotoxic medication

## 2025-02-03 NOTE — PROGRESS NOTES
Name: Samira Allred      : 1940      MRN: [id number]  Encounter Provider: Zack Mooney MD  Encounter Date: 2/3/2025   Encounter department: [facility]  :  Assessment & Plan  Chronic renal disease, stage IV (HCC)  Lab Results   Component Value Date    EGFR 24 2025    EGFR 27 2025    EGFR 30 2024    CREATININE 1.88 (H) 2025    CREATININE 1.70 (H) 2025    CREATININE 1.59 (H) 2024   Does have fluctuating kidney function.  Need more hydration.  Discussed with the patient.  I will recheck the labs in 2 months in 4 months as a part of the close monitoring.  She is also advised to avoid any nephrotoxic medication         Chronic kidney disease-mineral and bone disorder  Lab Results   Component Value Date    EGFR 24 2025    EGFR 27 2025    EGFR 30 2024    CREATININE 1.88 (H) 2025    CREATININE 1.70 (H) 2025    CREATININE 1.59 (H) 2024   PTH and phosphorus along with vitamin D are within acceptable range and will continue to monitor         Urinary retention  Does a chronic Carrera catheter.  She did urinary test to be done to rule out any infection as urine does look cloudy       Hypertension, unspecified type  Very well-controlled       Everything discussed with the patient.  She will get urine test now to rule out infection.  I will see her back in 4 months but she will get blood and urine test in 2 months in 4 months    History of Present Illness   HPI  Samira Allred is a 84 y.o. female who presents CKD follow-up    Patient came for the follow-up    Does a permanent Carrera catheter because of urinary tension    She also had diffuse joint pain because of rheumatoid arthritis and on methotrexate as well as Rituxan    Being monitored by rheumatologist    There is a fluctuating kidney function which is concerned    No chest pain no palpitation no nausea no vomit          Review of Systems   Constitutional:   "Negative for fatigue.   HENT:  Negative for congestion.    Eyes:  Negative for photophobia and pain.   Respiratory:  Negative for chest tightness and shortness of breath.    Cardiovascular:  Negative for chest pain and palpitations.   Gastrointestinal:  Negative for abdominal distention, abdominal pain and blood in stool.   Endocrine: Negative for polydipsia.   Genitourinary:  Negative for difficulty urinating, dysuria, flank pain, hematuria and urgency.   Musculoskeletal:  Negative for arthralgias and back pain.   Skin:  Negative for rash.   Neurological:  Negative for dizziness, light-headedness and headaches.   Hematological:  Does not bruise/bleed easily.   Psychiatric/Behavioral:  Negative for behavioral problems. The patient is not nervous/anxious.           Objective   Pulse 78   Temp 98 °F (36.7 °C) (Temporal)   Resp 16   Ht 5' 5\" (1.651 m)   Wt 75.8 kg (167 lb)   LMP  (LMP Unknown)   SpO2 99%   BMI 27.79 kg/m²      Physical Exam  Constitutional:       General: She is not in acute distress.     Appearance: She is well-developed.   HENT:      Head: Normocephalic.      Mouth/Throat:      Mouth: Mucous membranes are moist.   Eyes:      General: No scleral icterus.     Conjunctiva/sclera: Conjunctivae normal.   Neck:      Vascular: No JVD.   Cardiovascular:      Rate and Rhythm: Normal rate.      Heart sounds: Normal heart sounds.   Pulmonary:      Effort: Pulmonary effort is normal.      Breath sounds: No wheezing.   Abdominal:      Palpations: Abdomen is soft.      Tenderness: There is no abdominal tenderness.   Musculoskeletal:         General: Normal range of motion.      Cervical back: Neck supple.   Skin:     General: Skin is warm.      Findings: No rash.   Neurological:      Mental Status: She is alert and oriented to person, place, and time.   Psychiatric:         Behavior: Behavior normal.         "

## 2025-02-03 NOTE — ASSESSMENT & PLAN NOTE
Does a chronic Carrera catheter.  She did urinary test to be done to rule out any infection as urine does look cloudy

## 2025-02-05 ENCOUNTER — LAB REQUISITION (OUTPATIENT)
Dept: LAB | Facility: HOSPITAL | Age: 85
End: 2025-02-05
Payer: MEDICARE

## 2025-02-05 DIAGNOSIS — N18.4 CHRONIC KIDNEY DISEASE, STAGE 4 (SEVERE) (HCC): ICD-10-CM

## 2025-02-05 LAB
BACTERIA UR QL AUTO: ABNORMAL /HPF
BILIRUB UR QL STRIP: NEGATIVE
CLARITY UR: ABNORMAL
COLOR UR: YELLOW
CREAT UR-MCNC: 25.7 MG/DL
GLUCOSE UR STRIP-MCNC: NEGATIVE MG/DL
HGB UR QL STRIP.AUTO: ABNORMAL
KETONES UR STRIP-MCNC: NEGATIVE MG/DL
LEUKOCYTE ESTERASE UR QL STRIP: ABNORMAL
NITRITE UR QL STRIP: NEGATIVE
NON-SQ EPI CELLS URNS QL MICRO: ABNORMAL /HPF
PH UR STRIP.AUTO: 6.5 [PH]
PROT UR STRIP-MCNC: ABNORMAL MG/DL
PROT UR-MCNC: 56.9 MG/DL
PROT/CREAT UR: 2.2 MG/G{CREAT} (ref 0–0.1)
RBC #/AREA URNS AUTO: ABNORMAL /HPF
SP GR UR STRIP.AUTO: 1.01 (ref 1–1.03)
UROBILINOGEN UR STRIP-ACNC: <2 MG/DL
WBC #/AREA URNS AUTO: ABNORMAL /HPF

## 2025-02-05 PROCEDURE — 87086 URINE CULTURE/COLONY COUNT: CPT | Performed by: INTERNAL MEDICINE

## 2025-02-05 PROCEDURE — 84156 ASSAY OF PROTEIN URINE: CPT | Performed by: INTERNAL MEDICINE

## 2025-02-05 PROCEDURE — 87077 CULTURE AEROBIC IDENTIFY: CPT | Performed by: INTERNAL MEDICINE

## 2025-02-05 PROCEDURE — 81001 URINALYSIS AUTO W/SCOPE: CPT | Performed by: INTERNAL MEDICINE

## 2025-02-05 PROCEDURE — 82570 ASSAY OF URINE CREATININE: CPT | Performed by: INTERNAL MEDICINE

## 2025-02-05 PROCEDURE — 87186 SC STD MICRODIL/AGAR DIL: CPT | Performed by: INTERNAL MEDICINE

## 2025-02-07 ENCOUNTER — RESULTS FOLLOW-UP (OUTPATIENT)
Dept: NEPHROLOGY | Facility: CLINIC | Age: 85
End: 2025-02-07

## 2025-02-07 DIAGNOSIS — N39.0 FREQUENT UTI: Primary | ICD-10-CM

## 2025-02-07 LAB — BACTERIA UR CULT: ABNORMAL

## 2025-02-07 RX ORDER — DIPHENHYDRAMINE HCL 25 MG
50 TABLET ORAL ONCE
Status: DISCONTINUED | OUTPATIENT
Start: 2025-02-10 | End: 2025-02-13 | Stop reason: HOSPADM

## 2025-02-07 RX ORDER — SODIUM CHLORIDE 9 MG/ML
20 INJECTION, SOLUTION INTRAVENOUS CONTINUOUS
Status: DISCONTINUED | OUTPATIENT
Start: 2025-02-10 | End: 2025-02-11 | Stop reason: HOSPADM

## 2025-02-07 RX ORDER — METHYLPREDNISOLONE SODIUM SUCCINATE 125 MG/2ML
100 INJECTION, POWDER, LYOPHILIZED, FOR SOLUTION INTRAMUSCULAR; INTRAVENOUS ONCE
Status: DISCONTINUED | OUTPATIENT
Start: 2025-02-10 | End: 2025-02-13 | Stop reason: HOSPADM

## 2025-02-07 RX ORDER — ACETAMINOPHEN 325 MG/1
975 TABLET ORAL ONCE
Status: DISCONTINUED | OUTPATIENT
Start: 2025-02-10 | End: 2025-02-13 | Stop reason: HOSPADM

## 2025-02-07 RX ORDER — NITROFURANTOIN 25; 75 MG/1; MG/1
100 CAPSULE ORAL 2 TIMES DAILY
Qty: 14 CAPSULE | Refills: 1 | Status: SHIPPED | OUTPATIENT
Start: 2025-02-07

## 2025-02-07 NOTE — TELEPHONE ENCOUNTER
----- Message from Zack Mooney MD sent at 2/7/2025 10:19 AM EST -----  I Called in antibiotic.  Please let her know

## 2025-02-10 ENCOUNTER — HOSPITAL ENCOUNTER (OUTPATIENT)
Dept: INFUSION CENTER | Facility: CLINIC | Age: 85
Discharge: HOME | End: 2025-02-10

## 2025-02-10 ENCOUNTER — TELEPHONE (OUTPATIENT)
Dept: INFUSION CENTER | Facility: CLINIC | Age: 85
End: 2025-02-10

## 2025-02-17 ENCOUNTER — HOSPITAL ENCOUNTER (EMERGENCY)
Facility: HOSPITAL | Age: 85
Discharge: HOME | End: 2025-02-17
Attending: EMERGENCY MEDICINE
Payer: MEDICARE

## 2025-02-17 VITALS
SYSTOLIC BLOOD PRESSURE: 146 MMHG | OXYGEN SATURATION: 98 % | HEART RATE: 80 BPM | RESPIRATION RATE: 18 BRPM | DIASTOLIC BLOOD PRESSURE: 95 MMHG | TEMPERATURE: 97.8 F

## 2025-02-17 DIAGNOSIS — T83.9XXA PROBLEM WITH FOLEY CATHETER, INITIAL ENCOUNTER (HCC): Primary | ICD-10-CM

## 2025-02-17 PROCEDURE — 99283 EMERGENCY DEPT VISIT LOW MDM: CPT

## 2025-02-17 PROCEDURE — 99284 EMERGENCY DEPT VISIT MOD MDM: CPT

## 2025-02-17 NOTE — ED PROVIDER NOTES
Time reflects when diagnosis was documented in both MDM as applicable and the Disposition within this note       Time User Action Codes Description Comment    2/17/2025  4:50 PM Ronda Owens Add [T83.9XXA] Problem with Carrera catheter, initial encounter (HCC)           ED Disposition       ED Disposition   Discharge    Condition   Stable    Date/Time   Mon Feb 17, 2025  4:50 PM    Comment   Samira Allred discharge to home/self care.                   Assessment & Plan       Medical Decision Making  Vital signs stable.  Afebrile.  Patient has no abdominal tenderness to palpation.  Carrera catheter is in place.  Carrera catheter bag was replaced and there was no leakage afterward. Carrera catheter draining appropriately.     Advised to f/u with PCP. Case discussed with Dr. Cuevas. Advised patient to return with any new or worsening symptoms including but not limited to fever, chills, pain with urination, dark or foul smelling urine, confusion, chest pain, shortness of breath, headache, visual disturbances, dizziness,  or any other concerning symptoms. Discussed assessment and plan with patient who verbalizes understanding and agrees with plan.             Medications - No data to display    ED Risk Strat Scores                                                History of Present Illness       Chief Complaint   Patient presents with    Urinary Catheter Problem     Pt c/o drainage around catheter connection. Pt denies leakage from urethra.        Past Medical History:   Diagnosis Date    Allergic angioedema     [id number] resolved    Angioedema     [id number] resolved    Arthritis     Breast cancer (Former[facility]) 2012    right     Chronic kidney disease     Disease of thyroid gland     GERD (gastroesophageal reflux disease)     Hemorrhage of anus and rectum     [id number] resolved    Hyperlipidemia     Hypertension     Hypocalcemia     [id number] resolved    Neuritis     thoracic and lumbosacral    Peptic ulcer     Rheumatoid arthritis ([facility])      Stage 3b chronic kidney disease (HCC) 05/07/2021    Vitamin D deficiency       Past Surgical History:   Procedure Laterality Date    ABDOMINAL SURGERY      BREAST BIOPSY Right 2012    BREAST LUMPECTOMY Right 2012    BREAST SURGERY      CHOLECYSTECTOMY      ELBOW SURGERY      GALLBLADDER SURGERY  1981    HIP SURGERY Right 09/18/2022    10/2023    ORTHOPEDIC SURGERY      for toes    CO CYSTO W/IRRIG & EVAC MULTPLE OBSTRUCTING CLOTS N/A 11/05/2023    Procedure: CYSTOSCOPY EVACUATION OF CLOTS & fulguration;  Surgeon: Uriah Schwartz MD;  Location: [facility];  Service: Urology    TRANSURETHRAL RESECTION OF BLADDER TUMOR N/A 11/05/2023    Procedure: TRANSURETHRAL RESECTION OF BLADDER TUMOR (TURBT);  Surgeon: Uriah Schwartz MD;  Location: [facility];  Service: Urology    US BREAST CYST ASPIRATION RIGHT INITIAL Right 4/24/2018    US GUIDED BREAST BIOPSY RIGHT COMPLETE Right 08/15/2022      Family History   Problem Relation Age of Onset    Breast cancer Mother     Kidney failure Father     Other Father         uremia    Lung cancer Brother     Breast cancer Maternal Aunt     Breast cancer Maternal Aunt     Breast cancer Maternal Aunt     Breast cancer Maternal Aunt     Breast cancer Maternal Aunt     Stomach cancer Maternal Uncle       Social History     Tobacco Use    Smoking status: Never     Passive exposure: Past    Smokeless tobacco: Never   Vaping Use    Vaping status: Never Used   Substance Use Topics    Alcohol use: Not Currently    Drug use: No      E-Cigarette/Vaping    E-Cigarette Use Never User       E-Cigarette/Vaping Substances    Nicotine No     THC No     CBD No     Flavoring No     Other No     Unknown No       I have reviewed and agree with the history as documented.     Patient is a 84-year-old female who presents complaining of leaking urinary catheter onset yesterday.  Patient states that she has a urinary catheter due to urinary retention and has it changed every month.  Patient states  that she had it changed 2 weeks ago but that yesterday the urinary catheter started to leak from where the silicone aspect of the Carrera meets the plastic tubing of the drain.  States that she had to tape it but that it still leaked.  Patient denies any abdominal pain, urinary symptoms, fever, chills, headache, visual disturbances, dizziness, chest pain, shortness of breath, or any other symptoms at this time.      History provided by:  Patient   used: No        Review of Systems   Constitutional: Negative.  Negative for chills and fever.   HENT: Negative.  Negative for ear pain and sore throat.    Eyes: Negative.  Negative for pain and visual disturbance.   Respiratory: Negative.  Negative for cough and shortness of breath.    Cardiovascular: Negative.  Negative for chest pain and palpitations.   Gastrointestinal: Negative.  Negative for abdominal pain, diarrhea, nausea and vomiting.   Genitourinary: Negative.  Negative for difficulty urinating, dysuria, flank pain, frequency and hematuria.   Musculoskeletal: Negative.  Negative for arthralgias and back pain.   Skin: Negative.  Negative for color change and rash.   Neurological: Negative.  Negative for dizziness, seizures, syncope, weakness, numbness and headaches.   All other systems reviewed and are negative.          Objective       ED Triage Vitals [02/17/25 1555]   Temperature Pulse Blood Pressure Respirations SpO2 Patient Position - Orthostatic VS   97.8 °F (36.6 °C) 80 146/95 18 98 % Sitting      Temp Source Heart Rate Source BP Location FiO2 (%) Pain Score    Oral Monitor Right arm -- No Pain      Vitals      Date and Time Temp Pulse SpO2 Resp BP Pain Score FACES Pain Rating User   02/17/25 1555 97.8 °F (36.6 °C) 80 98 % 18 146/95 No Pain -- RAP            Physical Exam  Vitals and nursing note reviewed.   Constitutional:       General: She is awake. She is not in acute distress.     Appearance: Normal appearance. She is well-developed  and well-groomed. She is not ill-appearing, toxic-appearing or diaphoretic.      Comments: Patient is resting comfortably on the bed, in no acute distress.  Pleasant and cooperative.   HENT:      Head: Normocephalic and atraumatic.      Right Ear: External ear normal.      Left Ear: External ear normal.      Nose: Nose normal.      Mouth/Throat:      Lips: Pink.   Eyes:      General: Lids are normal.      Extraocular Movements: Extraocular movements intact.      Conjunctiva/sclera: Conjunctivae normal.   Cardiovascular:      Rate and Rhythm: Normal rate.      Heart sounds: Murmur heard.   Pulmonary:      Effort: Pulmonary effort is normal. No tachypnea or respiratory distress.      Breath sounds: Normal breath sounds and air entry. No stridor. No decreased breath sounds, wheezing, rhonchi or rales.   Abdominal:      Palpations: Abdomen is soft.      Tenderness: There is no abdominal tenderness. There is no guarding or rebound.   Musculoskeletal:         General: No swelling. Normal range of motion.      Cervical back: Normal range of motion.   Skin:     General: Skin is warm and dry.      Capillary Refill: Capillary refill takes less than 2 seconds.   Neurological:      Mental Status: She is alert and oriented to person, place, and time.   Psychiatric:         Attention and Perception: Attention normal.         Mood and Affect: Mood normal.         Speech: Speech normal.         Behavior: Behavior normal. Behavior is cooperative.         Results Reviewed       None            No orders to display       Procedures    ED Medication and Procedure Management   Prior to Admission Medications   Prescriptions Last Dose Informant Patient Reported? Taking?   Calcium Citrate-Vitamin D (CITRACAL + D PO)  Self Yes No   Sig: Take by mouth 2 (two) times a day   Cholecalciferol (VITAMIN D-3 PO)  Self Yes No   Sig: Take 2,000 Units by mouth daily.   IRON, FERROUS SULFATE, PO  Self Yes No   Sig: Take by mouth   Multiple Vitamin  (MULTIVITAMIN) tablet  Self Yes No   Sig: Take 1 tablet by mouth daily.   ascorbic acid (VITAMIN C) 500 mg tablet  Self Yes No   Sig: Take 500 mg by mouth daily.   folic acid (FOLVITE) 1 mg tablet  Self No No   Si daily   hydroCHLOROthiazide 12.5 mg tablet  Self No No   Sig: Take 1 tablet (12.5 mg total) by mouth daily   levothyroxine (Synthroid) 75 mcg tablet  Self No No   Sig: Take 1 tablet (75 mcg total) by mouth in the morning   magnesium oxide (MAG-OX) 400 mg  Self No No   Sig: Take 1 tablet (400 mg total) by mouth 2 (two) times a day   methotrexate 2.5 MG tablet  Self Yes No   Si pills weekly   nitrofurantoin (MACROBID) 100 mg capsule   No No   Sig: Take 1 capsule (100 mg total) by mouth 2 (two) times a day   polyethylene glycol (MIRALAX) 17 g packet  Self No No   Sig: Take 17 g by mouth daily as needed (constipation)   potassium chloride (Klor-Con M10) 10 mEq tablet  Self No No   Sig: Take 1 tablet (10 mEq total) by mouth 2 (two) times a day   riTUXimab (RITUXAN IV)  Self Yes No   Sig: Inject into a catheter in a vein   rosuvastatin (CRESTOR) 5 mg tablet  Self No No   Sig: Take 1 tablet (5 mg total) by mouth daily      Facility-Administered Medications: None     Discharge Medication List as of 2025  4:51 PM        CONTINUE these medications which have NOT CHANGED    Details   ascorbic acid (VITAMIN C) 500 mg tablet Take 500 mg by mouth daily., Historical Med      Calcium Citrate-Vitamin D (CITRACAL + D PO) Take by mouth 2 (two) times a day, Historical Med      Cholecalciferol (VITAMIN D-3 PO) Take 2,000 Units by mouth daily., Historical Med      folic acid (FOLVITE) 1 mg tablet 1 daily, Normal      hydroCHLOROthiazide 12.5 mg tablet Take 1 tablet (12.5 mg total) by mouth daily, Starting Thu 2024, Until Sun 2025, Normal      IRON, FERROUS SULFATE, PO Take by mouth, Historical Med      levothyroxine (Synthroid) 75 mcg tablet Take 1 tablet (75 mcg total) by mouth in the morning, Starting  Tue 1/14/2025, Normal      magnesium oxide (MAG-OX) 400 mg Take 1 tablet (400 mg total) by mouth 2 (two) times a day, Starting Mon 11/5/2018, No Print      methotrexate 2.5 MG tablet 4 pills weekly, Historical Med      Multiple Vitamin (MULTIVITAMIN) tablet Take 1 tablet by mouth daily., Historical Med      nitrofurantoin (MACROBID) 100 mg capsule Take 1 capsule (100 mg total) by mouth 2 (two) times a day, Starting Fri 2/7/2025, Normal      polyethylene glycol (MIRALAX) 17 g packet Take 17 g by mouth daily as needed (constipation), Starting Wed 11/8/2023, Normal      potassium chloride (Klor-Con M10) 10 mEq tablet Take 1 tablet (10 mEq total) by mouth 2 (two) times a day, Starting Tue 12/3/2024, Normal      riTUXimab (RITUXAN IV) Inject into a catheter in a vein, Historical Med      rosuvastatin (CRESTOR) 5 mg tablet Take 1 tablet (5 mg total) by mouth daily, Starting Mon 12/2/2024, Normal           No discharge procedures on file.  ED SEPSIS DOCUMENTATION   Time reflects when diagnosis was documented in both MDM as applicable and the Disposition within this note       Time User Action Codes Description Comment    2/17/2025  4:50 PM Ronda Owens Add [T83.9XXA] Problem with Carrera catheter, initial encounter (HCC)                  Ronda Owens PA-C  02/17/25 2018

## 2025-02-21 RX ORDER — METHYLPREDNISOLONE SODIUM SUCCINATE 125 MG/2ML
100 INJECTION, POWDER, LYOPHILIZED, FOR SOLUTION INTRAMUSCULAR; INTRAVENOUS ONCE
Status: COMPLETED | OUTPATIENT
Start: 2025-02-24 | End: 2025-02-24

## 2025-02-21 RX ORDER — SODIUM CHLORIDE 9 MG/ML
20 INJECTION, SOLUTION INTRAVENOUS CONTINUOUS
Status: DISCONTINUED | OUTPATIENT
Start: 2025-02-24 | End: 2025-02-25 | Stop reason: HOSPADM

## 2025-02-21 RX ORDER — DIPHENHYDRAMINE HCL 25 MG
50 TABLET ORAL ONCE
Status: COMPLETED | OUTPATIENT
Start: 2025-02-24 | End: 2025-02-24

## 2025-02-21 RX ORDER — ACETAMINOPHEN 325 MG/1
975 TABLET ORAL ONCE
Status: COMPLETED | OUTPATIENT
Start: 2025-02-24 | End: 2025-02-24

## 2025-02-24 ENCOUNTER — HOSPITAL ENCOUNTER (OUTPATIENT)
Dept: INFUSION CENTER | Facility: CLINIC | Age: 85
Discharge: HOME | End: 2025-02-24
Payer: MEDICARE

## 2025-02-24 VITALS
RESPIRATION RATE: 18 BRPM | HEART RATE: 75 BPM | TEMPERATURE: 97.5 F | SYSTOLIC BLOOD PRESSURE: 174 MMHG | DIASTOLIC BLOOD PRESSURE: 82 MMHG

## 2025-02-24 PROCEDURE — 96415 CHEMO IV INFUSION ADDL HR: CPT

## 2025-02-24 PROCEDURE — 96413 CHEMO IV INFUSION 1 HR: CPT

## 2025-02-24 PROCEDURE — 96375 TX/PRO/DX INJ NEW DRUG ADDON: CPT

## 2025-02-24 RX ADMIN — SODIUM CHLORIDE 20 ML/HR: 0.9 INJECTION, SOLUTION INTRAVENOUS at 12:44

## 2025-02-24 RX ADMIN — ACETAMINOPHEN 975 MG: 325 TABLET, FILM COATED ORAL at 12:45

## 2025-02-24 RX ADMIN — METHYLPREDNISOLONE SODIUM SUCCINATE 100 MG: 125 INJECTION, POWDER, FOR SOLUTION INTRAMUSCULAR; INTRAVENOUS at 12:44

## 2025-02-24 RX ADMIN — DIPHENHYDRAMINE HYDROCHLORIDE 50 MG: 25 TABLET ORAL at 12:45

## 2025-02-24 RX ADMIN — RITUXIMAB 1000 MG: 10 INJECTION, SOLUTION INTRAVENOUS at 13:27

## 2025-02-24 NOTE — PROGRESS NOTES
Pt. Tolerated remaining Rituxan infusion without complications. Aware of next appt. On 3/10/25 at 12PM.

## 2025-02-24 NOTE — PROGRESS NOTES
Reached out to office RN Ana who verified with Dr. Lee that we are okay to proceed with Rituxan infusion with BP of 174/70. Pt advised to reach out to her PCP regarding elevated BP.

## 2025-02-24 NOTE — PROGRESS NOTES
Pt to clinic for Rituxan. Pt offers no complaints today. Tolerating infusion without complications. Aware of next appointment on 3/10/25 at 1200. AVS printed. Report given to Brandie GUDINO RN.

## 2025-02-27 NOTE — PROGRESS NOTES
Name: Samira Allred      : 1940      MRN: [id number]  Encounter Provider: Rosa Mckeon PA-C  Encounter Date: 2025   Encounter department: [facility] [place]  :  Assessment & Plan  UTI (urinary tract infection)  Asymptomatic   Resolved  Orders:    Ambulatory Referral to Urology    Problem with urinary catheter (HCC)  Resolved  Orders:    Ambulatory Referral to Urology    Chronic retention of urine  Continue routine catheter changes with VNA  US kidney and bladder and BMP now  Repeat imaging and labs in 1 year  Orders:    US kidney and bladder; Future    US kidney and bladder; Future    Basic metabolic panel; Future    Basic metabolic panel; Future    Adenocarcinoma of breast, unspecified laterality (HCC)  Follows with cancer center for this           History of Present Illness   Samira Allred is a 84 y.o. female who presents for follow up.    Patient with chronic urinary retention managed with herrera catheter. Patient has CKD and follows with nephrology. Recently treated for UTI, however, is asymptomatic. Denies symptoms today. VNA changes catheter.     Review of Systems   Constitutional:  Negative for activity change, appetite change, chills and fever.   HENT:  Negative for congestion and trouble swallowing.    Respiratory:  Negative for cough and shortness of breath.    Cardiovascular:  Negative for chest pain, palpitations and leg swelling.   Gastrointestinal:  Negative for abdominal pain, constipation, diarrhea, nausea and vomiting.   Genitourinary:  Negative for difficulty urinating, dysuria, flank pain, frequency, hematuria and urgency.   Musculoskeletal:  Negative for back pain and gait problem.   Skin:  Negative for wound.   Allergic/Immunologic: Negative for immunocompromised state.   Neurological:  Negative for dizziness and syncope.   Hematological:  Does not bruise/bleed easily.   Psychiatric/Behavioral:  Negative for confusion.    All other systems reviewed and are  "negative.       Objective   /88 (BP Location: Left arm, Patient Position: Sitting, Cuff Size: Standard)   Pulse 87   Temp 98.2 °F (36.8 °C)   Resp 18   Ht 5' 5\" (1.651 m)   Wt 79.1 kg (174 lb 6.4 oz)   LMP  (LMP Unknown)   SpO2 96%   BMI 29.02 kg/m²     Physical Exam  Constitutional:       Appearance: Normal appearance.   HENT:      Head: Normocephalic.      Nose: Nose normal.      Mouth/Throat:      Pharynx: Oropharynx is clear.   Eyes:      Pupils: Pupils are equal, round, and reactive to light.   Pulmonary:      Effort: Pulmonary effort is normal.   Genitourinary:     Comments: Carrera catheter in place draining well  Musculoskeletal:      Cervical back: Normal range of motion.   Skin:     General: Skin is warm.   Neurological:      General: No focal deficit present.      Mental Status: She is alert and oriented to person, place, and time. Mental status is at baseline.   Psychiatric:         Mood and Affect: Mood normal.         Behavior: Behavior normal.         Thought Content: Thought content normal.         Judgment: Judgment normal.           Results   No results found for: \"PSA\"  Lab Results   Component Value Date    CALCIUM 9.6 01/28/2025    K 4.3 01/28/2025    CO2 28 01/28/2025     01/28/2025    BUN 30 (H) 01/28/2025    CREATININE 1.88 (H) 01/28/2025     Lab Results   Component Value Date    WBC 5.94 01/28/2025    HGB 9.7 (L) 01/28/2025    HCT 30.0 (L) 01/28/2025     (H) 01/28/2025     01/28/2025       Office Urine Dip  No results found for this or any previous visit (from the past hour).      "

## 2025-02-28 ENCOUNTER — OFFICE VISIT (OUTPATIENT)
Dept: UROLOGY | Facility: CLINIC | Age: 85
End: 2025-02-28
Payer: MEDICARE

## 2025-02-28 VITALS
SYSTOLIC BLOOD PRESSURE: 152 MMHG | WEIGHT: 174.4 LBS | TEMPERATURE: 98.2 F | BODY MASS INDEX: 29.06 KG/M2 | DIASTOLIC BLOOD PRESSURE: 88 MMHG | HEIGHT: 65 IN | HEART RATE: 87 BPM | RESPIRATION RATE: 18 BRPM | OXYGEN SATURATION: 96 %

## 2025-02-28 DIAGNOSIS — R33.9 CHRONIC RETENTION OF URINE: Primary | ICD-10-CM

## 2025-02-28 DIAGNOSIS — N39.0 UTI (URINARY TRACT INFECTION): ICD-10-CM

## 2025-02-28 DIAGNOSIS — T83.9XXA PROBLEM WITH URINARY CATHETER (HCC): ICD-10-CM

## 2025-02-28 DIAGNOSIS — C50.919 ADENOCARCINOMA OF BREAST, UNSPECIFIED LATERALITY (HCC): ICD-10-CM

## 2025-02-28 PROCEDURE — 99213 OFFICE O/P EST LOW 20 MIN: CPT | Performed by: PHYSICIAN ASSISTANT

## 2025-03-04 ENCOUNTER — APPOINTMENT (OUTPATIENT)
Dept: LAB | Facility: HOSPITAL | Age: 85
End: 2025-03-04
Payer: MEDICARE

## 2025-03-04 ENCOUNTER — TELEPHONE (OUTPATIENT)
Dept: LAB | Facility: HOSPITAL | Age: 85
End: 2025-03-04

## 2025-03-04 ENCOUNTER — TELEPHONE (OUTPATIENT)
Age: 85
End: 2025-03-04

## 2025-03-04 DIAGNOSIS — R33.9 CHRONIC RETENTION OF URINE: ICD-10-CM

## 2025-03-04 DIAGNOSIS — D53.9 MACROCYTIC ANEMIA: ICD-10-CM

## 2025-03-04 LAB
ANION GAP SERPL CALCULATED.3IONS-SCNC: 8 MMOL/L (ref 4–13)
BUN SERPL-MCNC: 31 MG/DL (ref 5–25)
CALCIUM SERPL-MCNC: 9.3 MG/DL (ref 8.4–10.2)
CHLORIDE SERPL-SCNC: 102 MMOL/L (ref 96–108)
CO2 SERPL-SCNC: 27 MMOL/L (ref 21–32)
CREAT SERPL-MCNC: 1.59 MG/DL (ref 0.6–1.3)
FERRITIN SERPL-MCNC: 242 NG/ML (ref 11–307)
GFR SERPL CREATININE-BSD FRML MDRD: 29 ML/MIN/1.73SQ M
GLUCOSE P FAST SERPL-MCNC: 113 MG/DL (ref 65–99)
IRON SATN MFR SERPL: 29 % (ref 15–50)
IRON SERPL-MCNC: 93 UG/DL (ref 50–212)
POTASSIUM SERPL-SCNC: 4.1 MMOL/L (ref 3.5–5.3)
SODIUM SERPL-SCNC: 137 MMOL/L (ref 135–147)
TIBC SERPL-MCNC: 319.2 UG/DL (ref 250–450)
TRANSFERRIN SERPL-MCNC: 228 MG/DL (ref 203–362)
UIBC SERPL-MCNC: 226 UG/DL (ref 155–355)

## 2025-03-04 PROCEDURE — 82728 ASSAY OF FERRITIN: CPT

## 2025-03-04 PROCEDURE — 80048 BASIC METABOLIC PNL TOTAL CA: CPT

## 2025-03-04 PROCEDURE — 83550 IRON BINDING TEST: CPT

## 2025-03-04 PROCEDURE — 83540 ASSAY OF IRON: CPT

## 2025-03-04 PROCEDURE — 36415 COLL VENOUS BLD VENIPUNCTURE: CPT

## 2025-03-04 NOTE — TELEPHONE ENCOUNTER
Jacqui from [facility] calls with the patient. Her blood pressure today was 158/80. Patient has been taking her blood pressure medication and offers to complaints/symptoms. She would like to make an office visit to discuss. Visit made for Friday. She will call back with any issues.

## 2025-03-07 ENCOUNTER — OFFICE VISIT (OUTPATIENT)
Dept: INTERNAL MEDICINE CLINIC | Facility: CLINIC | Age: 85
End: 2025-03-07
Payer: MEDICARE

## 2025-03-07 VITALS
WEIGHT: 173 LBS | DIASTOLIC BLOOD PRESSURE: 74 MMHG | RESPIRATION RATE: 16 BRPM | HEART RATE: 82 BPM | HEIGHT: 65 IN | SYSTOLIC BLOOD PRESSURE: 146 MMHG | BODY MASS INDEX: 28.82 KG/M2 | OXYGEN SATURATION: 99 %

## 2025-03-07 DIAGNOSIS — I10 PRIMARY HYPERTENSION: Primary | ICD-10-CM

## 2025-03-07 DIAGNOSIS — D68.2 HEREDITARY DEFICIENCY OF OTHER CLOTTING FACTORS (HCC): ICD-10-CM

## 2025-03-07 DIAGNOSIS — M05.79 RHEUMATOID ARTHRITIS INVOLVING MULTIPLE SITES WITH POSITIVE RHEUMATOID FACTOR (HCC): ICD-10-CM

## 2025-03-07 DIAGNOSIS — Z99.89 USES WALKER: ICD-10-CM

## 2025-03-07 PROCEDURE — 99214 OFFICE O/P EST MOD 30 MIN: CPT | Performed by: INTERNAL MEDICINE

## 2025-03-07 PROCEDURE — G2211 COMPLEX E/M VISIT ADD ON: HCPCS | Performed by: INTERNAL MEDICINE

## 2025-03-07 RX ORDER — METHYLPREDNISOLONE SODIUM SUCCINATE 125 MG/2ML
100 INJECTION, POWDER, LYOPHILIZED, FOR SOLUTION INTRAMUSCULAR; INTRAVENOUS ONCE
Status: COMPLETED | OUTPATIENT
Start: 2025-03-10 | End: 2025-03-10

## 2025-03-07 RX ORDER — AMLODIPINE BESYLATE 2.5 MG/1
2.5 TABLET ORAL DAILY
Qty: 30 TABLET | Refills: 5 | Status: SHIPPED | OUTPATIENT
Start: 2025-03-07

## 2025-03-07 RX ORDER — SODIUM CHLORIDE 9 MG/ML
20 INJECTION, SOLUTION INTRAVENOUS CONTINUOUS
Status: DISCONTINUED | OUTPATIENT
Start: 2025-03-10 | End: 2025-03-11 | Stop reason: HOSPADM

## 2025-03-07 RX ORDER — ACETAMINOPHEN 325 MG/1
975 TABLET ORAL ONCE
Status: COMPLETED | OUTPATIENT
Start: 2025-03-10 | End: 2025-03-10

## 2025-03-07 RX ORDER — DIPHENHYDRAMINE HCL 25 MG
50 TABLET ORAL ONCE
Status: COMPLETED | OUTPATIENT
Start: 2025-03-10 | End: 2025-03-10

## 2025-03-07 NOTE — PROGRESS NOTES
"Name: Samira Allred      : 1940      MRN: [id number]  Encounter Provider: Layne Bacon MD  Encounter Date: 3/7/2025   Encounter department: [facility] [place]  :  Assessment & Plan  Primary hypertension  She has been having few high BP's recently.  Add amlodipine. Continue HCTZ.  Orders:    amLODIPine (NORVASC) 2.5 mg tablet; Take 1 tablet (2.5 mg total) by mouth daily      Rheumatoid arthritis involving multiple sites with positive rheumatoid factor (HCC)  Will be seeing rheumatology next week. She is back on rituxan, on methotrexate.           Hereditary deficiency of other clotting factors (HCC)  Noted.         Uses walker  Noted.             Depression Screening and Follow-up Plan: Patient was screened for depression during today's encounter. They screened negative with a PHQ-2 score of 0.        History of Present Illness   HPI  Review of Systems   Constitutional:  Negative for chills and fever.   HENT:  Negative for ear pain and sore throat.    Eyes:  Negative for pain and visual disturbance.   Respiratory:  Negative for cough and shortness of breath.    Cardiovascular:  Negative for chest pain and palpitations.   Gastrointestinal:  Negative for abdominal pain and vomiting.   Genitourinary:  Negative for dysuria and hematuria.   Musculoskeletal:  Negative for arthralgias and back pain.   Skin:  Negative for color change and rash.   Neurological:  Negative for seizures and syncope.   All other systems reviewed and are negative.      Objective   /74 (BP Location: Right arm, Patient Position: Sitting, Cuff Size: Adult)   Pulse 82   Resp 16   Ht 5' 5\" (1.651 m)   Wt 78.5 kg (173 lb)   LMP  (LMP Unknown)   SpO2 99%   BMI 28.79 kg/m²      Physical Exam  Vitals and nursing note reviewed.   Constitutional:       General: She is not in acute distress.     Appearance: She is well-developed.   HENT:      Head: Normocephalic and atraumatic.   Cardiovascular:      Rate and " Rhythm: Normal rate.      Heart sounds: No murmur heard.  Pulmonary:      Effort: Pulmonary effort is normal. No respiratory distress.   Abdominal:      Tenderness: There is no abdominal tenderness.   Musculoskeletal:         General: No swelling.   Skin:     General: Skin is dry.   Neurological:      Mental Status: She is alert.      Gait: Gait abnormal.   Psychiatric:         Mood and Affect: Mood normal.

## 2025-03-07 NOTE — ASSESSMENT & PLAN NOTE
She has been having few high BP's recently.  Add amlodipine. Continue HCTZ.  Orders:    amLODIPine (NORVASC) 2.5 mg tablet; Take 1 tablet (2.5 mg total) by mouth daily

## 2025-03-10 ENCOUNTER — HOSPITAL ENCOUNTER (OUTPATIENT)
Dept: INFUSION CENTER | Facility: CLINIC | Age: 85
Discharge: HOME | End: 2025-03-10
Payer: MEDICARE

## 2025-03-10 ENCOUNTER — TELEPHONE (OUTPATIENT)
Dept: LAB | Facility: HOSPITAL | Age: 85
End: 2025-03-10

## 2025-03-10 VITALS
TEMPERATURE: 97.5 F | RESPIRATION RATE: 18 BRPM | DIASTOLIC BLOOD PRESSURE: 69 MMHG | HEART RATE: 67 BPM | SYSTOLIC BLOOD PRESSURE: 151 MMHG

## 2025-03-10 PROCEDURE — 96375 TX/PRO/DX INJ NEW DRUG ADDON: CPT

## 2025-03-10 PROCEDURE — 96413 CHEMO IV INFUSION 1 HR: CPT

## 2025-03-10 PROCEDURE — 96415 CHEMO IV INFUSION ADDL HR: CPT

## 2025-03-10 RX ADMIN — RITUXIMAB 1000 MG: 10 INJECTION, SOLUTION INTRAVENOUS at 13:24

## 2025-03-10 RX ADMIN — DIPHENHYDRAMINE HYDROCHLORIDE 50 MG: 25 TABLET ORAL at 12:23

## 2025-03-10 RX ADMIN — METHYLPREDNISOLONE SODIUM SUCCINATE 100 MG: 125 INJECTION, POWDER, FOR SOLUTION INTRAMUSCULAR; INTRAVENOUS at 12:23

## 2025-03-10 RX ADMIN — ACETAMINOPHEN 975 MG: 325 TABLET, FILM COATED ORAL at 12:23

## 2025-03-10 RX ADMIN — SODIUM CHLORIDE 20 ML/HR: 0.9 INJECTION, SOLUTION INTRAVENOUS at 12:22

## 2025-03-10 NOTE — PROGRESS NOTES
Pt here for rituxan day 15 infusion, offering no complaints.  Left arm IV placed with positive blood return noted.  Tolerated infusion without incident.  PIV removed.  AVS given.  No other appts at this time.  Walked out in stable condition.

## 2025-03-12 ENCOUNTER — HOSPITAL ENCOUNTER (OUTPATIENT)
Dept: ULTRASOUND IMAGING | Facility: HOSPITAL | Age: 85
Discharge: HOME | End: 2025-03-12
Payer: MEDICARE

## 2025-03-12 DIAGNOSIS — R33.9 CHRONIC RETENTION OF URINE: ICD-10-CM

## 2025-03-12 PROCEDURE — 76775 US EXAM ABDO BACK WALL LIM: CPT

## 2025-03-14 ENCOUNTER — RA CDI HCC (OUTPATIENT)
Dept: OTHER | Facility: HOSPITAL | Age: 85
End: 2025-03-14

## 2025-03-21 ENCOUNTER — OFFICE VISIT (OUTPATIENT)
Dept: INTERNAL MEDICINE CLINIC | Facility: CLINIC | Age: 85
End: 2025-03-21
Payer: MEDICARE

## 2025-03-21 VITALS
HEIGHT: 65 IN | WEIGHT: 173 LBS | OXYGEN SATURATION: 96 % | BODY MASS INDEX: 28.82 KG/M2 | HEART RATE: 75 BPM | SYSTOLIC BLOOD PRESSURE: 144 MMHG | RESPIRATION RATE: 16 BRPM | DIASTOLIC BLOOD PRESSURE: 60 MMHG

## 2025-03-21 DIAGNOSIS — Z99.89 USES WALKER: ICD-10-CM

## 2025-03-21 DIAGNOSIS — I10 PRIMARY HYPERTENSION: Primary | ICD-10-CM

## 2025-03-21 PROCEDURE — G2211 COMPLEX E/M VISIT ADD ON: HCPCS | Performed by: INTERNAL MEDICINE

## 2025-03-21 PROCEDURE — 99214 OFFICE O/P EST MOD 30 MIN: CPT | Performed by: INTERNAL MEDICINE

## 2025-03-21 RX ORDER — AMLODIPINE BESYLATE 2.5 MG/1
2.5 TABLET ORAL DAILY
Qty: 90 TABLET | Refills: 3 | Status: SHIPPED | OUTPATIENT
Start: 2025-03-21

## 2025-03-21 NOTE — ASSESSMENT & PLAN NOTE
She has been having few high BP's recently.      Continue HCTZ and amlodipine, better controlled today.  Orders:    amLODIPine (NORVASC) 2.5 mg tablet; Take 1 tablet (2.5 mg total) by mouth daily

## 2025-03-21 NOTE — PROGRESS NOTES
"Name: Samira Allred      : 1940      MRN: [id number]  Encounter Provider: Layne Bacon MD  Encounter Date: 3/21/2025   Encounter department: [facility] [place]  :  Assessment & Plan  Primary hypertension  She has been having few high BP's recently.      Continue HCTZ and amlodipine, better controlled today.  Orders:    amLODIPine (NORVASC) 2.5 mg tablet; Take 1 tablet (2.5 mg total) by mouth daily        Uses walker  Noted.             Depression Screening and Follow-up Plan: Patient was screened for depression during today's encounter. They screened negative with a PHQ-2 score of 0.        History of Present Illness   Here for HTN f/u.      Review of Systems   Constitutional:  Negative for chills and fever.   HENT:  Negative for ear pain and sore throat.    Eyes:  Negative for pain and visual disturbance.   Respiratory:  Negative for cough and shortness of breath.    Cardiovascular:  Negative for chest pain and palpitations.   Gastrointestinal:  Negative for abdominal pain and vomiting.   Genitourinary:  Negative for dysuria and hematuria.   Musculoskeletal:  Negative for arthralgias and back pain.   Skin:  Negative for color change and rash.   Neurological:  Negative for seizures and syncope.   All other systems reviewed and are negative.      Objective   /60 (BP Location: Left arm, Patient Position: Sitting, Cuff Size: Adult)   Pulse 75   Resp 16   Ht 5' 5\" (1.651 m)   Wt 78.5 kg (173 lb)   LMP  (LMP Unknown)   SpO2 96%   BMI 28.79 kg/m²      Physical Exam  Vitals and nursing note reviewed.   Constitutional:       General: She is not in acute distress.     Appearance: She is well-developed.   HENT:      Head: Normocephalic and atraumatic.   Eyes:      Conjunctiva/sclera: Conjunctivae normal.   Cardiovascular:      Rate and Rhythm: Normal rate.      Heart sounds: No murmur heard.  Pulmonary:      Effort: Pulmonary effort is normal. No respiratory distress. "   Abdominal:      Tenderness: There is no abdominal tenderness.   Musculoskeletal:         General: No swelling.   Skin:     General: Skin is warm and dry.      Capillary Refill: Capillary refill takes less than 2 seconds.   Neurological:      Mental Status: She is alert.   Psychiatric:         Mood and Affect: Mood normal.

## 2025-04-07 ENCOUNTER — TELEPHONE (OUTPATIENT)
Age: 85
End: 2025-04-07

## 2025-04-07 NOTE — TELEPHONE ENCOUNTER
Patient canceled her appt for today with Dr. Bacon for a BP check. Patients nurse was in to see her today and her blood pressure was 132/68. Please advise.

## 2025-04-16 ENCOUNTER — TELEPHONE (OUTPATIENT)
Age: 85
End: 2025-04-16

## 2025-04-16 NOTE — TELEPHONE ENCOUNTER
[facility] calling to [facility] for catheter change in 4 weeks. She stated they changed the patient's catheter today but they will be discharging her from home health after today.    Scheduled patient for 5/14 at 11:30 with nurse in [place].

## 2025-05-14 ENCOUNTER — PROCEDURE VISIT (OUTPATIENT)
Dept: UROLOGY | Facility: CLINIC | Age: 85
End: 2025-05-14
Payer: MEDICARE

## 2025-05-14 VITALS
HEART RATE: 79 BPM | TEMPERATURE: 98.2 F | SYSTOLIC BLOOD PRESSURE: 142 MMHG | DIASTOLIC BLOOD PRESSURE: 84 MMHG | BODY MASS INDEX: 29.32 KG/M2 | OXYGEN SATURATION: 98 % | HEIGHT: 65 IN | WEIGHT: 176 LBS

## 2025-05-14 DIAGNOSIS — R33.9 CHRONIC RETENTION OF URINE: Primary | ICD-10-CM

## 2025-05-14 PROCEDURE — 51702 INSERT TEMP BLADDER CATH: CPT

## 2025-05-19 ENCOUNTER — OFFICE VISIT (OUTPATIENT)
Dept: INTERNAL MEDICINE CLINIC | Facility: CLINIC | Age: 85
End: 2025-05-19
Payer: MEDICARE

## 2025-05-19 ENCOUNTER — TELEPHONE (OUTPATIENT)
Dept: UROLOGY | Facility: AMBULATORY SURGERY CENTER | Age: 85
End: 2025-05-19

## 2025-05-19 VITALS
SYSTOLIC BLOOD PRESSURE: 136 MMHG | WEIGHT: 175 LBS | BODY MASS INDEX: 29.16 KG/M2 | HEART RATE: 99 BPM | HEIGHT: 65 IN | OXYGEN SATURATION: 98 % | DIASTOLIC BLOOD PRESSURE: 86 MMHG | TEMPERATURE: 96 F

## 2025-05-19 DIAGNOSIS — R79.9 ABNORMAL BLOOD CHEMISTRY: ICD-10-CM

## 2025-05-19 DIAGNOSIS — Z00.00 MEDICARE ANNUAL WELLNESS VISIT, SUBSEQUENT: Primary | ICD-10-CM

## 2025-05-19 DIAGNOSIS — Z99.89 USES WALKER: ICD-10-CM

## 2025-05-19 DIAGNOSIS — Z78.0 POSTMENOPAUSAL: ICD-10-CM

## 2025-05-19 DIAGNOSIS — E78.5 HYPERLIPIDEMIA, UNSPECIFIED HYPERLIPIDEMIA TYPE: ICD-10-CM

## 2025-05-19 DIAGNOSIS — M05.79 RHEUMATOID ARTHRITIS INVOLVING MULTIPLE SITES WITH POSITIVE RHEUMATOID FACTOR (HCC): ICD-10-CM

## 2025-05-19 DIAGNOSIS — E03.8 OTHER SPECIFIED HYPOTHYROIDISM: ICD-10-CM

## 2025-05-19 DIAGNOSIS — I10 PRIMARY HYPERTENSION: Chronic | ICD-10-CM

## 2025-05-19 DIAGNOSIS — I12.9 HYPERTENSIVE RENAL DISEASE, STAGE 1 THROUGH STAGE 4 OR UNSPECIFIED CHRONIC KIDNEY DISEASE: ICD-10-CM

## 2025-05-19 PROBLEM — I82.4Z9 EMBOLISM FROM THROMBOSIS OF VEIN OF DISTAL END OF LOWER EXTREMITY (HCC): Status: ACTIVE | Noted: 2020-04-27

## 2025-05-19 PROBLEM — E03.9 ACQUIRED HYPOTHYROIDISM: Status: ACTIVE | Noted: 2018-02-06

## 2025-05-19 PROBLEM — D52.0 MEGALOBLASTIC ANEMIA DUE TO FOLATE DEFICIENCY: Status: ACTIVE | Noted: 2022-09-30

## 2025-05-19 PROBLEM — E63.9 UNDERNUTRITION: Status: ACTIVE | Noted: 2023-10-12

## 2025-05-19 PROBLEM — M06.9 RHEUMATOID ARTHRITIS (HCC): Status: ACTIVE | Noted: 2018-02-05

## 2025-05-19 PROBLEM — K27.9 PEPTIC ULCER: Status: ACTIVE | Noted: 2023-10-11

## 2025-05-19 PROCEDURE — G2211 COMPLEX E/M VISIT ADD ON: HCPCS | Performed by: INTERNAL MEDICINE

## 2025-05-19 PROCEDURE — 99214 OFFICE O/P EST MOD 30 MIN: CPT | Performed by: INTERNAL MEDICINE

## 2025-05-19 PROCEDURE — G0439 PPPS, SUBSEQ VISIT: HCPCS | Performed by: INTERNAL MEDICINE

## 2025-05-19 NOTE — PATIENT INSTRUCTIONS
Medicare Preventive Visit Patient Instructions  Thank you for completing your Welcome to Medicare Visit or Medicare Annual Wellness Visit today. Your next wellness visit will be due in one year (5/20/2026).  The screening/preventive services that you may require over the next 5-10 years are detailed below. Some tests may not apply to you based off risk factors and/or age. Screening tests ordered at today's visit but not completed yet may show as past due. Also, please note that scanned in results may not display below.  Preventive Screenings:  Service Recommendations Previous Testing/Comments   Colorectal Cancer Screening  * Colonoscopy    * Fecal Occult Blood Test (FOBT)/Fecal Immunochemical Test (FIT)  * Fecal DNA/Cologuard Test  * Flexible Sigmoidoscopy Age: 45-75 years old   Colonoscopy: every 10 years (may be performed more frequently if at higher risk)  OR  FOBT/FIT: every 1 year  OR  Cologuard: every 3 years  OR  Sigmoidoscopy: every 5 years  Screening may be recommended earlier than age 45 if at higher risk for colorectal cancer. Also, an individualized decision between you and your healthcare provider will decide whether screening between the ages of 76-85 would be appropriate. Colonoscopy: Not on file  FOBT/FIT: Not on file  Cologuard: Not on file  Sigmoidoscopy: Not on file          Breast Cancer Screening Age: 40+ years old  Frequency: every 1-2 years  Not required if history of left and right mastectomy Mammogram: 02/20/2024    History Breast Cancer   Cervical Cancer Screening Between the ages of 21-29, pap smear recommended once every 3 years.   Between the ages of 30-65, can perform pap smear with HPV co-testing every 5 years.   Recommendations may differ for women with a history of total hysterectomy, cervical cancer, or abnormal pap smears in past. Pap Smear: Not on file    Screening Not Indicated   Hepatitis C Screening Once for adults born between 1945 and 1965  More frequently in patients at high  risk for Hepatitis C Hep C Antibody: Not on file        Diabetes Screening 1-2 times per year if you're at risk for diabetes or have pre-diabetes Fasting glucose: 113 mg/dL (3/4/2025)  A1C: 5.4 % (9/14/2024)  Screening Current   Cholesterol Screening Once every 5 years if you don't have a lipid disorder. May order more often based on risk factors. Lipid panel: 09/06/2023    Screening Not Indicated  History Lipid Disorder     Other Preventive Screenings Covered by Medicare:  Abdominal Aortic Aneurysm (AAA) Screening: covered once if your at risk. You're considered to be at risk if you have a family history of AAA.  Lung Cancer Screening: covers low dose CT scan once per year if you meet all of the following conditions: (1) Age 55-77; (2) No signs or symptoms of lung cancer; (3) Current smoker or have quit smoking within the last 15 years; (4) You have a tobacco smoking history of at least 20 pack years (packs per day multiplied by number of years you smoked); (5) You get a written order from a healthcare provider.  Glaucoma Screening: covered annually if you're considered high risk: (1) You have diabetes OR (2) Family history of glaucoma OR (3)  aged 50 and older OR (4)  American aged 65 and older  Osteoporosis Screening: covered every 2 years if you meet one of the following conditions: (1) You're estrogen deficient and at risk for osteoporosis based off medical history and other findings; (2) Have a vertebral abnormality; (3) On glucocorticoid therapy for more than 3 months; (4) Have primary hyperparathyroidism; (5) On osteoporosis medications and need to assess response to drug therapy.   Last bone density test (DXA Scan): 09/17/2021.  HIV Screening: covered annually if you're between the age of 15-65. Also covered annually if you are younger than 15 and older than 65 with risk factors for HIV infection. For pregnant patients, it is covered up to 3 times per  pregnancy.    Immunizations:  Immunization Recommendations   Influenza Vaccine Annual influenza vaccination during flu season is recommended for all persons aged >= 6 months who do not have contraindications   Pneumococcal Vaccine   * Pneumococcal conjugate vaccine = PCV13 (Prevnar 13), PCV15 (Vaxneuvance), PCV20 (Prevnar 20)  * Pneumococcal polysaccharide vaccine = PPSV23 (Pneumovax) Adults 19-65 yo with certain risk factors or if 65+ yo  If never received any pneumonia vaccine: recommend Prevnar 20 (PCV20)  Give PCV20 if previously received 1 dose of PCV13 or PPSV23   Hepatitis B Vaccine 3 dose series if at intermediate or high risk (ex: diabetes, end stage renal disease, liver disease)   Respiratory syncytial virus (RSV) Vaccine - COVERED BY MEDICARE PART D  * RSVPreF3 (Arexvy) CDC recommends that adults 60 years of age and older may receive a single dose of RSV vaccine using shared clinical decision-making (SCDM)   Tetanus (Td) Vaccine - COST NOT COVERED BY MEDICARE PART B Following completion of primary series, a booster dose should be given every 10 years to maintain immunity against tetanus. Td may also be given as tetanus wound prophylaxis.   Tdap Vaccine - COST NOT COVERED BY MEDICARE PART B Recommended at least once for all adults. For pregnant patients, recommended with each pregnancy.   Shingles Vaccine (Shingrix) - COST NOT COVERED BY MEDICARE PART B  2 shot series recommended in those 19 years and older who have or will have weakened immune systems or those 50 years and older     Health Maintenance Due:      Topic Date Due   • DXA SCAN  09/17/2023     Immunizations Due:      Topic Date Due   • Pneumococcal Vaccine: 65+ Years (4 of 4 - PCV20 or PCV21) 03/30/2022   • COVID-19 Vaccine (7 - 2024-25 season) 09/01/2024     Advance Directives   What are advance directives?  Advance directives are legal documents that state your wishes and plans for medical care. These plans are made ahead of time in case you  lose your ability to make decisions for yourself. Advance directives can apply to any medical decision, such as the treatments you want, and if you want to donate organs.   What are the types of advance directives?  There are many types of advance directives, and each state has rules about how to use them. You may choose a combination of any of the following:  Living will:  This is a written record of the treatment you want. You can also choose which treatments you do not want, which to limit, and which to stop at a certain time. This includes surgery, medicine, IV fluid, and tube feedings.   Durable power of  for healthcare (DPAHC):  This is a written record that states who you want to make healthcare choices for you when you are unable to make them for yourself. This person, called a proxy, is usually a family member or a friend. You may choose more than 1 proxy.  Do not resuscitate (DNR) order:  A DNR order is used in case your heart stops beating or you stop breathing. It is a request not to have certain forms of treatment, such as CPR. A DNR order may be included in other types of advance directives.  Medical directive:  This covers the care that you want if you are in a coma, near death, or unable to make decisions for yourself. You can list the treatments you want for each condition. Treatment may include pain medicine, surgery, blood transfusions, dialysis, IV or tube feedings, and a ventilator (breathing machine).  Values history:  This document has questions about your views, beliefs, and how you feel and think about life. This information can help others choose the care that you would choose.  Why are advance directives important?  An advance directive helps you control your care. Although spoken wishes may be used, it is better to have your wishes written down. Spoken wishes can be misunderstood, or not followed. Treatments may be given even if you do not want them. An advance directive may make  it easier for your family to make difficult choices about your care.   Weight Management   Why it is important to manage your weight:  Being overweight increases your risk of health conditions such as heart disease, high blood pressure, type 2 diabetes, and certain types of cancer. It can also increase your risk for osteoarthritis, sleep apnea, and other respiratory problems. Aim for a slow, steady weight loss. Even a small amount of weight loss can lower your risk of health problems.  How to lose weight safely:  A safe and healthy way to lose weight is to eat fewer calories and get regular exercise. You can lose up about 1 pound a week by decreasing the number of calories you eat by 500 calories each day.   Healthy meal plan for weight management:  A healthy meal plan includes a variety of foods, contains fewer calories, and helps you stay healthy. A healthy meal plan includes the following:  Eat whole-grain foods more often.  A healthy meal plan should contain fiber. Fiber is the part of grains, fruits, and vegetables that is not broken down by your body. Whole-grain foods are healthy and provide extra fiber in your diet. Some examples of whole-grain foods are whole-wheat breads and pastas, oatmeal, brown rice, and bulgur.  Eat a variety of vegetables every day.  Include dark, leafy greens such as spinach, kale, boo greens, and mustard greens. Eat yellow and orange vegetables such as carrots, sweet potatoes, and winter squash.   Eat a variety of fruits every day.  Choose fresh or canned fruit (canned in its own juice or light syrup) instead of juice. Fruit juice has very little or no fiber.  Eat low-fat dairy foods.  Drink fat-free (skim) milk or 1% milk. Eat fat-free yogurt and low-fat cottage cheese. Try low-fat cheeses such as mozzarella and other reduced-fat cheeses.  Choose meat and other protein foods that are low in fat.  Choose beans or other legumes such as split peas or lentils. Choose fish, skinless  poultry (chicken or turkey), or lean cuts of red meat (beef or pork). Before you cook meat or poultry, cut off any visible fat.   Use less fat and oil.  Try baking foods instead of frying them. Add less fat, such as margarine, sour cream, regular salad dressing and mayonnaise to foods. Eat fewer high-fat foods. Some examples of high-fat foods include french fries, doughnuts, ice cream, and cakes.  Eat fewer sweets.  Limit foods and drinks that are high in sugar. This includes candy, cookies, regular soda, and sweetened drinks.  Exercise:  Exercise at least 30 minutes per day on most days of the week. Some examples of exercise include walking, biking, dancing, and swimming. You can also fit in more physical activity by taking the stairs instead of the elevator or parking farther away from stores. Ask your healthcare provider about the best exercise plan for you.    © Copyright [employer] 2018 Information is for End User's use only and may not be sold, redistributed or otherwise used for commercial purposes. All illustrations and images included in CareNotes® are the copyrighted property of A.D.A[employer]M., Inc. or [employer]

## 2025-05-19 NOTE — ASSESSMENT & PLAN NOTE
Continue current levothyroxine dose.    Orders:    TSH, 3rd generation with Free T4 reflex; Future

## 2025-05-19 NOTE — ASSESSMENT & PLAN NOTE
Continue nephrology f/u.             Lab Results   Component Value Date    EGFR 29 03/04/2025    EGFR 24 01/28/2025    EGFR 27 01/05/2025    CREATININE 1.59 (H) 03/04/2025    CREATININE 1.88 (H) 01/28/2025    CREATININE 1.70 (H) 01/05/2025

## 2025-05-19 NOTE — ASSESSMENT & PLAN NOTE
She has been having few high BP's recently.      Continue HCTZ and amlodipine, better controlled today.

## 2025-05-19 NOTE — PROGRESS NOTES
Name: Samira Allred      : 1940      MRN: [id number]  Encounter Provider: Layne Bacon MD  Encounter Date: 2025   Encounter department: [facility] [place]  :  Assessment & Plan  Medicare annual wellness visit, subsequent  Completed.       Primary hypertension  She has been having few high BP's recently.      Continue HCTZ and amlodipine, better controlled today.             Other specified hypothyroidism  Continue current levothyroxine dose.    Orders:    TSH, 3rd generation with Free T4 reflex; Future      Rheumatoid arthritis involving multiple sites with positive rheumatoid factor (HCC)  Will be seeing rheumatology next week. She is back on rituxan, on methotrexate.             Hypertensive renal disease, stage 1 through stage 4 or unspecified chronic kidney disease    Continue nephrology f/u.             Lab Results   Component Value Date    EGFR 29 2025    EGFR 24 2025    EGFR 27 2025    CREATININE 1.59 (H) 2025    CREATININE 1.88 (H) 2025    CREATININE 1.70 (H) 2025     Postmenopausal    Orders:    DXA bone density spine hip and pelvis; Future    Hyperlipidemia, unspecified hyperlipidemia type    Orders:    Lipid Panel with Direct LDL reflex; Future    Abnormal blood chemistry    Orders:    Hemoglobin A1C; Future    Uses walker  Noted. Denies falls.         Depression Screening and Follow-up Plan: Patient was screened for depression during today's encounter. They screened negative with a PHQ-2 score of 0.        Preventive health issues were discussed with patient, and age appropriate screening tests were ordered as noted in patient's After Visit Summary. Personalized health advice and appropriate referrals for health education or preventive services given if needed, as noted in patient's After Visit Summary.    History of Present Illness     Patient is here for routine follow up, reviewed chronic medical problems, ordered labs for  next visit including CBC CMP TSH A1C LIPID. Reviewed labs for this visit.       Patient Care Team:  Layne Bacon MD as PCP - General (Internal Medicine)  MD Zack Prince MD (Nephrology)    Review of Systems   Constitutional:  Negative for chills and fever.   HENT:  Negative for ear pain and sore throat.    Eyes:  Negative for pain and visual disturbance.   Respiratory:  Negative for cough and shortness of breath.    Cardiovascular:  Negative for chest pain and palpitations.   Gastrointestinal:  Negative for abdominal pain and vomiting.   Genitourinary:  Negative for dysuria and hematuria.   Musculoskeletal:  Negative for arthralgias and back pain.   Skin:  Negative for color change and rash.   Neurological:  Negative for seizures and syncope.   All other systems reviewed and are negative.    Medical History Reviewed by provider this encounter:  Tobacco  Allergies  Meds  Problems  Med Hx  Surg Hx  Fam Hx       Annual Wellness Visit Questionnaire   Samira is here for her Subsequent Wellness visit. Last Medicare Wellness visit information reviewed, patient interviewed and updates made to the record today.      Health Risk Assessment:   Patient rates overall health as fair. Patient feels that their physical health rating is slightly worse. Patient is satisfied with their life. Eyesight was rated as same. Hearing was rated as same. Patient feels that their emotional and mental health rating is same. Patients states they are never, rarely angry. Patient states they are sometimes unusually tired/fatigued. Pain experienced in the last 7 days has been some. Patient's pain rating has been 5/10. Patient states that she has experienced no weight loss or gain in last 6 months.     Depression Screening:   PHQ-2 Score: 0      Fall Risk Screening:   In the past year, patient has experienced: no history of falling in past year      Urinary Incontinence Screening:   Patient has not leaked urine accidently  in the last six months.     Home Safety:  Patient has trouble with stairs inside or outside of their home. Patient has working smoke alarms and has working carbon monoxide detector. Home safety hazards include: none.     Nutrition:   Current diet is Regular.     Medications:   Patient is not currently taking any over-the-counter supplements. Patient is able to manage medications.     Activities of Daily Living (ADLs)/Instrumental Activities of Daily Living (IADLs):   Walk and transfer into and out of bed and chair?: Yes  Dress and groom yourself?: Yes    Bathe or shower yourself?: Yes    Feed yourself? Yes  Do your laundry/housekeeping?: Yes  Manage your money, pay your bills and track your expenses?: Yes  Make your own meals?: Yes    Do your own shopping?: Yes    Previous Hospitalizations:   Any hospitalizations or ED visits within the last 12 months?: No      Advance Care Planning:   Living will: No    Durable POA for healthcare: No    Advanced directive: No      Cognitive Screening:   Provider or family/friend/caregiver concerned regarding cognition?: No    Preventive Screenings      Cardiovascular Screening:    General: History Lipid Disorder and Risks and Benefits Discussed    Due for: Lipid Panel      Diabetes Screening:     General: Risks and Benefits Discussed    Due for: Blood Glucose      Colorectal Cancer Screening:     General: Screening Not Indicated      Breast Cancer Screening:     General: History Breast Cancer and Risks and Benefits Discussed    Due for: Mammogram        Cervical Cancer Screening:    General: Screening Not Indicated      Osteoporosis Screening:    General: Risks and Benefits Discussed    Due for: DXA Axial      Abdominal Aortic Aneurysm (AAA) Screening:        General: Screening Not Indicated      Lung Cancer Screening:     General: Screening Not Indicated      Hepatitis C Screening:    General: Patient Declines    Immunizations:  - Immunizations due: Prevnar 20    Screening, Brief  "Intervention, and Referral to Treatment (SBIRT)     Screening  Typical number of drinks in a day: 0  Typical number of drinks in a week: 0  Interpretation: Low risk drinking behavior.    Single Item Drug Screening:  How often have you used an illegal drug (including marijuana) or a prescription medication for non-medical reasons in the past year? never    Single Item Drug Screen Score: 0  Interpretation: Negative screen for possible drug use disorder    Brief Intervention  Alcohol & drug use screenings were reviewed. No concerns regarding substance use disorder identified.     Other Counseling Topics:   Car/seat belt/driving safety and regular weightbearing exercise.     Social Drivers of Health     Financial Resource Strain: Low Risk  (10/4/2023)    Received from [facility]    Overall Financial Resource Strain (CARDIA)     Difficulty of Paying Living Expenses: Not hard at all   Food Insecurity: No Food Insecurity (5/19/2025)    Nursing - Inadequate Food Risk Classification     Worried About Running Out of Food in the Last Year: Never true     Ran Out of Food in the Last Year: Never true   Transportation Needs: No Transportation Needs (5/19/2025)    PRAPARE - Transportation     Lack of Transportation (Medical): No     Lack of Transportation (Non-Medical): No   Housing Stability: Low Risk  (5/19/2025)    Housing Stability Vital Sign     Unable to Pay for Housing in the Last Year: No     Number of Times Moved in the Last Year: 0     Homeless in the Last Year: No   Utilities: Not At Risk (5/19/2025)    [facility] Utilities     Threatened with loss of utilities: No     No results found.    Objective   /86 (BP Location: Left arm, Patient Position: Sitting, Cuff Size: Standard)   Pulse 99   Temp (!) 96 °F (35.6 °C) (Temporal)   Ht 5' 5\" (1.651 m)   Wt 79.4 kg (175 lb)   LMP  (LMP Unknown)   SpO2 98%   BMI 29.12 kg/m²     Physical Exam  Vitals and nursing note reviewed.   Constitutional:       " General: She is not in acute distress.     Appearance: She is well-developed.   HENT:      Head: Normocephalic and atraumatic.     Eyes:      Conjunctiva/sclera: Conjunctivae normal.       Cardiovascular:      Rate and Rhythm: Normal rate and regular rhythm.      Heart sounds: No murmur heard.  Pulmonary:      Effort: Pulmonary effort is normal. No respiratory distress.      Breath sounds: Normal breath sounds.   Abdominal:      Tenderness: There is no abdominal tenderness.     Musculoskeletal:         General: No swelling.      Cervical back: Neck supple.     Skin:     General: Skin is warm and dry.      Capillary Refill: Capillary refill takes less than 2 seconds.     Neurological:      Mental Status: She is alert.      Gait: Gait abnormal.     Psychiatric:         Mood and Affect: Mood normal.

## 2025-05-19 NOTE — PROGRESS NOTES
"5/14/2025  Samira Allred is a 84 y.o. female  [id number]    Diagnosis:      Patient presents for routine herrera change managed by our office    Plan:  To follow up as scheduled   Patient instructed to call with any questions or concerns in the meantime.    No orders of the defined types were placed in this encounter.       Vitals:    05/14/25 1443   BP: 142/84   BP Location: Left arm   Patient Position: Sitting   Cuff Size: Standard   Pulse: 79   Temp: 98.2 °F (36.8 °C)   SpO2: 98%   Weight: 79.8 kg (176 lb)   Height: 5' 5\" (1.651 m)           Procedure:      Universal Protocol:  procedure performed by consultantConsent: Verbal consent obtained  Consent given by: patient  Patient understanding: patient states understanding of the procedure being performed  Patient consent: the patient's understanding of the procedure matches consent given  Procedure consent: procedure consent matches procedure scheduled  Patient identity confirmed: verbally with patient    Bladder catheterization    Date/Time: 5/14/2025 2:30 PM    Performed by: Jewell Armenta RN  Authorized by: Anton Sanchez DO    Patient location:  Bedside  Consent:     Consent given by:  Patient  Universal protocol:     Procedure explained and questions answered to patient or proxy's satisfaction: yes      Patient identity confirmed:  Verbally with patient  Pre-procedure details:     Procedure purpose:  Therapeutic  Anesthesia (see MAR for exact dosages):     Anesthesia method:  None  Procedure details:     Bladder irrigation: no      Catheter insertion:  Indwelling    Approach: natural orifice      Catheter type:  Herrera    Catheter size:  18 Fr    Number of attempts:  1    Successful placement: yes      Urine characteristics:  Yellow  Post-procedure details:     Patient tolerance of procedure:  Tolerated well, no immediate complications  Comments:      Previous herrera removed after the deflation of an intact balloon. Site prepped with betadine solution. " New herrera inserted without issue and yellow urine was returned. 10 ml sterile water was inserted into the new herrera balloon. New leg bag was attached to herrera and applied to patient leg.          Jewell Armenta RN

## 2025-05-30 NOTE — H&P
[facility]  H&P  Name: Samira Allred 83 y.o. female I MRN: [id number]  Unit/Bed#: ED 22 I Date of Admission: 1/18/2024   Date of Service: 1/18/2024 I Hospital Day: 0      Assessment/Plan   UTI (urinary tract infection)  Assessment & Plan  Urinalysis showing innumerable WBC  Awaiting culture  Past pseudomonal UTI 12/23    Plan  Empiric treatment with Cefepime  Narrow per culture results  Monitor for signs of systemic infection    Urinary retention  Assessment & Plan    Herrera removed around 2 weeks ago.  Was in place for retention  Had been urinating fine for the past 2 weeks but experienced suprapubic fullness/pain with inability to void this morning which prompted ED visit  Presents with UTI and MARCELO both likely 2/2 urinary retention    Plan  Keep herrera in place  Monitor for patency of herrera catheter  Review results of kidney and bladder US    MARCELO (acute kidney injury) (HCC)  Assessment & Plan  Recent Labs     01/18/24  0937   CREATININE 3.00*   EGFR 13     Estimated Creatinine Clearance: 13.6 mL/min (A) (by C-G formula based on SCr of 3 mg/dL (H)).    Baseline CKD IV with creatinine of 1.5  MARCELO likely postrenal 2/2 urinary retention    Plan  Continue IV fluid hydration  Herrera catheter in place  Continue to trend BMP    Chronic renal disease, stage IV (HCC)  Assessment & Plan  Lab Results   Component Value Date    EGFR 13 01/18/2024    EGFR 30 12/18/2023    EGFR 28 12/17/2023    CREATININE 3.00 (H) 01/18/2024    CREATININE 1.56 (H) 12/18/2023    CREATININE 1.68 (H) 12/17/2023     Baseline creatinine around 1.5  Currently presenting with MARCELO on CKD in the setting of urinary retention    Anemia  Assessment & Plan  Recent Labs     01/18/24  0937   HGB 9.5*     Chronically anemic   Likely d.t CKD    Plan  Continue to trend CBC  Consider transfusion if Hgb <7    Diarrhea  Assessment & Plan  Patient endorsed diarrhea for the past month  She has been mostly continent of brown liquid stool that occurs  typically once a day  Denies melena  Does not wake patient up at night  Recent hospitalization with antibiotic administration for urosepsis with pseudomonas    Plan  Awaiting stool antigen for C. Difficile, enteric stool pcr panel  Place patient on contact precautions  Continue IV hydration       VTE Pharmacologic Prophylaxis: VTE Score: 4 Moderate Risk (Score 3-4) - Pharmacological DVT Prophylaxis Contraindicated. Sequential Compression Devices Ordered.  Code Status: Prior Full  Discussion with Patient/Family:     Anticipated Length of Stay: Patient will be admitted on an observation basis with an anticipated length of stay of less than 2 midnights secondary to MARCELO, UTI, urinary retention.    Chief Complaint:   Chief Complaint   Patient presents with    Urinary Retention     Pt came in by EMS c/o urinary retention after having her herrera taken out a few weeks ago.        History of Present Illness:  Samira Allred is a 83 y.o. female with pmh of HTN, RA, diastolic dysfunction, HLN, breast cancer, stage IV CKD who presents with abdominal pain and acute urinary retention. Pt was seen in 12/23 for pseudomonal urosepsis 2/2 to UTI as well as upper GI bleed and was treated for Dieulafoy lesion by GI endoscopy. Pt has history of urinary retention and was discharged w/ herrera. Herrera was removed by urology 2 weeks ago and has been urinating normally until about 2 days ago. Yesterday she started having increased, sharp, diffuse abdominal pain and trouble initiating micturition, which prompted her to call EMS. She states she had a large volume void right before EMS came, which helped relieve her symptoms. She also has been having consistent watery diarrhea since being discharged from the hospital a month ago. She denies any blood in her stool or melena and describes the stool as brown liquid. She denies any fever, CP, SOB, back pain, flank pain, nausea, or vomiting.     PMHx of CKD, RA, HTN, HLN, diastolic dysfunction, breast  adenocarcinoma        Review of Systems:  Review of Systems   Constitutional:  Negative for appetite change, chills, fatigue and fever.   HENT:  Negative for congestion and rhinorrhea.    Respiratory:  Negative for cough and shortness of breath.    Cardiovascular:  Negative for chest pain and palpitations.   Gastrointestinal:  Positive for abdominal pain and diarrhea. Negative for blood in stool, constipation, nausea and vomiting.   Genitourinary:  Positive for decreased urine volume and difficulty urinating. Negative for dysuria, flank pain, frequency, hematuria and urgency.   Musculoskeletal:  Negative for arthralgias and back pain.   Skin:  Negative for color change and rash.   Neurological:  Negative for headaches.          Past Medical and Surgical History:   Past Medical History:   Diagnosis Date    Allergic angioedema     [id number] resolved    Angioedema     [id number] resolved    Arthritis     Breast cancer (HCC) 2012    right     Chronic kidney disease     Disease of thyroid gland     Hemorrhage of anus and rectum     [id number] resolved    Hyperlipidemia     Hypertension     Hypocalcemia     [id number] resolved    Neuritis     thoracic and lumbosacral    Peptic ulcer     Rheumatoid arthritis (HCC)     Stage 3b chronic kidney disease (HCC) 5/7/2021    Vitamin D deficiency        Past Surgical History:   Procedure Laterality Date    ABDOMINAL SURGERY      BREAST BIOPSY Right 2012    BREAST LUMPECTOMY Right 2012    BREAST SURGERY      CHOLECYSTECTOMY      ELBOW SURGERY      GALLBLADDER SURGERY  1981    HIP SURGERY Right 09/18/2022    10/2023    ORTHOPEDIC SURGERY      for toes    MA CYSTO W/IRRIG & EVAC MULTPLE OBSTRUCTING CLOTS N/A 11/5/2023    Procedure: CYSTOSCOPY EVACUATION OF CLOTS & fulguration;  Surgeon: Uriah Schwartz MD;  Location: [facility];  Service: Urology    TRANSURETHRAL RESECTION OF BLADDER TUMOR N/A 11/5/2023    Procedure: TRANSURETHRAL RESECTION OF BLADDER TUMOR (TURBT);  Surgeon:  Uriah Schwartz MD;  Location: [facility];  Service: Urology    US GUIDED BREAST BIOPSY RIGHT COMPLETE Right 08/15/2022       Meds/Allergies:  Prior to Admission medications    Medication Sig Start Date End Date Taking? Authorizing Provider   ascorbic acid (VITAMIN C) 500 mg tablet Take 500 mg by mouth daily.    Historical Provider, MD   Calcium Citrate-Vitamin D (CITRACAL + D PO) Take by mouth 2 (two) times a day    Historical Provider, MD   Cholecalciferol (VITAMIN D-3 PO) Take 2,000 Units by mouth daily.    Historical Provider, MD   folic acid (FOLVITE) 1 mg tablet 1 daily 1/6/21   PUJA Horner   levothyroxine (Synthroid) 75 mcg tablet Take 1 tablet (75 mcg total) by mouth in the morning 5/1/23   Layne Bacon MD   magnesium oxide (MAG-OX) 400 mg Take 1 tablet (400 mg total) by mouth 2 (two) times a day 11/5/18   PUJA Horner   Multiple Vitamin (MULTIVITAMIN) tablet Take 1 tablet by mouth daily.    Historical Provider, MD   pantoprazole (PROTONIX) 40 mg tablet Take 1 tablet (40 mg total) by mouth 2 (two) times a day 12/18/23   Emanuel Corley MD   polyethylene glycol (MIRALAX) 17 g packet Take 17 g by mouth daily as needed (constipation) 11/8/23   Shruthi Baker MD   Psyllium (Metamucil) WAFR Take once daily 3/7/23   Layne Bacon MD   riTUXimab (RITUXAN) 500 mg/50 mL Inject into a catheter in a vein every 6 (six) months 8/20/15   Historical Provider, MD   rosuvastatin (CRESTOR) 5 mg tablet TAKE 1 TABLET DAILY 8/23/23   Layne Bacon MD     I have reviewed home medications with patient personally.    Allergies:   Allergies   Allergen Reactions    Lisinopril Anaphylaxis    Codeine Other (See Comments)     Nausea/vomiting      Other      Annotation - [id number]: tounge swelling    Oxycodone      Annotation - 11Mar2013: NOT TOLERATE    Penicillins Hives    Sulfamethoxazole-Trimethoprim     Ciprofloxacin Rash       Social History:  Marital Status:    Occupation:   Patient  Pre-hospital Living Situation: Home  Patient Pre-hospital Level of Mobility: walks with walker  Patient Pre-hospital Diet Restrictions:   Substance Use History:   Social History     Substance and Sexual Activity   Alcohol Use Not Currently     Social History     Tobacco Use   Smoking Status Never    Passive exposure: Past   Smokeless Tobacco Never     Social History     Substance and Sexual Activity   Drug Use No       Family History:  Family History   Problem Relation Age of Onset    Breast cancer Mother     Kidney failure Father     Other Father         uremia    Lung cancer Brother     Breast cancer Maternal Aunt     Breast cancer Maternal Aunt     Breast cancer Maternal Aunt     Breast cancer Maternal Aunt     Breast cancer Maternal Aunt     Stomach cancer Maternal Uncle        Physical Exam:     Vitals:   Blood Pressure: (!) 176/121 (01/18/24 1230)  Pulse: 99 (01/18/24 1230)  Temperature: 98.9 °F (37.2 °C) (01/18/24 0832)  Temp Source: Oral (01/18/24 0832)  Respirations: 16 (01/18/24 1230)  SpO2: 96 % (01/18/24 1230)    Physical Exam  Constitutional:       General: She is not in acute distress.     Appearance: Normal appearance. She is not ill-appearing or toxic-appearing.   HENT:      Mouth/Throat:      Mouth: Mucous membranes are moist.      Pharynx: Oropharynx is clear.   Eyes:      Extraocular Movements: Extraocular movements intact.   Cardiovascular:      Rate and Rhythm: Normal rate and regular rhythm.      Heart sounds: Murmur heard.      Comments: Systolic murmur III/VI over right second intercostal space, not a new finding  0-1+ edema of lower extremities bilaterally  Pulmonary:      Effort: Pulmonary effort is normal. No respiratory distress.      Breath sounds: Normal breath sounds. No wheezing.   Abdominal:      General: There is no distension.      Palpations: Abdomen is soft.      Tenderness: There is abdominal tenderness.      Comments: Mild suprapubic tenderness   Genitourinary:     Comments: No  CVA tenderness  Musculoskeletal:         General: No tenderness.   Skin:     General: Skin is warm and dry.      Capillary Refill: Capillary refill takes less than 2 seconds.   Neurological:      General: No focal deficit present.      Mental Status: She is alert.              Additional Data:     Lab Results:  Results from last 7 days   Lab Units 01/18/24  0937   WBC Thousand/uL 8.45   HEMOGLOBIN g/dL 9.5*   HEMATOCRIT % 29.6*   PLATELETS Thousands/uL 396*   NEUTROS PCT % 85*   LYMPHS PCT % 3*   MONOS PCT % 10   EOS PCT % 1     Results from last 7 days   Lab Units 01/18/24  0937   SODIUM mmol/L 131*   POTASSIUM mmol/L 5.0   CHLORIDE mmol/L 103   CO2 mmol/L 19*   BUN mg/dL 40*   CREATININE mg/dL 3.00*   ANION GAP mmol/L 9   CALCIUM mg/dL 8.3*   GLUCOSE RANDOM mg/dL 130                 Results from last 7 days   Lab Units 01/18/24  1128 01/18/24  0937   LACTIC ACID mmol/L 1.5 2.8*       Imaging Results Reviewed as noted below  US kidney and bladder   Final Result by Dima Warren DO (01/18 2998)      1. Moderate left hydronephrosis which is worsened from most recent comparison ultrasound of 12/31/2023. Consider further characterization with CT abdomen pelvis to evaluate for the presence of left ureteral calculi.      2. Bilateral renal calculi.      3. Urinary bladder is decompressed around a Carrera catheter, limiting evaluation.      The study was marked in EPIC for immediate notification.      Workstation performed: [id number]             US kidney and bladder    Result Date: 1/18/2024  Impression: 1. Moderate left hydronephrosis which is worsened from most recent comparison ultrasound of 12/31/2023. Consider further characterization with CT abdomen pelvis to evaluate for the presence of left ureteral calculi. 2. Bilateral renal calculi. 3. Urinary bladder is decompressed around a Carrera catheter, limiting evaluation. The study was marked in EPIC for immediate notification. Workstation performed:  "[id number]     No Chest XR results available for this patient.       EKG and Other Studies Reviewed on Admission:   EKG:     No results for input(s): \"VENTRATE\" in the last 72 hours.    ** Please Note: This note has been constructed using a voice recognition system. **           " no

## 2025-06-04 ENCOUNTER — APPOINTMENT (OUTPATIENT)
Dept: LAB | Facility: HOSPITAL | Age: 85
End: 2025-06-04
Payer: MEDICARE

## 2025-06-04 DIAGNOSIS — E83.9 CHRONIC KIDNEY DISEASE-MINERAL AND BONE DISORDER: ICD-10-CM

## 2025-06-04 DIAGNOSIS — N18.4 CHRONIC RENAL DISEASE, STAGE IV (HCC): ICD-10-CM

## 2025-06-04 DIAGNOSIS — D53.9 MACROCYTIC ANEMIA: ICD-10-CM

## 2025-06-04 DIAGNOSIS — M89.9 CHRONIC KIDNEY DISEASE-MINERAL AND BONE DISORDER: ICD-10-CM

## 2025-06-04 DIAGNOSIS — N18.9 CHRONIC KIDNEY DISEASE-MINERAL AND BONE DISORDER: ICD-10-CM

## 2025-06-04 DIAGNOSIS — E03.8 OTHER SPECIFIED HYPOTHYROIDISM: ICD-10-CM

## 2025-06-04 DIAGNOSIS — E78.5 HYPERLIPIDEMIA, UNSPECIFIED HYPERLIPIDEMIA TYPE: ICD-10-CM

## 2025-06-04 DIAGNOSIS — R79.9 ABNORMAL BLOOD CHEMISTRY: ICD-10-CM

## 2025-06-04 LAB
BASOPHILS # BLD MANUAL: 0 THOUSAND/UL (ref 0–0.1)
BASOPHILS NFR MAR MANUAL: 0 % (ref 0–1)
EOSINOPHIL # BLD MANUAL: 0.35 THOUSAND/UL (ref 0–0.4)
EOSINOPHIL NFR BLD MANUAL: 5 % (ref 0–6)
ERYTHROCYTE [DISTWIDTH] IN BLOOD BY AUTOMATED COUNT: 15.6 % (ref 11.6–15.1)
EST. AVERAGE GLUCOSE BLD GHB EST-MCNC: 114 MG/DL
HBA1C MFR BLD: 5.6 %
HCT VFR BLD AUTO: 30.9 % (ref 34.8–46.1)
HGB BLD-MCNC: 9.5 G/DL (ref 11.5–15.4)
LYMPHOCYTES # BLD AUTO: 1.34 THOUSAND/UL (ref 0.6–4.47)
LYMPHOCYTES # BLD AUTO: 19 % (ref 14–44)
MACROCYTES BLD QL AUTO: PRESENT
MCH RBC QN AUTO: 32.5 PG (ref 26.8–34.3)
MCHC RBC AUTO-ENTMCNC: 30.7 G/DL (ref 31.4–37.4)
MCV RBC AUTO: 106 FL (ref 82–98)
MONOCYTES # BLD AUTO: 0.42 THOUSAND/UL (ref 0–1.22)
MONOCYTES NFR BLD: 6 % (ref 4–12)
NEUTROPHILS # BLD MANUAL: 4.95 THOUSAND/UL (ref 1.85–7.62)
NEUTS SEG NFR BLD AUTO: 70 % (ref 43–75)
PLATELET # BLD AUTO: 265 THOUSANDS/UL (ref 149–390)
PLATELET BLD QL SMEAR: ADEQUATE
PMV BLD AUTO: 9.8 FL (ref 8.9–12.7)
RBC # BLD AUTO: 2.92 MILLION/UL (ref 3.81–5.12)
WBC # BLD AUTO: 7.07 THOUSAND/UL (ref 4.31–10.16)

## 2025-06-04 PROCEDURE — 36415 COLL VENOUS BLD VENIPUNCTURE: CPT

## 2025-06-04 PROCEDURE — 85007 BL SMEAR W/DIFF WBC COUNT: CPT

## 2025-06-04 PROCEDURE — 83036 HEMOGLOBIN GLYCOSYLATED A1C: CPT

## 2025-06-04 PROCEDURE — 85027 COMPLETE CBC AUTOMATED: CPT

## 2025-06-05 ENCOUNTER — TELEPHONE (OUTPATIENT)
Dept: LAB | Facility: HOSPITAL | Age: 85
End: 2025-06-05

## 2025-06-11 ENCOUNTER — PROCEDURE VISIT (OUTPATIENT)
Dept: UROLOGY | Facility: CLINIC | Age: 85
End: 2025-06-11
Payer: MEDICARE

## 2025-06-11 ENCOUNTER — TELEPHONE (OUTPATIENT)
Dept: NEPHROLOGY | Facility: CLINIC | Age: 85
End: 2025-06-11

## 2025-06-11 VITALS
SYSTOLIC BLOOD PRESSURE: 134 MMHG | TEMPERATURE: 97.6 F | WEIGHT: 175 LBS | BODY MASS INDEX: 29.16 KG/M2 | DIASTOLIC BLOOD PRESSURE: 86 MMHG | OXYGEN SATURATION: 97 % | HEART RATE: 80 BPM | HEIGHT: 65 IN

## 2025-06-11 DIAGNOSIS — R33.9 CHRONIC RETENTION OF URINE: Primary | ICD-10-CM

## 2025-06-11 DIAGNOSIS — I10 HYPERTENSION, UNSPECIFIED TYPE: ICD-10-CM

## 2025-06-11 PROCEDURE — 51702 INSERT TEMP BLADDER CATH: CPT

## 2025-06-11 RX ORDER — POTASSIUM CHLORIDE 750 MG/1
10 TABLET, EXTENDED RELEASE ORAL 2 TIMES DAILY
Qty: 180 TABLET | Refills: 1 | Status: SHIPPED | OUTPATIENT
Start: 2025-06-11

## 2025-06-11 NOTE — TELEPHONE ENCOUNTER
Reason for call:   [x] Refill   [] Prior Auth  [] Other:     Office:   [x] PCP/Provider -   [] Specialty/Provider -     Medication: potassium chloride 10 mEq, take 1 tablet by 2 times a day       Pharmacy: [facility]       Mail Away Pharmacy   Does the patient have enough for 10 days?   [] Yes   [x] No - Send as HP to POD

## 2025-06-11 NOTE — PROGRESS NOTES
"6/11/2025  Samira Allred is a 84 y.o. female  [id number]    Diagnosis:      Patient presents for routine Herrera change managed by our office    Plan:  To follow up in 4 weeks for herrera exchange   Patient instructed to call with any questions or concerns in the meantime.    No orders of the defined types were placed in this encounter.       Vitals:    06/11/25 1402   BP: 134/86   BP Location: Left arm   Patient Position: Sitting   Cuff Size: Standard   Pulse: 80   Temp: 97.6 °F (36.4 °C)   SpO2: 97%   Weight: 79.4 kg (175 lb)   Height: 5' 5\" (1.651 m)           Procedure:      Universal Protocol:  procedure performed by consultantConsent: Verbal consent obtained  Consent given by: patient  Patient understanding: patient states understanding of the procedure being performed  Patient consent: the patient's understanding of the procedure matches consent given  Procedure consent: procedure consent matches procedure scheduled  Patient identity confirmed: verbally with patient    Bladder catheterization    Date/Time: 6/11/2025 1:30 PM    Performed by: Jewell Armenta RN  Authorized by: Agus Browning MD    Patient location:  Bedside  Consent:     Consent given by:  Patient  Universal protocol:     Procedure explained and questions answered to patient or proxy's satisfaction: yes      Patient identity confirmed:  Verbally with patient  Pre-procedure details:     Procedure purpose:  Therapeutic  Anesthesia (see MAR for exact dosages):     Anesthesia method:  None  Procedure details:     Bladder irrigation: no      Catheter insertion:  Indwelling    Approach: natural orifice      Catheter type:  Herrera    Catheter size:  18 Fr    Number of attempts:  1    Successful placement: yes      Urine characteristics:  Yellow  Post-procedure details:     Patient tolerance of procedure:  Tolerated well, no immediate complications  Comments:      Previous herrera removed after the deflation of an intact balloon. Site prepped with betadine " solution. New herrera inserted without issue and yellow urine was returned. 10 ml sterile water was inserted into the new herrera balloon. New overnight bag was attached. Patient provided with extra supplies per her request.           Jewell Armenta RN

## 2025-06-13 ENCOUNTER — TELEPHONE (OUTPATIENT)
Dept: NEPHROLOGY | Facility: CLINIC | Age: 85
End: 2025-06-13

## 2025-06-13 NOTE — TELEPHONE ENCOUNTER
I called and spoke with patient reminded patient to have fasting lab work done prior to appointment for Tuesday 06/17/2025 with Dr. Mooney. Per patient she stated she will go this weekend to have labs

## 2025-06-14 ENCOUNTER — APPOINTMENT (OUTPATIENT)
Dept: LAB | Facility: HOSPITAL | Age: 85
End: 2025-06-14
Payer: MEDICARE

## 2025-06-14 DIAGNOSIS — N18.4 CHRONIC RENAL DISEASE, STAGE IV (HCC): ICD-10-CM

## 2025-06-14 DIAGNOSIS — M05.79 SEROPOSITIVE RHEUMATOID ARTHRITIS OF MULTIPLE SITES (HCC): ICD-10-CM

## 2025-06-14 DIAGNOSIS — N18.32 STAGE 3B CHRONIC KIDNEY DISEASE (HCC): ICD-10-CM

## 2025-06-14 DIAGNOSIS — D53.9 MACROCYTIC ANEMIA: ICD-10-CM

## 2025-06-14 DIAGNOSIS — E03.8 OTHER SPECIFIED HYPOTHYROIDISM: ICD-10-CM

## 2025-06-14 DIAGNOSIS — E83.9 CHRONIC KIDNEY DISEASE-MINERAL AND BONE DISORDER: ICD-10-CM

## 2025-06-14 DIAGNOSIS — R79.9 ABNORMAL BLOOD CHEMISTRY: ICD-10-CM

## 2025-06-14 DIAGNOSIS — M89.9 CHRONIC KIDNEY DISEASE-MINERAL AND BONE DISORDER: ICD-10-CM

## 2025-06-14 DIAGNOSIS — E78.5 HYPERLIPIDEMIA, UNSPECIFIED HYPERLIPIDEMIA TYPE: ICD-10-CM

## 2025-06-14 DIAGNOSIS — N18.9 CHRONIC KIDNEY DISEASE-MINERAL AND BONE DISORDER: ICD-10-CM

## 2025-06-14 LAB
ALBUMIN SERPL BCG-MCNC: 4.2 G/DL (ref 3.5–5)
ALP SERPL-CCNC: 69 U/L (ref 34–104)
ALT SERPL W P-5'-P-CCNC: 9 U/L (ref 7–52)
ANION GAP SERPL CALCULATED.3IONS-SCNC: 7 MMOL/L (ref 4–13)
ANISOCYTOSIS BLD QL SMEAR: PRESENT
AST SERPL W P-5'-P-CCNC: 20 U/L (ref 13–39)
BACTERIA UR QL AUTO: ABNORMAL /HPF
BASOPHILS # BLD MANUAL: 0 THOUSAND/UL (ref 0–0.1)
BASOPHILS NFR MAR MANUAL: 0 % (ref 0–1)
BILIRUB DIRECT SERPL-MCNC: 0.13 MG/DL (ref 0–0.2)
BILIRUB SERPL-MCNC: 0.48 MG/DL (ref 0.2–1)
BILIRUB UR QL STRIP: NEGATIVE
BUN SERPL-MCNC: 31 MG/DL (ref 5–25)
CALCIUM SERPL-MCNC: 9.1 MG/DL (ref 8.4–10.2)
CHLORIDE SERPL-SCNC: 102 MMOL/L (ref 96–108)
CHOLEST SERPL-MCNC: 135 MG/DL (ref ?–200)
CLARITY UR: ABNORMAL
CO2 SERPL-SCNC: 28 MMOL/L (ref 21–32)
COLOR UR: YELLOW
CREAT SERPL-MCNC: 1.68 MG/DL (ref 0.6–1.3)
CREAT UR-MCNC: 36.3 MG/DL
CRP SERPL QL: 7 MG/L
EOSINOPHIL # BLD MANUAL: 0.18 THOUSAND/UL (ref 0–0.4)
EOSINOPHIL NFR BLD MANUAL: 2 % (ref 0–6)
ERYTHROCYTE [DISTWIDTH] IN BLOOD BY AUTOMATED COUNT: 16.1 % (ref 11.6–15.1)
ERYTHROCYTE [SEDIMENTATION RATE] IN BLOOD: 34 MM/HOUR (ref 0–29)
GFR SERPL CREATININE-BSD FRML MDRD: 27 ML/MIN/1.73SQ M
GLUCOSE P FAST SERPL-MCNC: 103 MG/DL (ref 65–99)
GLUCOSE UR STRIP-MCNC: NEGATIVE MG/DL
HCT VFR BLD AUTO: 30.4 % (ref 34.8–46.1)
HDLC SERPL-MCNC: 63 MG/DL
HGB BLD-MCNC: 9.4 G/DL (ref 11.5–15.4)
HGB UR QL STRIP.AUTO: ABNORMAL
KETONES UR STRIP-MCNC: NEGATIVE MG/DL
LDLC SERPL CALC-MCNC: 54 MG/DL (ref 0–100)
LEUKOCYTE ESTERASE UR QL STRIP: ABNORMAL
LYMPHOCYTES # BLD AUTO: 2.05 THOUSAND/UL (ref 0.6–4.47)
LYMPHOCYTES # BLD AUTO: 23 % (ref 14–44)
MACROCYTES BLD QL AUTO: PRESENT
MCH RBC QN AUTO: 33 PG (ref 26.8–34.3)
MCHC RBC AUTO-ENTMCNC: 30.9 G/DL (ref 31.4–37.4)
MCV RBC AUTO: 107 FL (ref 82–98)
MONOCYTES # BLD AUTO: 0.54 THOUSAND/UL (ref 0–1.22)
MONOCYTES NFR BLD: 6 % (ref 4–12)
MYELOCYTE ABSOLUTE CT: 0.18 THOUSAND/UL (ref 0–0.1)
MYELOCYTES NFR BLD MANUAL: 2 % (ref 0–1)
NEUTROPHILS # BLD MANUAL: 5.98 THOUSAND/UL (ref 1.85–7.62)
NEUTS BAND NFR BLD MANUAL: 3 % (ref 0–8)
NEUTS SEG NFR BLD AUTO: 64 % (ref 43–75)
NITRITE UR QL STRIP: POSITIVE
NON-SQ EPI CELLS URNS QL MICRO: ABNORMAL /HPF
PH UR STRIP.AUTO: 6 [PH]
PHOSPHATE SERPL-MCNC: 3.4 MG/DL (ref 2.3–4.1)
PLATELET # BLD AUTO: 220 THOUSANDS/UL (ref 149–390)
PLATELET BLD QL SMEAR: ADEQUATE
PMV BLD AUTO: 9.4 FL (ref 8.9–12.7)
POTASSIUM SERPL-SCNC: 4.2 MMOL/L (ref 3.5–5.3)
PROT SERPL-MCNC: 6.8 G/DL (ref 6.4–8.4)
PROT UR STRIP-MCNC: ABNORMAL MG/DL
PROT UR-MCNC: 105.9 MG/DL
PROT/CREAT UR: 2.9 MG/G{CREAT}
RBC # BLD AUTO: 2.85 MILLION/UL (ref 3.81–5.12)
RBC #/AREA URNS AUTO: ABNORMAL /HPF
RBC MORPH BLD: PRESENT
SODIUM SERPL-SCNC: 137 MMOL/L (ref 135–147)
SP GR UR STRIP.AUTO: 1.01 (ref 1–1.03)
TRIGL SERPL-MCNC: 89 MG/DL (ref ?–150)
TSH SERPL DL<=0.05 MIU/L-ACNC: 3.72 UIU/ML (ref 0.45–4.5)
UROBILINOGEN UR QL STRIP.AUTO: 0.2 E.U./DL
WBC # BLD AUTO: 8.92 THOUSAND/UL (ref 4.31–10.16)
WBC #/AREA URNS AUTO: ABNORMAL /HPF

## 2025-06-14 PROCEDURE — 80076 HEPATIC FUNCTION PANEL: CPT

## 2025-06-14 PROCEDURE — 85007 BL SMEAR W/DIFF WBC COUNT: CPT

## 2025-06-14 PROCEDURE — 83970 ASSAY OF PARATHORMONE: CPT

## 2025-06-14 PROCEDURE — 85027 COMPLETE CBC AUTOMATED: CPT

## 2025-06-14 PROCEDURE — 86140 C-REACTIVE PROTEIN: CPT

## 2025-06-14 PROCEDURE — 80048 BASIC METABOLIC PNL TOTAL CA: CPT

## 2025-06-14 PROCEDURE — 84443 ASSAY THYROID STIM HORMONE: CPT

## 2025-06-14 PROCEDURE — 81001 URINALYSIS AUTO W/SCOPE: CPT

## 2025-06-14 PROCEDURE — 87186 SC STD MICRODIL/AGAR DIL: CPT

## 2025-06-14 PROCEDURE — 80061 LIPID PANEL: CPT

## 2025-06-14 PROCEDURE — 82784 ASSAY IGA/IGD/IGG/IGM EACH: CPT

## 2025-06-14 PROCEDURE — 87086 URINE CULTURE/COLONY COUNT: CPT

## 2025-06-14 PROCEDURE — 87077 CULTURE AEROBIC IDENTIFY: CPT

## 2025-06-14 PROCEDURE — 85652 RBC SED RATE AUTOMATED: CPT

## 2025-06-14 PROCEDURE — 82306 VITAMIN D 25 HYDROXY: CPT

## 2025-06-14 PROCEDURE — 84100 ASSAY OF PHOSPHORUS: CPT

## 2025-06-14 PROCEDURE — 36415 COLL VENOUS BLD VENIPUNCTURE: CPT

## 2025-06-15 LAB
25(OH)D3 SERPL-MCNC: 68.3 NG/ML (ref 30–100)
IGA SERPL-MCNC: 127 MG/DL (ref 66–433)
IGG SERPL-MCNC: 567 MG/DL (ref 635–1741)
IGM SERPL-MCNC: <20 MG/DL (ref 45–281)
PTH-INTACT SERPL-MCNC: 63.2 PG/ML (ref 12–88)

## 2025-06-17 LAB — BACTERIA UR CULT: ABNORMAL

## 2025-06-18 ENCOUNTER — APPOINTMENT (OUTPATIENT)
Dept: LAB | Facility: HOSPITAL | Age: 85
End: 2025-06-18

## 2025-06-18 ENCOUNTER — TELEPHONE (OUTPATIENT)
Dept: NEPHROLOGY | Facility: CLINIC | Age: 85
End: 2025-06-18

## 2025-06-18 DIAGNOSIS — N39.0 FREQUENT UTI: Primary | ICD-10-CM

## 2025-06-18 RX ORDER — CIPROFLOXACIN 250 MG/1
250 TABLET, FILM COATED ORAL EVERY 12 HOURS SCHEDULED
Qty: 14 TABLET | Refills: 0 | Status: SHIPPED | OUTPATIENT
Start: 2025-06-18 | End: 2025-06-25

## 2025-06-18 NOTE — TELEPHONE ENCOUNTER
I called and left a message on machine for patient to return our call about rescheduling her appointment for Tuesday 6/17/2025 nephology follow up with Dr. AGATHA Mooney that she No Show for. I also explained that I was mailing her out a No Show letter just as a reminder for her to return our call.

## 2025-06-18 NOTE — TELEPHONE ENCOUNTER
The patient called to reschedule her appointment as she was a NS yesterday. She only wanted appointment with Dr. Mooney and is scheduled for next opening which is 12-24-25 and on the wait list. She went for labs on 6-14-25 and is asking if they can be reviewed and if someone can call her with results. She also asked if she would need to repeat anything prior to coming then in December

## 2025-06-18 NOTE — TELEPHONE ENCOUNTER
She has UTI and I called in antibiotic. Please let her know. She will need CKD labs before next visit

## 2025-06-24 ENCOUNTER — HOSPITAL ENCOUNTER (OUTPATIENT)
Dept: MAMMOGRAPHY | Facility: CLINIC | Age: 85
Discharge: HOME | End: 2025-06-24
Payer: MEDICARE

## 2025-06-24 DIAGNOSIS — Z12.31 ENCOUNTER FOR SCREENING MAMMOGRAM FOR MALIGNANT NEOPLASM OF BREAST: ICD-10-CM

## 2025-06-24 PROCEDURE — 77067 SCR MAMMO BI INCL CAD: CPT

## 2025-06-24 PROCEDURE — 77063 BREAST TOMOSYNTHESIS BI: CPT

## 2025-07-09 ENCOUNTER — PROCEDURE VISIT (OUTPATIENT)
Dept: UROLOGY | Facility: CLINIC | Age: 85
End: 2025-07-09
Payer: MEDICARE

## 2025-07-09 VITALS
WEIGHT: 175 LBS | TEMPERATURE: 97.8 F | DIASTOLIC BLOOD PRESSURE: 68 MMHG | HEART RATE: 66 BPM | OXYGEN SATURATION: 98 % | BODY MASS INDEX: 29.16 KG/M2 | SYSTOLIC BLOOD PRESSURE: 142 MMHG | HEIGHT: 65 IN

## 2025-07-09 DIAGNOSIS — R33.9 CHRONIC RETENTION OF URINE: Primary | ICD-10-CM

## 2025-07-09 PROCEDURE — 51702 INSERT TEMP BLADDER CATH: CPT

## 2025-07-15 ENCOUNTER — TELEPHONE (OUTPATIENT)
Dept: UROLOGY | Facility: AMBULATORY SURGERY CENTER | Age: 85
End: 2025-07-15

## 2025-07-15 ENCOUNTER — HOSPITAL ENCOUNTER (INPATIENT)
Facility: HOSPITAL | Age: 85
LOS: 4 days | Discharge: HOME HEALTH | End: 2025-07-20
Attending: STUDENT IN AN ORGANIZED HEALTH CARE EDUCATION/TRAINING PROGRAM | Admitting: INTERNAL MEDICINE
Payer: MEDICARE

## 2025-07-15 ENCOUNTER — APPOINTMENT (EMERGENCY)
Dept: CT IMAGING | Facility: HOSPITAL | Age: 85
End: 2025-07-15
Payer: MEDICARE

## 2025-07-15 ENCOUNTER — APPOINTMENT (EMERGENCY)
Dept: RADIOLOGY | Facility: HOSPITAL | Age: 85
End: 2025-07-15
Payer: MEDICARE

## 2025-07-15 ENCOUNTER — OFFICE VISIT (OUTPATIENT)
Dept: INTERNAL MEDICINE CLINIC | Facility: CLINIC | Age: 85
End: 2025-07-15
Payer: MEDICARE

## 2025-07-15 VITALS
BODY MASS INDEX: 29.16 KG/M2 | DIASTOLIC BLOOD PRESSURE: 74 MMHG | RESPIRATION RATE: 16 BRPM | HEART RATE: 95 BPM | TEMPERATURE: 102.1 F | OXYGEN SATURATION: 97 % | SYSTOLIC BLOOD PRESSURE: 142 MMHG | HEIGHT: 65 IN | WEIGHT: 175 LBS

## 2025-07-15 DIAGNOSIS — N13.30 HYDRONEPHROSIS: ICD-10-CM

## 2025-07-15 DIAGNOSIS — N39.0 UTI (URINARY TRACT INFECTION): Primary | ICD-10-CM

## 2025-07-15 DIAGNOSIS — R11.2 NAUSEA AND VOMITING, UNSPECIFIED VOMITING TYPE: ICD-10-CM

## 2025-07-15 DIAGNOSIS — R10.9 RIGHT FLANK PAIN: ICD-10-CM

## 2025-07-15 DIAGNOSIS — R50.9 FEVER, UNSPECIFIED FEVER CAUSE: Primary | ICD-10-CM

## 2025-07-15 DIAGNOSIS — R82.79 POSITIVE URINE CULTURE: ICD-10-CM

## 2025-07-15 LAB
ALBUMIN SERPL BCG-MCNC: 4.5 G/DL (ref 3.5–5)
ALP SERPL-CCNC: 65 U/L (ref 34–104)
ALT SERPL W P-5'-P-CCNC: 12 U/L (ref 7–52)
ANION GAP SERPL CALCULATED.3IONS-SCNC: 9 MMOL/L (ref 4–13)
ANISOCYTOSIS BLD QL SMEAR: PRESENT
AST SERPL W P-5'-P-CCNC: 22 U/L (ref 13–39)
BACTERIA UR QL AUTO: ABNORMAL /HPF
BASOPHILS # BLD MANUAL: 0 THOUSAND/UL (ref 0–0.1)
BASOPHILS NFR MAR MANUAL: 0 % (ref 0–1)
BILIRUB SERPL-MCNC: 0.88 MG/DL (ref 0.2–1)
BILIRUB UR QL STRIP: NEGATIVE
BUN SERPL-MCNC: 28 MG/DL (ref 5–25)
CALCIUM SERPL-MCNC: 9.6 MG/DL (ref 8.4–10.2)
CHLORIDE SERPL-SCNC: 102 MMOL/L (ref 96–108)
CLARITY UR: ABNORMAL
CO2 SERPL-SCNC: 25 MMOL/L (ref 21–32)
COLOR UR: ABNORMAL
CREAT SERPL-MCNC: 1.66 MG/DL (ref 0.6–1.3)
EOSINOPHIL # BLD MANUAL: 0 THOUSAND/UL (ref 0–0.4)
EOSINOPHIL NFR BLD MANUAL: 0 % (ref 0–6)
ERYTHROCYTE [DISTWIDTH] IN BLOOD BY AUTOMATED COUNT: 15.9 % (ref 11.6–15.1)
FLUAV RNA RESP QL NAA+PROBE: NEGATIVE
FLUBV RNA RESP QL NAA+PROBE: NEGATIVE
GFR SERPL CREATININE-BSD FRML MDRD: 28 ML/MIN/1.73SQ M
GLUCOSE SERPL-MCNC: 114 MG/DL (ref 65–140)
GLUCOSE UR STRIP-MCNC: NEGATIVE MG/DL
HCT VFR BLD AUTO: 28.4 % (ref 34.8–46.1)
HGB BLD-MCNC: 9.5 G/DL (ref 11.5–15.4)
HGB UR QL STRIP.AUTO: ABNORMAL
HYALINE CASTS #/AREA URNS LPF: ABNORMAL /LPF
KETONES UR STRIP-MCNC: NEGATIVE MG/DL
LACTATE SERPL-SCNC: 0.6 MMOL/L (ref 0.5–2)
LEUKOCYTE ESTERASE UR QL STRIP: ABNORMAL
LYMPHOCYTES # BLD AUTO: 0.59 THOUSAND/UL (ref 0.6–4.47)
LYMPHOCYTES # BLD AUTO: 5 % (ref 14–44)
MACROCYTES BLD QL AUTO: PRESENT
MCH RBC QN AUTO: 34.1 PG (ref 26.8–34.3)
MCHC RBC AUTO-ENTMCNC: 33.5 G/DL (ref 31.4–37.4)
MCV RBC AUTO: 102 FL (ref 82–98)
MONOCYTES # BLD AUTO: 0.23 THOUSAND/UL (ref 0–1.22)
MONOCYTES NFR BLD: 2 % (ref 4–12)
NEUTROPHILS # BLD MANUAL: 10.9 THOUSAND/UL (ref 1.85–7.62)
NEUTS BAND NFR BLD MANUAL: 10 % (ref 0–8)
NEUTS SEG NFR BLD AUTO: 83 % (ref 43–75)
NITRITE UR QL STRIP: POSITIVE
NON-SQ EPI CELLS URNS QL MICRO: ABNORMAL /HPF
PH UR STRIP.AUTO: 7 [PH]
PLATELET # BLD AUTO: 221 THOUSANDS/UL (ref 149–390)
PLATELET BLD QL SMEAR: ADEQUATE
PMV BLD AUTO: 9 FL (ref 8.9–12.7)
POTASSIUM SERPL-SCNC: 4.4 MMOL/L (ref 3.5–5.3)
PROCALCITONIN SERPL-MCNC: 0.15 NG/ML
PROT SERPL-MCNC: 7 G/DL (ref 6.4–8.4)
PROT UR STRIP-MCNC: ABNORMAL MG/DL
RBC # BLD AUTO: 2.79 MILLION/UL (ref 3.81–5.12)
RBC #/AREA URNS AUTO: ABNORMAL /HPF
RBC MORPH BLD: PRESENT
RSV RNA RESP QL NAA+PROBE: NEGATIVE
SARS-COV-2 RNA RESP QL NAA+PROBE: NEGATIVE
SODIUM SERPL-SCNC: 136 MMOL/L (ref 135–147)
SP GR UR STRIP.AUTO: 1.01 (ref 1–1.03)
UROBILINOGEN UR STRIP-ACNC: <2 MG/DL
WBC # BLD AUTO: 11.72 THOUSAND/UL (ref 4.31–10.16)
WBC #/AREA URNS AUTO: ABNORMAL /HPF

## 2025-07-15 PROCEDURE — 99284 EMERGENCY DEPT VISIT MOD MDM: CPT

## 2025-07-15 PROCEDURE — 96374 THER/PROPH/DIAG INJ IV PUSH: CPT

## 2025-07-15 PROCEDURE — 0241U HB NFCT DS VIR RESP RNA 4 TRGT: CPT | Performed by: STUDENT IN AN ORGANIZED HEALTH CARE EDUCATION/TRAINING PROGRAM

## 2025-07-15 PROCEDURE — 87077 CULTURE AEROBIC IDENTIFY: CPT | Performed by: STUDENT IN AN ORGANIZED HEALTH CARE EDUCATION/TRAINING PROGRAM

## 2025-07-15 PROCEDURE — 85007 BL SMEAR W/DIFF WBC COUNT: CPT | Performed by: STUDENT IN AN ORGANIZED HEALTH CARE EDUCATION/TRAINING PROGRAM

## 2025-07-15 PROCEDURE — G2211 COMPLEX E/M VISIT ADD ON: HCPCS | Performed by: PHYSICIAN ASSISTANT

## 2025-07-15 PROCEDURE — 96361 HYDRATE IV INFUSION ADD-ON: CPT

## 2025-07-15 PROCEDURE — 87086 URINE CULTURE/COLONY COUNT: CPT | Performed by: STUDENT IN AN ORGANIZED HEALTH CARE EDUCATION/TRAINING PROGRAM

## 2025-07-15 PROCEDURE — 80053 COMPREHEN METABOLIC PANEL: CPT | Performed by: STUDENT IN AN ORGANIZED HEALTH CARE EDUCATION/TRAINING PROGRAM

## 2025-07-15 PROCEDURE — 85027 COMPLETE CBC AUTOMATED: CPT | Performed by: STUDENT IN AN ORGANIZED HEALTH CARE EDUCATION/TRAINING PROGRAM

## 2025-07-15 PROCEDURE — 87040 BLOOD CULTURE FOR BACTERIA: CPT | Performed by: STUDENT IN AN ORGANIZED HEALTH CARE EDUCATION/TRAINING PROGRAM

## 2025-07-15 PROCEDURE — 99285 EMERGENCY DEPT VISIT HI MDM: CPT | Performed by: STUDENT IN AN ORGANIZED HEALTH CARE EDUCATION/TRAINING PROGRAM

## 2025-07-15 PROCEDURE — 81001 URINALYSIS AUTO W/SCOPE: CPT | Performed by: STUDENT IN AN ORGANIZED HEALTH CARE EDUCATION/TRAINING PROGRAM

## 2025-07-15 PROCEDURE — 84145 PROCALCITONIN (PCT): CPT | Performed by: STUDENT IN AN ORGANIZED HEALTH CARE EDUCATION/TRAINING PROGRAM

## 2025-07-15 PROCEDURE — 83605 ASSAY OF LACTIC ACID: CPT | Performed by: STUDENT IN AN ORGANIZED HEALTH CARE EDUCATION/TRAINING PROGRAM

## 2025-07-15 PROCEDURE — 74176 CT ABD & PELVIS W/O CONTRAST: CPT

## 2025-07-15 PROCEDURE — 99214 OFFICE O/P EST MOD 30 MIN: CPT | Performed by: PHYSICIAN ASSISTANT

## 2025-07-15 PROCEDURE — 36415 COLL VENOUS BLD VENIPUNCTURE: CPT | Performed by: STUDENT IN AN ORGANIZED HEALTH CARE EDUCATION/TRAINING PROGRAM

## 2025-07-15 PROCEDURE — 87186 SC STD MICRODIL/AGAR DIL: CPT | Performed by: STUDENT IN AN ORGANIZED HEALTH CARE EDUCATION/TRAINING PROGRAM

## 2025-07-15 PROCEDURE — 71046 X-RAY EXAM CHEST 2 VIEWS: CPT

## 2025-07-15 PROCEDURE — 99223 1ST HOSP IP/OBS HIGH 75: CPT

## 2025-07-15 RX ORDER — ONDANSETRON 2 MG/ML
4 INJECTION INTRAMUSCULAR; INTRAVENOUS EVERY 6 HOURS PRN
Status: DISCONTINUED | OUTPATIENT
Start: 2025-07-15 | End: 2025-07-20 | Stop reason: HOSPADM

## 2025-07-15 RX ORDER — METHOTREXATE 2.5 MG/1
10 TABLET ORAL WEEKLY
Status: DISCONTINUED | OUTPATIENT
Start: 2025-07-19 | End: 2025-07-20 | Stop reason: HOSPADM

## 2025-07-15 RX ORDER — ONDANSETRON 2 MG/ML
4 INJECTION INTRAMUSCULAR; INTRAVENOUS ONCE
Status: DISCONTINUED | OUTPATIENT
Start: 2025-07-15 | End: 2025-07-20 | Stop reason: HOSPADM

## 2025-07-15 RX ORDER — PRAVASTATIN SODIUM 40 MG
40 TABLET ORAL
Status: DISCONTINUED | OUTPATIENT
Start: 2025-07-16 | End: 2025-07-20 | Stop reason: HOSPADM

## 2025-07-15 RX ORDER — ACETAMINOPHEN 325 MG/1
650 TABLET ORAL EVERY 6 HOURS PRN
Status: DISCONTINUED | OUTPATIENT
Start: 2025-07-15 | End: 2025-07-20 | Stop reason: HOSPADM

## 2025-07-15 RX ORDER — HYDROCHLOROTHIAZIDE 12.5 MG/1
12.5 TABLET ORAL DAILY
Status: DISCONTINUED | OUTPATIENT
Start: 2025-07-16 | End: 2025-07-20 | Stop reason: HOSPADM

## 2025-07-15 RX ORDER — ACETAMINOPHEN 10 MG/ML
1000 INJECTION, SOLUTION INTRAVENOUS ONCE
Status: COMPLETED | OUTPATIENT
Start: 2025-07-15 | End: 2025-07-15

## 2025-07-15 RX ORDER — LEVOTHYROXINE SODIUM 75 UG/1
75 TABLET ORAL DAILY
Status: DISCONTINUED | OUTPATIENT
Start: 2025-07-16 | End: 2025-07-20 | Stop reason: HOSPADM

## 2025-07-15 RX ORDER — SODIUM CHLORIDE 9 MG/ML
100 INJECTION, SOLUTION INTRAVENOUS CONTINUOUS
Status: DISCONTINUED | OUTPATIENT
Start: 2025-07-15 | End: 2025-07-17

## 2025-07-15 RX ORDER — METHOTREXATE 2.5 MG/1
2.5 TABLET ORAL WEEKLY
Status: DISCONTINUED | OUTPATIENT
Start: 2025-07-19 | End: 2025-07-15

## 2025-07-15 RX ORDER — METHOTREXATE 2.5 MG/1
2.5 TABLET ORAL WEEKLY
Status: DISCONTINUED | OUTPATIENT
Start: 2025-07-15 | End: 2025-07-15

## 2025-07-15 RX ORDER — AMLODIPINE BESYLATE 2.5 MG/1
2.5 TABLET ORAL DAILY
Status: DISCONTINUED | OUTPATIENT
Start: 2025-07-16 | End: 2025-07-20 | Stop reason: HOSPADM

## 2025-07-15 RX ORDER — HEPARIN SODIUM 5000 [USP'U]/ML
5000 INJECTION, SOLUTION INTRAVENOUS; SUBCUTANEOUS EVERY 8 HOURS SCHEDULED
Status: DISCONTINUED | OUTPATIENT
Start: 2025-07-15 | End: 2025-07-20 | Stop reason: HOSPADM

## 2025-07-15 RX ADMIN — ACETAMINOPHEN 1000 MG: 10 INJECTION INTRAVENOUS at 17:06

## 2025-07-15 RX ADMIN — CEFEPIME 2000 MG: 2 INJECTION, POWDER, FOR SOLUTION INTRAVENOUS at 21:15

## 2025-07-15 RX ADMIN — SODIUM CHLORIDE 100 ML/HR: 0.9 INJECTION, SOLUTION INTRAVENOUS at 21:58

## 2025-07-15 RX ADMIN — SODIUM CHLORIDE 500 ML: 0.9 INJECTION, SOLUTION INTRAVENOUS at 17:03

## 2025-07-15 NOTE — ED PROVIDER NOTES
ED Disposition       None          Assessment & Plan       Medical Decision Making  Differential URI, UTI, nephrolithiasis, lumbar radiculopathy    Patient is a well-appearing 84-year-old female present emerged from no acute respiratory distress and vital signs unremarkable while in the department.  Sepsis labs were obtained secondary to the reported fever and symptoms at home.  Unclear if urine is colonized.  Patient empirically given cefepime with concern of possible nephrolithiasis with indeterminant CT showing possible artifact.  Discussed this with Slim and patient.  All patient's questions were answered.  Will admit for further management.      Amount and/or Complexity of Data Reviewed  Labs: ordered.  Radiology: ordered.    Risk  Prescription drug management.  Decision regarding hospitalization.             Medications   sodium chloride 0.9 % bolus 500 mL (has no administration in time range)   ondansetron (ZOFRAN) injection 4 mg (has no administration in time range)   acetaminophen (Ofirmev) injection 1,000 mg (has no administration in time range)       ED Risk Strat Scores                    No data recorded                            History of Present Illness       Chief Complaint   Patient presents with    Fever     Was seen at doctors office today and had temperature of 102. Has chronic herrera, patient states that sometimes urine smells.        Past Medical History[1]   Past Surgical History[2]   Family History[3]   Social History[4]   E-Cigarette/Vaping    E-Cigarette Use Never User       E-Cigarette/Vaping Substances    Nicotine No     THC No     CBD No     Flavoring No     Other No     Unknown No       I have reviewed and agree with the history as documented.     HPI    Patient is an 84-year-old female present emerged department for a fever at home.  Yesterday she had a fever of 102.  She reports chills and bodyaches.  Patient also reports some runny nose.  She had 1 episode of nonbloody nonbilious  emesis.  Does have a chronic indwelling Carrera with history of positive cultures.  Carrera most recently exchanged last week.  History includes GERD, CKD.    Review of Systems   Constitutional:  Positive for chills and fever.   HENT:  Negative for ear pain and sore throat.    Eyes:  Negative for pain and visual disturbance.   Respiratory:  Negative for cough and shortness of breath.    Cardiovascular:  Negative for chest pain and palpitations.   Gastrointestinal:  Negative for abdominal pain and vomiting.   Genitourinary:  Negative for dysuria and hematuria.   Musculoskeletal:  Negative for arthralgias and back pain.   Skin:  Negative for color change and rash.   Neurological:  Positive for weakness. Negative for seizures and syncope.   All other systems reviewed and are negative.          Objective       ED Triage Vitals [07/15/25 1504]   Temperature Pulse Blood Pressure Respirations SpO2 Patient Position - Orthostatic VS   98.3 °F (36.8 °C) 95 (!) 185/77 16 97 % Sitting      Temp Source Heart Rate Source BP Location FiO2 (%) Pain Score    Oral Monitor Left arm -- --      Vitals      Date and Time Temp Pulse SpO2 Resp BP Pain Score FACES Pain Rating User   07/15/25 1504 98.3 °F (36.8 °C) 95 97 % 16 185/77 -- -- EP            Physical Exam  Vitals and nursing note reviewed.   Constitutional:       General: She is not in acute distress.     Appearance: She is well-developed.   HENT:      Head: Normocephalic and atraumatic.     Eyes:      Conjunctiva/sclera: Conjunctivae normal.       Cardiovascular:      Rate and Rhythm: Normal rate.      Heart sounds: Murmur heard.   Pulmonary:      Effort: Pulmonary effort is normal. No respiratory distress.      Breath sounds: Normal breath sounds.   Abdominal:      Palpations: Abdomen is soft.      Tenderness: There is no abdominal tenderness.     Musculoskeletal:         General: No swelling.      Cervical back: Neck supple.     Skin:     General: Skin is warm and dry.       Capillary Refill: Capillary refill takes less than 2 seconds.     Neurological:      General: No focal deficit present.      Mental Status: She is alert.     Psychiatric:         Mood and Affect: Mood normal.         Results Reviewed       Procedure Component Value Units Date/Time    FLU/RSV/COVID - if FLU/RSV clinically relevant (2hr TAT) [[id number]]     Lab Status: No result Specimen: Nares from Nose     CBC and differential [[id number]]     Lab Status: No result Specimen: Blood     Comprehensive metabolic panel [[id number]]     Lab Status: No result Specimen: Blood     Lactic acid, plasma (w/reflex if result > 2.0) [[id number]]     Lab Status: No result Specimen: Blood     Procalcitonin [[id number]]     Lab Status: No result Specimen: Blood             CT abdomen pelvis with contrast    (Results Pending)   CT recon only lumbar spine    (Results Pending)   XR chest 2 views    (Results Pending)       Procedures    ED Medication and Procedure Management   Prior to Admission Medications   Prescriptions Last Dose Informant Patient Reported? Taking?   Calcium Citrate-Vitamin D (CITRACAL + D PO)  Self Yes No   Sig: Take by mouth in the morning and in the evening.   Cholecalciferol (VITAMIN D-3 PO)  Self Yes No   Sig: Take 2,000 Units by mouth in the morning.   IRON, FERROUS SULFATE, PO  Self Yes No   Sig: Take by mouth   Multiple Vitamin (MULTIVITAMIN) tablet  Self Yes No   Sig: Take 1 tablet by mouth in the morning.   amLODIPine (NORVASC) 2.5 mg tablet   No No   Sig: Take 1 tablet (2.5 mg total) by mouth daily   ascorbic acid (VITAMIN C) 500 mg tablet  Self Yes No   Sig: Take 500 mg by mouth in the morning.   folic acid (FOLVITE) 1 mg tablet  Self No No   Si daily   hydroCHLOROthiazide 12.5 mg tablet  Self No No   Sig: Take 1 tablet (12.5 mg total) by mouth daily   levothyroxine (Synthroid) 75 mcg tablet  Self No No   Sig: Take 1 tablet (75 mcg total) by mouth in the morning   magnesium oxide (MAG-OX) 400 mg  Self No  No   Sig: Take 1 tablet (400 mg total) by mouth 2 (two) times a day   methotrexate 2.5 MG tablet  Self Yes No   Si pills weekly   nitrofurantoin (MACROBID) 100 mg capsule  Self No No   Sig: Take 1 capsule (100 mg total) by mouth 2 (two) times a day   polyethylene glycol (MIRALAX) 17 g packet  Self No No   Sig: Take 17 g by mouth daily as needed (constipation)   potassium chloride (Klor-Con M10) 10 mEq tablet   No No   Sig: Take 1 tablet (10 mEq total) by mouth 2 (two) times a day   riTUXimab (RITUXAN IV)  Self Yes No   Sig: Inject into a catheter in a vein   rosuvastatin (CRESTOR) 5 mg tablet  Self No No   Sig: Take 1 tablet (5 mg total) by mouth daily      Facility-Administered Medications: None     Patient's Medications   Discharge Prescriptions    No medications on file     No discharge procedures on file.  ED SEPSIS DOCUMENTATION                [1]   Past Medical History:  Diagnosis Date    Allergic angioedema     [id number] resolved    Angioedema     [id number] resolved    Arthritis     Breast cancer (HCC) 2012    right     Chronic kidney disease     Disease of thyroid gland     GERD (gastroesophageal reflux disease)     Hemorrhage of anus and rectum     [id number] resolved    Hyperlipidemia     Hypertension     Hypocalcemia     2017 resolved    Neuritis     thoracic and lumbosacral    Peptic ulcer     Rheumatoid arthritis (HCC)     Stage 3b chronic kidney disease (HCC) 2021    Vitamin D deficiency    [2]   Past Surgical History:  Procedure Laterality Date    ABDOMINAL SURGERY      BREAST BIOPSY Right 2012    BREAST LUMPECTOMY Right 2012    BREAST SURGERY      CHOLECYSTECTOMY      ELBOW SURGERY      GALLBLADDER SURGERY  1981    HIP SURGERY Right 2022    10/2023    ORTHOPEDIC SURGERY      for toes    [place] CYSTO W/IRRIG & EVAC MULTPLE OBSTRUCTING CLOTS N/A 2023    Procedure: CYSTOSCOPY EVACUATION OF CLOTS & fulguration;  Surgeon: Uriah Schwartz MD;  [facility]: [facility];  Service:  Urology    TRANSURETHRAL RESECTION OF BLADDER TUMOR N/A 11/05/2023    Procedure: TRANSURETHRAL RESECTION OF BLADDER TUMOR (TURBT);  Surgeon: Uriah Schwartz MD;  Location: [facility];  Service: Urology    US BREAST CYST ASPIRATION RIGHT INITIAL Right 4/24/2018    US GUIDED BREAST BIOPSY RIGHT COMPLETE Right 08/15/2022   [3]   Family History  Problem Relation Name Age of Onset    Breast cancer Mother      Kidney failure Father      Other Father          uremia    Lung cancer Brother      Breast cancer Maternal Aunt      Breast cancer Maternal Aunt      Breast cancer Maternal Aunt      Breast cancer Maternal Aunt      Breast cancer Maternal Aunt      Stomach cancer Maternal Uncle     [4]   Social History  Tobacco Use    Smoking status: Never     Passive exposure: Past    Smokeless tobacco: Never   Vaping Use    Vaping status: Never Used   Substance Use Topics    Alcohol use: Not Currently    Drug use: No        Shauna Stokes DO  07/16/25 0033

## 2025-07-16 LAB
ANION GAP SERPL CALCULATED.3IONS-SCNC: 7 MMOL/L (ref 4–13)
BUN SERPL-MCNC: 24 MG/DL (ref 5–25)
CALCIUM SERPL-MCNC: 8.7 MG/DL (ref 8.4–10.2)
CHLORIDE SERPL-SCNC: 105 MMOL/L (ref 96–108)
CO2 SERPL-SCNC: 24 MMOL/L (ref 21–32)
CREAT SERPL-MCNC: 1.66 MG/DL (ref 0.6–1.3)
ERYTHROCYTE [DISTWIDTH] IN BLOOD BY AUTOMATED COUNT: 16 % (ref 11.6–15.1)
GFR SERPL CREATININE-BSD FRML MDRD: 28 ML/MIN/1.73SQ M
GLUCOSE P FAST SERPL-MCNC: 104 MG/DL (ref 65–99)
GLUCOSE SERPL-MCNC: 104 MG/DL (ref 65–140)
GLUCOSE SERPL-MCNC: 90 MG/DL (ref 65–140)
HCT VFR BLD AUTO: 27.7 % (ref 34.8–46.1)
HGB BLD-MCNC: 9.1 G/DL (ref 11.5–15.4)
MCH RBC QN AUTO: 33.7 PG (ref 26.8–34.3)
MCHC RBC AUTO-ENTMCNC: 32.9 G/DL (ref 31.4–37.4)
MCV RBC AUTO: 103 FL (ref 82–98)
PLATELET # BLD AUTO: 202 THOUSANDS/UL (ref 149–390)
PMV BLD AUTO: 9.6 FL (ref 8.9–12.7)
POTASSIUM SERPL-SCNC: 4 MMOL/L (ref 3.5–5.3)
RBC # BLD AUTO: 2.7 MILLION/UL (ref 3.81–5.12)
SODIUM SERPL-SCNC: 136 MMOL/L (ref 135–147)
WBC # BLD AUTO: 9.23 THOUSAND/UL (ref 4.31–10.16)

## 2025-07-16 PROCEDURE — 85027 COMPLETE CBC AUTOMATED: CPT

## 2025-07-16 PROCEDURE — 80048 BASIC METABOLIC PNL TOTAL CA: CPT

## 2025-07-16 PROCEDURE — 99233 SBSQ HOSP IP/OBS HIGH 50: CPT | Performed by: INTERNAL MEDICINE

## 2025-07-16 PROCEDURE — 82948 REAGENT STRIP/BLOOD GLUCOSE: CPT

## 2025-07-16 RX ADMIN — HEPARIN SODIUM 5000 UNITS: 5000 INJECTION INTRAVENOUS; SUBCUTANEOUS at 00:56

## 2025-07-16 RX ADMIN — HEPARIN SODIUM 5000 UNITS: 5000 INJECTION INTRAVENOUS; SUBCUTANEOUS at 15:01

## 2025-07-16 RX ADMIN — AMLODIPINE BESYLATE 2.5 MG: 2.5 TABLET ORAL at 10:47

## 2025-07-16 RX ADMIN — ACETAMINOPHEN 650 MG: 325 TABLET ORAL at 15:01

## 2025-07-16 RX ADMIN — LEVOTHYROXINE SODIUM 75 MCG: 0.07 TABLET ORAL at 10:47

## 2025-07-16 RX ADMIN — HEPARIN SODIUM 5000 UNITS: 5000 INJECTION INTRAVENOUS; SUBCUTANEOUS at 21:01

## 2025-07-16 RX ADMIN — CEFEPIME 2000 MG: 2 INJECTION, POWDER, FOR SOLUTION INTRAVENOUS at 21:01

## 2025-07-16 RX ADMIN — PRAVASTATIN SODIUM 40 MG: 40 TABLET ORAL at 16:09

## 2025-07-16 RX ADMIN — HEPARIN SODIUM 5000 UNITS: 5000 INJECTION INTRAVENOUS; SUBCUTANEOUS at 05:14

## 2025-07-16 RX ADMIN — HYDROCHLOROTHIAZIDE 12.5 MG: 12.5 TABLET ORAL at 10:47

## 2025-07-16 NOTE — ASSESSMENT & PLAN NOTE
Per chart review patient with history of DVT 2019, completed 6 months of anticoagulation  Repeat imaging 2020 revealed resolved DVT  Not on therapeutic AC

## 2025-07-16 NOTE — PLAN OF CARE
Problem: SAFETY ADULT  Goal: Patient will remain free of falls  Description: INTERVENTIONS:  - Educate patient/family on patient safety including physical limitations  - Instruct patient to call for assistance with activity   - Consider consulting OT/PT to assist with strengthening/mobility based on AM PAC & JH-HLM score  - Consult OT/PT to assist with strengthening/mobility   - Keep Call bell within reach  - Keep bed low and locked with side rails adjusted as appropriate  - Keep care items and personal belongings within reach  - Initiate and maintain comfort rounds  - Make Fall Risk Sign visible to staff  - Offer Toileting every 2 Hours, in advance of need  - Initiate/Maintain bed alarm    - Apply yellow socks and bracelet for high fall risk patients  - Consider moving patient to room near nurses station  Outcome: Progressing  Goal: Maintain or return to baseline ADL function  Description: INTERVENTIONS:  -  Assess patient's ability to carry out ADLs; assess patient's baseline for ADL function and identify physical deficits which impact ability to perform ADLs (bathing, care of mouth/teeth, toileting, grooming, dressing, etc.)  - Assess/evaluate cause of self-care deficits   - Assess range of motion  - Assess patient's mobility; develop plan if impaired  - Assess patient's need for assistive devices and provide as appropriate  - Encourage maximum independence but intervene and supervise when necessary  - Involve family in performance of ADLs  - Assess for home care needs following discharge   - Consider OT consult to assist with ADL evaluation and planning for discharge  - Provide patient education as appropriate  - Monitor functional capacity and physical performance, use of AM PAC & JH-HLM   - Monitor gait, balance and fatigue with ambulation    Outcome: Progressing  Goal: Maintains/Returns to pre admission functional level  Description: INTERVENTIONS:  - Perform AM-PAC 6 Click Basic Mobility/ Daily Activity  assessment daily.  - Set and communicate daily mobility goal to care team and patient/family/caregiver.   - Collaborate with rehabilitation services on mobility goals if consulted  - Perform Range of Motion 3 times a day.  - Reposition patient every 2 hours.  - Dangle patient 3 times a day  - Stand patient 3 times a day  - Ambulate patient 3 times a day  - Out of bed to chair 3 times a day   - Out of bed for meals 3 times a day  - Out of bed for toileting  - Record patient progress and toleration of activity level   Outcome: Progressing

## 2025-07-16 NOTE — H&P
H&P - Hospitalist   Name: Samira Allred 84 y.o. female I MRN: [id number]  Unit/Bed#: ED 08 I Date of Admission: 7/15/2025   Date of Service: 7/15/2025 I Hospital Day: 0     Assessment & Plan  Flank pain  84-year-old female with chronic herrera 2/2 chronic urinary retention presents to ED complaining of R flank pain, fevers 102F and chills  Patient with history of UTI urine cx + pseudomonas in Feb and June 2025 treated with ciprofloxacin  UA today + nitrites, leukocytes, small blood; urine cx pending  CT A/P revealing mild right hydronephrosis and numerous right pelvic calculi any of which could be located in R ureter  VSS, WBC 11.12 otherwise labs unremarkable  Per ID patient likely to have colonization with chronic Herrera although recommend admission as no clear source of infection at this time  IV Cefepime for possible UTI coverage, follow up on blood cx/urine cx  Consider formal ID consult and urology consult  Strain all urine  IVF hydration, n.p.o. at midnight should urology need to intervene for management of stone  Blood cultures and urine culture pending, plan to follow-up  Essential hypertension  BP stable since admission, monitor BP per unit protocol  Continue PTA HCTZ and amlodipine  Acquired hypothyroidism  Continue PTA levothyroxine  Hyperlipidemia  Continue PTA statin  Functional diarrhea  Patient reports history of diarrhea x 2 days  Per chart review pt with history of non-infectious diarrhea in January  Continue to monitor, consider stool studies should diarrhea persist  History of DVT (deep vein thrombosis)  Per chart review patient with history of DVT 2019, completed 6 months of anticoagulation  Repeat imaging 2020 revealed resolved DVT  Not on AC at this time  Urinary retention  Follows with urology as outpatient, routine catheter changes with VNA  Chronic herrera  Rheumatoid arthritis (HCC)  Continue PTA Rituxan and methotrexate  Chronic kidney disease-mineral and bone disorder  Lab Results   Component  Value Date    EGFR 28 07/15/2025    EGFR 27 06/14/2025    EGFR 29 03/04/2025    CREATININE 1.66 (H) 07/15/2025    CREATININE 1.68 (H) 06/14/2025    CREATININE 1.59 (H) 03/04/2025   Follows with nephrology as outpatient  Monitor creatinine with daily BMP, avoid nephrotoxic agents    VTE Pharmacologic Prophylaxis: VTE Score: 6 High Risk (Score >/= 5) - Pharmacological DVT Prophylaxis Ordered: heparin. Sequential Compression Devices Ordered.  Code Status: Level 1 - Full Code   Discussion with family: Patient declined call to .     Anticipated Length of Stay: Patient will be admitted on an observation basis with an anticipated length of stay of less than 2 midnights secondary to IV abx, possible ID and urology eval.    History of Present Illness   Chief Complaint:   Chief Complaint   Patient presents with    Fever     Was seen at doctors office today and had temperature of 102. Has chronic herrera, patient states that sometimes urine smells.       Samira Allred is a 84 y.o. female with a PMH of HTN, rheumatoid arthritis, hypothyroidism, HLD, history of DVT, urinary retention with chronic Herrera in place, CKD who presents with right hip/flank pain and fever beginning today.  Patient was seen by PCP for above symptoms plus feeling generally unwell and N/V/D.  Per chart review patient with history of UTI with Pseudomonas positive urine culture, treated with Cipro in June.  Patient was instructed to report to ED for initiation of IV antibiotics for likely UTI.  Denies blood in stool or vomit.  Reports history of diarrhea 1 month ago that had resolved until 2 days ago.  Was noted to be febrile at PCP office.  Denies chills, chest pain, shortness of breath or abdominal pain.    Review of Systems   Constitutional:  Positive for fatigue and fever. Negative for chills.   Respiratory:  Negative for shortness of breath.    Cardiovascular:  Negative for chest pain, palpitations and leg swelling.   Gastrointestinal:   Positive for diarrhea, nausea and vomiting. Negative for abdominal pain, blood in stool and constipation.   Genitourinary:  Positive for flank pain. Negative for hematuria.        Chronic herrera in place   Musculoskeletal:  Positive for back pain.   Neurological:  Negative for dizziness and headaches.   Psychiatric/Behavioral:  Negative for confusion.        Historical Information   Past Medical History[1]  Past Surgical History[2]  Social History[3]  E-Cigarette/Vaping    E-Cigarette Use Never User      E-Cigarette/Vaping Substances    Nicotine No     THC No     CBD No     Flavoring No     Other No     Unknown No      Family History[4]  Social History:  Marital Status:    Occupation:   Patient Pre-hospital Living Situation: Home  Patient Pre-hospital Level of Mobility: walks with walker  Patient Pre-hospital Diet Restrictions:     Meds/Allergies   I have reviewed home medications using recent Epic encounter.  Prior to Admission medications    Medication Sig Start Date End Date Taking? Authorizing Provider   amLODIPine (NORVASC) 2.5 mg tablet Take 1 tablet (2.5 mg total) by mouth daily 3/21/25   Layne Bacon MD   ascorbic acid (VITAMIN C) 500 mg tablet Take 500 mg by mouth in the morning.    Historical Provider, MD   Calcium Citrate-Vitamin D (CITRACAL + D PO) Take by mouth in the morning and in the evening.    Historical Provider, MD   Cholecalciferol (VITAMIN D-3 PO) Take 2,000 Units by mouth in the morning.    Historical Provider, MD   folic acid (FOLVITE) 1 mg tablet 1 daily 2/14/24   Layne Bacon MD   hydroCHLOROthiazide 12.5 mg tablet Take 1 tablet (12.5 mg total) by mouth daily 7/18/24 7/13/25  Layne Bacon MD   IRON, FERROUS SULFATE, PO Take by mouth    Historical Provider, MD   levothyroxine (Synthroid) 75 mcg tablet Take 1 tablet (75 mcg total) by mouth in the morning 1/14/25   Layne Bacon MD   magnesium oxide (MAG-OX) 400 mg Take 1 tablet (400 mg total) by mouth 2 (two)  times a day 11/5/18   PUJA Horner   methotrexate 2.5 MG tablet 4 pills weekly 4/11/24   Historical Provider, MD   Multiple Vitamin (MULTIVITAMIN) tablet Take 1 tablet by mouth in the morning.    Historical Provider, MD   nitrofurantoin (MACROBID) 100 mg capsule Take 1 capsule (100 mg total) by mouth 2 (two) times a day 2/7/25   Zack Mooney MD   polyethylene glycol (MIRALAX) 17 g packet Take 17 g by mouth daily as needed (constipation) 11/8/23   Shruthi Baker MD   potassium chloride (Klor-Con M10) 10 mEq tablet Take 1 tablet (10 mEq total) by mouth 2 (two) times a day 6/11/25   Layne Bacon MD   riTUXimab (RITUXAN IV) Inject into a catheter in a vein    Historical Provider, MD   rosuvastatin (CRESTOR) 5 mg tablet Take 1 tablet (5 mg total) by mouth daily 12/2/24   Layne Bacon MD     Allergies   Allergen Reactions    Lisinopril Anaphylaxis    Codeine Other (See Comments)     Nausea/vomiting      Other      Annotation - [id number]: tounge swelling    Oxycodone      Annotation - 11Mar2013: NOT TOLERATE    Penicillins Hives    Sulfamethoxazole-Trimethoprim     Ciprofloxacin Rash       Objective :  Temp:  [98.3 °F (36.8 °C)-102.1 °F (38.9 °C)] 98.3 °F (36.8 °C)  HR:  [85-95] 90  BP: (127-185)/(58-77) 127/58  Resp:  [16] 16  SpO2:  [95 %-97 %] 95 %  O2 Device: None (Room air)    Physical Exam  Vitals reviewed.   Constitutional:       General: She is not in acute distress.     Appearance: She is not ill-appearing, toxic-appearing or diaphoretic.   HENT:      Mouth/Throat:      Mouth: Mucous membranes are moist.     Cardiovascular:      Rate and Rhythm: Normal rate and regular rhythm.   Pulmonary:      Effort: Pulmonary effort is normal. No respiratory distress.      Breath sounds: Normal breath sounds.   Abdominal:      General: There is no distension.      Palpations: Abdomen is soft.      Tenderness: There is no abdominal tenderness. There is no right CVA tenderness, left CVA tenderness or  guarding.     Musculoskeletal:         General: No swelling.      Right lower leg: No edema.      Left lower leg: No edema.     Skin:     General: Skin is warm and dry.     Neurological:      Mental Status: She is alert.         Urinary Catheter:  Goal for removal: N/A - Chronic Carrera               Lab Results: I have reviewed the following results:  Results from last 7 days   Lab Units 07/15/25  1726   WBC Thousand/uL 11.72*   HEMOGLOBIN g/dL 9.5*   HEMATOCRIT % 28.4*   PLATELETS Thousands/uL 221   BANDS PCT % 10*   LYMPHO PCT % 5*   MONO PCT % 2*   EOS PCT % 0     Results from last 7 days   Lab Units 07/15/25  1658   SODIUM mmol/L 136   POTASSIUM mmol/L 4.4   CHLORIDE mmol/L 102   CO2 mmol/L 25   BUN mg/dL 28*   CREATININE mg/dL 1.66*   ANION GAP mmol/L 9   CALCIUM mg/dL 9.6   ALBUMIN g/dL 4.5   TOTAL BILIRUBIN mg/dL 0.88   ALK PHOS U/L 65   ALT U/L 12   AST U/L 22   GLUCOSE RANDOM mg/dL 114             Lab Results   Component Value Date    HGBA1C 5.6 06/04/2025    HGBA1C 5.4 09/14/2024    HGBA1C 5.6 09/06/2023     Results from last 7 days   Lab Units 07/15/25  1658   LACTIC ACID mmol/L 0.6   PROCALCITONIN ng/ml 0.15       Imaging Results Review: I reviewed radiology reports from this admission including: chest xray, CT abdomen/pelvis, and CT lumbar spine.  Other Study Results Review: No additional pertinent studies reviewed.    Administrative Statements   I have spent a total time of 60 minutes in caring for this patient on the day of the visit/encounter including Diagnostic results, Instructions for management, Patient and family education, Impressions, Counseling / Coordination of care, Documenting in the medical record, Reviewing/placing orders in the medical record (including tests, medications, and/or procedures), Obtaining or reviewing history  , and Communicating with other healthcare professionals .    ** Please Note: This note has been constructed using a voice recognition system. **         [1]   Past  Medical History:  Diagnosis Date    Allergic angioedema     2mar2016 resolved    Angioedema     [id number] resolved    Arthritis     Breast cancer (HCC) 2012    right     Chronic kidney disease     Disease of thyroid gland     GERD (gastroesophageal reflux disease)     Hemorrhage of anus and rectum     02mar2016 resolved    Hyperlipidemia     Hypertension     Hypocalcemia     12oct2017 resolved    Neuritis     thoracic and lumbosacral    Peptic ulcer     Rheumatoid arthritis (HCC)     Stage 3b chronic kidney disease (HCC) 05/07/2021    Vitamin D deficiency    [2]   Past Surgical History:  Procedure Laterality Date    ABDOMINAL SURGERY      BREAST BIOPSY Right 2012    BREAST LUMPECTOMY Right 2012    BREAST SURGERY      CHOLECYSTECTOMY      ELBOW SURGERY      GALLBLADDER SURGERY  1981    HIP SURGERY Right 09/18/2022    10/2023    ORTHOPEDIC SURGERY      for toes    [place] CYSTO W/IRRIG & EVAC MULTPLE OBSTRUCTING CLOTS N/A 11/05/2023    Procedure: CYSTOSCOPY EVACUATION OF CLOTS & fulguration;  Surgeon: Uriah Schwartz MD;  Location: [facility];  Service: Urology    TRANSURETHRAL RESECTION OF BLADDER TUMOR N/A 11/05/2023    Procedure: TRANSURETHRAL RESECTION OF BLADDER TUMOR (TURBT);  Surgeon: Uriah Schwartz MD;  [facility];  Service: Urology    US BREAST CYST ASPIRATION RIGHT INITIAL Right 4/24/2018    US GUIDED BREAST BIOPSY RIGHT COMPLETE Right 08/15/2022   [3]   Social History  Tobacco Use    Smoking status: Never     Passive exposure: Past    Smokeless tobacco: Never   Vaping Use    Vaping status: Never Used   Substance and Sexual Activity    Alcohol use: Not Currently    Drug use: No    Sexual activity: Not Currently     Partners: Male   [4]   Family History  Problem Relation Name Age of Onset    Breast cancer Mother      Kidney failure Father      Other Father          uremia    Lung cancer Brother      Breast cancer Maternal Aunt      Breast cancer Maternal Aunt      Breast cancer Maternal Aunt       Breast cancer Maternal Aunt      Breast cancer Maternal Aunt      Stomach cancer Maternal Uncle

## 2025-07-16 NOTE — ASSESSMENT & PLAN NOTE
84-year-old female with chronic herrera 2/2 chronic urinary retention presents to ED complaining of R flank pain, fevers 102F and chills  Patient with history of UTI urine cx + pseudomonas in Feb and June 2025 treated with ciprofloxacin  UA today + nitrites, leukocytes, small blood; urine cx pending  CT A/P revealing mild right hydronephrosis and numerous right pelvic calculi any of which could be located in R ureter  VSS, WBC 11.12 otherwise labs unremarkable      Continue cefepime for now given some urinary symptoms and history of Pseudomonas growth in urine  Patient with stable kidney function no overt sepsis so will defer urological consult at this time

## 2025-07-16 NOTE — PROGRESS NOTES
Progress Note - Hospitalist   Name: Samira Allred 84 y.o. female I MRN: [id number]  Unit/Bed#: -01 I Date of Admission: 7/15/2025   Date of Service: 7/16/2025 I Hospital Day: 0    Assessment & Plan  Flank pain  84-year-old female with chronic herrera 2/2 chronic urinary retention presents to ED complaining of R flank pain, fevers 102F and chills  Patient with history of UTI urine cx + pseudomonas in Feb and June 2025 treated with ciprofloxacin  UA today + nitrites, leukocytes, small blood; urine cx pending  CT A/P revealing mild right hydronephrosis and numerous right pelvic calculi any of which could be located in R ureter  VSS, WBC 11.12 otherwise labs unremarkable      Continue cefepime for now given some urinary symptoms and history of Pseudomonas growth in urine  Patient with stable kidney function no overt sepsis so will defer urological consult at this time  UTI (urinary tract infection)  Continue cefepime  Follow repeat urine cultures  Essential hypertension  BP stable since admission, monitor BP per unit protocol  Continue PTA HCTZ and amlodipine  Acquired hypothyroidism  Continue PTA levothyroxine  Hyperlipidemia  Continue PTA statin  Functional diarrhea  Patient reports history of diarrhea x 2 days  Per chart review pt with history of non-infectious diarrhea in January  Continue to monitor, consider stool studies should diarrhea persist  History of DVT (deep vein thrombosis)  Per chart review patient with history of DVT 2019, completed 6 months of anticoagulation  Repeat imaging 2020 revealed resolved DVT  Not on AC at this time  Urinary retention  Follows with urology as outpatient, routine catheter changes with VNA  Chronic herrera  Rheumatoid arthritis (HCC)  Continue PTA Rituxan and methotrexate  Chronic kidney disease-mineral and bone disorder  Lab Results   Component Value Date    EGFR 28 07/16/2025    EGFR 28 07/15/2025    EGFR 27 06/14/2025    CREATININE 1.66 (H) 07/16/2025    CREATININE 1.66 (H)  "07/15/2025    CREATININE 1.68 (H) 06/14/2025   Follows with nephrology as outpatient  Monitor creatinine with daily BMP, avoid nephrotoxic agents    VTE Pharmacologic Prophylaxis: VTE Score: 6 High Risk (Score >/= 5) - Pharmacological DVT Prophylaxis Ordered: heparin. Sequential Compression Devices Ordered.    Mobility:   Basic Mobility Inpatient Raw Score: 19  JH-HLM Goal: 6: Walk 10 steps or more  JH-HLM Achieved: 6: Walk 10 steps or more  JH-HLM Goal achieved. Continue to encourage appropriate mobility.    Patient Centered Rounds: I evaluated the patient without nursing staff present due to medical need   Discussions with Specialists or Other Care Team Provider: CM    Education and Discussions with Family / Patient: Team has Updated  (\"Relative\") via phone.    Current Length of Stay: 0 day(s)  Current Patient Status: Inpatient   Certification Statement: The patient will continue to require additional inpatient hospital stay due to IV atbx  Discharge Plan: Anticipate discharge in 48-72 hrs to home.    Code Status: Level 1 - Full Code    Subjective   Patient was examined at bedside this am in no acute distress. Denied acute overnight events. Patient is doing well. Reported she is oob and passing normal stools but complaining that she is hungry and thirsty. Denied F, chills, N, V, D,C, abdominal pain, abdominal fullness, burning sensation in genitals, chest pain and sob.    Objective :  Temp:  [98.3 °F (36.8 °C)-102.1 °F (38.9 °C)] 99.5 °F (37.5 °C)  HR:  [66-95] 71  BP: (109-185)/(52-80) 150/61  Resp:  [16] 16  SpO2:  [94 %-97 %] 96 %  O2 Device: None (Room air)    Body mass index is 29.86 kg/m².     Input and Output Summary (last 24 hours):     Intake/Output Summary (Last 24 hours) at 7/16/2025 1339  Last data filed at 7/16/2025 1245  Gross per 24 hour   Intake 960 ml   Output 2100 ml   Net -1140 ml       Physical Exam  Constitutional:       General: She is not in acute distress.     Appearance: She " is not ill-appearing or toxic-appearing.     Cardiovascular:      Rate and Rhythm: Normal rate and regular rhythm.      Pulses:           Dorsalis pedis pulses are 2+ on the right side and 1+ on the left side.      Heart sounds: Normal heart sounds.   Pulmonary:      Effort: Pulmonary effort is normal.      Breath sounds: Normal breath sounds.   Abdominal:      General: There is no distension.      Palpations: Abdomen is soft.      Tenderness: There is no abdominal tenderness. There is no guarding.   Genitourinary:     Comments: Carrera catheter in place    Musculoskeletal:         General: No swelling.      Right lower leg: No swelling or tenderness. No edema.      Left lower leg: Tenderness present. No swelling. No edema.     Skin:     General: Skin is warm and dry.     Neurological:      Mental Status: She is oriented to person, place, and time.     Psychiatric:         Mood and Affect: Mood normal.         Behavior: Behavior normal.       Lines/Drains:  Lines/Drains/Airways       Active Status       Name Placement date Placement time Site Days    Urethral Catheter Non-latex 16 Fr. 01/08/25  1617  Non-latex  188                  Urinary Catheter:  Goal for removal: N/A - Chronic Carrera                 Lab Results: I have reviewed the following results:   Results from last 7 days   Lab Units 07/16/25  0629 07/15/25  1726   WBC Thousand/uL 9.23 11.72*   HEMOGLOBIN g/dL 9.1* 9.5*   HEMATOCRIT % 27.7* 28.4*   PLATELETS Thousands/uL 202 221   BANDS PCT %  --  10*   LYMPHO PCT %  --  5*   MONO PCT %  --  2*   EOS PCT %  --  0     Results from last 7 days   Lab Units 07/16/25  0629 07/15/25  1658   SODIUM mmol/L 136 136   POTASSIUM mmol/L 4.0 4.4   CHLORIDE mmol/L 105 102   CO2 mmol/L 24 25   BUN mg/dL 24 28*   CREATININE mg/dL 1.66* 1.66*   ANION GAP mmol/L 7 9   CALCIUM mg/dL 8.7 9.6   ALBUMIN g/dL  --  4.5   TOTAL BILIRUBIN mg/dL  --  0.88   ALK PHOS U/L  --  65   ALT U/L  --  12   AST U/L  --  22   GLUCOSE RANDOM mg/dL  104 114                 Results from last 7 days   Lab Units 07/15/25  1658   LACTIC ACID mmol/L 0.6   PROCALCITONIN ng/ml 0.15       Recent Cultures (last 7 days):   Results from last 7 days   Lab Units 07/15/25  2112 07/15/25  2111   BLOOD CULTURE  Received in Microbiology Lab. Culture in Progress. Received in Microbiology Lab. Culture in Progress.       Imaging Results Review: I personally reviewed the following image studies in PACS and associated radiology reports: chest xray and CT abdomen/pelvis. My interpretation of the radiology images/reports is: R hydronephrosis with traces of pelvic calculi.  Other Study Results Review: No additional pertinent studies reviewed.    Last 24 Hours Medication List:     Current Facility-Administered Medications:     acetaminophen (TYLENOL) tablet 650 mg, Q6H PRN    amLODIPine (NORVASC) tablet 2.5 mg, Daily    cefepime (MAXIPIME) 2 g/50 mL dextrose IVPB, Q24H    heparin (porcine) subcutaneous injection 5,000 Units, Q8H TORO    hydroCHLOROthiazide tablet 12.5 mg, Daily    levothyroxine tablet 75 mcg, Daily    [START ON 7/19/2025] methotrexate tablet 10 mg, Weekly    ondansetron (ZOFRAN) injection 4 mg, Once    ondansetron (ZOFRAN) injection 4 mg, Q6H PRN    pravastatin (PRAVACHOL) tablet 40 mg, Daily With Dinner    sodium chloride 0.9 % infusion, Continuous, Last Rate: 100 mL/hr (07/15/25 2158)    Administrative Statements   Today, Patient Was Seen By: Kevin Valenzuela MD  I have spent a total time of 86 minutes in caring for this patient on the day of the visit/encounter including Instructions for management, Patient and family education, Documenting in the medical record, Reviewing/placing orders in the medical record (including tests, medications, and/or procedures), Obtaining or reviewing history  , and Communicating with other healthcare professionals .    **Please Note: This note may have been constructed using a voice recognition system.**

## 2025-07-16 NOTE — ASSESSMENT & PLAN NOTE
Patient reports history of diarrhea x 2 days  Per chart review pt with history of non-infectious diarrhea in January  Continue to monitor, consider stool studies should diarrhea persist

## 2025-07-16 NOTE — ASSESSMENT & PLAN NOTE
Patient reports history of diarrhea x 2 days  Per chart review pt with history of non-infectious diarrhea in January  Currently passing soft stools, no stool study necessary at this time

## 2025-07-16 NOTE — ASSESSMENT & PLAN NOTE
Per chart review patient with history of DVT 2019, completed 6 months of anticoagulation  Repeat imaging 2020 revealed resolved DVT  Not on AC at this time

## 2025-07-16 NOTE — ASSESSMENT & PLAN NOTE
84-year-old female with chronic herrera 2/2 chronic urinary retention presents to ED complaining of R flank pain, fevers 102F and chills  Patient with history of UTI urine cx + pseudomonas in Feb and June 2025 treated with ciprofloxacin  UA today + nitrites, leukocytes, small blood; urine cx pending  CT A/P revealing mild right hydronephrosis and numerous right pelvic calculi any of which could be located in R ureter  VSS, WBC 11.12 otherwise labs unremarkable  Per ID patient likely to have colonization with chronic Herrera although recommend admission as no clear source of infection at this time  Strain all urine  No urology consult needed, patient is stable

## 2025-07-16 NOTE — ASSESSMENT & PLAN NOTE
Lab Results   Component Value Date    EGFR 28 07/16/2025    EGFR 28 07/15/2025    EGFR 27 06/14/2025    CREATININE 1.66 (H) 07/16/2025    CREATININE 1.66 (H) 07/15/2025    CREATININE 1.68 (H) 06/14/2025   Follows with nephrology as outpatient  Monitor creatinine with daily BMP, avoid nephrotoxic agents

## 2025-07-16 NOTE — ASSESSMENT & PLAN NOTE
Continue IV Cefepime   Patient responsive as her WBC continues to drop, 7.3 from 9.3  Urine culture results pending   Continue monitoring kidney function, urine and blood cultures

## 2025-07-16 NOTE — CASE MANAGEMENT
Case Management Assessment & Discharge Planning Note    Patient name Samira Allred  [facility] located within 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    Chronic pain of both knees 08/10/2022    Chronic kidney disease-mineral and bone disorder 2022    History of DVT (deep vein thrombosis) 2021    Chronic renal disease, stage IV (HCC) 2021    Varicose veins of left lower extremity with pain 2020    Embolism from thrombosis of vein of distal end of lower extremity (HCC) 2020    Intermediate stage nonexudative 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 eyes 2019    Functional diarrhea 2019    History of peptic ulcer 10/11/2018    Fat necrosis (segmental) of breast 2018    Personal history of breast cancer 2018    Acquired hypothyroidism 2018    Hyperuricemia 2018    Essential hypertension 2018    Rheumatoid arthritis (HCC) 2018    Diastolic dysfunction 2017    Vitamin D deficiency 2014    Lumbar radiculopathy 2013    Hyperlipidemia 2013    Osteopenia 2013    Other chronic pain 2013    Adenocarcinoma of breast (HCC) 2013      LOS (days): 0  Geometric Mean LOS (GMLOS) (days): 2.8  Days to GMLOS:2.5     OBJECTIVE:    Risk of Unplanned Readmission Score: 14.64         Current admission status:  Inpatient       Preferred Pharmacy:   [facility] #[id number] - [place] - [address]  [address]  [facility] [address]  Phone: [phone] Fax: [phone]    [facility] [place] - [address]  [address]  [place] [address]  Phone: [phone] Fax: [phone]    Primary Care Provider: Layne Bacon MD    Primary Insurance: MEDICARE  Secondary Insurance: BLUE CROSS    ASSESSMENT:  Active Health Care Proxies    There are no active Health Care Proxies on file.       Advance Directives  Does patient have a Health Care POA?: No  Was patient offered paperwork?: Yes (declined)  Does patient currently have a Health Care decision maker?: Yes, please see Health Care Proxy section  Does patient have Advance Directives?: No  Was patient offered paperwork?: Yes (declined)  Primary Contact: Kali Bangura         Readmission Root Cause  30 Day Readmission: No    Patient Information  Admitted from:: Home  Mental Status: Alert  During Assessment patient was accompanied by: Not accompanied during assessment  Assessment information provided by:: Patient  Primary Caregiver: Self  Support Systems: Other (Comment) (roomate)  County of Residence: [place]  What city do you live in?: [place]  Home entry access options. Select all that apply.: Ramp  Type of Current Residence: [facility]  Living Arrangements: Lives w/ Friend  Is patient a ?: No    Activities of Daily Living Prior to Admission  Functional Status: Independent  Completes ADLs independently?: Yes  Ambulates independently?: Yes  Does patient use assisted devices?: Yes  Assisted Devices (DME) used: Walker  Does patient currently own DME?: Yes  What DME does the patient currently own?: Walker  Does patient have a history of Outpatient Therapy (PT/OT)?: Yes  Does the patient have a history of Short-Term Rehab?: Yes (pat)  Does patient have a history of HHC?: Yes  Does  patient currently have HHC?: No    Current Home Health Care  Home Health Agency Name:: Other (blanket referral sent)    Patient Information Continued  Income Source: Pension/group home  Does patient have prescription coverage?: No  Can the patient afford their medications and any related supplies (such as glucometers or test strips)?: Yes  Does patient receive dialysis treatments?: No  Does patient have a history of substance abuse?: No  Does patient have a history of Mental Health Diagnosis?: No         Means of Transportation  [facility]          DISCHARGE DETAILS:    Discharge planning discussed with:: pt  Freedom of Choice: Yes  Comments - Freedom of Choice: CM introduced self/role and discussed FOC with patient who demonstrated understanding and had no questions. IPTA, lives with roomate in a trailer, uses a RW, and is agreeable for HHC- refusing STR. Pt used to have Residential and prefers them. CM sent blanket referral.  CM contacted family/caregiver?: No- see comments (declined)  Were Treatment Team discharge recommendations reviewed with patient/caregiver?: Yes  Did patient/caregiver verbalize understanding of patient care needs?: Yes  Were patient/caregiver advised of the risks associated with not following Treatment Team discharge recommendations?: Yes         Requested Home Health Care         Is the patient interested in HHC at discharge?: Yes  Home Health Discipline requested:: Nursing, Physical Therapy, Occupational Therapy, Home Health Aide  Home Health Agency Name:: Other (blanket referral sent)  Home Health Follow-Up Provider:: PCP  Home Health Services Needed:: Evaluate Functional Status and Safety, Gait/ADL Training, Strengthening/Theraputic Exercises to Improve Function  Homebound Criteria Met:: Uses an Assist Device (i.e. cane, walker, etc)  Supporting Clincal Findings:: Fatigues Easliy in Short Distances, Limited Endurance    DME Referral Provided  Referral made  for DME?: No    Other Referral/Resources/Interventions Provided:  Interventions: HHC         Treatment Team Recommendation: Home with Home Health Care  Expected Discharge Disposition: Home Health Services  Additional Discharge Dispositions: Home Health Services  Transport at Discharge : Family

## 2025-07-16 NOTE — PROGRESS NOTES
Progress Note - Hospitalist   Name: Samira Allred 84 y.o. female I MRN: [id number]  Unit/Bed#: -01 I Date of Admission: 7/15/2025   Date of Service: 7/16/2025 I Hospital Day: 0  { ?Quick Links I Problem List I PORCH I Billing Tip:44175}  Assessment & Plan  Flank pain  84-year-old female with chronic herrera 2/2 chronic urinary retention presents to ED complaining of R flank pain, fevers 102F and chills  Patient with history of UTI urine cx + pseudomonas in Feb and June 2025 treated with ciprofloxacin  UA today + nitrites, leukocytes, small blood; urine cx pending  CT A/P revealing mild right hydronephrosis and numerous right pelvic calculi any of which could be located in R ureter  VSS, WBC 11.12 otherwise labs unremarkable  Per ID patient likely to have colonization with chronic Herrera although recommend admission as no clear source of infection at this time  Strain all urine  No urology consult needed, patient is stable   UTI (urinary tract infection)  Continue IV Cefepime   Patient responsive as her WBC continues to drop, 7.3 from 9.3  Urine culture results pending   Continue monitoring kidney function, urine and blood cultures   Essential hypertension  BP stable since admission, monitor BP per unit protocol  Continue PTA HCTZ and amlodipine  Acquired hypothyroidism  Continue PTA levothyroxine  Hyperlipidemia  Continue PTA statin  Functional diarrhea  Patient reports history of diarrhea x 2 days  Per chart review pt with history of non-infectious diarrhea in January  Currently passing soft stools, no stool study necessary at this time   History of DVT (deep vein thrombosis)  Per chart review patient with history of DVT 2019, completed 6 months of anticoagulation  Repeat imaging 2020 revealed resolved DVT  Not on therapeutic AC  Urinary retention  Follows with urology as outpatient, routine catheter changes with [facility]NA  Chronic herrera  Rheumatoid arthritis (HCC)  Continue PTA Rituxan and methotrexate  Chronic kidney  "disease-mineral and bone disorder  Lab Results   Component Value Date    EGFR 28 07/16/2025    EGFR 28 07/15/2025    EGFR 27 06/14/2025    CREATININE 1.66 (H) 07/16/2025    CREATININE 1.66 (H) 07/15/2025    CREATININE 1.68 (H) 06/14/2025   Follows with nephrology as outpatient  Monitor creatinine with daily BMP, avoid nephrotoxic agents  B12 deficiency  Supplemental B12 injection    VTE Pharmacologic Prophylaxis: VTE Score: 6 High Risk (Score >/= 5) - Pharmacological DVT Prophylaxis Ordered: heparin. Sequential Compression Devices Ordered.    Mobility:   Basic Mobility Inpatient Raw Score: 19  JH-HLM Goal: 6: Walk 10 steps or more  JH-HLM Achieved: 6: Walk 10 steps or more  JH-HLM Goal achieved. Continue to encourage appropriate mobility.    Patient Centered Rounds: I evaluated the patient without nursing staff present due to ***   Discussions with Specialists or Other Care Team Provider: CM    Education and Discussions with Family / Patient: Updated  (\"Relative\") via phone.    Current Length of Stay: 0 day(s)  Current Patient Status: Inpatient   Certification Statement: {Certification Statement:60415}  Discharge Plan: Anticipate discharge in 48-72 hrs to home.    Code Status: Level 1 - Full Code    Subjective   Patient was examined at bedside this am in no acute distress. Denied acute overnight events and states \"she feels better\" and has \"no pain.\" Patient is doing well. Reported she is oob with a walker, tolerating food and passing normal stools. Denied F, chills, N, V, D,C, abdominal pain, abdominal fullness, burning sensation in genitals, chest pain and sob.    Objective :{?Quick Links I ICU Summary I Vitals I I/Os I LDAs I Mobility (PT/OT) I Code Status / ACP   ?Quick Links I Active Meds I Pain Meds I Antibiotics I Anticoagulants:20481}  Temp:  [98.3 °F (36.8 °C)-102.1 °F (38.9 °C)] 99.5 °F (37.5 °C)  HR:  [66-95] 71  BP: (109-185)/(52-80) 150/61  Resp:  [16] 16  SpO2:  [94 %-97 %] 96 %  O2 " Device: None (Room air)    Body mass index is 29.86 kg/m².     Input and Output Summary (last 24 hours):     Intake/Output Summary (Last 24 hours) at 7/16/2025 1110  Last data filed at 7/16/2025 0519  Gross per 24 hour   Intake 240 ml   Output 1350 ml   Net -1110 ml       Physical Exam  Constitutional:       General: She is not in acute distress.     Appearance: She is not ill-appearing or toxic-appearing.     Cardiovascular:      Rate and Rhythm: Normal rate and regular rhythm.      Pulses:           Dorsalis pedis pulses are 2+ on the right side and 1+ on the left side.      Heart sounds: Normal heart sounds.   Pulmonary:      Effort: Pulmonary effort is normal.      Breath sounds: Normal breath sounds.   Abdominal:      General: There is no distension.      Palpations: Abdomen is soft.      Tenderness: There is no abdominal tenderness. There is no guarding.     Musculoskeletal:         General: No swelling.      Right lower leg: No swelling or tenderness. No edema.      Left lower leg: Tenderness present. No swelling. No edema.     Skin:     General: Skin is warm and dry.     Neurological:      Mental Status: She is oriented to person, place, and time.     Psychiatric:         Mood and Affect: Mood normal.         Behavior: Behavior normal.       Lines/Drains:  Lines/Drains/Airways       Active Status       Name Placement date Placement time Site Days    Urethral Catheter Non-latex 16 Fr. 01/08/25  1617  Non-latex  188                  Urinary Catheter:  Goal for removal: N/A - Chronic Carrera               {?Quick Links I Lab Review I Micro Results I Radiology I Cardiology:44041}  Lab Results: I have reviewed the following results:   Results from last 7 days   Lab Units 07/16/25  0629 07/15/25  1726   WBC Thousand/uL 9.23 11.72*   HEMOGLOBIN g/dL 9.1* 9.5*   HEMATOCRIT % 27.7* 28.4*   PLATELETS Thousands/uL 202 221   BANDS PCT %  --  10*   LYMPHO PCT %  --  5*   MONO PCT %  --  2*   EOS PCT %  --  0     Results  from last 7 days   Lab Units 07/16/25  0629 07/15/25  1658   SODIUM mmol/L 136 136   POTASSIUM mmol/L 4.0 4.4   CHLORIDE mmol/L 105 102   CO2 mmol/L 24 25   BUN mg/dL 24 28*   CREATININE mg/dL 1.66* 1.66*   ANION GAP mmol/L 7 9   CALCIUM mg/dL 8.7 9.6   ALBUMIN g/dL  --  4.5   TOTAL BILIRUBIN mg/dL  --  0.88   ALK PHOS U/L  --  65   ALT U/L  --  12   AST U/L  --  22   GLUCOSE RANDOM mg/dL 104 114                 Results from last 7 days   Lab Units 07/15/25  1658   LACTIC ACID mmol/L 0.6   PROCALCITONIN ng/ml 0.15       Recent Cultures (last 7 days):   Results from last 7 days   Lab Units 07/15/25  2112 07/15/25  2111   BLOOD CULTURE  Received in Microbiology Lab. Culture in Progress. Received in Microbiology Lab. Culture in Progress.       Imaging Results Review: I personally reviewed the following image studies in PACS and associated radiology reports: chest xray and CT abdomen/pelvis. My interpretation of the radiology images/reports is: R hydronephrosis with traces of pelvic calculi.  Other Study Results Review: No additional pertinent studies reviewed.    Last 24 Hours Medication List:     Current Facility-Administered Medications:     acetaminophen (TYLENOL) tablet 650 mg, Q6H PRN    amLODIPine (NORVASC) tablet 2.5 mg, Daily    cefepime (MAXIPIME) 2 g/50 mL dextrose IVPB, Q24H    heparin (porcine) subcutaneous injection 5,000 Units, Q8H [facility]    hydroCHLOROthiazide tablet 12.5 mg, Daily    levothyroxine tablet 75 mcg, Daily    [START ON 7/19/2025] methotrexate tablet 10 mg, Weekly    ondansetron (ZOFRAN) injection 4 mg, Once    ondansetron (ZOFRAN) injection 4 mg, Q6H PRN    pravastatin (PRAVACHOL) tablet 40 mg, Daily With Dinner    sodium chloride 0.9 % infusion, Continuous, Last Rate: 100 mL/hr (07/15/25 7824)    Administrative Statements   Today, Patient Was Seen By: Tomeka Barrera  I have spent a total time of *** minutes in caring for this patient on the day of the visit/encounter including Instructions for  management, Patient and family education, Documenting in the medical record, Reviewing/placing orders in the medical record (including tests, medications, and/or procedures), Obtaining or reviewing history  , and Communicating with other healthcare professionals .    **Please Note: This note may have been constructed using a voice recognition system.**

## 2025-07-16 NOTE — ASSESSMENT & PLAN NOTE
Continue PTA levothyroxine   O-L Flap Text: The defect edges were debeveled with a #15 scalpel blade.  Given the location of the defect, shape of the defect and the proximity to free margins an O-L flap was deemed most appropriate.  Using a sterile surgical marker, an appropriate advancement flap was drawn incorporating the defect and placing the expected incisions within the relaxed skin tension lines where possible.    The area thus outlined was incised deep to adipose tissue with a #15 scalpel blade.  The skin margins were undermined to an appropriate distance in all directions utilizing iris scissors.

## 2025-07-17 PROBLEM — R71.0 HEMOGLOBIN DROP: Status: ACTIVE | Noted: 2025-07-17

## 2025-07-17 PROBLEM — E53.8 B12 DEFICIENCY: Status: ACTIVE | Noted: 2025-07-17

## 2025-07-17 LAB
ALBUMIN SERPL BCG-MCNC: 3.5 G/DL (ref 3.5–5)
ALP SERPL-CCNC: 49 U/L (ref 34–104)
ALT SERPL W P-5'-P-CCNC: 13 U/L (ref 7–52)
ANION GAP SERPL CALCULATED.3IONS-SCNC: 6 MMOL/L (ref 4–13)
AST SERPL W P-5'-P-CCNC: 27 U/L (ref 13–39)
BILIRUB SERPL-MCNC: 0.43 MG/DL (ref 0.2–1)
BUN SERPL-MCNC: 24 MG/DL (ref 5–25)
CALCIUM SERPL-MCNC: 8.3 MG/DL (ref 8.4–10.2)
CHLORIDE SERPL-SCNC: 103 MMOL/L (ref 96–108)
CO2 SERPL-SCNC: 24 MMOL/L (ref 21–32)
CREAT SERPL-MCNC: 1.73 MG/DL (ref 0.6–1.3)
ERYTHROCYTE [DISTWIDTH] IN BLOOD BY AUTOMATED COUNT: 15.8 % (ref 11.6–15.1)
FOLATE SERPL-MCNC: 18.3 NG/ML
GFR SERPL CREATININE-BSD FRML MDRD: 26 ML/MIN/1.73SQ M
GLUCOSE SERPL-MCNC: 101 MG/DL (ref 65–140)
HCT VFR BLD AUTO: 25.9 % (ref 34.8–46.1)
HGB BLD-MCNC: 8.4 G/DL (ref 11.5–15.4)
MCH RBC QN AUTO: 32.7 PG (ref 26.8–34.3)
MCHC RBC AUTO-ENTMCNC: 32.4 G/DL (ref 31.4–37.4)
MCV RBC AUTO: 101 FL (ref 82–98)
NRBC BLD AUTO-RTO: 0 /100 WBCS
PLATELET # BLD AUTO: 178 THOUSANDS/UL (ref 149–390)
PMV BLD AUTO: 9.6 FL (ref 8.9–12.7)
POTASSIUM SERPL-SCNC: 3.4 MMOL/L (ref 3.5–5.3)
PROT SERPL-MCNC: 5.6 G/DL (ref 6.4–8.4)
RBC # BLD AUTO: 2.57 MILLION/UL (ref 3.81–5.12)
SODIUM SERPL-SCNC: 133 MMOL/L (ref 135–147)
VIT B12 SERPL-MCNC: 1440 PG/ML (ref 180–914)
WBC # BLD AUTO: 7.3 THOUSAND/UL (ref 4.31–10.16)

## 2025-07-17 PROCEDURE — 99233 SBSQ HOSP IP/OBS HIGH 50: CPT | Performed by: INTERNAL MEDICINE

## 2025-07-17 PROCEDURE — 82746 ASSAY OF FOLIC ACID SERUM: CPT | Performed by: INTERNAL MEDICINE

## 2025-07-17 PROCEDURE — 82607 VITAMIN B-12: CPT | Performed by: INTERNAL MEDICINE

## 2025-07-17 PROCEDURE — 80053 COMPREHEN METABOLIC PANEL: CPT | Performed by: INTERNAL MEDICINE

## 2025-07-17 PROCEDURE — 85027 COMPLETE CBC AUTOMATED: CPT | Performed by: INTERNAL MEDICINE

## 2025-07-17 RX ORDER — POTASSIUM CHLORIDE 1500 MG/1
40 TABLET, EXTENDED RELEASE ORAL ONCE
Status: COMPLETED | OUTPATIENT
Start: 2025-07-17 | End: 2025-07-17

## 2025-07-17 RX ADMIN — AMLODIPINE BESYLATE 2.5 MG: 2.5 TABLET ORAL at 08:34

## 2025-07-17 RX ADMIN — HEPARIN SODIUM 5000 UNITS: 5000 INJECTION INTRAVENOUS; SUBCUTANEOUS at 21:54

## 2025-07-17 RX ADMIN — ACETAMINOPHEN 650 MG: 325 TABLET ORAL at 00:02

## 2025-07-17 RX ADMIN — PRAVASTATIN SODIUM 40 MG: 40 TABLET ORAL at 15:14

## 2025-07-17 RX ADMIN — HYDROCHLOROTHIAZIDE 12.5 MG: 12.5 TABLET ORAL at 08:34

## 2025-07-17 RX ADMIN — POTASSIUM CHLORIDE 40 MEQ: 1500 TABLET, EXTENDED RELEASE ORAL at 08:34

## 2025-07-17 RX ADMIN — HEPARIN SODIUM 5000 UNITS: 5000 INJECTION INTRAVENOUS; SUBCUTANEOUS at 13:08

## 2025-07-17 RX ADMIN — LEVOTHYROXINE SODIUM 75 MCG: 0.07 TABLET ORAL at 08:34

## 2025-07-17 RX ADMIN — DEXTROSE MONOHYDRATE 1000 MG: 50 INJECTION, SOLUTION INTRAVENOUS at 18:21

## 2025-07-17 RX ADMIN — HEPARIN SODIUM 5000 UNITS: 5000 INJECTION INTRAVENOUS; SUBCUTANEOUS at 06:02

## 2025-07-17 NOTE — CASE MANAGEMENT
Case Management Progress Note    Patient name Samira Allred  Location /-01 MRN [id number]  : 1940 Date 2025       LOS (days): 1  Geometric Mean LOS (GMLOS) (days): 2.8  Days to GMLOS:1.5        OBJECTIVE:        Current admission status: Inpatient  Preferred Pharmacy:   [facility]/pharmacy #[id number] - [place] - [address]  [address]  [facility] [address]  Phone: [phone] Fax: [phone]    [facility] [place] - [address]  [address] [facility] [place] [address]  Phone: [phone] Fax: [phone]    Primary Care Provider: Layne Bacon MD    Primary Insurance: MEDICARE  Secondary Insurance: BLUE CROSS    PROGRESS NOTE:    CM provided patient a choice list for HHC agencies. Patient stated she would does not HHC. CM reviewed benefits of HHC with patient, but she is still declining.

## 2025-07-17 NOTE — PROGRESS NOTES
Progress Note - Hospitalist   Name: Samira Allred 84 y.o. female I MRN: [id number]  Unit/Bed#: -01 I Date of Admission: 7/15/2025   Date of Service: 7/17/2025 I Hospital Day: 1    Assessment & Plan  UTI (urinary tract infection)  Continue cefepime for now given some urinary symptoms and history of Pseudomonas growth in urine  Patient with stable kidney function no overt sepsis so will defer urological consult at this time  Flank pain  84-year-old female with chronic herrera 2/2 chronic urinary retention presents to ED complaining of R flank pain, fevers 102F and chills  UA today + nitrites, leukocytes, small blood; urine cx pending  CT A/P revealing mild right hydronephrosis and numerous right pelvic calculi any of which could be located in R ureter    Pain improving  Continue UTI ttx  Essential hypertension  BP stable since admission, monitor BP per unit protocol  Continue PTA HCTZ and amlodipine  Acquired hypothyroidism  Continue PTA levothyroxine  Hyperlipidemia  Continue PTA statin  Functional diarrhea  Patient reports history of diarrhea x 2 days  Per chart review pt with history of non-infectious diarrhea in January  Continue to monitor, consider stool studies should diarrhea persist  History of DVT (deep vein thrombosis)  Per chart review patient with history of DVT 2019, completed 6 months of anticoagulation  Repeat imaging 2020 revealed resolved DVT  Not on therapeutic AC at this time  Urinary retention  Follows with urology as outpatient, routine catheter changes with VNA  Chronic herrera  Rheumatoid arthritis (HCC)  Continue PTA Rituxan and methotrexate  Chronic kidney disease-mineral and bone disorder  Lab Results   Component Value Date    EGFR 26 07/17/2025    EGFR 28 07/16/2025    EGFR 28 07/15/2025    CREATININE 1.73 (H) 07/17/2025    CREATININE 1.66 (H) 07/16/2025    CREATININE 1.66 (H) 07/15/2025   Follows with nephrology as outpatient  Monitor creatinine with daily BMP, avoid nephrotoxic  "agents  Hemoglobin drop  Dilutional, D/c IV fluids, she denies bleeding    VTE Pharmacologic Prophylaxis: VTE Score: 6 High Risk (Score >/= 5) - Pharmacological DVT Prophylaxis Ordered: heparin. Sequential Compression Devices Ordered.    Mobility:   Basic Mobility Inpatient Raw Score: 19  JH-HLM Goal: 6: Walk 10 steps or more  JH-HLM Achieved: 6: Walk 10 steps or more  JH-HLM Goal achieved. Continue to encourage appropriate mobility.    Patient Centered Rounds: I evaluated the patient without nursing staff present due to medical need   Discussions with Specialists or Other Care Team Provider: CM    Education and Discussions with Family / Patient: Patient    Current Length of Stay: 1 day(s)  Current Patient Status: Inpatient   Certification Statement: The patient will continue to require additional inpatient hospital stay due to monitoring disease progression and pts response to abx  Discharge Plan: Anticipate discharge in 24-48 hrs to rehab.  Code Status: Level 1 - Full Code    Subjective   Patient was examined at bedside this am in no acute distress. Denied acute overnight events and states \"she feels better\" and has \"no pain.\" Patient is doing well. Reported she is oob with a walker, tolerating food and passing normal stools. Denied F, chills, N, V, D,C, abdominal pain, abdominal fullness, burning sensation in genitals, chest pain and sob.     Objective :  Temp:  [98.2 °F (36.8 °C)-100.1 °F (37.8 °C)] 98.2 °F (36.8 °C)  HR:  [58-91] 65  BP: (100-158)/(56-66) 130/59  Resp:  [17] 17  SpO2:  [94 %-99 %] 96 %    Body mass index is 29.86 kg/m².     Input and Output Summary (last 24 hours):     Intake/Output Summary (Last 24 hours) at 7/17/2025 1002  Last data filed at 7/17/2025 0953  Gross per 24 hour   Intake 720 ml   Output 2050 ml   Net -1330 ml       Physical Exam  Constitutional:       General: She is not in acute distress.     Appearance: She is not ill-appearing or toxic-appearing.     Cardiovascular:      Rate " and Rhythm: Normal rate and regular rhythm.      Pulses:           Dorsalis pedis pulses are 2+ on the right side and 1+ on the left side.      Heart sounds: Normal heart sounds.   Pulmonary:      Effort: Pulmonary effort is normal. No respiratory distress.      Breath sounds: Normal breath sounds.   Chest:      Chest wall: No tenderness.   Abdominal:      General: There is no distension.      Palpations: Abdomen is soft.      Tenderness: There is no abdominal tenderness.     Musculoskeletal:         General: No swelling.      Right lower leg: No edema.      Left lower leg: No edema.       Lines/Drains:  Lines/Drains/Airways       Active Status       Name Placement date Placement time Site Days    Urethral Catheter Non-latex 16 Fr. 01/08/25  1617  Non-latex  189                  Urinary Catheter:  Goal for removal: N/A - Chronic Carrera                 Lab Results: I have reviewed the following results:   Results from last 7 days   Lab Units 07/17/25  0456 07/16/25  0629 07/15/25  1726   WBC Thousand/uL 7.30   < > 11.72*   HEMOGLOBIN g/dL 8.4*   < > 9.5*   HEMATOCRIT % 25.9*   < > 28.4*   PLATELETS Thousands/uL 178   < > 221   BANDS PCT %  --   --  10*   LYMPHO PCT %  --   --  5*   MONO PCT %  --   --  2*   EOS PCT %  --   --  0    < > = values in this interval not displayed.     Results from last 7 days   Lab Units 07/17/25  0456   SODIUM mmol/L 133*   POTASSIUM mmol/L 3.4*   CHLORIDE mmol/L 103   CO2 mmol/L 24   BUN mg/dL 24   CREATININE mg/dL 1.73*   ANION GAP mmol/L 6   CALCIUM mg/dL 8.3*   ALBUMIN g/dL 3.5   TOTAL BILIRUBIN mg/dL 0.43   ALK PHOS U/L 49   ALT U/L 13   AST U/L 27   GLUCOSE RANDOM mg/dL 101         Results from last 7 days   Lab Units 07/16/25  2112   POC GLUCOSE mg/dl 90         Results from last 7 days   Lab Units 07/15/25  1658   LACTIC ACID mmol/L 0.6   PROCALCITONIN ng/ml 0.15       Recent Cultures (last 7 days):   Results from last 7 days   Lab Units 07/15/25  2112 07/15/25  2111  07/15/25  1940   BLOOD CULTURE  No Growth at 24 hrs. No Growth at 24 hrs.  --    URINE CULTURE   --   --  Culture too young- will reincubate       Imaging Results Review: No pertinent imaging studies reviewed.  Other Study Results Review: No additional pertinent studies reviewed.    Last 24 Hours Medication List:     Current Facility-Administered Medications:     acetaminophen (TYLENOL) tablet 650 mg, Q6H PRN    amLODIPine (NORVASC) tablet 2.5 mg, Daily    cefepime (MAXIPIME) 2 g/50 mL dextrose IVPB, Q24H, Last Rate: 2,000 mg (07/16/25 2101)    heparin (porcine) subcutaneous injection 5,000 Units, Q8H [facility]    hydroCHLOROthiazide tablet 12.5 mg, Daily    levothyroxine tablet 75 mcg, Daily    [START ON 7/19/2025] methotrexate tablet 10 mg, Weekly    ondansetron (ZOFRAN) injection 4 mg, Once    ondansetron (ZOFRAN) injection 4 mg, Q6H PRN    pravastatin (PRAVACHOL) tablet 40 mg, Daily With Dinner    sodium chloride 0.9 % infusion, Continuous, Last Rate: 100 mL/hr (07/15/25 2158)    Administrative Statements   Today, Patient Was Seen By: Tomeka Barrera  I have spent a total time of 38 minutes in caring for this patient on the day of the visit/encounter including Diagnostic results, Risks and benefits of tx options, Patient and family education, Counseling / Coordination of care, Documenting in the medical record, Reviewing/placing orders in the medical record (including tests, medications, and/or procedures), Obtaining or reviewing history  , and Communicating with other healthcare professionals .    **Please Note: This note may have been constructed using a voice recognition system.**

## 2025-07-17 NOTE — ASSESSMENT & PLAN NOTE
Per chart review patient with history of DVT 2019, completed 6 months of anticoagulation  Repeat imaging 2020 revealed resolved DVT  Not on therapeutic AC at this time

## 2025-07-17 NOTE — ASSESSMENT & PLAN NOTE
84-year-old female with chronic herrera 2/2 chronic urinary retention presents to ED complaining of R flank pain, fevers 102F and chills  UA today + nitrites, leukocytes, small blood; urine cx pending  CT A/P revealing mild right hydronephrosis and numerous right pelvic calculi any of which could be located in R ureter    Pain improving  Continue UTI ttx

## 2025-07-17 NOTE — ASSESSMENT & PLAN NOTE
Continue cefepime for now given some urinary symptoms and history of Pseudomonas growth in urine  Patient with stable kidney function no overt sepsis so will defer urological consult at this time

## 2025-07-17 NOTE — ASSESSMENT & PLAN NOTE
Continue PTA levothyroxine   Patient needs to be scheduled for screening colonoscopy per chart order

## 2025-07-17 NOTE — ASSESSMENT & PLAN NOTE
Lab Results   Component Value Date    EGFR 26 07/17/2025    EGFR 28 07/16/2025    EGFR 28 07/15/2025    CREATININE 1.73 (H) 07/17/2025    CREATININE 1.66 (H) 07/16/2025    CREATININE 1.66 (H) 07/15/2025   Follows with nephrology as outpatient  Monitor creatinine with daily BMP, avoid nephrotoxic agents

## 2025-07-18 LAB
ALBUMIN SERPL BCG-MCNC: 3.8 G/DL (ref 3.5–5)
ALP SERPL-CCNC: 54 U/L (ref 34–104)
ALT SERPL W P-5'-P-CCNC: 25 U/L (ref 7–52)
ANION GAP SERPL CALCULATED.3IONS-SCNC: 6 MMOL/L (ref 4–13)
AST SERPL W P-5'-P-CCNC: 43 U/L (ref 13–39)
BILIRUB SERPL-MCNC: 0.38 MG/DL (ref 0.2–1)
BUN SERPL-MCNC: 25 MG/DL (ref 5–25)
CALCIUM SERPL-MCNC: 8.4 MG/DL (ref 8.4–10.2)
CHLORIDE SERPL-SCNC: 103 MMOL/L (ref 96–108)
CO2 SERPL-SCNC: 24 MMOL/L (ref 21–32)
CREAT SERPL-MCNC: 1.73 MG/DL (ref 0.6–1.3)
ERYTHROCYTE [DISTWIDTH] IN BLOOD BY AUTOMATED COUNT: 15.9 % (ref 11.6–15.1)
GFR SERPL CREATININE-BSD FRML MDRD: 26 ML/MIN/1.73SQ M
GLUCOSE SERPL-MCNC: 97 MG/DL (ref 65–140)
HCT VFR BLD AUTO: 26.8 % (ref 34.8–46.1)
HGB BLD-MCNC: 8.8 G/DL (ref 11.5–15.4)
MCH RBC QN AUTO: 33.3 PG (ref 26.8–34.3)
MCHC RBC AUTO-ENTMCNC: 32.8 G/DL (ref 31.4–37.4)
MCV RBC AUTO: 102 FL (ref 82–98)
PLATELET # BLD AUTO: 170 THOUSANDS/UL (ref 149–390)
PMV BLD AUTO: 9.4 FL (ref 8.9–12.7)
POTASSIUM SERPL-SCNC: 3.8 MMOL/L (ref 3.5–5.3)
PROT SERPL-MCNC: 6 G/DL (ref 6.4–8.4)
RBC # BLD AUTO: 2.64 MILLION/UL (ref 3.81–5.12)
SODIUM SERPL-SCNC: 133 MMOL/L (ref 135–147)
WBC # BLD AUTO: 9.79 THOUSAND/UL (ref 4.31–10.16)

## 2025-07-18 PROCEDURE — 85027 COMPLETE CBC AUTOMATED: CPT | Performed by: INTERNAL MEDICINE

## 2025-07-18 PROCEDURE — 80053 COMPREHEN METABOLIC PANEL: CPT | Performed by: INTERNAL MEDICINE

## 2025-07-18 PROCEDURE — 99233 SBSQ HOSP IP/OBS HIGH 50: CPT | Performed by: INTERNAL MEDICINE

## 2025-07-18 RX ADMIN — HEPARIN SODIUM 5000 UNITS: 5000 INJECTION INTRAVENOUS; SUBCUTANEOUS at 17:55

## 2025-07-18 RX ADMIN — LEVOTHYROXINE SODIUM 75 MCG: 0.07 TABLET ORAL at 11:32

## 2025-07-18 RX ADMIN — PRAVASTATIN SODIUM 40 MG: 40 TABLET ORAL at 17:55

## 2025-07-18 RX ADMIN — HEPARIN SODIUM 5000 UNITS: 5000 INJECTION INTRAVENOUS; SUBCUTANEOUS at 05:22

## 2025-07-18 RX ADMIN — DEXTROSE MONOHYDRATE 1000 MG: 50 INJECTION, SOLUTION INTRAVENOUS at 05:22

## 2025-07-18 RX ADMIN — AMLODIPINE BESYLATE 2.5 MG: 2.5 TABLET ORAL at 11:33

## 2025-07-18 RX ADMIN — HYDROCHLOROTHIAZIDE 12.5 MG: 12.5 TABLET ORAL at 11:33

## 2025-07-18 RX ADMIN — HEPARIN SODIUM 5000 UNITS: 5000 INJECTION INTRAVENOUS; SUBCUTANEOUS at 22:04

## 2025-07-18 RX ADMIN — DEXTROSE MONOHYDRATE 1000 MG: 50 INJECTION, SOLUTION INTRAVENOUS at 17:59

## 2025-07-18 NOTE — ASSESSMENT & PLAN NOTE
Urine culture positive for Pseudomonas aeruginosa  Continue cefepime given some urinary symptoms and Pseudomonas growth in urine  Patient with stable kidney function no overt sepsis so will defer urological consult at this time

## 2025-07-18 NOTE — CASE MANAGEMENT
Case Management Discharge Planning Note    Patient name Samira Allred  [facility] /-01 MRN [id number]  : 1940 Date 2025       Current Admission Date: 7/15/2025  Current Admission Diagnosis:UTI (urinary tract infection)   Patient Active Problem List    Diagnosis Date Noted    Hemoglobin drop 2025    Flank pain 07/15/2025    Hereditary deficiency of other clotting factors (HCC) 2024    Hypertensive kidney disease with chronic kidney disease 2024    UTI (urinary tract infection) 2024    Aortic valve stenosis, moderate 2023    Hydronephrosis 2023    Urinary retention 2023    Undernutrition 10/12/2023    Peptic ulcer 10/11/2023    Constipation 2022    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    Chronic pain of both knees 08/10/2022    Chronic kidney disease-mineral and bone disorder 2022    History of DVT (deep vein thrombosis) 2021    Chronic renal disease, stage IV (HCC) 2021    Varicose veins of left lower extremity with pain 2020    Embolism from thrombosis of vein of distal end of lower extremity (HCC) 2020    Intermediate stage nonexudative 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 eyes 2019    Functional diarrhea 2019    History of peptic ulcer 10/11/2018    Fat necrosis (segmental) of breast 2018    Personal history of breast cancer 2018    Acquired hypothyroidism 2018    Hyperuricemia 2018    Essential hypertension 2018    Rheumatoid arthritis (HCC) 2018    Diastolic dysfunction 2017    Vitamin D deficiency 2014    Lumbar radiculopathy 2013    Hyperlipidemia 2013    Osteopenia 2013    Other chronic pain 2013    Adenocarcinoma of breast (HCC) 2013      LOS (days): 2  Geometric Mean LOS (GMLOS) (days): 2.1  Days to GMLOS:-0.2     OBJECTIVE:  Risk of Unplanned Readmission Score: 14.45         Current admission  status: Inpatient   Preferred Pharmacy:   [facility]/pharmacy #[id number] - [place] - [address]  [address]  [place] [address]  Phone: [phone] Fax: [phone]    [facility] - [place] - [address]  [address] [facility]  [place] [address]  Phone: [phone] Fax: [phone]    Primary Care Provider: Layne Bacon MD    Primary Insurance: MEDICARE  Secondary Insurance: BLUE CROSS    DISCHARGE DETAILS:    Discharge planning discussed with:: pt  Freedom of Choice: Yes  Comments - Freedom of Choice: CM spoe with patient about the benefits of HHC. Pt is agreeable for HHC and chose [facility]. CM reserved on [facility], messaged them of dc time frame, and updated AVS.  CM contacted family/caregiver?: No- see comments (declined)  Were Treatment Team discharge recommendations reviewed with patient/caregiver?: Yes  Did patient/caregiver verbalize understanding of patient care needs?: Yes  Were patient/caregiver advised of the risks associated with not following Treatment Team discharge recommendations?: Yes         Requested Home Health Care         Is the patient interested in HHC at discharge?: Yes  Home Health Discipline requested:: Nursing  Home Health Agency Name:: [facility]  HHA External Referral Reason (only applicable if external HHA name selected): Services not provided in network or near patient location  Home Health Follow-Up Provider:: PCP  Home Health Services Needed:: Urinary Incontinence Catheter Management  Homebound Criteria Met:: Uses an Assist Device (i.e. cane, walker, etc)  Supporting Clincal Findings:: Fatigues Easliy in Short Distances, Limited Endurance         Other Referral/Resources/Interventions Provided:  Interventions: HHC         Treatment Team Recommendation: Home with Home Health Care  Expected Discharge Disposition: Home Health Services  Additional Discharge Dispositions: Home Health Services  Transport at  Discharge : Family

## 2025-07-18 NOTE — PROGRESS NOTES
Patient:  CHRISTINA ESQUIVEL    MRN:  [id number]    Anastasiain Request ID:  [id number]    Level of care reserved:  Home Health Agency    Partner Reserved:  [facility] [place] [address] [phone]    Clinical needs requested:    Geography searched:  [id number]    Start of Service:    Request sent:  9:57am EDT on 7/16/2025 by Hermelinda Quiros    Partner reserved:  2:30pm EDT on 7/18/2025 by Hermelinda Quiros    Choice list shared:  10:38am EDT on 7/17/2025 by Hermelinda Quiros

## 2025-07-18 NOTE — PLAN OF CARE
Problem: GENITOURINARY - ADULT  Goal: Maintains or returns to baseline urinary function  Description: INTERVENTIONS:  - Assess urinary function  - Encourage oral fluids to ensure adequate hydration if ordered  - Administer IV fluids as ordered to ensure adequate hydration  - Administer ordered medications as needed  - Offer frequent toileting  - Follow urinary retention protocol if ordered  Outcome: Progressing  Goal: Absence of urinary retention  Description: INTERVENTIONS:  - Assess patient’s ability to void and empty bladder  - Monitor I/O  - Bladder scan as needed  - Discuss with physician/AP medications to alleviate retention as needed  - Discuss catheterization for long term situations as appropriate  Outcome: Progressing  Goal: Urinary catheter remains patent  Description: INTERVENTIONS:  - Assess patency of urinary catheter  - If patient has a chronic herrera, consider changing catheter if non-functioning  - Follow guidelines for intermittent irrigation of non-functioning urinary catheter  Outcome: Progressing     Problem: INFECTION - ADULT  Goal: Absence or prevention of progression during hospitalization  Description: INTERVENTIONS:  - Assess and monitor for signs and symptoms of infection  - Monitor lab/diagnostic results  - Monitor all insertion sites, i.e. indwelling lines, tubes, and drains  - Monitor endotracheal if appropriate and nasal secretions for changes in amount and color  - [place] appropriate cooling/warming therapies per order  - Administer medications as ordered  - Instruct and encourage patient and family to use good hand hygiene technique  - Identify and instruct in appropriate isolation precautions for identified infection/condition  Outcome: Progressing  Goal: Absence of fever/infection during neutropenic period  Description: INTERVENTIONS:  - Monitor WBC  - Perform strict hand hygiene  - Limit to healthy visitors only  - No plants, dried, fresh or silk flowers with paredes in patient  room  Outcome: Progressing

## 2025-07-18 NOTE — ASSESSMENT & PLAN NOTE
Lab Results   Component Value Date    EGFR 26 07/18/2025    EGFR 26 07/17/2025    EGFR 28 07/16/2025    CREATININE 1.73 (H) 07/18/2025    CREATININE 1.73 (H) 07/17/2025    CREATININE 1.66 (H) 07/16/2025   Follows with nephrology as outpatient  Monitor creatinine with daily BMP, avoid nephrotoxic agents

## 2025-07-18 NOTE — PROGRESS NOTES
Progress Note - Hospitalist   Name: Samira Allred 84 y.o. female I MRN: [id number]  Unit/Bed#: -01 I Date of Admission: 7/15/2025   Date of Service: 7/18/2025 I Hospital Day: 2    Assessment & Plan  UTI (urinary tract infection)  Urine culture positive for Pseudomonas aeruginosa  Continue cefepime given some urinary symptoms and Pseudomonas growth in urine  Patient with stable kidney function no overt sepsis so will defer urological consult at this time  Flank pain  84-year-old female with chronic herrera 2/2 chronic urinary retention presents to ED complaining of R flank pain, fevers 102F and chills  UA today + nitrites, leukocytes, small blood  Urine culture positive for Pseudomonas aeruginosa  CT A/P revealing mild right hydronephrosis and numerous right pelvic calculi any of which could be located in R ureter    Pain improving  Continue UTI ttx  Essential hypertension  BP stable since admission, monitor BP per unit protocol  Continue PTA HCTZ and amlodipine  Acquired hypothyroidism  Continue PTA levothyroxine  Hyperlipidemia  Continue PTA statin  Functional diarrhea  Patient reports history of diarrhea x 2 days  Per chart review pt with history of non-infectious diarrhea in January  Continue to monitor, consider stool studies should diarrhea persist  History of DVT (deep vein thrombosis)  Per chart review patient with history of DVT 2019, completed 6 months of anticoagulation  Repeat imaging 2020 revealed resolved DVT  Not on therapeutic AC at this time  Urinary retention  Follows with urology as outpatient, routine catheter changes with VNA  Chronic herrera  Rheumatoid arthritis (HCC)  Continue PTA Rituxan and methotrexate  Chronic kidney disease-mineral and bone disorder  Lab Results   Component Value Date    EGFR 26 07/18/2025    EGFR 26 07/17/2025    EGFR 28 07/16/2025    CREATININE 1.73 (H) 07/18/2025    CREATININE 1.73 (H) 07/17/2025    CREATININE 1.66 (H) 07/16/2025   Follows with nephrology as  outpatient  Monitor creatinine with daily BMP, avoid nephrotoxic agents  Hemoglobin drop  Dilutional, D/c IV fluids, she denies bleeding    VTE Pharmacologic Prophylaxis: VTE Score: 6 High Risk (Score >/= 5) - Pharmacological DVT Prophylaxis Ordered: heparin. Sequential Compression Devices Ordered.    Mobility:   Basic Mobility Inpatient Raw Score: 19  JH-HLM Goal: 6: Walk 10 steps or more  JH-HLM Achieved: 7: Walk 25 feet or more  JH-HLM Goal achieved. Continue to encourage appropriate mobility.    Patient Centered Rounds: I performed bedside rounds with nursing staff today.     Current Length of Stay: 2 day(s)  Current Patient Status: Inpatient   Certification Statement: The patient will continue to require additional inpatient hospital stay due to IV antibiotics for UTI  Discharge Plan: Anticipate discharge in 48 hrs to home.    Code Status: Level 1 - Full Code    Subjective   83 y.o female with a hx of HTN, RA, hypothyroidism, HLD, urinary retention with chronic herrera, and CKD. Presented on 7/15 with right hip and flank pain with a fever up to 102. Patient had UTI in June that was positive for Pseudomonas. Initiated on IV antibiotics in the ED.    On exam today, patient denies any pain. She feels much better than when she was admitted. Denies any nausea or vomiting. She has a good appetite.     Objective :  Temp:  [98.5 °F (36.9 °C)-98.8 °F (37.1 °C)] 98.6 °F (37 °C)  HR:  [63-70] 65  BP: (119-136)/(56-59) 136/59  SpO2:  [94 %-97 %] 94 %  O2 Device: None (Room air)    Body mass index is 29.86 kg/m².     Input and Output Summary (last 24 hours):     Intake/Output Summary (Last 24 hours) at 7/18/2025 1032  Last data filed at 7/18/2025 0817  Gross per 24 hour   Intake 600 ml   Output 2250 ml   Net -1650 ml       Physical Exam  Vitals and nursing note reviewed.   Constitutional:       General: She is not in acute distress.     Appearance: She is well-developed.   HENT:      Head: Normocephalic and atraumatic.      Cardiovascular:      Rate and Rhythm: Normal rate and regular rhythm.      Heart sounds: Murmur heard.      Comments: Patient has a systolic murmur.  Pulmonary:      Effort: Pulmonary effort is normal. No respiratory distress.      Breath sounds: Normal breath sounds.   Abdominal:      Palpations: Abdomen is soft.      Tenderness: There is no abdominal tenderness.     Musculoskeletal:         General: No swelling.      Cervical back: Neck supple.     Skin:     General: Skin is warm and dry.      Capillary Refill: Capillary refill takes less than 2 seconds.     Neurological:      Mental Status: She is alert and oriented to person, place, and time.     Psychiatric:         Mood and Affect: Mood normal.         Lines/Drains:  Lines/Drains/Airways       Active Status       Name Placement date Placement time Site Days    Urethral Catheter Non-latex 16 Fr. 01/08/25  1617  Non-latex  190                  Urinary Catheter:  Goal for removal: N/A - Chronic Carrera                 Lab Results: I have reviewed the following results:   Results from last 7 days   Lab Units 07/18/25  0447 07/16/25  0629 07/15/25  1726   WBC Thousand/uL 9.79   < > 11.72*   HEMOGLOBIN g/dL 8.8*   < > 9.5*   HEMATOCRIT % 26.8*   < > 28.4*   PLATELETS Thousands/uL 170   < > 221   BANDS PCT %  --   --  10*   LYMPHO PCT %  --   --  5*   MONO PCT %  --   --  2*   EOS PCT %  --   --  0    < > = values in this interval not displayed.     Results from last 7 days   Lab Units 07/18/25  0447   SODIUM mmol/L 133*   POTASSIUM mmol/L 3.8   CHLORIDE mmol/L 103   CO2 mmol/L 24   BUN mg/dL 25   CREATININE mg/dL 1.73*   ANION GAP mmol/L 6   CALCIUM mg/dL 8.4   ALBUMIN g/dL 3.8   TOTAL BILIRUBIN mg/dL 0.38   ALK PHOS U/L 54   ALT U/L 25   AST U/L 43*   GLUCOSE RANDOM mg/dL 97         Results from last 7 days   Lab Units 07/16/25  2112   POC GLUCOSE mg/dl 90         Results from last 7 days   Lab Units 07/15/25  1658   LACTIC ACID mmol/L 0.6   PROCALCITONIN ng/ml  0.15       Recent Cultures (last 7 days):   Results from last 7 days   Lab Units 07/15/25  2112 07/15/25  2111 07/15/25  1941   BLOOD CULTURE  No Growth at 48 hrs. No Growth at 48 hrs.  --    URINE CULTURE   --   --  >100,000 cfu/ml Pseudomonas aeruginosa*  20,000-29,000 cfu/ml Serratia liquefaciens*             Last 24 Hours Medication List:     Current Facility-Administered Medications:     acetaminophen (TYLENOL) tablet 650 mg, Q6H PRN    amLODIPine (NORVASC) tablet 2.5 mg, Daily    cefepime (MAXIPIME) 1,000 mg in dextrose 5 % 50 mL IVPB, Q12H, Last Rate: 1,000 mg (07/18/25 0522)    heparin (porcine) subcutaneous injection 5,000 Units, Q8H TORO    hydroCHLOROthiazide tablet 12.5 mg, Daily    levothyroxine tablet 75 mcg, Daily    [START ON 7/19/2025] methotrexate tablet 10 mg, Weekly    ondansetron (ZOFRAN) injection 4 mg, Once    ondansetron (ZOFRAN) injection 4 mg, Q6H PRN    pravastatin (PRAVACHOL) tablet 40 mg, Daily With Dinner    Administrative Statements   Today, Patient Was Seen By: Verona Muñoz      **Please Note: This note may have been constructed using a voice recognition system.**

## 2025-07-18 NOTE — ASSESSMENT & PLAN NOTE
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

## 2025-07-19 LAB
ALBUMIN SERPL BCG-MCNC: 3.8 G/DL (ref 3.5–5)
ALP SERPL-CCNC: 54 U/L (ref 34–104)
ALT SERPL W P-5'-P-CCNC: 30 U/L (ref 7–52)
ANION GAP SERPL CALCULATED.3IONS-SCNC: 8 MMOL/L (ref 4–13)
AST SERPL W P-5'-P-CCNC: 45 U/L (ref 13–39)
BASOPHILS # BLD MANUAL: 0 THOUSAND/UL (ref 0–0.1)
BASOPHILS NFR MAR MANUAL: 0 % (ref 0–1)
BILIRUB SERPL-MCNC: 0.38 MG/DL (ref 0.2–1)
BUN SERPL-MCNC: 33 MG/DL (ref 5–25)
CALCIUM SERPL-MCNC: 8.7 MG/DL (ref 8.4–10.2)
CHLORIDE SERPL-SCNC: 102 MMOL/L (ref 96–108)
CO2 SERPL-SCNC: 24 MMOL/L (ref 21–32)
CREAT SERPL-MCNC: 1.82 MG/DL (ref 0.6–1.3)
EOSINOPHIL # BLD MANUAL: 0.29 THOUSAND/UL (ref 0–0.4)
EOSINOPHIL NFR BLD MANUAL: 3 % (ref 0–6)
ERYTHROCYTE [DISTWIDTH] IN BLOOD BY AUTOMATED COUNT: 15.5 % (ref 11.6–15.1)
GFR SERPL CREATININE-BSD FRML MDRD: 25 ML/MIN/1.73SQ M
GLUCOSE SERPL-MCNC: 99 MG/DL (ref 65–140)
HCT VFR BLD AUTO: 28 % (ref 34.8–46.1)
HGB BLD-MCNC: 9 G/DL (ref 11.5–15.4)
LYMPHOCYTES # BLD AUTO: 1.17 THOUSAND/UL (ref 0.6–4.47)
LYMPHOCYTES # BLD AUTO: 12 % (ref 14–44)
MACROCYTES BLD QL AUTO: PRESENT
MCH RBC QN AUTO: 32.7 PG (ref 26.8–34.3)
MCHC RBC AUTO-ENTMCNC: 32.1 G/DL (ref 31.4–37.4)
MCV RBC AUTO: 102 FL (ref 82–98)
MONOCYTES # BLD AUTO: 0.97 THOUSAND/UL (ref 0–1.22)
MONOCYTES NFR BLD: 10 % (ref 4–12)
NEUTROPHILS # BLD MANUAL: 7.3 THOUSAND/UL (ref 1.85–7.62)
NEUTS BAND NFR BLD MANUAL: 1 % (ref 0–8)
NEUTS SEG NFR BLD AUTO: 74 % (ref 43–75)
PLATELET # BLD AUTO: 175 THOUSANDS/UL (ref 149–390)
PLATELET BLD QL SMEAR: ADEQUATE
PMV BLD AUTO: 9.7 FL (ref 8.9–12.7)
POTASSIUM SERPL-SCNC: 3.9 MMOL/L (ref 3.5–5.3)
PROT SERPL-MCNC: 6.1 G/DL (ref 6.4–8.4)
RBC # BLD AUTO: 2.75 MILLION/UL (ref 3.81–5.12)
SODIUM SERPL-SCNC: 134 MMOL/L (ref 135–147)
WBC # BLD AUTO: 9.73 THOUSAND/UL (ref 4.31–10.16)

## 2025-07-19 PROCEDURE — 85007 BL SMEAR W/DIFF WBC COUNT: CPT | Performed by: INTERNAL MEDICINE

## 2025-07-19 PROCEDURE — 80053 COMPREHEN METABOLIC PANEL: CPT | Performed by: INTERNAL MEDICINE

## 2025-07-19 PROCEDURE — 99233 SBSQ HOSP IP/OBS HIGH 50: CPT | Performed by: INTERNAL MEDICINE

## 2025-07-19 PROCEDURE — 85027 COMPLETE CBC AUTOMATED: CPT | Performed by: INTERNAL MEDICINE

## 2025-07-19 RX ADMIN — HEPARIN SODIUM 5000 UNITS: 5000 INJECTION INTRAVENOUS; SUBCUTANEOUS at 05:29

## 2025-07-19 RX ADMIN — DEXTROSE MONOHYDRATE 1000 MG: 50 INJECTION, SOLUTION INTRAVENOUS at 18:00

## 2025-07-19 RX ADMIN — AMLODIPINE BESYLATE 2.5 MG: 2.5 TABLET ORAL at 09:29

## 2025-07-19 RX ADMIN — DEXTROSE MONOHYDRATE 1000 MG: 50 INJECTION, SOLUTION INTRAVENOUS at 05:29

## 2025-07-19 RX ADMIN — METHOTREXATE 10 MG: 2.5 TABLET ORAL at 09:30

## 2025-07-19 RX ADMIN — HEPARIN SODIUM 5000 UNITS: 5000 INJECTION INTRAVENOUS; SUBCUTANEOUS at 21:11

## 2025-07-19 RX ADMIN — HEPARIN SODIUM 5000 UNITS: 5000 INJECTION INTRAVENOUS; SUBCUTANEOUS at 13:24

## 2025-07-19 RX ADMIN — LEVOTHYROXINE SODIUM 75 MCG: 0.07 TABLET ORAL at 09:29

## 2025-07-19 RX ADMIN — PRAVASTATIN SODIUM 40 MG: 40 TABLET ORAL at 16:47

## 2025-07-19 RX ADMIN — HYDROCHLOROTHIAZIDE 12.5 MG: 12.5 TABLET ORAL at 09:29

## 2025-07-19 NOTE — ASSESSMENT & PLAN NOTE
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

## 2025-07-19 NOTE — PROGRESS NOTES
Progress Note - Hospitalist   Name: Samira Allred 84 y.o. 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 given the chronic Herrera however given that she is symptomatic and that she is growing more than 100,000 colonies would favor treating this time    Continue cefepime course  Monitor clinically  Flank pain  84-year-old female with chronic herrera 2/2 chronic urinary retention presents to ED complaining of R flank pain, fevers 102F and chills  UA today + nitrites, leukocytes, small blood  Urine culture positive for Pseudomonas aeruginosa  CT A/P revealing mild right hydronephrosis and numerous right pelvic calculi any of which could be located in R ureter    Pain improving   Continue UTI ttx as above  Essential hypertension  Continue hydrochlorothiazide and amlodipine  Monitor BP  Acquired hypothyroidism  Continue PTA levothyroxine  Hyperlipidemia  Continue PTA statin  Functional diarrhea  Patient reports history of diarrhea x 2 days  Per chart review pt with history of non-infectious diarrhea in January  Continue to monitor, consider stool studies should diarrhea persist  History of DVT (deep vein thrombosis)  Per chart review patient with history of DVT 2019, completed 6 months of anticoagulation  Repeat imaging 2020 revealed resolved DVT  Not on therapeutic AC at this time  Urinary retention  Follows with urology as outpatient, routine catheter changes with VNA  Chronic herrera  Rheumatoid arthritis (HCC)  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    This can be continued in the outpatient setting  Chronic kidney disease-mineral and bone disorder  Lab Results   Component Value  Date    EGFR 25 07/19/2025    EGFR 26 07/18/2025    EGFR 26 07/17/2025    CREATININE 1.82 (H) 07/19/2025    CREATININE 1.73 (H) 07/18/2025    CREATININE 1.73 (H) 07/17/2025   Follows with nephrology as outpatient  Monitor creatinine with daily BMP, avoid nephrotoxic agents  Hemoglobin drop  Dilutional, D/c IV fluids, she denies bleeding    Monitor intermittently    VTE Pharmacologic Prophylaxis: VTE Score: 6 heparin    Mobility:   Basic Mobility Inpatient Raw Score: 19  JH-HLM Goal: 6: Walk 10 steps or more  JH-HLM Achieved: 7: Walk 25 feet or more  JH-HLM Goal achieved. Continue to encourage appropriate mobility.    Patient Centered Rounds: I performed bedside rounds with nursing staff today.   Discussions with Specialists or Other Care Team Provider: Discussed with care management team    Education and Discussions with Family / Patient: Patient herself, she did not request me to talk to anyone.     Current Length of Stay: 3 day(s)  Current Patient Status: Inpatient   Certification Statement: The patient will continue to require additional inpatient hospital stay due to discussed with care management team  Discharge Plan: Anticipate discharge in 48-72 hrs to discharge location to be determined pending rehab evaluations.    Code Status: Level 1 - Full Code    Subjective     Patient evaluated this morning.  Patient denies any chest pain nausea vomiting.  Feeling overall well.  No other events reported      Objective :  Temp:  [98.1 °F (36.7 °C)-98.6 °F (37 °C)] 98.6 °F (37 °C)  HR:  [54-70] 65  BP: (125-129)/(58-62) 129/62  Resp:  [15-18] 18  SpO2:  [91 %-100 %] 97 %  O2 Device: None (Room air)    Body mass index is 29.86 kg/m².     Input and Output Summary (last 24 hours):     Intake/Output Summary (Last 24 hours) at 7/19/2025 1205  Last data filed at 7/19/2025 0817  Gross per 24 hour   Intake 940 ml   Output 1750 ml   Net -810 ml       Physical Exam  Vitals and nursing note reviewed.   Constitutional:        General: She is not in acute distress.     Appearance: Normal appearance. She is normal weight. She is not ill-appearing, toxic-appearing or diaphoretic.   HENT:      Head: Normocephalic and atraumatic.      Right Ear: External ear normal.      Left Ear: External ear normal.      Nose: Nose normal. No congestion.      Mouth/Throat:      Mouth: Mucous membranes are moist.      Pharynx: Oropharynx is clear. No oropharyngeal exudate or posterior oropharyngeal erythema.     Eyes:      General: No scleral icterus.        Right eye: No discharge.         Left eye: No discharge.      Extraocular Movements: Extraocular movements intact.      Conjunctiva/sclera: Conjunctivae normal.      Pupils: Pupils are equal, round, and reactive to light.       Cardiovascular:      Rate and Rhythm: Normal rate and regular rhythm.      Pulses: Normal pulses.      Heart sounds: Normal heart sounds. No murmur heard.     No friction rub. No gallop.   Pulmonary:      Effort: Pulmonary effort is normal. No respiratory distress.      Breath sounds: Normal breath sounds. No stridor. No wheezing, rhonchi or rales.   Chest:      Chest wall: No tenderness.   Abdominal:      General: Abdomen is flat. Bowel sounds are normal. There is no distension.      Palpations: Abdomen is soft. There is no mass.      Tenderness: There is no abdominal tenderness. There is no guarding or rebound.   Genitourinary:     Comments: Carrera catheter in place    Musculoskeletal:         General: No swelling, tenderness, deformity or signs of injury. Normal range of motion.      Cervical back: Normal range of motion and neck supple. No rigidity. No muscular tenderness.      Comments: Chronic lateral deviation of bilateral fingers in both hands due to chronic rheumatoid arthritis     Skin:     General: Skin is warm and dry.      Capillary Refill: Capillary refill takes less than 2 seconds.      Coloration: Skin is not jaundiced or pale.      Findings: No bruising, erythema,  lesion or rash.     Neurological:      General: No focal deficit present.      Mental Status: She is alert and oriented to person, place, and time. Mental status is at baseline.      Cranial Nerves: No cranial nerve deficit.      Sensory: No sensory deficit.      Motor: No weakness.      Coordination: Coordination normal.     Psychiatric:         Mood and Affect: Mood normal.         Behavior: Behavior normal.         Thought Content: Thought content normal.         Judgment: Judgment normal.           Lines/Drains:  Lines/Drains/Airways       Active Status       Name Placement date Placement time Site Days    Urethral Catheter Non-latex 16 Fr. 01/08/25  1617  Non-latex  191                  Urinary Catheter:  Goal for removal: N/A - Chronic Carrera                 Lab Results: I have reviewed the following results:   Results from last 7 days   Lab Units 07/19/25  0525   WBC Thousand/uL 9.73   HEMOGLOBIN g/dL 9.0*   HEMATOCRIT % 28.0*   PLATELETS Thousands/uL 175   BANDS PCT % 1   LYMPHO PCT % 12*   MONO PCT % 10   EOS PCT % 3     Results from last 7 days   Lab Units 07/19/25  0525   SODIUM mmol/L 134*   POTASSIUM mmol/L 3.9   CHLORIDE mmol/L 102   CO2 mmol/L 24   BUN mg/dL 33*   CREATININE mg/dL 1.82*   ANION GAP mmol/L 8   CALCIUM mg/dL 8.7   ALBUMIN g/dL 3.8   TOTAL BILIRUBIN mg/dL 0.38   ALK PHOS U/L 54   ALT U/L 30   AST U/L 45*   GLUCOSE RANDOM mg/dL 99         Results from last 7 days   Lab Units 07/16/25  2112   POC GLUCOSE mg/dl 90         Results from last 7 days   Lab Units 07/15/25  1658   LACTIC ACID mmol/L 0.6   PROCALCITONIN ng/ml 0.15       Recent Cultures (last 7 days):   Results from last 7 days   Lab Units 07/15/25  2112 07/15/25  2111 07/15/25  1941   BLOOD CULTURE  No Growth at 72 hrs. No Growth at 72 hrs.  --    URINE CULTURE   --   --  >100,000 cfu/ml Pseudomonas aeruginosa*  20,000-29,000 cfu/ml Serratia liquefaciens*             Last 24 Hours Medication List:     Current Facility-Administered  Medications:     acetaminophen (TYLENOL) tablet 650 mg, Q6H PRN    amLODIPine (NORVASC) tablet 2.5 mg, Daily    cefepime (MAXIPIME) 1,000 mg in dextrose 5 % 50 mL IVPB, Q12H, Last Rate: 1,000 mg (07/19/25 0529)    heparin (porcine) subcutaneous injection 5,000 Units, Q8H TORO    hydroCHLOROthiazide tablet 12.5 mg, Daily    levothyroxine tablet 75 mcg, Daily    methotrexate tablet 10 mg, Weekly    ondansetron (ZOFRAN) injection 4 mg, Once    ondansetron (ZOFRAN) injection 4 mg, Q6H PRN    pravastatin (PRAVACHOL) tablet 40 mg, Daily With Dinner    Administrative Statements   Today, Patient Was Seen By: Kevin Valenzuela MD      **Please Note: This note may have been constructed using a voice recognition system.**

## 2025-07-19 NOTE — PLAN OF CARE
Problem: Knowledge Deficit  Goal: Patient/family/caregiver demonstrates understanding of disease process, treatment plan, medications, and discharge instructions  Description: Complete learning assessment and assess knowledge base.  Interventions:  - Provide teaching at level of understanding  - Provide teaching via preferred learning methods  Outcome: Progressing     Problem: DISCHARGE PLANNING  Goal: Discharge to home or other facility with appropriate resources  Description: INTERVENTIONS:  - Identify barriers to discharge w/patient and caregiver  - Arrange for needed discharge resources and transportation as appropriate  - Identify discharge learning needs (meds, wound care, etc.)  - Arrange for interpretive services to assist at discharge as needed  - Refer to Case Management Department for coordinating discharge planning if the patient needs post-hospital services based on physician/advanced practitioner order or complex needs related to functional status, cognitive ability, or social support system  Outcome: Progressing     Problem: INFECTION - ADULT  Goal: Absence or prevention of progression during hospitalization  Description: INTERVENTIONS:  - Assess and monitor for signs and symptoms of infection  - Monitor lab/diagnostic results  - Monitor all insertion sites, i.e. indwelling lines, tubes, and drains  - Monitor endotracheal if appropriate and nasal secretions for changes in amount and color  - [place] appropriate cooling/warming therapies per order  - Administer medications as ordered  - Instruct and encourage patient and family to use good hand hygiene technique  - Identify and instruct in appropriate isolation precautions for identified infection/condition  Outcome: Progressing

## 2025-07-19 NOTE — ASSESSMENT & PLAN NOTE
Lab Results   Component Value Date    EGFR 25 07/19/2025    EGFR 26 07/18/2025    EGFR 26 07/17/2025    CREATININE 1.82 (H) 07/19/2025    CREATININE 1.73 (H) 07/18/2025    CREATININE 1.73 (H) 07/17/2025   Follows with nephrology as outpatient  Monitor creatinine with daily BMP, avoid nephrotoxic agents

## 2025-07-19 NOTE — ASSESSMENT & PLAN NOTE
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    This can be continued in the outpatient setting

## 2025-07-19 NOTE — ASSESSMENT & PLAN NOTE
84-year-old female with chronic herrera 2/2 chronic urinary retention presents to ED complaining of R flank pain, fevers 102F and chills  UA today + nitrites, leukocytes, small blood  Urine culture positive for Pseudomonas aeruginosa  CT A/P revealing mild right hydronephrosis and numerous right pelvic calculi any of which could be located in R ureter    Pain improving   Continue UTI ttx as above

## 2025-07-19 NOTE — PLAN OF CARE
Problem: PAIN - ADULT  Goal: Verbalizes/displays adequate comfort level or baseline comfort level  Description: Interventions:  - Encourage patient to monitor pain and request assistance  - Assess pain using appropriate pain scale  - Administer analgesics as ordered based on type and severity of pain and evaluate response  - Implement non-pharmacological measures as appropriate and evaluate response  - Consider cultural and social influences on pain and pain management  - Notify physician/advanced practitioner if interventions unsuccessful or patient reports new pain  - Educate patient/family on pain management process including their role and importance of  reporting pain   - Provide non-pharmacologic/complimentary pain relief interventions  Outcome: Progressing     Problem: INFECTION - ADULT  Goal: Absence or prevention of progression during hospitalization  Description: INTERVENTIONS:  - Assess and monitor for signs and symptoms of infection  - Monitor lab/diagnostic results  - Monitor all insertion sites, i.e. indwelling lines, tubes, and drains  - Monitor endotracheal if appropriate and nasal secretions for changes in amount and color  - [place] appropriate cooling/warming therapies per order  - Administer medications as ordered  - Instruct and encourage patient and family to use good hand hygiene technique  - Identify and instruct in appropriate isolation precautions for identified infection/condition  Outcome: Progressing  Goal: Absence of fever/infection during neutropenic period  Description: INTERVENTIONS:  - Monitor WBC  - Perform strict hand hygiene  - Limit to healthy visitors only  - No plants, dried, fresh or silk flowers with paredes in patient room  Outcome: Progressing     Problem: DISCHARGE PLANNING  Goal: Discharge to home or other facility with appropriate resources  Description: INTERVENTIONS:  - Identify barriers to discharge w/patient and caregiver  - Arrange for needed discharge resources  and transportation as appropriate  - Identify discharge learning needs (meds, wound care, etc.)  - Arrange for interpretive services to assist at discharge as needed  - Refer to Case Management Department for coordinating discharge planning if the patient needs post-hospital services based on physician/advanced practitioner order or complex needs related to functional status, cognitive ability, or social support system  Outcome: Progressing     Problem: Knowledge Deficit  Goal: Patient/family/caregiver demonstrates understanding of disease process, treatment plan, medications, and discharge instructions  Description: Complete learning assessment and assess knowledge base.  Interventions:  - Provide teaching at level of understanding  - Provide teaching via preferred learning methods  Outcome: Progressing     Problem: GENITOURINARY - ADULT  Goal: Maintains or returns to baseline urinary function  Description: INTERVENTIONS:  - Assess urinary function  - Encourage oral fluids to ensure adequate hydration if ordered  - Administer IV fluids as ordered to ensure adequate hydration  - Administer ordered medications as needed  - Offer frequent toileting  - Follow urinary retention protocol if ordered  Outcome: Progressing  Goal: Absence of urinary retention  Description: INTERVENTIONS:  - Assess patient’s ability to void and empty bladder  - Monitor I/O  - Bladder scan as needed  - Discuss with physician/AP medications to alleviate retention as needed  - Discuss catheterization for long term situations as appropriate  Outcome: Progressing  Goal: Urinary catheter remains patent  Description: INTERVENTIONS:  - Assess patency of urinary catheter  - If patient has a chronic herrera, consider changing catheter if non-functioning  - Follow guidelines for intermittent irrigation of non-functioning urinary catheter  Outcome: Progressing

## 2025-07-20 VITALS
OXYGEN SATURATION: 96 % | DIASTOLIC BLOOD PRESSURE: 48 MMHG | HEIGHT: 64 IN | HEART RATE: 63 BPM | RESPIRATION RATE: 16 BRPM | WEIGHT: 173.94 LBS | BODY MASS INDEX: 29.7 KG/M2 | SYSTOLIC BLOOD PRESSURE: 126 MMHG | TEMPERATURE: 98.2 F

## 2025-07-20 LAB
ALBUMIN SERPL BCG-MCNC: 3.8 G/DL (ref 3.5–5)
ALP SERPL-CCNC: 55 U/L (ref 34–104)
ALT SERPL W P-5'-P-CCNC: 31 U/L (ref 7–52)
ANION GAP SERPL CALCULATED.3IONS-SCNC: 7 MMOL/L (ref 4–13)
AST SERPL W P-5'-P-CCNC: 41 U/L (ref 13–39)
BACTERIA UR CULT: ABNORMAL
BACTERIA UR CULT: ABNORMAL
BASOPHILS # BLD AUTO: 0.09 THOUSANDS/ÂΜL (ref 0–0.1)
BASOPHILS NFR BLD AUTO: 1 % (ref 0–1)
BILIRUB SERPL-MCNC: 0.41 MG/DL (ref 0.2–1)
BUN SERPL-MCNC: 35 MG/DL (ref 5–25)
CALCIUM SERPL-MCNC: 8.8 MG/DL (ref 8.4–10.2)
CHLORIDE SERPL-SCNC: 104 MMOL/L (ref 96–108)
CO2 SERPL-SCNC: 25 MMOL/L (ref 21–32)
CREAT SERPL-MCNC: 1.93 MG/DL (ref 0.6–1.3)
EOSINOPHIL # BLD AUTO: 0.37 THOUSAND/ÂΜL (ref 0–0.61)
EOSINOPHIL NFR BLD AUTO: 4 % (ref 0–6)
ERYTHROCYTE [DISTWIDTH] IN BLOOD BY AUTOMATED COUNT: 15.5 % (ref 11.6–15.1)
GFR SERPL CREATININE-BSD FRML MDRD: 23 ML/MIN/1.73SQ M
GLUCOSE SERPL-MCNC: 96 MG/DL (ref 65–140)
HCT VFR BLD AUTO: 27.4 % (ref 34.8–46.1)
HGB BLD-MCNC: 8.8 G/DL (ref 11.5–15.4)
IMM GRANULOCYTES # BLD AUTO: >0.5 THOUSAND/UL (ref 0–0.2)
IMM GRANULOCYTES NFR BLD AUTO: 6 % (ref 0–2)
LYMPHOCYTES # BLD AUTO: 0.99 THOUSANDS/ÂΜL (ref 0.6–4.47)
LYMPHOCYTES NFR BLD AUTO: 10 % (ref 14–44)
MCH RBC QN AUTO: 32.7 PG (ref 26.8–34.3)
MCHC RBC AUTO-ENTMCNC: 32.1 G/DL (ref 31.4–37.4)
MCV RBC AUTO: 102 FL (ref 82–98)
MONOCYTES # BLD AUTO: 0.89 THOUSAND/ÂΜL (ref 0.17–1.22)
MONOCYTES NFR BLD AUTO: 9 % (ref 4–12)
NEUTROPHILS # BLD AUTO: 6.6 THOUSANDS/ÂΜL (ref 1.85–7.62)
NEUTS SEG NFR BLD AUTO: 70 % (ref 43–75)
NRBC BLD AUTO-RTO: 0 /100 WBCS
PLATELET # BLD AUTO: 204 THOUSANDS/UL (ref 149–390)
PMV BLD AUTO: 10 FL (ref 8.9–12.7)
POTASSIUM SERPL-SCNC: 4 MMOL/L (ref 3.5–5.3)
PROT SERPL-MCNC: 6.2 G/DL (ref 6.4–8.4)
RBC # BLD AUTO: 2.69 MILLION/UL (ref 3.81–5.12)
SODIUM SERPL-SCNC: 136 MMOL/L (ref 135–147)
WBC # BLD AUTO: 9.53 THOUSAND/UL (ref 4.31–10.16)

## 2025-07-20 PROCEDURE — 80053 COMPREHEN METABOLIC PANEL: CPT | Performed by: INTERNAL MEDICINE

## 2025-07-20 PROCEDURE — 85027 COMPLETE CBC AUTOMATED: CPT | Performed by: INTERNAL MEDICINE

## 2025-07-20 PROCEDURE — 99239 HOSP IP/OBS DSCHRG MGMT >30: CPT | Performed by: INTERNAL MEDICINE

## 2025-07-20 RX ADMIN — HEPARIN SODIUM 5000 UNITS: 5000 INJECTION INTRAVENOUS; SUBCUTANEOUS at 06:25

## 2025-07-20 RX ADMIN — LEVOTHYROXINE SODIUM 75 MCG: 0.07 TABLET ORAL at 09:20

## 2025-07-20 RX ADMIN — AMLODIPINE BESYLATE 2.5 MG: 2.5 TABLET ORAL at 09:20

## 2025-07-20 NOTE — DISCHARGE SUMMARY
Discharge Summary - Hospitalist   Name: Samira Allred 84 y.o. 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  Chronic urinary retention with chronic Carrera catheter in place  CT finding of mild hydronephrosis on the right with numerous pelvic calculi  CKD IV  Hypertension  Hypothyroidism  Hyperlipidemia  Functional diarrhea  History of DVT  History of RA    Discharging Physician / Practitioner: Kevin Valenzuela MD  PCP: Layne Bacon MD  Admission Date:   Admission Orders (From admission, onward)       Ordered        07/16/25 0730  INPATIENT ADMISSION  Once            07/15/25 2107  Place in Observation  Once                          Discharge Date: 07/20/25    Significant Findings / Test Results:   Procedure Component Value Units Date/Time   CT recon only lumbar spine [[id number]] Collected: 07/15/25 2021   Order Status: Completed Updated: 07/15/25 2030   Narrative:     CT RECON ONLY LUMBAR SPINE (NO CHARGE)    INDICATION:   back pain.    COMPARISON:  None    TECHNIQUE: Axial CT examination of the lumbar spine was obtained utilizing reconstructed images from CT of the chest, abdomen and pelvis performed the same day.  Images were reformatted in the sagittal and coronal planes.    This examination, like all CT scans performed in the [facility], was performed utilizing techniques to minimize radiation dose exposure, including the use of iterative reconstruction and automated exposure control.    FINDINGS:    ALIGNMENT: Mild dextroscoliosis.    VERTEBRAE: Severe chronic T12 compression deformity.    DEGENERATIVE CHANGES: Severe degenerative changes.    PREVERTEBRAL AND PARASPINAL SOFT TISSUES: Normal.    OTHER: Unremarkable   Impression:       No acute fracture or traumatic subluxation.        Workstation performed:  [id number]   CT abdomen pelvis wo contrast [[id number]] Collected: 07/15/25 2004   Order Status: Completed Updated: 07/15/25 2022   Narrative:     CT ABDOMEN AND PELVIS WITHOUT IV CONTRAST    INDICATION: abdominal pain,chronic herrera.    COMPARISON: PET/CT 3/6/2024. CT chest abdomen pelvis 12/7/2023.    TECHNIQUE: CT examination of the abdomen and pelvis was performed without intravenous contrast. Multiplanar 2D reformatted images were created from the source data.    This examination, like all CT scans performed in the [facility], was performed utilizing techniques to minimize radiation dose exposure, including the use of iterative reconstruction and automated exposure control. Radiation dose length  product (DLP) for this visit: 839 mGy-cm.    Enteric Contrast: Not administered.    FINDINGS:    ABDOMEN    LOWER CHEST: Unchanged head 7 mm right lower lobe groundglass opacity #2/7.    LIVER/BILIARY TREE: Unremarkable.    GALLBLADDER: Cholecystectomy.    SPLEEN: Unremarkable.    PANCREAS: Unremarkable.    ADRENAL GLANDS: Unremarkable.    KIDNEYS/URETERS: Mild right hydronephrosis with a reactive right perinephric stranding. Limited visualization of the distal right ureter which is obscured by streak artifact from an adjacent right hip prostheses. There are numerous right pelvic calculi  any of which could be intraluminally located in the right ureter.    STOMACH AND BOWEL: Unremarkable.    APPENDIX: No findings to suggest appendicitis.    ABDOMINOPELVIC CAVITY: No ascites. No pneumoperitoneum. No lymphadenopathy.    VESSELS: Unremarkable for patient's age.    PELVIS    REPRODUCTIVE ORGANS: Obscured by streak artifact from a right hip prosthesis.    Simple 2.5 cm left adnexal cyst unchanged since 12/7/2023.    URINARY BLADDER: Partially imaged cured by streak artifact from a right hip prostheses. Decompressed with a Herrera catheter.    ABDOMINAL WALL/INGUINAL REGIONS: Small fat-containing umbilical  hernia.    BONES: No acute fracture or suspicious osseous lesion. Spinal degenerative changes. Chronic T12 compression deformity. Right hip hemiarthroplasty.   Impression:       Mild right hydronephrosis. Limited visualization of the distal right ureter which is obscured by streak artifact from a right hip prosthesis. There are numerous right pelvic calculi, any of which could be intraluminally located in the right ureter.    The study was marked in EPIC for immediate notification.    Workstation performed: [id number]   XR chest 2 views [[id number]] Collected: 07/16/25 0752   Order Status: Completed Updated: 07/16/25 0754   Narrative:     XR CHEST PA AND LATERAL    INDICATION: fever.    COMPARISON: Chest radiograph 11/21/2007    FINDINGS:    Clear lungs. No pneumothorax or pleural effusion.    Normal cardiomediastinal silhouette.    Bones are unremarkable for age.    Surgical clips in the right breast.   Impression:       No acute cardiopulmonary disease.        Outpatient follow up Requested:  PCP    Complications:  None    Reason for Admission: Flank pain    The patient was hospitalized with flank pain and suprapubic discomfort,  Samira Allred is a 84 y.o. female with a PMH of HTN, rheumatoid arthritis, hypothyroidism, HLD, history of DVT, urinary retention with chronic Carrera in place, CKD who presents with right hip/flank pain and fever beginning today.  Patient was seen by PCP for above symptoms plus feeling generally unwell and N/V/D.  Per chart review patient with history of UTI with Pseudomonas positive urine culture, treated with Cipro in June.  Patient was instructed to report to ED for initiation of IV antibiotics for likely UTI.  Denies blood in stool or vomit.  Reports history of diarrhea 1 month ago that had resolved until 2 days ago.  Was noted to be febrile at PCP office.  Denies chills, chest pain, shortness of breath or abdominal pain.       Hospital Course:     The patient was hospitalized with  "flank pain and suprapubic discomfort.  Her CT did not reveal any pyelonephritis but did reveal nephrolithiasis with mild hydronephrosis, no large stones, her pain improved rapidly with treatment symptomatically wise, she did have a urine culture positive for Pseudomonas more than 100,000 colonies, I suspect that this is a largely chronic colonization given that she has a chronic Carrera and she has grown this multiple times before, however given that she did have symptoms related to it we treated her for that with a 5-day course of cefepime.  She is doing very well denies any fever suprapubic pain flank pain.  She does have some CKD which appears to be baseline creatinine fluctuates between 1.7-1.9.  The patient is eager to help go home today and requests discharge today.  Given that she is otherwise stable, afebrile, asymptomatic urinary wise, I am discharging her.  I would avoid prolonging antibiotic courses given that she is asymptomatic.  Furthermore she also has allergy to quinolones so I would not send with her anything p.o. given her age.  Patient being discharged stable condition, she was comfortable going home by herself taking care of, she does have a walker and she is comfortable taking care of the Carrera.  Patient discharged in stable condition as per request 7/20/2025.    Condition at Discharge: good     Discharge Day Visit / Exam:     Subjective:    Patient evaluated this morning.  She is eager to go home.  She denies any chest pain nausea vomiting.  She is tolerating p.o. intake.  Vitals: Blood Pressure: (!) 126/48 (07/20/25 0634)  Pulse: 63 (07/20/25 0648)  Temperature: 98.2 °F (36.8 °C) (07/20/25 0634)  Temp Source: Oral (07/16/25 1546)  Respirations: 16 (07/19/25 2235)  Height: 5' 4\" (162.6 cm) (07/15/25 2336)  Weight - Scale: 78.9 kg (173 lb 15.1 oz) (07/15/25 2336)  SpO2: 96 % (07/20/25 0648)    Exam:   Physical Exam  Vitals and nursing note reviewed.   Constitutional:       General: She is not in " acute distress.     Appearance: Normal appearance. She is normal weight. She is not ill-appearing, toxic-appearing or diaphoretic.   HENT:      Head: Normocephalic and atraumatic.      Right Ear: External ear normal.      Left Ear: External ear normal.      Nose: Nose normal. No congestion.      Mouth/Throat:      Mouth: Mucous membranes are moist.      Pharynx: Oropharynx is clear. No oropharyngeal exudate or posterior oropharyngeal erythema.     Eyes:      General: No scleral icterus.        Right eye: No discharge.         Left eye: No discharge.      Extraocular Movements: Extraocular movements intact.      Conjunctiva/sclera: Conjunctivae normal.      Pupils: Pupils are equal, round, and reactive to light.       Cardiovascular:      Rate and Rhythm: Normal rate and regular rhythm.      Pulses: Normal pulses.      Heart sounds: Normal heart sounds. No murmur heard.     No friction rub. No gallop.   Pulmonary:      Effort: Pulmonary effort is normal. No respiratory distress.      Breath sounds: Normal breath sounds. No stridor. No wheezing, rhonchi or rales.   Chest:      Chest wall: No tenderness.   Abdominal:      General: Abdomen is flat. Bowel sounds are normal. There is no distension.      Palpations: Abdomen is soft. There is no mass.      Tenderness: There is no abdominal tenderness. There is no guarding or rebound.   Genitourinary:     Comments: Chronic Carrera in place draining clear urine    Musculoskeletal:         General: No swelling, tenderness, deformity or signs of injury. Normal range of motion.      Cervical back: Normal range of motion and neck supple. No rigidity. No muscular tenderness.      Comments: Chronic rheumatoid arthritis lateral deviation of the fingers deformity noted     Skin:     General: Skin is warm and dry.      Capillary Refill: Capillary refill takes less than 2 seconds.      Coloration: Skin is not jaundiced or pale.      Findings: No bruising, erythema, lesion or rash.      Neurological:      General: No focal deficit present.      Mental Status: She is alert and oriented to person, place, and time. Mental status is at baseline.      Cranial Nerves: No cranial nerve deficit.      Sensory: No sensory deficit.      Motor: No weakness.      Coordination: Coordination normal.     Psychiatric:         Mood and Affect: Mood normal.         Behavior: Behavior normal.         Thought Content: Thought content normal.         Judgment: Judgment normal.           Discussion with Family: Patient is AAOx4, I specifically asked her throughout hospitalization and at the date of discharge if she would like her plan discussed with anyone - she politely declined    Discharge instructions/Information to patient and family:   See after visit summary for information provided to patient and family.      Provisions for Follow-Up Care:  See after visit summary for information related to follow-up care and any pertinent home health orders.      Disposition:     Home    For Discharges to [facility]:   Not Applicable to this Patient - Not Applicable to this Patient    Planned Readmission: No     Discharge Statement:  I spent 81 minutes discharging the patient. This time was spent on the day of discharge. I had direct contact with the patient on the day of discharge. Greater than 50% of the total time was spent examining patient, answering all patient questions, arranging and discussing plan of care with patient as well as directly providing post-discharge instructions.  Additional time then spent on discharge activities.    Discharge Medications:  See after visit summary for reconciled discharge medications provided to patient and family.      ** Please Note: This note has been constructed using a voice recognition system **

## 2025-07-21 ENCOUNTER — TRANSITIONAL CARE MANAGEMENT (OUTPATIENT)
Dept: INTERNAL MEDICINE CLINIC | Facility: CLINIC | Age: 85
End: 2025-07-21

## 2025-07-21 ENCOUNTER — PATIENT OUTREACH (OUTPATIENT)
Dept: CASE MANAGEMENT | Facility: OTHER | Age: 85
End: 2025-07-21

## 2025-07-21 LAB
BACTERIA BLD CULT: NORMAL
BACTERIA BLD CULT: NORMAL

## 2025-07-21 NOTE — PROGRESS NOTES
HRR received following ED visit with admission for IV abx. Patient has permanent herrera catheter and frequently gets infections in the kidney from having the catheter.     Called patient to introduce her to OPCM program. Patient stated she had surgery a year ago and the catheter was to be removed in 2 days, but was left in for 2 weeks which at that point she no longer had the ability to control urinating. She stated the solution was a permanent urinary catheter, but she has had repeated infections. Patient does follow with urology and nephrology. AVS reviewed including catheter care. Patient has follow up with PCP in August. She drives herself, lives along and has not financial concerns. Patient also states she has a good support system. One time outreach completed no further outreach needed.

## 2025-07-24 ENCOUNTER — OFFICE VISIT (OUTPATIENT)
Dept: HEMATOLOGY ONCOLOGY | Facility: CLINIC | Age: 85
End: 2025-07-24
Payer: MEDICARE

## 2025-07-24 VITALS
DIASTOLIC BLOOD PRESSURE: 60 MMHG | TEMPERATURE: 98.6 F | HEART RATE: 115 BPM | SYSTOLIC BLOOD PRESSURE: 142 MMHG | OXYGEN SATURATION: 99 % | RESPIRATION RATE: 17 BRPM | HEIGHT: 64 IN | WEIGHT: 173 LBS | BODY MASS INDEX: 29.53 KG/M2

## 2025-07-24 DIAGNOSIS — D63.1 ANEMIA OF CHRONIC RENAL FAILURE, STAGE 4 (SEVERE)  (HCC): Primary | ICD-10-CM

## 2025-07-24 DIAGNOSIS — R91.1 PULMONARY NODULE 1 CM OR GREATER IN DIAMETER: ICD-10-CM

## 2025-07-24 DIAGNOSIS — E83.42 HYPOMAGNESEMIA: ICD-10-CM

## 2025-07-24 DIAGNOSIS — E03.9 HYPOTHYROIDISM, UNSPECIFIED TYPE: Chronic | ICD-10-CM

## 2025-07-24 DIAGNOSIS — E78.5 HYPERLIPIDEMIA, UNSPECIFIED HYPERLIPIDEMIA TYPE: ICD-10-CM

## 2025-07-24 DIAGNOSIS — I10 HYPERTENSION, UNSPECIFIED TYPE: ICD-10-CM

## 2025-07-24 DIAGNOSIS — N18.4 ANEMIA OF CHRONIC RENAL FAILURE, STAGE 4 (SEVERE)  (HCC): Primary | ICD-10-CM

## 2025-07-24 DIAGNOSIS — Z86.718 HISTORY OF DVT (DEEP VEIN THROMBOSIS): ICD-10-CM

## 2025-07-24 DIAGNOSIS — Z85.3 PERSONAL HISTORY OF BREAST CANCER: ICD-10-CM

## 2025-07-24 DIAGNOSIS — N18.4 CHRONIC RENAL DISEASE, STAGE IV (HCC): ICD-10-CM

## 2025-07-24 DIAGNOSIS — R79.9 ABNORMAL BLOOD CHEMISTRY: ICD-10-CM

## 2025-07-24 PROCEDURE — 99213 OFFICE O/P EST LOW 20 MIN: CPT | Performed by: PHYSICIAN ASSISTANT

## 2025-07-24 RX ORDER — LEVOTHYROXINE SODIUM 75 UG/1
75 TABLET ORAL DAILY
Qty: 90 TABLET | Refills: 1 | Status: SHIPPED | OUTPATIENT
Start: 2025-07-24

## 2025-07-24 NOTE — TELEPHONE ENCOUNTER
Reason for call:   [x] Refill   [] Prior Auth  [] Other:     Office:   [x] PCP/Provider -   [] Specialty/Provider -     Medication: levothyroxine (Synthroid) 75 mcg     Dose/Frequency: Take 1 tablet (75 mcg total) by mouth in the morning     Quantity: 90    Pharmacy: [facility] - [place] - [facility]     Local Pharmacy   Does the patient have enough for 3 days?   [] Yes   [] No - Send as HP to POD    Mail Away Pharmacy   Does the patient have enough for 10 days?   [] Yes   [x] No - Send as HP to POD

## 2025-07-28 ENCOUNTER — OFFICE VISIT (OUTPATIENT)
Dept: INTERNAL MEDICINE CLINIC | Facility: CLINIC | Age: 85
End: 2025-07-28
Payer: MEDICARE

## 2025-07-28 VITALS
WEIGHT: 173 LBS | OXYGEN SATURATION: 97 % | SYSTOLIC BLOOD PRESSURE: 136 MMHG | BODY MASS INDEX: 29.53 KG/M2 | HEIGHT: 64 IN | DIASTOLIC BLOOD PRESSURE: 76 MMHG | HEART RATE: 78 BPM

## 2025-07-28 DIAGNOSIS — R31.9 URINARY TRACT INFECTION WITH HEMATURIA, SITE UNSPECIFIED: Primary | ICD-10-CM

## 2025-07-28 DIAGNOSIS — Z99.89 USES WALKER: ICD-10-CM

## 2025-07-28 DIAGNOSIS — R33.9 URINARY RETENTION: ICD-10-CM

## 2025-07-28 DIAGNOSIS — N39.0 URINARY TRACT INFECTION WITH HEMATURIA, SITE UNSPECIFIED: Primary | ICD-10-CM

## 2025-07-28 DIAGNOSIS — R10.9 FLANK PAIN: ICD-10-CM

## 2025-07-28 PROCEDURE — 99496 TRANSJ CARE MGMT HIGH F2F 7D: CPT | Performed by: INTERNAL MEDICINE

## 2025-08-04 ENCOUNTER — NURSE TRIAGE (OUTPATIENT)
Age: 85
End: 2025-08-04

## 2025-08-04 ENCOUNTER — TELEPHONE (OUTPATIENT)
Age: 85
End: 2025-08-04

## 2025-08-06 ENCOUNTER — PROCEDURE VISIT (OUTPATIENT)
Dept: UROLOGY | Facility: CLINIC | Age: 85
End: 2025-08-06
Payer: MEDICARE

## 2025-08-06 VITALS
DIASTOLIC BLOOD PRESSURE: 84 MMHG | SYSTOLIC BLOOD PRESSURE: 136 MMHG | BODY MASS INDEX: 29.71 KG/M2 | TEMPERATURE: 98.2 F | HEIGHT: 64 IN | OXYGEN SATURATION: 98 % | WEIGHT: 174 LBS | HEART RATE: 68 BPM

## 2025-08-06 DIAGNOSIS — R33.9 CHRONIC RETENTION OF URINE: Primary | ICD-10-CM

## 2025-08-06 PROCEDURE — 51702 INSERT TEMP BLADDER CATH: CPT

## 2025-08-15 PROBLEM — N39.0 UTI (URINARY TRACT INFECTION): Status: RESOLVED | Noted: 2024-01-18 | Resolved: 2025-08-15

## (undated) DEVICE — BASIC SINGLE BASIN 2-LF: Brand: MEDLINE INDUSTRIES, INC.

## (undated) DEVICE — Device: Brand: OLYMPUS

## (undated) DEVICE — CATH FOLEY 20FR 5ML 2 WAY SILICONE ELASTIMER

## (undated) DEVICE — PREMIUM DRY TRAY LF: Brand: MEDLINE INDUSTRIES, INC.

## (undated) DEVICE — GLOVE SRG BIOGEL 7

## (undated) DEVICE — STERILE SURGICAL LUBRICANT,  TUBE: Brand: SURGILUBE

## (undated) DEVICE — BAG URINE DRAINAGE 2000ML ANTI RFLX LF

## (undated) DEVICE — INVIEW CLEAR LEGGINGS: Brand: CONVERTORS

## (undated) DEVICE — PACK TUR

## (undated) DEVICE — TUBING SUCTION 5MM X 12 FT

## (undated) DEVICE — PAD GROUNDING ADULT

## (undated) DEVICE — CHLORHEXIDINE 4PCT 4 OZ

## (undated) DEVICE — EVACUATOR BLADDER ELLIK DISP STRL